# Patient Record
Sex: FEMALE | Race: WHITE | NOT HISPANIC OR LATINO | Employment: FULL TIME | URBAN - METROPOLITAN AREA
[De-identification: names, ages, dates, MRNs, and addresses within clinical notes are randomized per-mention and may not be internally consistent; named-entity substitution may affect disease eponyms.]

---

## 2017-01-16 ENCOUNTER — APPOINTMENT (OUTPATIENT)
Dept: GENERAL RADIOLOGY | Facility: CLINIC | Age: 32
End: 2017-01-16
Attending: EMERGENCY MEDICINE
Payer: COMMERCIAL

## 2017-01-16 ENCOUNTER — HOSPITAL ENCOUNTER (EMERGENCY)
Facility: CLINIC | Age: 32
Discharge: HOME | End: 2017-01-16
Attending: EMERGENCY MEDICINE | Admitting: EMERGENCY MEDICINE
Payer: COMMERCIAL

## 2017-01-16 VITALS — DIASTOLIC BLOOD PRESSURE: 81 MMHG | OXYGEN SATURATION: 100 % | TEMPERATURE: 97.8 F | SYSTOLIC BLOOD PRESSURE: 121 MMHG

## 2017-01-16 DIAGNOSIS — K59.09 OTHER CONSTIPATION: ICD-10-CM

## 2017-01-16 DIAGNOSIS — R10.84 ABDOMINAL PAIN, GENERALIZED: ICD-10-CM

## 2017-01-16 LAB
ALBUMIN SERPL-MCNC: 3.7 G/DL (ref 3.4–5)
ALBUMIN UR-MCNC: NEGATIVE MG/DL
ALP SERPL-CCNC: 45 U/L (ref 40–150)
ALT SERPL W P-5'-P-CCNC: 25 U/L (ref 0–50)
ANION GAP SERPL CALCULATED.3IONS-SCNC: 7 MMOL/L (ref 3–14)
APPEARANCE UR: CLEAR
AST SERPL W P-5'-P-CCNC: 22 U/L (ref 0–45)
BASOPHILS # BLD AUTO: 0 10E9/L (ref 0–0.2)
BASOPHILS NFR BLD AUTO: 0.2 %
BILIRUB SERPL-MCNC: 0.2 MG/DL (ref 0.2–1.3)
BILIRUB UR QL STRIP: NEGATIVE
BUN SERPL-MCNC: 12 MG/DL (ref 7–30)
CALCIUM SERPL-MCNC: 8.3 MG/DL (ref 8.5–10.1)
CHLORIDE SERPL-SCNC: 104 MMOL/L (ref 94–109)
CO2 SERPL-SCNC: 29 MMOL/L (ref 20–32)
COLOR UR AUTO: ABNORMAL
CREAT SERPL-MCNC: 0.64 MG/DL (ref 0.52–1.04)
DIFFERENTIAL METHOD BLD: ABNORMAL
EOSINOPHIL # BLD AUTO: 0.1 10E9/L (ref 0–0.7)
EOSINOPHIL NFR BLD AUTO: 1.5 %
ERYTHROCYTE [DISTWIDTH] IN BLOOD BY AUTOMATED COUNT: 12.4 % (ref 10–15)
GFR SERPL CREATININE-BSD FRML MDRD: ABNORMAL ML/MIN/1.7M2
GLUCOSE SERPL-MCNC: 88 MG/DL (ref 70–99)
GLUCOSE UR STRIP-MCNC: NEGATIVE MG/DL
HCG UR QL: NEGATIVE
HCT VFR BLD AUTO: 40.7 % (ref 35–47)
HGB BLD-MCNC: 13.5 G/DL (ref 11.7–15.7)
HGB UR QL STRIP: NEGATIVE
IMM GRANULOCYTES # BLD: 0 10E9/L (ref 0–0.4)
IMM GRANULOCYTES NFR BLD: 0 %
KETONES UR STRIP-MCNC: 10 MG/DL
LEUKOCYTE ESTERASE UR QL STRIP: ABNORMAL
LYMPHOCYTES # BLD AUTO: 1.7 10E9/L (ref 0.8–5.3)
LYMPHOCYTES NFR BLD AUTO: 32.3 %
MCH RBC QN AUTO: 30.3 PG (ref 26.5–33)
MCHC RBC AUTO-ENTMCNC: 33.2 G/DL (ref 31.5–36.5)
MCV RBC AUTO: 91 FL (ref 78–100)
MONOCYTES # BLD AUTO: 0.4 10E9/L (ref 0–1.3)
MONOCYTES NFR BLD AUTO: 7 %
MUCOUS THREADS #/AREA URNS LPF: PRESENT /LPF
NEUTROPHILS # BLD AUTO: 3.1 10E9/L (ref 1.6–8.3)
NEUTROPHILS NFR BLD AUTO: 59 %
NITRATE UR QL: NEGATIVE
PH UR STRIP: 6 PH (ref 5–7)
PLATELET # BLD AUTO: 124 10E9/L (ref 150–450)
POTASSIUM SERPL-SCNC: 3.6 MMOL/L (ref 3.4–5.3)
PROT SERPL-MCNC: 6.3 G/DL (ref 6.8–8.8)
RBC # BLD AUTO: 4.46 10E12/L (ref 3.8–5.2)
RBC #/AREA URNS AUTO: <1 /HPF (ref 0–2)
SODIUM SERPL-SCNC: 140 MMOL/L (ref 133–144)
SP GR UR STRIP: 1.02 (ref 1–1.03)
SQUAMOUS #/AREA URNS AUTO: 12 /HPF (ref 0–1)
URN SPEC COLLECT METH UR: ABNORMAL
UROBILINOGEN UR STRIP-MCNC: NORMAL MG/DL (ref 0–2)
WBC # BLD AUTO: 5.3 10E9/L (ref 4–11)
WBC #/AREA URNS AUTO: <1 /HPF (ref 0–2)

## 2017-01-16 PROCEDURE — 85025 COMPLETE CBC W/AUTO DIFF WBC: CPT | Performed by: FAMILY MEDICINE

## 2017-01-16 PROCEDURE — 99284 EMERGENCY DEPT VISIT MOD MDM: CPT | Mod: 25

## 2017-01-16 PROCEDURE — 96372 THER/PROPH/DIAG INJ SC/IM: CPT

## 2017-01-16 PROCEDURE — 81025 URINE PREGNANCY TEST: CPT | Performed by: FAMILY MEDICINE

## 2017-01-16 PROCEDURE — 80053 COMPREHEN METABOLIC PANEL: CPT | Performed by: FAMILY MEDICINE

## 2017-01-16 PROCEDURE — 81001 URINALYSIS AUTO W/SCOPE: CPT | Performed by: FAMILY MEDICINE

## 2017-01-16 PROCEDURE — 74020 XR ABDOMEN 2 VW: CPT

## 2017-01-16 PROCEDURE — 99284 EMERGENCY DEPT VISIT MOD MDM: CPT | Performed by: EMERGENCY MEDICINE

## 2017-01-16 PROCEDURE — 25000125 ZZHC RX 250: Performed by: EMERGENCY MEDICINE

## 2017-01-16 RX ORDER — SENNOSIDES 8.6 MG
2 TABLET ORAL 2 TIMES DAILY
COMMUNITY

## 2017-01-16 RX ORDER — KETOROLAC TROMETHAMINE 30 MG/ML
30 INJECTION, SOLUTION INTRAMUSCULAR; INTRAVENOUS ONCE
Status: COMPLETED | OUTPATIENT
Start: 2017-01-16 | End: 2017-01-16

## 2017-01-16 RX ADMIN — KETOROLAC TROMETHAMINE 30 MG: 30 INJECTION, SOLUTION INTRAMUSCULAR at 15:38

## 2017-01-16 NOTE — ED AVS SNAPSHOT
[facility]    [address] [address]    Phone:  [phone]    Fax:  [phone]                                       Merissa Leung   MRN: [id number]    Department:  [facility]   Date of Visit:  1/16/2017           After Visit Summary Signature Page     I have received my discharge instructions, and my questions have been answered. I have discussed any challenges I see with this plan with the nurse or doctor.    ..........................................................................................................................................  Patient/Patient Representative Signature      ..........................................................................................................................................  Patient Representative Print Name and Relationship to Patient    ..................................................               ................................................  Date                                            Time    ..........................................................................................................................................  Reviewed by Signature/Title    ...................................................              ..............................................  Date                                                            Time

## 2017-01-16 NOTE — ED NOTES
pt noticed last night at 2030 she passed a pinkish/white clot like, either vag/rectal unsure, now having pain from the top of neck down her back with more  in lower back into fronot, no bleeding today

## 2017-01-16 NOTE — ED PROVIDER NOTES
"  History     Chief Complaint   Patient presents with     Rectal Bleeding     pt noticed last night at  she passed a pinkish/white clot like, either vag/rectal unsure, now having pain from the top of neck down her back with more  in lower back into fronot, no bleeding today      Threatened Miscarriage     HPI  Merissa Leung is a 31 year old female with a history of chronic anxiety, abdominal pain, and chronic constipation who presents to the ED for concerns of possible rectal bleeding. Patient reports last night at 8:30 pm she developed back pain from the upper back down to the low back that has progressively worsened. She also noted lower abdominal pain. Around that time, she voided and then felt the need to have a bowel movement, but felt like she was straining excessively to pass it. Then when she looked in the toilet she saw \"a mass,\" pinkish/white in color. She explains that it did not look like a clot to her. She says that she has passed many clots in the past due to her history of IBS, food intolerances, bowel obstructions, and chronic constipation. Her bowel program includes daily Miralax and Senna and she has been doing this as instructed. Her bowel movements have been regular, but she has felt slightly constipated for about 4 weeks. She also had c. Diff within the last 6 months but this resolved completely. Patient has not taken any medication for her pain today. Denies nausea or vomiting. Surgical history includes a  11 months ago.      Patient Active Problem List   Diagnosis     Hyperlipidemia LDL goal <160     Headaches, tension     Chronic constipation     OCD (obsessive compulsive disorder)     Anxiety     Fibromyalgia     Chronic abdominal pain     Current Outpatient Prescriptions   Medication Sig Dispense Refill     sennosides (SENOKOT) 8.6 MG tablet Take 2 tablets by mouth 2 times daily       clonazePAM (KLONOPIN) 1 MG tablet Take 1/2 twice a day for anxiety, 1 and 1/2 at bedtime for " sleep 75 tablet 2     lisdexamfetamine (VYVANSE) 30 MG capsule Take 1 capsule (30 mg) by mouth daily 30 capsule 0     polyethylene glycol (MIRALAX/GLYCOLAX) powder Take 17 g by mouth daily as needed for constipation       Multiple Vitamins-Minerals (MULTIVITAL PO) Take 1 chew tab by mouth daily Gummi       [START ON 2/9/2017] lisdexamfetamine (VYVANSE) 30 MG capsule Take 1 capsule (30 mg) by mouth daily 30 capsule 0     ondansetron (ZOFRAN ODT) 4 MG disintegrating tablet Take 1-2 tablets (4-8 mg) by mouth every 8 hours as needed for nausea or vomiting 20 tablet 1     Allergies   Allergen Reactions     Dilaudid [Hydromorphone] Nausea and Vomiting     Nkda [No Known Drug Allergies]      I have reviewed the Medications, Allergies, Past Medical and Surgical History, and Social History in the Epic system.    Review of Systems  All other systems are reviewed and are negative.    Physical Exam   BP: 121/81 mmHg  Heart Rate: 68  Temp: 97.8  F (36.6  C)  SpO2: 100 %  Physical Exam   Nontoxic-appearing no respiratory distress alert and oriented ×3, lying in the fetal position, slow to move to us from sitting to supine  Head atraumatic normocephalic  Oropharynx moist without lesions or  Skin pink warm and dry  Lungs clear  Tenderness parathoracic paralumbar musculature without rash  Heart regular no murmur  Abdomen soft scaphoid bowel sounds positive mild diffuse tenderness without guarding or rebound    ED Course   Procedures             Labs Ordered and Resulted from Time of ED Arrival Up to the Time of Departure from the ED   URINE MACROSCOPIC WITH REFLEX TO MICRO - Abnormal; Notable for the following:     Ketones Urine 10 (*)     Leukocyte Esterase Urine Small (*)     Squamous Epithelial /HPF Urine 12 (*)     Mucous Urine Present (*)     All other components within normal limits   CBC WITH PLATELETS DIFFERENTIAL - Abnormal; Notable for the following:     Platelet Count 124 (*)     All other components within normal limits    COMPREHENSIVE METABOLIC PANEL - Abnormal; Notable for the following:     Calcium 8.3 (*)     Protein Total 6.3 (*)     All other components within normal limits   HCG QUALITATIVE URINE     No results found for this or any previous visit (from the past 24 hour(s)).    Medications   ketorolac (TORADOL) injection 30 mg (30 mg Intramuscular Given 1/16/17 4718)     Reevaluated after Toradol, Tylenol and tap water enema.  Symptoms subsided somewhat, flat and upright of the abdomen show no evidence for obstruction, moderate stool left lower colon and right lower colon.  2:55 PM Patient assessed.    Assessments & Plan (with Medical Decision Making)  31-year-old female acute on chronic abdominal pain history constipation, no evidence for intra-abdominal infection, ball distraction, urinary tract infection, cholecystitis versus other.  Exacerbation of chronic abdominal pain, recommend continuing bowel regimen, fleets enemas at home, follow-up with regular physician, return criteria reviewed.       I have reviewed the nursing notes.    I have reviewed the findings, diagnosis, plan and need for follow up with the patient.    Discharge Medication List as of 1/16/2017  5:35 PM          Final diagnoses:   Abdominal pain, generalized   Other constipation     This document serves as a record of the services and decisions personally performed and made by Saqib Rollins MD. It was created on HIS/HER behalf by Mirtha Ferguson, a trained medical scribe. The creation of this document is based the provider's statements to the medical scribe.  Mirtha Ferguson 2:36 PM 1/16/2017    Provider:   The information in this document, created by the medical scribe for me, accurately reflects the services I personally performed and the decisions made by me. I have reviewed and approved this document for accuracy prior to leaving the patient care area.  Saqib Rollins MD 2:36 PM 1/16/2017 1/16/2017   [facility]  DEPARTMENT      Saqib Rollins MD  01/17/17 1974

## 2017-01-16 NOTE — DISCHARGE INSTRUCTIONS
*Abdominal Pain, Unknown Cause (Female)    The exact cause of your abdominal (stomach) pain is not certain. This does not mean that this is something to worry about, or the right tests were not done. Everyone likes to know the exact cause of the problem, but sometimes with abdominal pain, there is no clear-cut cause, and this could be a good thing. The good news is that your symptoms can be treated, and you will feel better.   Your condition does not seem serious now; however, sometimes the signs of a serious problem may take more time to appear. For this reason, it is important for you to watch for any new symptoms, problems, or worsening of your condition.  Over the next few days, the abdominal pain may come and go, or be continuous. Other common symptoms can include nausea and vomiting. Sometimes it can be difficult to tell if you feel nauseous, you may just feel bad and not associate that feeling with nausea. Constipation, diarrhea, and a fever may go along with the pain.  The pain may continue even if treated correctly over the following days. Depending on how things go, sometimes the cause can become clear and may require further or different treatment. Additional evaluations, medications, or tests may be needed.  Home care  Your health care provider may prescribe medications for pain, symptoms, or an infection.  Follow the health care provider's instructions for taking these medications.  General care    Rest until your next exam. No strenuous activities.    Try to find positions that ease discomfort. A small pillow placed on the abdomen may help relieve pain.    Something warm on your abdomen (such as a heating pad) may help, but be careful not to burn yourself.  Diet    Do not force yourself to eat, especially if having cramps, vomiting, or diarrhea.    Water is important so you do not get dehydrated. Soup may also be good. Sports drinks may also help, especially if they are not too acidic. Make sure you  don't drink sugary drinks as this can make things worse. Take liquids in small amounts. Do not guzzle them.    Caffeine sometimes makes the pain and cramping worse.    Avoid dairy products if you have vomiting or diarrhea.    Don't eat large amounts at a time. Wait a few minutes between bites.    Eat a diet low in fiber (called a low-residue diet). Foods allowed include refined breads, white rice, fruit and vegetable juices without pulp, tender meats. These foods will pass more easily through the intestine.    Avoid fried or fatty foods, dairy, alcohol and spicy foods until your symptoms go away.  Follow-up care  Follow up with your health care provider as instructed, or if your pain does not begin to improve in the next 24 hours.  When to seek medical care  Seek prompt medical care if any of the following occur:    Pain gets worse or moves to the right lower abdomen    New or worsening vomiting or diarrhea    Swelling of the abdomen    Unable to pass stool for more than three days    New fever over 101  F (38.3 C), or rising fever    Blood in vomit or bowel movements (dark red or black color)    Jaundice (yellow color of eyes and skin)    Weakness, dizziness    Chest, arm, back, neck or jaw pain    Unexpected vaginal bleeding or missed period  Call 911  Call emergency services if any of the following occur:    Trouble breathing    Confusion    Fainting or loss of consciousness    Rapid heart rate    Seizure    [id number] [facility], [address] [place] [address], [place] [address]. All rights reserved. This information is not intended as a substitute for professional medical care. Always follow your healthcare professional's instructions.          Constipation (Adult)  Constipation means that you have bowel movements that are less frequent than usual. Stools often become very hard and difficult to pass.  Constipation is very common. At some point in life it affects almost everyone. Since everyone's bowel habits are  different, what is constipation to one person may not be to another. Your healthcare provider may do tests to diagnose constipation. It depends on what he or she finds when evaluating you.    Symptoms of constipation include:    Abdominal pain    Bloating    Vomiting    Painful bowel movements    Itching, swelling, bleeding, or pain around the anus  Causes  Constipation can have many causes. These include:    Diet low in fiber    Too much dairy    Not drinking enough liquids    Lack of exercise or physical activity. This is especially true for older adults.    Changes in lifestyle or daily routine, including pregnancy, aging, work, and travel    Frequent use or misuse of laxatives    Ignoring the urge to have a bowel movement or delaying it until later    Medicines, such as certain prescription pain medicines, iron supplements, antacids, certain antidepressants, and calcium supplements    Diseases like irritable bowel syndrome, bowel obstructions, stroke, diabetes, thyroid disease, Parkinson disease, hemorrhoids, and colon cancer  Complications  Potential complications of constipation can include:    Hemorrhoids    Rectal bleeding from hemorrhoids or anal fissures (skin tears)    Hernias    Dependency on laxatives    Chronic constipation    Fecal impaction    Bowel obstruction or perforation  Home care  All treatment should be done after talking with your healthcare provider. This is especially true if you have another medical problems, are taking prescription medicines, or are an older adult. Treatment most often involves lifestyle changes. You may also need medicines. Your healthcare provider will tell you which will work best for you. Follow the advice below to help avoid this problem in the future.  Lifestyle changes  These lifestyle changes can help prevent constipation:    Diet. Eat a high-fiber diet, with fresh fruit and vegetables, and reduce dairy intake, meats, and processed foods    Fluids. It's important  to get enough fluids each day. Drink plenty of water when you eat more fiber. If you are on diet that limits the amount of fluid you can have, talk about this with your healthcare provider.    Regular exercise. Check with your healthcare provider first.  Medications  Take any medicines as directed. Some laxatives are safe to use only every now and then. Others can be taken on a regular basis. Talk with your doctor or pharmacist if you have questions.  Prescription pain medicines can cause constipation. If you are taking this kind of medicine, ask your healthcare provider if you should also take a stool softener.  Medicines you may take to treat constipation include:    Fiber supplements    Stool softeners    Laxatives    Enemas    Rectal suppositories  Follow-up care  Follow up with your healthcare provider if symptoms don't get better in the next few days. You may need to have more tests or see a specialist.  Call 911  Call 911 if any of these occur:    Trouble breathing    Stiff, rigid abdomen that is severely painful to touch    Confusion    Fainting or loss of consciousness    Rapid heart rate    Chest pain  When to seek medical advice  Call your healthcare provider right away if any of these occur:    Fever over 100.4 F (38 C)    Failure to resume normal bowel movements    Pain in your abdomen or back gets worse    Nausea or vomiting    Swelling in your abdomen    Blood in the stool    Black, tarry stool    Involuntary weight loss    Weakness    [id number] [employer]. [address] [place] [address], [place] [address]. All rights reserved. This information is not intended as a substitute for professional medical care. Always follow your healthcare professional's instructions.      Try Fleets enema

## 2017-01-16 NOTE — ED NOTES
Tap water enema administered.  Pt placed on her left side of comfort and encouraged to hold in contents for at least 15 minutes.  Pt agreed.

## 2017-01-16 NOTE — ED AVS SNAPSHOT
[facility] [place] [facility]    [address]    [facility] [address]    Phone:  [phone]    Fax:  [phone]                                       Merissa Leung   MRN: [id number]    Department:  [facility] [place] [facility]   Date of Visit:  1/16/2017           Patient Information     Date Of Birth          1985        Your diagnoses for this visit were:     Abdominal pain, generalized     Other constipation        You were seen by Saqib Rollins MD.      Follow-up Information     Follow up with Mohini Mccord NP.    Specialty:  Nurse Practitioner - Adult Health    Contact information:    [facility]  [address] [facility] [address]  [phone]          Discharge Instructions         *Abdominal Pain, Unknown Cause (Female)    The exact cause of your abdominal (stomach) pain is not certain. This does not mean that this is something to worry about, or the right tests were not done. Everyone likes to know the exact cause of the problem, but sometimes with abdominal pain, there is no clear-cut cause, and this could be a good thing. The good news is that your symptoms can be treated, and you will feel better.   Your condition does not seem serious now; however, sometimes the signs of a serious problem may take more time to appear. For this reason, it is important for you to watch for any new symptoms, problems, or worsening of your condition.  Over the next few days, the abdominal pain may come and go, or be continuous. Other common symptoms can include nausea and vomiting. Sometimes it can be difficult to tell if you feel nauseous, you may just feel bad and not associate that feeling with nausea. Constipation, diarrhea, and a fever may go along with the pain.  The pain may continue even if treated correctly over the following days. Depending on how things go, sometimes the cause can become clear and may require further or different treatment. Additional  evaluations, medications, or tests may be needed.  Home care  Your health care provider may prescribe medications for pain, symptoms, or an infection.  Follow the health care provider's instructions for taking these medications.  General care    Rest until your next exam. No strenuous activities.    Try to find positions that ease discomfort. A small pillow placed on the abdomen may help relieve pain.    Something warm on your abdomen (such as a heating pad) may help, but be careful not to burn yourself.  Diet    Do not force yourself to eat, especially if having cramps, vomiting, or diarrhea.    Water is important so you do not get dehydrated. Soup may also be good. Sports drinks may also help, especially if they are not too acidic. Make sure you don't drink sugary drinks as this can make things worse. Take liquids in small amounts. Do not guzzle them.    Caffeine sometimes makes the pain and cramping worse.    Avoid dairy products if you have vomiting or diarrhea.    Don't eat large amounts at a time. Wait a few minutes between bites.    Eat a diet low in fiber (called a low-residue diet). Foods allowed include refined breads, white rice, fruit and vegetable juices without pulp, tender meats. These foods will pass more easily through the intestine.    Avoid fried or fatty foods, dairy, alcohol and spicy foods until your symptoms go away.  Follow-up care  Follow up with your health care provider as instructed, or if your pain does not begin to improve in the next 24 hours.  When to seek medical care  Seek prompt medical care if any of the following occur:    Pain gets worse or moves to the right lower abdomen    New or worsening vomiting or diarrhea    Swelling of the abdomen    Unable to pass stool for more than three days    New fever over 101  F (38.3 C), or rising fever    Blood in vomit or bowel movements (dark red or black color)    Jaundice (yellow color of eyes and skin)    Weakness, dizziness    Chest,  arm, back, neck or jaw pain    Unexpected vaginal bleeding or missed period  Call 911  Call emergency services if any of the following occur:    Trouble breathing    Confusion    Fainting or loss of consciousness    Rapid heart rate    Seizure    [id number] [employer], [address] [facility], [place] [address]. All rights reserved. This information is not intended as a substitute for professional medical care. Always follow your healthcare professional's instructions.          Constipation (Adult)  Constipation means that you have bowel movements that are less frequent than usual. Stools often become very hard and difficult to pass.  Constipation is very common. At some point in life it affects almost everyone. Since everyone's bowel habits are different, what is constipation to one person may not be to another. Your healthcare provider may do tests to diagnose constipation. It depends on what he or she finds when evaluating you.    Symptoms of constipation include:    Abdominal pain    Bloating    Vomiting    Painful bowel movements    Itching, swelling, bleeding, or pain around the anus  Causes  Constipation can have many causes. These include:    Diet low in fiber    Too much dairy    Not drinking enough liquids    Lack of exercise or physical activity. This is especially true for older adults.    Changes in lifestyle or daily routine, including pregnancy, aging, work, and travel    Frequent use or misuse of laxatives    Ignoring the urge to have a bowel movement or delaying it until later    Medicines, such as certain prescription pain medicines, iron supplements, antacids, certain antidepressants, and calcium supplements    Diseases like irritable bowel syndrome, bowel obstructions, stroke, diabetes, thyroid disease, Parkinson disease, hemorrhoids, and colon cancer  Complications  Potential complications of constipation can include:    Hemorrhoids    Rectal bleeding from hemorrhoids or anal fissures (skin  tears)    Hernias    Dependency on laxatives    Chronic constipation    Fecal impaction    Bowel obstruction or perforation  Home care  All treatment should be done after talking with your healthcare provider. This is especially true if you have another medical problems, are taking prescription medicines, or are an older adult. Treatment most often involves lifestyle changes. You may also need medicines. Your healthcare provider will tell you which will work best for you. Follow the advice below to help avoid this problem in the future.  Lifestyle changes  These lifestyle changes can help prevent constipation:    Diet. Eat a high-fiber diet, with fresh fruit and vegetables, and reduce dairy intake, meats, and processed foods    Fluids. It's important to get enough fluids each day. Drink plenty of water when you eat more fiber. If you are on diet that limits the amount of fluid you can have, talk about this with your healthcare provider.    Regular exercise. Check with your healthcare provider first.  Medications  Take any medicines as directed. Some laxatives are safe to use only every now and then. Others can be taken on a regular basis. Talk with your doctor or pharmacist if you have questions.  Prescription pain medicines can cause constipation. If you are taking this kind of medicine, ask your healthcare provider if you should also take a stool softener.  Medicines you may take to treat constipation include:    Fiber supplements    Stool softeners    Laxatives    Enemas    Rectal suppositories  Follow-up care  Follow up with your healthcare provider if symptoms don't get better in the next few days. You may need to have more tests or see a specialist.  Call 911  Call 911 if any of these occur:    Trouble breathing    Stiff, rigid abdomen that is severely painful to touch    Confusion    Fainting or loss of consciousness    Rapid heart rate    Chest pain  When to seek medical advice  Call your healthcare provider  right away if any of these occur:    Fever over 100.4 F (38 C)    Failure to resume normal bowel movements    Pain in your abdomen or back gets worse    Nausea or vomiting    Swelling in your abdomen    Blood in the stool    Black, tarry stool    Involuntary weight loss    Weakness    [id number] [employer]. [address] [place] [address]. All rights reserved. This information is not intended as a substitute for professional medical care. Always follow your healthcare professional's instructions.      Try Fleets enema    24 Hour Appointment Hotline       To make an appointment at any [facility], call [phone] ([phone]). If you don't have a family doctor or clinic, we will help you find one. [facility] are conveniently located to serve the needs of you and your family.             Review of your medicines      Our records show that you are taking the medicines listed below. If these are incorrect, please call your family doctor or clinic.        Dose / Directions Last dose taken    clonazePAM 1 MG tablet   Commonly known as:  klonoPIN   Quantity:  75 tablet        Take 1/2 twice a day for anxiety, 1 and 1/2 at bedtime for sleep   Refills:  2        * lisdexamfetamine 30 MG capsule   Commonly known as:  VYVANSE   Dose:  30 mg   Quantity:  30 capsule        Take 1 capsule (30 mg) by mouth daily   Refills:  0        * lisdexamfetamine 30 MG capsule   Commonly known as:  VYVANSE   Dose:  30 mg   Quantity:  30 capsule   Start taking on:  2/9/2017        Take 1 capsule (30 mg) by mouth daily   Refills:  0        MULTIVITAL PO   Dose:  1 chew tab        Take 1 chew tab by mouth daily Gummi   Refills:  0        ondansetron 4 MG ODT tab   Commonly known as:  ZOFRAN ODT   Dose:  4-8 mg   Quantity:  20 tablet        Take 1-2 tablets (4-8 mg) by mouth every 8 hours as needed for nausea or vomiting   Refills:  1        polyethylene glycol powder   Commonly known as:   MIRALAX/GLYCOLAX   Dose:  17 g        Take 17 g by mouth daily as needed for constipation   Refills:  0        sennosides 8.6 MG tablet   Commonly known as:  SENOKOT   Dose:  2 tablet        Take 2 tablets by mouth 2 times daily   Refills:  0        * Notice:  This list has 2 medication(s) that are the same as other medications prescribed for you. Read the directions carefully, and ask your doctor or other care provider to review them with you.            Procedures and tests performed during your visit     Abdomen, flat/upright (2 views)    CBC with platelets differential    Comprehensive metabolic panel    HCG qualitative urine    UA reflex to Microscopic      Orders Needing Specimen Collection     None      Pending Results     No orders found from 1/15/2017 to 1/17/2017.            Pending Culture Results     No orders found from 1/15/2017 to 1/17/2017.       Test Results from your hospital stay           1/16/2017  2:15 PM - Interface, Flexilab Results      Component Results     Component Value Ref Range & Units Status    Color Urine Dark Yellow  Final    Appearance Urine Clear  Final    Glucose Urine Negative NEG mg/dL Final    Bilirubin Urine Negative NEG Final    Ketones Urine 10 (A) NEG mg/dL Final    Specific Gravity Urine 1.017 1.003 - 1.035 Final    Blood Urine Negative NEG Final    pH Urine 6.0 5.0 - 7.0 pH Final    Protein Albumin Urine Negative NEG mg/dL Final    Urobilinogen mg/dL Normal 0.0 - 2.0 mg/dL Final    Nitrite Urine Negative NEG Final    Leukocyte Esterase Urine Small (A) NEG Final    Source Midstream Urine  Final    RBC Urine <1 0 - 2 /HPF Final    WBC Urine <1 0 - 2 /HPF Final    Squamous Epithelial /HPF Urine 12 (H) 0 - 1 /HPF Final    Mucous Urine Present (A) NEG /LPF Final         1/16/2017  2:09 PM - Interface, Flexilab Results      Component Results     Component Value Ref Range & Units Status    HCG Qual Urine Negative NEG Final         1/16/2017  2:08 PM - Interface, Flexilab  Results      Component Results     Component Value Ref Range & Units Status    WBC 5.3 4.0 - 11.0 10e9/L Final    RBC Count 4.46 3.8 - 5.2 10e12/L Final    Hemoglobin 13.5 11.7 - 15.7 g/dL Final    Hematocrit 40.7 35.0 - 47.0 % Final    MCV 91 78 - 100 fl Final    MCH 30.3 26.5 - 33.0 pg Final    MCHC 33.2 31.5 - 36.5 g/dL Final    RDW 12.4 10.0 - 15.0 % Final    Platelet Count 124 (L) 150 - 450 10e9/L Final    Diff Method Automated Method  Final    % Neutrophils 59.0 % Final    % Lymphocytes 32.3 % Final    % Monocytes 7.0 % Final    % Eosinophils 1.5 % Final    % Basophils 0.2 % Final    % Immature Granulocytes 0.0 % Final    Absolute Neutrophil 3.1 1.6 - 8.3 10e9/L Final    Absolute Lymphocytes 1.7 0.8 - 5.3 10e9/L Final    Absolute Monocytes 0.4 0.0 - 1.3 10e9/L Final    Absolute Eosinophils 0.1 0.0 - 0.7 10e9/L Final    Absolute Basophils 0.0 0.0 - 0.2 10e9/L Final    Abs Immature Granulocytes 0.0 0 - 0.4 10e9/L Final         1/16/2017  2:32 PM - Interface, Flexilab Results      Component Results     Component Value Ref Range & Units Status    Sodium 140 133 - 144 mmol/L Final    Potassium 3.6 3.4 - 5.3 mmol/L Final    Chloride 104 94 - 109 mmol/L Final    Carbon Dioxide 29 20 - 32 mmol/L Final    Anion Gap 7 3 - 14 mmol/L Final    Glucose 88 70 - 99 mg/dL Final    Urea Nitrogen 12 7 - 30 mg/dL Final    Creatinine 0.64 0.52 - 1.04 mg/dL Final    GFR Estimate >90  Non  GFR Calc   >60 mL/min/1.7m2 Final    GFR Estimate If Black >90   GFR Calc   >60 mL/min/1.7m2 Final    Calcium 8.3 (L) 8.5 - 10.1 mg/dL Final    Bilirubin Total 0.2 0.2 - 1.3 mg/dL Final    Albumin 3.7 3.4 - 5.0 g/dL Final    Protein Total 6.3 (L) 6.8 - 8.8 g/dL Final    Alkaline Phosphatase 45 40 - 150 U/L Final    ALT 25 0 - 50 U/L Final    AST 22 0 - 45 U/L Final         1/16/2017  3:43 PM - Interface, Radiant Ib      Narrative     XR ABDOMEN 2 VW 1/16/2017 3:30 PM    HISTORY: Abdominal pain.    COMPARISON:  "2013    FINDINGS: No evidence of free intraperitoneal air. The bowel gas  pattern is nonobstructive.        Impression     IMPRESSION: Nonobstructive gas pattern.    NAV ROBERTS MD                Thank you for choosing [facility]       Thank you for choosing [facility] for your care. Our goal is always to provide you with excellent care. Hearing back from our patients is one way we can continue to improve our services. Please take a few minutes to complete the written survey that you may receive in the mail after you visit with us. Thank you!        [employer] Information     [employer] lets you send messages to your doctor, view your test results, renew your prescriptions, schedule appointments and more. To sign up, go to [email or web address] . Click on \"Log in\" on the left side of the screen, which will take you to the Welcome page. Then click on \"Sign up Now\" on the right side of the page.     You will be asked to enter the access code listed below, as well as some personal information. Please follow the directions to create your username and password.     Your access code is: [email or web address]  Expires: 3/9/2017  8:14 AM     Your access code will  in 90 days. If you need help or a new code, please call your [facility] clinic or [phone].        Care EveryWhere ID     This is your Care EveryWhere ID. This could be used by other organizations to access your [facility] medical records  [id number]        After Visit Summary       This is your record. Keep this with you and show to your community pharmacist(s) and doctor(s) at your next visit.                  "

## 2017-03-07 ENCOUNTER — OFFICE VISIT (OUTPATIENT)
Dept: FAMILY MEDICINE | Facility: CLINIC | Age: 32
End: 2017-03-07
Payer: COMMERCIAL

## 2017-03-07 VITALS
BODY MASS INDEX: 17.21 KG/M2 | SYSTOLIC BLOOD PRESSURE: 129 MMHG | TEMPERATURE: 97.5 F | DIASTOLIC BLOOD PRESSURE: 81 MMHG | HEART RATE: 106 BPM | RESPIRATION RATE: 16 BRPM | WEIGHT: 105 LBS

## 2017-03-07 DIAGNOSIS — J06.9 VIRAL URI: ICD-10-CM

## 2017-03-07 DIAGNOSIS — R07.0 THROAT PAIN: Primary | ICD-10-CM

## 2017-03-07 LAB
DEPRECATED S PYO AG THROAT QL EIA: NORMAL
MICRO REPORT STATUS: NORMAL
SPECIMEN SOURCE: NORMAL

## 2017-03-07 PROCEDURE — 87081 CULTURE SCREEN ONLY: CPT | Performed by: NURSE PRACTITIONER

## 2017-03-07 PROCEDURE — 99212 OFFICE O/P EST SF 10 MIN: CPT | Performed by: NURSE PRACTITIONER

## 2017-03-07 PROCEDURE — 87880 STREP A ASSAY W/OPTIC: CPT | Performed by: NURSE PRACTITIONER

## 2017-03-07 NOTE — PROGRESS NOTES
SUBJECTIVE:                                                    Merissa Leung is a 31 year old female who presents to clinic today for the following health issues:       RESPIRATORY SYMPTOMS      Duration: 1 week    Description  sore throat, cough, wheezing, fever, fatigue/malaise and hoarse voice    Severity: moderate    Accompanying signs and symptoms: None    History (predisposing factors):  none    Precipitating or alleviating factors: None    Therapies tried and outcome:  OTC theraflu, ibuprofen, tylenol       Problem list and histories reviewed & adjusted, as indicated.  Additional history: as documented    Labs reviewed in EPIC    Reviewed and updated as needed this visit by clinical staff  Tobacco  Allergies  Meds  Problems  Med Hx  Surg Hx  Fam Hx  Soc Hx        Reviewed and updated as needed this visit by Provider  Allergies  Meds  Problems         ROS:  Constitutional, neuro, ENT, endocrine, pulmonary, cardiac, gastrointestinal, genitourinary, musculoskeletal, integument and psychiatric systems are negative, except as otherwise noted.    OBJECTIVE:                                                    /81 (BP Location: Right arm, Cuff Size: Adult Regular)  Pulse 106  Temp 97.5  F (36.4  C) (Tympanic)  Resp 16  Wt 105 lb (47.6 kg)  LMP 01/16/2017  BMI 17.21 kg/m2  Body mass index is 17.21 kg/(m^2).  GENERAL APPEARANCE: healthy, alert and no distress  HENT: ear canals and TM's normal and nose and mouth without ulcers or lesions  RESP: lungs clear to auscultation - no rales, rhonchi or wheezes  CV: regular rates and rhythm, normal S1 S2, no S3 or S4 and no murmur, click or rub  ABDOMEN: soft, nontender, without hepatosplenomegaly or masses and bowel sounds normal  SKIN: no suspicious lesions or rashes    Diagnostic test results:  Diagnostic Test Results:  Strep screen - Negative     ASSESSMENT/PLAN:                                                    1. Throat pain  - Strep, Rapid Screen  -  Beta strep group A culture    2. Viral URI      Patient Instructions   Suspect viral upper respiratory infection   Call if worsening the end of the week   Strep was negative       Viral Upper Respiratory Illness (Adult)  You have a viral upper respiratory illness (URI), which is another term for the common cold. This illness is contagious during the first few days. It is spread through the air by coughing and sneezing. It may also be spread by direct contact (touching the sick person and then touching your own eyes, nose, or mouth). Frequent handwashing will decrease risk of spread. Most viral illnesses go away within 7 to 10 days with rest and simple home remedies. Sometimes the illness may last for several weeks. Antibiotics will not kill a virus, and they are generally not prescribed for this condition.    Home care    If symptoms are severe, rest at home for the first 2 to 3 days. When you resume activity, don't let yourself get too tired.    Avoid being exposed to cigarette smoke (yours or others ).    You may use acetaminophen or ibuprofen to control pain and fever, unless another medicine was prescribed. (Note: If you have chronic liver or kidney disease, have ever had a stomach ulcer or gastrointestinal bleeding, or are taking blood-thinning medicines, talk with your healthcare provider before using these medicines.) Aspirin should never be given to anyone under 18 years of age who is ill with a viral infection or fever. It may cause severe liver or brain damage.    Your appetite may be poor, so a light diet is fine. Avoid dehydration by drinking 6 to 8 glasses of fluids per day (water, soft drinks, juices, tea, or soup). Extra fluids will help loosen secretions in the nose and lungs.    Over-the-counter cold medicines will not shorten the length of time you re sick, but they may be helpful for the following symptoms: cough, sore throat, and nasal and sinus congestion. (Note: Do not use decongestants if you  have high blood pressure.)  Follow-up care  Follow up with your healthcare provider, or as advised.  When to seek medical advice  Call your healthcare provider right away if any of these occur:    Cough with lots of colored sputum (mucus)    Severe headache; face, neck, or ear pain    Difficulty swallowing due to throat pain    Fever of 100.4 F (38 C)  Call 911, or get immediate medical care  Call emergency services right away if any of these occur:    Chest pain, shortness of breath, wheezing, or difficulty breathing    Coughing up blood    Inability to swallow due to throat pain    7927-8044 [employer]. [address] [place] [address]. All rights reserved. This information is not intended as a substitute for professional medical care. Always follow your healthcare professional's instructions.            Mohini Mccord NP  [facility]

## 2017-03-07 NOTE — LETTER
[facility]  [address] [facility] [address]  Phone: [phone]  Fax: [phone]    March 9, 2017    Merissa Leung  [address] [facility] [address]              Dear Ms. Leung,        The results of your recent throat culture were negative.  If you have any further questions or concerns please contact the clinic            Sincerely,      Анна Mccord NP/ ross

## 2017-03-07 NOTE — MR AVS SNAPSHOT
After Visit Summary   3/7/2017    Merissa Leung    MRN: [id number]           Patient Information     Date Of Birth          1985        Visit Information        Provider Department      3/7/2017 8:20 AM Mohini Mccord NP [facility]        Today's Diagnoses     Throat pain    -  1      Care Instructions    Suspect viral upper respiratory infection   Call if worsening the end of the week   Strep was negative       Viral Upper Respiratory Illness (Adult)  You have a viral upper respiratory illness (URI), which is another term for the common cold. This illness is contagious during the first few days. It is spread through the air by coughing and sneezing. It may also be spread by direct contact (touching the sick person and then touching your own eyes, nose, or mouth). Frequent handwashing will decrease risk of spread. Most viral illnesses go away within 7 to 10 days with rest and simple home remedies. Sometimes the illness may last for several weeks. Antibiotics will not kill a virus, and they are generally not prescribed for this condition.    Home care    If symptoms are severe, rest at home for the first 2 to 3 days. When you resume activity, don't let yourself get too tired.    Avoid being exposed to cigarette smoke (yours or others ).    You may use acetaminophen or ibuprofen to control pain and fever, unless another medicine was prescribed. (Note: If you have chronic liver or kidney disease, have ever had a stomach ulcer or gastrointestinal bleeding, or are taking blood-thinning medicines, talk with your healthcare provider before using these medicines.) Aspirin should never be given to anyone under 18 years of age who is ill with a viral infection or fever. It may cause severe liver or brain damage.    Your appetite may be poor, so a light diet is fine. Avoid dehydration by drinking 6 to 8 glasses of fluids per day (water, soft drinks, juices, tea, or soup). Extra fluids  will help loosen secretions in the nose and lungs.    Over-the-counter cold medicines will not shorten the length of time you re sick, but they may be helpful for the following symptoms: cough, sore throat, and nasal and sinus congestion. (Note: Do not use decongestants if you have high blood pressure.)  Follow-up care  Follow up with your healthcare provider, or as advised.  When to seek medical advice  Call your healthcare provider right away if any of these occur:    Cough with lots of colored sputum (mucus)    Severe headache; face, neck, or ear pain    Difficulty swallowing due to throat pain    Fever of 100.4 F (38 C)  Call 911, or get immediate medical care  Call emergency services right away if any of these occur:    Chest pain, shortness of breath, wheezing, or difficulty breathing    Coughing up blood    Inability to swallow due to throat pain    6211-0453 [employer]. [address] [place] [address]. All rights reserved. This information is not intended as a substitute for professional medical care. Always follow your healthcare professional's instructions.              Follow-ups after your visit        Who to contact     If you have questions or need follow up information about today's clinic visit or your schedule please contact [facility] directly at [phone].  Normal or non-critical lab and imaging results will be communicated to you by [employer] SPORT[employer]art, letter or phone within 4 business days after the clinic has received the results. If you do not hear from us within 7 days, please contact the clinic through [employer] SPORT[employer]art or phone. If you have a critical or abnormal lab result, we will notify you by phone as soon as possible.  Submit refill requests through "[email or web address] or call your pharmacy and they will forward the refill request to us. Please allow 3 business days for your refill to be completed.          Additional Information About Your Visit        MyChart Information   "   [employer] lets you send messages to your doctor, view your test results, renew your prescriptions, schedule appointments and more. To sign up, go to [email or web address] . Click on \"Log in\" on the left side of the screen, which will take you to the Welcome page. Then click on \"Sign up Now\" on the right side of the page.     You will be asked to enter the access code listed below, as well as some personal information. Please follow the directions to create your username and password.     Your access code is: [email or web address]  Expires: 3/9/2017  8:14 AM     Your access code will  in 90 days. If you need help or a new code, please call your [facility] clinic or [phone].        Care EveryWhere ID     This is your [facility] EveryWhere ID. This could be used by other organizations to access your [facility] medical records  [id number]        Your Vitals Were     Pulse Temperature Respirations Last Period BMI (Body Mass Index)       106 97.5  F (36.4  C) (Tympanic) 96 2017 17.21 kg/m2        Blood Pressure from Last 3 Encounters:   17 129/81   17 121/81   16 118/76    Weight from Last 3 Encounters:   17 105 lb (47.6 kg)   16 105 lb (47.6 kg)   10/03/16 106 lb (48.1 kg)              We Performed the Following     Beta strep group A culture     Strep, Rapid Screen        Primary Care Provider Office Phone # Fax #    Mohini RAHEL Mccord [phone]       [facility] [address]  [facility] [address]        Thank you!     Thank you for choosing [facility]  for your care. Our goal is always to provide you with excellent care. Hearing back from our patients is one way we can continue to improve our services. Please take a few minutes to complete the written survey that you may receive in the mail after your visit with us. Thank you!             Your Updated Medication List - Protect others around you: Learn how to safely use, store and throw " away your medicines at www.disposemymeds.org.          This list is accurate as of: 3/7/17  8:51 AM.  Always use your most recent med list.                   Brand Name Dispense Instructions for use    clonazePAM 1 MG tablet    klonoPIN    75 tablet    Take 1/2 twice a day for anxiety, 1 and 1/2 at bedtime for sleep       lisdexamfetamine 30 MG capsule    VYVANSE    30 capsule    Take 1 capsule (30 mg) by mouth daily       MULTIVITAL PO      Take 1 chew tab by mouth daily Gummi       ondansetron 4 MG ODT tab    ZOFRAN ODT    20 tablet    Take 1-2 tablets (4-8 mg) by mouth every 8 hours as needed for nausea or vomiting       polyethylene glycol powder    MIRALAX/GLYCOLAX     Take 17 g by mouth daily as needed for constipation       sennosides 8.6 MG tablet    SENOKOT     Take 2 tablets by mouth 2 times daily

## 2017-03-07 NOTE — PATIENT INSTRUCTIONS
Suspect viral upper respiratory infection   Call if worsening the end of the week   Strep was negative       Viral Upper Respiratory Illness (Adult)  You have a viral upper respiratory illness (URI), which is another term for the common cold. This illness is contagious during the first few days. It is spread through the air by coughing and sneezing. It may also be spread by direct contact (touching the sick person and then touching your own eyes, nose, or mouth). Frequent handwashing will decrease risk of spread. Most viral illnesses go away within 7 to 10 days with rest and simple home remedies. Sometimes the illness may last for several weeks. Antibiotics will not kill a virus, and they are generally not prescribed for this condition.    Home care    If symptoms are severe, rest at home for the first 2 to 3 days. When you resume activity, don't let yourself get too tired.    Avoid being exposed to cigarette smoke (yours or others ).    You may use acetaminophen or ibuprofen to control pain and fever, unless another medicine was prescribed. (Note: If you have chronic liver or kidney disease, have ever had a stomach ulcer or gastrointestinal bleeding, or are taking blood-thinning medicines, talk with your healthcare provider before using these medicines.) Aspirin should never be given to anyone under 18 years of age who is ill with a viral infection or fever. It may cause severe liver or brain damage.    Your appetite may be poor, so a light diet is fine. Avoid dehydration by drinking 6 to 8 glasses of fluids per day (water, soft drinks, juices, tea, or soup). Extra fluids will help loosen secretions in the nose and lungs.    Over-the-counter cold medicines will not shorten the length of time you re sick, but they may be helpful for the following symptoms: cough, sore throat, and nasal and sinus congestion. (Note: Do not use decongestants if you have high blood pressure.)  Follow-up care  Follow up with your  healthcare provider, or as advised.  When to seek medical advice  Call your healthcare provider right away if any of these occur:    Cough with lots of colored sputum (mucus)    Severe headache; face, neck, or ear pain    Difficulty swallowing due to throat pain    Fever of 100.4 F (38 C)  Call 911, or get immediate medical care  Call emergency services right away if any of these occur:    Chest pain, shortness of breath, wheezing, or difficulty breathing    Coughing up blood    Inability to swallow due to throat pain    9613-8278 [employer]. [address] [place] [address]. All rights reserved. This information is not intended as a substitute for professional medical care. Always follow your healthcare professional's instructions.

## 2017-03-07 NOTE — NURSING NOTE
"Chief Complaint   Patient presents with     URI     x 1 week       Initial /81 (BP Location: Right arm, Cuff Size: Adult Regular)  Pulse 106  Temp 97.5  F (36.4  C) (Tympanic)  Resp (!) 96  Wt 105 lb (47.6 kg)  LMP 01/16/2017  BMI 17.21 kg/m2 Estimated body mass index is 17.21 kg/(m^2) as calculated from the following:    Height as of 7/7/16: 5' 5.5\" (1.664 m).    Weight as of this encounter: 105 lb (47.6 kg).  Medication Reconciliation: complete    Health Maintenance that is potentially due pending provider review:  NONE    n/a    Juliana Valdez, CMA    "

## 2017-03-09 LAB
BACTERIA SPEC CULT: NORMAL
MICRO REPORT STATUS: NORMAL
SPECIMEN SOURCE: NORMAL

## 2017-03-21 ENCOUNTER — OFFICE VISIT (OUTPATIENT)
Dept: FAMILY MEDICINE | Facility: CLINIC | Age: 32
End: 2017-03-21
Payer: COMMERCIAL

## 2017-03-21 VITALS
RESPIRATION RATE: 16 BRPM | WEIGHT: 107 LBS | TEMPERATURE: 97.1 F | DIASTOLIC BLOOD PRESSURE: 70 MMHG | BODY MASS INDEX: 17.53 KG/M2 | HEART RATE: 72 BPM | SYSTOLIC BLOOD PRESSURE: 110 MMHG

## 2017-03-21 DIAGNOSIS — F50.819 BINGE EATING DISORDER: ICD-10-CM

## 2017-03-21 DIAGNOSIS — F41.1 GAD (GENERALIZED ANXIETY DISORDER): ICD-10-CM

## 2017-03-21 PROCEDURE — 99213 OFFICE O/P EST LOW 20 MIN: CPT | Performed by: NURSE PRACTITIONER

## 2017-03-21 RX ORDER — LISDEXAMFETAMINE DIMESYLATE 30 MG/1
30 CAPSULE ORAL DAILY
Qty: 30 CAPSULE | Refills: 0 | Status: SHIPPED | OUTPATIENT
Start: 2017-05-21 | End: 2017-06-20

## 2017-03-21 RX ORDER — CLONAZEPAM 1 MG/1
TABLET ORAL
Qty: 75 TABLET | Refills: 2 | Status: SHIPPED | OUTPATIENT
Start: 2017-03-21 | End: 2017-06-26

## 2017-03-21 RX ORDER — LISDEXAMFETAMINE DIMESYLATE 30 MG/1
30 CAPSULE ORAL DAILY
Qty: 30 CAPSULE | Refills: 0 | Status: SHIPPED | OUTPATIENT
Start: 2017-03-21 | End: 2017-06-26

## 2017-03-21 RX ORDER — LISDEXAMFETAMINE DIMESYLATE 30 MG/1
30 CAPSULE ORAL DAILY
Qty: 30 CAPSULE | Refills: 0 | Status: SHIPPED | OUTPATIENT
Start: 2017-04-21 | End: 2017-07-20 | Stop reason: DRUGHIGH

## 2017-03-21 NOTE — MR AVS SNAPSHOT
"              After Visit Summary   3/21/2017    Merissa Leung    MRN: [id number]           Patient Information     Date Of Birth          1985        Visit Information        Provider Department      3/21/2017 8:40 AM Mohini Mccord NP [facility]        Today's Diagnoses     Binge eating disorder        EDWARD (generalized anxiety disorder)          Care Instructions    meds refilled for 3 months         Follow-ups after your visit        Who to contact     If you have questions or need follow up information about today's clinic visit or your schedule please contact [facility] directly at [phone].  Normal or non-critical lab and imaging results will be communicated to you by Shawarmanjihart, letter or phone within 4 business days after the clinic has received the results. If you do not hear from us within 7 days, please contact the clinic through Shawarmanjihart or phone. If you have a critical or abnormal lab result, we will notify you by phone as soon as possible.  Submit refill requests through 5minutes or call your pharmacy and they will forward the refill request to us. Please allow 3 business days for your refill to be completed.          Additional Information About Your Visit        MyChart Information     5minutes lets you send messages to your doctor, view your test results, renew your prescriptions, schedule appointments and more. To sign up, go to [email or web address] . Click on \"Log in\" on the left side of the screen, which will take you to the Welcome page. Then click on \"Sign up Now\" on the right side of the page.     You will be asked to enter the access code listed below, as well as some personal information. Please follow the directions to create your username and password.     Your access code is: [email or web address]  Expires: 2017  8:59 AM     Your access code will  in 90 days. If you need help or a new code, please call your [facility] or [phone].      "   Care EveryWhere ID     This is your Care EveryWhere ID. This could be used by other organizations to access your [facility] medical records  [id number]        Your Vitals Were     Pulse Temperature Respirations Last Period Breastfeeding? BMI (Body Mass Index)    72 97.1  F (36.2  C) (Tympanic) 16 01/16/2017 No 17.53 kg/m2       Blood Pressure from Last 3 Encounters:   03/21/17 110/70   03/07/17 129/81   01/16/17 121/81    Weight from Last 3 Encounters:   03/21/17 107 lb (48.5 kg)   03/07/17 105 lb (47.6 kg)   12/09/16 105 lb (47.6 kg)              Today, you had the following     No orders found for display         Today's Medication Changes          These changes are accurate as of: 3/21/17  8:59 AM.  If you have any questions, ask your nurse or doctor.               Start taking these medicines.        Dose/Directions    * lisdexamfetamine 30 MG capsule   Commonly known as:  VYVANSE   Used for:  Binge eating disorder   Started by:  Mohini Mccord NP        Dose:  30 mg   Take 1 capsule (30 mg) by mouth daily   Quantity:  30 capsule   Refills:  0       * lisdexamfetamine 30 MG capsule   Commonly known as:  VYVANSE   Used for:  Binge eating disorder   Started by:  Mohini Mccord NP        Dose:  30 mg   Start taking on:  4/21/2017   Take 1 capsule (30 mg) by mouth daily   Quantity:  30 capsule   Refills:  0       * lisdexamfetamine 30 MG capsule   Commonly known as:  VYVANSE   Used for:  Binge eating disorder   Started by:  Mohini Mccord NP        Dose:  30 mg   Start taking on:  5/21/2017   Take 1 capsule (30 mg) by mouth daily   Quantity:  30 capsule   Refills:  0       * Notice:  This list has 3 medication(s) that are the same as other medications prescribed for you. Read the directions carefully, and ask your doctor or other care provider to review them with you.         Where to get your medicines      Some of these will need a paper prescription and others can be bought over the counter.  Ask your  nurse if you have questions.     Bring a paper prescription for each of these medications     clonazePAM 1 MG tablet    lisdexamfetamine 30 MG capsule    lisdexamfetamine 30 MG capsule    lisdexamfetamine 30 MG capsule                Primary Care Provider Office Phone # Fax #    Mohini Mccord [phone]       [facility] [address]  [facility] [place] [address]        Thank you!     Thank you for choosing [facility]  for your care. Our goal is always to provide you with excellent care. Hearing back from our patients is one way we can continue to improve our services. Please take a few minutes to complete the written survey that you may receive in the mail after your visit with us. Thank you!             Your Updated Medication List - Protect others around you: Learn how to safely use, store and throw away your medicines at www.disposemymeds.org.          This list is accurate as of: 3/21/17  8:59 AM.  Always use your most recent med list.                   Brand Name Dispense Instructions for use    clonazePAM 1 MG tablet    klonoPIN    75 tablet    Take 1/2 twice a day for anxiety, 1 and 1/2 at bedtime for sleep       * lisdexamfetamine 30 MG capsule    VYVANSE    30 capsule    Take 1 capsule (30 mg) by mouth daily       * lisdexamfetamine 30 MG capsule   Start taking on:  4/21/2017    VYVANSE    30 capsule    Take 1 capsule (30 mg) by mouth daily       * lisdexamfetamine 30 MG capsule   Start taking on:  5/21/2017    VYVANSE    30 capsule    Take 1 capsule (30 mg) by mouth daily       MULTIVITAL PO      Take 1 chew tab by mouth daily Gummi       ondansetron 4 MG ODT tab    ZOFRAN ODT    20 tablet    Take 1-2 tablets (4-8 mg) by mouth every 8 hours as needed for nausea or vomiting       polyethylene glycol powder    MIRALAX/GLYCOLAX     Take 17 g by mouth daily as needed for constipation       sennosides 8.6 MG tablet    SENOKOT     Take 2 tablets by mouth 2 times  daily       * Notice:  This list has 3 medication(s) that are the same as other medications prescribed for you. Read the directions carefully, and ask your doctor or other care provider to review them with you.

## 2017-03-21 NOTE — PROGRESS NOTES
SUBJECTIVE:                                                    Merissa Leung is a 32 year old female who presents to clinic today for the following health issues:       Medication Followup     Taking Medication as prescribed: yes    Side Effects:  None    Medication Helping Symptoms:  yes     Doing well on medications   No concerns today     Wt Readings from Last 4 Encounters:   03/21/17 107 lb (48.5 kg)   03/07/17 105 lb (47.6 kg)   12/09/16 105 lb (47.6 kg)   10/03/16 106 lb (48.1 kg)       Problem list and histories reviewed & adjusted, as indicated.  Additional history: as documented    Labs reviewed in EPIC    Reviewed and updated as needed this visit by clinical staff  Tobacco  Allergies  Med Hx  Surg Hx  Fam Hx  Soc Hx      Reviewed and updated as needed this visit by Provider         ROS:  Constitutional, HEENT, cardiovascular, pulmonary, gi and gu systems are negative, except as otherwise noted.    OBJECTIVE:                                                    /70 (BP Location: Right arm, Cuff Size: Adult Regular)  Pulse 72  Temp 97.1  F (36.2  C) (Tympanic)  Resp 16  Wt 107 lb (48.5 kg)  LMP 01/16/2017  Breastfeeding? No  BMI 17.53 kg/m2  Body mass index is 17.53 kg/(m^2).  GENERAL APPEARANCE: healthy, alert and no distress  RESP: lungs clear to auscultation - no rales, rhonchi or wheezes  CV: regular rates and rhythm, normal S1 S2, no S3 or S4 and no murmur, click or rub  PSYCH: mentation appears normal and affect normal/bright    Diagnostic test results:  Diagnostic Test Results:  none      ASSESSMENT/PLAN:                                                    1. Binge eating disorder  Refilled for 3 months   Doing well on medications  - lisdexamfetamine (VYVANSE) 30 MG capsule; Take 1 capsule (30 mg) by mouth daily  Dispense: 30 capsule; Refill: 0  - lisdexamfetamine (VYVANSE) 30 MG capsule; Take 1 capsule (30 mg) by mouth daily  Dispense: 30 capsule; Refill: 0  - lisdexamfetamine (VYVANSE)  30 MG capsule; Take 1 capsule (30 mg) by mouth daily  Dispense: 30 capsule; Refill: 0    2. EDWARD (generalized anxiety disorder)  Doing well   - clonazePAM (KLONOPIN) 1 MG tablet; Take 1/2 twice a day for anxiety, 1 and 1/2 at bedtime for sleep  Dispense: 75 tablet; Refill: 2      Patient Instructions   meds refilled for 3 months       Mohini Mccodr NP  [facility]

## 2017-03-21 NOTE — NURSING NOTE
"Chief Complaint   Patient presents with     Anxiety     med refills       Initial /70 (BP Location: Right arm, Cuff Size: Adult Regular)  Pulse 72  Temp 97.1  F (36.2  C) (Tympanic)  Resp 16  Wt 107 lb (48.5 kg)  LMP 01/16/2017  Breastfeeding? No  BMI 17.53 kg/m2 Estimated body mass index is 17.53 kg/(m^2) as calculated from the following:    Height as of 7/7/16: 5' 5.5\" (1.664 m).    Weight as of this encounter: 107 lb (48.5 kg).  Medication Reconciliation: complete    Health Maintenance that is potentially due pending provider review:  NONE    n/a      Juliana Valdez, CMA    "

## 2017-06-20 DIAGNOSIS — F50.819 BINGE EATING DISORDER: ICD-10-CM

## 2017-06-20 NOTE — TELEPHONE ENCOUNTER
Luan      Last Written Prescription Date:  5/21/2017  Last Fill Quantity: 30,   # refills: 0  Last Office Visit with [facility], [facility] or [facility] prescribing provider: 3/21/2017  Future Office visit:    Next 5 appointments (look out 90 days)     Jun 23, 2017  9:40 AM CDT   [facility]ccord [facility]    [address] [place] [id number]   [phone]                   Routing refill request to provider for review/approval because:  Drug not on the [facility], [facility] or [facility] refill protocol or controlled substance    Pt called.  She is out of medication and she set up an appt on Friday 6/23/2017

## 2017-06-22 RX ORDER — LISDEXAMFETAMINE DIMESYLATE 30 MG/1
30 CAPSULE ORAL DAILY
Qty: 30 CAPSULE | Refills: 0 | Status: SHIPPED | OUTPATIENT
Start: 2017-06-22 | End: 2017-06-26

## 2017-06-26 ENCOUNTER — OFFICE VISIT (OUTPATIENT)
Dept: FAMILY MEDICINE | Facility: CLINIC | Age: 32
End: 2017-06-26
Payer: COMMERCIAL

## 2017-06-26 VITALS
WEIGHT: 107 LBS | SYSTOLIC BLOOD PRESSURE: 122 MMHG | OXYGEN SATURATION: 99 % | RESPIRATION RATE: 18 BRPM | DIASTOLIC BLOOD PRESSURE: 86 MMHG | TEMPERATURE: 98 F | BODY MASS INDEX: 17.53 KG/M2

## 2017-06-26 DIAGNOSIS — F41.1 GAD (GENERALIZED ANXIETY DISORDER): ICD-10-CM

## 2017-06-26 DIAGNOSIS — F50.819 BINGE EATING DISORDER: ICD-10-CM

## 2017-06-26 DIAGNOSIS — K58.1 IRRITABLE BOWEL SYNDROME WITH CONSTIPATION: Primary | ICD-10-CM

## 2017-06-26 PROCEDURE — 99214 OFFICE O/P EST MOD 30 MIN: CPT | Performed by: NURSE PRACTITIONER

## 2017-06-26 RX ORDER — CLONAZEPAM 1 MG/1
TABLET ORAL
Qty: 75 TABLET | Refills: 2 | Status: SHIPPED | OUTPATIENT
Start: 2017-06-26 | End: 2017-10-27

## 2017-06-26 RX ORDER — LISDEXAMFETAMINE DIMESYLATE 10 MG/1
10 CAPSULE ORAL
Qty: 30 CAPSULE | Refills: 0 | Status: SHIPPED | OUTPATIENT
Start: 2017-06-26 | End: 2017-11-20

## 2017-06-26 NOTE — NURSING NOTE
"Chief Complaint   Patient presents with     Recheck Medication       Initial /86 (BP Location: Right arm, Patient Position: Chair, Cuff Size: Adult Small)  Temp 98  F (36.7  C) (Tympanic)  Resp 18  Wt 107 lb (48.5 kg)  LMP 05/19/2017  SpO2 99%  BMI 17.53 kg/m2 Estimated body mass index is 17.53 kg/(m^2) as calculated from the following:    Height as of 7/7/16: 5' 5.5\" (1.664 m).    Weight as of this encounter: 107 lb (48.5 kg).  Medication Reconciliation: complete    Health Maintenance that is potentially due pending provider review:  NONE    n/a    "

## 2017-06-26 NOTE — PATIENT INSTRUCTIONS
Let try adding in 10 mg   Take in afternoon for 1 week   Take along with the 30 mg for higher dose the next   See which one works better     Higher dose would likely be cheaper option     I will research linzess and update you       See me back in 1 month

## 2017-06-26 NOTE — PROGRESS NOTES
SUBJECTIVE:                                                    Merissa Leung is a 32 year old female who presents to clinic today for the following health issues:      Medication Followup of  VYVANSE    Taking Medication as prescribed: yes    Side Effects:  None    Medication Helping Symptoms:  yes     Noticing that vyvanse wears off between 11-3  Wondering if there is a high dose or if it can be taken BID    Ashlyn wants to try   IBS constipation     right now  Taking miralax 1 capful in AM and PM when she remembers   Senna 2-3  AM and PM     as needed enema when severe constipation           Taking a magnesium supplement to help with sleep     Problem list and histories reviewed & adjusted, as indicated.  Additional history: as documented    Patient Active Problem List   Diagnosis     Hyperlipidemia LDL goal <160     Headaches, tension     Chronic constipation     OCD (obsessive compulsive disorder)     Anxiety     Fibromyalgia     Chronic abdominal pain     Past Surgical History:   Procedure Laterality Date     C/SECTION, LOW TRANSVERSE        SECTION, TUBAL LIGATION, COMBINED N/A 2/15/2016    NO tubal ligation done     COLONOSCOPY  2013    Procedure: COMBINED COLONOSCOPY, SINGLE BIOPSY/POLYPECTOMY BY BIOPSY;  Colonoscopy;  Surgeon: Maureen Valentin MD;  Location: [facility]     COLONOSCOPY N/A 2015    Procedure: COMBINED COLONOSCOPY, SINGLE OR MULTIPLE BIOPSY/POLYPECTOMY BY BIOPSY;  Surgeon: Maureen Valentin MD;  Location: [facility]       Social History   Substance Use Topics     Smoking status: Former Smoker     Packs/day: 0.50     Years: 6.00     Types: Cigarettes     Quit date: 2015     Smokeless tobacco: Never Used      Comment: smoking 10cig/day- quit with pregnancy     Alcohol use No     Family History   Problem Relation Age of Onset     GASTROINTESTINAL DISEASE Mother 46     perforated intestines-      Hepatitis Mother      C     Liver Disease Mother      Blood  Disease Mother      anemia     Substance Abuse Mother      A&D     Psychotic Disorder Brother      Schizophrenia, OCD     Schizophrenia Brother      Anxiety Disorder Brother      Arthritis Maternal Grandmother      CANCER Paternal Grandmother      cervical     Breast Cancer Paternal Grandmother      Hypertension Paternal Grandmother      DIABETES Paternal Grandmother      CEREBROVASCULAR DISEASE Paternal Grandfather      HEART DISEASE Paternal Grandfather      Bipolar Disorder Paternal Grandfather      Suicide Paternal Grandfather      CANCER Paternal Aunt      cervical     Coronary Artery Disease Maternal Grandfather      MI     Substance Abuse Father      recovered A&D           Reviewed and updated as needed this visit by clinical staff       Reviewed and updated as needed this visit by Provider         ROS:  Constitutional, HEENT, cardiovascular, pulmonary, gi and gu systems are negative, except as otherwise noted.    OBJECTIVE:                                                    /86 (BP Location: Right arm, Patient Position: Chair, Cuff Size: Adult Small)  Temp 98  F (36.7  C) (Tympanic)  Resp 18  Wt 107 lb (48.5 kg)  LMP 05/19/2017  SpO2 99%  BMI 17.53 kg/m2  Body mass index is 17.53 kg/(m^2).  GENERAL APPEARANCE: healthy, alert and no distress  RESP: lungs clear to auscultation - no rales, rhonchi or wheezes  CV: regular rates and rhythm, normal S1 S2, no S3 or S4 and no murmur, click or rub  SKIN: no suspicious lesions or rashes  PSYCH: mentation appears normal and affect normal/bright    Diagnostic test results:  Diagnostic Test Results:  none      ASSESSMENT/PLAN:                                                    1. Binge eating disorder  Will continue with 30 mg in AM and will add in 10 mg in the afternoon   Discussed that she could also try increase daily dose to 40 mg in AM one week and see if she notices a difference with taking twice a day versus daily at higher dose  I will see her back  in 1 month@  - lisdexamfetamine dimesylate (VYVANSE) 10 MG capsule; Take 1 capsule (10 mg) by mouth daily (with lunch)  Dispense: 30 capsule; Refill: 0    2. EDWARD (generalized anxiety disorder)  Refilled today   - clonazePAM (KLONOPIN) 1 MG tablet; Take 1/2 twice a day for anxiety, 1 and 1/2 at bedtime for sleep  Dispense: 75 tablet; Refill: 2    3. Irritable bowel syndrome with constipation  Will do a trial of linzess  - linaclotide (LINZESS) 145 MCG capsule; Take 1 capsule (145 mcg) by mouth every morning (before breakfast)  Dispense: 30 capsule; Refill: 0      Patient Instructions   Let try adding in 10 mg   Take in afternoon for 1 week   Take along with the 30 mg for higher dose the next   See which one works better     Higher dose would likely be cheaper option     I will research linzess and update you       See me back in 1 month      Mohini Mccord NP  [facility]

## 2017-06-26 NOTE — MR AVS SNAPSHOT
"              After Visit Summary   6/26/2017    Merissa Leung    MRN: [id number]           Patient Information     Date Of Birth          1985        Visit Information        Provider Department      6/26/2017 4:20 PM Mohini Mccord NP [facility]        Today's Diagnoses     Binge eating disorder        EDWARD (generalized anxiety disorder)          Care Instructions    Let try adding in 10 mg   Take in afternoon for 1 week   Take along with the 30 mg for higher dose the next   See which one works better     Higher dose would likely be cheaper option     I will research linzess and update you       See me back in 1 month          Follow-ups after your visit        Who to contact     If you have questions or need follow up information about today's clinic visit or your schedule please contact [facility] directly at [phone].  Normal or non-critical lab and imaging results will be communicated to you by MyChart, letter or phone within 4 business days after the clinic has received the results. If you do not hear from us within 7 days, please contact the clinic through MyChart or phone. If you have a critical or abnormal lab result, we will notify you by phone as soon as possible.  Submit refill requests through [employer] or call your pharmacy and they will forward the refill request to us. Please allow 3 business days for your refill to be completed.          Additional Information About Your Visit        MyChart Information     [employer] lets you send messages to your doctor, view your test results, renew your prescriptions, schedule appointments and more. To sign up, go to [email or web address] . Click on \"Log in\" on the left side of the screen, which will take you to the Welcome page. Then click on \"Sign up Now\" on the right side of the page.     You will be asked to enter the access code listed below, as well as some personal information. Please follow the directions to " create your username and password.     Your access code is: [email or web address]  Expires: 2017  4:55 PM     Your access code will  in 90 days. If you need help or a new code, please call your [facility] or [phone].        Care EveryWhere ID     This is your Care EveryWhere ID. This could be used by other organizations to access your [place] medical records  [id number]        Your Vitals Were     Temperature Respirations Last Period Pulse Oximetry BMI (Body Mass Index)       98  F (36.7  C) (Tympanic) 18 2017 99% 17.53 kg/m2        Blood Pressure from Last 3 Encounters:   17 122/86   17 110/70   17 129/81    Weight from Last 3 Encounters:   17 107 lb (48.5 kg)   17 107 lb (48.5 kg)   17 105 lb (47.6 kg)              Today, you had the following     No orders found for display         Today's Medication Changes          These changes are accurate as of: 17  4:55 PM.  If you have any questions, ask your nurse or doctor.               These medicines have changed or have updated prescriptions.        Dose/Directions    * lisdexamfetamine 30 MG capsule   Commonly known as:  VYVANSE   This may have changed:  Another medication with the same name was added. Make sure you understand how and when to take each.   Used for:  Binge eating disorder   Changed by:  Mohini Mccord NP        Dose:  30 mg   Take 1 capsule (30 mg) by mouth daily   Quantity:  30 capsule   Refills:  0       * lisdexamfetamine dimesylate 10 MG capsule   Commonly known as:  VYVANSE   This may have changed:  You were already taking a medication with the same name, and this prescription was added. Make sure you understand how and when to take each.   Used for:  Binge eating disorder   Changed by:  Mohini Mccord NP        Dose:  10 mg   Take 1 capsule (10 mg) by mouth daily (with lunch)   Quantity:  30 capsule   Refills:  0       * Notice:  This list has 2 medication(s) that are the same  as other medications prescribed for you. Read the directions carefully, and ask your doctor or other care provider to review them with you.         Where to get your medicines      Some of these will need a paper prescription and others can be bought over the counter.  Ask your nurse if you have questions.     Bring a paper prescription for each of these medications     clonazePAM 1 MG tablet    lisdexamfetamine dimesylate 10 MG capsule                Primary Care Provider Office Phone # Fax #    Mohini Mccord, [phone]       [facility] [address] [facility] [address]        Equal Access to Services     ANA GARCIA : Hadii ivanna ku hadasho Soomaali, waaxda luqadaha, qaybta kaalmada adeedyyaney, leela us . So [facility] [phone].    ATENCIÓN: Si habla español, tiene a goldsmith disposición servicios gratuitos de asistencia lingüística. Llame al [phone].    We comply with applicable federal civil rights laws and [place] laws. We do not discriminate on the basis of race, color, national origin, age, disability sex, sexual orientation or gender identity.            Thank you!     Thank you for choosing [facility]  for your care. Our goal is always to provide you with excellent care. Hearing back from our patients is one way we can continue to improve our services. Please take a few minutes to complete the written survey that you may receive in the mail after your visit with us. Thank you!             Your Updated Medication List - Protect others around you: Learn how to safely use, store and throw away your medicines at www.disposemymeds.org.          This list is accurate as of: 6/26/17  4:55 PM.  Always use your most recent med list.                   Brand Name Dispense Instructions for use Diagnosis    clonazePAM 1 MG tablet    klonoPIN    75 tablet    Take 1/2 twice a day for anxiety, 1 and 1/2 at bedtime for sleep    EDWARD  (generalized anxiety disorder)       * lisdexamfetamine 30 MG capsule    VYVANSE    30 capsule    Take 1 capsule (30 mg) by mouth daily    Binge eating disorder       * lisdexamfetamine dimesylate 10 MG capsule    VYVANSE    30 capsule    Take 1 capsule (10 mg) by mouth daily (with lunch)    Binge eating disorder       MULTIVITAL PO      Take 1 chew tab by mouth daily Gummi        polyethylene glycol powder    MIRALAX/GLYCOLAX     Take 17 g by mouth daily as needed for constipation        sennosides 8.6 MG tablet    SENOKOT     Take 2 tablets by mouth 2 times daily        * Notice:  This list has 2 medication(s) that are the same as other medications prescribed for you. Read the directions carefully, and ask your doctor or other care provider to review them with you.

## 2017-07-20 ENCOUNTER — TELEPHONE (OUTPATIENT)
Dept: OBGYN | Facility: CLINIC | Age: 32
End: 2017-07-20

## 2017-07-20 ENCOUNTER — DOCUMENTATION ONLY (OUTPATIENT)
Dept: LAB | Facility: CLINIC | Age: 32
End: 2017-07-20

## 2017-07-20 ENCOUNTER — PRENATAL OFFICE VISIT (OUTPATIENT)
Dept: OBGYN | Facility: CLINIC | Age: 32
End: 2017-07-20
Payer: COMMERCIAL

## 2017-07-20 DIAGNOSIS — Z32.01 PREGNANCY TEST POSITIVE: Primary | ICD-10-CM

## 2017-07-20 DIAGNOSIS — Z34.80 PRENATAL CARE, SUBSEQUENT PREGNANCY: Primary | ICD-10-CM

## 2017-07-20 PROCEDURE — 99207 ZZC NO CHARGE NURSE ONLY: CPT | Performed by: OBSTETRICS & GYNECOLOGY

## 2017-07-20 NOTE — TELEPHONE ENCOUNTER
Reason for Call: Request for an order or referral:    Order or referral being requested: urine and blood pregnancy     Date needed: as soon as possible    Has the patient been seen by the PCP for this problem? NO    Additional comments: pt calling stating she would like to get orders for blood and urine pregnancy test. She has an appt in [place] tomorrow. Please call pt once this has been finished so she knows it ok to keep her appt    Phone number Patient can be reached at:  Home number on file [phone] (home)    Best Time:  Any     Can we leave a detailed message on this number?  YES    Call taken on 7/20/2017 at 3:55 PM by Daylin Bernstein

## 2017-07-20 NOTE — PROGRESS NOTES
Denied need for review of teaching- Patient told me after intake she had 2 negative tests today but positive yesterday. Instructed her to go into clinic for urine or blood test to determine if she is pregnant or not.  Francisca Melissa OB Intake Nurse

## 2017-07-20 NOTE — PROGRESS NOTES
Patient is scheduled for a lab appointment tomorrow for a blood and urine pregnancy test.  Please place future order for labs needed.  Thank you.

## 2017-07-20 NOTE — TELEPHONE ENCOUNTER
Called pt and she reports she had taken several home pregnancy tests and is getting mixed readings.  Took 3 more today and 1 was a faded negative and when checks it 5 min later it is positive.  Pt has lab appt sched for tomorrow in [place].    Tracey Ramírez  [facility] RN

## 2017-07-20 NOTE — MR AVS SNAPSHOT
"              After Visit Summary   7/20/2017    Merissa Leung    MRN: [id number]           Patient Information     Date Of Birth          1985        Visit Information        Provider Department      7/20/2017 3:23 PM Carola Sanchez MD [facility]        Today's Diagnoses     Prenatal care, subsequent pregnancy    -  1       Follow-ups after your visit        Your next 10 appointments already scheduled     Jul 26, 2017 10:30 AM CDT   New Prenatal with Carola Sanchez MD, [facility]   [facility] ([facility])    [address] [facility] [address]   [phone]              Who to contact     If you have questions or need follow up information about today's clinic visit or your schedule please contact [facility] directly at [phone].  Normal or non-critical lab and imaging results will be communicated to you by MyChart, letter or phone within 4 business days after the clinic has received the results. If you do not hear from us within 7 days, please contact the clinic through MyChart or phone. If you have a critical or abnormal lab result, we will notify you by phone as soon as possible.  Submit refill requests through [employer] or call your pharmacy and they will forward the refill request to us. Please allow 3 business days for your refill to be completed.          Additional Information About Your Visit        MyChart Information     [employer] lets you send messages to your doctor, view your test results, renew your prescriptions, schedule appointments and more. To sign up, go to [email or web address] . Click on \"Log in\" on the left side of the screen, which will take you to the Welcome page. Then click on \"Sign up Now\" on the right side of the page.     You will be asked to enter the access code listed below, as well as some personal information. Please follow the directions to create your username and password.   "   Your access code is: [email or web address]  Expires: 2017  4:55 PM     Your access code will  in 90 days. If you need help or a new code, please call your [facility] clinic or [phone].        Care EveryWhere ID     This is your Care EveryWhere ID. This could be used by other organizations to access your [facility] medical records  [id number]        Your Vitals Were     Last Period                   2017            Blood Pressure from Last 3 Encounters:   17 122/86   17 110/70   17 129/81    Weight from Last 3 Encounters:   17 48.5 kg (107 lb)   17 48.5 kg (107 lb)   17 47.6 kg (105 lb)              Today, you had the following     No orders found for display         Today's Medication Changes          These changes are accurate as of: 17  3:32 PM.  If you have any questions, ask your nurse or doctor.               These medicines have changed or have updated prescriptions.        Dose/Directions    lisdexamfetamine dimesylate 10 MG capsule   Commonly known as:  VYVANSE   This may have changed:  Another medication with the same name was removed. Continue taking this medication, and follow the directions you see here.   Used for:  Binge eating disorder   Changed by:  Mohini Mccord NP        Dose:  10 mg   Take 1 capsule (10 mg) by mouth daily (with lunch)   Quantity:  30 capsule   Refills:  0                Primary Care Provider Office Phone # Fax #    Mohini Mccord [phone]       [address] [address]        Equal Access to Services     [facility]: Hadii aad ku hadasho Soomaali, waaxda luqadaha, qaybta kaalmada adeleela menjivar idiin hayaan adeeg kharash la'aan . So [facility] [phone].    ATENCIÓN: Si habla español, tiene a goldsmith disposición servicios gratuitos de asistencia lingüística. Llame al [phone].    We comply with applicable federal civil rights laws and [place] laws. We do not  discriminate on the basis of race, color, national origin, age, disability sex, sexual orientation or gender identity.            Thank you!     Thank you for choosing [facility]  for your care. Our goal is always to provide you with excellent care. Hearing back from our patients is one way we can continue to improve our services. Please take a few minutes to complete the written survey that you may receive in the mail after your visit with us. Thank you!             Your Updated Medication List - Protect others around you: Learn how to safely use, store and throw away your medicines at www.disposemymeds.org.          This list is accurate as of: 7/20/17  3:32 PM.  Always use your most recent med list.                   Brand Name Dispense Instructions for use Diagnosis    clonazePAM 1 MG tablet    klonoPIN    75 tablet    Take 1/2 twice a day for anxiety, 1 and 1/2 at bedtime for sleep    EDWARD (generalized anxiety disorder)       linaclotide 145 MCG capsule    LINZESS    30 capsule    Take 1 capsule (145 mcg) by mouth every morning (before breakfast)    Irritable bowel syndrome with constipation       lisdexamfetamine dimesylate 10 MG capsule    VYVANSE    30 capsule    Take 1 capsule (10 mg) by mouth daily (with lunch)    Binge eating disorder       MULTIVITAL PO      Take 1 chew tab by mouth daily Gummi        polyethylene glycol powder    MIRALAX/GLYCOLAX     Take 17 g by mouth daily as needed for constipation        sennosides 8.6 MG tablet    SENOKOT     Take 2 tablets by mouth 2 times daily

## 2017-07-21 ENCOUNTER — TELEPHONE (OUTPATIENT)
Dept: OBGYN | Facility: CLINIC | Age: 32
End: 2017-07-21

## 2017-07-21 ENCOUNTER — ALLIED HEALTH/NURSE VISIT (OUTPATIENT)
Dept: FAMILY MEDICINE | Facility: CLINIC | Age: 32
End: 2017-07-21
Payer: COMMERCIAL

## 2017-07-21 DIAGNOSIS — Z32.00 POSSIBLE PREGNANCY, NOT YET CONFIRMED: Primary | ICD-10-CM

## 2017-07-21 DIAGNOSIS — Z32.01 PREGNANCY TEST POSITIVE: ICD-10-CM

## 2017-07-21 LAB — BETA HCG QUAL IFA URINE: NEGATIVE

## 2017-07-21 PROCEDURE — 84703 CHORIONIC GONADOTROPIN ASSAY: CPT | Performed by: NURSE PRACTITIONER

## 2017-07-21 PROCEDURE — 99207 ZZC NO CHARGE NURSE ONLY: CPT

## 2017-07-21 PROCEDURE — 84702 CHORIONIC GONADOTROPIN TEST: CPT | Performed by: OBSTETRICS & GYNECOLOGY

## 2017-07-21 PROCEDURE — 36415 COLL VENOUS BLD VENIPUNCTURE: CPT | Performed by: OBSTETRICS & GYNECOLOGY

## 2017-07-21 NOTE — PROGRESS NOTES
S-(situation): RN visit for UPT ordered by RAHEL Jj, to confirm pregnancy.    B-(background): Reports irregular menses since last pregnancy 2/2016. Thinks LMP 05-19-17, heavy flow x2 days then stopped. Has had multiple neg home preg tests with 1 faint positive home preg test yesterday followed by 2 neg tests last jewels.     A-(assessment): UPT neg today. Reports has had some unusual nausea, breast tenderness. Denies spotting, cramping. Denies current birth control.      R-(recommendations): Discussed with RAHEL Jj, who advises to proceed with serum HCT quant ordered by Dr. Sanchez. Advised pt to expect call from OB/gyn with these results, further recommendations after provider reviews. Forwarded to Dr. Sanchez. ADA Rubi RN

## 2017-07-21 NOTE — TELEPHONE ENCOUNTER
Inform pt that should get blood test. Place order for quant hcg   Carola Mericle     Order placed.    Julia Renee   [facility]  RN

## 2017-07-21 NOTE — MR AVS SNAPSHOT
"              After Visit Summary   7/21/2017    Merissa Leung    MRN: [id number]           Patient Information     Date Of Birth          1985        Visit Information        Provider Department      7/21/2017 11:15 AM JOSETTE MCINTOSH RN [facility]        Today's Diagnoses     Possible pregnancy, not yet confirmed    -  1       Follow-ups after your visit        Your next 10 appointments already scheduled     Jul 26, 2017 10:30 AM CDT   New Prenatal with Carola Sanchez MD, [facility]    [address] [address]   [phone]              Who to contact     If you have questions or need follow up information about today's clinic visit or your schedule please contact [facility] directly at [phone].  Normal or non-critical lab and imaging results will be communicated to you by MyChart, letter or phone within 4 business days after the clinic has received the results. If you do not hear from us within 7 days, please contact the clinic through MyChart or phone. If you have a critical or abnormal lab result, we will notify you by phone as soon as possible.  Submit refill requests through [employer] [facility] [employer] or call your pharmacy and they will forward the refill request to us. Please allow 3 business days for your refill to be completed.          Additional Information About Your Visit        MyChart Information     [employer] [facility] [employer] lets you send messages to your doctor, view your test results, renew your prescriptions, schedule appointments and more. To sign up, go to [email or web address]/[employer] . Click on \"Log in\" on the left side of the screen, which will take you to the Welcome page. Then click on \"Sign up Now\" on the right side of the page.     You will be asked to enter the access code listed below, as well as some personal information. Please follow the directions to create your username and password.     Your " access code is: [email or web address]  Expires: 2017  4:55 PM     Your access code will  in 90 days. If you need help or a new code, please call your [place] clinic or [phone].        Care EveryWhere ID     This is your Care EveryWhere ID. This could be used by other organizations to access your [place] medical records  [id number]        Your Vitals Were     Last Period Breastfeeding?                2017 Unknown           Blood Pressure from Last 3 Encounters:   17 122/86   17 110/70   17 129/81    Weight from Last 3 Encounters:   17 107 lb (48.5 kg)   17 107 lb (48.5 kg)   17 105 lb (47.6 kg)              We Performed the Following     Beta HCG qual IFA urine        Primary Care Provider Office Phone # Fax #    Mohini Mccord, [phone]       [facility] [address] [facility] [address]        Equal Access to Services     ANA GARCIA : Hadii aad ku hadasho Soomaali, waaxda luqadaha, qaybta kaalmada adeegyada, waxay idiin hayaan marilee khken us . So [facility] [phone].    ATENCIÓN: Si habla español, tiene a goldsmith disposición servicios gratuitos de asistencia lingüística. Llame al [phone].    We comply with applicable federal civil rights laws and [place] laws. We do not discriminate on the basis of race, color, national origin, age, disability sex, sexual orientation or gender identity.            Thank you!     Thank you for choosing [facility]  for your care. Our goal is always to provide you with excellent care. Hearing back from our patients is one way we can continue to improve our services. Please take a few minutes to complete the written survey that you may receive in the mail after your visit with us. Thank you!             Your Updated Medication List - Protect others around you: Learn how to safely use, store and throw away your medicines at www.disposemymeds.org.          This list is  accurate as of: 7/21/17 12:22 PM.  Always use your most recent med list.                   Brand Name Dispense Instructions for use Diagnosis    clonazePAM 1 MG tablet    klonoPIN    75 tablet    Take 1/2 twice a day for anxiety, 1 and 1/2 at bedtime for sleep    EDWARD (generalized anxiety disorder)       linaclotide 145 MCG capsule    LINZESS    30 capsule    Take 1 capsule (145 mcg) by mouth every morning (before breakfast)    Irritable bowel syndrome with constipation       lisdexamfetamine dimesylate 10 MG capsule    VYVANSE    30 capsule    Take 1 capsule (10 mg) by mouth daily (with lunch)    Binge eating disorder       MULTIVITAL PO      Take 1 chew tab by mouth daily Gummi        polyethylene glycol powder    MIRALAX/GLYCOLAX     Take 17 g by mouth daily as needed for constipation        sennosides 8.6 MG tablet    SENOKOT     Take 2 tablets by mouth 2 times daily

## 2017-07-21 NOTE — TELEPHONE ENCOUNTER
Pt calling to get pregnancy test results from today.    Please call-     Fadumo Cosby  Clinic Station

## 2017-07-22 LAB — B-HCG SERPL-ACNC: <1 IU/L (ref 0–5)

## 2017-07-24 DIAGNOSIS — F50.819 BINGE EATING DISORDER: ICD-10-CM

## 2017-07-24 DIAGNOSIS — K58.1 IRRITABLE BOWEL SYNDROME WITH CONSTIPATION: ICD-10-CM

## 2017-07-24 RX ORDER — LISDEXAMFETAMINE DIMESYLATE 30 MG/1
30 CAPSULE ORAL DAILY
Qty: 30 CAPSULE | Refills: 0 | Status: SHIPPED | OUTPATIENT
Start: 2017-09-24 | End: 2017-10-24

## 2017-07-24 RX ORDER — LISDEXAMFETAMINE DIMESYLATE 30 MG/1
30 CAPSULE ORAL DAILY
Qty: 30 CAPSULE | Refills: 0 | Status: SHIPPED | OUTPATIENT
Start: 2017-08-24 | End: 2017-09-23

## 2017-07-24 RX ORDER — LISDEXAMFETAMINE DIMESYLATE 10 MG/1
10 CAPSULE ORAL
Qty: 30 CAPSULE | Refills: 0 | Status: SHIPPED | OUTPATIENT
Start: 2017-07-24 | End: 2017-08-23

## 2017-07-24 RX ORDER — LISDEXAMFETAMINE DIMESYLATE 10 MG/1
10 CAPSULE ORAL
Qty: 30 CAPSULE | Refills: 0 | Status: SHIPPED | OUTPATIENT
Start: 2017-07-24 | End: 2017-07-24

## 2017-07-24 RX ORDER — LISDEXAMFETAMINE DIMESYLATE 10 MG/1
10 CAPSULE ORAL
Qty: 30 CAPSULE | Refills: 0 | Status: SHIPPED | OUTPATIENT
Start: 2017-08-24 | End: 2017-11-20

## 2017-07-24 RX ORDER — LISDEXAMFETAMINE DIMESYLATE 30 MG/1
30 CAPSULE ORAL DAILY
Qty: 30 CAPSULE | Refills: 0 | Status: SHIPPED | OUTPATIENT
Start: 2017-07-24 | End: 2017-08-23

## 2017-07-24 NOTE — TELEPHONE ENCOUNTER
Notes Recorded by Carola Sanchez MD on 7/22/2017 at 9:41 AM  Notify pt that the HCG blood test is negative  Carola Sanchez    Call to pt to notify of above.  Unable to reach.  Left message for pt to call back     Julia Renee   Ob/Gyn Clinic  RN

## 2017-07-25 ENCOUNTER — TELEPHONE (OUTPATIENT)
Dept: FAMILY MEDICINE | Facility: CLINIC | Age: 32
End: 2017-07-25

## 2017-07-25 NOTE — TELEPHONE ENCOUNTER
PA for Vyvanse approved over the phone with Matthew at [phone] approved until 07.25.2018 ref.# [id number] notified Tex CHAMPAGNE.    Keke Wei CSS

## 2017-10-27 ENCOUNTER — TELEPHONE (OUTPATIENT)
Dept: FAMILY MEDICINE | Facility: CLINIC | Age: 32
End: 2017-10-27

## 2017-10-27 ENCOUNTER — NURSE TRIAGE (OUTPATIENT)
Dept: NURSING | Facility: CLINIC | Age: 32
End: 2017-10-27

## 2017-10-27 DIAGNOSIS — F41.1 GAD (GENERALIZED ANXIETY DISORDER): ICD-10-CM

## 2017-10-27 DIAGNOSIS — K58.1 IRRITABLE BOWEL SYNDROME WITH CONSTIPATION: ICD-10-CM

## 2017-10-27 DIAGNOSIS — F50.819 BINGE EATING DISORDER: ICD-10-CM

## 2017-10-27 RX ORDER — LISDEXAMFETAMINE DIMESYLATE 10 MG/1
10 CAPSULE ORAL
Qty: 30 CAPSULE | Refills: 0 | Status: SHIPPED | OUTPATIENT
Start: 2017-12-27 | End: 2018-01-30

## 2017-10-27 RX ORDER — LISDEXAMFETAMINE DIMESYLATE 10 MG/1
10 CAPSULE ORAL
Qty: 30 CAPSULE | Refills: 0 | Status: SHIPPED | OUTPATIENT
Start: 2017-11-27 | End: 2017-11-20

## 2017-10-27 RX ORDER — LISDEXAMFETAMINE DIMESYLATE 30 MG/1
30 CAPSULE ORAL DAILY
Qty: 30 CAPSULE | Refills: 0 | Status: SHIPPED | OUTPATIENT
Start: 2017-10-27 | End: 2017-11-20

## 2017-10-27 RX ORDER — LISDEXAMFETAMINE DIMESYLATE 30 MG/1
30 CAPSULE ORAL DAILY
Qty: 30 CAPSULE | Refills: 0 | Status: SHIPPED | OUTPATIENT
Start: 2017-11-27 | End: 2017-11-20

## 2017-10-27 RX ORDER — LISDEXAMFETAMINE DIMESYLATE 30 MG/1
30 CAPSULE ORAL DAILY
Qty: 30 CAPSULE | Refills: 0 | Status: SHIPPED | OUTPATIENT
Start: 2017-12-28 | End: 2018-02-01

## 2017-10-27 RX ORDER — CLONAZEPAM 1 MG/1
TABLET ORAL
Qty: 75 TABLET | Refills: 2 | Status: SHIPPED | OUTPATIENT
Start: 2017-10-27 | End: 2018-01-30

## 2017-10-27 RX ORDER — LISDEXAMFETAMINE DIMESYLATE 10 MG/1
10 CAPSULE ORAL
Qty: 30 CAPSULE | Refills: 0 | Status: SHIPPED | OUTPATIENT
Start: 2017-10-27 | End: 2017-11-20

## 2017-10-27 NOTE — TELEPHONE ENCOUNTER
Pt has been informed of Rx refills and need for 3 month F/U prior to further refills.  Scripts placed at  for .  ADA Rubi RN

## 2017-10-27 NOTE — TELEPHONE ENCOUNTER
Clinic Action Needed:Yes, Please call patient [phone], patient stating she will need all of her medications refilled  Reason for Call:    Patient calling requesting refills     Controlled Substance Refill Request for Vyvansse Klonopin  Problem List Complete:     checked in past 6 months?  Yes 6/26/17     Vyvanse 10 mg capsule last filled 8/24/17 30 capsules no refills.  Clonazepam 1 mg tablet filled 6/26/17 75 tablets with 2 refills.    .Tameka Burch RN  [facility]

## 2017-10-27 NOTE — TELEPHONE ENCOUNTER
Pt states doing well on current medications- stable. Denies s/e or concerns.  Agreeable to F/U in 3 mos or as provider advises.  Forwarded to Alfonso Rubi RN

## 2017-10-27 NOTE — TELEPHONE ENCOUNTER
Clinic Action Needed:Yes, Please call patient [phone]  Reason for Call:    Patient calling requesting refills     Controlled Substance Refill Request for Vyvdayose Klonopin  Problem List Complete:     checked in past 6 months?  Yes 6/26/17     Vyvanse 10 mg capsule last filled 8/24/17 30 capsules no refills.  Clonazepam 1 mg tablet filled 6/26/17 75 tablets with 2 refills.    .Tameka Burch RN  [facility]

## 2017-11-14 DIAGNOSIS — R63.6 UNDERWEIGHT: ICD-10-CM

## 2017-11-14 DIAGNOSIS — D69.6 THROMBOCYTOPENIA (H): Primary | ICD-10-CM

## 2017-11-15 DIAGNOSIS — D69.6 THROMBOCYTOPENIA (H): ICD-10-CM

## 2017-11-15 DIAGNOSIS — R63.6 UNDERWEIGHT: ICD-10-CM

## 2017-11-15 LAB
ERYTHROCYTE [DISTWIDTH] IN BLOOD BY AUTOMATED COUNT: 12 % (ref 10–15)
HCT VFR BLD AUTO: 43.5 % (ref 35–47)
HGB BLD-MCNC: 14.6 G/DL (ref 11.7–15.7)
MCH RBC QN AUTO: 30 PG (ref 26.5–33)
MCHC RBC AUTO-ENTMCNC: 33.6 G/DL (ref 31.5–36.5)
MCV RBC AUTO: 89 FL (ref 78–100)
PLATELET # BLD AUTO: 32 10E9/L (ref 150–450)
RBC # BLD AUTO: 4.87 10E12/L (ref 3.8–5.2)
WBC # BLD AUTO: 6.8 10E9/L (ref 4–11)

## 2017-11-15 PROCEDURE — 36415 COLL VENOUS BLD VENIPUNCTURE: CPT | Performed by: NURSE PRACTITIONER

## 2017-11-15 PROCEDURE — 80048 BASIC METABOLIC PNL TOTAL CA: CPT | Performed by: NURSE PRACTITIONER

## 2017-11-15 PROCEDURE — 85027 COMPLETE CBC AUTOMATED: CPT | Performed by: NURSE PRACTITIONER

## 2017-11-15 NOTE — PROGRESS NOTES
Merissa was seen in the EMERGENCY ROOM on 11/7/17 for chest pain- cardiac work up negative. Her platelet count was noted to be 27 at this time. She was admitted for observation. Work up including peripheral smear, HIV, drug screen, XI, hepatitis, B12 and CMP unremarkable and is in care everywhere.     Recheck of platelets on 11/9 was 32; 11/15 22    Spoke with Merissa she has an appointment with hematologist at [facility] on 11/28- she prefers to resume care with [facility]    Spoke with Dr. Le's nurse appointment made for 11/24 at 9 am.

## 2017-11-15 NOTE — NURSING NOTE
Platelets critical low at 22.  Spoke to PCP.  Will try to get her in to see [place]ology [facility].  Appointment made for November 29th at 3PM.  PCP calling patient to see if she wants to keep appointment at [place] or if she should go to [facility].      Lori Henderson RN

## 2017-11-16 LAB
ANION GAP SERPL CALCULATED.3IONS-SCNC: 5 MMOL/L (ref 3–14)
BUN SERPL-MCNC: 7 MG/DL (ref 7–30)
CALCIUM SERPL-MCNC: 8.8 MG/DL (ref 8.5–10.1)
CHLORIDE SERPL-SCNC: 105 MMOL/L (ref 94–109)
CO2 SERPL-SCNC: 29 MMOL/L (ref 20–32)
CREAT SERPL-MCNC: 0.67 MG/DL (ref 0.52–1.04)
GFR SERPL CREATININE-BSD FRML MDRD: >90 ML/MIN/1.7M2
GLUCOSE SERPL-MCNC: 96 MG/DL (ref 70–99)
POTASSIUM SERPL-SCNC: 4 MMOL/L (ref 3.4–5.3)
SODIUM SERPL-SCNC: 139 MMOL/L (ref 133–144)

## 2017-11-20 DIAGNOSIS — D69.6 THROMBOCYTOPENIA (H): Primary | ICD-10-CM

## 2017-11-20 DIAGNOSIS — N92.0 EXCESSIVE OR FREQUENT MENSTRUATION: ICD-10-CM

## 2017-11-20 NOTE — PROGRESS NOTES
Patient called and reported that her period started over the weekend. She reports that her periods is very heavy and she is needing to change her tampon every hour. I have recommended that she come in and have her lans checked to ensure that her hgb is not too low in light of her recent diagnosis of thrombocytopenia. She will call and schedule a lab apppointment.   Анна MCGINNIS

## 2017-11-21 DIAGNOSIS — R70.0 ELEVATED SEDIMENTATION RATE: Primary | ICD-10-CM

## 2017-11-21 DIAGNOSIS — D69.6 THROMBOCYTOPENIA (H): ICD-10-CM

## 2017-11-21 DIAGNOSIS — N92.0 EXCESSIVE OR FREQUENT MENSTRUATION: ICD-10-CM

## 2017-11-21 PROCEDURE — 36415 COLL VENOUS BLD VENIPUNCTURE: CPT | Performed by: NURSE PRACTITIONER

## 2017-11-21 PROCEDURE — 85027 COMPLETE CBC AUTOMATED: CPT | Performed by: NURSE PRACTITIONER

## 2017-11-22 LAB
ERYTHROCYTE [DISTWIDTH] IN BLOOD BY AUTOMATED COUNT: 12.4 % (ref 10–15)
HCT VFR BLD AUTO: 45.5 % (ref 35–47)
HGB BLD-MCNC: 15.2 G/DL (ref 11.7–15.7)
MCH RBC QN AUTO: 30.3 PG (ref 26.5–33)
MCHC RBC AUTO-ENTMCNC: 33.4 G/DL (ref 31.5–36.5)
MCV RBC AUTO: 91 FL (ref 78–100)
PLATELET # BLD AUTO: 44 10E9/L (ref 150–450)
RBC # BLD AUTO: 5.02 10E12/L (ref 3.8–5.2)
WBC # BLD AUTO: 6.4 10E9/L (ref 4–11)

## 2017-11-24 ENCOUNTER — ONCOLOGY VISIT (OUTPATIENT)
Dept: ONCOLOGY | Facility: CLINIC | Age: 32
End: 2017-11-24
Attending: INTERNAL MEDICINE
Payer: COMMERCIAL

## 2017-11-24 ENCOUNTER — HOSPITAL ENCOUNTER (OUTPATIENT)
Dept: LAB | Facility: CLINIC | Age: 32
Discharge: HOME | End: 2017-11-24
Attending: INTERNAL MEDICINE | Admitting: INTERNAL MEDICINE
Payer: COMMERCIAL

## 2017-11-24 VITALS
WEIGHT: 110.9 LBS | SYSTOLIC BLOOD PRESSURE: 115 MMHG | TEMPERATURE: 98.5 F | HEIGHT: 65 IN | OXYGEN SATURATION: 100 % | BODY MASS INDEX: 18.48 KG/M2 | RESPIRATION RATE: 16 BRPM | DIASTOLIC BLOOD PRESSURE: 78 MMHG | HEART RATE: 77 BPM

## 2017-11-24 DIAGNOSIS — D69.3 IMMUNE THROMBOCYTOPENIA (H): Primary | ICD-10-CM

## 2017-11-24 LAB
IGA SERPL-MCNC: 75 MG/DL (ref 70–380)
IGG SERPL-MCNC: 776 MG/DL (ref 695–1620)
IGM SERPL-MCNC: 90 MG/DL (ref 60–265)

## 2017-11-24 PROCEDURE — 82784 ASSAY IGA/IGD/IGG/IGM EACH: CPT | Performed by: INTERNAL MEDICINE

## 2017-11-24 PROCEDURE — 84165 PROTEIN E-PHORESIS SERUM: CPT | Performed by: INTERNAL MEDICINE

## 2017-11-24 PROCEDURE — 36415 COLL VENOUS BLD VENIPUNCTURE: CPT | Performed by: INTERNAL MEDICINE

## 2017-11-24 PROCEDURE — 99204 OFFICE O/P NEW MOD 45 MIN: CPT | Performed by: INTERNAL MEDICINE

## 2017-11-24 PROCEDURE — 99211 OFF/OP EST MAY X REQ PHY/QHP: CPT

## 2017-11-24 PROCEDURE — 00000402 ZZHCL STATISTIC TOTAL PROTEIN: Performed by: INTERNAL MEDICINE

## 2017-11-24 ASSESSMENT — PAIN SCALES - GENERAL: PAINLEVEL: WORST PAIN (10)

## 2017-11-24 NOTE — NURSING NOTE
"Oncology Rooming Note    November 24, 2017 9:10 AM   Merissa Leung is a 32 year old female who presents for:    Chief Complaint   Patient presents with     Hematology     New patient, Thrombocytopenia. Review lab results.      Initial Vitals: /78 (BP Location: Right arm, Patient Position: Sitting, Cuff Size: Adult Regular)  Pulse 77  Temp 98.5  F (36.9  C) (Oral)  Resp 16  Ht 1.657 m (5' 5.25\")  Wt 50.3 kg (110 lb 14.4 oz)  SpO2 100%  Breastfeeding? No  BMI 18.31 kg/m2 Estimated body mass index is 18.31 kg/(m^2) as calculated from the following:    Height as of this encounter: 1.657 m (5' 5.25\").    Weight as of this encounter: 50.3 kg (110 lb 14.4 oz). Body surface area is 1.52 meters squared.  Worst Pain (10) Comment: whole body aches.    No LMP recorded. Patient is not currently having periods (Reason: Irregular Periods).  Allergies reviewed: Yes  Medications reviewed: Yes    Medications: Medication refills not needed today.  Pharmacy name entered into [employer]:    [facility] - [place], [place] - [address] - [place], [place] - [address]    Clinical concerns: New patient, referred by PMD for Thrombocytopenia. C/o extreme fatigue x 6-7 months which she feels is intermittent along with 10/10 body aches. Per patient Memory has been fogged, difficulty concentrating and feels vision has been blurry at times, denies seeing Opthalmologist. C/o of intense migraines over the last several months. Here today to review lab results.      7 minutes for nursing intake (face to face time)     Cathi [facility]            "

## 2017-11-24 NOTE — MR AVS SNAPSHOT
After Visit Summary   11/24/2017    Merissa Leung    MRN: [id number]           Patient Information     Date Of Birth          1985        Visit Information        Provider Department      11/24/2017 9:00 AM Brittany Le MD [facility] ONCOLOGY      Today's Diagnoses     Idiopathic thrombocytopenic purpura (H)    -  1      Care Instructions    We would like you to have lab work today and weekly labs, refer you to rheumatology and to see you back in 3 months or earlier as needed pending lab results. When you are in need of a refill, please call your pharmacy and they will send us a request.  Copy of appointments, and after visit summary (AVS) given to patient.  If you have any questions during business hours (M-F 8 AM- 4PM), please call Laverne Rollins RN, BSN, OCN Oncology Hematology /Breast Cancer Navigator at [facility] [phone].   For questions after business hours, or on holidays/weekends, please call our after hours Nurse Triage line [phone]. Thank you.            Follow-ups after your visit        Additional Services     RHEUMATOLOGY REFERRAL       Your provider has referred you to: [facility]: [facility] [phone]   [email or web address]/ or Per pt preference    Please be aware that coverage of these services is subject to the terms and limitations of your health insurance plan.  Call member services at your health plan with any benefit or coverage questions.      Please bring the following with you to your appointment:    (1) Any X-Rays, CTs or MRIs which have been performed.  Contact the facility where they were done to arrange for  prior to your scheduled appointment.    (2) List of current medications   (3) This referral request   (4) Any documents/labs given to you for this referral                  Follow-up notes from your care team     Return in about 3 months  (around 2/24/2018) for Standing blood work, lab ordered today.      Your next 10 appointments already scheduled     Feb 16, 2018  2:00 PM CST   LAB with [facility]   [facility]    [address] [address]   [phone]           Please do not eat 10-12 hours before your appointment if you are coming in fasting for labs on lipids, cholesterol, or glucose (sugar). This does not apply to pregnant women. Water, hot tea and black coffee (with nothing added) are okay. Do not drink other fluids, diet soda or chew gum.            Feb 23, 2018  2:00 PM CST   Return Visit with Brittany Le MD   [facility] ([facility])    [facility]  [address]  [facility] [address]   [phone]            Mar 19, 2018  1:40 PM CDT   [facility]    [address] [address] [phone]              Who to contact     If you have questions or need follow up information about today's clinic visit or your schedule please contact [facility] directly at [phone].  Normal or non-critical lab and imaging results will be communicated to you by MyChart, letter or phone within 4 business days after the clinic has received the results. If you do not hear from us within 7 days, please contact the clinic through MyChart or phone. If you have a critical or abnormal lab result, we will notify you by phone as soon as possible.  Submit refill requests through [employer] or call your pharmacy and they will forward the refill request to us. Please allow 3 business days for your refill to be completed.          Additional Information About Your Visit        [employer] Information     [employer] lets you send messages to your doctor, view your test results, renew your prescriptions, schedule appointments and more. To sign up, go to  "[email or web address] . Click on \"Log in\" on the left side of the screen, which will take you to the Welcome page. Then click on \"Sign up Now\" on the right side of the page.     You will be asked to enter the access code listed below, as well as some personal information. Please follow the directions to create your username and password.     Your access code is: [email or web address]  Expires: 2018 10:28 AM     Your access code will  in 90 days. If you need help or a new code, please call your [place] clinic or [phone].        Care EveryWhere ID     This is your Care EveryWhere ID. This could be used by other organizations to access your [place] medical records  [id number]        Your Vitals Were     Pulse Temperature Respirations Height Pulse Oximetry Breastfeeding?    77 98.5  F (36.9  C) (Oral) 16 1.657 m (5' 5.25\") 100% No    BMI (Body Mass Index)                   18.31 kg/m2            Blood Pressure from Last 3 Encounters:   17 115/78   17 122/86   17 110/70    Weight from Last 3 Encounters:   17 50.3 kg (110 lb 14.4 oz)   17 48.5 kg (107 lb)   17 48.5 kg (107 lb)              We Performed the Following     ELP reflex to IELP     Immunoglobulins A G and M     RHEUMATOLOGY REFERRAL        Primary Care Provider Office Phone # Fax #    Mohini Mccord [phone]       [address] [place] [address]        Equal Access to Services     [facility]: Hadii ivanna santoyo hadcarlos Soandrea, waaxda luqadaha, qaybta kaalmada leela chavez. So [facility] [phone].    ATENCIÓN: Si habla español, tiene a goldsmith disposición servicios gratuitos de asistencia lingüística. Llame al [phone].    We comply with applicable federal civil rights laws and [place] laws. We do not discriminate on the basis of race, color, national origin, age, disability, sex, sexual orientation, or gender identity.            Thank you!     " Thank you for choosing [facility]  for your care. Our goal is always to provide you with excellent care. Hearing back from our patients is one way we can continue to improve our services. Please take a few minutes to complete the written survey that you may receive in the mail after your visit with us. Thank you!             Your Updated Medication List - Protect others around you: Learn how to safely use, store and throw away your medicines at www.disposemymeds.org.          This list is accurate as of: 11/24/17 11:59 PM.  Always use your most recent med list.                   Brand Name Dispense Instructions for use Diagnosis    clonazePAM 1 MG tablet    klonoPIN    75 tablet    Take 1/2 twice a day for anxiety, 1 and 1/2 at bedtime for sleep    EDWARD (generalized anxiety disorder)       linaclotide 145 MCG capsule    LINZESS    30 capsule    Take 1 capsule (145 mcg) by mouth every morning (before breakfast)    Irritable bowel syndrome with constipation       * lisdexamfetamine 10 MG capsule   Start taking on:  12/27/2017    VYVANSE    30 capsule    Take 1 capsule (10 mg) by mouth daily (with lunch)    Binge eating disorder       * lisdexamfetamine 30 MG capsule   Start taking on:  12/28/2017    VYVANSE    30 capsule    Take 1 capsule (30 mg) by mouth daily    Binge eating disorder       MULTIVITAL PO      Take 1 chew tab by mouth daily Gummi        polyethylene glycol powder    MIRALAX/GLYCOLAX     Take 17 g by mouth daily as needed for constipation        sennosides 8.6 MG tablet    SENOKOT     Take 2 tablets by mouth 2 times daily        * Notice:  This list has 2 medication(s) that are the same as other medications prescribed for you. Read the directions carefully, and ask your doctor or other care provider to review them with you.

## 2017-11-24 NOTE — LETTER
2017         RE: Merissa Leung  [address] [facility] [address]        Dear Colleague,    Thank you for referring your patient, Merissa Leung, to the [facility]. Please see a copy of my visit note below.    DATE OF VISIT: 2017    REASON FOR REFERRAL: Management of thrombocytopenia.    CHIEF COMPLAINT:   Chief Complaint   Patient presents with     Hematology     New patient, Thrombocytopenia. Review lab results.        HISTORY OF PRESENT ILLNESS:   32-year-old female patient who was a routine blood work found to have low platelet count. Patient was referred for further evaluation. Patient was noted to have low platelet count at 232711 in 2017. More recently a CBC which was done on November 15, 2017 showed a platelet count of 52,000. Total white count and hemoglobin has been normal. Patient was referred for further evaluation and management. The patient denies any bruising or bleeding. The patient denies any fever or chills or skin rash. Patient denies any joint aches or pains. There is no family history of rheumatologic disease or autoimmune disease.    REVIEW OF SYSTEMS:   Constitutional: Negative for fever, chills, and night sweats.  Skin: negative.  Eyes: negative.  Ears/Nose/Throat: negative.  Respiratory: No shortness of breath, dyspnea on exertion, cough, or hemoptysis.  Cardiovascular: negative.  Gastrointestinal: negative.  Genitourinary: negative.  Musculoskeletal: negative.  Neurologic: negative.  Psychiatric: negative.  Hematologic/Lymphatic/Immunologic: negative.  Endocrine: negative.    PAST MEDICAL HISTORY:   Past Medical History:   Diagnosis Date     Anxiety      Binge eating disorder      Chickenpox     x3     Food intolerance      IBS (irritable bowel syndrome)      MEDICAL HISTORY OF -     pelvic floor disorder       PAST SURGICAL HISTORY:   Past Surgical History:   Procedure Laterality Date     C/SECTION, LOW TRANSVERSE        SECTION, TUBAL  LIGATION, COMBINED N/A 2/15/2016    NO tubal ligation done     COLONOSCOPY  2013    Procedure: COMBINED COLONOSCOPY, SINGLE BIOPSY/POLYPECTOMY BY BIOPSY;  Colonoscopy;  Surgeon: Maureen Valentin MD;  Location: [facility]     COLONOSCOPY N/A 2015    Procedure: COMBINED COLONOSCOPY, SINGLE OR MULTIPLE BIOPSY/POLYPECTOMY BY BIOPSY;  Surgeon: Maureen Valentin MD;  Location: [facility]       ALLERGIES:   Allergies as of 2017 - Nikunj as Reviewed 2017   Allergen Reaction Noted     Dilaudid [hydromorphone] Nausea and Vomiting 2016       MEDICATIONS:   Current Outpatient Prescriptions   Medication Sig Dispense Refill     clonazePAM (KLONOPIN) 1 MG tablet Take 1/2 twice a day for anxiety, 1 and 1/2 at bedtime for sleep 75 tablet 2     linaclotide (LINZESS) 145 MCG capsule Take 1 capsule (145 mcg) by mouth every morning (before breakfast) 30 capsule 3     [START ON 2017] lisdexamfetamine (VYVANSE) 30 MG capsule Take 1 capsule (30 mg) by mouth daily 30 capsule 0     [START ON 2017] lisdexamfetamine dimesylate (VYVANSE) 10 MG capsule Take 1 capsule (10 mg) by mouth daily (with lunch) 30 capsule 0     sennosides (SENOKOT) 8.6 MG tablet Take 2 tablets by mouth 2 times daily       Multiple Vitamins-Minerals (MULTIVITAL PO) Take 1 chew tab by mouth daily Gummi       polyethylene glycol (MIRALAX/GLYCOLAX) powder Take 17 g by mouth daily as needed for constipation          FAMILY HISTORY:   Family History   Problem Relation Age of Onset     GASTROINTESTINAL DISEASE Mother 46     perforated intestines-      Hepatitis Mother      C     Liver Disease Mother      Blood Disease Mother      anemia     Substance Abuse Mother      A&D     Psychotic Disorder Brother      Schizophrenia, OCD     Schizophrenia Brother      Anxiety Disorder Brother      Arthritis Maternal Grandmother      CANCER Paternal Grandmother      cervical     Breast Cancer Paternal Grandmother      Hypertension  "Paternal Grandmother      DIABETES Paternal Grandmother      CEREBROVASCULAR DISEASE Paternal Grandfather      HEART DISEASE Paternal Grandfather      Bipolar Disorder Paternal Grandfather      Suicide Paternal Grandfather      CANCER Paternal Aunt      cervical     Coronary Artery Disease Maternal Grandfather      MI     Substance Abuse Father      recovered A&D     HEART DISEASE Father      MI-stents     Hypertension Father       No history of hematologic disease and autoimmune disease in her family    SOCIAL HISTORY:   Social History     Social History     Marital status:      Spouse name: N/A     Number of children: N/A     Years of education: N/A     Social History Main Topics     Smoking status: Former Smoker     Packs/day: 0.50     Years: 6.00     Types: Cigarettes     Quit date: 5/25/2015     Smokeless tobacco: Never Used      Comment: 1/2 pack daily      Alcohol use No     Drug use: No     Sexual activity: Yes     Partners: Male     Other Topics Concern     Parent/Sibling W/ Cabg, Mi Or Angioplasty Before 65f 55m? No     Social History Narrative       PHYSICAL EXAMINATION:   /78 (BP Location: Right arm, Patient Position: Sitting, Cuff Size: Adult Regular)  Pulse 77  Temp 98.5  F (36.9  C) (Oral)  Resp 16  Ht 1.657 m (5' 5.25\")  Wt 50.3 kg (110 lb 14.4 oz)  SpO2 100%  Breastfeeding? No  BMI 18.31 kg/m2  Wt Readings from Last 10 Encounters:   11/24/17 50.3 kg (110 lb 14.4 oz)   06/26/17 48.5 kg (107 lb)   03/21/17 48.5 kg (107 lb)   03/07/17 47.6 kg (105 lb)   12/09/16 47.6 kg (105 lb)   10/03/16 48.1 kg (106 lb)   09/07/16 47.6 kg (105 lb)   08/08/16 46.7 kg (103 lb)   07/07/16 45.9 kg (101 lb 3.2 oz)   05/24/16 45.5 kg (100 lb 3.2 oz)      GENERAL APPEARANCE: Healthy, alert and in no acute distress.  HEENT: Sclerae anicteric. PERRLA. Oropharynx without ulcers, lesions, or thrush.  NECK: Supple. No asymmetry or masses.  LYMPHATICS: No palpable cervical, supraclavicular, axillary, or " inguinal lymphadenopathy.  RESP: Lungs clear to auscultation bilaterally without rales, rhonchi or wheezes.  CARDIOVASCULAR: Regular rate and rhythm. Normal S1, S2; no S3 or S4. No murmur, gallop, or rub.  ABDOMEN: Soft, nontender. Bowel sounds normal. No palpable organomegaly or masses.  MUSCULOSKELETAL: Extremities without gross deformities noted. No edema of bilateral lower extremities.  SKIN: No suspicious lesions or rashes.  NEURO: Alert and oriented x 3. Cranial nerves II-XII grossly intact.  PSYCHIATRIC: Mentation and affect appear normal.    LABORATORY RESULTS:  Orders Only on 11/21/2017   Component Date Value Ref Range Status     WBC 11/21/2017 6.4  4.0 - 11.0 10e9/L Final     RBC Count 11/21/2017 5.02  3.8 - 5.2 10e12/L Final     Hemoglobin 11/21/2017 15.2  11.7 - 15.7 g/dL Final     Hematocrit 11/21/2017 45.5  35.0 - 47.0 % Final     MCV 11/21/2017 91  78 - 100 fl Final     MCH 11/21/2017 30.3  26.5 - 33.0 pg Final     MCHC 11/21/2017 33.4  31.5 - 36.5 g/dL Final     RDW 11/21/2017 12.4  10.0 - 15.0 % Final     Platelet Count 11/21/2017 44* 150 - 450 10e9/L Final    Comment: .   Consistent with previous critical result         IMAGING RESULTS:      ASSESSMENT AND PLAN:  This is a 52-year-old female patient with thrombocytopenia probably due to immune thrombocytopenia. I discussed with the patient the natural history of immune thrombocytopenia. Currently the patient is minimally symptomatic. Today I am going to check CBC with platelets differential, Blood    Morphology Pathologist Review, Reticulocyte Count, ELP reflex to IELP, Immunoglobulins A G   and M, RHEUMATOLOGY REFERRAL, Blood Morphology Pathologist Review, Reticulocyte Count. I also requested standing order for CBC. I talked to the patient about therapeutic modalities of immune thrombocytopenia. We talked about use of steroids. I gave an overview about potential side effects of steroids. I educated the patient about potential complications of  thrombocytopenia. I asked the patient to call us if she has any new symptoms or concerns.    The patient is ready to learn, no apparent learning barriers were identified, Diagnosis and treatment plans were explained to the patient. The patient expressed understanding of the content. The patient questions were answered to her satisfaction.    Brittany Le MD    Chart documentation with Dragon Voice recognition Software. Although reviewed after completion, some words and grammatical errors may remain.    Again, thank you for allowing me to participate in the care of your patient.        Sincerely,        Brittany Le MD

## 2017-11-24 NOTE — PATIENT INSTRUCTIONS
We would like you to have lab work today and weekly labs, refer you to rheumatology and to see you back in 3 months or earlier as needed pending lab results. When you are in need of a refill, please call your pharmacy and they will send us a request.  Copy of appointments, and after visit summary (AVS) given to patient.  If you have any questions during business hours (M-F 8 AM- 4PM), please call Laverne Rollins RN, BSN, OCN Oncology Hematology /Breast Cancer Navigator at [facility] [phone].   For questions after business hours, or on holidays/weekends, please call our after hours Nurse Triage line [phone]. Thank you.

## 2017-11-25 ENCOUNTER — TELEPHONE (OUTPATIENT)
Dept: ONCOLOGY | Facility: CLINIC | Age: 32
End: 2017-11-25

## 2017-11-25 NOTE — TELEPHONE ENCOUNTER
Clinic Action Needed:No  Reason for Call: Merissa is calling for her platelet count that was to be drawn yesterday. If she had not heard back from the cancer clinic lab, she was suppose to call them. There was not one done yesterday. I spoke with Rose Anderson in the inpatient lab who confirmed it was not done. There is a standing order for CBC with platelets in Merissa's chart. There are 2 other lab tests in [facility] for Merissa ordered early today. 3:15pm  I paged Dr Le, on call for himself for [facility] via his pager at [phone]. Rose Anderson from lab said to have someone call them with a heads up, if the patient was coming in for a blood draw today. If Merissa is, she should check in at the emergency room for a lab only visit and the ED would page them for the blood draw. 3:30 pm I have not heard back from Dr Le, so I called cell phone at [phone]. I left a message for him to call me back at [phone]. Dr Le called back and said if Merissa is bruising or bleeding, she should come to [place] for a CBC with platelets, and as the order from early today said, he wants the Blood Morphology pathologist to review them. Merissa said she wants to wait until Monday and call the [facility] at that time and go from there. I did repeat if she bleeds or bruises before then, to be seen for blood work this weekend. Patient voices understanding and is in agreement with this plan.   Routed to: None    Tiffani Phelan RN, [place] [facility]

## 2017-11-27 LAB
ALBUMIN SERPL ELPH-MCNC: 4.4 G/DL (ref 3.7–5.1)
ALPHA1 GLOB SERPL ELPH-MCNC: 0.3 G/DL (ref 0.2–0.4)
ALPHA2 GLOB SERPL ELPH-MCNC: 0.8 G/DL (ref 0.5–0.9)
B-GLOBULIN SERPL ELPH-MCNC: 0.6 G/DL (ref 0.6–1)
GAMMA GLOB SERPL ELPH-MCNC: 0.8 G/DL (ref 0.7–1.6)
M PROTEIN SERPL ELPH-MCNC: 0 G/DL
PROT PATTERN SERPL ELPH-IMP: NORMAL

## 2017-11-28 ENCOUNTER — TELEPHONE (OUTPATIENT)
Dept: ONCOLOGY | Facility: CLINIC | Age: 32
End: 2017-11-28

## 2017-11-28 DIAGNOSIS — D69.3 IDIOPATHIC THROMBOCYTOPENIC PURPURA (H): ICD-10-CM

## 2017-11-28 PROCEDURE — 85060 BLOOD SMEAR INTERPRETATION: CPT | Performed by: PATHOLOGY

## 2017-11-28 PROCEDURE — 36415 COLL VENOUS BLD VENIPUNCTURE: CPT | Performed by: INTERNAL MEDICINE

## 2017-11-28 PROCEDURE — 85045 AUTOMATED RETICULOCYTE COUNT: CPT | Performed by: INTERNAL MEDICINE

## 2017-11-28 PROCEDURE — 85025 COMPLETE CBC W/AUTO DIFF WBC: CPT | Performed by: INTERNAL MEDICINE

## 2017-11-28 NOTE — TELEPHONE ENCOUNTER
DATE:  11/28/2017   TIME OF RECEIPT FROM LAB:  7685  LAB TEST:  PLT  LAB VALUE:  Preliminary PLT result of 37  RESULTS GIVEN WITH READ-BACK TO (PROVIDER):  Dr. NANCY Le via inbasket.  Morphology and other results pending   TIME LAB VALUE REPORTED TO PROVIDER:   1500

## 2017-11-29 PROBLEM — D69.3 IMMUNE THROMBOCYTOPENIA (H): Status: ACTIVE | Noted: 2017-11-29

## 2017-11-29 LAB
BASOPHILS # BLD AUTO: 0 10E9/L (ref 0–0.2)
BASOPHILS NFR BLD AUTO: 0.3 %
COPATH REPORT: NORMAL
DIFFERENTIAL METHOD BLD: ABNORMAL
EOSINOPHIL # BLD AUTO: 0 10E9/L (ref 0–0.7)
EOSINOPHIL NFR BLD AUTO: 0.4 %
ERYTHROCYTE [DISTWIDTH] IN BLOOD BY AUTOMATED COUNT: 12.4 % (ref 10–15)
HCT VFR BLD AUTO: 44.2 % (ref 35–47)
HGB BLD-MCNC: 14.6 G/DL (ref 11.7–15.7)
IMM GRANULOCYTES # BLD: 0 10E9/L (ref 0–0.4)
IMM GRANULOCYTES NFR BLD: 0.1 %
LYMPHOCYTES # BLD AUTO: 2 10E9/L (ref 0.8–5.3)
LYMPHOCYTES NFR BLD AUTO: 26 %
MCH RBC QN AUTO: 29.9 PG (ref 26.5–33)
MCHC RBC AUTO-ENTMCNC: 33 G/DL (ref 31.5–36.5)
MCV RBC AUTO: 91 FL (ref 78–100)
MONOCYTES # BLD AUTO: 0.3 10E9/L (ref 0–1.3)
MONOCYTES NFR BLD AUTO: 4.4 %
NEUTROPHILS # BLD AUTO: 5.2 10E9/L (ref 1.6–8.3)
NEUTROPHILS NFR BLD AUTO: 68.8 %
PLATELET # BLD AUTO: 55 10E9/L (ref 150–450)
RBC # BLD AUTO: 4.88 10E12/L (ref 3.8–5.2)
RETICS # AUTO: 47.4 10E9/L (ref 25–95)
RETICS/RBC NFR AUTO: 1 % (ref 0.5–2)
WBC # BLD AUTO: 7.6 10E9/L (ref 4–11)

## 2017-11-29 NOTE — PROGRESS NOTES
Message left for patient to return call to clinic for results and recommendations.  Direct line provided.

## 2017-11-29 NOTE — PROGRESS NOTES
Patient returned call to clinic.  Reviewed results and recommendations.  Denies questions.  Would like to sign up for mychart and also to have results placed in the mail.  Verified address, results placed in outgoing mail today.  Call transferred to scheduling to assist with mychart activation. Advised to call back with questions or concerns.  Direct line provided.

## 2017-11-29 NOTE — PROGRESS NOTES
DATE OF VISIT: 2017    REASON FOR REFERRAL: Management of thrombocytopenia.    CHIEF COMPLAINT:   Chief Complaint   Patient presents with     Hematology     New patient, Thrombocytopenia. Review lab results.        HISTORY OF PRESENT ILLNESS:   32-year-old female patient who was a routine blood work found to have low platelet count. Patient was referred for further evaluation. Patient was noted to have low platelet count at 264026 in 2017. More recently a CBC which was done on November 15, 2017 showed a platelet count of 52,000. Total white count and hemoglobin has been normal. Patient was referred for further evaluation and management. The patient denies any bruising or bleeding. The patient denies any fever or chills or skin rash. Patient denies any joint aches or pains. There is no family history of rheumatologic disease or autoimmune disease.    REVIEW OF SYSTEMS:   Constitutional: Negative for fever, chills, and night sweats.  Skin: negative.  Eyes: negative.  Ears/Nose/Throat: negative.  Respiratory: No shortness of breath, dyspnea on exertion, cough, or hemoptysis.  Cardiovascular: negative.  Gastrointestinal: negative.  Genitourinary: negative.  Musculoskeletal: negative.  Neurologic: negative.  Psychiatric: negative.  Hematologic/Lymphatic/Immunologic: negative.  Endocrine: negative.    PAST MEDICAL HISTORY:   Past Medical History:   Diagnosis Date     Anxiety      Binge eating disorder      Chickenpox     x3     Food intolerance      IBS (irritable bowel syndrome)      MEDICAL HISTORY OF -     pelvic floor disorder       PAST SURGICAL HISTORY:   Past Surgical History:   Procedure Laterality Date     C/SECTION, LOW TRANSVERSE        SECTION, TUBAL LIGATION, COMBINED N/A 2/15/2016    NO tubal ligation done     COLONOSCOPY  2013    Procedure: COMBINED COLONOSCOPY, SINGLE BIOPSY/POLYPECTOMY BY BIOPSY;  Colonoscopy;  Surgeon: Maureen Valentin MD;  Location: [facility]      COLONOSCOPY N/A 2015    Procedure: COMBINED COLONOSCOPY, SINGLE OR MULTIPLE BIOPSY/POLYPECTOMY BY BIOPSY;  Surgeon: Maureen Valentin MD;  Location: [facility]       ALLERGIES:   Allergies as of 2017 - Nikunj as Reviewed 2017   Allergen Reaction Noted     Dilaudid [hydromorphone] Nausea and Vomiting 2016       MEDICATIONS:   Current Outpatient Prescriptions   Medication Sig Dispense Refill     clonazePAM (KLONOPIN) 1 MG tablet Take 1/2 twice a day for anxiety, 1 and 1/2 at bedtime for sleep 75 tablet 2     linaclotide (LINZESS) 145 MCG capsule Take 1 capsule (145 mcg) by mouth every morning (before breakfast) 30 capsule 3     [START ON 2017] lisdexamfetamine (VYVANSE) 30 MG capsule Take 1 capsule (30 mg) by mouth daily 30 capsule 0     [START ON 2017] lisdexamfetamine dimesylate (VYVANSE) 10 MG capsule Take 1 capsule (10 mg) by mouth daily (with lunch) 30 capsule 0     sennosides (SENOKOT) 8.6 MG tablet Take 2 tablets by mouth 2 times daily       Multiple Vitamins-Minerals (MULTIVITAL PO) Take 1 chew tab by mouth daily Gummi       polyethylene glycol (MIRALAX/GLYCOLAX) powder Take 17 g by mouth daily as needed for constipation          FAMILY HISTORY:   Family History   Problem Relation Age of Onset     GASTROINTESTINAL DISEASE Mother 46     perforated intestines-      Hepatitis Mother      C     Liver Disease Mother      Blood Disease Mother      anemia     Substance Abuse Mother      A&D     Psychotic Disorder Brother      Schizophrenia, OCD     Schizophrenia Brother      Anxiety Disorder Brother      Arthritis Maternal Grandmother      CANCER Paternal Grandmother      cervical     Breast Cancer Paternal Grandmother      Hypertension Paternal Grandmother      DIABETES Paternal Grandmother      CEREBROVASCULAR DISEASE Paternal Grandfather      HEART DISEASE Paternal Grandfather      Bipolar Disorder Paternal Grandfather      Suicide Paternal Grandfather      CANCER Paternal  "Aunt      cervical     Coronary Artery Disease Maternal Grandfather      MI     Substance Abuse Father      recovered A&D     HEART DISEASE Father      MI-stents     Hypertension Father       No history of hematologic disease and autoimmune disease in her family    SOCIAL HISTORY:   Social History     Social History     Marital status:      Spouse name: N/A     Number of children: N/A     Years of education: N/A     Social History Main Topics     Smoking status: Former Smoker     Packs/day: 0.50     Years: 6.00     Types: Cigarettes     Quit date: 5/25/2015     Smokeless tobacco: Never Used      Comment: 1/2 pack daily      Alcohol use No     Drug use: No     Sexual activity: Yes     Partners: Male     Other Topics Concern     Parent/Sibling W/ Cabg, Mi Or Angioplasty Before 65f 55m? No     Social History Narrative       PHYSICAL EXAMINATION:   /78 (BP Location: Right arm, Patient Position: Sitting, Cuff Size: Adult Regular)  Pulse 77  Temp 98.5  F (36.9  C) (Oral)  Resp 16  Ht 1.657 m (5' 5.25\")  Wt 50.3 kg (110 lb 14.4 oz)  SpO2 100%  Breastfeeding? No  BMI 18.31 kg/m2  Wt Readings from Last 10 Encounters:   11/24/17 50.3 kg (110 lb 14.4 oz)   06/26/17 48.5 kg (107 lb)   03/21/17 48.5 kg (107 lb)   03/07/17 47.6 kg (105 lb)   12/09/16 47.6 kg (105 lb)   10/03/16 48.1 kg (106 lb)   09/07/16 47.6 kg (105 lb)   08/08/16 46.7 kg (103 lb)   07/07/16 45.9 kg (101 lb 3.2 oz)   05/24/16 45.5 kg (100 lb 3.2 oz)      GENERAL APPEARANCE: Healthy, alert and in no acute distress.  HEENT: Sclerae anicteric. PERRLA. Oropharynx without ulcers, lesions, or thrush.  NECK: Supple. No asymmetry or masses.  LYMPHATICS: No palpable cervical, supraclavicular, axillary, or inguinal lymphadenopathy.  RESP: Lungs clear to auscultation bilaterally without rales, rhonchi or wheezes.  CARDIOVASCULAR: Regular rate and rhythm. Normal S1, S2; no S3 or S4. No murmur, gallop, or rub.  ABDOMEN: Soft, nontender. Bowel sounds " normal. No palpable organomegaly or masses.  MUSCULOSKELETAL: Extremities without gross deformities noted. No edema of bilateral lower extremities.  SKIN: No suspicious lesions or rashes.  NEURO: Alert and oriented x 3. Cranial nerves II-XII grossly intact.  PSYCHIATRIC: Mentation and affect appear normal.    LABORATORY RESULTS:  Orders Only on 11/21/2017   Component Date Value Ref Range Status     WBC 11/21/2017 6.4  4.0 - 11.0 10e9/L Final     RBC Count 11/21/2017 5.02  3.8 - 5.2 10e12/L Final     Hemoglobin 11/21/2017 15.2  11.7 - 15.7 g/dL Final     Hematocrit 11/21/2017 45.5  35.0 - 47.0 % Final     MCV 11/21/2017 91  78 - 100 fl Final     MCH 11/21/2017 30.3  26.5 - 33.0 pg Final     MCHC 11/21/2017 33.4  31.5 - 36.5 g/dL Final     RDW 11/21/2017 12.4  10.0 - 15.0 % Final     Platelet Count 11/21/2017 44* 150 - 450 10e9/L Final    Comment: .   Consistent with previous critical result         IMAGING RESULTS:      ASSESSMENT AND PLAN:  This is a 52-year-old female patient with thrombocytopenia probably due to immune thrombocytopenia. I discussed with the patient the natural history of immune thrombocytopenia. Currently the patient is minimally symptomatic. Today I am going to check CBC with platelets differential, Blood    Morphology Pathologist Review, Reticulocyte Count, ELP reflex to IELP, Immunoglobulins A G   and M, RHEUMATOLOGY REFERRAL, Blood Morphology Pathologist Review, Reticulocyte Count. I also requested standing order for CBC. I talked to the patient about therapeutic modalities of immune thrombocytopenia. We talked about use of steroids. I gave an overview about potential side effects of steroids. I educated the patient about potential complications of thrombocytopenia. I asked the patient to call us if she has any new symptoms or concerns.    The patient is ready to learn, no apparent learning barriers were identified, Diagnosis and treatment plans were explained to the patient. The patient  expressed understanding of the content. The patient questions were answered to her satisfaction.    Brittany Le MD    Chart documentation with Dragon Voice recognition Software. Although reviewed after completion, some words and grammatical errors may remain.

## 2017-12-04 ENCOUNTER — HOSPITAL ENCOUNTER (EMERGENCY)
Facility: CLINIC | Age: 32
Discharge: HOME | End: 2017-12-05
Attending: FAMILY MEDICINE | Admitting: FAMILY MEDICINE
Payer: COMMERCIAL

## 2017-12-04 ENCOUNTER — TELEPHONE (OUTPATIENT)
Dept: ONCOLOGY | Facility: CLINIC | Age: 32
End: 2017-12-04

## 2017-12-04 DIAGNOSIS — D69.6 THROMBOCYTOPENIA (H): ICD-10-CM

## 2017-12-04 DIAGNOSIS — D69.3 IMMUNE THROMBOCYTOPENIA (H): Primary | ICD-10-CM

## 2017-12-04 LAB
ALBUMIN SERPL-MCNC: 3.7 G/DL (ref 3.4–5)
ALP SERPL-CCNC: 56 U/L (ref 40–150)
ALT SERPL W P-5'-P-CCNC: 21 U/L (ref 0–50)
ANION GAP SERPL CALCULATED.3IONS-SCNC: 8 MMOL/L (ref 3–14)
AST SERPL W P-5'-P-CCNC: 15 U/L (ref 0–45)
BILIRUB SERPL-MCNC: 0.4 MG/DL (ref 0.2–1.3)
BUN SERPL-MCNC: 9 MG/DL (ref 7–30)
CALCIUM SERPL-MCNC: 8.3 MG/DL (ref 8.5–10.1)
CHLORIDE SERPL-SCNC: 108 MMOL/L (ref 94–109)
CO2 SERPL-SCNC: 24 MMOL/L (ref 20–32)
CREAT SERPL-MCNC: 0.64 MG/DL (ref 0.52–1.04)
GFR SERPL CREATININE-BSD FRML MDRD: >90 ML/MIN/1.7M2
GLUCOSE SERPL-MCNC: 77 MG/DL (ref 70–99)
INR PPP: 1.07 (ref 0.86–1.14)
POTASSIUM SERPL-SCNC: 3.4 MMOL/L (ref 3.4–5.3)
PROT SERPL-MCNC: 6.6 G/DL (ref 6.8–8.8)
SODIUM SERPL-SCNC: 140 MMOL/L (ref 133–144)

## 2017-12-04 PROCEDURE — 85610 PROTHROMBIN TIME: CPT | Performed by: FAMILY MEDICINE

## 2017-12-04 PROCEDURE — 85025 COMPLETE CBC W/AUTO DIFF WBC: CPT | Performed by: FAMILY MEDICINE

## 2017-12-04 PROCEDURE — 99283 EMERGENCY DEPT VISIT LOW MDM: CPT | Performed by: FAMILY MEDICINE

## 2017-12-04 PROCEDURE — 80053 COMPREHEN METABOLIC PANEL: CPT | Performed by: FAMILY MEDICINE

## 2017-12-04 PROCEDURE — 99284 EMERGENCY DEPT VISIT MOD MDM: CPT | Mod: Z6 | Performed by: FAMILY MEDICINE

## 2017-12-04 NOTE — TELEPHONE ENCOUNTER
"Pt called to report she has had issues with bleeding today. Pt reports she is having dark red blood with stool (mixed in and on toilet paper) and vaginally, pt requiring a panty liner. Pt also reports she was coughing this morning and coughed up a \"quarter sized of bright red blood\". Pt is currently having CBC weekly and last weeks platelet count was 55. Pt also reports ongoing weakness and abdominal pain which is \"normal for her\" but endorses light-headedness which is new. Denies any SOB but is \"not with it\" which has been \"consistent lately\". Advised pt to be evaluated in urgent care or ER this evening to be evaluated. Pt verbalized understanding. Advised pt Dr. Le's  or I will follow up with her tomorrow.   "

## 2017-12-04 NOTE — ED AVS SNAPSHOT
[facility] Emergency Department    [address] [address]    Phone:  [phone]    Fax:  [phone]                                       Merissa Leung   MRN: [id number]    Department:  [facility] Emergency Department   Date of Visit:  12/4/2017           Patient Information     Date Of Birth          1985        Your diagnoses for this visit were:     Thrombocytopenia (H)        You were seen by Ap Wynn MD.        Discharge Instructions       Return to the Emergency Room if the following occurs:     Worsened bleeding, fainting, trouble breathing, or for any concern at anytime.    Or, follow-up with the following provider as we discussed:     Call your oncologist/hematologist later today for further guidance.  Your most recent platelet count is 43,000.    Medications discussed:    None new.  No changes.    If you received pain-relieving or sedating medication during your time in the ER, avoid alcohol, driving automobiles, or working with machinery.  Also, a responsible adult must stay with you.        Call the Nurse Advice Line at [phone] or [phone] for any concern at anytime.      Future Appointments        Provider Department Dept Phone Center    2/16/2018 2:00 PM PI LAB [facility] [phone] [address]    2/23/2018 2:00 PM Brittany Le MD [facility] [phone] [facility]    3/19/2018 1:40 PM Buck Morrell MD [facility] [phone] [place]      24 Hour Appointment Hotline       To make an appointment at any [facility], call [phone]). If you don't have a family doctor or clinic, we will help you find one. [facility] (formerly [facility] are conveniently located to serve the needs of you and your family.             Review of your medicines      Our records show that you are taking the medicines listed below. If these are incorrect, please call your family doctor or clinic.         Dose / Directions Last dose taken    clonazePAM 1 MG tablet   Commonly known as:  klonoPIN   Quantity:  75 tablet        Take 1/2 twice a day for anxiety, 1 and 1/2 at bedtime for sleep   Refills:  2        linaclotide 145 MCG capsule   Commonly known as:  LINZESS   Dose:  145 mcg   Quantity:  30 capsule        Take 1 capsule (145 mcg) by mouth every morning (before breakfast)   Refills:  3        * lisdexamfetamine 10 MG capsule   Commonly known as:  VYVANSE   Dose:  10 mg   Quantity:  30 capsule   Start taking on:  12/27/2017        Take 1 capsule (10 mg) by mouth daily (with lunch)   Refills:  0        * lisdexamfetamine 30 MG capsule   Commonly known as:  VYVANSE   Dose:  30 mg   Quantity:  30 capsule   Start taking on:  12/28/2017        Take 1 capsule (30 mg) by mouth daily   Refills:  0        MULTIVITAL PO   Dose:  1 chew tab        Take 1 chew tab by mouth daily Gummi   Refills:  0        polyethylene glycol powder   Commonly known as:  MIRALAX/GLYCOLAX   Dose:  17 g        Take 17 g by mouth daily as needed for constipation   Refills:  0        sennosides 8.6 MG tablet   Commonly known as:  SENOKOT   Dose:  2 tablet        Take 2 tablets by mouth 2 times daily   Refills:  0        * Notice:  This list has 2 medication(s) that are the same as other medications prescribed for you. Read the directions carefully, and ask your doctor or other care provider to review them with you.            Procedures and tests performed during your visit     CBC with platelets differential    Comprehensive metabolic panel    INR      Orders Needing Specimen Collection     None      Pending Results     Date and Time Order Name Status Description    12/4/2017 2313 CBC with platelets differential In process             Pending Culture Results     No orders found for last 3 day(s).            Pending Results Instructions     If you had any lab results that were not finalized at the time of your Discharge, you can call the ED Lab  Result RN at [phone]. You will be contacted by this team for any positive Lab results or changes in treatment. The nurses are available 7 days a week from 10A to 6:30P.  You can leave a message 24 hours per day and they will return your call.        Test Results From Your Hospital Stay        12/5/2017 12:55 AM      Component Results     Component Value Ref Range & Units Status    WBC 6.6 4.0 - 11.0 10e9/L Final    RBC Count 4.62 3.8 - 5.2 10e12/L Final    Hemoglobin 13.9 11.7 - 15.7 g/dL Final    Hematocrit 41.0 35.0 - 47.0 % Final    MCV 89 78 - 100 fl Final    MCH 30.1 26.5 - 33.0 pg Final    MCHC 33.9 31.5 - 36.5 g/dL Final    RDW 12.2 10.0 - 15.0 % Final    Platelet Count 43 (LL) 150 - 450 10e9/L Final    .   Consistent with previous critical result      Diff Method PENDING  Incomplete    % Neutrophils 46.8 % Final    % Lymphocytes 43.9 % Final    % Monocytes 6.2 % Final    % Eosinophils 2.1 % Final    % Basophils 0.8 % Final    % Immature Granulocytes 0.2 % Final    Absolute Neutrophil 3.1 1.6 - 8.3 10e9/L Final    Absolute Lymphocytes 2.9 0.8 - 5.3 10e9/L Final    Absolute Monocytes 0.4 0.0 - 1.3 10e9/L Final    Absolute Eosinophils 0.1 0.0 - 0.7 10e9/L Final    Absolute Basophils 0.1 0.0 - 0.2 10e9/L Final    Abs Immature Granulocytes 0.0 0 - 0.4 10e9/L Final         12/4/2017 11:45 PM      Component Results     Component Value Ref Range & Units Status    Sodium 140 133 - 144 mmol/L Final    Potassium 3.4 3.4 - 5.3 mmol/L Final    Chloride 108 94 - 109 mmol/L Final    Carbon Dioxide 24 20 - 32 mmol/L Final    Anion Gap 8 3 - 14 mmol/L Final    Glucose 77 70 - 99 mg/dL Final    Urea Nitrogen 9 7 - 30 mg/dL Final    Creatinine 0.64 0.52 - 1.04 mg/dL Final    GFR Estimate >90 >60 mL/min/1.7m2 Final    Non  GFR Calc    GFR Estimate If Black >90 >60 mL/min/1.7m2 Final    African American GFR Calc    Calcium 8.3 (L) 8.5 - 10.1 mg/dL Final    Bilirubin Total 0.4 0.2 - 1.3 mg/dL Final    Albumin  "3.7 3.4 - 5.0 g/dL Final    Protein Total 6.6 (L) 6.8 - 8.8 g/dL Final    Alkaline Phosphatase 56 40 - 150 U/L Final    ALT 21 0 - 50 U/L Final    AST 15 0 - 45 U/L Final         2017 11:37 PM      Component Results     Component Value Ref Range & Units Status    INR 1.07 0.86 - 1.14 Final                Thank you for choosing [facility]       Thank you for choosing [facility] for your care. Our goal is always to provide you with excellent care. Hearing back from our patients is one way we can continue to improve our services. Please take a few minutes to complete the written survey that you may receive in the mail after you visit with us. Thank you!        [employer] Information     [employer] lets you send messages to your doctor, view your test results, renew your prescriptions, schedule appointments and more. To sign up, go to [email or web address] . Click on \"Log in\" on the left side of the screen, which will take you to the Welcome page. Then click on \"Sign up Now\" on the right side of the page.     You will be asked to enter the access code listed below, as well as some personal information. Please follow the directions to create your username and password.     Your access code is: [email or web address]  Expires: 2018 10:28 AM     Your access code will  in 90 days. If you need help or a new code, please call your [facility] clinic or [phone].        Care EveryWhere ID     This is your Care EveryWhere ID. This could be used by other organizations to access your [facility] medical records  [id number]        Equal Access to Services     [facility] JOSE : Hadjaden Cervantes, geovanni gutiérrez, leela berman. So [facility] [phone].    ATENCIÓN: Si habla español, tiene a goldsmith disposición servicios gratuitos de asistencia lingüística. Llame al [phone].    We comply with applicable federal civil rights laws and [place] laws. We do not discriminate on " the basis of race, color, national origin, age, disability, sex, sexual orientation, or gender identity.            After Visit Summary       This is your record. Keep this with you and show to your community pharmacist(s) and doctor(s) at your next visit.

## 2017-12-04 NOTE — ED AVS SNAPSHOT
[facility] Emergency Department    [address] [address]    Phone:  [phone]    Fax:  [phone]                                       Merissa Leung   MRN: [id number]    Department:  [facility] Emergency Department   Date of Visit:  12/4/2017           After Visit Summary Signature Page     I have received my discharge instructions, and my questions have been answered. I have discussed any challenges I see with this plan with the nurse or doctor.    ..........................................................................................................................................  Patient/Patient Representative Signature      ..........................................................................................................................................  Patient Representative Print Name and Relationship to Patient    ..................................................               ................................................  Date                                            Time    ..........................................................................................................................................  Reviewed by Signature/Title    ...................................................              ..............................................  Date                                                            Time

## 2017-12-05 VITALS
DIASTOLIC BLOOD PRESSURE: 56 MMHG | RESPIRATION RATE: 16 BRPM | TEMPERATURE: 97.5 F | OXYGEN SATURATION: 99 % | SYSTOLIC BLOOD PRESSURE: 89 MMHG | HEART RATE: 86 BPM

## 2017-12-05 PROBLEM — D69.6 THROMBOCYTOPENIA (H): Status: ACTIVE | Noted: 2017-11-09

## 2017-12-05 PROBLEM — R63.6 UNDERWEIGHT: Status: ACTIVE | Noted: 2017-11-09

## 2017-12-05 LAB
BASOPHILS # BLD AUTO: 0.1 10E9/L (ref 0–0.2)
BASOPHILS NFR BLD AUTO: 0.8 %
DIFFERENTIAL METHOD BLD: ABNORMAL
EOSINOPHIL # BLD AUTO: 0.1 10E9/L (ref 0–0.7)
EOSINOPHIL NFR BLD AUTO: 2.1 %
ERYTHROCYTE [DISTWIDTH] IN BLOOD BY AUTOMATED COUNT: 12.2 % (ref 10–15)
HCT VFR BLD AUTO: 41 % (ref 35–47)
HGB BLD-MCNC: 13.9 G/DL (ref 11.7–15.7)
IMM GRANULOCYTES # BLD: 0 10E9/L (ref 0–0.4)
IMM GRANULOCYTES NFR BLD: 0.2 %
LYMPHOCYTES # BLD AUTO: 2.9 10E9/L (ref 0.8–5.3)
LYMPHOCYTES NFR BLD AUTO: 43.9 %
MCH RBC QN AUTO: 30.1 PG (ref 26.5–33)
MCHC RBC AUTO-ENTMCNC: 33.9 G/DL (ref 31.5–36.5)
MCV RBC AUTO: 89 FL (ref 78–100)
MONOCYTES # BLD AUTO: 0.4 10E9/L (ref 0–1.3)
MONOCYTES NFR BLD AUTO: 6.2 %
NEUTROPHILS # BLD AUTO: 3.1 10E9/L (ref 1.6–8.3)
NEUTROPHILS NFR BLD AUTO: 46.8 %
PLATELET # BLD AUTO: 43 10E9/L (ref 150–450)
RBC # BLD AUTO: 4.62 10E12/L (ref 3.8–5.2)
WBC # BLD AUTO: 6.6 10E9/L (ref 4–11)

## 2017-12-05 NOTE — DISCHARGE INSTRUCTIONS
Return to the Emergency Room if the following occurs:     Worsened bleeding, fainting, trouble breathing, or for any concern at anytime.    Or, follow-up with the following provider as we discussed:     Call your oncologist/hematologist later today for further guidance.  Your most recent platelet count is 43,000.    Medications discussed:    None new.  No changes.    If you received pain-relieving or sedating medication during your time in the ER, avoid alcohol, driving automobiles, or working with machinery.  Also, a responsible adult must stay with you.        Call the Nurse Advice Line at [phone] or [phone] for any concern at anytime.

## 2017-12-05 NOTE — TELEPHONE ENCOUNTER
"Status Check:    Pt is a 32 year old female, recently in ED 12.04.17 for thrombocytopenia and bleeding.  Call placed for status check.    Primary care provider is: Mohini Mccord    Diagnosis:   Encounter Diagnoses   Name Primary?     Immune thrombocytopenia (H) Yes     Thrombocytopenia (H)      Oncology provider is:      How are you doing/feeling?: \"Ok. So far no more bleeding\".  Any further issues?: denies  Next Follow up/Recommendations: Reviewed bleeding precautions.  Patient will be seen tomorrow by Dr. Le to review symptoms, ED visit, and plan of care.    Patient instructed to call with any questions or concerns.  Patient states understanding and is in agreement with this plan.    Approximately 7 minutes spent on telephone with patient reviewing assessment and symptom(s) and providing symptom management.    Laverne Rollins RN, BSN, OCN  Oncology Hematology   Breast Cancer Navigator  [facility]  [email or web address]  [phone]    "

## 2017-12-05 NOTE — ED PROVIDER NOTES
HPI  Patient is a 32-year-old female presenting with concerns for worsening low platelet count.  She has a known history of idiopathic thrombocytopenia.  She sees hematology.  She has had regular lab values checked over the past few weeks.  Her platelet count most recently was 32K, 44K, and 55K just last week.  She has fibromyalgia and IBS as well.  Former smoker.  No drugs.  No alcohol.    The patient had not had abnormal bleeding in the past despite her low platelet numbers.  However, today she recognized a quarter-sized amount of blood when she coughed this morning.  This recurred at about 5:00 PM this evening.  There is been no chest pain or shortness of breath.  No rhinorrhea or congestion.  No fever.  She also recognized a moderate sized amount of blood mixed with her stool today.  This was unusual for her.  No melena or obvious clots described.  She also has had spotting throughout the day from the vagina.  She has had a couple of larger episodes of vaginal bleeding as well.  The most recent of these was at 8:00 this evening, three and a half hours ago.  She feels lightheaded which is new.  She feels tired.  No shortness of breath.  No trauma or injury.    ROS: All other review of systems are negative other than that noted above.     Past Medical History:   Diagnosis Date     Anxiety      Binge eating disorder      Chickenpox     x3     Food intolerance      IBS (irritable bowel syndrome)      MEDICAL HISTORY OF -     pelvic floor disorder     Past Surgical History:   Procedure Laterality Date     C/SECTION, LOW TRANSVERSE        SECTION, TUBAL LIGATION, COMBINED N/A 2/15/2016    NO tubal ligation done     COLONOSCOPY  2013    Procedure: COMBINED COLONOSCOPY, SINGLE BIOPSY/POLYPECTOMY BY BIOPSY;  Colonoscopy;  Surgeon: Maureen Valentin MD;  Location: [facility]     COLONOSCOPY N/A 2015    Procedure: COMBINED COLONOSCOPY, SINGLE OR MULTIPLE BIOPSY/POLYPECTOMY BY BIOPSY;  Surgeon: Larissa  Maureen Cooper MD;  Location: [facility]     Family History   Problem Relation Age of Onset     GASTROINTESTINAL DISEASE Mother 46     perforated intestines-      Hepatitis Mother      C     Liver Disease Mother      Blood Disease Mother      anemia     Substance Abuse Mother      A&D     Psychotic Disorder Brother      Schizophrenia, OCD     Schizophrenia Brother      Anxiety Disorder Brother      Arthritis Maternal Grandmother      CANCER Paternal Grandmother      cervical     Breast Cancer Paternal Grandmother      Hypertension Paternal Grandmother      DIABETES Paternal Grandmother      CEREBROVASCULAR DISEASE Paternal Grandfather      HEART DISEASE Paternal Grandfather      Bipolar Disorder Paternal Grandfather      Suicide Paternal Grandfather      CANCER Paternal Aunt      cervical     Coronary Artery Disease Maternal Grandfather      MI     Substance Abuse Father      recovered A&D     HEART DISEASE Father      MI-stents     Hypertension Father      Social History   Substance Use Topics     Smoking status: Former Smoker     Packs/day: 0.50     Years: 6.00     Types: Cigarettes     Quit date: 2015     Smokeless tobacco: Never Used      Comment: 1/2 pack daily      Alcohol use No         PHYSICAL  BP (!) 89/56  Pulse 86  Temp 97.5  F (36.4  C) (Oral)  Resp 16  LMP 2017 (Exact Date)  SpO2 99%  General: Patient is alert and in mild distress.  Tired appearing.  Thin.  Neurological: Alert.  Moving upper and lower extremities equally, bilaterally.  Head / Neck: Atraumatic.  Ears: Not done.  Eyes: Pupils are equal, round, and reactive.  Normal conjunctiva.  Nose: Midline.  No epistaxis.  Mouth / Throat: No ulcerations or lesions.  Upper pharynx is not erythematous.  Moist.  Respiratory: No respiratory distress. CTA B.  Cardiovascular: Regular rhythm.  Peripheral extremities are warm.  No edema.  No calf tenderness.  Abdomen / Pelvis: Not tender.  No distention.  Soft throughout.  Genitalia: Not  done.  Musculoskeletal: No tenderness over major muscles and joints.  Skin: No evidence of rash or trauma.        PHYSICIAN  2323.  Patient has abnormal bleeding within the respiratory system, within the GI system, and within the reproductive system.  Lab values pending.  Vital signs unremarkable.    Labs Ordered and Resulted from Time of ED Arrival Up to the Time of Departure from the ED   CBC WITH PLATELETS DIFFERENTIAL - Abnormal; Notable for the following:        Result Value    Platelet Count 43 (*)     All other components within normal limits   COMPREHENSIVE METABOLIC PANEL - Abnormal; Notable for the following:     Calcium 8.3 (*)     Protein Total 6.6 (*)     All other components within normal limits   INR       0230.  Unfortunately, the hematologist/oncologist group was not calling back.  We phoned them multiple times at the [facility] and they have not called.  The patient is frustrated as am I.  She would like to leave.  She has not been having obvious, active bleeding here in the ER.  I have low concern that she needs hospitalization.  No platelets are given here.  The patient will call her hematologist/oncologist later today for further guidance.  She'll return here for any worsening, as discussed.      IMPRESSION    ICD-10-CM    1. Thrombocytopenia (H) D69.6            Critical Care time:  none                    Ap Wynn MD  12/05/17 2912

## 2017-12-05 NOTE — ED NOTES
Pt presents to ED with rectal and vaginal bleeding, especially with cough. Pt being seen by oncology/hematology for thrombcytopenia. Reports dizziness, weakness and abd pain.

## 2017-12-06 ENCOUNTER — ONCOLOGY VISIT (OUTPATIENT)
Dept: ONCOLOGY | Facility: CLINIC | Age: 32
End: 2017-12-06
Attending: INTERNAL MEDICINE
Payer: COMMERCIAL

## 2017-12-06 VITALS
OXYGEN SATURATION: 100 % | SYSTOLIC BLOOD PRESSURE: 126 MMHG | TEMPERATURE: 97.7 F | DIASTOLIC BLOOD PRESSURE: 75 MMHG | BODY MASS INDEX: 18.01 KG/M2 | HEART RATE: 67 BPM | WEIGHT: 108.1 LBS | HEIGHT: 65 IN | RESPIRATION RATE: 16 BRPM

## 2017-12-06 DIAGNOSIS — R11.0 NAUSEA: ICD-10-CM

## 2017-12-06 DIAGNOSIS — D69.6 THROMBOCYTOPENIA (H): Primary | ICD-10-CM

## 2017-12-06 PROCEDURE — 99214 OFFICE O/P EST MOD 30 MIN: CPT | Performed by: INTERNAL MEDICINE

## 2017-12-06 PROCEDURE — 99211 OFF/OP EST MAY X REQ PHY/QHP: CPT

## 2017-12-06 RX ORDER — PREDNISONE 50 MG/1
50 TABLET ORAL DAILY
Qty: 7 TABLET | Refills: 0 | Status: SHIPPED | OUTPATIENT
Start: 2017-12-06 | End: 2017-12-14

## 2017-12-06 RX ORDER — PREDNISONE 50 MG/1
50 TABLET ORAL DAILY
Qty: 10 TABLET | Refills: 0 | Status: SHIPPED | OUTPATIENT
Start: 2017-12-06 | End: 2017-12-14

## 2017-12-06 ASSESSMENT — PAIN SCALES - GENERAL: PAINLEVEL: EXTREME PAIN (8)

## 2017-12-06 NOTE — NURSING NOTE
"Oncology Rooming Note    December 6, 2017 2:31 PM   Merissa Leung is a 32 year old female who presents for:    Chief Complaint   Patient presents with     Hematology     Recheck Thrombocytopenia, post ED, review labs     Initial Vitals: /75 (BP Location: Right arm, Patient Position: Sitting, Cuff Size: Adult Regular)  Pulse 67  Temp 97.7  F (36.5  C) (Tympanic)  Resp 16  Ht 1.657 m (5' 5.24\")  Wt 49 kg (108 lb 1.6 oz)  LMP 11/20/2017 (Exact Date)  SpO2 100%  Breastfeeding? No  BMI 17.86 kg/m2 Estimated body mass index is 17.86 kg/(m^2) as calculated from the following:    Height as of this encounter: 1.657 m (5' 5.24\").    Weight as of this encounter: 49 kg (108 lb 1.6 oz). Body surface area is 1.5 meters squared.  Extreme Pain (8) Comment: low back    Patient's last menstrual period was 11/20/2017 (exact date).  Allergies reviewed: Yes  Medications reviewed: Yes    Medications: Medication refills not needed today.  Pharmacy name entered into [employer]:      [facility] - [place], [place] - [address]    Clinical concerns:Recheck Thrombocytopenia, post ED, review labs.     1. Patient gone to Ed for rectal bleeding & Coughed up blood.   2. Bleeding vaginally heavy off & on for two weeks.   7 minutes for nursing intake (face to face time)     Ashley Sultana CMA              "

## 2017-12-06 NOTE — MR AVS SNAPSHOT
After Visit Summary   12/6/2017    Merissa Leung    MRN: [id number]           Patient Information     Date Of Birth          1985        Visit Information        Provider Department      12/6/2017 2:15 PM Brittany Le MD [facility] ONCOLOGY      Today's Diagnoses     Thrombocytopenia (H)    -  1    Nausea          Care Instructions    We are referring you to gastroenterology for the persistent nausea. We would like to see you back in clinic with Dr. Le in 1 week with labs prior.  Please continue weekly standing labs. Our schedulers will also assist with trying to get you a sooner appt with rheumatology.    Your prescription (prednisone) has been sent to:   [facility] [address] [place] [address] [address]  [address] [facility] [address]  Phone: [phone] Fax: [phone]  When you are in need of a refill, please call your pharmacy and they will send us a request.      Copy of appointments, and after visit summary (AVS) given to patient.  If you have any questions during business hours (M-F 8 AM- 4PM), please call Laverne Rollins RN, BSN, OCN Oncology Hematology /Breast Cancer Navigator at [facility] [phone].   For questions after business hours, or on holidays/weekends, please call our after hours Nurse Triage line [phone]. Thank you.            Follow-ups after your visit        Follow-up notes from your care team     Return in about 1 week (around 12/13/2017) for Blood work before next appointment, Standing blood work.      Your next 10 appointments already scheduled     Dec 14, 2017  2:00 PM CST   LAB with PI LAB   [facility]    [address] [facility] [address]   [phone]           Please do not eat 10-12 hours before your appointment if you are coming in fasting for labs on lipids, cholesterol, or glucose (sugar). This does not apply to  pregnant women. Water, hot tea and black coffee (with nothing added) are okay. Do not drink other fluids, diet soda or chew gum.            Dec 14, 2017  3:30 PM CST   Return Visit with Brittany Le MD   [facility] ([facility])    [facility]  [address]  [facility] [address]   [phone]            Jan 02, 2018   Procedure with Ray Amato MD   [facility] ([facility])    [address] [facility] [address]   [phone]           The medical center is located at [address] (between [address] in [place], [place] north of [place]).            Feb 16, 2018  2:00 PM CST   LAB with PI LAB   [facility]    [address] [facility] [address]   [phone]           Please do not eat 10-12 hours before your appointment if you are coming in fasting for labs on lipids, cholesterol, or glucose (sugar). This does not apply to pregnant women. Water, hot tea and black coffee (with nothing added) are okay. Do not drink other fluids, diet soda or chew gum.            Feb 23, 2018  2:00 PM CST   Return Visit with Brittany Le MD   [facility] ([facility])    [facility]  [address]  [facility] [address]   [phone]            Mar 19, 2018  1:40 PM CDT   New Visit with Buck Morrell MD   [facility] [place] ([facility])    [address]  [place] [address] [phone]              Future tests that were ordered for you today     Open Future Orders        Priority Expected Expires Ordered    UPPER GI ENDOSCOPY Routine 12/6/2017 1/20/2018 12/6/2017            Who to contact     If you have questions or need follow up information about today's clinic visit or your schedule please contact [facility] directly at [phone].  Normal or  "non-critical lab and imaging results will be communicated to you by MyChart, letter or phone within 4 business days after the clinic has received the results. If you do not hear from us within 7 days, please contact the clinic through [employer] or phone. If you have a critical or abnormal lab result, we will notify you by phone as soon as possible.  Submit refill requests through [employer] or call your pharmacy and they will forward the refill request to us. Please allow 3 business days for your refill to be completed.          Additional Information About Your Visit        [employer] Information     [employer] lets you send messages to your doctor, view your test results, renew your prescriptions, schedule appointments and more. To sign up, go to [email or web address] . Click on \"Log in\" on the left side of the screen, which will take you to the Welcome page. Then click on \"Sign up Now\" on the right side of the page.     You will be asked to enter the access code listed below, as well as some personal information. Please follow the directions to create your username and password.     Your access code is: [email or web address]  Expires: 2018 10:28 AM     Your access code will  in 90 days. If you need help or a new code, please call your [place] clinic or [phone].        Care EveryWhere ID     This is your Care EveryWhere ID. This could be used by other organizations to access your [place] medical records  [id number]        Your Vitals Were     Pulse Temperature Respirations Height Last Period Pulse Oximetry    67 97.7  F (36.5  C) (Tympanic) 16 1.657 m (5' 5.24\") 2017 (Exact Date) 100%    Breastfeeding? BMI (Body Mass Index)                No 17.86 kg/m2           Blood Pressure from Last 3 Encounters:   17 126/75   17 (!) 89/56   17 115/78    Weight from Last 3 Encounters:   17 49 kg (108 lb 1.6 oz)   17 50.3 kg (110 lb 14.4 oz)   17 48.5 kg (107 lb)               "   Today's Medication Changes          These changes are accurate as of: 12/6/17  3:22 PM.  If you have any questions, ask your nurse or doctor.               Start taking these medicines.        Dose/Directions    predniSONE 50 MG tablet   Commonly known as:  DELTASONE   Used for:  Thrombocytopenia (H)   Started by:  Brittany Le MD        Dose:  50 mg   Take 1 tablet (50 mg) by mouth daily   Quantity:  7 tablet   Refills:  0            Where to get your medicines      These medications [id number] [address] [facility]  [address] [facility] [facility] [address]     Phone:  [phone]     predniSONE 50 MG tablet                Primary Care Provider Office Phone # Fax #    Mohini Mccord, [phone]       [address] [facility] [address]        Equal Access to Services     [facility] : Hadii aad ku hadasho Soomaali, waaxda luqadaha, qaybta kaalmada adeedyyaney, leela us . So [facility] [phone].    ATENCIÓN: Si habla español, tiene a goldsmith disposición servicios gratuitos de asistencia lingüística. [facility] [phone].    We comply with applicable federal civil rights laws and [place] laws. We do not discriminate on the basis of race, color, national origin, age, disability, sex, sexual orientation, or gender identity.            Thank you!     Thank you for choosing [facility]  for your care. Our goal is always to provide you with excellent care. Hearing back from our patients is one way we can continue to improve our services. Please take a few minutes to complete the written survey that you may receive in the mail after your visit with us. Thank you!             Your Updated Medication List - Protect others around you: Learn how to safely use, store and throw away your medicines at www.disposemymeds.org.          This list is accurate as of: 12/6/17  3:22 PM.  Always use your most recent med list.                    Brand Name Dispense Instructions for use Diagnosis    clonazePAM 1 MG tablet    klonoPIN    75 tablet    Take 1/2 twice a day for anxiety, 1 and 1/2 at bedtime for sleep    EDWARD (generalized anxiety disorder)       linaclotide 145 MCG capsule    LINZESS    30 capsule    Take 1 capsule (145 mcg) by mouth every morning (before breakfast)    Irritable bowel syndrome with constipation       * lisdexamfetamine 10 MG capsule   Start taking on:  12/27/2017    VYVANSE    30 capsule    Take 1 capsule (10 mg) by mouth daily (with lunch)    Binge eating disorder       * lisdexamfetamine 30 MG capsule   Start taking on:  12/28/2017    VYVANSE    30 capsule    Take 1 capsule (30 mg) by mouth daily    Binge eating disorder       MULTIVITAL PO      Take 1 chew tab by mouth daily Gummi        polyethylene glycol powder    MIRALAX/GLYCOLAX     Take 17 g by mouth daily as needed for constipation        predniSONE 50 MG tablet    DELTASONE    7 tablet    Take 1 tablet (50 mg) by mouth daily    Thrombocytopenia (H)       sennosides 8.6 MG tablet    SENOKOT     Take 2 tablets by mouth 2 times daily        UNABLE TO FIND      Take 2 teaspoonful by mouth At Bedtime Magnesium Calm        * Notice:  This list has 2 medication(s) that are the same as other medications prescribed for you. Read the directions carefully, and ask your doctor or other care provider to review them with you.

## 2017-12-06 NOTE — LETTER
12/6/2017         RE: Merissa Leung  [address] [facility] [address]        Dear Colleague,    Thank you for referring your patient, Merissa Leung, to the [facility]. Please see a copy of my visit note below.    Hematology/ Oncology Follow-up Visit:  Dec 6, 2017    Reason for Visit:   Chief Complaint   Patient presents with     Hematology     Recheck Thrombocytopenia, post ED, review labs       Oncologic History:  History of immune thrombocytopenia    Interval History:  Patient is here today for follow-up. She's been having increasing vaginal bleeding. His platelet count has been averaging around 50,000. Patient denies any weight loss or night sweats or fever or chills. She's been having increasing fatigue lately.    Review Of Systems:  Constitutional: Negative for fever, chills, and night sweats.  Skin: negative.  Eyes: negative.  Ears/Nose/Throat: negative.  Respiratory: No shortness of breath, dyspnea on exertion, cough, or hemoptysis.  Cardiovascular: negative.  Gastrointestinal: negative.  Genitourinary: negative.  Musculoskeletal: negative.  Neurologic: negative.  Psychiatric: negative.  Hematologic/Lymphatic/Immunologic: negative.  Endocrine: negative.    All other ROS negative unless mentioned in interval history.    Past medical, social, surgical, and family histories reviewed.    Allergies:  Allergies as of 12/06/2017 - Nikunj as Reviewed 12/06/2017   Allergen Reaction Noted     Dilaudid [hydromorphone] Nausea and Vomiting 02/14/2016       Current Medications:  Current Outpatient Prescriptions   Medication Sig Dispense Refill     UNABLE TO FIND Take 2 teaspoonful by mouth At Bedtime Magnesium Calm       predniSONE (DELTASONE) 50 MG tablet Take 1 tablet (50 mg) by mouth daily 7 tablet 0     predniSONE (DELTASONE) 50 MG tablet Take 1 tablet (50 mg) by mouth daily 10 tablet 0     omeprazole (PRILOSEC) 20 MG CR capsule Take 1 capsule (20 mg) by mouth daily 30 capsule 1     clonazePAM  "(KLONOPIN) 1 MG tablet Take 1/2 twice a day for anxiety, 1 and 1/2 at bedtime for sleep 75 tablet 2     linaclotide (LINZESS) 145 MCG capsule Take 1 capsule (145 mcg) by mouth every morning (before breakfast) 30 capsule 3     [START ON 12/28/2017] lisdexamfetamine (VYVANSE) 30 MG capsule Take 1 capsule (30 mg) by mouth daily 30 capsule 0     [START ON 12/27/2017] lisdexamfetamine dimesylate (VYVANSE) 10 MG capsule Take 1 capsule (10 mg) by mouth daily (with lunch) 30 capsule 0     sennosides (SENOKOT) 8.6 MG tablet Take 2 tablets by mouth 2 times daily       polyethylene glycol (MIRALAX/GLYCOLAX) powder Take 17 g by mouth daily as needed for constipation       Multiple Vitamins-Minerals (MULTIVITAL PO) Take 1 chew tab by mouth daily Gummi          Physical Exam:  /75 (BP Location: Right arm, Patient Position: Sitting, Cuff Size: Adult Regular)  Pulse 67  Temp 97.7  F (36.5  C) (Tympanic)  Resp 16  Ht 1.657 m (5' 5.24\")  Wt 49 kg (108 lb 1.6 oz)  LMP 11/20/2017 (Exact Date)  SpO2 100%  Breastfeeding? No  BMI 17.86 kg/m2  Wt Readings from Last 12 Encounters:   12/06/17 49 kg (108 lb 1.6 oz)   11/24/17 50.3 kg (110 lb 14.4 oz)   06/26/17 48.5 kg (107 lb)   03/21/17 48.5 kg (107 lb)   03/07/17 47.6 kg (105 lb)   12/09/16 47.6 kg (105 lb)   10/03/16 48.1 kg (106 lb)   09/07/16 47.6 kg (105 lb)   08/08/16 46.7 kg (103 lb)   07/07/16 45.9 kg (101 lb 3.2 oz)   05/24/16 45.5 kg (100 lb 3.2 oz)   03/30/16 46.3 kg (102 lb)     GENERAL APPEARANCE: Healthy, alert and in no acute distress.  HEENT: Sclerae anicteric. PERRLA. Oropharynx without ulcers, lesions, or thrush.  NECK: Supple. No asymmetry or masses.  LYMPHATICS: No palpable cervical, supraclavicular, axillary, or inguinal lymphadenopathy.  RESP: Lungs clear to auscultation bilaterally without rales, rhonchi or wheezes.  CARDIOVASCULAR: Regular rate and rhythm. Normal S1, S2; no S3 or S4. No murmur, gallop, or rub.  ABDOMEN: Soft, nontender. Bowel sounds " normal. No palpable organomegaly or masses.  MUSCULOSKELETAL: Extremities without gross deformities noted. No edema of bilateral lower extremities.  SKIN: No suspicious lesions or rashes.  NEURO: Alert and oriented x 3. Cranial nerves II-XII grossly intact.  PSYCHIATRIC: Mentation and affect appear normal.    Laboratory/Imaging Studies:  Orders Only on 11/28/2017   Component Date Value Ref Range Status     WBC 11/28/2017 7.6  4.0 - 11.0 10e9/L Final     RBC Count 11/28/2017 4.88  3.8 - 5.2 10e12/L Final     Hemoglobin 11/28/2017 14.6  11.7 - 15.7 g/dL Final     Hematocrit 11/28/2017 44.2  35.0 - 47.0 % Final     MCV 11/28/2017 91  78 - 100 fl Final     MCH 11/28/2017 29.9  26.5 - 33.0 pg Final     MCHC 11/28/2017 33.0  31.5 - 36.5 g/dL Final     RDW 11/28/2017 12.4  10.0 - 15.0 % Final     Platelet Count 11/28/2017 55* 150 - 450 10e9/L Final     Diff Method 11/28/2017 Automated Method   Final     % Neutrophils 11/28/2017 68.8  % Final     % Lymphocytes 11/28/2017 26.0  % Final     % Monocytes 11/28/2017 4.4  % Final     % Eosinophils 11/28/2017 0.4  % Final     % Basophils 11/28/2017 0.3  % Final     % Immature Granulocytes 11/28/2017 0.1  % Final     Absolute Neutrophil 11/28/2017 5.2  1.6 - 8.3 10e9/L Final     Absolute Lymphocytes 11/28/2017 2.0  0.8 - 5.3 10e9/L Final     Absolute Monocytes 11/28/2017 0.3  0.0 - 1.3 10e9/L Final     Absolute Eosinophils 11/28/2017 0.0  0.0 - 0.7 10e9/L Final     Absolute Basophils 11/28/2017 0.0  0.0 - 0.2 10e9/L Final     Abs Immature Granulocytes 11/28/2017 0.0  0 - 0.4 10e9/L Final     Copath Report 11/28/2017    Final                    Value:Patient Name: ALLEGRA LUZ  MR#: [id number]  Specimen #: [id number]  Collected: 11/28/2017  Received: 11/29/2017  Reported: 11/29/2017 18:29  Ordering Phy(s): DOCTOR UNKNOWN    For improved result formatting, select 'View Enhanced Report Format'  under Linked Documents section.    TEST(S):  Peripheral Smear Morphology    FINAL  DIAGNOSIS:  Peripheral blood morphology:  - Marked thrombocytopenia    COMMENT:  Thrombocytopenia may be due to increased platelet destruction (immune  mediated such as idiopathic thrombocytopenic purpura, SLE, drug induced  or due to ITP, TTP, DIC, infections such as anaplasma), due to  hypersplenism or due to bone marrow suppression (i.e. bone marrow  infiltrative process, myelodysplastic syndrome, or  toxin - such as  alcohol, viral or drug induced), among other causes.    Electronically signed out by:    Netta Bowers M.D.    CLINICAL HISTORY:  32 year old female.  From electronic medical record: Idiopathic  thrombocytopenic purpura.  On 11/28/2017 absolu                          te reticulocyte count was  normal.  On 11/22/2017, IgG,. IgA, IgM and serum protein electrophoresis  were normal.  Platelet count has been decreased since 1/16/17 at 124 and  was 44 on 11/21/2017.    PERIPHERAL BLOOD DATA:  PERIPHERAL BLOOD DATA (Date: 11/29/2017)    Patient Value (Reference Range >18 year old female)    7.55    WBC        (4.0-11.0 x 10*9/L)  4.88    RBC         (3.8-5.2 x 10*12/L)  14.6    HGB         (11.7-15.7 g/dL)  44.2    HCT         (35.0-47.0 %)  90.6    MCV        (78-100fL)  29.9    MCH        (26.5-33.0 pg)  33.0    MCHC      (31.5-36.5 g/dL)  12.4    RDW       (10.0-15.0 %)    55    PLT         (150-450 x 10*9/L)    PERIPHERAL BLOOD DIFFERENTIAL - Manual 200 cells.  (Reference ranges >18 year old)    Percent  69.0%  Neutrophils  26.0%  Lymphocytes   5.0%  Monocytes   0.0%  Eosinophils   0.0%  Basophils    Absolute  5.21   Neutrophils  (Ref normal 1.6 - 8.3 x 10*9/L)  1.96   Lymphocytes  (Ref normal 0.8 - 5.3 x 10*9/L)  0.38   Monocytes  (Ref normal 0 -1.3                           x 10*9/L)  0.00   Eosinophils  (Ref normal 0 - 0.7 x 10*9/L)  0.00   Basophils  (Ref normal 0 - 0.2 x 10*9/L)    PERIPHERAL BLOOD MORPHOLOGY:    ERYTHROCYTES:  The red cells are normocytic, normochromic and normal in  number for  the patient's age and gender.  Anisopoikilocytosis is  minimal.  No features of hemolysis or increased polychromasia are  identified.  No intracellular microorganisms are identified.    LEUKOCYTES:  The leukocytes are normal in number, morphology and  differential distribution. No immature precursors or evidence of  neutrophilic dysplasia is seen. No atypical lymphoid cells are seen. No  intracellular microorganisms are identified.    PLATELETS:  The platelets are markedly decreased in number and are  normal in morphology.    CPT Codes:  A: 18203-VJND    TESTING LAB LOCATION:  [address] [address] [phone]    COLLECTION SITE:  Client:  [facility]  Lo                          cation:  PILAB (K)       % Retic 11/28/2017 1.0  0.5 - 2.0 % Final     Absolute Retic 11/28/2017 47.4  25 - 95 10e9/L Final   Oncology Visit on 11/24/2017   Component Date Value Ref Range Status     Albumin Fraction 11/24/2017 4.4  3.7 - 5.1 g/dL Final     Alpha 1 Fraction 11/24/2017 0.3  0.2 - 0.4 g/dL Final     Alpha 2 Fraction 11/24/2017 0.8  0.5 - 0.9 g/dL Final     Beta Fraction 11/24/2017 0.6  0.6 - 1.0 g/dL Final     Gamma Fraction 11/24/2017 0.8  0.7 - 1.6 g/dL Final     Monoclonal Peak 11/24/2017 0.0  0.0 g/dL Final     ELP Interpretation: 11/24/2017    Final                    Value:Essentially normal electrophoretic pattern. No monoclonal protein seen.  Pathologic   significance requires clinical correlation. Delfin Hobson M.D., Ph.D., Pathologist.        IGG 11/24/2017 776  695 - 1620 mg/dL Final     IGA 11/24/2017 75  70 - 380 mg/dL Final     IGM 11/24/2017 90  60 - 265 mg/dL Final          Assessment and plan:    (D69.6) Thrombocytopenia (H)  (primary encounter diagnosis)  I reviewed with the patient denied history of immune thrombocytopenia. We also talked about sequential I of the disease. Because of the patient  increased history of bleeding lately I recommend patient to start a short course of steroids. Patient will be started on prednisone 50 mg orally daily. I reviewed with the patient the potential side effects of prednisone in details. We will monitor the platelet count and adjust the dose accordingly. I will see the patient again in 1 week time or sooner if there are new developments or concerns.    (R11.0) Nausea  The patient had persistent nausea. We will refer the patient for upper GI endoscopy. Patient will be starting Prilosec 20 mg orally daily.  Plan: UPPER GI ENDOSCOPY    The patient is ready to learn, no apparent learning barriers were identified.  Diagnosis and treatment plans were explained to the patient. The patient expressed understanding of the content. The patient asked appropriate questions. The patient questions were answered to her satisfaction.    Time spent 15 minutes more than 50% of the time in counseling and coordination of care including discussion of immune thrombocytopenia, prednisone and potential side effects. We also discussed follow-up plan    Chart documentation with Dragon Voice recognition Software. Although reviewed after completion, some words and grammatical errors may remain.    Again, thank you for allowing me to participate in the care of your patient.        Sincerely,        Brittany Le MD

## 2017-12-12 NOTE — PROGRESS NOTES
Hematology/ Oncology Follow-up Visit:  Dec 6, 2017    Reason for Visit:   Chief Complaint   Patient presents with     Hematology     Recheck Thrombocytopenia, post ED, review labs       Oncologic History:  History of immune thrombocytopenia    Interval History:  Patient is here today for follow-up. She's been having increasing vaginal bleeding. His platelet count has been averaging around 50,000. Patient denies any weight loss or night sweats or fever or chills. She's been having increasing fatigue lately.    Review Of Systems:  Constitutional: Negative for fever, chills, and night sweats.  Skin: negative.  Eyes: negative.  Ears/Nose/Throat: negative.  Respiratory: No shortness of breath, dyspnea on exertion, cough, or hemoptysis.  Cardiovascular: negative.  Gastrointestinal: negative.  Genitourinary: negative.  Musculoskeletal: negative.  Neurologic: negative.  Psychiatric: negative.  Hematologic/Lymphatic/Immunologic: negative.  Endocrine: negative.    All other ROS negative unless mentioned in interval history.    Past medical, social, surgical, and family histories reviewed.    Allergies:  Allergies as of 12/06/2017 - Nikunj as Reviewed 12/06/2017   Allergen Reaction Noted     Dilaudid [hydromorphone] Nausea and Vomiting 02/14/2016       Current Medications:  Current Outpatient Prescriptions   Medication Sig Dispense Refill     UNABLE TO FIND Take 2 teaspoonful by mouth At Bedtime Magnesium Calm       predniSONE (DELTASONE) 50 MG tablet Take 1 tablet (50 mg) by mouth daily 7 tablet 0     predniSONE (DELTASONE) 50 MG tablet Take 1 tablet (50 mg) by mouth daily 10 tablet 0     omeprazole (PRILOSEC) 20 MG CR capsule Take 1 capsule (20 mg) by mouth daily 30 capsule 1     clonazePAM (KLONOPIN) 1 MG tablet Take 1/2 twice a day for anxiety, 1 and 1/2 at bedtime for sleep 75 tablet 2     linaclotide (LINZESS) 145 MCG capsule Take 1 capsule (145 mcg) by mouth every morning (before breakfast) 30 capsule 3     [START ON  "12/28/2017] lisdexamfetamine (VYVANSE) 30 MG capsule Take 1 capsule (30 mg) by mouth daily 30 capsule 0     [START ON 12/27/2017] lisdexamfetamine dimesylate (VYVANSE) 10 MG capsule Take 1 capsule (10 mg) by mouth daily (with lunch) 30 capsule 0     sennosides (SENOKOT) 8.6 MG tablet Take 2 tablets by mouth 2 times daily       polyethylene glycol (MIRALAX/GLYCOLAX) powder Take 17 g by mouth daily as needed for constipation       Multiple Vitamins-Minerals (MULTIVITAL PO) Take 1 chew tab by mouth daily Gummi          Physical Exam:  /75 (BP Location: Right arm, Patient Position: Sitting, Cuff Size: Adult Regular)  Pulse 67  Temp 97.7  F (36.5  C) (Tympanic)  Resp 16  Ht 1.657 m (5' 5.24\")  Wt 49 kg (108 lb 1.6 oz)  LMP 11/20/2017 (Exact Date)  SpO2 100%  Breastfeeding? No  BMI 17.86 kg/m2  Wt Readings from Last 12 Encounters:   12/06/17 49 kg (108 lb 1.6 oz)   11/24/17 50.3 kg (110 lb 14.4 oz)   06/26/17 48.5 kg (107 lb)   03/21/17 48.5 kg (107 lb)   03/07/17 47.6 kg (105 lb)   12/09/16 47.6 kg (105 lb)   10/03/16 48.1 kg (106 lb)   09/07/16 47.6 kg (105 lb)   08/08/16 46.7 kg (103 lb)   07/07/16 45.9 kg (101 lb 3.2 oz)   05/24/16 45.5 kg (100 lb 3.2 oz)   03/30/16 46.3 kg (102 lb)     GENERAL APPEARANCE: Healthy, alert and in no acute distress.  HEENT: Sclerae anicteric. PERRLA. Oropharynx without ulcers, lesions, or thrush.  NECK: Supple. No asymmetry or masses.  LYMPHATICS: No palpable cervical, supraclavicular, axillary, or inguinal lymphadenopathy.  RESP: Lungs clear to auscultation bilaterally without rales, rhonchi or wheezes.  CARDIOVASCULAR: Regular rate and rhythm. Normal S1, S2; no S3 or S4. No murmur, gallop, or rub.  ABDOMEN: Soft, nontender. Bowel sounds normal. No palpable organomegaly or masses.  MUSCULOSKELETAL: Extremities without gross deformities noted. No edema of bilateral lower extremities.  SKIN: No suspicious lesions or rashes.  NEURO: Alert and oriented x 3. Cranial nerves " II-XII grossly intact.  PSYCHIATRIC: Mentation and affect appear normal.    Laboratory/Imaging Studies:  Orders Only on 11/28/2017   Component Date Value Ref Range Status     WBC 11/28/2017 7.6  4.0 - 11.0 10e9/L Final     RBC Count 11/28/2017 4.88  3.8 - 5.2 10e12/L Final     Hemoglobin 11/28/2017 14.6  11.7 - 15.7 g/dL Final     Hematocrit 11/28/2017 44.2  35.0 - 47.0 % Final     MCV 11/28/2017 91  78 - 100 fl Final     MCH 11/28/2017 29.9  26.5 - 33.0 pg Final     MCHC 11/28/2017 33.0  31.5 - 36.5 g/dL Final     RDW 11/28/2017 12.4  10.0 - 15.0 % Final     Platelet Count 11/28/2017 55* 150 - 450 10e9/L Final     Diff Method 11/28/2017 Automated Method   Final     % Neutrophils 11/28/2017 68.8  % Final     % Lymphocytes 11/28/2017 26.0  % Final     % Monocytes 11/28/2017 4.4  % Final     % Eosinophils 11/28/2017 0.4  % Final     % Basophils 11/28/2017 0.3  % Final     % Immature Granulocytes 11/28/2017 0.1  % Final     Absolute Neutrophil 11/28/2017 5.2  1.6 - 8.3 10e9/L Final     Absolute Lymphocytes 11/28/2017 2.0  0.8 - 5.3 10e9/L Final     Absolute Monocytes 11/28/2017 0.3  0.0 - 1.3 10e9/L Final     Absolute Eosinophils 11/28/2017 0.0  0.0 - 0.7 10e9/L Final     Absolute Basophils 11/28/2017 0.0  0.0 - 0.2 10e9/L Final     Abs Immature Granulocytes 11/28/2017 0.0  0 - 0.4 10e9/L Final     Copath Report 11/28/2017    Final                    Value:Patient Name: ALLEGRA LUZ  MR#: [id number]  Specimen #: [id number]  Collected: 11/28/2017  Received: 11/29/2017  Reported: 11/29/2017 18:29  Ordering Phy(s): DOCTOR UNKNOWN    For improved result formatting, select 'View Enhanced Report Format'  under Linked Documents section.    TEST(S):  Peripheral Smear Morphology    FINAL DIAGNOSIS:  Peripheral blood morphology:  - Marked thrombocytopenia    COMMENT:  Thrombocytopenia may be due to increased platelet destruction (immune  mediated such as idiopathic thrombocytopenic purpura, SLE, drug induced  or due to ITP,  TTP, DIC, infections such as anaplasma), due to  hypersplenism or due to bone marrow suppression (i.e. bone marrow  infiltrative process, myelodysplastic syndrome, or  toxin - such as  alcohol, viral or drug induced), among other causes.    Electronically signed out by:    Netta Bowers M.D.    CLINICAL HISTORY:  32 year old female.  From electronic medical record: Idiopathic  thrombocytopenic purpura.  On 11/28/2017 absolu                          te reticulocyte count was  normal.  On 11/22/2017, IgG,. IgA, IgM and serum protein electrophoresis  were normal.  Platelet count has been decreased since 1/16/17 at 124 and  was 44 on 11/21/2017.    PERIPHERAL BLOOD DATA:  PERIPHERAL BLOOD DATA (Date: 11/29/2017)    Patient Value (Reference Range >18 year old female)    7.55    WBC        (4.0-11.0 x 10*9/L)  4.88    RBC         (3.8-5.2 x 10*12/L)  14.6    HGB         (11.7-15.7 g/dL)  44.2    HCT         (35.0-47.0 %)  90.6    MCV        (78-100fL)  29.9    MCH        (26.5-33.0 pg)  33.0    MCHC      (31.5-36.5 g/dL)  12.4    RDW       (10.0-15.0 %)    55    PLT         (150-450 x 10*9/L)    PERIPHERAL BLOOD DIFFERENTIAL - Manual 200 cells.  (Reference ranges >18 year old)    Percent  69.0%  Neutrophils  26.0%  Lymphocytes   5.0%  Monocytes   0.0%  Eosinophils   0.0%  Basophils    Absolute  5.21   Neutrophils  (Ref normal 1.6 - 8.3 x 10*9/L)  1.96   Lymphocytes  (Ref normal 0.8 - 5.3 x 10*9/L)  0.38   Monocytes  (Ref normal 0 -1.3                           x 10*9/L)  0.00   Eosinophils  (Ref normal 0 - 0.7 x 10*9/L)  0.00   Basophils  (Ref normal 0 - 0.2 x 10*9/L)    PERIPHERAL BLOOD MORPHOLOGY:    ERYTHROCYTES:  The red cells are normocytic, normochromic and normal in  number for the patient's age and gender.  Anisopoikilocytosis is  minimal.  No features of hemolysis or increased polychromasia are  identified.  No intracellular microorganisms are identified.    LEUKOCYTES:  The leukocytes are normal in number,  morphology and  differential distribution. No immature precursors or evidence of  neutrophilic dysplasia is seen. No atypical lymphoid cells are seen. No  intracellular microorganisms are identified.    PLATELETS:  The platelets are markedly decreased in number and are  normal in morphology.    CPT Codes:  A: 45158-IJOO    TESTING LAB LOCATION:  [facility], [address] [place] [id number] [phone]    COLLECTION SITE:  Client:  [facility]  Lo                          cation:  PILAB (K)       % Retic 11/28/2017 1.0  0.5 - 2.0 % Final     Absolute Retic 11/28/2017 47.4  25 - 95 10e9/L Final   Oncology Visit on 11/24/2017   Component Date Value Ref Range Status     Albumin Fraction 11/24/2017 4.4  3.7 - 5.1 g/dL Final     Alpha 1 Fraction 11/24/2017 0.3  0.2 - 0.4 g/dL Final     Alpha 2 Fraction 11/24/2017 0.8  0.5 - 0.9 g/dL Final     Beta Fraction 11/24/2017 0.6  0.6 - 1.0 g/dL Final     Gamma Fraction 11/24/2017 0.8  0.7 - 1.6 g/dL Final     Monoclonal Peak 11/24/2017 0.0  0.0 g/dL Final     ELP Interpretation: 11/24/2017    Final                    Value:Essentially normal electrophoretic pattern. No monoclonal protein seen.  Pathologic   significance requires clinical correlation. Delfin Hobson M.D., Ph.D., Pathologist.        IGG 11/24/2017 776  695 - 1620 mg/dL Final     IGA 11/24/2017 75  70 - 380 mg/dL Final     IGM 11/24/2017 90  60 - 265 mg/dL Final          Assessment and plan:    (D69.6) Thrombocytopenia (H)  (primary encounter diagnosis)  I reviewed with the patient denied history of immune thrombocytopenia. We also talked about sequential I of the disease. Because of the patient increased history of bleeding lately I recommend patient to start a short course of steroids. Patient will be started on prednisone 50 mg orally daily. I reviewed with the patient the potential side effects of prednisone in details. We  will monitor the platelet count and adjust the dose accordingly. I will see the patient again in 1 week time or sooner if there are new developments or concerns.    (R11.0) Nausea  The patient had persistent nausea. We will refer the patient for upper GI endoscopy. Patient will be starting Prilosec 20 mg orally daily.  Plan: UPPER GI ENDOSCOPY    The patient is ready to learn, no apparent learning barriers were identified.  Diagnosis and treatment plans were explained to the patient. The patient expressed understanding of the content. The patient asked appropriate questions. The patient questions were answered to her satisfaction.    Time spent 15 minutes more than 50% of the time in counseling and coordination of care including discussion of immune thrombocytopenia, prednisone and potential side effects. We also discussed follow-up plan    Chart documentation with Dragon Voice recognition Software. Although reviewed after completion, some words and grammatical errors may remain.

## 2017-12-13 PROCEDURE — 36415 COLL VENOUS BLD VENIPUNCTURE: CPT | Performed by: INTERNAL MEDICINE

## 2017-12-13 PROCEDURE — 85025 COMPLETE CBC W/AUTO DIFF WBC: CPT | Performed by: INTERNAL MEDICINE

## 2017-12-14 ENCOUNTER — ONCOLOGY VISIT (OUTPATIENT)
Dept: ONCOLOGY | Facility: CLINIC | Age: 32
End: 2017-12-14
Attending: INTERNAL MEDICINE
Payer: COMMERCIAL

## 2017-12-14 VITALS
TEMPERATURE: 98 F | BODY MASS INDEX: 18.26 KG/M2 | HEIGHT: 65 IN | SYSTOLIC BLOOD PRESSURE: 126 MMHG | DIASTOLIC BLOOD PRESSURE: 78 MMHG | RESPIRATION RATE: 12 BRPM | HEART RATE: 73 BPM | WEIGHT: 109.6 LBS | OXYGEN SATURATION: 100 %

## 2017-12-14 DIAGNOSIS — D69.6 THROMBOCYTOPENIA (H): ICD-10-CM

## 2017-12-14 LAB
BASOPHILS # BLD AUTO: 0 10E9/L (ref 0–0.2)
BASOPHILS NFR BLD AUTO: 0.1 %
DIFFERENTIAL METHOD BLD: ABNORMAL
EOSINOPHIL # BLD AUTO: 0 10E9/L (ref 0–0.7)
EOSINOPHIL NFR BLD AUTO: 0 %
ERYTHROCYTE [DISTWIDTH] IN BLOOD BY AUTOMATED COUNT: 12.5 % (ref 10–15)
HCT VFR BLD AUTO: 44.2 % (ref 35–47)
HGB BLD-MCNC: 14.8 G/DL (ref 11.7–15.7)
IMM GRANULOCYTES # BLD: 0 10E9/L (ref 0–0.4)
IMM GRANULOCYTES NFR BLD: 0 %
LYMPHOCYTES # BLD AUTO: 0.8 10E9/L (ref 0.8–5.3)
LYMPHOCYTES NFR BLD AUTO: 10 %
MCH RBC QN AUTO: 30.2 PG (ref 26.5–33)
MCHC RBC AUTO-ENTMCNC: 33.5 G/DL (ref 31.5–36.5)
MCV RBC AUTO: 90 FL (ref 78–100)
MONOCYTES # BLD AUTO: 0.1 10E9/L (ref 0–1.3)
MONOCYTES NFR BLD AUTO: 0.8 %
NEUTROPHILS # BLD AUTO: 6.8 10E9/L (ref 1.6–8.3)
NEUTROPHILS NFR BLD AUTO: 89.1 %
PLATELET # BLD AUTO: 71 10E9/L (ref 150–450)
PLATELET # BLD EST: ABNORMAL 10*3/UL
RBC # BLD AUTO: 4.9 10E12/L (ref 3.8–5.2)
RBC MORPH BLD: NORMAL
WBC # BLD AUTO: 7.7 10E9/L (ref 4–11)

## 2017-12-14 PROCEDURE — 99211 OFF/OP EST MAY X REQ PHY/QHP: CPT

## 2017-12-14 PROCEDURE — 99213 OFFICE O/P EST LOW 20 MIN: CPT | Performed by: INTERNAL MEDICINE

## 2017-12-14 RX ORDER — PREDNISONE 50 MG/1
50 TABLET ORAL DAILY
Qty: 15 TABLET | Refills: 1 | Status: SHIPPED | OUTPATIENT
Start: 2017-12-14 | End: 2017-12-22

## 2017-12-14 ASSESSMENT — PAIN SCALES - GENERAL: PAINLEVEL: SEVERE PAIN (6)

## 2017-12-14 NOTE — NURSING NOTE
"Oncology Rooming Note    December 14, 2017 3:44 PM   Merissa Leung is a 32 year old female who presents for:    Chief Complaint   Patient presents with     Hematology     One week follow up Thrombocytopenia. Review lab results.      Initial Vitals: /78 (BP Location: Right arm, Patient Position: Sitting, Cuff Size: Adult Regular)  Pulse 73  Temp 98  F (36.7  C) (Tympanic)  Resp 12  Ht 1.657 m (5' 5.24\")  Wt 49.7 kg (109 lb 9.6 oz)  LMP 11/20/2017 (Exact Date)  SpO2 100%  Breastfeeding? No  BMI 18.11 kg/m2 Estimated body mass index is 18.11 kg/(m^2) as calculated from the following:    Height as of this encounter: 1.657 m (5' 5.24\").    Weight as of this encounter: 49.7 kg (109 lb 9.6 oz). Body surface area is 1.51 meters squared.  Severe Pain (6) Comment: Joint & Muscle all over   Patient's last menstrual period was 11/20/2017 (exact date).  Allergies reviewed: Yes  Medications reviewed: Yes    Medications: Medication refills not needed today.  Pharm[facility] into [facility]: [facility] - [address]    Clinical concerns: One week follow up Thrombocytopenia. Review lab results.      7 minutes for nursing intake (face to face time)     Ashley Sultana CMA              "

## 2017-12-14 NOTE — PATIENT INSTRUCTIONS
We would like to see you back in clinic with Dr. Le in 6 weeks with standing labs.      Your prescription (prednisone) has been sent to:   [facility] [address] [place] [address] - [facility] [address]  [address] [facility] [address]  Phone: [phone] Fax: [phone]  When you are in need of a refill, please call your pharmacy and they will send us a request.      Copy of appointments, and after visit summary (AVS) given to patient.  If you have any questions during business hours (M-F 8 AM- 4PM), please call Laverne Rollins RN, BSN, OCN Oncology Hematology /Breast Cancer Navigator at [facility] [phone].       For questions after business hours, or on holidays/weekends, please call our after hours Nurse Triage line [phone]. Thank you.

## 2017-12-14 NOTE — MR AVS SNAPSHOT
After Visit Summary   12/14/2017    Merissa Leung    MRN: [id number]           Patient Information     Date Of Birth          1985        Visit Information        Provider Department      12/14/2017 3:30 PM Brittany Le MD [facility] ONCOLOGY      Today's Diagnoses     Thrombocytopenia (H)           Follow-ups after your visit        Follow-up notes from your care team     Return in about 6 weeks (around 1/25/2018) for Standing blood work.      Your next 10 appointments already scheduled     Jan 02, 2018   Procedure with Ray Amato MD   [facility] ([facility])    [address] [facility] [id number] [phone]           The medical center is located at [address] [place] [address]. (between [address] in [place], [place] north of [place]).            Jan 19, 2018  2:00 PM CST   LAB with [facility] LAB   [facility]    [address] [facility] [id number] [phone]           Please do not eat 10-12 hours before your appointment if you are coming in fasting for labs on lipids, cholesterol, or glucose (sugar). This does not apply to pregnant women. Water, hot tea and black coffee (with nothing added) are okay. Do not drink other fluids, diet soda or chew gum.            Jan 25, 2018  2:30 PM CST   Return Visit with Brittany Le MD   [facility] ([facility])    [facility]  [address] [facility] [address]  [facility] [id number] [phone]              Who to contact     If you have questions or need follow up information about today's clinic visit or your schedule please contact [facility] CANCER [facility] directly at [phone].  Normal or non-critical lab and imaging results will be communicated to you by MyChart, letter or phone within 4 business days after the clinic has received the results. If you do not hear from us within 7 days,  "please contact the clinic through [employer] or phone. If you have a critical or abnormal lab result, we will notify you by phone as soon as possible.  Submit refill requests through [employer] or call your pharmacy and they will forward the refill request to us. Please allow 3 business days for your refill to be completed.          Additional Information About Your Visit        [employer] Information     [employer] gives you secure access to your electronic health record. If you see a primary care provider, you can also send messages to your care team and make appointments. If you have questions, please call your primary care clinic.  If you do not have a primary care provider, please call [phone] and they will assist you.        Care EveryWhere ID     This is your Care EveryWhere ID. This could be used by other organizations to access your [place] medical records  [id number]        Your Vitals Were     Pulse Temperature Respirations Height Last Period Pulse Oximetry    73 98  F (36.7  C) (Tympanic) 12 1.657 m (5' 5.24\") 11/20/2017 (Exact Date) 100%    Breastfeeding? BMI (Body Mass Index)                No 18.11 kg/m2           Blood Pressure from Last 3 Encounters:   12/14/17 126/78   12/06/17 126/75   12/05/17 (!) 89/56    Weight from Last 3 Encounters:   12/14/17 49.7 kg (109 lb 9.6 oz)   12/06/17 49 kg (108 lb 1.6 oz)   11/24/17 50.3 kg (110 lb 14.4 oz)              Today, you had the following     No orders found for display         Where to get your medicines      These medications [facility] to [facility] [address] [facility]  [address] [facility] [address]     Phone:  [phone]     predniSONE 50 MG tablet          Primary Care Provider Office Phone # Fax #    Mohini Mccord [phone]       [address]  [facility] [address]        Equal Access to Services     ANA GARCIA AH: Elva razoo Soandrea, waaxda luqadaha, qaybta kaalmada kathy, leela " honorio calderónken la'aan ah. So [facility] [phone].    ATENCIÓN: Si karanla lois, tiene a goldsmith disposición servicios gratuitos de asistencia lingüística. Konrad al [phone].    We comply with applicable federal civil rights laws and [place] laws. We do not discriminate on the basis of race, color, national origin, age, disability, sex, sexual orientation, or gender identity.            Thank you!     Thank you for choosing [facility]  for your care. Our goal is always to provide you with excellent care. Hearing back from our patients is one way we can continue to improve our services. Please take a few minutes to complete the written survey that you may receive in the mail after your visit with us. Thank you!             Your Updated Medication List - Protect others around you: Learn how to safely use, store and throw away your medicines at www.disposemymeds.org.          This list is accurate as of: 12/14/17  3:59 PM.  Always use your most recent med list.                   Brand Name Dispense Instructions for use Diagnosis    clonazePAM 1 MG tablet    klonoPIN    75 tablet    Take 1/2 twice a day for anxiety, 1 and 1/2 at bedtime for sleep    EDWARD (generalized anxiety disorder)       linaclotide 145 MCG capsule    LINZESS    30 capsule    Take 1 capsule (145 mcg) by mouth every morning (before breakfast)    Irritable bowel syndrome with constipation       * lisdexamfetamine 10 MG capsule   Start taking on:  12/27/2017    VYVANSE    30 capsule    Take 1 capsule (10 mg) by mouth daily (with lunch)    Binge eating disorder       * lisdexamfetamine 30 MG capsule   Start taking on:  12/28/2017    VYVANSE    30 capsule    Take 1 capsule (30 mg) by mouth daily    Binge eating disorder       MULTIVITAL PO      Take 1 chew tab by mouth daily Gummi        omeprazole 20 MG CR capsule    priLOSEC    30 capsule    Take 1 capsule (20 mg) by mouth daily    Thrombocytopenia (H)       polyethylene glycol powder     MIRALAX/GLYCOLAX     Take 17 g by mouth daily as needed for constipation        predniSONE 50 MG tablet    DELTASONE    15 tablet    Take 1 tablet (50 mg) by mouth daily    Thrombocytopenia (H)       sennosides 8.6 MG tablet    SENOKOT     Take 2 tablets by mouth 2 times daily        UNABLE TO FIND      Take 2 teaspoonful by mouth At Bedtime Magnesium Calm        * Notice:  This list has 2 medication(s) that are the same as other medications prescribed for you. Read the directions carefully, and ask your doctor or other care provider to review them with you.

## 2017-12-14 NOTE — LETTER
12/14/2017         RE: Merissa Leung  [address] [facility] [address]        Dear Colleague,    Thank you for referring your patient, Merissa Leung, to the [facility]. Please see a copy of my visit note below.    DATE OF VISIT: Dec 14, 2017    Merissa Leung is a 32 year old female is seen today for   Chief Complaint   Patient presents with     Hematology     One week follow up Thrombocytopenia. Review lab results.    .       (D69.6) Thrombocytopenia (H)  Patient presented with thrombocytopenia. She started on steroids with prednisone 50 mg orally daily. Platelet count has increased to 71,000. Patient denies any significant bruising or bleeding. She denies any nausea or vomiting or diarrhea. She is scheduled to see a rheumatologist in January. I would recommend to continue on prednisone at 50 mg daily. We will monitor CBC once a week and taper off prednisone accordingly. I will see the patient again in 6 weeks or sooner if there are new developments or concerns.    The patient is ready to learn, no apparent learning barriers were identified.  Diagnosis and treatment plans were explained to the patient. The patient expressed understanding of the content. The patient asked appropriate questions. The patient questions were answered to her satisfaction.  Time spent 15 minutes more than 50% of the timing counseling and coordination of care.  Chart documentation with Dragon Voice recognition Software. Although reviewed after completion, some words and grammatical errors may remain.    Again, thank you for allowing me to participate in the care of your patient.        Sincerely,        Brittany Le MD

## 2017-12-15 NOTE — PROGRESS NOTES
DATE OF VISIT: Dec 14, 2017    Merissa Leung is a 32 year old female is seen today for   Chief Complaint   Patient presents with     Hematology     One week follow up Thrombocytopenia. Review lab results.    .       (D69.6) Thrombocytopenia (H)  Patient presented with thrombocytopenia. She started on steroids with prednisone 50 mg orally daily. Platelet count has increased to 71,000. Patient denies any significant bruising or bleeding. She denies any nausea or vomiting or diarrhea. She is scheduled to see a rheumatologist in January. I would recommend to continue on prednisone at 50 mg daily. We will monitor CBC once a week and taper off prednisone accordingly. I will see the patient again in 6 weeks or sooner if there are new developments or concerns.    The patient is ready to learn, no apparent learning barriers were identified.  Diagnosis and treatment plans were explained to the patient. The patient expressed understanding of the content. The patient asked appropriate questions. The patient questions were answered to her satisfaction.  Time spent 15 minutes more than 50% of the timing counseling and coordination of care.  Chart documentation with Dragon Voice recognition Software. Although reviewed after completion, some words and grammatical errors may remain.

## 2017-12-21 ENCOUNTER — TELEPHONE (OUTPATIENT)
Dept: ONCOLOGY | Facility: CLINIC | Age: 32
End: 2017-12-21

## 2017-12-21 LAB
BASOPHILS # BLD AUTO: 0 10E9/L (ref 0–0.2)
BASOPHILS NFR BLD AUTO: 0.2 %
DIFFERENTIAL METHOD BLD: ABNORMAL
EOSINOPHIL # BLD AUTO: 0.1 10E9/L (ref 0–0.7)
EOSINOPHIL NFR BLD AUTO: 1 %
ERYTHROCYTE [DISTWIDTH] IN BLOOD BY AUTOMATED COUNT: 13 % (ref 10–15)
HCT VFR BLD AUTO: 43.7 % (ref 35–47)
HGB BLD-MCNC: 14.3 G/DL (ref 11.7–15.7)
IMM GRANULOCYTES # BLD: 0 10E9/L (ref 0–0.4)
IMM GRANULOCYTES NFR BLD: 0.3 %
LYMPHOCYTES # BLD AUTO: 4.1 10E9/L (ref 0.8–5.3)
LYMPHOCYTES NFR BLD AUTO: 42.3 %
MCH RBC QN AUTO: 30.2 PG (ref 26.5–33)
MCHC RBC AUTO-ENTMCNC: 32.7 G/DL (ref 31.5–36.5)
MCV RBC AUTO: 92 FL (ref 78–100)
MONOCYTES # BLD AUTO: 0.7 10E9/L (ref 0–1.3)
MONOCYTES NFR BLD AUTO: 6.7 %
NEUTROPHILS # BLD AUTO: 4.8 10E9/L (ref 1.6–8.3)
NEUTROPHILS NFR BLD AUTO: 49.5 %
PLATELET # BLD AUTO: 40 10E9/L (ref 150–450)
RBC # BLD AUTO: 4.74 10E12/L (ref 3.8–5.2)
WBC # BLD AUTO: 9.7 10E9/L (ref 4–11)

## 2017-12-21 PROCEDURE — 85025 COMPLETE CBC W/AUTO DIFF WBC: CPT | Performed by: INTERNAL MEDICINE

## 2017-12-21 PROCEDURE — 36415 COLL VENOUS BLD VENIPUNCTURE: CPT | Performed by: INTERNAL MEDICINE

## 2017-12-21 NOTE — TELEPHONE ENCOUNTER
DATE:  12/21/2017   TIME OF RECEIPT FROM LAB:  1017   LAB TEST:  Preliminary PLT result  LAB VALUE:  36  RESULTS GIVEN WITH READ-BACK TO (PROVIDER):  Dr. NANCY Le via inForrst  TIME LAB VALUE REPORTED TO PROVIDER:   1020    This is preliminary result.  Tube is being couriered to [facility] for manual count.

## 2017-12-21 NOTE — PROGRESS NOTES
Please inform the patient.  Continue on prednisone at 50 mg orally daily.  Check CBC again next week

## 2017-12-22 DIAGNOSIS — D69.6 THROMBOCYTOPENIA (H): ICD-10-CM

## 2017-12-22 RX ORDER — PREDNISONE 50 MG/1
50 TABLET ORAL DAILY
Qty: 15 TABLET | Refills: 1 | Status: SHIPPED | OUTPATIENT
Start: 2017-12-22 | End: 2018-01-24

## 2017-12-22 NOTE — PROGRESS NOTES
Patient returned call to clinic.  Reviewed results and recommendations.  Patient reports she is menstruating at this time, and has been for approximately 2- 2 1/2 weeks.  Reports this cycle is not as heavy as it's been.  She will call back to schedule lab draw for next week.  She starts a new job Wednesday morning and is unsure of what date/time she will be able to have labs drawn.  Direct line for scheduling provided. Refill of prednisone sent to pharmacy per patient request.

## 2017-12-26 ENCOUNTER — TELEPHONE (OUTPATIENT)
Dept: ONCOLOGY | Facility: CLINIC | Age: 32
End: 2017-12-26

## 2017-12-26 LAB
BASOPHILS # BLD AUTO: 0 10E9/L (ref 0–0.2)
BASOPHILS NFR BLD AUTO: 0.5 %
DIFFERENTIAL METHOD BLD: ABNORMAL
EOSINOPHIL # BLD AUTO: 0.1 10E9/L (ref 0–0.7)
EOSINOPHIL NFR BLD AUTO: 0.9 %
ERYTHROCYTE [DISTWIDTH] IN BLOOD BY AUTOMATED COUNT: 12.8 % (ref 10–15)
HCT VFR BLD AUTO: 45.6 % (ref 35–47)
HGB BLD-MCNC: 14.9 G/DL (ref 11.7–15.7)
IMM GRANULOCYTES # BLD: 0.1 10E9/L (ref 0–0.4)
IMM GRANULOCYTES NFR BLD: 1 %
LYMPHOCYTES # BLD AUTO: 1.5 10E9/L (ref 0.8–5.3)
LYMPHOCYTES NFR BLD AUTO: 19.2 %
MCH RBC QN AUTO: 29.8 PG (ref 26.5–33)
MCHC RBC AUTO-ENTMCNC: 32.7 G/DL (ref 31.5–36.5)
MCV RBC AUTO: 91 FL (ref 78–100)
MONOCYTES # BLD AUTO: 0.4 10E9/L (ref 0–1.3)
MONOCYTES NFR BLD AUTO: 4.7 %
NEUTROPHILS # BLD AUTO: 5.6 10E9/L (ref 1.6–8.3)
NEUTROPHILS NFR BLD AUTO: 73.7 %
PLATELET # BLD AUTO: 30 10E9/L (ref 150–450)
RBC # BLD AUTO: 5 10E12/L (ref 3.8–5.2)
WBC # BLD AUTO: 7.7 10E9/L (ref 4–11)

## 2017-12-26 PROCEDURE — 36415 COLL VENOUS BLD VENIPUNCTURE: CPT | Performed by: INTERNAL MEDICINE

## 2017-12-26 PROCEDURE — 85025 COMPLETE CBC W/AUTO DIFF WBC: CPT | Performed by: INTERNAL MEDICINE

## 2017-12-26 NOTE — TELEPHONE ENCOUNTER
Called to check on pt and to review the preliminary results. Pt reports no bleeding or additional bruising and is still taking the Prednisone 50 mg daily. Advised pt we will call after the final result is back but it will most likely not be until morning. Pt advised to go to the ER if she has any bleeding, severe headache or any new symptoms. Pt verbalized understanding. Will f/u tomorrow.

## 2017-12-26 NOTE — TELEPHONE ENCOUNTER
DATE:  12/26/2017   TIME OF RECEIPT FROM LAB:  2:18  LAB TEST:  PLT (Prelim)  LAB VALUE:  23  RESULTS GIVEN WITH READ-BACK TO (PROVIDER):  Dr. Le  TIME LAB VALUE REPORTED TO PROVIDER:   2:21     Will await final being couriered to [facility] lab.

## 2017-12-27 ENCOUNTER — ANESTHESIA EVENT (OUTPATIENT)
Dept: GASTROENTEROLOGY | Facility: CLINIC | Age: 32
End: 2017-12-27
Payer: COMMERCIAL

## 2017-12-27 ASSESSMENT — LIFESTYLE VARIABLES: TOBACCO_USE: 1

## 2017-12-27 NOTE — ANESTHESIA PREPROCEDURE EVALUATION
Anesthesia Evaluation     . Pt has had prior anesthetic. Type: MAC and Regional           ROS/MED HX    ENT/Pulmonary:     (+)tobacco use, Past use , . .    Neurologic:  - neg neurologic ROS     Cardiovascular:     (+) Dyslipidemia, ----. : . . . :. .       METS/Exercise Tolerance:  >4 METS   Hematologic:     (+) Other Hematologic Disorder-Thrombocytopenia      Musculoskeletal:   (+) , , other musculoskeletal- Fibromyalgia      GI/Hepatic:     (+) Inflammatory bowel disease,       Renal/Genitourinary:  - ROS Renal section negative       Endo:  - neg endo ROS       Psychiatric:     (+) psychiatric history anxiety and other (comment) (OCD)      Infectious Disease:  - neg infectious disease ROS       Malignancy:      - no malignancy   Other:                     Physical Exam  Normal systems: cardiovascular, pulmonary and dental    Airway   Mallampati: I  TM distance: >3 FB  Neck ROM: full    Dental     Cardiovascular       Pulmonary                     Anesthesia Plan      History & Physical Review  History and physical reviewed and following examination; no interval change.    ASA Status:  2 .    NPO Status:  > 6 hours    Plan for MAC with Propofol and Intravenous induction. Reason for MAC:  Deep or markedly invasive procedure (G8)  PONV prophylaxis:  Ondansetron (or other 5HT-3) and Dexamethasone or Solumedrol       Postoperative Care  Postoperative pain management:  IV analgesics, Oral pain medications and Multi-modal analgesia.      Consents  Anesthetic plan, risks, benefits and alternatives discussed with:  Patient..                          .

## 2017-12-27 NOTE — TELEPHONE ENCOUNTER
Reviewed with Dr. Le today, advised pt to decrease to 40 mg prednisone daily x1 week, continue weekly lab checks and we will call her with results next week. Pt to report to the ED or call us if she has any signs of bleeding.     Left message for a return call.     Pt returned call, reviewed the plan with her and she verbalized understanding. Pt also inquiring about getting a form filled out from work stating she is able to work full time with no restrictions. Gave pt the fax number to send something over for us to complete. Pt will have labs done on 1/2.

## 2017-12-29 ENCOUNTER — DOCUMENTATION ONLY (OUTPATIENT)
Dept: ONCOLOGY | Facility: CLINIC | Age: 32
End: 2017-12-29

## 2017-12-29 NOTE — PROGRESS NOTES
Dr. Le is out of office starting 12.30.17-01.14.18.    Pt is to have lab draw 01.02.18.  Per Dr. Le, if PLT stable or above 30, OK to decrease prednisone to 30 mg daily x 1 week with recheck following week with decrease of prednisone to 20 mg daily x 1 week if platelets stable or above 30 with recheck the following week.    If platelets below 30 or active bleeding, pt is to remain on same dose of prednisone and be evaluated by Dr. Pena.   138

## 2018-01-02 ENCOUNTER — HOSPITAL ENCOUNTER (OUTPATIENT)
Facility: CLINIC | Age: 33
Discharge: HOME | End: 2018-01-02
Attending: SURGERY | Admitting: SURGERY
Payer: COMMERCIAL

## 2018-01-02 ENCOUNTER — ANESTHESIA (OUTPATIENT)
Dept: GASTROENTEROLOGY | Facility: CLINIC | Age: 33
End: 2018-01-02
Payer: COMMERCIAL

## 2018-01-02 ENCOUNTER — NURSE TRIAGE (OUTPATIENT)
Dept: NURSING | Facility: CLINIC | Age: 33
End: 2018-01-02

## 2018-01-02 ENCOUNTER — TELEPHONE (OUTPATIENT)
Dept: ONCOLOGY | Facility: CLINIC | Age: 33
End: 2018-01-02

## 2018-01-02 VITALS
WEIGHT: 110 LBS | DIASTOLIC BLOOD PRESSURE: 55 MMHG | HEIGHT: 66 IN | HEART RATE: 67 BPM | SYSTOLIC BLOOD PRESSURE: 93 MMHG | RESPIRATION RATE: 16 BRPM | TEMPERATURE: 97.7 F | BODY MASS INDEX: 17.68 KG/M2 | OXYGEN SATURATION: 100 %

## 2018-01-02 LAB
BASOPHILS # BLD AUTO: 0 10E9/L (ref 0–0.2)
BASOPHILS NFR BLD AUTO: 0.6 %
DIFFERENTIAL METHOD BLD: ABNORMAL
EOSINOPHIL # BLD AUTO: 0.2 10E9/L (ref 0–0.7)
EOSINOPHIL NFR BLD AUTO: 2.4 %
ERYTHROCYTE [DISTWIDTH] IN BLOOD BY AUTOMATED COUNT: 12.6 % (ref 10–15)
HCG UR QL: NEGATIVE
HCT VFR BLD AUTO: 44 % (ref 35–47)
HGB BLD-MCNC: 14.6 G/DL (ref 11.7–15.7)
IMM GRANULOCYTES # BLD: 0 10E9/L (ref 0–0.4)
IMM GRANULOCYTES NFR BLD: 0.2 %
LYMPHOCYTES # BLD AUTO: 1.7 10E9/L (ref 0.8–5.3)
LYMPHOCYTES NFR BLD AUTO: 26.1 %
MCH RBC QN AUTO: 30.2 PG (ref 26.5–33)
MCHC RBC AUTO-ENTMCNC: 33.2 G/DL (ref 31.5–36.5)
MCV RBC AUTO: 91 FL (ref 78–100)
MONOCYTES # BLD AUTO: 0.4 10E9/L (ref 0–1.3)
MONOCYTES NFR BLD AUTO: 7 %
NEUTROPHILS # BLD AUTO: 4 10E9/L (ref 1.6–8.3)
NEUTROPHILS NFR BLD AUTO: 63.7 %
PLATELET # BLD AUTO: 40 10E9/L (ref 150–450)
RBC # BLD AUTO: 4.84 10E12/L (ref 3.8–5.2)
UPPER GI ENDOSCOPY: NORMAL
WBC # BLD AUTO: 6.3 10E9/L (ref 4–11)

## 2018-01-02 PROCEDURE — 25000128 H RX IP 250 OP 636: Performed by: NURSE ANESTHETIST, CERTIFIED REGISTERED

## 2018-01-02 PROCEDURE — 25000125 ZZHC RX 250: Performed by: SURGERY

## 2018-01-02 PROCEDURE — 25000128 H RX IP 250 OP 636: Performed by: SURGERY

## 2018-01-02 PROCEDURE — 43239 EGD BIOPSY SINGLE/MULTIPLE: CPT | Performed by: SURGERY

## 2018-01-02 PROCEDURE — 40000268 ZZH STATISTIC NO CHARGES

## 2018-01-02 PROCEDURE — 37000008 ZZH ANESTHESIA TECHNICAL FEE, 1ST 30 MIN: Performed by: SURGERY

## 2018-01-02 PROCEDURE — 88305 TISSUE EXAM BY PATHOLOGIST: CPT | Performed by: SURGERY

## 2018-01-02 PROCEDURE — 25000125 ZZHC RX 250: Performed by: NURSE ANESTHETIST, CERTIFIED REGISTERED

## 2018-01-02 PROCEDURE — 85025 COMPLETE CBC W/AUTO DIFF WBC: CPT | Performed by: INTERNAL MEDICINE

## 2018-01-02 PROCEDURE — 88305 TISSUE EXAM BY PATHOLOGIST: CPT | Mod: 26 | Performed by: SURGERY

## 2018-01-02 PROCEDURE — 96374 THER/PROPH/DIAG INJ IV PUSH: CPT

## 2018-01-02 PROCEDURE — 36415 COLL VENOUS BLD VENIPUNCTURE: CPT | Performed by: INTERNAL MEDICINE

## 2018-01-02 PROCEDURE — 81025 URINE PREGNANCY TEST: CPT | Performed by: SURGERY

## 2018-01-02 PROCEDURE — 96361 HYDRATE IV INFUSION ADD-ON: CPT

## 2018-01-02 RX ORDER — SODIUM CHLORIDE, SODIUM LACTATE, POTASSIUM CHLORIDE, CALCIUM CHLORIDE 600; 310; 30; 20 MG/100ML; MG/100ML; MG/100ML; MG/100ML
INJECTION, SOLUTION INTRAVENOUS CONTINUOUS
Status: DISCONTINUED | OUTPATIENT
Start: 2018-01-02 | End: 2018-01-02 | Stop reason: HOSPADM

## 2018-01-02 RX ORDER — LIDOCAINE 40 MG/G
CREAM TOPICAL
Status: DISCONTINUED | OUTPATIENT
Start: 2018-01-02 | End: 2018-01-02 | Stop reason: HOSPADM

## 2018-01-02 RX ORDER — PROPOFOL 10 MG/ML
INJECTION, EMULSION INTRAVENOUS CONTINUOUS PRN
Status: DISCONTINUED | OUTPATIENT
Start: 2018-01-02 | End: 2018-01-02

## 2018-01-02 RX ORDER — ONDANSETRON 2 MG/ML
4 INJECTION INTRAMUSCULAR; INTRAVENOUS
Status: DISCONTINUED | OUTPATIENT
Start: 2018-01-02 | End: 2018-01-02 | Stop reason: HOSPADM

## 2018-01-02 RX ORDER — PROPOFOL 10 MG/ML
INJECTION, EMULSION INTRAVENOUS PRN
Status: DISCONTINUED | OUTPATIENT
Start: 2018-01-02 | End: 2018-01-02

## 2018-01-02 RX ORDER — LIDOCAINE HYDROCHLORIDE 10 MG/ML
INJECTION, SOLUTION INFILTRATION; PERINEURAL PRN
Status: DISCONTINUED | OUTPATIENT
Start: 2018-01-02 | End: 2018-01-02

## 2018-01-02 RX ADMIN — PROPOFOL 100 MG: 10 INJECTION, EMULSION INTRAVENOUS at 08:05

## 2018-01-02 RX ADMIN — PROPOFOL 200 MCG/KG/MIN: 10 INJECTION, EMULSION INTRAVENOUS at 08:05

## 2018-01-02 RX ADMIN — LIDOCAINE HYDROCHLORIDE 5 ML: 10 INJECTION, SOLUTION EPIDURAL; INFILTRATION; INTRACAUDAL; PERINEURAL at 07:33

## 2018-01-02 RX ADMIN — SODIUM CHLORIDE, POTASSIUM CHLORIDE, SODIUM LACTATE AND CALCIUM CHLORIDE: 600; 310; 30; 20 INJECTION, SOLUTION INTRAVENOUS at 07:33

## 2018-01-02 RX ADMIN — LIDOCAINE HYDROCHLORIDE 50 MG: 10 INJECTION, SOLUTION INFILTRATION; PERINEURAL at 08:05

## 2018-01-02 NOTE — BRIEF OP NOTE
[facility]   Brief Operative Note    Pre-operative diagnosis: nausea   Post-operative diagnosis gastritis, poor gastric emptying   Procedure: Procedure(s):  Gastroscopy - Wound Class: II-Clean Contaminated   Surgeon(s): Surgeon(s) and Role:     * Ray Amato MD - Primary   Estimated blood loss: * No values recorded between 1/2/2018 12:00 AM and 1/2/2018  8:17 AM *    Specimens:   ID Type Source Tests Collected by Time Destination   A :                                     Tissue Small Intestine, Duodenum SURGICAL PATHOLOGY EXAM Ray Amato MD 1/2/2018  8:13 AM    B :       Tissue Stomach, Antrum SURGICAL PATHOLOGY EXAM Ray Amato MD 1/2/2018  8:14 AM       Findings: 1. Large amount of food remaining in stomach  2. Mild duodenitis, moderate gastritis  3. Esophagus normal

## 2018-01-02 NOTE — H&P
32 year old year old female here for upper endoscopy for evaluation of persistent nausea.        Patient Active Problem List   Diagnosis     Hyperlipidemia LDL goal <160     Headaches, tension     Chronic constipation     OCD (obsessive compulsive disorder)     Anxiety     Fibromyalgia     Chronic abdominal pain     Prenatal care, subsequent pregnancy     Immune thrombocytopenia (H)     Thrombocytopenia (H)     Underweight       Past Medical History:   Diagnosis Date     Anxiety      Binge eating disorder      Chickenpox     x3     Food intolerance      IBS (irritable bowel syndrome)      MEDICAL HISTORY OF -     pelvic floor disorder       Past Surgical History:   Procedure Laterality Date     C/SECTION, LOW TRANSVERSE        SECTION, TUBAL LIGATION, COMBINED N/A 2/15/2016    NO tubal ligation done     COLONOSCOPY  2013    Procedure: COMBINED COLONOSCOPY, SINGLE BIOPSY/POLYPECTOMY BY BIOPSY;  Colonoscopy;  Surgeon: Maureen Valentin MD;  Location: [facility]     COLONOSCOPY N/A 2015    Procedure: COMBINED COLONOSCOPY, SINGLE OR MULTIPLE BIOPSY/POLYPECTOMY BY BIOPSY;  Surgeon: Maureen Valentin MD;  Location: [facility]       Family History   Problem Relation Age of Onset     GASTROINTESTINAL DISEASE Mother 46     perforated intestines-      Hepatitis Mother      C     Liver Disease Mother      Blood Disease Mother      anemia     Substance Abuse Mother      A&D     Psychotic Disorder Brother      Schizophrenia, OCD     Schizophrenia Brother      Anxiety Disorder Brother      Arthritis Maternal Grandmother      CANCER Paternal Grandmother      cervical     Breast Cancer Paternal Grandmother      Hypertension Paternal Grandmother      DIABETES Paternal Grandmother      CEREBROVASCULAR DISEASE Paternal Grandfather      HEART DISEASE Paternal Grandfather      Bipolar Disorder Paternal Grandfather      Suicide Paternal Grandfather      CANCER Paternal Aunt      cervical     Coronary  Artery Disease Maternal Grandfather      MI     Substance Abuse Father      recovered A&D     HEART DISEASE Father      MI-stents     Hypertension Father        No current outpatient prescriptions on file.       Allergies   Allergen Reactions     Dilaudid [Hydromorphone] Nausea and Vomiting       Pt reports that she quit smoking about 2 years ago. Her smoking use included Cigarettes. She has a 3.00 pack-year smoking history. She has never used smokeless tobacco. She reports that she does not drink alcohol or use illicit drugs.    Exam:    Awake, Alert OX3  Lungs - CTA bilaterally  CV - RRR, no murmurs, distal pulses intact  Abd - soft, non-distended, non-tender, +BS  Extr - No cyanosis or edema    A/P 32 year old year old female in need of upper endoscopy for evaluation of persistent nausea. Risks, benefits, alternatives, and complications were discussed including the possibility of perforation and the patient agreed to proceed.    Ray Amato MD

## 2018-01-02 NOTE — ANESTHESIA CARE TRANSFER NOTE
Patient: Merissa Leung    Procedure(s):  Gastroscopy - Wound Class: II-Clean Contaminated    Diagnosis: nausea  Diagnosis Additional Information: No value filed.    Anesthesia Type:   MAC     Note:  Airway :Nasal Cannula  Patient transferred to:Phase II  Handoff Report: Identifed the Patient, Identified the Reponsible Provider, Reviewed the pertinent medical history, Discussed the surgical course, Reviewed Intra-OP anesthesia mangement and issues during anesthesia, Set expectations for post-procedure period and Allowed opportunity for questions and acknowledgement of understanding      Vitals: (Last set prior to Anesthesia Care Transfer)    CRNA VITALS  1/2/2018 0748 - 1/2/2018 0818      1/2/2018             Pulse: 67    Ht Rate: 69    SpO2: 100 %                Electronically Signed By: TABATHA Starkey CRNA  January 2, 2018  8:18 AM

## 2018-01-02 NOTE — ANESTHESIA POSTPROCEDURE EVALUATION
Patient: Merissa Leung    Procedure(s):  Gastroscopy - Wound Class: II-Clean Contaminated    Diagnosis:nausea  Diagnosis Additional Information: No value filed.    Anesthesia Type:  MAC    Note:  Anesthesia Post Evaluation    Patient location during evaluation: Phase 2 and Bedside  Patient participation: Able to fully participate in evaluation  Level of consciousness: awake and alert  Pain management: adequate  Airway patency: patent  Cardiovascular status: acceptable and hemodynamically stable  Respiratory status: acceptable and room air  Hydration status: acceptable  PONV: none     Anesthetic complications: None          Last vitals:  Vitals:    01/02/18 0719   BP: 100/64   Pulse: 67   Resp: 16   Temp: 36.5  C (97.7  F)   SpO2: 100%         Electronically Signed By: TABATHA Starkey CRNA  January 2, 2018  8:18 AM

## 2018-01-02 NOTE — TELEPHONE ENCOUNTER
[facility] called with critical preliminary platelet count of 29, will be sending to [place] for verification, but they need on call paged or notified of this critical lab value. Called Dr. Le on his cell and left message for him to call Leticia LAKE at [facility] [phone] regarding critical lab results at 5:06 pm.  2nd call to Dr. Le's cell at 5:29 pm and left another message to call Leticia LAKE at [facility] [phone] re: critical lab value. Called Page  Alejandro and he has no pager or information for this provider. Per the protocol for this provider unable to get a hold of after 30 minutes reach out to PCP, paged on call Dr. Aguilera via smart web at 5:56 pm to Call Leticia LAKE [facility] [phone], re: Merissa Leung [birth year], critical lab value called in from [facility] and unable to get a hold of Dr. Le. Thanks! Called page  Terrell at 6:12 pm stating no call back yet from Dr. Aguilera and she agreed to send a 2nd page to provider to call Leticia LAKE at [facility] [phone]. Dr. Aguilera called back and he stated that he does not cover for specialty or heme/onc and he deferred this back to ordering provider of treating provider from [facility] today.  He feels it is better served to have critical lab sent to more acute provider.

## 2018-01-03 ENCOUNTER — NURSE TRIAGE (OUTPATIENT)
Dept: NURSING | Facility: CLINIC | Age: 33
End: 2018-01-03

## 2018-01-03 LAB — COPATH REPORT: NORMAL

## 2018-01-03 NOTE — TELEPHONE ENCOUNTER
Merissa returned call stating she had received a voice mail.  Reviewed # 1 and #2 information below (from telephone encounter 1/2/18 at 821 pm. Patient stating she does have some bruising on her lower leg.  Advised patient to follow recommendation and be seen in ED now. Patient verbalized understanding.  Stating she is scheduled to work this morning and will contact her supervisor. Encouraged patient to been  aeen as soon as possible in ED this morning.    Note routed to Dr Pena.    Tameka Burch RN  [facility]

## 2018-01-03 NOTE — TELEPHONE ENCOUNTER
Merissa returned call stating she had received a voice mail.  Reviewed # 1 and #2 information below. Patient stating she does have some bruising on her lower leg.  Advised patient to follow recommendation and be seen in ED now. Patient verbalized understanding.  Stating she is scheduled to work this morning and will contact her supervisor. Encouraged patient to been  aeen as soon as possible in ED this morning.    Note routed to Dr Pena.    Tameka Burch RN  [facility]

## 2018-01-03 NOTE — TELEPHONE ENCOUNTER
"Spoke with patient in regards to PLT level of 40 from 01.02.18.  Reviewed Dr. Le's plan, per note 12.29.17.  She is not experiencing signs of bleeding and bruising that is present has not increased in size nor darkened in color since bruising was present 2-3 weeks ago.  Dr. Le's plan for management of PLT is:  \"Dr. Le is out of office starting 12.30.17-01.14.18.  Pt is to have lab draw 01.02.18.  Per Dr. Le, if PLT stable or above 30, OK to decrease prednisone to 30 mg daily x 1 week with recheck following week with decrease of prednisone to 20 mg daily x 1 week if platelets stable or above 30 with recheck the following week.  If platelets below 30 or active bleeding, pt is to remain on same dose of prednisone and be evaluated by Dr. Pena.\"    Reviewed the above with patient.  She is in agreement with this plan.  "

## 2018-01-03 NOTE — TELEPHONE ENCOUNTER
Following up in regards to relaying a critical platelet count of 29. See nurse triage encounter previously.   This FNA RN paged the admin for the oncology clinic at 7:09pm. Admin called back and relayed that Dr. Le is [place] the [place].   FNA paged covering provider, Dr. Meneses, at 7:18pm to call FNA back directly on MD line.      Dr. Meneses called back and RN relayed critical lab value. Reviewed previous platelet values and med list.   Provider requested to call patient to advise the followin) If patient is bleeding/ bruising, advised to be seen in the ED.   2) Advised patient not to take aspirin, naproxen, or motrin.      Attempted to call patient at [phone], no answer.   Left generic VM to return call to [facility] and then pick the option to speak to nurse advisors.      Results are preliminary so FNA was not going to relay them, but did want to relay #1 and #2 above.   Will route TE to Dr. MENESES to follow up as appropriate.      Julisa Peralta RN  [employer] [facility]

## 2018-01-03 NOTE — TELEPHONE ENCOUNTER
Following up in regards to relaying the critical lab value below.   This FNA RN paged the admin for the oncology clinic at 7:09pm. Admin called back and relayed that Dr. Le is out of the country.   FNA paged covering provider, Dr. Meneses, at 7:18pm to call FNA back directly on MD line.     Dr. Meneses called back and RN relayed critical lab value. Reviewed previous platelet values and med list.   Provider requested to call patient to advise the followin) If patient is bleeding/ bruising, advised to be seen in the ED.   2) Advised patient not to take aspirin, naproxen, or motrin.     Attempted to call patient at [phone], no answer.   Left generic VM to return call to [facility] and pick the option to speak to nurse advisors.     Results are preliminary so FNA was not going to relay them, but did want to relay #1 and #2 above.   Will route TE to Dr. MENESES to follow up as appropriate.     Julisa Peralta RN  [facility]

## 2018-01-08 ENCOUNTER — TRANSFERRED RECORDS (OUTPATIENT)
Dept: HEALTH INFORMATION MANAGEMENT | Facility: CLINIC | Age: 33
End: 2018-01-08

## 2018-01-10 ENCOUNTER — TELEPHONE (OUTPATIENT)
Dept: ONCOLOGY | Facility: CLINIC | Age: 33
End: 2018-01-10

## 2018-01-10 PROCEDURE — 85025 COMPLETE CBC W/AUTO DIFF WBC: CPT | Performed by: INTERNAL MEDICINE

## 2018-01-10 PROCEDURE — 36415 COLL VENOUS BLD VENIPUNCTURE: CPT | Performed by: INTERNAL MEDICINE

## 2018-01-10 NOTE — TELEPHONE ENCOUNTER
"DATE:  1/10/2018   TIME OF RECEIPT FROM LAB:  4:28  LAB TEST:  PLT  LAB VALUE:  46 (preliminary)   RESULTS GIVEN WITH READ-BACK TO (PROVIDER):  Dr. Pena via in-basket. Plan in place already per Per Dr. Le, \"if PLT stable or above 30, OK to decrease prednisone to 30 mg daily x 1 week with recheck following week with decrease of prednisone to 20 mg daily x 1 week if platelets stable or above 30 with recheck the following week. If platelets below 30 or active bleeding, pt is to remain on same dose of prednisone and be evaluated by Dr. Pena.\"   Called and left a message updating pt with results and plan.  TIME LAB VALUE REPORTED TO PROVIDER:   4:31    "

## 2018-01-11 LAB
BASOPHILS # BLD AUTO: 0 10E9/L (ref 0–0.2)
BASOPHILS NFR BLD AUTO: 0.7 %
DIFFERENTIAL METHOD BLD: ABNORMAL
EOSINOPHIL # BLD AUTO: 0.1 10E9/L (ref 0–0.7)
EOSINOPHIL NFR BLD AUTO: 1.3 %
ERYTHROCYTE [DISTWIDTH] IN BLOOD BY AUTOMATED COUNT: 12.4 % (ref 10–15)
HCT VFR BLD AUTO: 43.4 % (ref 35–47)
HGB BLD-MCNC: 14.4 G/DL (ref 11.7–15.7)
IMM GRANULOCYTES # BLD: 0 10E9/L (ref 0–0.4)
IMM GRANULOCYTES NFR BLD: 0.2 %
LYMPHOCYTES # BLD AUTO: 1.9 10E9/L (ref 0.8–5.3)
LYMPHOCYTES NFR BLD AUTO: 32.1 %
MCH RBC QN AUTO: 29.8 PG (ref 26.5–33)
MCHC RBC AUTO-ENTMCNC: 33.2 G/DL (ref 31.5–36.5)
MCV RBC AUTO: 90 FL (ref 78–100)
MONOCYTES # BLD AUTO: 0.4 10E9/L (ref 0–1.3)
MONOCYTES NFR BLD AUTO: 5.8 %
NEUTROPHILS # BLD AUTO: 3.6 10E9/L (ref 1.6–8.3)
NEUTROPHILS NFR BLD AUTO: 59.9 %
PLATELET # BLD AUTO: 58 10E9/L (ref 150–450)
RBC # BLD AUTO: 4.84 10E12/L (ref 3.8–5.2)
WBC # BLD AUTO: 6 10E9/L (ref 4–11)

## 2018-01-23 PROCEDURE — 85025 COMPLETE CBC W/AUTO DIFF WBC: CPT | Performed by: INTERNAL MEDICINE

## 2018-01-23 PROCEDURE — 36415 COLL VENOUS BLD VENIPUNCTURE: CPT | Performed by: INTERNAL MEDICINE

## 2018-01-24 ENCOUNTER — ONCOLOGY VISIT (OUTPATIENT)
Dept: ONCOLOGY | Facility: CLINIC | Age: 33
End: 2018-01-24
Attending: INTERNAL MEDICINE
Payer: COMMERCIAL

## 2018-01-24 ENCOUNTER — HOSPITAL ENCOUNTER (OUTPATIENT)
Dept: GENERAL RADIOLOGY | Facility: CLINIC | Age: 33
End: 2018-01-24
Attending: INTERNAL MEDICINE
Payer: COMMERCIAL

## 2018-01-24 ENCOUNTER — HOSPITAL ENCOUNTER (OUTPATIENT)
Dept: LAB | Facility: CLINIC | Age: 33
Discharge: HOME | End: 2018-01-24
Attending: INTERNAL MEDICINE | Admitting: INTERNAL MEDICINE
Payer: COMMERCIAL

## 2018-01-24 ENCOUNTER — HOSPITAL ENCOUNTER (OUTPATIENT)
Dept: ULTRASOUND IMAGING | Facility: CLINIC | Age: 33
End: 2018-01-24
Attending: INTERNAL MEDICINE
Payer: COMMERCIAL

## 2018-01-24 VITALS
SYSTOLIC BLOOD PRESSURE: 130 MMHG | TEMPERATURE: 97.7 F | HEART RATE: 71 BPM | BODY MASS INDEX: 18.21 KG/M2 | OXYGEN SATURATION: 99 % | RESPIRATION RATE: 16 BRPM | DIASTOLIC BLOOD PRESSURE: 80 MMHG | WEIGHT: 111.1 LBS

## 2018-01-24 DIAGNOSIS — D69.6 THROMBOCYTOPENIA (H): ICD-10-CM

## 2018-01-24 DIAGNOSIS — D69.3 IMMUNE THROMBOCYTOPENIA (H): ICD-10-CM

## 2018-01-24 DIAGNOSIS — D69.3 IMMUNE THROMBOCYTOPENIA (H): Primary | ICD-10-CM

## 2018-01-24 LAB
ALBUMIN SERPL-MCNC: 3.7 G/DL (ref 3.4–5)
ALBUMIN UR-MCNC: 10 MG/DL
ALP SERPL-CCNC: 60 U/L (ref 40–150)
ALT SERPL W P-5'-P-CCNC: 18 U/L (ref 0–50)
ANION GAP SERPL CALCULATED.3IONS-SCNC: 3 MMOL/L (ref 3–14)
APPEARANCE UR: CLEAR
AST SERPL W P-5'-P-CCNC: 14 U/L (ref 0–45)
BACTERIA #/AREA URNS HPF: ABNORMAL /HPF
BASOPHILS # BLD AUTO: 0.1 10E9/L (ref 0–0.2)
BASOPHILS NFR BLD AUTO: 0.8 %
BILIRUB SERPL-MCNC: 0.2 MG/DL (ref 0.2–1.3)
BILIRUB UR QL STRIP: NEGATIVE
BUN SERPL-MCNC: 12 MG/DL (ref 7–30)
CALCIUM SERPL-MCNC: 8.1 MG/DL (ref 8.5–10.1)
CHLORIDE SERPL-SCNC: 111 MMOL/L (ref 94–109)
CK SERPL-CCNC: 129 U/L (ref 30–225)
CO2 SERPL-SCNC: 28 MMOL/L (ref 20–32)
COLOR UR AUTO: ABNORMAL
CREAT SERPL-MCNC: 0.6 MG/DL (ref 0.52–1.04)
CRP SERPL-MCNC: <2.9 MG/L (ref 0–8)
DIFFERENTIAL METHOD BLD: ABNORMAL
EOSINOPHIL # BLD AUTO: 0.1 10E9/L (ref 0–0.7)
EOSINOPHIL NFR BLD AUTO: 1.1 %
ERYTHROCYTE [DISTWIDTH] IN BLOOD BY AUTOMATED COUNT: 12.2 % (ref 10–15)
ERYTHROCYTE [SEDIMENTATION RATE] IN BLOOD BY WESTERGREN METHOD: 4 MM/H (ref 0–20)
GFR SERPL CREATININE-BSD FRML MDRD: >90 ML/MIN/1.7M2
GLUCOSE SERPL-MCNC: 101 MG/DL (ref 70–99)
GLUCOSE UR STRIP-MCNC: NEGATIVE MG/DL
HCT VFR BLD AUTO: 42.6 % (ref 35–47)
HGB BLD-MCNC: 14.5 G/DL (ref 11.7–15.7)
HGB UR QL STRIP: ABNORMAL
IMM GRANULOCYTES # BLD: 0 10E9/L (ref 0–0.4)
IMM GRANULOCYTES NFR BLD: 0 %
KETONES UR STRIP-MCNC: 5 MG/DL
LEUKOCYTE ESTERASE UR QL STRIP: ABNORMAL
LYMPHOCYTES # BLD AUTO: 2 10E9/L (ref 0.8–5.3)
LYMPHOCYTES NFR BLD AUTO: 27.6 %
MCH RBC QN AUTO: 29.8 PG (ref 26.5–33)
MCHC RBC AUTO-ENTMCNC: 34 G/DL (ref 31.5–36.5)
MCV RBC AUTO: 88 FL (ref 78–100)
MONOCYTES # BLD AUTO: 0.5 10E9/L (ref 0–1.3)
MONOCYTES NFR BLD AUTO: 6.2 %
MUCOUS THREADS #/AREA URNS LPF: PRESENT /LPF
NEUTROPHILS # BLD AUTO: 4.8 10E9/L (ref 1.6–8.3)
NEUTROPHILS NFR BLD AUTO: 64.3 %
NITRATE UR QL: NEGATIVE
PH UR STRIP: 6 PH (ref 5–7)
PLATELET # BLD AUTO: 23 10E9/L (ref 150–450)
PLATELET # BLD EST: ABNORMAL 10*3/UL
POTASSIUM SERPL-SCNC: 3.4 MMOL/L (ref 3.4–5.3)
PROT SERPL-MCNC: 6.9 G/DL (ref 6.8–8.8)
RBC # BLD AUTO: 4.87 10E12/L (ref 3.8–5.2)
RBC #/AREA URNS AUTO: 132 /HPF (ref 0–2)
RBC MORPH BLD: NORMAL
SODIUM SERPL-SCNC: 142 MMOL/L (ref 133–144)
SOURCE: ABNORMAL
SP GR UR STRIP: 1.02 (ref 1–1.03)
SQUAMOUS #/AREA URNS AUTO: 2 /HPF (ref 0–1)
UROBILINOGEN UR STRIP-MCNC: NORMAL MG/DL (ref 0–2)
WBC # BLD AUTO: 7.4 10E9/L (ref 4–11)
WBC #/AREA URNS AUTO: 3 /HPF (ref 0–2)

## 2018-01-24 PROCEDURE — 76700 US EXAM ABDOM COMPLETE: CPT

## 2018-01-24 PROCEDURE — 82164 ANGIOTENSIN I ENZYME TEST: CPT | Performed by: INTERNAL MEDICINE

## 2018-01-24 PROCEDURE — 81001 URINALYSIS AUTO W/SCOPE: CPT | Performed by: INTERNAL MEDICINE

## 2018-01-24 PROCEDURE — 85730 THROMBOPLASTIN TIME PARTIAL: CPT | Performed by: INTERNAL MEDICINE

## 2018-01-24 PROCEDURE — 00000167 ZZHCL STATISTIC INR NC: Performed by: INTERNAL MEDICINE

## 2018-01-24 PROCEDURE — 00000401 ZZHCL STATISTIC THROMBIN TIME NC: Performed by: INTERNAL MEDICINE

## 2018-01-24 PROCEDURE — 80053 COMPREHEN METABOLIC PANEL: CPT | Performed by: INTERNAL MEDICINE

## 2018-01-24 PROCEDURE — 83516 IMMUNOASSAY NONANTIBODY: CPT | Performed by: INTERNAL MEDICINE

## 2018-01-24 PROCEDURE — 36415 COLL VENOUS BLD VENIPUNCTURE: CPT | Performed by: INTERNAL MEDICINE

## 2018-01-24 PROCEDURE — 86160 COMPLEMENT ANTIGEN: CPT | Performed by: INTERNAL MEDICINE

## 2018-01-24 PROCEDURE — 86140 C-REACTIVE PROTEIN: CPT | Performed by: INTERNAL MEDICINE

## 2018-01-24 PROCEDURE — G0463 HOSPITAL OUTPT CLINIC VISIT: HCPCS

## 2018-01-24 PROCEDURE — 87340 HEPATITIS B SURFACE AG IA: CPT | Performed by: INTERNAL MEDICINE

## 2018-01-24 PROCEDURE — 85652 RBC SED RATE AUTOMATED: CPT | Performed by: INTERNAL MEDICINE

## 2018-01-24 PROCEDURE — 83876 ASSAY MYELOPEROXIDASE: CPT | Performed by: INTERNAL MEDICINE

## 2018-01-24 PROCEDURE — 71046 X-RAY EXAM CHEST 2 VIEWS: CPT

## 2018-01-24 PROCEDURE — 82085 ASSAY OF ALDOLASE: CPT | Performed by: INTERNAL MEDICINE

## 2018-01-24 PROCEDURE — 85613 RUSSELL VIPER VENOM DILUTED: CPT | Performed by: INTERNAL MEDICINE

## 2018-01-24 PROCEDURE — 82550 ASSAY OF CK (CPK): CPT | Performed by: INTERNAL MEDICINE

## 2018-01-24 PROCEDURE — 86803 HEPATITIS C AB TEST: CPT | Performed by: INTERNAL MEDICINE

## 2018-01-24 PROCEDURE — 99214 OFFICE O/P EST MOD 30 MIN: CPT | Performed by: INTERNAL MEDICINE

## 2018-01-24 RX ORDER — PREDNISONE 10 MG/1
20 TABLET ORAL DAILY
Qty: 20 TABLET | Refills: 0 | Status: SHIPPED | OUTPATIENT
Start: 2018-01-24 | End: 2018-06-04

## 2018-01-24 ASSESSMENT — PAIN SCALES - GENERAL: PAINLEVEL: MODERATE PAIN (5)

## 2018-01-24 NOTE — PROGRESS NOTES
Reviewed results and recommendations with patient.  She will check with her boss and call us back to let us know what day/time works to come to clinic. Direct line provided.

## 2018-01-24 NOTE — PATIENT INSTRUCTIONS
Dr. Le would like you to have the testing done today that was recommended by your rheumatologist.  This includes, labs (blood draw and urine), ultrasound, and chest xray.  You will also need to have your platelets rechecked this Friday (01.26.18).  We will call you with this result and let you know dose to stay on of the prednisone.   Depending on the results of the testing; you may need to have a bone marrow biopsy or your spleen removed (splenectomy).  We will call you with the plan and follow up appointment with Dr. Le.    Your prescription(prednisone) has been sent to:   [facility] [address] [place] [address] [address] [place] [address]  [address] [facility] [address]  Phone: [phone] Fax: [phone]  When you are in need of a refill, please call your pharmacy and they will send us a request.      Copy of appointments, and after visit summary (AVS) given to patient.  If you have any questions during business hours (M-F 8 AM- 4PM), please call Laverne Rollins RN, BSN, OCN Oncology Hematology /Breast Cancer Navigator at [facility] [phone].   For questions after business hours, or on holidays/weekends, please call our after hours Nurse Triage line [phone]. Thank you.

## 2018-01-24 NOTE — LETTER
1/24/2018         RE: Merissa Leung  [address] [facility] [address]        Dear Colleague,    Thank you for referring your patient, Merissa Leung, to the [facility] [place] [facility]. Please see a copy of my visit note below.    Patient called back to confirm her prednisone dose is 10 mg.     Hematology/ Oncology Follow-up Visit:  Jan 24, 2018    Reason for Visit:   Chief Complaint   Patient presents with     Oncology Clinic Visit     f/u thrombocytopenia       Oncologic History:  Immune thrombocytopenia.    Interval History:  Patient is here today for follow-up.  Most recent platelet count came down to 23 000.  She denies any recent history of active bleeding or bruising.  Denies any fever or chills.  Denies any nausea or vomiting or diarrhea.  Denies any black stools.  Denies any epistaxis.  She was seen by rheumatology because of generalized body aches.  Workup was requested.  The test were not done    Review Of Systems:  Constitutional: Negative for fever, chills, and night sweats.  Skin: negative.  Eyes: negative.  Ears/Nose/Throat: negative.  Respiratory: No shortness of breath, dyspnea on exertion, cough, or hemoptysis.  Cardiovascular: negative.  Gastrointestinal: negative.  Genitourinary: negative.  Musculoskeletal: negative.  Neurologic: negative.  Psychiatric: negative.  Hematologic/Lymphatic/Immunologic: negative.  Endocrine: negative.    All other ROS negative unless mentioned in interval history.    Past medical, social, surgical, and family histories reviewed.    Allergies:  Allergies as of 01/24/2018 - Nikunj as Reviewed 01/24/2018   Allergen Reaction Noted     Dilaudid [hydromorphone] Nausea and Vomiting 02/14/2016       Current Medications:  Current Outpatient Prescriptions   Medication Sig Dispense Refill     predniSONE (DELTASONE) 10 MG tablet Take 2 tablets (20 mg) by mouth daily 20 tablet 0     UNABLE TO FIND Take 2 teaspoonful by mouth At Bedtime Magnesium Calm       omeprazole (PRILOSEC) 20  MG CR capsule Take 1 capsule (20 mg) by mouth daily 30 capsule 1     clonazePAM (KLONOPIN) 1 MG tablet Take 1/2 twice a day for anxiety, 1 and 1/2 at bedtime for sleep 75 tablet 2     linaclotide (LINZESS) 145 MCG capsule Take 1 capsule (145 mcg) by mouth every morning (before breakfast) 30 capsule 3     lisdexamfetamine (VYVANSE) 30 MG capsule Take 1 capsule (30 mg) by mouth daily 30 capsule 0     lisdexamfetamine dimesylate (VYVANSE) 10 MG capsule Take 1 capsule (10 mg) by mouth daily (with lunch) 30 capsule 0     sennosides (SENOKOT) 8.6 MG tablet Take 2 tablets by mouth 2 times daily       polyethylene glycol (MIRALAX/GLYCOLAX) powder Take 17 g by mouth daily as needed for constipation       Multiple Vitamins-Minerals (MULTIVITAL PO) Take 1 chew tab by mouth daily Gummi          Physical Exam:  /80 (BP Location: Right arm, Patient Position: Sitting, Cuff Size: Adult Small)  Pulse 71  Temp 97.7  F (36.5  C) (Oral)  Resp 16  Wt 50.4 kg (111 lb 1.6 oz)  SpO2 99%  BMI 18.21 kg/m2  Wt Readings from Last 12 Encounters:   01/24/18 50.4 kg (111 lb 1.6 oz)   01/02/18 49.9 kg (110 lb)   12/14/17 49.7 kg (109 lb 9.6 oz)   12/06/17 49 kg (108 lb 1.6 oz)   11/24/17 50.3 kg (110 lb 14.4 oz)   06/26/17 48.5 kg (107 lb)   03/21/17 48.5 kg (107 lb)   03/07/17 47.6 kg (105 lb)   12/09/16 47.6 kg (105 lb)   10/03/16 48.1 kg (106 lb)   09/07/16 47.6 kg (105 lb)   08/08/16 46.7 kg (103 lb)     GENERAL APPEARANCE: Healthy, alert and in no acute distress.  HEENT: Sclerae anicteric. PERRLA. Oropharynx without ulcers, lesions, or thrush.  NECK: Supple. No asymmetry or masses.  LYMPHATICS: No palpable cervical, supraclavicular, axillary, or inguinal lymphadenopathy.  RESP: Lungs clear to auscultation bilaterally without rales, rhonchi or wheezes.  CARDIOVASCULAR: Regular rate and rhythm. Normal S1, S2; no S3 or S4. No murmur, gallop, or rub.  ABDOMEN: Soft, nontender. Bowel sounds normal. No palpable organomegaly or  masses.  MUSCULOSKELETAL: Extremities without gross deformities noted. No edema of bilateral lower extremities.  SKIN: No suspicious lesions or rashes.  NEURO: Alert and oriented x 3. Cranial nerves II-XII grossly intact.  PSYCHIATRIC: Mentation and affect appear normal.    Laboratory/Imaging Studies:  Orders Only on 01/23/2018   Component Date Value Ref Range Status     WBC 01/23/2018 7.4  4.0 - 11.0 10e9/L Final     RBC Count 01/23/2018 4.87  3.8 - 5.2 10e12/L Final     Hemoglobin 01/23/2018 14.5  11.7 - 15.7 g/dL Final     Hematocrit 01/23/2018 42.6  35.0 - 47.0 % Final     MCV 01/23/2018 88  78 - 100 fl Final     MCH 01/23/2018 29.8  26.5 - 33.0 pg Final     MCHC 01/23/2018 34.0  31.5 - 36.5 g/dL Final     RDW 01/23/2018 12.2  10.0 - 15.0 % Final     Platelet Count 01/23/2018 23* 150 - 450 10e9/L Final    Comment: This result has been called to DR ARACELI by Bushra Cline on 01 24 2018 at   0130, and has been read back. CALLED by JCarter       Diff Method 01/23/2018 Automated Method   Final     % Neutrophils 01/23/2018 64.3  % Final     % Lymphocytes 01/23/2018 27.6  % Final     % Monocytes 01/23/2018 6.2  % Final     % Eosinophils 01/23/2018 1.1  % Final     % Basophils 01/23/2018 0.8  % Final     % Immature Granulocytes 01/23/2018 0.0  % Final     Absolute Neutrophil 01/23/2018 4.8  1.6 - 8.3 10e9/L Final     Absolute Lymphocytes 01/23/2018 2.0  0.8 - 5.3 10e9/L Final     Absolute Monocytes 01/23/2018 0.5  0.0 - 1.3 10e9/L Final     Absolute Eosinophils 01/23/2018 0.1  0.0 - 0.7 10e9/L Final     Absolute Basophils 01/23/2018 0.1  0.0 - 0.2 10e9/L Final     Abs Immature Granulocytes 01/23/2018 0.0  0 - 0.4 10e9/L Final     RBC Morphology 01/23/2018 Normal   Final     Platelet Estimate 01/23/2018 Confirming automated cell count   Final        Recent Results (from the past 744 hour(s))   US Abdomen Complete    Narrative    ULTRASOUND ABDOMEN COMPLETE January 24, 2018 1:33 PM     HISTORY: Thrombocytopenia. Immune  thrombocytopenia (H).     COMPARISON: CT abdomen and pelvis 9/30/2016.    FINDINGS:    Gallbladder: Normal with no cholelithiasis, wall thickening or focal  tenderness.      Bile ducts: CHD is normal diameter. No intrahepatic biliary  dilatation.    Liver: Normal.     Pancreas: Partially obscured but grossly unremarkable.     Spleen: A few granulomatous calcifications noted. Normal size of the  spleen measuring 9.3 cm.    Right kidney: Normal.     Left kidney: Normal.    Aorta and IVC: Not specifically assessed.        Impression    IMPRESSION: No specific abnormality.    TINO NOLASCO MD   XR Chest 2 Views    Narrative    CHEST TWO VIEWS January 24, 2018 1:38 PM     HISTORY: Thrombocytopenia; Immune thrombocytopenia (H).    COMPARISON: None.      Impression    IMPRESSION: No acute cardiopulmonary disease.    TINO NOLASCO MD       Assessment and plan:  (D69.3) Immune thrombocytopenia (H)  (primary encounter diagnosis)  Again I reviewed with the patient the management and the natural history of immune thrombocytopenia.  The patient currently has no active bleeding.  I would recommend to increase the dose of prednisone to 20 mg orally daily.  We will continue to monitor platelet count.  We talked about IVIG.  We also talked about splenectomy.  We will proceed with further workup.  I will see the patient again after the results of the workup to discuss next step in management.    The patient is ready to learn, no apparent learning barriers were identified.  Diagnosis and treatment plans were explained to the patient. The patient expressed understanding of the content. The patient asked appropriate questions. The patient questions were answered to her satisfaction.    Time spent 25 minute more than 50% of the time in counseling coordination of care including discussion of natural history of immune thrombocytopenia, management,  prognosis and follow-up    Chart documentation with Dragon Voice recognition Software. Although  reviewed after completion, some words and grammatical errors may remain.    Again, thank you for allowing me to participate in the care of your patient.        Sincerely,        Brittany Le MD

## 2018-01-24 NOTE — NURSING NOTE
"Oncology Rooming Note    January 24, 2018 10:53 AM   Merissa Leung is a 32 year old female who presents for:    Chief Complaint   Patient presents with     Oncology Clinic Visit     f/u thrombocytopenia     Initial Vitals: /80 (BP Location: Right arm, Patient Position: Sitting, Cuff Size: Adult Small)  Pulse 71  Temp 97.7  F (36.5  C) (Oral)  Resp 16  Wt 50.4 kg (111 lb 1.6 oz)  SpO2 99%  BMI 18.21 kg/m2 Estimated body mass index is 18.21 kg/(m^2) as calculated from the following:    Height as of 1/2/18: 1.664 m (5' 5.5\").    Weight as of this encounter: 50.4 kg (111 lb 1.6 oz). Body surface area is 1.53 meters squared.  Moderate Pain (5) Comment: chronic pain   No LMP recorded. Patient is not currently having periods (Reason: Irregular Periods).  Allergies reviewed: Yes  Medications reviewed: Yes    Medications: Medication refills not needed today.  Pharmacy name entered into [facility]: [facility] - [place], [place] - [address] [place] [address]    Clinical concerns: f/u thrombocytopenia, lab results     5 minutes for nursing intake (face to face time)     Maddy Miramontes RN              "

## 2018-01-24 NOTE — PROGRESS NOTES
Patient called back.  She is able to come to clinic today at 1045.  Appt added to Dr. Le's schedule.

## 2018-01-24 NOTE — MR AVS SNAPSHOT
After Visit Summary   1/24/2018    Merissa Leung    MRN: [id number]           Patient Information     Date Of Birth          1985        Visit Information        Provider Department      1/24/2018 10:45 AM Brittany Le MD [facility] ONCOLOGY      Today's Diagnoses     Immune thrombocytopenia (H)    -  1       Follow-ups after your visit        Your next 10 appointments already scheduled     Jan 24, 2018 12:00 PM CST   US ABDOMEN COMPLETE with [email or web address]   [facility] Ultrasound ([facility])    [address] [facility] [address]   [phone]           Please bring a list of your medicines (including vitamins, minerals and over-the-counter drugs). Also, tell your doctor about any allergies you may have. Wear comfortable clothes and leave your valuables at home.  Adults: No eating or drinking for 8 hours before the exam. You may take medicine with a small sip of water.  Children: - Children 6+ years: No food or drink for 6 hours before exam. - Children 1-5 years: No food or drink for 4 hours before exam. - Infants, breast-fed: may have breast milk up to 2 hours before exam. - Infants, formula: may have bottle until 4 hours before exam.  Please call the Imaging Department at your exam site with any questions.            Jan 26, 2018  3:30 PM CST   LAB with PI LAB   [facility]    [address] [facility] [facility] [id number] [phone]           Please do not eat 10-12 hours before your appointment if you are coming in fasting for labs on lipids, cholesterol, or glucose (sugar). This does not apply to pregnant women. Water, hot tea and black coffee (with nothing added) are okay. Do not drink other fluids, diet soda or chew gum.              Future tests that were ordered for you today     Open Future Orders        Priority Expected Expires Ordered    Vasculitis panel Routine 1/24/2018 1/31/2018 1/24/2018     ANTIBODY; HIV-1 & HIV-2, SINGLE ASSAY Routine 1/24/2018 1/31/2018 1/24/2018    HCV ANTIBODY Routine 1/24/2018 1/31/2018 1/24/2018    Hepatitis B surface antigen Routine 1/24/2018 1/31/2018 1/24/2018    XI Screen IFA ([facility]) Routine 1/24/2018 1/31/2018 1/24/2018    Complement C3 Routine 1/24/2018 1/31/2018 1/24/2018    Complement C4 Routine 1/24/2018 1/31/2018 1/24/2018    Sjogrens Anti-SS-A ([facility]) Routine 1/24/2018 1/31/2018 1/24/2018    CK total Routine 1/24/2018 1/31/2018 1/24/2018    Aldolase Routine 1/24/2018 1/31/2018 1/24/2018    CRP inflammation Routine 1/24/2018 1/31/2018 1/24/2018    ESR: Erythrocyte sedimentation rate Routine 1/24/2018 1/31/2018 1/24/2018    CARDIOLIPIN AB (IGG, IGM) Routine 1/24/2018 1/31/2018 1/24/2018    Lupus Anticoagulant Panel Routine 1/24/2018 1/31/2018 1/24/2018    Comprehensive metabolic panel Routine 1/24/2018 1/31/2018 1/24/2018    Angiotensin converting enzyme Routine 1/24/2018 1/31/2018 1/24/2018    US Abdomen Complete STAT 1/24/2018 1/24/2019 1/24/2018    XR Chest 2 Views STAT 1/24/2018 1/24/2019 1/24/2018            Who to contact     If you have questions or need follow up information about today's clinic visit or your schedule please contact [facility] directly at [phone].  Normal or non-critical lab and imaging results will be communicated to you by MyChart, letter or phone within 4 business days after the clinic has received the results. If you do not hear from us within 7 days, please contact the clinic through MyChart or phone. If you have a critical or abnormal lab result, we will notify you by phone as soon as possible.  Submit refill requests through [employer] or call your pharmacy and they will forward the refill request to us. Please allow 3 business days for your refill to be completed.          Additional Information About Your Visit        [employer] Information     [employer] gives you secure access to your electronic health record. If you see a  primary care provider, you can also send messages to your care team and make appointments. If you have questions, please call your primary care clinic.  If you do not have a primary care provider, please call [phone] and they will assist you.        Care EveryWhere ID     This is your Care EveryWhere ID. This could be used by other organizations to access your [facility] medical records  [id number]        Your Vitals Were     Pulse Temperature Respirations Pulse Oximetry BMI (Body Mass Index)       71 97.7  F (36.5  C) (Oral) 16 99% 18.21 kg/m2        Blood Pressure from Last 3 Encounters:   01/24/18 130/80   01/02/18 93/55   12/14/17 126/78    Weight from Last 3 Encounters:   01/24/18 50.4 kg (111 lb 1.6 oz)   01/02/18 49.9 kg (110 lb)   12/14/17 49.7 kg (109 lb 9.6 oz)               Primary Care Provider Office Phone # Fax #    Mohini Flex, [phone]       [address] [place] [address]        Equal Access to Services     [facility] : Hadii ivanna ku hadasho Soomaali, waaxda luqadaha, qaybta kaalmada kathy, leela smith. So [facility] [phone].    ATENCIÓN: Si habla español, tiene a goldsmith disposición servicios gratuitos de asistencia lingüística. Konrad al [phone].    We comply with applicable federal civil rights laws and [place] laws. We do not discriminate on the basis of race, color, national origin, age, disability, sex, sexual orientation, or gender identity.            Thank you!     Thank you for choosing [facility]  for your care. Our goal is always to provide you with excellent care. Hearing back from our patients is one way we can continue to improve our services. Please take a few minutes to complete the written survey that you may receive in the mail after your visit with us. Thank you!             Your Updated Medication List - Protect others around you: Learn how to safely use, store and throw away your medicines at  www.disposemymeds.org.          This list is accurate as of 1/24/18 11:32 AM.  Always use your most recent med list.                   Brand Name Dispense Instructions for use Diagnosis    clonazePAM 1 MG tablet    klonoPIN    75 tablet    Take 1/2 twice a day for anxiety, 1 and 1/2 at bedtime for sleep    EDWARD (generalized anxiety disorder)       linaclotide 145 MCG capsule    LINZESS    30 capsule    Take 1 capsule (145 mcg) by mouth every morning (before breakfast)    Irritable bowel syndrome with constipation       * lisdexamfetamine 10 MG capsule    VYVANSE    30 capsule    Take 1 capsule (10 mg) by mouth daily (with lunch)    Binge eating disorder       * lisdexamfetamine 30 MG capsule    VYVANSE    30 capsule    Take 1 capsule (30 mg) by mouth daily    Binge eating disorder       MULTIVITAL PO      Take 1 chew tab by mouth daily Gummi        omeprazole 20 MG CR capsule    priLOSEC    30 capsule    Take 1 capsule (20 mg) by mouth daily    Thrombocytopenia (H)       polyethylene glycol powder    MIRALAX/GLYCOLAX     Take 17 g by mouth daily as needed for constipation        predniSONE 50 MG tablet    DELTASONE    15 tablet    Take 1 tablet (50 mg) by mouth daily    Thrombocytopenia (H)       sennosides 8.6 MG tablet    SENOKOT     Take 2 tablets by mouth 2 times daily        UNABLE TO FIND      Take 2 teaspoonful by mouth At Bedtime Magnesium Calm        * Notice:  This list has 2 medication(s) that are the same as other medications prescribed for you. Read the directions carefully, and ask your doctor or other care provider to review them with you.

## 2018-01-25 LAB
C3 SERPL-MCNC: 84 MG/DL (ref 76–169)
C4 SERPL-MCNC: 24 MG/DL (ref 15–50)
HBV SURFACE AG SERPL QL IA: NONREACTIVE
HCV AB SERPL QL IA: NONREACTIVE
MYELOPEROXIDASE AB SER-ACNC: <0.2 AI (ref 0–0.9)
PROTEINASE3 IGG SER-ACNC: <0.2 AI (ref 0–0.9)

## 2018-01-26 ENCOUNTER — TELEPHONE (OUTPATIENT)
Dept: ONCOLOGY | Facility: CLINIC | Age: 33
End: 2018-01-26

## 2018-01-26 DIAGNOSIS — D69.6 THROMBOCYTOPENIA (H): Primary | ICD-10-CM

## 2018-01-26 DIAGNOSIS — D69.3 IMMUNE THROMBOCYTOPENIA (H): ICD-10-CM

## 2018-01-26 LAB
ACE SERPL-CCNC: 13 U/L (ref 9–67)
ALDOLASE SERPL-CCNC: 6.3 U/L (ref 1.5–8.1)
BASOPHILS # BLD AUTO: 0.1 10E9/L (ref 0–0.2)
BASOPHILS NFR BLD AUTO: 0.7 %
DIFFERENTIAL METHOD BLD: ABNORMAL
EOSINOPHIL # BLD AUTO: 0.1 10E9/L (ref 0–0.7)
EOSINOPHIL NFR BLD AUTO: 0.8 %
ERYTHROCYTE [DISTWIDTH] IN BLOOD BY AUTOMATED COUNT: 12.1 % (ref 10–15)
HCT VFR BLD AUTO: 43.2 % (ref 35–47)
HGB BLD-MCNC: 14.8 G/DL (ref 11.7–15.7)
IMM GRANULOCYTES # BLD: 0 10E9/L (ref 0–0.4)
IMM GRANULOCYTES NFR BLD: 0.1 %
LA PPP-IMP: NEGATIVE
LYMPHOCYTES # BLD AUTO: 2.4 10E9/L (ref 0.8–5.3)
LYMPHOCYTES NFR BLD AUTO: 32 %
MCH RBC QN AUTO: 29.8 PG (ref 26.5–33)
MCHC RBC AUTO-ENTMCNC: 34.3 G/DL (ref 31.5–36.5)
MCV RBC AUTO: 87 FL (ref 78–100)
MONOCYTES # BLD AUTO: 0.5 10E9/L (ref 0–1.3)
MONOCYTES NFR BLD AUTO: 6.9 %
NEUTROPHILS # BLD AUTO: 4.5 10E9/L (ref 1.6–8.3)
NEUTROPHILS NFR BLD AUTO: 59.5 %
PLATELET # BLD AUTO: 21 10E9/L (ref 150–450)
RBC # BLD AUTO: 4.97 10E12/L (ref 3.8–5.2)
WBC # BLD AUTO: 7.5 10E9/L (ref 4–11)

## 2018-01-26 PROCEDURE — 85025 COMPLETE CBC W/AUTO DIFF WBC: CPT | Performed by: INTERNAL MEDICINE

## 2018-01-26 PROCEDURE — 36415 COLL VENOUS BLD VENIPUNCTURE: CPT | Performed by: INTERNAL MEDICINE

## 2018-01-26 NOTE — PROGRESS NOTES
Hematology/ Oncology Follow-up Visit:  Jan 24, 2018    Reason for Visit:   Chief Complaint   Patient presents with     Oncology Clinic Visit     f/u thrombocytopenia       Oncologic History:  Immune thrombocytopenia.    Interval History:  Patient is here today for follow-up.  Most recent platelet count came down to 23 000.  She denies any recent history of active bleeding or bruising.  Denies any fever or chills.  Denies any nausea or vomiting or diarrhea.  Denies any black stools.  Denies any epistaxis.  She was seen by rheumatology because of generalized body aches.  Workup was requested.  The test were not done    Review Of Systems:  Constitutional: Negative for fever, chills, and night sweats.  Skin: negative.  Eyes: negative.  Ears/Nose/Throat: negative.  Respiratory: No shortness of breath, dyspnea on exertion, cough, or hemoptysis.  Cardiovascular: negative.  Gastrointestinal: negative.  Genitourinary: negative.  Musculoskeletal: negative.  Neurologic: negative.  Psychiatric: negative.  Hematologic/Lymphatic/Immunologic: negative.  Endocrine: negative.    All other ROS negative unless mentioned in interval history.    Past medical, social, surgical, and family histories reviewed.    Allergies:  Allergies as of 01/24/2018 - Nikunj as Reviewed 01/24/2018   Allergen Reaction Noted     Dilaudid [hydromorphone] Nausea and Vomiting 02/14/2016       Current Medications:  Current Outpatient Prescriptions   Medication Sig Dispense Refill     predniSONE (DELTASONE) 10 MG tablet Take 2 tablets (20 mg) by mouth daily 20 tablet 0     UNABLE TO FIND Take 2 teaspoonful by mouth At Bedtime Magnesium Calm       omeprazole (PRILOSEC) 20 MG CR capsule Take 1 capsule (20 mg) by mouth daily 30 capsule 1     clonazePAM (KLONOPIN) 1 MG tablet Take 1/2 twice a day for anxiety, 1 and 1/2 at bedtime for sleep 75 tablet 2     linaclotide (LINZESS) 145 MCG capsule Take 1 capsule (145 mcg) by mouth every morning (before breakfast) 30  capsule 3     lisdexamfetamine (VYVANSE) 30 MG capsule Take 1 capsule (30 mg) by mouth daily 30 capsule 0     lisdexamfetamine dimesylate (VYVANSE) 10 MG capsule Take 1 capsule (10 mg) by mouth daily (with lunch) 30 capsule 0     sennosides (SENOKOT) 8.6 MG tablet Take 2 tablets by mouth 2 times daily       polyethylene glycol (MIRALAX/GLYCOLAX) powder Take 17 g by mouth daily as needed for constipation       Multiple Vitamins-Minerals (MULTIVITAL PO) Take 1 chew tab by mouth daily Gummi          Physical Exam:  /80 (BP Location: Right arm, Patient Position: Sitting, Cuff Size: Adult Small)  Pulse 71  Temp 97.7  F (36.5  C) (Oral)  Resp 16  Wt 50.4 kg (111 lb 1.6 oz)  SpO2 99%  BMI 18.21 kg/m2  Wt Readings from Last 12 Encounters:   01/24/18 50.4 kg (111 lb 1.6 oz)   01/02/18 49.9 kg (110 lb)   12/14/17 49.7 kg (109 lb 9.6 oz)   12/06/17 49 kg (108 lb 1.6 oz)   11/24/17 50.3 kg (110 lb 14.4 oz)   06/26/17 48.5 kg (107 lb)   03/21/17 48.5 kg (107 lb)   03/07/17 47.6 kg (105 lb)   12/09/16 47.6 kg (105 lb)   10/03/16 48.1 kg (106 lb)   09/07/16 47.6 kg (105 lb)   08/08/16 46.7 kg (103 lb)     GENERAL APPEARANCE: Healthy, alert and in no acute distress.  HEENT: Sclerae anicteric. PERRLA. Oropharynx without ulcers, lesions, or thrush.  NECK: Supple. No asymmetry or masses.  LYMPHATICS: No palpable cervical, supraclavicular, axillary, or inguinal lymphadenopathy.  RESP: Lungs clear to auscultation bilaterally without rales, rhonchi or wheezes.  CARDIOVASCULAR: Regular rate and rhythm. Normal S1, S2; no S3 or S4. No murmur, gallop, or rub.  ABDOMEN: Soft, nontender. Bowel sounds normal. No palpable organomegaly or masses.  MUSCULOSKELETAL: Extremities without gross deformities noted. No edema of bilateral lower extremities.  SKIN: No suspicious lesions or rashes.  NEURO: Alert and oriented x 3. Cranial nerves II-XII grossly intact.  PSYCHIATRIC: Mentation and affect appear normal.    Laboratory/Imaging  Studies:  Orders Only on 01/23/2018   Component Date Value Ref Range Status     WBC 01/23/2018 7.4  4.0 - 11.0 10e9/L Final     RBC Count 01/23/2018 4.87  3.8 - 5.2 10e12/L Final     Hemoglobin 01/23/2018 14.5  11.7 - 15.7 g/dL Final     Hematocrit 01/23/2018 42.6  35.0 - 47.0 % Final     MCV 01/23/2018 88  78 - 100 fl Final     MCH 01/23/2018 29.8  26.5 - 33.0 pg Final     MCHC 01/23/2018 34.0  31.5 - 36.5 g/dL Final     RDW 01/23/2018 12.2  10.0 - 15.0 % Final     Platelet Count 01/23/2018 23* 150 - 450 10e9/L Final    Comment: This result has been called to DR ARACELI by Bushra Cline on 01 24 2018 at   0130, and has been read back. CALLED by JCarter       Diff Method 01/23/2018 Automated Method   Final     % Neutrophils 01/23/2018 64.3  % Final     % Lymphocytes 01/23/2018 27.6  % Final     % Monocytes 01/23/2018 6.2  % Final     % Eosinophils 01/23/2018 1.1  % Final     % Basophils 01/23/2018 0.8  % Final     % Immature Granulocytes 01/23/2018 0.0  % Final     Absolute Neutrophil 01/23/2018 4.8  1.6 - 8.3 10e9/L Final     Absolute Lymphocytes 01/23/2018 2.0  0.8 - 5.3 10e9/L Final     Absolute Monocytes 01/23/2018 0.5  0.0 - 1.3 10e9/L Final     Absolute Eosinophils 01/23/2018 0.1  0.0 - 0.7 10e9/L Final     Absolute Basophils 01/23/2018 0.1  0.0 - 0.2 10e9/L Final     Abs Immature Granulocytes 01/23/2018 0.0  0 - 0.4 10e9/L Final     RBC Morphology 01/23/2018 Normal   Final     Platelet Estimate 01/23/2018 Confirming automated cell count   Final        Recent Results (from the past 744 hour(s))   US Abdomen Complete    Narrative    ULTRASOUND ABDOMEN COMPLETE January 24, 2018 1:33 PM     HISTORY: Thrombocytopenia. Immune thrombocytopenia (H).     COMPARISON: CT abdomen and pelvis 9/30/2016.    FINDINGS:    Gallbladder: Normal with no cholelithiasis, wall thickening or focal  tenderness.      Bile ducts: CHD is normal diameter. No intrahepatic biliary  dilatation.    Liver: Normal.     Pancreas: Partially  obscured but grossly unremarkable.     Spleen: A few granulomatous calcifications noted. Normal size of the  spleen measuring 9.3 cm.    Right kidney: Normal.     Left kidney: Normal.    Aorta and IVC: Not specifically assessed.        Impression    IMPRESSION: No specific abnormality.    TINO NOLASCO MD   XR Chest 2 Views    Narrative    CHEST TWO VIEWS January 24, 2018 1:38 PM     HISTORY: Thrombocytopenia; Immune thrombocytopenia (H).    COMPARISON: None.      Impression    IMPRESSION: No acute cardiopulmonary disease.    TINO NOLASCO MD       Assessment and plan:  (D69.3) Immune thrombocytopenia (H)  (primary encounter diagnosis)  Again I reviewed with the patient the management and the natural history of immune thrombocytopenia.  The patient currently has no active bleeding.  I would recommend to increase the dose of prednisone to 20 mg orally daily.  We will continue to monitor platelet count.  We talked about IVIG.  We also talked about splenectomy.  We will proceed with further workup.  I will see the patient again after the results of the workup to discuss next step in management.    The patient is ready to learn, no apparent learning barriers were identified.  Diagnosis and treatment plans were explained to the patient. The patient expressed understanding of the content. The patient asked appropriate questions. The patient questions were answered to her satisfaction.    Time spent 25 minute more than 50% of the time in counseling coordination of care including discussion of natural history of immune thrombocytopenia, management,  prognosis and follow-up    Chart documentation with Dragon Voice recognition Software. Although reviewed after completion, some words and grammatical errors may remain.

## 2018-01-26 NOTE — TELEPHONE ENCOUNTER
DATE:  1/26/2018   TIME OF RECEIPT FROM LAB:  1605   LAB TEST:  preliminart PLT result  LAB VALUE:  13  RESULTS GIVEN WITH READ-BACK TO (PROVIDER):  MANNY MARTIN  TIME LAB VALUE REPORTED TO PROVIDER:   Dr. NANCY Le

## 2018-01-26 NOTE — PROGRESS NOTES
Patient called for results.  Per Dr. Le, results are normal and do not indicate a rationale for the continued thrombocytopenia.  He suspects it is immune thrombocytopenia and would like patient to continue with weekly CBC with diff at this time.  Depending on if platelet count continues to remain low, may need bmbx or splenectomy. He would like to see her in clinic in 2 weeks with CBC with diff prior.    Pt states understanding and is in agreement with this plan.    Pt scheduled to see him 02.09.18 at 0800. Pt will call back to set up lab draw.    Also, standing order changed to STAT, and verified with Dr. Le today's order for PLT should be CBC with diff.  Order changed to reflect this.

## 2018-01-29 NOTE — TELEPHONE ENCOUNTER
"Status Check:    Pt is a 32 year female, recently with critical value for thrombocytopenia.  Call placed for status check.    Primary care provider is: Mohini Mccord      Diagnosis:   Encounter Diagnoses   Name Primary?     Thrombocytopenia (H) Yes     Immune thrombocytopenia (H)      Oncology provider is:  Dr. Le    How are you doing/feeling?: \"Good\".  Denies bleeding, nor bruising. Reviewed present dose of prednisone (20 mg daily).  Any further issues?: Reviewed Dr. Le's recommendations.  Next Follow up/Recommendations: Pt understands to continue same dose of prednisone (20 mg) daily and to recheck CBC with diff this Wednesday.     Patient instructed to call with any questions or concerns.  Patient states understanding and is in agreement with this plan.    Approximately 7 minutes spent on telephone with patient reviewing assessment and symptom(s) and providing symptom management.    Laverne Rollins RN, BSN, OCN  Oncology Hematology   Breast Cancer Navigator  [facility]  [email or web address]  [phone]    "

## 2018-01-30 ENCOUNTER — APPOINTMENT (OUTPATIENT)
Dept: CT IMAGING | Facility: CLINIC | Age: 33
End: 2018-01-30
Attending: EMERGENCY MEDICINE
Payer: COMMERCIAL

## 2018-01-30 ENCOUNTER — HOSPITAL ENCOUNTER (EMERGENCY)
Facility: CLINIC | Age: 33
Discharge: HOME | End: 2018-01-30
Attending: EMERGENCY MEDICINE | Admitting: EMERGENCY MEDICINE
Payer: COMMERCIAL

## 2018-01-30 ENCOUNTER — TELEPHONE (OUTPATIENT)
Dept: FAMILY MEDICINE | Facility: CLINIC | Age: 33
End: 2018-01-30

## 2018-01-30 VITALS
DIASTOLIC BLOOD PRESSURE: 50 MMHG | RESPIRATION RATE: 18 BRPM | WEIGHT: 107 LBS | OXYGEN SATURATION: 98 % | TEMPERATURE: 98.8 F | HEART RATE: 70 BPM | BODY MASS INDEX: 17.53 KG/M2 | SYSTOLIC BLOOD PRESSURE: 98 MMHG

## 2018-01-30 DIAGNOSIS — D69.3 IMMUNE THROMBOCYTOPENIA (H): ICD-10-CM

## 2018-01-30 DIAGNOSIS — F50.819 BINGE EATING DISORDER: ICD-10-CM

## 2018-01-30 DIAGNOSIS — F41.1 GAD (GENERALIZED ANXIETY DISORDER): ICD-10-CM

## 2018-01-30 DIAGNOSIS — K52.9 GASTROENTERITIS: ICD-10-CM

## 2018-01-30 DIAGNOSIS — K58.1 IRRITABLE BOWEL SYNDROME WITH CONSTIPATION: ICD-10-CM

## 2018-01-30 LAB
ALBUMIN SERPL-MCNC: 4.2 G/DL (ref 3.4–5)
ALBUMIN UR-MCNC: 30 MG/DL
ALP SERPL-CCNC: 77 U/L (ref 40–150)
ALT SERPL W P-5'-P-CCNC: 17 U/L (ref 0–50)
ANION GAP SERPL CALCULATED.3IONS-SCNC: 6 MMOL/L (ref 3–14)
APPEARANCE UR: CLEAR
AST SERPL W P-5'-P-CCNC: 15 U/L (ref 0–45)
BASOPHILS # BLD AUTO: 0 10E9/L (ref 0–0.2)
BASOPHILS NFR BLD AUTO: 0.2 %
BILIRUB SERPL-MCNC: 0.5 MG/DL (ref 0.2–1.3)
BILIRUB UR QL STRIP: NEGATIVE
BUN SERPL-MCNC: 15 MG/DL (ref 7–30)
CALCIUM SERPL-MCNC: 8.9 MG/DL (ref 8.5–10.1)
CAOX CRY #/AREA URNS HPF: ABNORMAL /HPF
CHLORIDE SERPL-SCNC: 108 MMOL/L (ref 94–109)
CO2 SERPL-SCNC: 24 MMOL/L (ref 20–32)
COLOR UR AUTO: ABNORMAL
CREAT SERPL-MCNC: 0.8 MG/DL (ref 0.52–1.04)
DEPRECATED S PYO AG THROAT QL EIA: NORMAL
DIFFERENTIAL METHOD BLD: ABNORMAL
EOSINOPHIL # BLD AUTO: 0 10E9/L (ref 0–0.7)
EOSINOPHIL NFR BLD AUTO: 0.2 %
ERYTHROCYTE [DISTWIDTH] IN BLOOD BY AUTOMATED COUNT: 12.2 % (ref 10–15)
FLUAV+FLUBV AG SPEC QL: NEGATIVE
FLUAV+FLUBV AG SPEC QL: NEGATIVE
GFR SERPL CREATININE-BSD FRML MDRD: 84 ML/MIN/1.7M2
GLUCOSE SERPL-MCNC: 92 MG/DL (ref 70–99)
GLUCOSE UR STRIP-MCNC: NEGATIVE MG/DL
HCG SERPL QL: NEGATIVE
HCT VFR BLD AUTO: 48.6 % (ref 35–47)
HGB BLD-MCNC: 16.9 G/DL (ref 11.7–15.7)
HGB UR QL STRIP: NEGATIVE
IMM GRANULOCYTES # BLD: 0 10E9/L (ref 0–0.4)
IMM GRANULOCYTES NFR BLD: 0.1 %
KETONES UR STRIP-MCNC: 5 MG/DL
LEUKOCYTE ESTERASE UR QL STRIP: ABNORMAL
LIPASE SERPL-CCNC: 81 U/L (ref 73–393)
LYMPHOCYTES # BLD AUTO: 1 10E9/L (ref 0.8–5.3)
LYMPHOCYTES NFR BLD AUTO: 11.6 %
MCH RBC QN AUTO: 30 PG (ref 26.5–33)
MCHC RBC AUTO-ENTMCNC: 34.8 G/DL (ref 31.5–36.5)
MCV RBC AUTO: 86 FL (ref 78–100)
MONOCYTES # BLD AUTO: 0.4 10E9/L (ref 0–1.3)
MONOCYTES NFR BLD AUTO: 4.6 %
MUCOUS THREADS #/AREA URNS LPF: PRESENT /LPF
NEUTROPHILS # BLD AUTO: 7.4 10E9/L (ref 1.6–8.3)
NEUTROPHILS NFR BLD AUTO: 83.3 %
NITRATE UR QL: NEGATIVE
PH UR STRIP: 6 PH (ref 5–7)
PLATELET # BLD AUTO: 25 10E9/L (ref 150–450)
POTASSIUM SERPL-SCNC: 3.4 MMOL/L (ref 3.4–5.3)
PROT SERPL-MCNC: 7.8 G/DL (ref 6.8–8.8)
RBC # BLD AUTO: 5.63 10E12/L (ref 3.8–5.2)
RBC #/AREA URNS AUTO: 39 /HPF (ref 0–2)
SODIUM SERPL-SCNC: 138 MMOL/L (ref 133–144)
SOURCE: ABNORMAL
SP GR UR STRIP: 1.02 (ref 1–1.03)
SPECIMEN SOURCE: NORMAL
SPECIMEN SOURCE: NORMAL
SQUAMOUS #/AREA URNS AUTO: 2 /HPF (ref 0–1)
UROBILINOGEN UR STRIP-MCNC: NORMAL MG/DL (ref 0–2)
WBC # BLD AUTO: 8.9 10E9/L (ref 4–11)
WBC #/AREA URNS AUTO: 5 /HPF (ref 0–2)

## 2018-01-30 PROCEDURE — 84703 CHORIONIC GONADOTROPIN ASSAY: CPT | Performed by: EMERGENCY MEDICINE

## 2018-01-30 PROCEDURE — 81001 URINALYSIS AUTO W/SCOPE: CPT | Performed by: EMERGENCY MEDICINE

## 2018-01-30 PROCEDURE — 87880 STREP A ASSAY W/OPTIC: CPT | Performed by: EMERGENCY MEDICINE

## 2018-01-30 PROCEDURE — 87081 CULTURE SCREEN ONLY: CPT | Performed by: EMERGENCY MEDICINE

## 2018-01-30 PROCEDURE — 83690 ASSAY OF LIPASE: CPT | Performed by: EMERGENCY MEDICINE

## 2018-01-30 PROCEDURE — 96374 THER/PROPH/DIAG INJ IV PUSH: CPT | Performed by: EMERGENCY MEDICINE

## 2018-01-30 PROCEDURE — 85025 COMPLETE CBC W/AUTO DIFF WBC: CPT | Performed by: EMERGENCY MEDICINE

## 2018-01-30 PROCEDURE — 74176 CT ABD & PELVIS W/O CONTRAST: CPT

## 2018-01-30 PROCEDURE — 87804 INFLUENZA ASSAY W/OPTIC: CPT | Performed by: EMERGENCY MEDICINE

## 2018-01-30 PROCEDURE — 87506 IADNA-DNA/RNA PROBE TQ 6-11: CPT | Performed by: EMERGENCY MEDICINE

## 2018-01-30 PROCEDURE — 99285 EMERGENCY DEPT VISIT HI MDM: CPT | Mod: 25 | Performed by: EMERGENCY MEDICINE

## 2018-01-30 PROCEDURE — 87086 URINE CULTURE/COLONY COUNT: CPT | Performed by: EMERGENCY MEDICINE

## 2018-01-30 PROCEDURE — 80053 COMPREHEN METABOLIC PANEL: CPT | Performed by: EMERGENCY MEDICINE

## 2018-01-30 PROCEDURE — 96361 HYDRATE IV INFUSION ADD-ON: CPT | Performed by: EMERGENCY MEDICINE

## 2018-01-30 PROCEDURE — 96375 TX/PRO/DX INJ NEW DRUG ADDON: CPT | Performed by: EMERGENCY MEDICINE

## 2018-01-30 PROCEDURE — 99285 EMERGENCY DEPT VISIT HI MDM: CPT | Mod: Z6 | Performed by: EMERGENCY MEDICINE

## 2018-01-30 PROCEDURE — 25000128 H RX IP 250 OP 636: Performed by: EMERGENCY MEDICINE

## 2018-01-30 RX ORDER — ONDANSETRON 2 MG/ML
4 INJECTION INTRAMUSCULAR; INTRAVENOUS ONCE
Status: COMPLETED | OUTPATIENT
Start: 2018-01-30 | End: 2018-01-30

## 2018-01-30 RX ORDER — ONDANSETRON 4 MG/1
8 TABLET, ORALLY DISINTEGRATING ORAL EVERY 8 HOURS PRN
Qty: 10 TABLET | Refills: 0 | Status: SHIPPED | OUTPATIENT
Start: 2018-01-30 | End: 2018-06-04

## 2018-01-30 RX ORDER — ONDANSETRON 4 MG/1
8 TABLET, ORALLY DISINTEGRATING ORAL EVERY 8 HOURS PRN
Qty: 10 TABLET | Refills: 0 | Status: SHIPPED | OUTPATIENT
Start: 2018-01-30 | End: 2018-01-30

## 2018-01-30 RX ORDER — FENTANYL CITRATE 50 UG/ML
75 INJECTION, SOLUTION INTRAMUSCULAR; INTRAVENOUS ONCE
Status: COMPLETED | OUTPATIENT
Start: 2018-01-30 | End: 2018-01-30

## 2018-01-30 RX ADMIN — SODIUM CHLORIDE 1000 ML: 9 INJECTION, SOLUTION INTRAVENOUS at 18:05

## 2018-01-30 RX ADMIN — ONDANSETRON 4 MG: 2 INJECTION INTRAMUSCULAR; INTRAVENOUS at 18:05

## 2018-01-30 RX ADMIN — FENTANYL CITRATE 75 MCG: 50 INJECTION, SOLUTION INTRAMUSCULAR; INTRAVENOUS at 20:13

## 2018-01-30 ASSESSMENT — ENCOUNTER SYMPTOMS
VOMITING: 1
BLOOD IN STOOL: 0
CHILLS: 1
DIARRHEA: 1
ABDOMINAL PAIN: 1
COUGH: 1
HEMATURIA: 0
NAUSEA: 1
SORE THROAT: 0

## 2018-01-30 ASSESSMENT — PAIN DESCRIPTION - DESCRIPTORS: DESCRIPTORS: ACHING

## 2018-01-30 NOTE — TELEPHONE ENCOUNTER
Patient called to update our office that she was advised to present to [place] ED to be admitted for the flu, dehydration, and low platelets. Patient questioning if this was necessary.    Did reinforce PCP's recommendation to present to ED for evaluation. Platelet count is being monitored by Dr. Le (Hematologist), is on Prednisone 20 mg daily for this, and pt is due for CBC with diff recheck tomorrow.

## 2018-01-30 NOTE — TELEPHONE ENCOUNTER
Influenza-Like Illness (DELON) Protocol    Mersisa Leung      Age: 32 year old     YOB: 1985    Are you currently sick or have you had close contact with someone who is currently sick?   Yes, this patient is currently sick.   Reports onset upset stomach, GI sx 3-4 days day. Felt better Mon day, worsening sx since last jewels  Including nausea, vomiting, diarrhea, body aches, weakness, slight cough, runny nose, low grade fever.  States mouth dry despite trying to drink fluids. States is urinating.   Child and spouse with vomiting, diarrhea also. Child sx improved, spouse still symptomatic.  Discussed with RAHEL Jj, who advises ED for eval due to Dx thrombocytopenia, potential dehydration.  Pt informed, voices understanding/ agreement.  Will cancel appt with Анна this jewels. Pt will reschedule as when feeling better.   Requesting med refills- see 01-30-18 refill encounter.  ADA Rubi RN      Adult Clinical Evaluation    Is this patient experiencing ANY of the following?  Unconsciousness or unresponsiveness No   Difficulty breathing or swallowing No   Blue or dusky lips, skin, or nail beds No   Chest pain No   Severe confusion or delirium No   Seizure activity: ongoing or stopped No   Severe dehydration or signs of shock No   Patient sounds very sick on the phone No     Is this patient experiencing ANY of the following?  Fever > 104 or shaking chills No   Wheezing with minimal response to usual wheezing medications or new wheezing No   Repeated vomiting or diarrhea with signs of dehydration (no urination within last 12 hours) No   Flu-like symptoms that initially improved but returned with fever and a worse cough Yes.  Patient advised to be taken to the ED now.   Stiff or painful neck No   Severe headache No     Does the patient have any of the following?  Measured fever > 100 degrees No   Chills or feels very warm to the touch Yes   Cough Yes   Sore throat No   Muscle/ body aches Yes   Headaches No    Fatigue (tiredness) Yes     Nursing Plan  Huddled with provider- advised ED today.     Provided home care instructions    General home care instruction:      Avoid contact with people in your household who are at increased risk for more severe complications of influenza (such as pregnant women or people who have a chronic health condition, for example diabetes, heart disease, asthma, or emphysema).    Stay home from work, school, childcare or other public places until your fever (37.8 degrees Celsius [100 degrees Fahrenheit]) has been gone for at least 24 hours, except to seek medical care. (Fever should be gone without the use of fever-reducing medications.) Use a surgical mask if available, or cover your mouth and nose with a tissue if possible if you need to seek medical care. Contact your work place, school, or  as they may have longer exclusion times.    You may continue to shed virus after your fever is gone. Limit your contact with high-risk individuals for 10 days after your symptoms started and be especially careful to cover your coughs/sneezes and wash your hands.    Cover your cough and wash your hands often, and especially after coughing, sneezing, blowing your nose.    Drink plenty of fluids (such as water, broth, sports drinks, electrolyte beverages for children) to prevent dehydration.    Avoid tobacco and second hand smoke.    Get plenty of rest.    Use over-the-counter pain relievers as needed per  instructions.    Do not give aspirin (acetylsalicylic acid) or products that contain aspirin (e.g. bismuth subsalicylate - Pepto Bismol) to children or teenagers 18 years or younger.    A small number of people with influenza do not have fever. If you have respiratory symptoms and are at increased risk for complications of influenza, contact your health care provider to discuss these symptoms.    For parents of infants:    If possible, only family members who are not sick should care  for infants.    Wash your hands with soap and water, or an alcohol-based hand rub (if your hands are not visibly soiled) before caring for your infant.    Cover your mouth and nose with a tissue when coughing or sneezing, and clean your hands.    Contact a health care provider to discuss your illness within 1-2 days if you are    Pregnant    Immunocompromised    Call 911 if you experience:    Difficulty breathing or shortness of breath    Pain or pressure in the chest    Confusion or less responsive than normal    Seizure activity: ongoing or stopped    Severe dehydration or signs of shock     Blue or dusky lips, skin, or nail beds    If further questions/concerns or if new symptoms develop, call your PCP or [facility] as soon as possible.    When to seek medical attention    Contact your health care provider right away if you experience:    A painful sore throat accompanied by fever persists for more than 48 hours    Ear pain, sinus pain, persistent vomiting and/or diarrhea    Oral temperature greater than 104  Fahrenheit (40  Celsius)    Dehydration (e.g., mouth feeling dry, dizzy when sitting/standing, decreased urine output)    Severe or persistent vomiting; unable to keep fluids down    Improvement in flu-like symptoms (fever and cough or sore throat) but then return of fever and worse cough or sore throat    Not drinking enough fluid    Any other concerns not stated above      Additional educational resources include:    http://www.[employer].com    http://www.cdc.gov/flu/  Anu Rubi

## 2018-01-30 NOTE — ED AVS SNAPSHOT
[facility]    [address] [facility] [address]    Phone:  [phone]    Fax:  [phone]                                       Merissa Leung   MRN: [id number]    Department:  [facility]   Date of Visit:  1/30/2018           Patient Information     Date Of Birth          1985        Your diagnoses for this visit were:     Gastroenteritis     Immune thrombocytopenia (H)        You were seen by Ray Rivera MD.      Follow-up Information     Follow up with Mohini Mccord NP.    Specialty:  Nurse Practitioner - Adult Health    Why:  As needed    Contact information:    [address] [facility] [address]  [phone]          Follow up with [facility].    Specialty:  EMERGENCY MEDICINE    Why:  If symptoms worsen    Contact information:    [facility] [facility] [address] [phone]    Additional information:    The medical center is located at   [address] [facility]. ([address]   [facility] [address] in [place], [place] miles north   of [place]).        Discharge Instructions       Return if symptoms worsen or new symptoms develop.  Follow-up with primary care physician next available.  Drink plenty of fluids.  Take Zofran as needed.  Follow-up with your hematologist tomorrow.  If increasing pain fevers chills or other symptoms present please return for recheck.  Coping with Thrombocytopenia   What is thrombocytopenia?  This is a term for a low platelet count. Platelets are blood cells that help your blood to clot. When your platelets are low, you may bruise or bleed more easily.   Signs of low platelets include:     A lot of bruises    Bleeding gums    Nosebleeds    Tiny purple spots on the feet or legs.  How is it treated?  If your platelets are too low or you have active bleeding, you may need a platelet transfusion.   In this case, we would give you donated platelets through an IV line (a  small tube in your vein).   This usually takes one to two hours.   You will need blood tests to see how well your treatment is working.  What can I do to prevent bleeding?  Until your platelets are back to normal, you need to reduce your risk for bleeding or bruising.    Do not have surgery or dental work.    Do not take medicine unless your care team says it's okay. This includes:    Aspirin or products that contain aspirin    Aspirin-free pain relievers (such as Advil).  Be sure to read the labels on any store-bought medicines.    Do not drink alcohol unless your care team says it's okay.    Avoid foods that can make your mouth bleed, including popcorn, chips and raw vegetables.    To avoid injuries:    Make your home as safe as possible.    Take care when using knives and other tools.    Be careful not to burn yourself when ironing   or cooking.    Wear heavy gloves when working in the garden or near [employer] plants.    Wear shoes when you walk.    Wear loose-fitting clothes to prevent bruising.    Do low-impact exercise such as walking or swimming. Avoid contact sports.    Keep your lips moist with lip balm. Keep your skin soft with cream or lotion.    Blow your nose gently. Never use your fingers to clean your nose.    Use an electric razor.    Use a soft toothbrush. Do not floss.    Use a water-based gel during sex (intercourse), such as K-Y Jelly or Astroglide. Do not have sex if your platelets are below 50,000.    Do not use enemas, suppositories, douches   or tampons.    Try not to strain when using the toilet. Ask   your care team if you need a stool softener   (such as Colace).  When should I call my care team?  Call your care team if:    You bleed from a cut or wound and it doesn't stop within 30 minutes of applying pressure.    You notice blood in your urine or stool after using the toilet.    You see blood or dark brown spots in your vomit.    You have nosebleeds, bleeding gums or headaches that you  cannot explain.    You see tiny, pinpoint-sized red or purple spots on your skin.    You have heavy bleeding from the vagina (you are soaking one pad an hour) or any change in your periods. This includes heavier bleeding or bleeding between cycles.  Comments:  _______________________________________  _______________________________________  _______________________________________  _______________________________________  _______________________________________  _______________________________________  _______________________________________  _______________________________________  _______________________________________  _______________________________________  For informational purposes only. Not to replace the advice of your health care provider.   Copyright   2006 [facility]. All rights reserved. [employer] 385567 - REV 10/15.      Noninfectious Gastroenteritis (Ages 6 Years to Adult)    Gastroenteritis can cause nausea, vomiting, diarrhea, and abdominal cramping. This may occur as a result of food sensitivity, inflammation of your gastrointestinal tract, medicines, stress, or other causes not related to infection. Your symptoms will usually last from 1 to 3 days, but can last longer. Antibiotics are not effective, but simple home treatment will be helpful.  Home care  Medicine    You may use acetaminophen or NSAID medicines like ibuprofen or naproxen to control fever, unless another medicine is prescribed. (Note: If you have chronic liver or kidney disease, or ever had a stomach ulcer or gastrointestinalI bleeding, talk with your healthcare provider before using these medicines.) Aspirin should never be used in anyone under 18 years of age who is ill with a fever. It may cause severe liver damage. Don't increase your NSAID medicines if you are already taking these medicines for another condition (like arthritis). Don't use NSAIDS if you are on aspirin (such as for heart disease, or after a  stroke).    If medicines for diarrhea or vomiting are prescribed, take only as directed.  General care and preventing spread of the illness    If symptoms are severe, rest at home for the next 24 hours or until you feel better.    Hand washing with soap and water is the best way to prevent the spread of infection. Wash your hands after touching anyone who is sick.    Wash your hands after using the toilet and before meals. Clean the toilet after each use.    Caffeine, tobacco, and alcohol can make your diarrhea, cramping, and pain worse.  Diet    Water and clear liquids are important so you do not get dehydrated. Drink a small amount at a time.    Do not force yourself to eat, especially if you have cramps, vomiting, or diarrhea. When you finally decide to start eating, do not eat large amounts at a time, even if you are hungry.    If you eat, avoid fatty, greasy, spicy, or fried foods.    Do not eat dairy products if you have diarrhea; they can make the diarrhea worse.  During the first 24 hours (the first full day), follow the diet below:    Beverages: Water, clear liquids, soft drinks without caffeine, like ginger ale; mineral water (plain or flavored); decaffeinated tea and coffee.    Soups: Clear broth, consommé, and bouillon Sports drinks aren't a good choice because they have too much sugar and not enough electrolytes. In this case, commercially available products called oral rehydration solutions are best.    Desserts: Plain gelatin, popsicles, and fruit juice bars.  During the next 24 hours (the second day), you may add the following to the above if you have improved. If not, continue what you did the first day:    Hot cereal, plain toast, bread, rolls, crackers    Plain noodles, rice, mashed potatoes, chicken noodle or rice soup    Unsweetened canned fruit (avoid pineapple), bananas    Limit caffeine and chocolate. No spices or seasonings except salt.  During the next 24 hours    Gradually resume a normal  diet, as you feel better and your symptoms improve.    If at any time your symptoms start getting worse, go back to clear liquids until you feel better.  Food preparation    If you have diarrhea, you should not prepare food for others. When you  prepare food for yourself, wash your hands before and after.    Wash your hands after using cutting boards, countertops, and knives that have been in contact with raw food.    Keep uncooked meats away from cooked and ready-to-eat foods.  Follow-up care  Follow up with your healthcare provider if you are not improving over the next 2 to 3 days, or as advised. If a stool (diarrhea) sample was taken, call for the results as directed.  When to seek medical care  Call your healthcare provider right away if any of these occur:     Increasing abdominal pain or constant lower right abdominal pain    Continued vomiting (unable to keep liquids down)    Frequent diarrhea (more than 5 times a day)    Blood in vomit or stool (black or red color)    Inability to tolerate solid food after a few days.    Dark urine, reduced urine output    Weakness, dizziness    Drowsiness    Fever of 100.4 F (38.0 C) or higher, or as directed by your healthcare provider    New rash  Call 911  Call 911 if any of these occur:    Trouble breathing    Chest pain    Confusion    Severe drowsiness or trouble awakening    Seizure    Stiff neck  Date Last Reviewed: 11/16/2015 2000-2017 [employer]. [address] [place] [address]. All rights reserved. This information is not intended as a substitute for professional medical care. Always follow your healthcare professional's instructions.          Future Appointments        Provider Department Dept Phone Center    2/9/2018 8:00 AM Brittany Le MD [facility] [phone] [facility]      24 Hour Appointment Hotline       To make an appointment at any [facility], call [phone] ([phone]). If you don't have a  family doctor or clinic, we will help you find one. [place] clinics are conveniently located to serve the needs of you and your family.             Review of your medicines      START taking        Dose / Directions Last dose taken    ondansetron 4 MG ODT tab   Commonly known as:  ZOFRAN ODT   Dose:  8 mg   Quantity:  10 tablet        Take 2 tablets (8 mg) by mouth every 8 hours as needed for nausea   Refills:  0          Our records show that you are taking the medicines listed below. If these are incorrect, please call your family doctor or clinic.        Dose / Directions Last dose taken    clonazePAM 1 MG tablet   Commonly known as:  klonoPIN   Quantity:  75 tablet        Take 1/2 twice a day for anxiety, 1 and 1/2 at bedtime for sleep   Refills:  2        linaclotide 145 MCG capsule   Commonly known as:  LINZESS   Dose:  145 mcg   Quantity:  30 capsule        Take 1 capsule (145 mcg) by mouth every morning (before breakfast)   Refills:  3        lisdexamfetamine 10 MG capsule   Commonly known as:  VYVANSE   Dose:  10 mg   Quantity:  30 capsule        Take 1 capsule (10 mg) by mouth daily (with lunch)   Refills:  0        MULTIVITAL PO   Dose:  1 chew tab        Take 1 chew tab by mouth daily Gummi   Refills:  0        omeprazole 20 MG CR capsule   Commonly known as:  priLOSEC   Dose:  20 mg   Quantity:  30 capsule        Take 1 capsule (20 mg) by mouth daily   Refills:  1        polyethylene glycol powder   Commonly known as:  MIRALAX/GLYCOLAX   Dose:  17 g        Take 17 g by mouth daily as needed for constipation   Refills:  0        predniSONE 10 MG tablet   Commonly known as:  DELTASONE   Dose:  20 mg   Quantity:  20 tablet        Take 2 tablets (20 mg) by mouth daily   Refills:  0        sennosides 8.6 MG tablet   Commonly known as:  SENOKOT   Dose:  2 tablet        Take 2 tablets by mouth 2 times daily   Refills:  0        UNABLE TO FIND   Dose:  2 teaspoonful        Take 2 teaspoonful by mouth At  Bedtime Magnesium Calm   Refills:  0                Prescriptions were sent or printed at these locations (1 Prescription)                   [id number] [address] [facility]   [address] [facility] [address]    Telephone:  [phone]   Fax:  [phone]   Hours:                  Printed at Department/Unit printer (1 of 1)         ondansetron (ZOFRAN ODT) 4 MG ODT tab                Procedures and tests performed during your visit     Abd/pelvis CT - no contrast - Stone Protocol    Beta strep group A culture    CBC with platelets, differential    Comprehensive metabolic panel    Enteric Bacteria and Virus Panel by ISRA Stool    HCG qualitative pregnancy (blood)    Influenza A/B antigen    Lipase    Rapid Strep Screen    UA reflex to Microscopic    Urine Culture Aerobic Bacterial      Orders Needing Specimen Collection     None      Pending Results     Date and Time Order Name Status Description    1/30/2018 2017 Abd/pelvis CT - no contrast - Stone Protocol Preliminary     1/30/2018 1846 Urine Culture Aerobic Bacterial In process     1/30/2018 1745 Beta strep group A culture In process     1/30/2018 1740 Enteric Bacteria and Virus Panel by ISRA Stool In process             Pending Culture Results     Date and Time Order Name Status Description    1/30/2018 1846 Urine Culture Aerobic Bacterial In process     1/30/2018 1745 Beta strep group A culture In process     1/30/2018 1740 Enteric Bacteria and Virus Panel by ISRA Stool In process             Pending Results Instructions     If you had any lab results that were not finalized at the time of your Discharge, you can call the ED Lab Result RN at [phone]. You will be contacted by this team for any positive Lab results or changes in treatment. The nurses are available 7 days a week from 10A to 6:30P.  You can leave a message 24 hours per day and they will return your call.        Test Results From Your Hospital Stay        1/30/2018   7:06 PM      Component Results     Component Value Ref Range & Units Status    WBC 8.9 4.0 - 11.0 10e9/L Final    RBC Count 5.63 (H) 3.8 - 5.2 10e12/L Final    Hemoglobin 16.9 (H) 11.7 - 15.7 g/dL Final    Hematocrit 48.6 (H) 35.0 - 47.0 % Final    MCV 86 78 - 100 fl Final    MCH 30.0 26.5 - 33.0 pg Final    MCHC 34.8 31.5 - 36.5 g/dL Final    RDW 12.2 10.0 - 15.0 % Final    Platelet Count 25 (LL) 150 - 450 10e9/L Final    This result has been called to FIGUEROA DENISE RN by Luis Armando Pollard on 01 30 2018 at 1902, and has been read back.       Diff Method Automated Method  Final    % Neutrophils 83.3 % Final    % Lymphocytes 11.6 % Final    % Monocytes 4.6 % Final    % Eosinophils 0.2 % Final    % Basophils 0.2 % Final    % Immature Granulocytes 0.1 % Final    Absolute Neutrophil 7.4 1.6 - 8.3 10e9/L Final    Absolute Lymphocytes 1.0 0.8 - 5.3 10e9/L Final    Absolute Monocytes 0.4 0.0 - 1.3 10e9/L Final    Absolute Eosinophils 0.0 0.0 - 0.7 10e9/L Final    Absolute Basophils 0.0 0.0 - 0.2 10e9/L Final    Abs Immature Granulocytes 0.0 0 - 0.4 10e9/L Final         1/30/2018  6:45 PM      Component Results     Component Value Ref Range & Units Status    Sodium 138 133 - 144 mmol/L Final    Potassium 3.4 3.4 - 5.3 mmol/L Final    Chloride 108 94 - 109 mmol/L Final    Carbon Dioxide 24 20 - 32 mmol/L Final    Anion Gap 6 3 - 14 mmol/L Final    Glucose 92 70 - 99 mg/dL Final    Urea Nitrogen 15 7 - 30 mg/dL Final    Creatinine 0.80 0.52 - 1.04 mg/dL Final    GFR Estimate 84 >60 mL/min/1.7m2 Final    Non  GFR Calc    GFR Estimate If Black >90 >60 mL/min/1.7m2 Final    African American GFR Calc    Calcium 8.9 8.5 - 10.1 mg/dL Final    Bilirubin Total 0.5 0.2 - 1.3 mg/dL Final    Albumin 4.2 3.4 - 5.0 g/dL Final    Protein Total 7.8 6.8 - 8.8 g/dL Final    Alkaline Phosphatase 77 40 - 150 U/L Final    ALT 17 0 - 50 U/L Final    AST 15 0 - 45 U/L Final         1/30/2018  6:42 PM      Component Results      Component Value Ref Range & Units Status    Lipase 81 73 - 393 U/L Final         1/30/2018  6:44 PM      Component Results     Component Value Ref Range & Units Status    Color Urine Dark Yellow  Final    Appearance Urine Clear  Final    Glucose Urine Negative NEG^Negative mg/dL Final    Bilirubin Urine Negative NEG^Negative Final    Ketones Urine 5 (A) NEG^Negative mg/dL Final    Specific Gravity Urine 1.019 1.003 - 1.035 Final    Blood Urine Negative NEG^Negative Final    pH Urine 6.0 5.0 - 7.0 pH Final    Protein Albumin Urine 30 (A) NEG^Negative mg/dL Final    Urobilinogen mg/dL Normal 0.0 - 2.0 mg/dL Final    Nitrite Urine Negative NEG^Negative Final    Leukocyte Esterase Urine Trace (A) NEG^Negative Final    Source Unspecified Urine  Final    RBC Urine 39 (H) 0 - 2 /HPF Final    WBC Urine 5 (H) 0 - 2 /HPF Final    Squamous Epithelial /HPF Urine 2 (H) 0 - 1 /HPF Final    Mucous Urine Present (A) NEG^Negative /LPF Final    Calcium Oxalate Few (A) NEG^Negative /HPF Final         1/30/2018  7:58 PM      Component Results     Component Value Ref Range & Units Status    HCG Qualitative Serum Negative NEG^Negative Final    This test is for screening purposes.  Results should be interpreted along with   the clinical picture.  Confirmation testing is available if warranted by   ordering [id number], HCG Quantitative Pregnancy.           1/30/2018  6:17 PM         1/30/2018  6:35 PM      Component Results     Component Value Ref Range & Units Status    Influenza A/B Agn Specimen Nasal  Final    Influenza A Negative NEG^Negative Final    Influenza B Negative NEG^Negative Final    Test results must be correlated with clinical data. If necessary, results   should be confirmed by a molecular assay or viral culture.           1/30/2018  6:27 PM      Component Results     Component    Specimen Description    Throat    Rapid Strep A Screen    NEGATIVE: No Group A streptococcal antigen detected by immunoassay, await culture report.          1/30/2018  6:28 PM         1/30/2018  7:00 PM         1/30/2018  8:40 PM      Narrative     CT ABDOMEN AND PELVIS WITHOUT CONTRAST   1/30/2018 8:31 PM     HISTORY: Left-sided flank pain.    COMPARISON: 9/30/2016.    TECHNIQUE: Without intravenous or oral contrast, helical sections were  acquired from the top of the diaphragm through the pubic symphysis.  Coronal reconstructions were generated. Radiation dose for this scan  was reduced using automated exposure control, adjustment of the mA  and/or kV according to the patient's size, or iterative reconstruction  technique. (Renal stone protocol)    FINDINGS:     Right urinary tract: Two 0.2 cm nonobstructing renal calculi. No  ureteral calculi or evidence of urinary obstruction.    Left urinary tract: Two tiny 0.2 cm nonobstructing calculi in the  interpolar region of the kidney. No ureteral calculi. No dilatation of  the intrarenal collecting system or ureter.    Urinary bladder: No visualized calculi.    Remainder of the abdomen and pelvis: The liver is unremarkable to the  limits of a noncontrast CT scan. A few small calcified granulomas  scattered within the spleen. The pancreas and adrenal glands are  unremarkable. The gallbladder is present. Fluid is present throughout  the small and large bowel lumens. No significant dilatation of the  small or large bowel. The appendix is not visualized. No bowel wall  thickening, pneumatosis or free intraperitoneal gas. The uterus is  present. No enlarged lymph nodes or free fluid in the abdomen or  pelvis.    Scan through the lower chest is unremarkable.        Impression     IMPRESSION:   1. Fluid is present throughout the lumens of the small and large  bowel. This is nonspecific, but could relate to gastroenteritis.  2. No other cause of acute pain identified in the abdomen or pelvis.  3. A few tiny nonobstructing bilateral renal calculi. No visualized  ureteral calculi or evidence of urinary obstruction.                 Thank you for choosing [facility]       Thank you for choosing [facility] for your care. Our goal is always to provide you with excellent care. Hearing back from our patients is one way we can continue to improve our services. Please take a few minutes to complete the written survey that you may receive in the mail after you visit with us. Thank you!        [employer] Information     [employer] gives you secure access to your electronic health record. If you see a primary care provider, you can also send messages to your care team and make appointments. If you have questions, please call your primary care clinic.  If you do not have a primary care provider, please call [phone] and they will assist you.        Care EveryWhere ID     This is your Care EveryWhere ID. This could be used by other organizations to access your [facility] medical records  [id number]        Equal Access to Services     ANA GARCIA : Elva Cervantes, geovanni gutiérrez, leela berman. So [facility] [phone].    ATENCIÓN: Si habla español, tiene a goldsmith disposición servicios gratuitos de asistencia lingüística. Llame al [phone].    We comply with applicable federal civil rights laws and [place] laws. We do not discriminate on the basis of race, color, national origin, age, disability, sex, sexual orientation, or gender identity.            After Visit Summary       This is your record. Keep this with you and show to your community pharmacist(s) and doctor(s) at your next visit.

## 2018-01-30 NOTE — ED AVS SNAPSHOT
[facility]    [address] [facility] [address]    Phone:  [phone]    Fax:  [phone]                                       Merissa Leung   MRN: [id number]    Department:  [facility]   Date of Visit:  1/30/2018           After Visit Summary Signature Page     I have received my discharge instructions, and my questions have been answered. I have discussed any challenges I see with this plan with the nurse or doctor.    ..........................................................................................................................................  Patient/Patient Representative Signature      ..........................................................................................................................................  Patient Representative Print Name and Relationship to Patient    ..................................................               ................................................  Date                                            Time    ..........................................................................................................................................  Reviewed by Signature/Title    ...................................................              ..............................................  Date                                                            Time

## 2018-01-30 NOTE — ED NOTES
Entered PT room and found PT lying on gurney in position of comfort. Placed PT on the monitor. PT states for the past 3 days. States she is having N/V, fevers and was sent her by PMD to be admitted. PT is A&OX4 at this time. Appears to be in no pain and mild discomfort. All needs are being assessed and will be met and all comfort measures are being addressed. MD at bedside and now awaiting orders.

## 2018-01-30 NOTE — TELEPHONE ENCOUNTER
Reason for call:  Patient reporting a symptom    Symptom or request: Diarrhea, vomiting, Fever, chills, coughing, runny nose, weak  Pt is wanting to know if she should keep her appt this evening.    Duration (how long have symptoms been present): upset stomach started 3 days ago.  Other symptoms started last night    Phone Number patient can be reached at:  Home number on file [phone] (home)    Best Time:  Any Time      Can we leave a detailed message on this number:  YES    Call taken on 1/30/2018 at 1:03 PM by Anum Brannon

## 2018-01-30 NOTE — TELEPHONE ENCOUNTER
"Appt with Анна scheduled for this jewels canceled due to pt ill. Will reschedule as soon as feeling better.  Pt requesting refills\" linactotide, vyvanse, clonazepam.  Forwarded to Анна.  ADA Rubi RN      "

## 2018-01-30 NOTE — ED PROVIDER NOTES
History     Chief Complaint   Patient presents with     Diarrhea     flu like illness, body aches     HPI  Merissa Leung is a 32 year old female who has a history of idiopathic thrombocytopenia, fibromyalgia, and IBS who presents to the ED for evaluation of diarrhea. Patient reports that she is being followed by Dr. Le for low platelet counts for which she has been taking 50 mg prednisone daily. Patient has been followed with weekly CBCs since December, and her most recent CBC was 1/26/2018 which showed a platelet count of 21. Patient states that she called the RN line for Dr. Le who instructed her to come into the ED to be admitted for influenza, dehydration, and low platelets.     Today, patient states that she has been ill for 4 days when her son became ill, and her symptoms worsened yesterday with increased symptoms of nausea, vomiting, and diarrhea. She has generalized abdominal pain. Patient states that she has increased weakness and fatigue. She has had 4-6 bouts of diarrhea today. Patient also reports chills. She denies sore throat, cough, or bleeding. She is having cramping abdominal pain which she she rates a 4/10. She has not noticed any blood in her stool.    Problem List:    Patient Active Problem List    Diagnosis Date Noted     Immune thrombocytopenia (H) 11/29/2017     Priority: Medium     Thrombocytopenia (H) 11/09/2017     Priority: Medium     Underweight 11/09/2017     Priority: Medium     Prenatal care, subsequent pregnancy 07/20/2017     Priority: Medium     Chronic abdominal pain 10/06/2016     Priority: Medium     Has had workup through [facility] and [place]      Now followed by [facility] (started on bentyl)       Fibromyalgia 07/17/2015     Priority: Medium     Anxiety 07/06/2015     Priority: Medium     Hyperlipidemia LDL goal <160 07/15/2013     Priority: Medium     Headaches, tension 07/15/2013     Priority: Medium     Chronic constipation 07/15/2013     Priority: Medium     OCD  (obsessive compulsive disorder) 07/15/2013     Priority: Medium     Binge eating  Started therapeutic trial of Luvox 100-300mg extended release at bedtime; category C in pregnancy (as all SSRI); not effective  Will refer to [facility]          Past Medical History:    Past Medical History:   Diagnosis Date     Anxiety      Binge eating disorder      Chickenpox      Food intolerance      IBS (irritable bowel syndrome)      MEDICAL HISTORY OF -        Past Surgical History:    Past Surgical History:   Procedure Laterality Date     C/SECTION, LOW TRANSVERSE        SECTION, TUBAL LIGATION, COMBINED N/A 2/15/2016    NO tubal ligation done     COLONOSCOPY  2013    Procedure: COMBINED COLONOSCOPY, SINGLE BIOPSY/POLYPECTOMY BY BIOPSY;  Colonoscopy;  Surgeon: Maureen Valentin MD;  Location: [facility]     COLONOSCOPY N/A 2015    Procedure: COMBINED COLONOSCOPY, SINGLE OR MULTIPLE BIOPSY/POLYPECTOMY BY BIOPSY;  Surgeon: Maureen Valentin MD;  Location: [facility]     ESOPHAGOSCOPY, GASTROSCOPY, DUODENOSCOPY (EGD), COMBINED N/A 2018    Procedure: COMBINED ESOPHAGOSCOPY, GASTROSCOPY, DUODENOSCOPY (EGD), BIOPSY SINGLE OR MULTIPLE;  Gastroscopy;  Surgeon: Ray Amato MD;  Location: [facility]       Family History:    Family History   Problem Relation Age of Onset     GASTROINTESTINAL DISEASE Mother 46     perforated intestines-      Hepatitis Mother      C     Liver Disease Mother      Blood Disease Mother      anemia     Substance Abuse Mother      A&D     Psychotic Disorder Brother      Schizophrenia, OCD     Schizophrenia Brother      Anxiety Disorder Brother      Arthritis Maternal Grandmother      CANCER Paternal Grandmother      cervical     Breast Cancer Paternal Grandmother      Hypertension Paternal Grandmother      DIABETES Paternal Grandmother      CEREBROVASCULAR DISEASE Paternal Grandfather      HEART DISEASE Paternal Grandfather      Bipolar Disorder Paternal Grandfather       Suicide Paternal Grandfather      CANCER Paternal Aunt      cervical     Coronary Artery Disease Maternal Grandfather      MI     Substance Abuse Father      recovered A&D     HEART DISEASE Father      MI-stents     Hypertension Father        Social History:  Marital Status:   [2]  Social History   Substance Use Topics     Smoking status: Former Smoker     Packs/day: 0.50     Years: 6.00     Types: Cigarettes     Quit date: 5/25/2015     Smokeless tobacco: Never Used      Comment: 1/2 pack daily      Alcohol use No        Medications:      predniSONE (DELTASONE) 10 MG tablet   UNABLE TO FIND   omeprazole (PRILOSEC) 20 MG CR capsule   clonazePAM (KLONOPIN) 1 MG tablet   linaclotide (LINZESS) 145 MCG capsule   lisdexamfetamine dimesylate (VYVANSE) 10 MG capsule   sennosides (SENOKOT) 8.6 MG tablet   polyethylene glycol (MIRALAX/GLYCOLAX) powder   Multiple Vitamins-Minerals (MULTIVITAL PO)         Review of Systems   Constitutional: Positive for activity change, appetite change, chills and fatigue. Negative for fever.   HENT: Negative for congestion and sore throat.    Eyes: Negative for visual disturbance.   Respiratory: Positive for cough and chest tightness. Negative for wheezing.    Cardiovascular: Negative for chest pain.   Gastrointestinal: Positive for abdominal pain, diarrhea, nausea and vomiting. Negative for abdominal distention and blood in stool.   Genitourinary: Negative for decreased urine volume, dysuria and hematuria.   Neurological: Positive for light-headedness. Negative for dizziness, numbness and headaches.        Generalized weakness.   Hematological: Does not bruise/bleed easily.   Psychiatric/Behavioral: Negative for confusion.   All other systems reviewed and are negative.      Physical Exam   BP: 106/74  Pulse: 85  Temp: 98.8  F (37.1  C)  Resp: 18  Weight: 48.5 kg (107 lb)  SpO2: 98 %      Physical Exam   Constitutional: She appears well-developed and well-nourished. She appears  distressed.   Eyes: Conjunctivae are normal.   Psychiatric: She has a normal mood and affect.   Nursing note and vitals reviewed.    HENT: Oral mucosa moist. No lesions. TMs clear bilaterally. Posterior pharynx mild erythema and edema.  Neck: Supple  Pulmonary/Chest: Lungs are clear to auscultation bilaterally.  Cardiovascular: Heart is regular rate and rhythm. No murmur.  Abdomen: Soft, non-distended. Mild tenderness to palpation generally, no guarding, no rebound. Bowels sounds positive.   Musculoskeletal: Moving all extremities well. No peripheral edema.   Neurological: Alert. No focal neurologic deficit.   Skin: No rash.    ED Course     ED Course     Procedures               Critical Care time:  none               Labs Ordered and Resulted from Time of ED Arrival Up to the Time of Departure from the ED   CBC WITH PLATELETS DIFFERENTIAL - Abnormal; Notable for the following:        Result Value    RBC Count 5.63 (*)     Hemoglobin 16.9 (*)     Hematocrit 48.6 (*)     Platelet Count 25 (*)     All other components within normal limits   URINE MACROSCOPIC WITH REFLEX TO MICRO - Abnormal; Notable for the following:     Ketones Urine 5 (*)     Protein Albumin Urine 30 (*)     Leukocyte Esterase Urine Trace (*)     RBC Urine 39 (*)     WBC Urine 5 (*)     Squamous Epithelial /HPF Urine 2 (*)     Mucous Urine Present (*)     Calcium Oxalate Few (*)     All other components within normal limits   COMPREHENSIVE METABOLIC PANEL   LIPASE   HCG QUALITATIVE   INFLUENZA A/B ANTIGEN   RAPID STREP SCREEN     Results for orders placed or performed during the hospital encounter of 01/30/18   Abd/pelvis CT - no contrast - Stone Protocol    Narrative    CT ABDOMEN AND PELVIS WITHOUT CONTRAST   1/30/2018 8:31 PM     HISTORY: Left-sided flank pain.    COMPARISON: 9/30/2016.    TECHNIQUE: Without intravenous or oral contrast, helical sections were  acquired from the top of the diaphragm through the pubic symphysis.  Coronal  reconstructions were generated. Radiation dose for this scan  was reduced using automated exposure control, adjustment of the mA  and/or kV according to the patient's size, or iterative reconstruction  technique. (Renal stone protocol)    FINDINGS:     Right urinary tract: Two 0.2 cm nonobstructing renal calculi. No  ureteral calculi or evidence of urinary obstruction.    Left urinary tract: Two 0.2 cm nonobstructing calculi in the  interpolar region of the kidney. No ureteral calculi. No dilatation of  the intrarenal collecting system or ureter.    Urinary bladder: No visualized calculi.    Remainder of the abdomen and pelvis: The liver is unremarkable to the  limits of a noncontrast CT scan. A few small calcified granulomas  scattered within the spleen. The pancreas and adrenal glands are  unremarkable. The gallbladder is present. Fluid is present throughout  the small and large bowel lumens. No significant dilatation of the  small or large bowel. The appendix is not visualized. No bowel wall  thickening, pneumatosis or free intraperitoneal gas. The uterus is  present. No enlarged lymph nodes or free fluid in the abdomen or  pelvis.    Scan through the lower chest is unremarkable.      Impression    IMPRESSION:   1. Fluid is present throughout the lumens of the small and large  bowel. This is nonspecific, but could relate to gastroenteritis.  2. No other cause of acute pain identified in the abdomen or pelvis.  3. Two tiny nonobstructing calculi in each kidney. No visualized  ureteral calculi or evidence of urinary obstruction.    MANUEL ADHIKARI MD         Medications   0.9% sodium chloride BOLUS (0 mLs Intravenous Stopped 1/30/18 1953)   ondansetron (ZOFRAN) injection 4 mg (4 mg Intravenous Given 1/30/18 1805)   fentaNYL (PF) (SUBLIMAZE) injection 75 mcg (75 mcg Intravenous Given 1/30/18 2013)       5:29 PM Patient assessed.    Assessments & Plan (with Medical Decision Making)records were reviewed. Labs were  obtained. Patient was given a fluid bolus and zofran. A stool sample was requested. White count was not elevated at there was not a L shift . Platelet count was low but stable at 25. CMP was without abnormality. UA with trace leuk 39 wbc and 5 wbc. A urine culture was sent. Due to RBC's and pain a CT stone protocol was sent. This revealed findings consistent with gastroenteritis. She was given fentanyl for pain with improvement of pain. She was informed of the findings. She was able to take po fluids and is feeling better. No evidence of acidosis at this time. No tachycardia present. She feels comfortable going home at this point. I am going to await culture results before starting antibiotics. I have advised the Patient she needs to push fluids and return if any further vomiting, increased diarrhea , blood in stool weakness or other symptoms. She should talk with her hematologist tomorrow about her platelet count . She is in agreement with the plan.      I have reviewed the nursing notes.    I have reviewed the findings, diagnosis, plan and need for follow up with the patient.       Discharge Medication List as of 1/30/2018  9:22 PM      START taking these medications    Details   ondansetron (ZOFRAN ODT) 4 MG ODT tab Take 2 tablets (8 mg) by mouth every 8 hours as needed for nausea, Disp-10 tablet, R-0, Local Print             Final diagnoses:   Gastroenteritis   Immune thrombocytopenia (H)     This document serves as a record of the services and decisions personally performed and made by Ray Rivera MD. It was created on HIS/HER behalf by   Anna Loyd, a trained medical scribe. The creation of this document is based the provider's statements to the medical scribe.  Anna Loyd 5:29 PM 1/30/2018    Provider:   The information in this document, created by the medical scribe for me, accurately reflects the services I personally performed and the decisions made by me. I have reviewed and  approved this document for accuracy prior to leaving the patient care area.  Ray Rivera MD 5:29 PM 1/30/2018 1/30/2018   [facility] EMERGENCY DEPARTMENT     Ray Rivera MD  02/01/18 0939

## 2018-01-31 ENCOUNTER — TELEPHONE (OUTPATIENT)
Dept: FAMILY MEDICINE | Facility: CLINIC | Age: 33
End: 2018-01-31

## 2018-01-31 ENCOUNTER — TELEPHONE (OUTPATIENT)
Dept: EMERGENCY MEDICINE | Facility: CLINIC | Age: 33
End: 2018-01-31

## 2018-01-31 DIAGNOSIS — F50.819 BINGE EATING DISORDER: ICD-10-CM

## 2018-01-31 DIAGNOSIS — K58.1 IRRITABLE BOWEL SYNDROME WITH CONSTIPATION: ICD-10-CM

## 2018-01-31 LAB
BACTERIA SPEC CULT: NO GROWTH
C COLI+JEJUNI+LARI FUSA STL QL NAA+PROBE: NOT DETECTED
EC STX1 GENE STL QL NAA+PROBE: NOT DETECTED
EC STX2 GENE STL QL NAA+PROBE: NOT DETECTED
ENTERIC PATHOGEN COMMENT: ABNORMAL
Lab: NORMAL
NOROV GI+II ORF1-ORF2 JNC STL QL NAA+PR: ABNORMAL
RVA NSP5 STL QL NAA+PROBE: NOT DETECTED
SALMONELLA SP RPOD STL QL NAA+PROBE: NOT DETECTED
SHIGELLA SP+EIEC IPAH STL QL NAA+PROBE: NOT DETECTED
SPECIMEN SOURCE: NORMAL
V CHOL+PARA RFBL+TRKH+TNAA STL QL NAA+PR: NOT DETECTED
Y ENTERO RECN STL QL NAA+PROBE: NOT DETECTED

## 2018-01-31 RX ORDER — LISDEXAMFETAMINE DIMESYLATE 10 MG/1
10 CAPSULE ORAL
Qty: 30 CAPSULE | Refills: 0 | Status: SHIPPED | OUTPATIENT
Start: 2018-01-31 | End: 2018-02-06

## 2018-01-31 RX ORDER — CLONAZEPAM 1 MG/1
TABLET ORAL
Qty: 75 TABLET | Refills: 0 | Status: SHIPPED | OUTPATIENT
Start: 2018-01-31 | End: 2018-02-06

## 2018-01-31 RX ORDER — LISDEXAMFETAMINE DIMESYLATE 10 MG/1
10 CAPSULE ORAL
Qty: 30 CAPSULE | Refills: 0 | Status: CANCELLED | OUTPATIENT
Start: 2018-01-31

## 2018-01-31 NOTE — TELEPHONE ENCOUNTER
Анна, please see notes below and advise note for employer and refills as they are not on RN protocol.    JAYA Centeno

## 2018-01-31 NOTE — TELEPHONE ENCOUNTER
[facility] Emergency Department Lab result notification:    [place] ED lab result protocol used  Enteric Bacteria and Virus Panel    Reason for call  Notify of lab results, assess symptoms,  review ED providers recommendations/discharge instructions (if necessary) and advise per ED lab result f/u protocol    Lab Result  Final Enteric Bacteria and Virus Panel by IS[facility] Stool is POSITIVE for Norovirus l and ll  Patient to be notified, symptom's assessed and advised per [place] ED lab result f/u protocol.    Information table from ED Provider visit on 1/30/18  Symptoms reported at ED visit (Chief complaint, HPI) Merissa Leung is a 32 year old female who has a history of idiopathic thrombocytopenia, fibromyalgia, and IBS who presents to the ED for evaluation of diarrhea. Patient reports that she is being followed by Dr. Le for low platelet counts for which she has been taking 50 mg prednisone daily. Patient has been followed with weekly CBCs since December, and her most recent CBC was 1/26/2018 which showed a platelet count of 21. Patient states that she called the RN line for Dr. Le who instructed her to come into the ED to be admitted for influenza, dehydration, and low platelets.      Today, patient states that she has been ill for 4 days when her son became ill, and her symptoms worsened yesterday with increased symptoms of nausea, vomiting, and diarrhea. She has generalized abdominal pain. Patient states that she has increased weakness and fatigue. She has had 4-6 bouts of diarrhea today. Patient also reports chills. She denies sore throat, cough, or bleeding.    ED providers Impression and Plan (applicable information) Return if symptoms worsen or new symptoms develop.  Follow-up with primary care physician next available.  Drink plenty of fluids.  Take Zofran as needed.  Follow-up with your hematologist tomorrow.  If increasing pain fevers chills or other symptoms present please return  "for recheck.   Miscellaneous information N/A     RN Assessment (Patient s current Symptoms), include time called.  [Insert Left message here if message left]  \"Not feeling so well\".  Not feeling worse,  Family has nausea/vomiting.        RN Recommendations/Instructions per [place] ED lab result protocol  Did review general information about and including infection control related to Norovirus.  Verbalizes understanding, no questions.    Please Contact your PCP clinic or return to the Emergency department if your:    Symptoms return.    Symptoms worsen or other concerning symptom's.    PCP follow-up Questions asked: YES       Flor aWlsh RN    [facility] RN  Lung Nodule and ED Lab Results F/U RN  Epic pool (ED late result f/u RN) : [phone] [id number]  Ph # [phone]    Copy of Lab result   Order   Enteric Bacteria and Virus Panel by [facility] Stool [[id number]] (Order [id number])   Exam Information   Exam Date Exam Time Accession # Results    1/30/18  5:50 PM [id number]    Component Results   Component Value Flag Ref Range Units Status Collected Lab   Campylobacter group by ISRA Not Detected  NDET^Not Detected  Final 01/30/2018  5:50 PM 75   Salmonella species by ISRA Not Detected  NDET^Not Detected  Final 01/30/2018  5:50 PM 75   Shigella species by ISRA Not Detected  NDET^Not Detected  Final 01/30/2018  5:50 PM 75   Vibrio group by ISRA Not Detected  NDET^Not Detected  Final 01/30/2018  5:50 PM 75   Rotavirus A by ISRA Not Detected  NDET^Not Detected  Final 01/30/2018  5:50 PM 75   Shiga toxin 1 gene by ISRA Not Detected  NDET^Not Detected  Final 01/30/2018  5:50 PM 75   Shiga toxin 2 gene by ISRA Not Detected  NDET^Not Detected  Final 01/30/2018  5:50 PM 75   Norovirus I and II by ISRA  (A) NDET^Not Detected  Final 01/30/2018  5:50 PM 75   Detected, Abnormal Result   Yersinia enterocolitica by ISRA Not Detected  NDET^Not Detected  Final 01/30/2018  5:50 PM 75         "

## 2018-01-31 NOTE — TELEPHONE ENCOUNTER
Reason for Call:  Other     Detailed comments: Patient was seen in our ED and has kidney stones and stomach flu - See notes - Patient was out of work yesterday and will be out the rest of this week - she needs a note for her employer.  Leonard Rodriguez - [phone] - Patient was to see Zaire yesterday but zaire sent her to the ED - She needs the above refills.      Phone Number Patient can be reached at: Home number on file [phone] (home)    Best Time:     Can we leave a detailed message on this number? YES    Call taken on 1/31/2018 at 9:51 AM by Jahaira Payne

## 2018-01-31 NOTE — DISCHARGE INSTRUCTIONS
Return if symptoms worsen or new symptoms develop.  Follow-up with primary care physician next available.  Drink plenty of fluids.  Take Zofran as needed.  Follow-up with your hematologist tomorrow.  If increasing pain fevers chills or other symptoms present please return for recheck.  Coping with Thrombocytopenia   What is thrombocytopenia?  This is a term for a low platelet count. Platelets are blood cells that help your blood to clot. When your platelets are low, you may bruise or bleed more easily.   Signs of low platelets include:     A lot of bruises    Bleeding gums    Nosebleeds    Tiny purple spots on the feet or legs.  How is it treated?  If your platelets are too low or you have active bleeding, you may need a platelet transfusion.   In this case, we would give you donated platelets through an IV line (a small tube in your vein).   This usually takes one to two hours.   You will need blood tests to see how well your treatment is working.  What can I do to prevent bleeding?  Until your platelets are back to normal, you need to reduce your risk for bleeding or bruising.    Do not have surgery or dental work.    Do not take medicine unless your care team says it's okay. This includes:    Aspirin or products that contain aspirin    Aspirin-free pain relievers (such as Advil).  Be sure to read the labels on any store-bought medicines.    Do not drink alcohol unless your care team says it's okay.    Avoid foods that can make your mouth bleed, including popcorn, chips and raw vegetables.    To avoid injuries:    Make your home as safe as possible.    Take care when using knives and other tools.    Be careful not to burn yourself when ironing   or cooking.    Wear heavy gloves when working in the garden or near [place] plants.    Wear shoes when you walk.    Wear loose-fitting clothes to prevent bruising.    Do low-impact exercise such as walking or swimming. Avoid contact sports.    Keep your lips moist with lip  balm. Keep your skin soft with cream or lotion.    Blow your nose gently. Never use your fingers to clean your nose.    Use an electric razor.    Use a soft toothbrush. Do not floss.    Use a water-based gel during sex (intercourse), such as K-Y Jelly or Astroglide. Do not have sex if your platelets are below 50,000.    Do not use enemas, suppositories, douches   or tampons.    Try not to strain when using the toilet. Ask   your care team if you need a stool softener   (such as Colace).  When should I call my care team?  Call your care team if:    You bleed from a cut or wound and it doesn't stop within 30 minutes of applying pressure.    You notice blood in your urine or stool after using the toilet.    You see blood or dark brown spots in your vomit.    You have nosebleeds, bleeding gums or headaches that you cannot explain.    You see tiny, pinpoint-sized red or purple spots on your skin.    You have heavy bleeding from the vagina (you are soaking one pad an hour) or any change in your periods. This includes heavier bleeding or bleeding between cycles.  Comments:  _______________________________________  _______________________________________  _______________________________________  _______________________________________  _______________________________________  _______________________________________  _______________________________________  _______________________________________  _______________________________________  _______________________________________  For informational purposes only. Not to replace the advice of your health care provider.   Copyright   2006 [facility]. All rights reserved. [employer] 142651 - REV 10/15.      Noninfectious Gastroenteritis (Ages 6 Years to Adult)    Gastroenteritis can cause nausea, vomiting, diarrhea, and abdominal cramping. This may occur as a result of food sensitivity, inflammation of your gastrointestinal tract, medicines, stress, or other causes  not related to infection. Your symptoms will usually last from 1 to 3 days, but can last longer. Antibiotics are not effective, but simple home treatment will be helpful.  Home care  Medicine    You may use acetaminophen or NSAID medicines like ibuprofen or naproxen to control fever, unless another medicine is prescribed. (Note: If you have chronic liver or kidney disease, or ever had a stomach ulcer or gastrointestinalI bleeding, talk with your healthcare provider before using these medicines.) Aspirin should never be used in anyone under 18 years of age who is ill with a fever. It may cause severe liver damage. Don't increase your NSAID medicines if you are already taking these medicines for another condition (like arthritis). Don't use NSAIDS if you are on aspirin (such as for heart disease, or after a stroke).    If medicines for diarrhea or vomiting are prescribed, take only as directed.  General care and preventing spread of the illness    If symptoms are severe, rest at home for the next 24 hours or until you feel better.    Hand washing with soap and water is the best way to prevent the spread of infection. Wash your hands after touching anyone who is sick.    Wash your hands after using the toilet and before meals. Clean the toilet after each use.    Caffeine, tobacco, and alcohol can make your diarrhea, cramping, and pain worse.  Diet    Water and clear liquids are important so you do not get dehydrated. Drink a small amount at a time.    Do not force yourself to eat, especially if you have cramps, vomiting, or diarrhea. When you finally decide to start eating, do not eat large amounts at a time, even if you are hungry.    If you eat, avoid fatty, greasy, spicy, or fried foods.    Do not eat dairy products if you have diarrhea; they can make the diarrhea worse.  During the first 24 hours (the first full day), follow the diet below:    Beverages: Water, clear liquids, soft drinks without caffeine,  like ginger ale; mineral water (plain or flavored); decaffeinated tea and coffee.    Soups: Clear broth, consommé, and bouillon Sports drinks aren't a good choice because they have too much sugar and not enough electrolytes. In this case, commercially available products called oral rehydration solutions are best.    Desserts: Plain gelatin, popsicles, and fruit juice bars.  During the next 24 hours (the second day), you may add the following to the above if you have improved. If not, continue what you did the first day:    Hot cereal, plain toast, bread, rolls, crackers    Plain noodles, rice, mashed potatoes, chicken noodle or rice soup    Unsweetened canned fruit (avoid pineapple), bananas    Limit caffeine and chocolate. No spices or seasonings except salt.  During the next 24 hours    Gradually resume a normal diet, as you feel better and your symptoms improve.    If at any time your symptoms start getting worse, go back to clear liquids until you feel better.  Food preparation    If you have diarrhea, you should not prepare food for others. When you  prepare food for yourself, wash your hands before and after.    Wash your hands after using cutting boards, countertops, and knives that have been in contact with raw food.    Keep uncooked meats away from cooked and ready-to-eat foods.  Follow-up care  Follow up with your healthcare provider if you are not improving over the next 2 to 3 days, or as advised. If a stool (diarrhea) sample was taken, call for the results as directed.  When to seek medical care  Call your healthcare provider right away if any of these occur:     Increasing abdominal pain or constant lower right abdominal pain    Continued vomiting (unable to keep liquids down)    Frequent diarrhea (more than 5 times a day)    Blood in vomit or stool (black or red color)    Inability to tolerate solid food after a few days.    Dark urine, reduced urine output    Weakness,  dizziness    Drowsiness    Fever of 100.4 F (38.0 C) or higher, or as directed by your healthcare provider    New rash  Call 911  Call 911 if any of these occur:    Trouble breathing    Chest pain    Confusion    Severe drowsiness or trouble awakening    Seizure    Stiff neck  Date Last Reviewed: 11/16/2015 2000-2017 [employer]. [address] [place], [place] [address]. All rights reserved. This information is not intended as a substitute for professional medical care. Always follow your healthcare professional's instructions.

## 2018-01-31 NOTE — TELEPHONE ENCOUNTER
Patient had PLT level drawn while in ED yesterday.  PLT 25, per Dr. Le no recheck needed today.  Recheck as planned next week.

## 2018-01-31 NOTE — ED NOTES
DATE:  1/30/2018   TIME OF RECEIPT FROM LAB:  1903  LAB TEST:  Platelet  LAB VALUE:  25  RESULTS GIVEN WITH READ-BACK TO (PROVIDER):    TIME LAB VALUE REPORTED TO PROVIDER:   1907

## 2018-01-31 NOTE — LETTER
January 31, 2018      Merissa Leung  [address] [facility] [address]        To Whom It May Concern:    Merissa Leung  was seen in the emergency room yesterday January 30, 2018.  Please excuse her until Monday February 5, 2018 due to illness.        Sincerely,        Mohini Mccord NP

## 2018-02-01 ENCOUNTER — TELEPHONE (OUTPATIENT)
Dept: FAMILY MEDICINE | Facility: CLINIC | Age: 33
End: 2018-02-01

## 2018-02-01 DIAGNOSIS — F50.819 BINGE EATING DISORDER: ICD-10-CM

## 2018-02-01 LAB
BACTERIA SPEC CULT: NORMAL
SPECIMEN SOURCE: NORMAL

## 2018-02-01 RX ORDER — LISDEXAMFETAMINE DIMESYLATE 30 MG/1
30 CAPSULE ORAL DAILY
Qty: 30 CAPSULE | Refills: 0 | Status: SHIPPED | OUTPATIENT
Start: 2018-02-01 | End: 2018-02-06

## 2018-02-01 ASSESSMENT — ENCOUNTER SYMPTOMS
HEADACHES: 0
NUMBNESS: 0
FATIGUE: 1
APPETITE CHANGE: 1
BRUISES/BLEEDS EASILY: 0
DIZZINESS: 0
ACTIVITY CHANGE: 1
WHEEZING: 0
DYSURIA: 0
CONFUSION: 0
ABDOMINAL DISTENTION: 0
LIGHT-HEADEDNESS: 1
CHEST TIGHTNESS: 1
FEVER: 0

## 2018-02-01 NOTE — TELEPHONE ENCOUNTER
Patient uses two different doses of the Vyvanse. She uses a 10 mg and a 30 mg tablet. She needs a script for the 30 mg.     Marcy [facility]

## 2018-02-06 ENCOUNTER — OFFICE VISIT (OUTPATIENT)
Dept: FAMILY MEDICINE | Facility: CLINIC | Age: 33
End: 2018-02-06
Payer: COMMERCIAL

## 2018-02-06 ENCOUNTER — NURSE TRIAGE (OUTPATIENT)
Dept: NURSING | Facility: CLINIC | Age: 33
End: 2018-02-06

## 2018-02-06 VITALS
SYSTOLIC BLOOD PRESSURE: 100 MMHG | BODY MASS INDEX: 17.17 KG/M2 | TEMPERATURE: 98.2 F | HEART RATE: 74 BPM | DIASTOLIC BLOOD PRESSURE: 62 MMHG | RESPIRATION RATE: 18 BRPM | WEIGHT: 104.8 LBS

## 2018-02-06 DIAGNOSIS — D69.6 THROMBOCYTOPENIA (H): ICD-10-CM

## 2018-02-06 DIAGNOSIS — R07.0 THROAT PAIN: Primary | ICD-10-CM

## 2018-02-06 DIAGNOSIS — K58.1 IRRITABLE BOWEL SYNDROME WITH CONSTIPATION: ICD-10-CM

## 2018-02-06 DIAGNOSIS — D69.3 IMMUNE THROMBOCYTOPENIA (H): ICD-10-CM

## 2018-02-06 DIAGNOSIS — F41.1 GAD (GENERALIZED ANXIETY DISORDER): ICD-10-CM

## 2018-02-06 DIAGNOSIS — F50.819 BINGE EATING DISORDER: ICD-10-CM

## 2018-02-06 LAB
DEPRECATED S PYO AG THROAT QL EIA: NORMAL
SPECIMEN SOURCE: NORMAL

## 2018-02-06 PROCEDURE — 36415 COLL VENOUS BLD VENIPUNCTURE: CPT | Performed by: INTERNAL MEDICINE

## 2018-02-06 PROCEDURE — 99214 OFFICE O/P EST MOD 30 MIN: CPT | Performed by: NURSE PRACTITIONER

## 2018-02-06 PROCEDURE — 87880 STREP A ASSAY W/OPTIC: CPT | Performed by: NURSE PRACTITIONER

## 2018-02-06 PROCEDURE — 87081 CULTURE SCREEN ONLY: CPT | Performed by: NURSE PRACTITIONER

## 2018-02-06 PROCEDURE — 85025 COMPLETE CBC W/AUTO DIFF WBC: CPT | Performed by: INTERNAL MEDICINE

## 2018-02-06 RX ORDER — LISDEXAMFETAMINE DIMESYLATE 10 MG/1
10 CAPSULE ORAL
Qty: 30 CAPSULE | Refills: 0 | Status: CANCELLED | OUTPATIENT
Start: 2018-02-06

## 2018-02-06 RX ORDER — LISDEXAMFETAMINE DIMESYLATE 40 MG/1
40 CAPSULE ORAL DAILY
Qty: 30 CAPSULE | Refills: 0 | Status: SHIPPED | OUTPATIENT
Start: 2018-03-30 | End: 2019-02-21

## 2018-02-06 RX ORDER — LISDEXAMFETAMINE DIMESYLATE 30 MG/1
30 CAPSULE ORAL DAILY
Qty: 30 CAPSULE | Refills: 0 | Status: CANCELLED | OUTPATIENT
Start: 2018-02-06

## 2018-02-06 RX ORDER — LISDEXAMFETAMINE DIMESYLATE 40 MG/1
40 CAPSULE ORAL DAILY
Qty: 30 CAPSULE | Refills: 0 | Status: SHIPPED | OUTPATIENT
Start: 2018-04-30 | End: 2019-02-21

## 2018-02-06 RX ORDER — ONDANSETRON 4 MG/1
8 TABLET, ORALLY DISINTEGRATING ORAL EVERY 8 HOURS PRN
Qty: 10 TABLET | Refills: 0 | Status: CANCELLED | OUTPATIENT
Start: 2018-02-06

## 2018-02-06 RX ORDER — LISDEXAMFETAMINE DIMESYLATE 40 MG/1
40 CAPSULE ORAL DAILY
Qty: 30 CAPSULE | Refills: 0 | Status: SHIPPED | OUTPATIENT
Start: 2018-03-01 | End: 2019-02-21

## 2018-02-06 RX ORDER — CLONAZEPAM 1 MG/1
TABLET ORAL
Qty: 75 TABLET | Refills: 2 | Status: SHIPPED | OUTPATIENT
Start: 2018-02-06 | End: 2018-06-04

## 2018-02-06 ASSESSMENT — ANXIETY QUESTIONNAIRES
5. BEING SO RESTLESS THAT IT IS HARD TO SIT STILL: NOT AT ALL
6. BECOMING EASILY ANNOYED OR IRRITABLE: NOT AT ALL
1. FEELING NERVOUS, ANXIOUS, OR ON EDGE: SEVERAL DAYS
3. WORRYING TOO MUCH ABOUT DIFFERENT THINGS: NOT AT ALL
2. NOT BEING ABLE TO STOP OR CONTROL WORRYING: NOT AT ALL
7. FEELING AFRAID AS IF SOMETHING AWFUL MIGHT HAPPEN: NOT AT ALL
GAD7 TOTAL SCORE: 2

## 2018-02-06 ASSESSMENT — PATIENT HEALTH QUESTIONNAIRE - PHQ9: 5. POOR APPETITE OR OVEREATING: SEVERAL DAYS

## 2018-02-06 ASSESSMENT — PAIN SCALES - GENERAL: PAINLEVEL: MODERATE PAIN (4)

## 2018-02-06 NOTE — TELEPHONE ENCOUNTER
Cynthia from [facility] called with a preliminary platelet count result of 22, last was 25. Sending lab work to [place] for verification. Cynthia can be reached at [phone]. Paged Dr. Le at 5:42 pm. Dr. Le called back at 5:50pm and message was relayed.    Apurva Pappas RN/[facility]

## 2018-02-06 NOTE — PROGRESS NOTES
"  SUBJECTIVE:   Merissa Leung is a 32 year old female who presents to clinic today for the following health issues:      Irritable Bowel with stomach pain:    Amount of exercise or physical activity: 2-3 days/week for an average of less than 15 minutes    Problems taking medications regularly: No - need refills- \"Linzess is really helping\"    Medication side effects: none    Diet: regular (no restrictions)      THROAT SYMPTOMS      Duration: 2 days- had a Negative RST at [facility] week ago    Description  rhinorrhea, sore throat, facial pain/pressure, cough, chills, ear pain both, headache, fatigue/malaise and hoarse voice    Severity: moderate    Accompanying signs and symptoms: No stomach pain or diarrhea at this time    History (predisposing factors):  tobacco abuse and exposure to smoke    Precipitating or alleviating factors: Irritable bowels    At times    Therapies tried and outcome:  rest and fluids    Needs medications refills  PHQ-9 SCORE 2016   Total Score - - -   Total Score 4 4 4     EDWRAD-7 SCORE 2016   Total Score - - -   Total Score 0 1 2       Seeing hematology for thrombocytopenia         Problem list and histories reviewed & adjusted, as indicated.  Additional history: as documented    Patient Active Problem List   Diagnosis     Hyperlipidemia LDL goal <160     Headaches, tension     Chronic constipation     OCD (obsessive compulsive disorder)     Anxiety     Fibromyalgia     Chronic abdominal pain     Prenatal care, subsequent pregnancy     Immune thrombocytopenia (H)     Thrombocytopenia (H)     Underweight     Past Surgical History:   Procedure Laterality Date     C/SECTION, LOW TRANSVERSE        SECTION, TUBAL LIGATION, COMBINED N/A 2/15/2016    NO tubal ligation done     COLONOSCOPY  2013    Procedure: COMBINED COLONOSCOPY, SINGLE BIOPSY/POLYPECTOMY BY BIOPSY;  Colonoscopy;  Surgeon: Maureen Valentin MD;  Location: [facility]" COLONOSCOPY N/A 2015    Procedure: COMBINED COLONOSCOPY, SINGLE OR MULTIPLE BIOPSY/POLYPECTOMY BY BIOPSY;  Surgeon: Maureen Valentin MD;  Location: [facility]     ESOPHAGOSCOPY, GASTROSCOPY, DUODENOSCOPY (EGD), COMBINED N/A 2018    Procedure: COMBINED ESOPHAGOSCOPY, GASTROSCOPY, DUODENOSCOPY (EGD), BIOPSY SINGLE OR MULTIPLE;  Gastroscopy;  Surgeon: Ray Amato MD;  Location: [facility]       Social History   Substance Use Topics     Smoking status: Current Every Day Smoker     Packs/day: 0.50     Years: 6.00     Types: Cigarettes     Last attempt to quit: 2015     Smokeless tobacco: Never Used      Comment: 1/2 pack daily      Alcohol use No     Family History   Problem Relation Age of Onset     GASTROINTESTINAL DISEASE Mother 46     perforated intestines-      Hepatitis Mother      C     Liver Disease Mother      Blood Disease Mother      anemia     Substance Abuse Mother      A&D     Psychotic Disorder Brother      Schizophrenia, OCD     Schizophrenia Brother      Anxiety Disorder Brother      Arthritis Maternal Grandmother      CANCER Paternal Grandmother      cervical     Breast Cancer Paternal Grandmother      Hypertension Paternal Grandmother      DIABETES Paternal Grandmother      CEREBROVASCULAR DISEASE Paternal Grandfather      HEART DISEASE Paternal Grandfather      Bipolar Disorder Paternal Grandfather      Suicide Paternal Grandfather      CANCER Paternal Aunt      cervical     Coronary Artery Disease Maternal Grandfather      MI     Substance Abuse Father      recovered A&D     HEART DISEASE Father      MI-stents     Hypertension Father            Reviewed and updated as needed this visit by clinical staff  Tobacco  Allergies  Meds       Reviewed and updated as needed this visit by Provider         ROS:  Constitutional, HEENT, cardiovascular, pulmonary, gi and gu systems are negative, except as otherwise noted.    OBJECTIVE:                                                    BP  100/62 (BP Location: Left arm, Patient Position: Sitting, Cuff Size: Adult Small)  Pulse 74  Temp 98.2  F (36.8  C) (Tympanic)  Resp 18  Wt 104 lb 12.8 oz (47.5 kg)  BMI 17.17 kg/m2  Body mass index is 17.17 kg/(m^2).  GENERAL APPEARANCE: healthy, alert and no distress  HENT: ear canals and TM's normal and nose and mouth without ulcers or lesions  RESP: lungs clear to auscultation - no rales, rhonchi or wheezes  CV: regular rates and rhythm, normal S1 S2, no S3 or S4 and no murmur, click or rub  ABDOMEN: soft, nontender, without hepatosplenomegaly or masses and bowel sounds normal  PSYCH: mood calm     Diagnostic test results:  Diagnostic Test Results:  Strep negative      ASSESSMENT/PLAN:                                                    1. EDWARD (generalized anxiety disorder)  Stable   Refilled   - clonazePAM (KLONOPIN) 1 MG tablet; Take 1/2 twice a day for anxiety, 1 and 1/2 at bedtime for sleep  Dispense: 75 tablet; Refill: 2    2. Binge eating disorder  Stable   Refilled   Will do 40 mg capsule rather than 30 mg and 10 mg   - lisdexamfetamine (VYVANSE) 40 MG capsule; Take 1 capsule (40 mg) by mouth daily  Dispense: 30 capsule; Refill: 0  - lisdexamfetamine (VYVANSE) 40 MG capsule; Take 1 capsule (40 mg) by mouth daily  Dispense: 30 capsule; Refill: 0  - lisdexamfetamine (VYVANSE) 40 MG capsule; Take 1 capsule (40 mg) by mouth daily  Dispense: 30 capsule; Refill: 0    3. Irritable bowel syndrome with constipation  stable  - linaclotide (LINZESS) 145 MCG capsule; Take 1 capsule (145 mcg) by mouth every morning (before breakfast)  Dispense: 30 capsule; Refill: 5    4. Throat pain  Negative strep   Suspect viral URI  - Strep, Rapid Screen  - Beta strep group A culture    5. Thrombocytopenia (H)  In process of working up with hematology      Patient Instructions   Refilled medication for 3 months     Rapid strep was negative   Suspect viral sore throat   Try saline nasal, netti pot, flonase nasal spray        Follow up with hematology as planned this week       Mohini Mccord NP  [facility]

## 2018-02-06 NOTE — LETTER
February 7, 2018      Merissa Leung  [address] [facility] [address]        Dear Merissa,       The results of your recent throat culture were negative.  If you have any questions or concerns please call your care team at the number listed at the top of this letter.            Sincerely,        Mohini Mccord NP

## 2018-02-06 NOTE — MR AVS SNAPSHOT
After Visit Summary   2/6/2018    Merissa Leung    MRN: [id number]           Patient Information     Date Of Birth          1985        Visit Information        Provider Department      2/6/2018 5:20 PM Mohini Mccord NP [facility]        Today's Diagnoses     Throat pain    -  1    EDWARD (generalized anxiety disorder)        Binge eating disorder        Irritable bowel syndrome with constipation          Care Instructions    Refilled medication for 3 months     Rapid strep was negative   Suspect viral sore throat   Try saline nasal, netti pot, flonase nasal spray       Follow up with hematology as planned this week           Follow-ups after your visit        Your next 10 appointments already scheduled     Feb 09, 2018  8:00 AM CST   Return Visit with Brittany Le MD   [facility]    [facility]  [address]  [facility] [address] [phone]              Who to contact     If you have questions or need follow up information about today's clinic visit or your schedule please contact [facility] directly at [phone].  Normal or non-critical lab and imaging results will be communicated to you by MyChart, letter or phone within 4 business days after the clinic has received the results. If you do not hear from us within 7 days, please contact the clinic through MyChart or phone. If you have a critical or abnormal lab result, we will notify you by phone as soon as possible.  Submit refill requests through [employer] or call your pharmacy and they will forward the refill request to us. Please allow 3 business days for your refill to be completed.          Additional Information About Your Visit        MyChart Information     [employer] gives you secure access to your electronic health record. If you see a primary care provider, you can also send messages to your care team and make  appointments. If you have questions, please call your primary care clinic.  If you do not have a primary care provider, please call [phone] and they will assist you.        Care EveryWhere ID     This is your Care EveryWhere ID. This could be used by other organizations to access your [place] medical records  [id number]        Your Vitals Were     Pulse Temperature Respirations BMI (Body Mass Index)          74 98.2  F (36.8  C) (Tympanic) 18 17.17 kg/m2         Blood Pressure from Last 3 Encounters:   02/06/18 100/62   01/30/18 98/50   01/24/18 130/80    Weight from Last 3 Encounters:   02/06/18 104 lb 12.8 oz (47.5 kg)   01/30/18 107 lb (48.5 kg)   01/24/18 111 lb 1.6 oz (50.4 kg)              We Performed the Following     Beta strep group A culture     Strep, Rapid Screen          Today's Medication Changes          These changes are accurate as of 2/6/18  6:10 PM.  If you have any questions, ask your nurse or doctor.               These medicines have changed or have updated prescriptions.        Dose/Directions    * lisdexamfetamine 40 MG capsule   Commonly known as:  VYVANSE   This may have changed:    - medication strength  - how much to take  - when to take this   Used for:  Binge eating disorder   Changed by:  Mohini Mccord NP        Dose:  40 mg   Start taking on:  3/1/2018   Take 1 capsule (40 mg) by mouth daily   Quantity:  30 capsule   Refills:  0       * lisdexamfetamine 40 MG capsule   Commonly known as:  VYVANSE   This may have changed:    - medication strength  - how much to take   Used for:  Binge eating disorder   Changed by:  Mohini Mccord NP        Dose:  40 mg   Start taking on:  3/30/2018   Take 1 capsule (40 mg) by mouth daily   Quantity:  30 capsule   Refills:  0       * lisdexamfetamine 40 MG capsule   Commonly known as:  VYVANSE   This may have changed:  You were already taking a medication with the same name, and this prescription was added. Make sure you understand how  and when to take each.   Used for:  Binge eating disorder   Changed by:  Mohini Mccord NP        Dose:  40 mg   Start taking on:  4/30/2018   Take 1 capsule (40 mg) by mouth daily   Quantity:  30 capsule   Refills:  0       * Notice:  This list has 3 medication(s) that are the same as other medications prescribed for you. Read the directions carefully, and ask your doctor or other care provider to review them with you.         Where to get your medicines      These medications were sent to [facility] [address] [facility]  [address] [facility] [address]     Phone:  [phone]     linaclotide 145 MCG capsule         Some of these will need a paper prescription and others can be bought over the counter.  Ask your nurse if you have questions.     Bring a paper prescription for each of these medications     clonazePAM 1 MG tablet    lisdexamfetamine 40 MG capsule    lisdexamfetamine 40 MG capsule    lisdexamfetamine 40 MG capsule                Primary Care Provider Office Phone # Fax #    Mohini Mccord [phone]       [address] [facility] [address]        Equal Access to Services     ANA GARCIA AH: Hadii ivanna ku hadasho Soomaali, waaxda luqadaha, qaybta kaalmada adeedyyaney, leela smith. So [facility] [phone].    ATENCIÓN: Si habla español, tiene a goldsmith disposición servicios gratuitos de asistencia lingüística. [facility] [phone].    We comply with applicable federal civil rights laws and [place] laws. We do not discriminate on the basis of race, color, national origin, age, disability, sex, sexual orientation, or gender identity.            Thank you!     Thank you for choosing [facility]  for your care. Our goal is always to provide you with excellent care. Hearing back from our patients is one way we can continue to improve our services. Please take a few minutes to complete the written survey that you may  receive in the mail after your visit with us. Thank you!             Your Updated Medication List - Protect others around you: Learn how to safely use, store and throw away your medicines at www.disposemymeds.org.          This list is accurate as of 2/6/18  6:10 PM.  Always use your most recent med list.                   Brand Name Dispense Instructions for use Diagnosis    clonazePAM 1 MG tablet    klonoPIN    75 tablet    Take 1/2 twice a day for anxiety, 1 and 1/2 at bedtime for sleep    EDWARD (generalized anxiety disorder)       linaclotide 145 MCG capsule    LINZESS    30 capsule    Take 1 capsule (145 mcg) by mouth every morning (before breakfast)    Irritable bowel syndrome with constipation       * lisdexamfetamine 40 MG capsule   Start taking on:  3/1/2018    VYVANSE    30 capsule    Take 1 capsule (40 mg) by mouth daily    Binge eating disorder       * lisdexamfetamine 40 MG capsule   Start taking on:  3/30/2018    VYVANSE    30 capsule    Take 1 capsule (40 mg) by mouth daily    Binge eating disorder       * lisdexamfetamine 40 MG capsule   Start taking on:  4/30/2018    VYVANSE    30 capsule    Take 1 capsule (40 mg) by mouth daily    Binge eating disorder       MULTIVITAL PO      Take 1 chew tab by mouth daily Gummi        omeprazole 20 MG CR capsule    priLOSEC    30 capsule    Take 1 capsule (20 mg) by mouth daily    Thrombocytopenia (H)       ondansetron 4 MG ODT tab    ZOFRAN ODT    10 tablet    Take 2 tablets (8 mg) by mouth every 8 hours as needed for nausea        polyethylene glycol powder    MIRALAX/GLYCOLAX     Take 17 g by mouth daily as needed for constipation        predniSONE 10 MG tablet    DELTASONE    20 tablet    Take 2 tablets (20 mg) by mouth daily    Thrombocytopenia (H), Immune thrombocytopenia (H)       sennosides 8.6 MG tablet    SENOKOT     Take 2 tablets by mouth 2 times daily        UNABLE TO FIND      Take 2 teaspoonful by mouth At Bedtime Magnesium Calm        * Notice:   This list has 3 medication(s) that are the same as other medications prescribed for you. Read the directions carefully, and ask your doctor or other care provider to review them with you.

## 2018-02-07 LAB
BACTERIA SPEC CULT: NORMAL
BASOPHILS # BLD AUTO: 0 10E9/L (ref 0–0.2)
BASOPHILS NFR BLD AUTO: 0.3 %
DIFFERENTIAL METHOD BLD: ABNORMAL
EOSINOPHIL # BLD AUTO: 0.1 10E9/L (ref 0–0.7)
EOSINOPHIL NFR BLD AUTO: 0.5 %
ERYTHROCYTE [DISTWIDTH] IN BLOOD BY AUTOMATED COUNT: 12.2 % (ref 10–15)
HCT VFR BLD AUTO: 42 % (ref 35–47)
HGB BLD-MCNC: 14.9 G/DL (ref 11.7–15.7)
IMM GRANULOCYTES # BLD: 0 10E9/L (ref 0–0.4)
IMM GRANULOCYTES NFR BLD: 0.2 %
LYMPHOCYTES # BLD AUTO: 1.8 10E9/L (ref 0.8–5.3)
LYMPHOCYTES NFR BLD AUTO: 13.9 %
MCH RBC QN AUTO: 29.7 PG (ref 26.5–33)
MCHC RBC AUTO-ENTMCNC: 35.5 G/DL (ref 31.5–36.5)
MCV RBC AUTO: 84 FL (ref 78–100)
MONOCYTES # BLD AUTO: 0.8 10E9/L (ref 0–1.3)
MONOCYTES NFR BLD AUTO: 6 %
NEUTROPHILS # BLD AUTO: 10.4 10E9/L (ref 1.6–8.3)
NEUTROPHILS NFR BLD AUTO: 79.1 %
PLATELET # BLD AUTO: 31 10E9/L (ref 150–450)
RBC # BLD AUTO: 5.01 10E12/L (ref 3.8–5.2)
SPECIMEN SOURCE: NORMAL
WBC # BLD AUTO: 13.1 10E9/L (ref 4–11)

## 2018-02-07 ASSESSMENT — PATIENT HEALTH QUESTIONNAIRE - PHQ9: SUM OF ALL RESPONSES TO PHQ QUESTIONS 1-9: 4

## 2018-02-07 ASSESSMENT — ANXIETY QUESTIONNAIRES: GAD7 TOTAL SCORE: 2

## 2018-02-07 NOTE — PATIENT INSTRUCTIONS
Refilled medication for 3 months     Rapid strep was negative   Suspect viral sore throat   Try saline nasal, netti pot, flonase nasal spray       Follow up with hematology as planned this week

## 2018-02-09 ENCOUNTER — TELEPHONE (OUTPATIENT)
Dept: ONCOLOGY | Facility: CLINIC | Age: 33
End: 2018-02-09

## 2018-02-09 NOTE — TELEPHONE ENCOUNTER
Patient cancelled appt for this morning with Dr. Le d/t her work schedule.  She requested a call back to set up plan; labs, monitoring, f/u.    Call returned to patient.  No answer.  Requested call back, direct line provided.

## 2018-02-16 NOTE — TELEPHONE ENCOUNTER
Merissa called today to figure out the plan. Reviewed with Dr. Le and plan for her to continue the weekly labs. If PLT continues to rise, will monitor. If they drop may need to talk about a spleenectomy.     Reviewed this with the pt and she plans to have them done on Tuesday evening. We will call her with results. Pt verbalized understanding.

## 2018-02-20 DIAGNOSIS — D69.3 IMMUNE THROMBOCYTOPENIA (H): ICD-10-CM

## 2018-02-20 PROCEDURE — 36415 COLL VENOUS BLD VENIPUNCTURE: CPT | Performed by: INTERNAL MEDICINE

## 2018-02-20 PROCEDURE — 85025 COMPLETE CBC W/AUTO DIFF WBC: CPT | Performed by: INTERNAL MEDICINE

## 2018-02-21 LAB
BASOPHILS # BLD AUTO: 0 10E9/L (ref 0–0.2)
BASOPHILS NFR BLD AUTO: 0.4 %
DIFFERENTIAL METHOD BLD: ABNORMAL
EOSINOPHIL # BLD AUTO: 0.4 10E9/L (ref 0–0.7)
EOSINOPHIL NFR BLD AUTO: 4.4 %
ERYTHROCYTE [DISTWIDTH] IN BLOOD BY AUTOMATED COUNT: 12 % (ref 10–15)
HCT VFR BLD AUTO: 42.6 % (ref 35–47)
HGB BLD-MCNC: 14.7 G/DL (ref 11.7–15.7)
IMM GRANULOCYTES # BLD: 0 10E9/L (ref 0–0.4)
IMM GRANULOCYTES NFR BLD: 0.1 %
LYMPHOCYTES # BLD AUTO: 2.6 10E9/L (ref 0.8–5.3)
LYMPHOCYTES NFR BLD AUTO: 28.3 %
MCH RBC QN AUTO: 29.7 PG (ref 26.5–33)
MCHC RBC AUTO-ENTMCNC: 34.5 G/DL (ref 31.5–36.5)
MCV RBC AUTO: 86 FL (ref 78–100)
MONOCYTES # BLD AUTO: 0.5 10E9/L (ref 0–1.3)
MONOCYTES NFR BLD AUTO: 5.2 %
NEUTROPHILS # BLD AUTO: 5.7 10E9/L (ref 1.6–8.3)
NEUTROPHILS NFR BLD AUTO: 61.6 %
PLATELET # BLD AUTO: 62 10E9/L (ref 150–450)
RBC # BLD AUTO: 4.95 10E12/L (ref 3.8–5.2)
WBC # BLD AUTO: 9.3 10E9/L (ref 4–11)

## 2018-02-21 NOTE — TELEPHONE ENCOUNTER
Reviewed results and Dr. Le's recommendations. Denies questions or concerns at this time.  Will call back if any arise.  Will continue weekly CBC draw at this time.

## 2018-02-27 DIAGNOSIS — D69.3 IMMUNE THROMBOCYTOPENIA (H): ICD-10-CM

## 2018-02-27 PROCEDURE — 36415 COLL VENOUS BLD VENIPUNCTURE: CPT | Performed by: INTERNAL MEDICINE

## 2018-02-27 PROCEDURE — 85025 COMPLETE CBC W/AUTO DIFF WBC: CPT | Performed by: INTERNAL MEDICINE

## 2018-02-28 LAB
BASOPHILS # BLD AUTO: 0.1 10E9/L (ref 0–0.2)
BASOPHILS NFR BLD AUTO: 1 %
DIFFERENTIAL METHOD BLD: ABNORMAL
EOSINOPHIL # BLD AUTO: 0.3 10E9/L (ref 0–0.7)
EOSINOPHIL NFR BLD AUTO: 3.7 %
ERYTHROCYTE [DISTWIDTH] IN BLOOD BY AUTOMATED COUNT: 12.4 % (ref 10–15)
HCT VFR BLD AUTO: 43.3 % (ref 35–47)
HGB BLD-MCNC: 14.8 G/DL (ref 11.7–15.7)
IMM GRANULOCYTES # BLD: 0 10E9/L (ref 0–0.4)
IMM GRANULOCYTES NFR BLD: 0 %
LYMPHOCYTES # BLD AUTO: 3.1 10E9/L (ref 0.8–5.3)
LYMPHOCYTES NFR BLD AUTO: 33.4 %
MCH RBC QN AUTO: 29.5 PG (ref 26.5–33)
MCHC RBC AUTO-ENTMCNC: 34.2 G/DL (ref 31.5–36.5)
MCV RBC AUTO: 86 FL (ref 78–100)
MONOCYTES # BLD AUTO: 0.5 10E9/L (ref 0–1.3)
MONOCYTES NFR BLD AUTO: 4.9 %
NEUTROPHILS # BLD AUTO: 5.2 10E9/L (ref 1.6–8.3)
NEUTROPHILS NFR BLD AUTO: 57 %
PLATELET # BLD AUTO: 44 10E9/L (ref 150–450)
RBC # BLD AUTO: 5.01 10E12/L (ref 3.8–5.2)
WBC # BLD AUTO: 9.2 10E9/L (ref 4–11)

## 2018-03-12 ENCOUNTER — HOSPITAL ENCOUNTER (EMERGENCY)
Facility: CLINIC | Age: 33
Discharge: HOME | End: 2018-03-12
Attending: EMERGENCY MEDICINE | Admitting: EMERGENCY MEDICINE
Payer: COMMERCIAL

## 2018-03-12 ENCOUNTER — TELEPHONE (OUTPATIENT)
Dept: ONCOLOGY | Facility: CLINIC | Age: 33
End: 2018-03-12

## 2018-03-12 ENCOUNTER — APPOINTMENT (OUTPATIENT)
Dept: CT IMAGING | Facility: CLINIC | Age: 33
End: 2018-03-12
Attending: EMERGENCY MEDICINE
Payer: COMMERCIAL

## 2018-03-12 VITALS
HEART RATE: 61 BPM | TEMPERATURE: 98.1 F | RESPIRATION RATE: 16 BRPM | HEIGHT: 65 IN | SYSTOLIC BLOOD PRESSURE: 124 MMHG | OXYGEN SATURATION: 100 % | DIASTOLIC BLOOD PRESSURE: 78 MMHG

## 2018-03-12 DIAGNOSIS — D69.6 THROMBOCYTOPENIA (H): ICD-10-CM

## 2018-03-12 DIAGNOSIS — G44.209 TENSION HEADACHE: ICD-10-CM

## 2018-03-12 DIAGNOSIS — D69.3 IMMUNE THROMBOCYTOPENIA (H): ICD-10-CM

## 2018-03-12 DIAGNOSIS — D69.3 IMMUNE THROMBOCYTOPENIA (H): Primary | ICD-10-CM

## 2018-03-12 LAB
BASOPHILS # BLD AUTO: 0 10E9/L (ref 0–0.2)
BASOPHILS NFR BLD AUTO: 0.7 %
DIFFERENTIAL METHOD BLD: ABNORMAL
EOSINOPHIL # BLD AUTO: 0.2 10E9/L (ref 0–0.7)
EOSINOPHIL NFR BLD AUTO: 3.2 %
ERYTHROCYTE [DISTWIDTH] IN BLOOD BY AUTOMATED COUNT: 12.6 % (ref 10–15)
HCT VFR BLD AUTO: 39.6 % (ref 35–47)
HGB BLD-MCNC: 13.9 G/DL (ref 11.7–15.7)
IMM GRANULOCYTES # BLD: 0 10E9/L (ref 0–0.4)
IMM GRANULOCYTES NFR BLD: 0.2 %
LYMPHOCYTES # BLD AUTO: 2.4 10E9/L (ref 0.8–5.3)
LYMPHOCYTES NFR BLD AUTO: 43.1 %
MCH RBC QN AUTO: 29.9 PG (ref 26.5–33)
MCHC RBC AUTO-ENTMCNC: 35.1 G/DL (ref 31.5–36.5)
MCV RBC AUTO: 85 FL (ref 78–100)
MONOCYTES # BLD AUTO: 0.3 10E9/L (ref 0–1.3)
MONOCYTES NFR BLD AUTO: 5.8 %
NEUTROPHILS # BLD AUTO: 2.6 10E9/L (ref 1.6–8.3)
NEUTROPHILS NFR BLD AUTO: 47 %
PLATELET # BLD AUTO: 52 10E9/L (ref 150–450)
RBC # BLD AUTO: 4.65 10E12/L (ref 3.8–5.2)
WBC # BLD AUTO: 5.6 10E9/L (ref 4–11)

## 2018-03-12 PROCEDURE — 85025 COMPLETE CBC W/AUTO DIFF WBC: CPT | Performed by: EMERGENCY MEDICINE

## 2018-03-12 PROCEDURE — 99284 EMERGENCY DEPT VISIT MOD MDM: CPT | Mod: 25 | Performed by: EMERGENCY MEDICINE

## 2018-03-12 PROCEDURE — 70450 CT HEAD/BRAIN W/O DYE: CPT

## 2018-03-12 PROCEDURE — 99284 EMERGENCY DEPT VISIT MOD MDM: CPT | Mod: Z6 | Performed by: EMERGENCY MEDICINE

## 2018-03-12 ASSESSMENT — ENCOUNTER SYMPTOMS
ABDOMINAL PAIN: 0
FEVER: 0
SHORTNESS OF BREATH: 0
HEADACHES: 1

## 2018-03-12 NOTE — TELEPHONE ENCOUNTER
"Patient is a 32 year old female, Dx ITP, was on prednisone therapy with weekly CBC diff with platelets for monitoring.  Last CBC drawn  02.27.18, PLT was 44 at that time.      Pt called to report epistaxis lasting greater than 20 min. Denies bruising or petechiae. Does report \"light\" bleeding of her gums when brushing her teeth.  Denies hematuria, hemoptysis. Reports worsening headaches and dizziness.  Feels she is eating and drinking ok.  Denies fever nor chills, pain nor discomfort excluding headaches.    Advised patient come to clinic for lab draw and evaluation.  Pt declines stating she can't miss anymore work.  Reiterated the importance of lab draw and evaluation to check bleeding/PLT status and reviewed potential concerns with the missed lab draws.  Pt states she will go to urgent care today after work.   "

## 2018-03-12 NOTE — ED AVS SNAPSHOT
[facility]    [address] [address]    Phone:  [phone]    Fax:  [phone]                                       Merissa Leung   MRN: [id number]    Department:  [facility]   Date of Visit:  3/12/2018           After Visit Summary Signature Page     I have received my discharge instructions, and my questions have been answered. I have discussed any challenges I see with this plan with the nurse or doctor.    ..........................................................................................................................................  Patient/Patient Representative Signature      ..........................................................................................................................................  Patient Representative Print Name and Relationship to Patient    ..................................................               ................................................  Date                                            Time    ..........................................................................................................................................  Reviewed by Signature/Title    ...................................................              ..............................................  Date                                                            Time

## 2018-03-12 NOTE — ED AVS SNAPSHOT
[facility]    [address] [address]    Phone:  [phone]    Fax:  [phone]                                       Merissa Leung   MRN: [id number]    Department:  [facility]   Date of Visit:  3/12/2018           Patient Information     Date Of Birth          1985        Your diagnoses for this visit were:     Tension headache     Immune thrombocytopenia (H)     Thrombocytopenia (H)        You were seen by Ray Velasquez MD.        Discharge Instructions       Apply pressure if ongoing nose bleeds.        Nosebleed (Adult)    Bleeding from the nose most commonly occurs because of injury or drying and cracking of the inner lining of the nose. Most nosebleeds are because of dry air or nose-picking. They can occur during a common cold or an allergy attack. They can also occur on a very hot day, or from dry air in the winter.  If the bleeding site is found, it may be cauterized. This means it is treated to cause a blood clot to form. This may be done with a chemical, heat, or electricity. If the bleeding continues after the site is cauterized, or if the site cannot be found, packing may be put in your nose. This is to apply pressure and stop the bleeding. The packing may be made of gauze or sponge. A small balloon catheter is sometimes used. These must be removed by your doctor. Some types of packing dissolve on their own.  Home care    If packing was put in your nose, unless told otherwise, do not pull on it or try to remove it yourself. You will be given an appointment to have it removed. You may also have been given antibiotics to prevent a sinus infection. If so, finish all of the medicine.    Do not blow your nose for 12 hours after the bleeding stops. This will allow a strong blood clot to form. Do not pick your nose. This may restart bleeding.    Avoid drinking alcohol and hot liquids for the next 2 days. Alcohol or hot  liquids in your mouth can dilate blood vessels in your nose. This can cause bleeding to start again.    Do not take ibuprofen, naproxen, or medicines that contain aspirin. These thin the blood and may cause your nose to bleed. You may take acetaminophen for pain, unless another pain medicine was prescribed.    If the bleeding starts again, sit up and lean forward to prevent swallowing blood. Pinch your nose tightly on both sides, as shown above, for 10 to 15 minutes. Time yourself. Don t release the pressure on your nose until 10 minutes is up. If bleeding does not stop, continue to pinch your nose and call your healthcare provider or return to this facility.    If you have a cold, allergies, or dry nasal membranes, lubricate the nasal passages. Apply a small amount of petroleum jelly inside the nose with a cotton swab twice a day (morning and night).    Avoid overheating your home. This can dry the air and make your condition worse.    Put a humidifier in the room where you sleep. This will add moisture to the air.    Use a saline nasal spray to keep nasal passages moist.    Do not pick your nose. Keep fingernails trimmed to decrease risk of bleeds.    Do not smoke.  Follow-up care  Follow up with your healthcare provider, or as advised. Nasal packing should be rechecked or removed within 2 to 3 days.  When to seek medical advice  Call your healthcare provider right away if any of these occur.    You have another nosebleed that you cannot control    Dizziness, weakness, or fainting    You become tired or confused    Fever of 100.4 F (38 C) or higher, or as directed by your healthcare provider    Headache    Sinus or facial pain    Shortness of breath or trouble breathing  Date Last Reviewed: 3/22/2015    2921-6965 [employer] [address] [place] [address], [place] [address]. All rights reserved. This information is not intended as a substitute for professional medical care. Always follow your healthcare  professional's instructions.          24 Hour Appointment Hotline       To make an appointment at any [facility], call [phone] ([phone]). If you don't have a family doctor or clinic, we will help you find one. [facility] clinics are conveniently located to serve the needs of you and your family.             Review of your medicines      Our records show that you are taking the medicines listed below. If these are incorrect, please call your family doctor or clinic.        Dose / Directions Last dose taken    clonazePAM 1 MG tablet   Commonly known as:  klonoPIN   Quantity:  75 tablet        Take 1/2 twice a day for anxiety, 1 and 1/2 at bedtime for sleep   Refills:  2        linaclotide 145 MCG capsule   Commonly known as:  LINZESS   Dose:  145 mcg   Quantity:  30 capsule        Take 1 capsule (145 mcg) by mouth every morning (before breakfast)   Refills:  5        * lisdexamfetamine 40 MG capsule   Commonly known as:  VYVANSE   Dose:  40 mg   Quantity:  30 capsule        Take 1 capsule (40 mg) by mouth daily   Refills:  0        * lisdexamfetamine 40 MG capsule   Commonly known as:  VYVANSE   Dose:  40 mg   Quantity:  30 capsule   Start taking on:  3/30/2018        Take 1 capsule (40 mg) by mouth daily   Refills:  0        * lisdexamfetamine 40 MG capsule   Commonly known as:  VYVANSE   Dose:  40 mg   Quantity:  30 capsule   Start taking on:  4/30/2018        Take 1 capsule (40 mg) by mouth daily   Refills:  0        MULTIVITAL PO   Dose:  1 chew tab        Take 1 chew tab by mouth daily Gummi   Refills:  0        omeprazole 20 MG CR capsule   Commonly known as:  priLOSEC   Dose:  20 mg   Quantity:  30 capsule        Take 1 capsule (20 mg) by mouth daily   Refills:  1        ondansetron 4 MG ODT tab   Commonly known as:  ZOFRAN ODT   Dose:  8 mg   Quantity:  10 tablet        Take 2 tablets (8 mg) by mouth every 8 hours as needed for nausea   Refills:  0        polyethylene glycol powder   Commonly  known as:  MIRALAX/GLYCOLAX   Dose:  17 g        Take 17 g by mouth daily as needed for constipation   Refills:  0        predniSONE 10 MG tablet   Commonly known as:  DELTASONE   Dose:  20 mg   Quantity:  20 tablet        Take 2 tablets (20 mg) by mouth daily   Refills:  0        sennosides 8.6 MG tablet   Commonly known as:  SENOKOT   Dose:  2 tablet        Take 2 tablets by mouth 2 times daily   Refills:  0        UNABLE TO FIND   Dose:  2 teaspoonful        Take 2 teaspoonful by mouth At Bedtime Magnesium Calm   Refills:  0        * Notice:  This list has 3 medication(s) that are the same as other medications prescribed for you. Read the directions carefully, and ask your doctor or other care provider to review them with you.            Procedures and tests performed during your visit     CBC with platelets differential    CT Head w/o Contrast      Orders Needing Specimen Collection     None      Pending Results     No orders found from 3/10/2018 to 3/13/2018.            Pending Culture Results     No orders found from 3/10/2018 to 3/13/2018.            Pending Results Instructions     If you had any lab results that were not finalized at the time of your Discharge, you can call the ED Lab Result RN at [phone]. You will be contacted by this team for any positive Lab results or changes in treatment. The nurses are available 7 days a week from 10A to 6:30P.  You can leave a message 24 hours per day and they will return your call.        Test Results From Your Hospital Stay        3/12/2018  9:50 PM      Component Results     Component Value Ref Range & Units Status    WBC 5.6 4.0 - 11.0 10e9/L Final    RBC Count 4.65 3.8 - 5.2 10e12/L Final    Hemoglobin 13.9 11.7 - 15.7 g/dL Final    Hematocrit 39.6 35.0 - 47.0 % Final    MCV 85 78 - 100 fl Final    MCH 29.9 26.5 - 33.0 pg Final    MCHC 35.1 31.5 - 36.5 g/dL Final    RDW 12.6 10.0 - 15.0 % Final    Platelet Count 52 (L) 150 - 450 10e9/L Final    Diff Method  Automated Method  Final    % Neutrophils 47.0 % Final    % Lymphocytes 43.1 % Final    % Monocytes 5.8 % Final    % Eosinophils 3.2 % Final    % Basophils 0.7 % Final    % Immature Granulocytes 0.2 % Final    Absolute Neutrophil 2.6 1.6 - 8.3 10e9/L Final    Absolute Lymphocytes 2.4 0.8 - 5.3 10e9/L Final    Absolute Monocytes 0.3 0.0 - 1.3 10e9/L Final    Absolute Eosinophils 0.2 0.0 - 0.7 10e9/L Final    Absolute Basophils 0.0 0.0 - 0.2 10e9/L Final    Abs Immature Granulocytes 0.0 0 - 0.4 10e9/L Final         3/12/2018  9:25 PM      Narrative     CT OF THE HEAD WITHOUT CONTRAST 3/12/2018 8:46 PM     COMPARISON: Brain MRI 2/24/2012    HISTORY:  severe headache.  h/o ITP;     TECHNIQUE: Axial CT images of the head from the skull base to the  vertex were acquired without IV contrast.    FINDINGS: The ventricles and basal cisterns are within normal limits  in configuration. There is no midline shift. There are no extra-axial  fluid collections.  Gray-white differentiation is well maintained.    No intracranial hemorrhage, mass or recent infarct.    The visualized paranasal sinuses are well-aerated. There is no  mastoiditis. There are no fractures of the visualized bones.        Impression     IMPRESSION:  Normal head CT.      Radiation dose for this scan was reduced using automated exposure  control, adjustment of the mA and/or kV according to patient size, or  iterative reconstruction technique    KENNEDY GARCIA MD                Thank you for choosing [facility]       Thank you for choosing [facility] for your care. Our goal is always to provide you with excellent care. Hearing back from our patients is one way we can continue to improve our services. Please take a few minutes to complete the written survey that you may receive in the mail after you visit with us. Thank you!        [employer] Information     [employer] gives you secure access to your electronic health record. If you see a primary care provider, you can also  send messages to your care team and make appointments. If you have questions, please call your primary care clinic.  If you do not have a primary care provider, please call [phone] and they will assist you.        Care EveryWhere ID     This is your Care EveryWhere ID. This could be used by other organizations to access your [facility] medical records  [id number]        Equal Access to Services     ANA GARCIA : Elva Cervantes, geovanni gutiérrez, cheyanne chavez, leela us . So [facility] [phone].    ATENCIÓN: Si habla español, tiene a goldsmith disposición servicios gratuitos de asistencia lingüística. Konrad al [phone].    We comply with applicable federal civil rights laws and [place] laws. We do not discriminate on the basis of race, color, national origin, age, disability, sex, sexual orientation, or gender identity.            After Visit Summary       This is your record. Keep this with you and show to your community pharmacist(s) and doctor(s) at your next visit.

## 2018-03-13 NOTE — ED PROVIDER NOTES
History     Chief Complaint   Patient presents with     Epistaxis     pt had a nose bleed for about 20 min this afternoon. Pt is being treated at the cancer center for low plateletes     Headache     for the last week     HPI  Merissa Leung is a 32 year old female who has past medical history significant for anxiety, hyperlipidemia, ITP, presenting to the emergency department with complaints of headache over the past 1 week, in addition to nosebleed which occurred for 20 minutes this afternoon.  I reviewed patient's previous medical records.  She has been on prior prednisone therapy.  She has weaned off of this currently, and her most recent platelet check was at 44.  Patient states that she has had worsening global headache, moderate to severe in nature which has occurred over the past 1 week.  There is been no nausea, or vomiting.  She reports nosebleed which occurred for approximately 20 minutes during the day today, and resolved with mild amounts of pressure, and ice pack.  No significant prior nosebleeds.    Problem List:    Patient Active Problem List    Diagnosis Date Noted     Immune thrombocytopenia (H) 11/29/2017     Priority: Medium     Thrombocytopenia (H) 11/09/2017     Priority: Medium     Underweight 11/09/2017     Priority: Medium     Prenatal care, subsequent pregnancy 07/20/2017     Priority: Medium     Chronic abdominal pain 10/06/2016     Priority: Medium     Has had workup through [place] and [place]      Now followed by [facility] (started on bentyl)       Fibromyalgia 07/17/2015     Priority: Medium     Anxiety 07/06/2015     Priority: Medium     Hyperlipidemia LDL goal <160 07/15/2013     Priority: Medium     Headaches, tension 07/15/2013     Priority: Medium     Chronic constipation 07/15/2013     Priority: Medium     OCD (obsessive compulsive disorder) 07/15/2013     Priority: Medium     Binge eating  Started therapeutic trial of Luvox 100-300mg extended release at bedtime; category C in  pregnancy (as all SSRI); not effective  Will refer to [facility]          Past Medical History:    Past Medical History:   Diagnosis Date     Anxiety      Binge eating disorder      Chickenpox      Food intolerance      IBS (irritable bowel syndrome)      MEDICAL HISTORY OF -        Past Surgical History:    Past Surgical History:   Procedure Laterality Date     C/SECTION, LOW TRANSVERSE        SECTION, TUBAL LIGATION, COMBINED N/A 2/15/2016    NO tubal ligation done     COLONOSCOPY  2013    Procedure: COMBINED COLONOSCOPY, SINGLE BIOPSY/POLYPECTOMY BY BIOPSY;  Colonoscopy;  Surgeon: Maureen Valentin MD;  Location: [facility]     COLONOSCOPY N/A 2015    Procedure: COMBINED COLONOSCOPY, SINGLE OR MULTIPLE BIOPSY/POLYPECTOMY BY BIOPSY;  Surgeon: Maureen Valentin MD;  Location: [facility]     ESOPHAGOSCOPY, GASTROSCOPY, DUODENOSCOPY (EGD), COMBINED N/A 2018    Procedure: COMBINED ESOPHAGOSCOPY, GASTROSCOPY, DUODENOSCOPY (EGD), BIOPSY SINGLE OR MULTIPLE;  Gastroscopy;  Surgeon: Ray Amato MD;  Location: [facility]       Family History:    Family History   Problem Relation Age of Onset     GASTROINTESTINAL DISEASE Mother 46     perforated intestines-      Hepatitis Mother      C     Liver Disease Mother      Blood Disease Mother      anemia     Substance Abuse Mother      A&D     Psychotic Disorder Brother      Schizophrenia, OCD     Schizophrenia Brother      Anxiety Disorder Brother      Arthritis Maternal Grandmother      CANCER Paternal Grandmother      cervical     Breast Cancer Paternal Grandmother      Hypertension Paternal Grandmother      DIABETES Paternal Grandmother      CEREBROVASCULAR DISEASE Paternal Grandfather      HEART DISEASE Paternal Grandfather      Bipolar Disorder Paternal Grandfather      Suicide Paternal Grandfather      CANCER Paternal Aunt      cervical     Coronary Artery Disease Maternal Grandfather      MI     Substance Abuse Father      recovered A&D  "    HEART DISEASE Father      MI-stents     Hypertension Father        Social History:  Marital Status:   [2]  Social History   Substance Use Topics     Smoking status: Current Every Day Smoker     Packs/day: 0.50     Years: 6.00     Types: Cigarettes     Last attempt to quit: 5/25/2015     Smokeless tobacco: Never Used      Comment: 1/2 pack daily      Alcohol use No        Medications:      clonazePAM (KLONOPIN) 1 MG tablet   linaclotide (LINZESS) 145 MCG capsule   lisdexamfetamine (VYVANSE) 40 MG capsule   [START ON 3/30/2018] lisdexamfetamine (VYVANSE) 40 MG capsule   [START ON 4/30/2018] lisdexamfetamine (VYVANSE) 40 MG capsule   ondansetron (ZOFRAN ODT) 4 MG ODT tab   predniSONE (DELTASONE) 10 MG tablet   UNABLE TO FIND   omeprazole (PRILOSEC) 20 MG CR capsule   sennosides (SENOKOT) 8.6 MG tablet   polyethylene glycol (MIRALAX/GLYCOLAX) powder   Multiple Vitamins-Minerals (MULTIVITAL PO)         Review of Systems   Constitutional: Negative for fever.   Respiratory: Negative for shortness of breath.    Cardiovascular: Negative for chest pain.   Gastrointestinal: Negative for abdominal pain.   Neurological: Positive for headaches.   Hematological:        See HPI   All other systems reviewed and are negative.      Physical Exam   BP: 124/78  Pulse: 61  Temp: 98.1  F (36.7  C)  Resp: 16  Height: 165.1 cm (5' 5\")  SpO2: 100 %      Physical Exam  /78  Pulse 61  Temp 98.1  F (36.7  C) (Oral)  Resp 16  Ht 1.651 m (5' 5\")  SpO2 100%  General: alert, interactive, in no apparent distress  Head: atraumatic  Nose: no rhinorrhea or epistaxis  Ears: no external auditory canal discharge or bleeding.    Eyes: Sclera nonicteric. Conjunctiva noninjected. PERRL, EOMI  Mouth: no tonsillar erythema, edema, or exudate  Neck: supple, no palp LAD  Lungs: CTAB  CV: RRR, S1/S2; peripheral pulses palpable and symmetric  Abdomen: soft, nt, nd, no guarding or rebound. Positive bowel sounds  Extremities: no cyanosis or " edema  Skin: no rash or diaphoresis  Neuro: CN II-XII grossly intact, strength 5/5 in UE and LEs bilaterally, sensation intact to light touch in UE and LEs bilaterally;       ED Course     ED Course     Procedures               Critical Care time:  none               Results for orders placed or performed during the hospital encounter of 03/12/18 (from the past 24 hour(s))   CBC with platelets differential   Result Value Ref Range    WBC 5.6 4.0 - 11.0 10e9/L    RBC Count 4.65 3.8 - 5.2 10e12/L    Hemoglobin 13.9 11.7 - 15.7 g/dL    Hematocrit 39.6 35.0 - 47.0 %    MCV 85 78 - 100 fl    MCH 29.9 26.5 - 33.0 pg    MCHC 35.1 31.5 - 36.5 g/dL    RDW 12.6 10.0 - 15.0 %    Platelet Count 52 (L) 150 - 450 10e9/L    Diff Method Automated Method     % Neutrophils 47.0 %    % Lymphocytes 43.1 %    % Monocytes 5.8 %    % Eosinophils 3.2 %    % Basophils 0.7 %    % Immature Granulocytes 0.2 %    Absolute Neutrophil 2.6 1.6 - 8.3 10e9/L    Absolute Lymphocytes 2.4 0.8 - 5.3 10e9/L    Absolute Monocytes 0.3 0.0 - 1.3 10e9/L    Absolute Eosinophils 0.2 0.0 - 0.7 10e9/L    Absolute Basophils 0.0 0.0 - 0.2 10e9/L    Abs Immature Granulocytes 0.0 0 - 0.4 10e9/L   CT Head w/o Contrast    Narrative    CT OF THE HEAD WITHOUT CONTRAST 3/12/2018 8:46 PM     COMPARISON: Brain MRI 2/24/2012    HISTORY:  severe headache.  h/o ITP;     TECHNIQUE: Axial CT images of the head from the skull base to the  vertex were acquired without IV contrast.    FINDINGS: The ventricles and basal cisterns are within normal limits  in configuration. There is no midline shift. There are no extra-axial  fluid collections.  Gray-white differentiation is well maintained.    No intracranial hemorrhage, mass or recent infarct.    The visualized paranasal sinuses are well-aerated. There is no  mastoiditis. There are no fractures of the visualized bones.      Impression    IMPRESSION:  Normal head CT.      Radiation dose for this scan was reduced using automated  exposure  control, adjustment of the mA and/or kV according to patient size, or  iterative reconstruction technique    KENNEDY GARCIA MD       Medications - No data to display    Assessments & Plan (with Medical Decision Making)  32 year old female, with past medical history significant for ITP, presenting to the emergency department with approximately 20 minute episode of nosebleed, primarily left-sided, which occurred this afternoon.  No trauma, fall, or injury.  However, patient complains of current global headache, nonradiating, moderate to severe in nature.  This is been primarily intermittent over the past 1 week.  Given the concerns of worsening headache, worse than prior episodes of headache in the context of history of ITP with thrombocytopenia, CT scan of the head will be performed.   these images were reviewed by myself, and radiology reviewed, and show no evidence of acute intracranial hemorrhage, or other abnormality.    CBC performed, and platelets are 52, improved from 44,000 most recently.  Patient is currently not on any prednisone therapy.  Do not feel that this needs to be started at this point.  Encouraged follow-up with primary care providers as needed.     I have reviewed the nursing notes.    I have reviewed the findings, diagnosis, plan and need for follow up with the patient.       Discharge Medication List as of 3/12/2018 10:09 PM          Final diagnoses:   Tension headache   Immune thrombocytopenia (H)   Thrombocytopenia (H)       3/12/2018   [facility] EMERGENCY DEPARTMENT     Ray Velasquez MD  03/12/18 [id number]

## 2018-03-13 NOTE — TELEPHONE ENCOUNTER
"Status Check:    Pt is a 32 year old female, recently in ED for Epistaxis and headache.  Call placed for status check.    Primary care provider is: Mohini Mccord    Diagnosis:    Encounter Diagnosis   Name Primary?     Immune thrombocytopenia (H) Yes     Oncology provider is:  Dr. NANCY Le    How are you doing/feeling?: \"better, but tired.  No more bleeding, but the headache is still there.  They've been getting worse\".  Any further issues?: has completed her prednisone therapy.  Next Follow up/Recommendations: Advised patient to update her PCP about the headaches and to continue weekly lab draw per Dr. Le's recommendations.      Advised patient to utilize PRN medications as needed, eat nutritious meals, drink plenty of fluids, and keep scheduled appointments. If symptoms or other concerns develop after hours, encouraged patient to call our after hours number: [phone].  Patient instructed to call with any questions or concerns.  Patient states understanding and is in agreement with this plan.      Patient instructed to call with any questions or concerns.  Patient states understanding and is in agreement with this plan.    Approximately 7 minutes spent on telephone with patient reviewing assessment and symptom(s) and providing symptom management.    Laverne Rollins RN, BSN, OCN  Oncology Hematology   Breast Cancer Navigator  [facility]  [email or web address]  [phone]    "

## 2018-03-13 NOTE — DISCHARGE INSTRUCTIONS
Apply pressure if ongoing nose bleeds.        Nosebleed (Adult)    Bleeding from the nose most commonly occurs because of injury or drying and cracking of the inner lining of the nose. Most nosebleeds are because of dry air or nose-picking. They can occur during a common cold or an allergy attack. They can also occur on a very hot day, or from dry air in the winter.  If the bleeding site is found, it may be cauterized. This means it is treated to cause a blood clot to form. This may be done with a chemical, heat, or electricity. If the bleeding continues after the site is cauterized, or if the site cannot be found, packing may be put in your nose. This is to apply pressure and stop the bleeding. The packing may be made of gauze or sponge. A small balloon catheter is sometimes used. These must be removed by your doctor. Some types of packing dissolve on their own.  Home care    If packing was put in your nose, unless told otherwise, do not pull on it or try to remove it yourself. You will be given an appointment to have it removed. You may also have been given antibiotics to prevent a sinus infection. If so, finish all of the medicine.    Do not blow your nose for 12 hours after the bleeding stops. This will allow a strong blood clot to form. Do not pick your nose. This may restart bleeding.    Avoid drinking alcohol and hot liquids for the next 2 days. Alcohol or hot liquids in your mouth can dilate blood vessels in your nose. This can cause bleeding to start again.    Do not take ibuprofen, naproxen, or medicines that contain aspirin. These thin the blood and may cause your nose to bleed. You may take acetaminophen for pain, unless another pain medicine was prescribed.    If the bleeding starts again, sit up and lean forward to prevent swallowing blood. Pinch your nose tightly on both sides, as shown above, for 10 to 15 minutes. Time yourself. Don t release the pressure on your nose until 10 minutes is up. If  bleeding does not stop, continue to pinch your nose and call your healthcare provider or return to this facility.    If you have a cold, allergies, or dry nasal membranes, lubricate the nasal passages. Apply a small amount of petroleum jelly inside the nose with a cotton swab twice a day (morning and night).    Avoid overheating your home. This can dry the air and make your condition worse.    Put a humidifier in the room where you sleep. This will add moisture to the air.    Use a saline nasal spray to keep nasal passages moist.    Do not pick your nose. Keep fingernails trimmed to decrease risk of bleeds.    Do not smoke.  Follow-up care  Follow up with your healthcare provider, or as advised. Nasal packing should be rechecked or removed within 2 to 3 days.  When to seek medical advice  Call your healthcare provider right away if any of these occur.    You have another nosebleed that you cannot control    Dizziness, weakness, or fainting    You become tired or confused    Fever of 100.4 F (38 C) or higher, or as directed by your healthcare provider    Headache    Sinus or facial pain    Shortness of breath or trouble breathing  Date Last Reviewed: 3/22/2015    9617-9306 [employer]. [address] [place] [address], [place] [address]. All rights reserved. This information is not intended as a substitute for professional medical care. Always follow your healthcare professional's instructions.

## 2018-03-13 NOTE — ED NOTES
Pt resting in room, with lights out.  RUSSELL remains 4-5/10.  Pt unable to take anything for pain, offered hot or cold packs, refused at this time.  Head CT Neg.    PLAN:  Awaiting plts result still.  Put updated.

## 2018-06-04 ENCOUNTER — OFFICE VISIT (OUTPATIENT)
Dept: FAMILY MEDICINE | Facility: CLINIC | Age: 33
End: 2018-06-04
Payer: COMMERCIAL

## 2018-06-04 VITALS
HEART RATE: 72 BPM | DIASTOLIC BLOOD PRESSURE: 78 MMHG | BODY MASS INDEX: 17.47 KG/M2 | OXYGEN SATURATION: 100 % | TEMPERATURE: 98.6 F | SYSTOLIC BLOOD PRESSURE: 110 MMHG | RESPIRATION RATE: 16 BRPM | WEIGHT: 105 LBS

## 2018-06-04 DIAGNOSIS — F50.819 BINGE EATING DISORDER: ICD-10-CM

## 2018-06-04 DIAGNOSIS — F41.1 GAD (GENERALIZED ANXIETY DISORDER): ICD-10-CM

## 2018-06-04 DIAGNOSIS — K58.1 IRRITABLE BOWEL SYNDROME WITH CONSTIPATION: ICD-10-CM

## 2018-06-04 DIAGNOSIS — D69.3 IMMUNE THROMBOCYTOPENIA (H): Primary | ICD-10-CM

## 2018-06-04 LAB
ANION GAP SERPL CALCULATED.3IONS-SCNC: 8 MMOL/L (ref 3–14)
BASOPHILS # BLD AUTO: 0.1 10E9/L (ref 0–0.2)
BASOPHILS NFR BLD AUTO: 0.8 %
BUN SERPL-MCNC: 11 MG/DL (ref 7–30)
CALCIUM SERPL-MCNC: 8.9 MG/DL (ref 8.5–10.1)
CHLORIDE SERPL-SCNC: 110 MMOL/L (ref 94–109)
CO2 SERPL-SCNC: 24 MMOL/L (ref 20–32)
CREAT SERPL-MCNC: 0.74 MG/DL (ref 0.52–1.04)
DIFFERENTIAL METHOD BLD: ABNORMAL
EOSINOPHIL # BLD AUTO: 0.1 10E9/L (ref 0–0.7)
EOSINOPHIL NFR BLD AUTO: 0.9 %
ERYTHROCYTE [DISTWIDTH] IN BLOOD BY AUTOMATED COUNT: 12.6 % (ref 10–15)
GFR SERPL CREATININE-BSD FRML MDRD: >90 ML/MIN/1.7M2
GLUCOSE SERPL-MCNC: 82 MG/DL (ref 70–99)
HCT VFR BLD AUTO: 46 % (ref 35–47)
HGB BLD-MCNC: 15.3 G/DL (ref 11.7–15.7)
IMM GRANULOCYTES # BLD: 0 10E9/L (ref 0–0.4)
IMM GRANULOCYTES NFR BLD: 0.1 %
LYMPHOCYTES # BLD AUTO: 2.3 10E9/L (ref 0.8–5.3)
LYMPHOCYTES NFR BLD AUTO: 29.7 %
MCH RBC QN AUTO: 29.8 PG (ref 26.5–33)
MCHC RBC AUTO-ENTMCNC: 33.3 G/DL (ref 31.5–36.5)
MCV RBC AUTO: 90 FL (ref 78–100)
MONOCYTES # BLD AUTO: 0.4 10E9/L (ref 0–1.3)
MONOCYTES NFR BLD AUTO: 4.6 %
NEUTROPHILS # BLD AUTO: 4.9 10E9/L (ref 1.6–8.3)
NEUTROPHILS NFR BLD AUTO: 63.9 %
NRBC # BLD AUTO: 0 10*3/UL
NRBC BLD AUTO-RTO: 0 /100
PLATELET # BLD AUTO: 59 10E9/L (ref 150–450)
POTASSIUM SERPL-SCNC: 3.6 MMOL/L (ref 3.4–5.3)
RBC # BLD AUTO: 5.13 10E12/L (ref 3.8–5.2)
SODIUM SERPL-SCNC: 142 MMOL/L (ref 133–144)
WBC # BLD AUTO: 7.7 10E9/L (ref 4–11)

## 2018-06-04 PROCEDURE — 99214 OFFICE O/P EST MOD 30 MIN: CPT | Performed by: NURSE PRACTITIONER

## 2018-06-04 PROCEDURE — 80048 BASIC METABOLIC PNL TOTAL CA: CPT | Performed by: NURSE PRACTITIONER

## 2018-06-04 PROCEDURE — 36415 COLL VENOUS BLD VENIPUNCTURE: CPT | Performed by: NURSE PRACTITIONER

## 2018-06-04 PROCEDURE — 85025 COMPLETE CBC W/AUTO DIFF WBC: CPT | Performed by: NURSE PRACTITIONER

## 2018-06-04 RX ORDER — LISDEXAMFETAMINE DIMESYLATE 10 MG/1
10 CAPSULE ORAL
Qty: 30 CAPSULE | Refills: 0 | Status: SHIPPED | OUTPATIENT
Start: 2018-07-03 | End: 2018-09-07

## 2018-06-04 RX ORDER — LISDEXAMFETAMINE DIMESYLATE 40 MG/1
40 CAPSULE ORAL DAILY
Qty: 30 CAPSULE | Refills: 0 | Status: SHIPPED | OUTPATIENT
Start: 2018-06-04 | End: 2019-02-21

## 2018-06-04 RX ORDER — ALPRAZOLAM 0.5 MG
0.5 TABLET ORAL 2 TIMES DAILY PRN
Qty: 60 TABLET | Refills: 3 | Status: SHIPPED | OUTPATIENT
Start: 2018-06-04 | End: 2018-08-01

## 2018-06-04 RX ORDER — CLONAZEPAM 1 MG/1
TABLET ORAL
Qty: 75 TABLET | Refills: 2 | Status: CANCELLED | OUTPATIENT
Start: 2018-06-04

## 2018-06-04 RX ORDER — LISDEXAMFETAMINE DIMESYLATE 40 MG/1
40 CAPSULE ORAL DAILY
Qty: 30 CAPSULE | Refills: 0 | Status: SHIPPED | OUTPATIENT
Start: 2018-07-05 | End: 2019-02-21

## 2018-06-04 RX ORDER — LISDEXAMFETAMINE DIMESYLATE 10 MG/1
10 CAPSULE ORAL
Qty: 30 CAPSULE | Refills: 0 | Status: SHIPPED | OUTPATIENT
Start: 2018-08-03 | End: 2018-09-07

## 2018-06-04 RX ORDER — MAGNESIUM CITRATE
615 POWDER (GRAM) MISCELLANEOUS AT BEDTIME
Qty: 1 BOTTLE | Refills: 3 | Status: SHIPPED | OUTPATIENT
Start: 2018-06-04 | End: 2018-10-31

## 2018-06-04 RX ORDER — LISDEXAMFETAMINE DIMESYLATE 10 MG/1
10 CAPSULE ORAL
Qty: 30 CAPSULE | Refills: 0 | Status: SHIPPED | OUTPATIENT
Start: 2018-06-04 | End: 2018-06-04

## 2018-06-04 RX ORDER — LISDEXAMFETAMINE DIMESYLATE 10 MG/1
10 CAPSULE ORAL
Qty: 30 CAPSULE | Refills: 0 | Status: SHIPPED | OUTPATIENT
Start: 2018-06-04 | End: 2018-09-07

## 2018-06-04 RX ORDER — LISDEXAMFETAMINE DIMESYLATE 40 MG/1
40 CAPSULE ORAL DAILY
Qty: 30 CAPSULE | Refills: 0 | Status: SHIPPED | OUTPATIENT
Start: 2018-08-05 | End: 2018-07-06

## 2018-06-04 ASSESSMENT — ANXIETY QUESTIONNAIRES
1. FEELING NERVOUS, ANXIOUS, OR ON EDGE: SEVERAL DAYS
2. NOT BEING ABLE TO STOP OR CONTROL WORRYING: SEVERAL DAYS
6. BECOMING EASILY ANNOYED OR IRRITABLE: SEVERAL DAYS
GAD7 TOTAL SCORE: 5
7. FEELING AFRAID AS IF SOMETHING AWFUL MIGHT HAPPEN: NOT AT ALL
5. BEING SO RESTLESS THAT IT IS HARD TO SIT STILL: NOT AT ALL
3. WORRYING TOO MUCH ABOUT DIFFERENT THINGS: SEVERAL DAYS

## 2018-06-04 ASSESSMENT — PATIENT HEALTH QUESTIONNAIRE - PHQ9: 5. POOR APPETITE OR OVEREATING: SEVERAL DAYS

## 2018-06-04 ASSESSMENT — PAIN SCALES - GENERAL: PAINLEVEL: NO PAIN (0)

## 2018-06-04 NOTE — MR AVS SNAPSHOT
After Visit Summary   6/4/2018    Merissa Leung    MRN: [id number]           Patient Information     Date Of Birth          1985        Visit Information        Provider Department      6/4/2018 3:40 PM Mohini Mccord NP [facility]        Today's Diagnoses     Immune thrombocytopenia (H)    -  1    EDWARD (generalized anxiety disorder)        Irritable bowel syndrome with constipation        Binge eating disorder          Care Instructions    Refilled medications for 3 months   Try the xanax in place of the klonopin   Update me in 1 month if this isn't going well   Ok to refill over the phone in 3 month if all is well    Labs in 1 month             Follow-ups after your visit        Future tests that were ordered for you today     Open Future Orders        Priority Expected Expires Ordered    CBC with platelets Routine  6/4/2019 6/4/2018            Who to contact     If you have questions or need follow up information about today's clinic visit or your schedule please contact [facility] directly at [phone].  Normal or non-critical lab and imaging results will be communicated to you by [employer], letter or phone within 4 business days after the clinic has received the results. If you do not hear from us within 7 days, please contact the clinic through [employer] or phone. If you have a critical or abnormal lab result, we will notify you by phone as soon as possible.  Submit refill requests through [employer] or call your pharmacy and they will forward the refill request to us. Please allow 3 business days for your refill to be completed.          Additional Information About Your Visit        CallYour[employer]hart Information     [employer] gives you secure access to your electronic health record. If you see a primary care provider, you can also send messages to your care team and make appointments. If you have questions, please call your primary care clinic.  If you do not have a  primary care provider, please call [phone] and they will assist you.        Care EveryWhere ID     This is your Care EveryWhere ID. This could be used by other organizations to access your [facility] medical records  [id number]        Your Vitals Were     Pulse Temperature Respirations Pulse Oximetry BMI (Body Mass Index)       72 98.6  F (37  C) (Tympanic) 16 100% 17.47 kg/m2        Blood Pressure from Last 3 Encounters:   06/04/18 110/78   03/12/18 124/78   02/06/18 100/62    Weight from Last 3 Encounters:   06/04/18 105 lb (47.6 kg)   02/06/18 104 lb 12.8 oz (47.5 kg)   01/30/18 107 lb (48.5 kg)              We Performed the Following     Basic metabolic panel  (Ca, Cl, CO2, Creat, Gluc, K, Na, BUN)     CBC with platelets and differential          Today's Medication Changes          These changes are accurate as of 6/4/18  4:26 PM.  If you have any questions, ask your nurse or doctor.               Start taking these medicines.        Dose/Directions    ALPRAZolam 0.5 MG tablet   Commonly known as:  XANAX   Used for:  EDWARD (generalized anxiety disorder)   Started by:  Mohini Mccord NP        Dose:  0.5 mg   Take 1 tablet (0.5 mg) by mouth 2 times daily as needed for anxiety   Quantity:  60 tablet   Refills:  3       * lisdexamfetamine 40 MG capsule   Commonly known as:  VYVANSE   Used for:  Binge eating disorder   Started by:  Mohini Mccord NP        Dose:  40 mg   Take 1 capsule (40 mg) by mouth daily   Quantity:  30 capsule   Refills:  0       * lisdexamfetamine 10 MG capsule   Commonly known as:  VYVANSE   Used for:  Binge eating disorder   Started by:  Mohini Mccord NP        Dose:  10 mg   Take 1 capsule (10 mg) by mouth daily (before lunch)   Quantity:  30 capsule   Refills:  0       * lisdexamfetamine 10 MG capsule   Commonly known as:  VYVANSE   Used for:  Binge eating disorder   Started by:  Mohini Mccord NP        Dose:  10 mg   Start taking on:  7/3/2018   Take 1 capsule (10 mg) by  mouth daily (with lunch)   Quantity:  30 capsule   Refills:  0       * lisdexamfetamine 40 MG capsule   Commonly known as:  VYVANSE   Used for:  Binge eating disorder   Started by:  Mohini Mccord NP        Dose:  40 mg   Start taking on:  7/5/2018   Take 1 capsule (40 mg) by mouth daily   Quantity:  30 capsule   Refills:  0       * lisdexamfetamine 10 MG capsule   Commonly known as:  VYVANSE   Used for:  Binge eating disorder   Started by:  Mohini Mccord NP        Dose:  10 mg   Start taking on:  8/3/2018   Take 1 capsule (10 mg) by mouth daily (with lunch)   Quantity:  30 capsule   Refills:  0       * lisdexamfetamine 40 MG capsule   Commonly known as:  VYVANSE   Used for:  Binge eating disorder   Started by:  Mohini Mccord NP        Dose:  40 mg   Start taking on:  8/5/2018   Take 1 capsule (40 mg) by mouth daily   Quantity:  30 capsule   Refills:  0       Magnesium Citrate Powd   Used for:  Irritable bowel syndrome with constipation   Started by:  Mohini Mccord NP        Dose:  615 mg   615 mg At Bedtime   Quantity:  1 Bottle   Refills:  3       * Notice:  This list has 6 medication(s) that are the same as other medications prescribed for you. Read the directions carefully, and ask your doctor or other care provider to review them with you.      Stop taking these medicines if you haven't already. Please contact your care team if you have questions.     clonazePAM 1 MG tablet   Commonly known as:  klonoPIN   Stopped by:  Mohini Mccord NP                Where to get your medicines      These medications were sent to [facility] [address] [place] [id number]     Phone:  [phone]     linaclotide 145 MCG capsule    Magnesium Citrate Powd         Some of these will need a paper prescription and others can be bought over the counter.  Ask your nurse if you have questions.     Bring a paper prescription for each of these medications      ALPRAZolam 0.5 MG tablet    lisdexamfetamine 10 MG capsule    lisdexamfetamine 10 MG capsule    lisdexamfetamine 10 MG capsule    lisdexamfetamine 40 MG capsule    lisdexamfetamine 40 MG capsule    lisdexamfetamine 40 MG capsule                Primary Care Provider Office Phone # Fax #    Mohini Mccord [phone]       [address] [facility] [address]        Equal Access to Services     ANA GARCIA : Hadii aad ku hadasho Soomaali, waaxda luqadaha, qaybta kaalmada adeegyada, waxay idiin hayaan adeeg kharash la'cristin . So [facility] [phone].    ATENCIÓN: Si habla español, tiene a goldsmith disposición servicios gratuitos de asistencia lingüística. [facility] [phone].    We comply with applicable federal civil rights laws and [place] laws. We do not discriminate on the basis of race, color, national origin, age, disability, sex, sexual orientation, or gender identity.            Thank you!     Thank you for choosing [facility]  for your care. Our goal is always to provide you with excellent care. Hearing back from our patients is one way we can continue to improve our services. Please take a few minutes to complete the written survey that you may receive in the mail after your visit with us. Thank you!             Your Updated Medication List - Protect others around you: Learn how to safely use, store and throw away your medicines at www.disposemymeds.org.          This list is accurate as of 6/4/18  4:26 PM.  Always use your most recent med list.                   Brand Name Dispense Instructions for use Diagnosis    ALPRAZolam 0.5 MG tablet    XANAX    60 tablet    Take 1 tablet (0.5 mg) by mouth 2 times daily as needed for anxiety    EDWARD (generalized anxiety disorder)       linaclotide 145 MCG capsule    LINZESS    30 capsule    Take 1 capsule (145 mcg) by mouth every morning (before breakfast)    Irritable bowel syndrome with constipation       * lisdexamfetamine 40 MG  capsule    VYVANSE    30 capsule    Take 1 capsule (40 mg) by mouth daily    Binge eating disorder       * lisdexamfetamine 10 MG capsule    VYVANSE    30 capsule    Take 1 capsule (10 mg) by mouth daily (before lunch)    Binge eating disorder       * lisdexamfetamine 10 MG capsule   Start taking on:  7/3/2018    VYVANSE    30 capsule    Take 1 capsule (10 mg) by mouth daily (with lunch)    Binge eating disorder       * lisdexamfetamine 40 MG capsule   Start taking on:  7/5/2018    VYVANSE    30 capsule    Take 1 capsule (40 mg) by mouth daily    Binge eating disorder       * lisdexamfetamine 10 MG capsule   Start taking on:  8/3/2018    VYVANSE    30 capsule    Take 1 capsule (10 mg) by mouth daily (with lunch)    Binge eating disorder       * lisdexamfetamine 40 MG capsule   Start taking on:  8/5/2018    VYVANSE    30 capsule    Take 1 capsule (40 mg) by mouth daily    Binge eating disorder       Magnesium Citrate Powd     1 Bottle    615 mg At Bedtime    Irritable bowel syndrome with constipation       MULTIVITAL PO      Take 1 chew tab by mouth daily Gummi        polyethylene glycol powder    MIRALAX/GLYCOLAX     Take 17 g by mouth daily as needed for constipation        sennosides 8.6 MG tablet    SENOKOT     Take 2 tablets by mouth 2 times daily        UNABLE TO FIND      Take 2 teaspoonful by mouth At Bedtime Magnesium Calm        * Notice:  This list has 6 medication(s) that are the same as other medications prescribed for you. Read the directions carefully, and ask your doctor or other care provider to review them with you.

## 2018-06-04 NOTE — NURSING NOTE
"Chief Complaint   Patient presents with     Anxiety       Initial /78  Pulse 72  Temp 98.6  F (37  C) (Tympanic)  Resp 16  Wt 105 lb (47.6 kg)  SpO2 100%  BMI 17.47 kg/m2 Estimated body mass index is 17.47 kg/(m^2) as calculated from the following:    Height as of 3/12/18: 5' 5\" (1.651 m).    Weight as of this encounter: 105 lb (47.6 kg).      Health Maintenance that is potentially due pending provider review:  NONE    n/a    Is there anyone who you would like to be able to receive your results? No  If yes have patient fill out SAUD      "

## 2018-06-04 NOTE — PATIENT INSTRUCTIONS
Refilled medications for 3 months   Try the xanax in place of the klonopin   Update me in 1 month if this isn't going well   Ok to refill over the phone in 3 month if all is well    Labs in 1 month

## 2018-06-04 NOTE — PROGRESS NOTES
SUBJECTIVE:   Merissa Leung is a 33 year old female who presents to clinic today for the following health issues:      Anxiety Follow-Up    Status since last visit: No change    Other associated symptoms:None    Complicating factors:   Significant life event: No   Current substance abuse: None  Depression symptoms: No  EDWARD-7 SCORE 2016   Total Score - - -   Total Score 1 2 5     PHQ-9 SCORE 2016   Total Score - - -   Total Score 4 4 9       EDWARD-7    Reports anxiety is a bit worse   No longer feels the clonazepam is helpful   She is wondering if she could try something different as needed           Medication Followup of Linzess    Taking Medication as prescribed: yes    Side Effects:  None    Medication Helping Symptoms:  yes   This is working well denies any concerns or side effects      She is taking vyvanse for binge eating disorder  She feels that it works well but can tell that it is wearing off in the afternoon    Problem list and histories reviewed & adjusted, as indicated.  Additional history: as documented    Patient Active Problem List   Diagnosis     Hyperlipidemia LDL goal <160     Headaches, tension     Chronic constipation     OCD (obsessive compulsive disorder)     Anxiety     Fibromyalgia     Chronic abdominal pain     Prenatal care, subsequent pregnancy     Immune thrombocytopenia (H)     Thrombocytopenia (H)     Underweight     Past Surgical History:   Procedure Laterality Date     C/SECTION, LOW TRANSVERSE        SECTION, TUBAL LIGATION, COMBINED N/A 2/15/2016    NO tubal ligation done     COLONOSCOPY  2013    Procedure: COMBINED COLONOSCOPY, SINGLE BIOPSY/POLYPECTOMY BY BIOPSY;  Colonoscopy;  Surgeon: Maureen Valentin MD;  Location: [facility]     COLONOSCOPY N/A 2015    Procedure: COMBINED COLONOSCOPY, SINGLE OR MULTIPLE BIOPSY/POLYPECTOMY BY BIOPSY;  Surgeon: Maureen Valentin MD;  Location: [facility]      ESOPHAGOSCOPY, GASTROSCOPY, DUODENOSCOPY (EGD), COMBINED N/A 2018    Procedure: COMBINED ESOPHAGOSCOPY, GASTROSCOPY, DUODENOSCOPY (EGD), BIOPSY SINGLE OR MULTIPLE;  Gastroscopy;  Surgeon: Ray Amato MD;  Location: [facility]       Social History   Substance Use Topics     Smoking status: Current Every Day Smoker     Packs/day: 0.50     Years: 6.00     Types: Cigarettes     Last attempt to quit: 2015     Smokeless tobacco: Never Used      Comment: 1/2 pack daily      Alcohol use No     Family History   Problem Relation Age of Onset     GASTROINTESTINAL DISEASE Mother 46     perforated intestines-      Hepatitis Mother      C     Liver Disease Mother      Blood Disease Mother      anemia     Substance Abuse Mother      A&D     Psychotic Disorder Brother      Schizophrenia, OCD     Schizophrenia Brother      Anxiety Disorder Brother      Arthritis Maternal Grandmother      CANCER Paternal Grandmother      cervical     Breast Cancer Paternal Grandmother      Hypertension Paternal Grandmother      DIABETES Paternal Grandmother      CEREBROVASCULAR DISEASE Paternal Grandfather      HEART DISEASE Paternal Grandfather      Bipolar Disorder Paternal Grandfather      Suicide Paternal Grandfather      CANCER Paternal Aunt      cervical     Coronary Artery Disease Maternal Grandfather      MI     Substance Abuse Father      recovered A&D     HEART DISEASE Father      MI-stents     Hypertension Father            Reviewed and updated as needed this visit by clinical staff  Tobacco  Allergies  Meds  Med Hx  Surg Hx  Fam Hx  Soc Hx      Reviewed and updated as needed this visit by Provider         ROS:  Constitutional, HEENT, cardiovascular, pulmonary, gi and gu systems are negative, except as otherwise noted.    OBJECTIVE:                                                    /78  Pulse 72  Temp 98.6  F (37  C) (Tympanic)  Resp 16  Wt 105 lb (47.6 kg)  SpO2 100%  BMI 17.47 kg/m2  Body mass index is  17.47 kg/(m^2).  GENERAL APPEARANCE: healthy, alert and no distress  RESP: lungs clear to auscultation - no rales, rhonchi or wheezes  ABDOMEN: soft, nontender, without hepatosplenomegaly or masses and bowel sounds normal  PSYCH: mentation appears normal and affect normal/bright    Diagnostic test results:  Diagnostic Test Results:  Results for orders placed or performed in visit on 06/04/18   Basic metabolic panel  (Ca, Cl, CO2, Creat, Gluc, K, Na, BUN)   Result Value Ref Range    Sodium 142 133 - 144 mmol/L    Potassium 3.6 3.4 - 5.3 mmol/L    Chloride 110 (H) 94 - 109 mmol/L    Carbon Dioxide 24 20 - 32 mmol/L    Anion Gap 8 3 - 14 mmol/L    Glucose 82 70 - 99 mg/dL    Urea Nitrogen 11 7 - 30 mg/dL    Creatinine 0.74 0.52 - 1.04 mg/dL    GFR Estimate >90 >60 mL/min/1.7m2    GFR Estimate If Black >90 >60 mL/min/1.7m2    Calcium 8.9 8.5 - 10.1 mg/dL   CBC with platelets and differential   Result Value Ref Range    WBC 7.7 4.0 - 11.0 10e9/L    RBC Count 5.13 3.8 - 5.2 10e12/L    Hemoglobin 15.3 11.7 - 15.7 g/dL    Hematocrit 46.0 35.0 - 47.0 %    MCV 90 78 - 100 fl    MCH 29.8 26.5 - 33.0 pg    MCHC 33.3 31.5 - 36.5 g/dL    RDW 12.6 10.0 - 15.0 %    Platelet Count 59 (L) 150 - 450 10e9/L    Diff Method Automated Method     % Neutrophils 63.9 %    % Lymphocytes 29.7 %    % Monocytes 4.6 %    % Eosinophils 0.9 %    % Basophils 0.8 %    % Immature Granulocytes 0.1 %    Nucleated RBCs 0 0 /100    Absolute Neutrophil 4.9 1.6 - 8.3 10e9/L    Absolute Lymphocytes 2.3 0.8 - 5.3 10e9/L    Absolute Monocytes 0.4 0.0 - 1.3 10e9/L    Absolute Eosinophils 0.1 0.0 - 0.7 10e9/L    Absolute Basophils 0.1 0.0 - 0.2 10e9/L    Abs Immature Granulocytes 0.0 0 - 0.4 10e9/L    Absolute Nucleated RBC 0.0         ASSESSMENT/PLAN:                                                    1. EDWARD (generalized anxiety disorder)  Will stop the clonzapam and try alprazolam instead  - ALPRAZolam (XANAX) 0.5 MG tablet; Take 1 tablet (0.5 mg) by mouth 2  times daily as needed for anxiety  Dispense: 60 tablet; Refill: 3    2. Irritable bowel syndrome with constipation  Stable   - linaclotide (LINZESS) 145 MCG capsule; Take 1 capsule (145 mcg) by mouth every morning (before breakfast)  Dispense: 30 capsule; Refill: 5  - Basic metabolic panel  (Ca, Cl, CO2, Creat, Gluc, K, Na, BUN)  - CBC with platelets and differential  - Magnesium Citrate POWD; 615 mg At Bedtime  Dispense: 1 Bottle; Refill: 3    3. Immune thrombocytopenia (H)  Was seeing hematology routinely   She tells me she became overwhelmed with the need for frequent monitoring and now change in the plan of care   She prefers to have labs routinely checked in the clinic   If a drop in platelet she will return to hematology for further management   Current stable without any treatment     - Basic metabolic panel  (Ca, Cl, CO2, Creat, Gluc, K, Na, BUN)  - CBC with platelets and differential  - CBC with platelets; Future    4. Binge eating disorder  Dose increased to 40 in the AM and 10 mg in the afternoon   Refilled for 3 months   She will see me back if does not find this change helpful   Plan will be to refer to mental health pre scriber if still requesting dose adjustments   - lisdexamfetamine (VYVANSE) 40 MG capsule; Take 1 capsule (40 mg) by mouth daily  Dispense: 30 capsule; Refill: 0  - lisdexamfetamine (VYVANSE) 40 MG capsule; Take 1 capsule (40 mg) by mouth daily  Dispense: 30 capsule; Refill: 0  - lisdexamfetamine (VYVANSE) 40 MG capsule; Take 1 capsule (40 mg) by mouth daily  Dispense: 30 capsule; Refill: 0  - lisdexamfetamine (VYVANSE) 10 MG capsule; Take 1 capsule (10 mg) by mouth daily (with lunch)  Dispense: 30 capsule; Refill: 0  - lisdexamfetamine (VYVANSE) 10 MG capsule; Take 1 capsule (10 mg) by mouth daily (before lunch)  Dispense: 30 capsule; Refill: 0  - lisdexamfetamine (VYVANSE) 10 MG capsule; Take 1 capsule (10 mg) by mouth daily (with lunch)  Dispense: 30 capsule; Refill: 0      Patient  Instructions   Refilled medications for 3 months   Try the xanax in place of the klonopin   Update me in 1 month if this isn't going well   Ok to refill over the phone in 3 month if all is well    Labs in 1 month         Mohini Mccord NP  [facility]

## 2018-06-05 ASSESSMENT — ANXIETY QUESTIONNAIRES: GAD7 TOTAL SCORE: 5

## 2018-06-05 ASSESSMENT — PATIENT HEALTH QUESTIONNAIRE - PHQ9: SUM OF ALL RESPONSES TO PHQ QUESTIONS 1-9: 9

## 2018-07-06 ENCOUNTER — TELEPHONE (OUTPATIENT)
Dept: FAMILY MEDICINE | Facility: CLINIC | Age: 33
End: 2018-07-06

## 2018-07-06 DIAGNOSIS — F50.819 BINGE EATING DISORDER: ICD-10-CM

## 2018-07-06 NOTE — TELEPHONE ENCOUNTER
Per 07-03-18 Vyvanse script, 18 of 30 caps dispensed.  Need new script for remaining 12 caps- pended.  Forwarded to Анна.  ADA Rubi RN

## 2018-07-06 NOTE — TELEPHONE ENCOUNTER
Request from pharmacy stating they dispenced 18 caps and need new Rx for remainder of Rx    Keke ROMERO

## 2018-07-09 RX ORDER — LISDEXAMFETAMINE DIMESYLATE 40 MG/1
40 CAPSULE ORAL DAILY
Qty: 12 CAPSULE | Refills: 0 | Status: SHIPPED | OUTPATIENT
Start: 2018-08-05 | End: 2018-09-07

## 2018-07-30 ENCOUNTER — TELEPHONE (OUTPATIENT)
Dept: FAMILY MEDICINE | Facility: CLINIC | Age: 33
End: 2018-07-30

## 2018-07-30 ENCOUNTER — NURSE TRIAGE (OUTPATIENT)
Dept: NURSING | Facility: CLINIC | Age: 33
End: 2018-07-30

## 2018-07-30 DIAGNOSIS — D69.3 IMMUNE THROMBOCYTOPENIA (H): ICD-10-CM

## 2018-07-30 DIAGNOSIS — Z32.01 PREGNANCY TEST POSITIVE: ICD-10-CM

## 2018-07-30 DIAGNOSIS — Z32.01 PREGNANCY TEST POSITIVE: Primary | ICD-10-CM

## 2018-07-30 LAB
B-HCG SERPL-ACNC: <1 IU/L (ref 0–5)
BASOPHILS # BLD AUTO: 0.1 10E9/L (ref 0–0.2)
BASOPHILS NFR BLD AUTO: 1.1 %
DIFFERENTIAL METHOD BLD: ABNORMAL
EOSINOPHIL # BLD AUTO: 0.1 10E9/L (ref 0–0.7)
EOSINOPHIL NFR BLD AUTO: 1 %
ERYTHROCYTE [DISTWIDTH] IN BLOOD BY AUTOMATED COUNT: 12.7 % (ref 10–15)
HCT VFR BLD AUTO: 45.2 % (ref 35–47)
HGB BLD-MCNC: 15 G/DL (ref 11.7–15.7)
IMM GRANULOCYTES # BLD: 0 10E9/L (ref 0–0.4)
IMM GRANULOCYTES NFR BLD: 0.2 %
LYMPHOCYTES # BLD AUTO: 2.5 10E9/L (ref 0.8–5.3)
LYMPHOCYTES NFR BLD AUTO: 41 %
MCH RBC QN AUTO: 29.9 PG (ref 26.5–33)
MCHC RBC AUTO-ENTMCNC: 33.2 G/DL (ref 31.5–36.5)
MCV RBC AUTO: 90 FL (ref 78–100)
MONOCYTES # BLD AUTO: 0.3 10E9/L (ref 0–1.3)
MONOCYTES NFR BLD AUTO: 5.2 %
NEUTROPHILS # BLD AUTO: 3.2 10E9/L (ref 1.6–8.3)
NEUTROPHILS NFR BLD AUTO: 51.5 %
NRBC # BLD AUTO: 0 10*3/UL
NRBC BLD AUTO-RTO: 0 /100
PLATELET # BLD AUTO: 45 10E9/L (ref 150–450)
RBC # BLD AUTO: 5.02 10E12/L (ref 3.8–5.2)
WBC # BLD AUTO: 6.2 10E9/L (ref 4–11)

## 2018-07-30 PROCEDURE — 36415 COLL VENOUS BLD VENIPUNCTURE: CPT | Performed by: NURSE PRACTITIONER

## 2018-07-30 PROCEDURE — 84702 CHORIONIC GONADOTROPIN TEST: CPT | Performed by: NURSE PRACTITIONER

## 2018-07-30 PROCEDURE — 85025 COMPLETE CBC W/AUTO DIFF WBC: CPT | Performed by: INTERNAL MEDICINE

## 2018-07-30 NOTE — TELEPHONE ENCOUNTER
A critical lab came in to FNA from [facility] at the [facility] for this patient. At 5:57 pm I left a voicemail for Dr NANCY Le on his cell [phone], as he is the provider who ordered the lab, to call me back at [facility], [phone].  6:00 pm Dr Le called me and took the information to call [facility] at the [place] [facility], [phone], for the critical platelet lab from today.    Tiffani Phelan RN, [facility]

## 2018-07-30 NOTE — PROGRESS NOTES
Patient called reporting a positive home pregnancy test and is requesting blood test to confirm with labs that are to be done tonight at 530 PM lab appointment. Анна Mccord NP

## 2018-07-30 NOTE — TELEPHONE ENCOUNTER
Reason for Call:  Other     Detailed comments: Patient is suppose to come in for a blood tests today but she wanted Danyelle to know that she had a positive pregnancy test yesterday. Please call pt    Phone Number Patient can be reached at: Home number on file [phone] (home)    Best Time:     Can we leave a detailed message on this number? YES    Call taken on 7/30/2018 at 10:44 AM by Jahaira Payne

## 2018-07-31 ENCOUNTER — NURSE TRIAGE (OUTPATIENT)
Dept: NURSING | Facility: CLINIC | Age: 33
End: 2018-07-31

## 2018-07-31 NOTE — TELEPHONE ENCOUNTER
As requested, writer gave pt her HCG results.  She stated her understanding and had no further questions.     Naye Caruso RN/FNA

## 2018-07-31 NOTE — TELEPHONE ENCOUNTER
Reason for Disposition    [1] Caller requesting NON-URGENT health information AND [2] PCP's office is the best resource    Additional Information    Negative: [1] Caller is not with the adult (patient) AND [2] reporting urgent symptoms    Negative: Lab result questions    Negative: Medication questions    Negative: Caller cannot be reached by phone    Negative: Caller has already spoken to PCP or another triager    Negative: RN needs further essential information from caller in order to complete triage    Negative: Requesting regular office appointment    Protocols used: INFORMATION ONLY CALL-ADULT-  Pt calling to see if her pregnancy test is done. Informed the HCG test for pregnancy is still in process, so no results yet. Advised to call clinic in am tomorrow for results, should be done by then. Also, has not heard from Dr Le about CBC and platelet test. She says her platelets have been low for months. Denies bleeding or bruises at this time. She will call clinic tomorrow for all test information and know she can call us back 24/7 if clinic is closed.     Tiffani Phelan RN, [facility]

## 2018-08-01 DIAGNOSIS — N91.2 AMENORRHEA: Primary | ICD-10-CM

## 2018-08-01 DIAGNOSIS — F41.1 GAD (GENERALIZED ANXIETY DISORDER): ICD-10-CM

## 2018-08-01 RX ORDER — CLONAZEPAM 0.5 MG/1
0.25-0.5 TABLET ORAL 2 TIMES DAILY PRN
Qty: 20 TABLET | Refills: 0 | COMMUNITY
Start: 2018-08-01 | End: 2019-11-06

## 2018-09-07 DIAGNOSIS — F50.819 BINGE EATING DISORDER: ICD-10-CM

## 2018-09-07 RX ORDER — LISDEXAMFETAMINE DIMESYLATE 10 MG/1
10 CAPSULE ORAL
Qty: 30 CAPSULE | Refills: 0 | Status: SHIPPED | OUTPATIENT
Start: 2018-09-07 | End: 2018-10-31

## 2018-09-07 RX ORDER — LISDEXAMFETAMINE DIMESYLATE 10 MG/1
10 CAPSULE ORAL
Qty: 30 CAPSULE | Refills: 0 | Status: SHIPPED | OUTPATIENT
Start: 2018-10-07 | End: 2018-09-07

## 2018-09-07 RX ORDER — LISDEXAMFETAMINE DIMESYLATE 40 MG/1
40 CAPSULE ORAL DAILY
Qty: 30 CAPSULE | Refills: 0 | Status: SHIPPED | OUTPATIENT
Start: 2018-09-07 | End: 2018-10-31

## 2018-09-07 RX ORDER — LISDEXAMFETAMINE DIMESYLATE 10 MG/1
10 CAPSULE ORAL EVERY MORNING
Qty: 30 CAPSULE | Refills: 0 | Status: ON HOLD | OUTPATIENT
Start: 2018-11-07 | End: 2018-11-06

## 2018-09-07 RX ORDER — LISDEXAMFETAMINE DIMESYLATE 40 MG/1
40 CAPSULE ORAL EVERY MORNING
Qty: 30 CAPSULE | Refills: 0 | Status: SHIPPED | OUTPATIENT
Start: 2018-10-07 | End: 2018-12-03

## 2018-09-07 RX ORDER — LISDEXAMFETAMINE DIMESYLATE 40 MG/1
40 CAPSULE ORAL EVERY MORNING
Qty: 30 CAPSULE | Refills: 0 | Status: SHIPPED | OUTPATIENT
Start: 2018-11-07 | End: 2018-09-07

## 2018-09-07 RX ORDER — LISDEXAMFETAMINE DIMESYLATE 10 MG/1
10 CAPSULE ORAL
Qty: 30 CAPSULE | Refills: 0 | Status: SHIPPED | OUTPATIENT
Start: 2018-10-07 | End: 2018-12-03

## 2018-09-07 NOTE — PROGRESS NOTES
Merissa called she needs refills on vyvanse. Current rx is working well for her. 3 month of scripts provided and left at  to . Reminded she is due for a pap.   Анна MCGINNIS

## 2018-09-23 ENCOUNTER — HEALTH MAINTENANCE LETTER (OUTPATIENT)
Age: 33
End: 2018-09-23

## 2018-09-24 DIAGNOSIS — Z32.01 PREGNANCY TEST POSITIVE: Primary | ICD-10-CM

## 2018-09-24 DIAGNOSIS — D69.3 IMMUNE THROMBOCYTOPENIA (H): ICD-10-CM

## 2018-09-24 DIAGNOSIS — Z32.01 PREGNANCY TEST POSITIVE: ICD-10-CM

## 2018-09-24 LAB
B-HCG SERPL-ACNC: <1 IU/L (ref 0–5)
BETA HCG QUAL IFA URINE: NEGATIVE
ERYTHROCYTE [DISTWIDTH] IN BLOOD BY AUTOMATED COUNT: 12 % (ref 10–15)
HCT VFR BLD AUTO: 44.1 % (ref 35–47)
HGB BLD-MCNC: 14.5 G/DL (ref 11.7–15.7)
MCH RBC QN AUTO: 30 PG (ref 26.5–33)
MCHC RBC AUTO-ENTMCNC: 32.9 G/DL (ref 31.5–36.5)
MCV RBC AUTO: 91 FL (ref 78–100)
PLATELET # BLD AUTO: 30 10E9/L (ref 150–450)
RBC # BLD AUTO: 4.84 10E12/L (ref 3.8–5.2)
WBC # BLD AUTO: 6 10E9/L (ref 4–11)

## 2018-09-24 PROCEDURE — 36415 COLL VENOUS BLD VENIPUNCTURE: CPT | Performed by: NURSE PRACTITIONER

## 2018-09-24 PROCEDURE — 84702 CHORIONIC GONADOTROPIN TEST: CPT | Performed by: NURSE PRACTITIONER

## 2018-09-24 PROCEDURE — 85027 COMPLETE CBC AUTOMATED: CPT | Performed by: NURSE PRACTITIONER

## 2018-09-24 PROCEDURE — 84703 CHORIONIC GONADOTROPIN ASSAY: CPT | Performed by: NURSE PRACTITIONER

## 2018-09-25 ENCOUNTER — TELEPHONE (OUTPATIENT)
Dept: FAMILY MEDICINE | Facility: CLINIC | Age: 33
End: 2018-09-25

## 2018-09-25 NOTE — TELEPHONE ENCOUNTER
Component      Latest Ref Rng & Units 9/24/2018   Beta HCG Qual IFA Urine      NEG:Negative    Negative   HCG Quantitative Serum      0 - 5 IU/L <1    Анна has informed pt of lab result.  ADA Rubi RN

## 2018-09-25 NOTE — TELEPHONE ENCOUNTER
Reason for Call:  Request for results:    Name of test or procedure: pregnancy test    Date of test of procedure: 09/24/18      Location of the test or procedure: [facility] to leave the result message on voice mail or with a family member? NO    Phone number Patient can be reached at:  Home number on file [phone] (home)    Additional comments: pt anxious for her results. Final but not read by MD,    Call taken on 9/25/2018 at 8:46 AM by Yara Crowley

## 2018-09-30 NOTE — PROGRESS NOTES
Patient contacted me on 9/24/18 requesting ordered for pregnancy urine and serum testing orders placed.   Анна Dalal NP

## 2018-10-02 ENCOUNTER — HOSPITAL ENCOUNTER (OUTPATIENT)
Dept: LAB | Facility: CLINIC | Age: 33
Discharge: HOME | End: 2018-10-02
Attending: INTERNAL MEDICINE | Admitting: INTERNAL MEDICINE
Payer: COMMERCIAL

## 2018-10-02 ENCOUNTER — ONCOLOGY VISIT (OUTPATIENT)
Dept: ONCOLOGY | Facility: CLINIC | Age: 33
End: 2018-10-02
Attending: INTERNAL MEDICINE
Payer: COMMERCIAL

## 2018-10-02 VITALS
DIASTOLIC BLOOD PRESSURE: 71 MMHG | HEART RATE: 94 BPM | OXYGEN SATURATION: 100 % | SYSTOLIC BLOOD PRESSURE: 120 MMHG | WEIGHT: 109.3 LBS | BODY MASS INDEX: 17.57 KG/M2 | TEMPERATURE: 98.6 F | RESPIRATION RATE: 16 BRPM | HEIGHT: 66 IN

## 2018-10-02 DIAGNOSIS — D69.3 IDIOPATHIC THROMBOCYTOPENIC PURPURA (H): Primary | ICD-10-CM

## 2018-10-02 LAB
BASOPHILS # BLD AUTO: 0.1 10E9/L (ref 0–0.2)
BASOPHILS NFR BLD AUTO: 0.6 %
DIFFERENTIAL METHOD BLD: ABNORMAL
EOSINOPHIL # BLD AUTO: 0 10E9/L (ref 0–0.7)
EOSINOPHIL NFR BLD AUTO: 0.4 %
ERYTHROCYTE [DISTWIDTH] IN BLOOD BY AUTOMATED COUNT: 11.9 % (ref 10–15)
HCT VFR BLD AUTO: 45.5 % (ref 35–47)
HGB BLD-MCNC: 14.9 G/DL (ref 11.7–15.7)
IMM GRANULOCYTES # BLD: 0 10E9/L (ref 0–0.4)
IMM GRANULOCYTES NFR BLD: 0.3 %
LYMPHOCYTES # BLD AUTO: 1.8 10E9/L (ref 0.8–5.3)
LYMPHOCYTES NFR BLD AUTO: 23.4 %
MCH RBC QN AUTO: 30 PG (ref 26.5–33)
MCHC RBC AUTO-ENTMCNC: 32.7 G/DL (ref 31.5–36.5)
MCV RBC AUTO: 92 FL (ref 78–100)
MONOCYTES # BLD AUTO: 0.3 10E9/L (ref 0–1.3)
MONOCYTES NFR BLD AUTO: 4.2 %
NEUTROPHILS # BLD AUTO: 5.5 10E9/L (ref 1.6–8.3)
NEUTROPHILS NFR BLD AUTO: 71.1 %
NRBC # BLD AUTO: 0 10*3/UL
NRBC BLD AUTO-RTO: 0 /100
PLATELET # BLD AUTO: 29 10E9/L (ref 150–450)
RBC # BLD AUTO: 4.96 10E12/L (ref 3.8–5.2)
WBC # BLD AUTO: 7.8 10E9/L (ref 4–11)

## 2018-10-02 PROCEDURE — 85025 COMPLETE CBC W/AUTO DIFF WBC: CPT | Performed by: INTERNAL MEDICINE

## 2018-10-02 PROCEDURE — G0463 HOSPITAL OUTPT CLINIC VISIT: HCPCS

## 2018-10-02 PROCEDURE — 36415 COLL VENOUS BLD VENIPUNCTURE: CPT | Performed by: INTERNAL MEDICINE

## 2018-10-02 PROCEDURE — 99215 OFFICE O/P EST HI 40 MIN: CPT | Performed by: INTERNAL MEDICINE

## 2018-10-02 RX ORDER — DIPHENHYDRAMINE HCL 25 MG
25 CAPSULE ORAL EVERY 6 HOURS PRN
Status: CANCELLED
Start: 2018-10-02

## 2018-10-02 RX ORDER — ACETAMINOPHEN 325 MG/1
325 TABLET ORAL EVERY 4 HOURS PRN
Status: CANCELLED
Start: 2018-10-02

## 2018-10-02 ASSESSMENT — PAIN SCALES - GENERAL: PAINLEVEL: MODERATE PAIN (5)

## 2018-10-02 NOTE — LETTER
10/2/2018         RE: Merissa Leung  [address] [facility] [address]        Dear Colleague,    Thank you for referring your patient, Merissa Leung, to the [facility]. Please see a copy of my visit note below.    Visit Date:   10/02/2018      HEMATOLOGY FOLLOWUP      CHIEF COMPLAINT AND REASON FOR VISIT:  ITP.  Transfer care from Dr. Le to me 10/02/2018.      HISTORY OF PRESENT ILLNESS:  She was seen first by Dr. Le on 11/20/2017 with severe thrombocytopenia isolated of PL around 30, working diagnosis ITP. Wbc/PL are fine.     She was offered prednisone 50 mg with slow taper over 2 or 3 months' period of time.  Her angela platelet count was around 21, was never back up to normal with the prednisone taper.  She has continued severe thrombocytopenia.  Splenectomy was discussed.  Plan was not carried over.      Her platelet count was normal back in 2016.  In 01/2017, platelet count was at 124 and then 11/2017 was at 32.   September 2018 recheck platelet count at 30 with normal white count and hemoglobin.     She reports steroid intolerance, not willing to get back on it.       PAST MEDICAL HISTORY:  Binge eating on medication, chronic abdominal pain, constipation on constipation medication, fibromyalgia, anxiety, hyperlipidemia, underweight, obsessive-compulsive disorder.      SOCIAL HISTORY:  She is .  She has 2 kids.  Her last pregnancy was in 2016.  She denies smoking, denies alcohol abuse.  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      FAMILY HISTORY:  Denied family history of a low platelet count.      REVIEW OF SYSTEMS:  She has intermittent gingival bleed and epistaxis and hemorrhoid bleed.  She is always underweight.  She has chronic body ache, abdominal discomfort and pain.      PHYSICAL EXAMINATION:   VITAL SIGNS:  Stable. Blood pressure 120/71, pulse 94, temperature 98.6  F (37  C), temperature source Tympanic,  "resp. rate 16, height 1.664 m (5' 5.5\"), weight 49.6 kg (109 lb 4.8 oz), SpO2 100 %, not currently breastfeeding.    ECOG 0    GENERAL APPEARANCE: NAD  HEENT: The patient is normocephalic, atraumatic. Pupils are equally reactive to light.  Sclerae are anicteric.  Moist oral mucosa.  Negative pharynx.  No oral thrush.   NECK:  Supple.  No jugular venous distention.  Thyroid is not palpable.   LYMPH NODES:  Superficial lymphadenopathy is not appreciable in the bilateral cervical, supraclavicular, axillary or inguinal areas.   CARDIOVASCULAR:  S1, S2 regular with no murmurs or gallops.  No carotid or abdominal bruits.   PULMONARY:  Lungs are clear to auscultation and percussion bilaterally.  There is no wheezing or rhonchi.   GASTROINTESTINAL:  Abdomen is soft, nontender.  No hepatosplenomegaly.  No signs of ascites.  No mass appreciable.   MUSCULOSKELETAL/EXTREMITIES:  No edema.  No cyanotic changes.  No signs of joint deformity.  No lymphedema.  No spinal or paraspinal tenderness.  No CVA tenderness.   NEUROLOGIC:  Cranial nerves II-XII are grossly intact.  Sensation intact.  Muscle strength and muscle tone symmetrical, 5/5 throughout.   SKIN:  No petechiae.  No rash.  No signs of cellulitis.       CURRENT LAB DATA REVIEWED:    September 2018:  Platelet count 30.  White count normal.  Hemoglobin normal.      CURRENT IMAGE DATA REVIEWED:  J  anuary 2018 ultrasound abdomen identified normal spleen size and liver size.     OLD DATA REVIEWED WITH SUMMARY:   Her platelet count was normal back in 2016.  In 01/2017, platelet count was at 124 and then 11/2017 was at 32.         ASSESSMENT AND PLAN:  Idiopathic thrombocytopenia purpura presenting platelet count around 30s, late 2017, status post 3-4 months of prednisone, has poor tolerance and minimal response.  She refused to get back on steroid again.      We discussed the bleeding risk associated with severe thrombocytopenia.  The indication treatment for ITP is platelet " count less than 30.      We talked about treatment options in terms of steroid rituximab, IVIG, immunosuppressive agent thrombopoietin agonist, different side effects associated with different options.  Due to her young age, active life, I think splenectomy is a valid option to pursue in this setting while we support her with IVIG.      We then discussed what is IVIG, why it works in ITP, how it is infused, dose, frequency, side effects, and how she should be monitored during this IV infusion.      Questions were answered to her satisfaction in terms of diagnosis, ITP, treatment, side effect, different treatment options, etc.      Total time was about 40 minutes, more than 35 minutes spent in counseling regarding this diagnosis and the above.         LAURITA MENESES MD             D: 10/02/2018   T: 10/02/2018   MT: PAZ      Name:     ALLEGRA LUZ   MRN:      [id number]        Account:      [id number]   :      1985           Visit Date:   10/02/2018      Document: [id number]        Pt is scheduled for IVIG.     Maddy Miramontes RN on 10/2/2018 at 4:15 PM     Again, thank you for allowing me to participate in the care of your patient.        Sincerely,        Bernadette Meneses MD, MD

## 2018-10-02 NOTE — PATIENT INSTRUCTIONS
Dr. Pena would like you to start monthly labs today and IVIG x2 as needed.    We would like you to have a surgical consult to discuss removal of spleen.       We would like to see you back in as needed pending Platelet count level for a follow up appointment.     When you are in need of a refill, please call your pharmacy and they will send us a request.      Copy of appointments, and after visit summary (AVS) given to patient.      If you have any questions please call Maddy Miramontes RN, BSN Oncology Hematology  [facility] [phone]. For questions after business hours, or on holidays/weekends, please call our after hours Nurse Triage line [phone]. Thank you.           Monthly cbc start today, with IVIG I in therapy plan prn. IVIG education material to pt.   Surgical consult for splenectomy for ITP.   F/u base on her PL count.

## 2018-10-02 NOTE — PROGRESS NOTES
"Visit Date:   10/02/2018      HEMATOLOGY FOLLOWUP      CHIEF COMPLAINT AND REASON FOR VISIT:  ITP.  Transfer care from Dr. Le to me 10/02/2018.      HISTORY OF PRESENT ILLNESS:  She was seen first by Dr. Le on 11/20/2017 with severe thrombocytopenia isolated of PL around 30, working diagnosis ITP. Wbc/PL are fine.     She was offered prednisone 50 mg with slow taper over 2 or 3 months' period of time.  Her angela platelet count was around 21, was never back up to normal with the prednisone taper.  She has continued severe thrombocytopenia.  Splenectomy was discussed.  Plan was not carried over.      Her platelet count was normal back in 2016.  In 01/2017, platelet count was at 124 and then 11/2017 was at 32.   September 2018 recheck platelet count at 30 with normal white count and hemoglobin.     She reports steroid intolerance, not willing to get back on it.       PAST MEDICAL HISTORY:  Binge eating on medication, chronic abdominal pain, constipation on constipation medication, fibromyalgia, anxiety, hyperlipidemia, underweight, obsessive-compulsive disorder.      SOCIAL HISTORY:  She is .  She has 2 kids.  Her last pregnancy was in 2016.  She denies smoking, denies alcohol abuse.  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      FAMILY HISTORY:  Denied family history of a low platelet count.      REVIEW OF SYSTEMS:  She has intermittent gingival bleed and epistaxis and hemorrhoid bleed.  She is always underweight.  She has chronic body ache, abdominal discomfort and pain.      PHYSICAL EXAMINATION:   VITAL SIGNS:  Stable. Blood pressure 120/71, pulse 94, temperature 98.6  F (37  C), temperature source Tympanic, resp. rate 16, height 1.664 m (5' 5.5\"), weight 49.6 kg (109 lb 4.8 oz), SpO2 100 %, not currently breastfeeding.    ECOG 0    GENERAL APPEARANCE: NAD  HEENT: The patient is normocephalic, atraumatic. Pupils are equally reactive to " light.  Sclerae are anicteric.  Moist oral mucosa.  Negative pharynx.  No oral thrush.   NECK:  Supple.  No jugular venous distention.  Thyroid is not palpable.   LYMPH NODES:  Superficial lymphadenopathy is not appreciable in the bilateral cervical, supraclavicular, axillary or inguinal areas.   CARDIOVASCULAR:  S1, S2 regular with no murmurs or gallops.  No carotid or abdominal bruits.   PULMONARY:  Lungs are clear to auscultation and percussion bilaterally.  There is no wheezing or rhonchi.   GASTROINTESTINAL:  Abdomen is soft, nontender.  No hepatosplenomegaly.  No signs of ascites.  No mass appreciable.   MUSCULOSKELETAL/EXTREMITIES:  No edema.  No cyanotic changes.  No signs of joint deformity.  No lymphedema.  No spinal or paraspinal tenderness.  No CVA tenderness.   NEUROLOGIC:  Cranial nerves II-XII are grossly intact.  Sensation intact.  Muscle strength and muscle tone symmetrical, 5/5 throughout.   SKIN:  No petechiae.  No rash.  No signs of cellulitis.       CURRENT LAB DATA REVIEWED:    September 2018:  Platelet count 30.  White count normal.  Hemoglobin normal.      CURRENT IMAGE DATA REVIEWED:  J  anuary 2018 ultrasound abdomen identified normal spleen size and liver size.     OLD DATA REVIEWED WITH SUMMARY:   Her platelet count was normal back in 2016.  In 01/2017, platelet count was at 124 and then 11/2017 was at 32.         ASSESSMENT AND PLAN:  Idiopathic thrombocytopenia purpura presenting platelet count around 30s, late 2017, status post 3-4 months of prednisone, has poor tolerance and minimal response.  She refused to get back on steroid again.      We discussed the bleeding risk associated with severe thrombocytopenia.  The indication treatment for ITP is platelet count less than 30.      We talked about treatment options in terms of steroid rituximab, IVIG, immunosuppressive agent thrombopoietin agonist, different side effects associated with different options.  Due to her young age, active  life, I think splenectomy is a valid option to pursue in this setting while we support her with IVIG.      We then discussed what is IVIG, why it works in ITP, how it is infused, dose, frequency, side effects, and how she should be monitored during this IV infusion.      Questions were answered to her satisfaction in terms of diagnosis, ITP, treatment, side effect, different treatment options, etc.      Total time was about 40 minutes, more than 35 minutes spent in counseling regarding this diagnosis and the above.         LAURITA MENESES MD             D: 10/02/2018   T: 10/02/2018   MT: PAZ      Name:     ALLEGRA LUZ   MRN:      [id number]        Account:      [id number]   :      1985           Visit Date:   10/02/2018      Document: [id number]

## 2018-10-02 NOTE — MR AVS SNAPSHOT
After Visit Summary   10/2/2018    Merissa Leung    MRN: [id number]           Patient Information     Date Of Birth          1985        Visit Information        Provider Department      10/2/2018 1:00 PM Bernadette Pena MD [facility]        Today's Diagnoses     Idiopathic thrombocytopenic purpura (H)    -  1      Care Instructions    Dr. Pena would like you to start monthly labs today and IVIG x2 as needed.    We would like you to have a surgical consult to discuss removal of spleen.       We would like to see you back in as needed pending Platelet count level for a follow up appointment.     When you are in need of a refill, please call your pharmacy and they will send us a request.      Copy of appointments, and after visit summary (AVS) given to patient.      If you have any questions please call Maddy Miramontes RN, BSN Oncology Hematology  [facility] [phone]. For questions after business hours, or on holidays/weekends, please call our after hours Nurse Triage line [phone]. Thank you.           Monthly cbc start today, with IVIG I in therapy plan prn. IVIG education material to pt.   Surgical consult for splenectomy for ITP.   F/u base on her PL count.           Follow-ups after your visit        Additional Services     GENERAL SURG ADULT REFERRAL       Your provider has referred you to: [facility]: [facility] [phone]   [email or web address]    Please be aware that coverage of these services is subject to the terms and limitations of your health insurance plan.  Call member services at your health plan with any benefit or coverage questions.      Please bring the following with you to your appointment:    (1) Any X-Rays, CTs or MRIs which have been performed.  Contact the facility where they were done to arrange for  prior to your scheduled appointment.   (2) List of current  medications   (3) This referral request   (4) Any documents/labs given to you for this referral    Discuss splenectomy d/t ITP                  Your next 10 appointments already scheduled     Oct 08, 2018 11:00 AM CDT   Level 3 with ROOM 9 [facility] ([facility])    [facility]  [address]  [facility] [address]   [phone]            Oct 09, 2018  9:45 AM CDT   New Visit with Anthony Jackson,    [facility]    [address] [facility] [address] [address]   [phone]            Oct 09, 2018 11:00 AM CDT   Level 3 with ROOM 8 [facility] ([facility])    [facility]  [address]  [facility] [address]   [phone]            Nov 30, 2018  2:45 PM CST   PHYSICAL with Carola Sanchez MD   [facility] ([facility])    [address] [facility] [address]   [phone]              Future tests that were ordered for you today     Open Standing Orders        Priority Remaining Interval Expires Ordered    CBC with platelets differential Routine 5/6 monthly 10/2/2019 10/2/2018            Who to contact     If you have questions or need follow up information about today's clinic visit or your schedule please contact [facility] directly at [phone].  Normal or non-critical lab and imaging results will be communicated to you by MyChart, letter or phone within 4 business days after the clinic has received the results. If you do not hear from us within 7 days, please contact the clinic through MyChart or phone. If you have a critical or abnormal lab result, we will notify you by phone as soon as possible.  Submit refill requests through [facility] or call your pharmacy and they will forward the refill request to us. Please allow 3 business days for your refill to be completed.           "Additional Information About Your Visit        MyChart Information     [employer] gives you secure access to your electronic health record. If you see a primary care provider, you can also send messages to your care team and make appointments. If you have questions, please call your primary care clinic.  If you do not have a primary care provider, please call [phone] and they will assist you.        Care EveryWhere ID     This is your Care EveryWhere ID. This could be used by other organizations to access your [place] medical records  [id number]        Your Vitals Were     Pulse Temperature Respirations Height Pulse Oximetry Breastfeeding?    94 98.6  F (37  C) (Tympanic) 16 1.664 m (5' 5.5\") 100% No    BMI (Body Mass Index)                   17.91 kg/m2            Blood Pressure from Last 3 Encounters:   10/02/18 120/71   06/04/18 110/78   03/12/18 124/78    Weight from Last 3 Encounters:   10/02/18 49.6 kg (109 lb 4.8 oz)   06/04/18 47.6 kg (105 lb)   02/06/18 47.5 kg (104 lb 12.8 oz)              We Performed the Following     CBC with platelets differential     GENERAL SURG ADULT REFERRAL        Primary Care Provider Office Phone # Fax #    Mohini Flex, [phone]       [address] [facility] [address]        Equal Access to Services     [facility] JOSE AH: Hadii ivanna ku hadasho Soomaali, waaxda luqadaha, qaybta kaalmada adeegyada, leela us . So [facility] [phone].    ATENCIÓN: Si habla español, tiene a goldsmith disposición servicios gratuitos de asistencia lingüística. Llame al [phone].    We comply with applicable federal civil rights laws and [place] laws. We do not discriminate on the basis of race, color, national origin, age, disability, sex, sexual orientation, or gender identity.            Thank you!     Thank you for choosing [facility]  for your care. Our goal is always to provide you with excellent care. Hearing back from our " patients is one way we can continue to improve our services. Please take a few minutes to complete the written survey that you may receive in the mail after your visit with us. Thank you!             Your Updated Medication List - Protect others around you: Learn how to safely use, store and throw away your medicines at www.disposemymeds.org.          This list is accurate as of 10/2/18  2:10 PM.  Always use your most recent med list.                   Brand Name Dispense Instructions for use Diagnosis    clonazePAM 0.5 MG tablet    klonoPIN    20 tablet    Take 0.5-1 tablets (0.25-0.5 mg) by mouth 2 times daily as needed for anxiety    EDWARD (generalized anxiety disorder)       linaclotide 145 MCG capsule    LINZESS    30 capsule    Take 1 capsule (145 mcg) by mouth every morning (before breakfast)    Irritable bowel syndrome with constipation       * lisdexamfetamine 10 MG capsule    VYVANSE    30 capsule    Take 1 capsule (10 mg) by mouth daily (with lunch)    Binge eating disorder       * lisdexamfetamine 40 MG capsule    VYVANSE    30 capsule    Take 1 capsule (40 mg) by mouth daily    Binge eating disorder       * lisdexamfetamine 10 MG capsule   Start taking on:  10/7/2018    VYVANSE    30 capsule    Take 1 capsule (10 mg) by mouth daily (before lunch)    Binge eating disorder       * lisdexamfetamine 40 MG capsule   Start taking on:  10/7/2018    VYVANSE    30 capsule    Take 1 capsule (40 mg) by mouth every morning    Binge eating disorder       * lisdexamfetamine 10 MG capsule   Start taking on:  11/7/2018    VYVANSE    30 capsule    Take 1 capsule (10 mg) by mouth every morning    Binge eating disorder       Magnesium Citrate Powd     1 Bottle    615 mg At Bedtime    Irritable bowel syndrome with constipation       MULTIVITAL PO      Take 1 chew tab by mouth daily Gummi        polyethylene glycol powder    MIRALAX/GLYCOLAX     Take 17 g by mouth daily as needed for constipation        sennosides 8.6 MG  tablet    SENOKOT     Take 2 tablets by mouth 2 times daily        UNABLE TO FIND      Take 2 teaspoonful by mouth At Bedtime Magnesium Calm        * Notice:  This list has 5 medication(s) that are the same as other medications prescribed for you. Read the directions carefully, and ask your doctor or other care provider to review them with you.

## 2018-10-02 NOTE — NURSING NOTE
"Oncology Rooming Note    October 2, 2018 1:10 PM   Merissa Leung is a 33 year old female who presents for:    Chief Complaint   Patient presents with     Hematology     New Patient- Immune thrombocytopenia, transfer form Dr Le     Initial Vitals: /71 (BP Location: Right arm, Patient Position: Sitting, Cuff Size: Adult Regular)  Pulse 94  Temp 98.6  F (37  C) (Tympanic)  Resp 16  Ht 1.664 m (5' 5.5\")  Wt 49.6 kg (109 lb 4.8 oz)  SpO2 100%  Breastfeeding? No  BMI 17.91 kg/m2 Estimated body mass index is 17.91 kg/(m^2) as calculated from the following:    Height as of this encounter: 1.664 m (5' 5.5\").    Weight as of this encounter: 49.6 kg (109 lb 4.8 oz). Body surface area is 1.51 meters squared.  Moderate Pain (5) Comment: entire right side of body    No LMP recorded. Patient is not currently having periods (Reason: Irregular Periods).  Allergies reviewed: Yes  Medications reviewed: Yes    Medications: Medication refills not needed today.  Pharmacy name entered into [employer]: [facility] - [address]    Clinical concerns:  New Patient- Immune thrombocytopenia, transfer form Dr eL.    7 minutes for nursing intake (face to face time)     Ashley Sultana CMA              "

## 2018-10-08 ENCOUNTER — INFUSION THERAPY VISIT (OUTPATIENT)
Dept: INFUSION THERAPY | Facility: CLINIC | Age: 33
End: 2018-10-08
Attending: INTERNAL MEDICINE
Payer: COMMERCIAL

## 2018-10-08 VITALS
SYSTOLIC BLOOD PRESSURE: 103 MMHG | TEMPERATURE: 96.9 F | HEART RATE: 63 BPM | OXYGEN SATURATION: 97 % | RESPIRATION RATE: 22 BRPM | DIASTOLIC BLOOD PRESSURE: 60 MMHG

## 2018-10-08 DIAGNOSIS — D69.3 IMMUNE THROMBOCYTOPENIA (H): Primary | ICD-10-CM

## 2018-10-08 PROCEDURE — 25000132 ZZH RX MED GY IP 250 OP 250 PS 637: Performed by: INTERNAL MEDICINE

## 2018-10-08 PROCEDURE — 96365 THER/PROPH/DIAG IV INF INIT: CPT

## 2018-10-08 PROCEDURE — 25000128 H RX IP 250 OP 636: Performed by: INTERNAL MEDICINE

## 2018-10-08 PROCEDURE — 96366 THER/PROPH/DIAG IV INF ADDON: CPT

## 2018-10-08 PROCEDURE — 25000128 H RX IP 250 OP 636

## 2018-10-08 RX ORDER — DIPHENHYDRAMINE HCL 25 MG
25 CAPSULE ORAL EVERY 6 HOURS PRN
Status: DISCONTINUED | OUTPATIENT
Start: 2018-10-08 | End: 2018-10-08 | Stop reason: HOSPADM

## 2018-10-08 RX ORDER — HEPARIN SODIUM,PORCINE 10 UNIT/ML
VIAL (ML) INTRAVENOUS
Status: COMPLETED
Start: 2018-10-08 | End: 2018-10-08

## 2018-10-08 RX ORDER — DIPHENHYDRAMINE HCL 25 MG
25 CAPSULE ORAL EVERY 6 HOURS PRN
Status: CANCELLED
Start: 2018-10-08

## 2018-10-08 RX ORDER — ACETAMINOPHEN 325 MG/1
325 TABLET ORAL EVERY 4 HOURS PRN
Status: CANCELLED
Start: 2018-10-08

## 2018-10-08 RX ORDER — ACETAMINOPHEN 325 MG/1
325 TABLET ORAL EVERY 4 HOURS PRN
Status: DISCONTINUED | OUTPATIENT
Start: 2018-10-08 | End: 2018-10-08 | Stop reason: HOSPADM

## 2018-10-08 RX ADMIN — RANITIDINE 150 MG: 150 TABLET, FILM COATED ORAL at 11:31

## 2018-10-08 RX ADMIN — HUMAN IMMUNOGLOBULIN G 25 G: 20 LIQUID INTRAVENOUS at 12:49

## 2018-10-08 RX ADMIN — HEPARIN, PORCINE (PF) 10 UNIT/ML INTRAVENOUS SYRINGE 50 UNITS: at 14:51

## 2018-10-08 RX ADMIN — DIPHENHYDRAMINE HYDROCHLORIDE 25 MG: 25 CAPSULE ORAL at 11:29

## 2018-10-08 RX ADMIN — ACETAMINOPHEN 325 MG: 325 TABLET, FILM COATED ORAL at 11:29

## 2018-10-08 RX ADMIN — DEXTROSE MONOHYDRATE 250 ML: 50 INJECTION, SOLUTION INTRAVENOUS at 12:01

## 2018-10-08 NOTE — MR AVS SNAPSHOT
After Visit Summary   10/8/2018    Merissa Leung    MRN: [id number]           Patient Information     Date Of Birth          1985        Visit Information        Provider Department      10/8/2018 11:00 AM ROOM 9 [facility]        Today's Diagnoses     Immune thrombocytopenia (H)    -  1       Follow-ups after your visit        Your next 10 appointments already scheduled     Oct 09, 2018  9:45 AM CDT   New Visit with Anthony Jackson,    [facility]    [address] [address]   [phone]            Oct 09, 2018 11:00 AM CDT   Level 3 with ROOM 8 [facility] ([facility])    [facility]  [address]  [facility] [address]   [phone]            Nov 05, 2018  5:30 PM CST   LAB with PI LAB   [facility]    [address] [facility] [address]   [phone]           Please do not eat 10-12 hours before your appointment if you are coming in fasting for labs on lipids, cholesterol, or glucose (sugar). This does not apply to pregnant women. Water, hot tea and black coffee (with nothing added) are okay. Do not drink other fluids, diet soda or chew gum.            Nov 08, 2018 11:30 AM CST   Level 3 with ROOM 6 [facility] ([facility])    [facility]  [address]  [facility] [address]   [phone]            Nov 09, 2018 11:30 AM CST   Level 3 with ROOM 6 [facility] ([facility])    [facility]  [address]  [facility] [address]   [phone]            Nov 30, 2018  2:45 PM CST   PHYSICAL with Carola Sanchez MD   [facility] ([facility])    [address] [address]   [phone]            Dec 03, 2018   5:30 PM CST   LAB with PI LAB   [facility] ([facility])    [address] [facility] [address]   [phone]           Please do not eat 10-12 hours before your appointment if you are coming in fasting for labs on lipids, cholesterol, or glucose (sugar). This does not apply to pregnant women. Water, hot tea and black coffee (with nothing added) are okay. Do not drink other fluids, diet soda or chew gum.            Dec 06, 2018 11:30 AM CST   Level 3 with ROOM 6 [facility] ([facility])    [facility]  [address]  [facility] [address]   [phone]            Dec 07, 2018 11:30 AM CST   Level 3 with ROOM 6 [facility] ([facility])    [facility]  [address]  [facility] [address]   [phone]              Who to contact     If you have questions or need follow up information about today's clinic visit or your schedule please contact [facility] directly at [phone].  Normal or non-critical lab and imaging results will be communicated to you by [employer], letter or phone within 4 business days after the clinic has received the results. If you do not hear from us within 7 days, please contact the clinic through [employer] or phone. If you have a critical or abnormal lab result, we will notify you by phone as soon as possible.  Submit refill requests through [employer] or call your pharmacy and they will forward the refill request to us. Please allow 3 business days for your refill to be completed.          Additional Information About Your Visit        [employer] Trace Information     [employer] gives you secure access to your electronic health record. If you see a primary care provider, you can also send messages to your care team and make appointments. If you have questions, please call your primary care clinic.  If you do not have a primary care  provider, please call [phone] and they will assist you.        Care EveryWhere ID     This is your Care EveryWhere ID. This could be used by other organizations to access your [place] medical records  [id number]        Your Vitals Were     Pulse Temperature Respirations Pulse Oximetry          63 96.9  F (36.1  C) (Oral) 22 97%         Blood Pressure from Last 3 Encounters:   10/08/18 103/60   10/02/18 120/71   06/04/18 110/78    Weight from Last 3 Encounters:   10/02/18 49.6 kg (109 lb 4.8 oz)   06/04/18 47.6 kg (105 lb)   02/06/18 47.5 kg (104 lb 12.8 oz)              Today, you had the following     No orders found for display       Primary Care Provider Office Phone # Fax #    Mohini Mccord, [phone]       [address] [facility] [address]        Equal Access to Services     KAVYA GARCIA : Hadii ivanna razoo Soandrea, waaxda luqadaha, qaybta kaalmada adeegyada, leela us . So [facility] [phone].    ATENCIÓN: Si habla español, tiene a goldsmith disposición servicios gratuitos de asistencia lingüística. Llame al [phone].    We comply with applicable federal civil rights laws and [place] laws. We do not discriminate on the basis of race, color, national origin, age, disability, sex, sexual orientation, or gender identity.            Thank you!     Thank you for choosing [facility]  for your care. Our goal is always to provide you with excellent care. Hearing back from our patients is one way we can continue to improve our services. Please take a few minutes to complete the written survey that you may receive in the mail after your visit with us. Thank you!             Your Updated Medication List - Protect others around you: Learn how to safely use, store and throw away your medicines at www.disposemymeds.org.          This list is accurate as of 10/8/18  2:53 PM.  Always use your most recent med list.                   Brand Name  Dispense Instructions for use Diagnosis    clonazePAM 0.5 MG tablet    klonoPIN    20 tablet    Take 0.5-1 tablets (0.25-0.5 mg) by mouth 2 times daily as needed for anxiety    EDWARD (generalized anxiety disorder)       linaclotide 145 MCG capsule    LINZESS    30 capsule    Take 1 capsule (145 mcg) by mouth every morning (before breakfast)    Irritable bowel syndrome with constipation       * lisdexamfetamine 10 MG capsule    VYVANSE    30 capsule    Take 1 capsule (10 mg) by mouth daily (with lunch)    Binge eating disorder       * lisdexamfetamine 40 MG capsule    VYVANSE    30 capsule    Take 1 capsule (40 mg) by mouth daily    Binge eating disorder       * lisdexamfetamine 10 MG capsule    VYVANSE    30 capsule    Take 1 capsule (10 mg) by mouth daily (before lunch)    Binge eating disorder       * lisdexamfetamine 40 MG capsule    VYVANSE    30 capsule    Take 1 capsule (40 mg) by mouth every morning    Binge eating disorder       * lisdexamfetamine 10 MG capsule   Start taking on:  11/7/2018    VYVANSE    30 capsule    Take 1 capsule (10 mg) by mouth every morning    Binge eating disorder       Magnesium Citrate Powd     1 Bottle    615 mg At Bedtime    Irritable bowel syndrome with constipation       MULTIVITAL PO      Take 1 chew tab by mouth daily Gummi        polyethylene glycol powder    MIRALAX/GLYCOLAX     Take 17 g by mouth daily as needed for constipation        sennosides 8.6 MG tablet    SENOKOT     Take 2 tablets by mouth 2 times daily        UNABLE TO FIND      Take 2 teaspoonful by mouth At Bedtime Magnesium Calm        * Notice:  This list has 5 medication(s) that are the same as other medications prescribed for you. Read the directions carefully, and ask your doctor or other care provider to review them with you.

## 2018-10-08 NOTE — PROGRESS NOTES
Infusion Nursing Note:  Merissa VINICIO Lelandjudy presents today for IVIG.    Patient seen by provider today: No   present during visit today: Not Applicable.    Note: After PIV placement Patient became hypotensive and bradycardic and felt like she was going to pass out. Patient never did become unresponsive. 250cc IVF given and Patient recovered back to her baseline.    Intravenous Access:  Peripheral IV placed.    Treatment Conditions:  Rheumatology Infusion Checklist: PRIOR TO INFUSION OF BIOLOGICAL MEDICATIONS OR ANY OF THESE AS LISTED: IVIG    Prior to Infusion of biological medications or any of these as listed:    1. Elevated temperature, fever, chills, productive cough or abnormal vital signs, night sweats, coughing up blood or sputum, no appetite or abnormal vital signs : NO    2. Open wounds or new incisions: NO    3. Recent hospitalization: NO    4.  Recent surgeries:  NO    5. Any upcoming surgeries or dental procedures?:NO    6. Any current or recent bouts of illness or infection? On any antibiotics? : NO    7. Any new, sudden or worsening abdominal pain :NO    8. Vaccination within 4 weeks? Patient or someone in the household is scheduled to receive vaccination? No live virus vaccines prior to or during treatment :NO    9. Any nervous system diseases [i.e. multiple sclerosis, Guillain-[place], seizures, neurological  changes]: NO    10. Pregnant or breast feeding; or plans on pregnancy in the future: NO    11. Signs of worsening depression or suicidal ideations while taking benlysta:NO    12. New-onset medical symptoms: NO    13.  New cancer diagnosis or on chemotherapy or radiation NO    14.  Evaluate for any sign of active TB [Unexplained weight loss, Loss of appetite, Night sweats, Fever, Fatigue, Chills, Coughing for 3 weeks or longer, Hemoptysis (coughing up blood), Chest pain]: NO    **Note: If answered yes to any of the above, hold the infusion and contact ordering rheumatologist or on-call  rheumatologist.   .      Post Infusion Assessment:  Patient tolerated infusion without incident.    Discharge Plan:   Patient discharged in stable condition accompanied by: .  Departure Mode: Ambulatory.    Katy Brooks RN

## 2018-10-09 ENCOUNTER — OFFICE VISIT (OUTPATIENT)
Dept: SURGERY | Facility: CLINIC | Age: 33
End: 2018-10-09
Payer: COMMERCIAL

## 2018-10-09 ENCOUNTER — INFUSION THERAPY VISIT (OUTPATIENT)
Dept: INFUSION THERAPY | Facility: CLINIC | Age: 33
End: 2018-10-09
Attending: INTERNAL MEDICINE
Payer: COMMERCIAL

## 2018-10-09 VITALS
SYSTOLIC BLOOD PRESSURE: 99 MMHG | RESPIRATION RATE: 16 BRPM | TEMPERATURE: 96.8 F | HEART RATE: 72 BPM | DIASTOLIC BLOOD PRESSURE: 58 MMHG

## 2018-10-09 VITALS
SYSTOLIC BLOOD PRESSURE: 114 MMHG | TEMPERATURE: 98 F | WEIGHT: 109 LBS | DIASTOLIC BLOOD PRESSURE: 67 MMHG | HEART RATE: 91 BPM | HEIGHT: 66 IN | BODY MASS INDEX: 17.52 KG/M2

## 2018-10-09 DIAGNOSIS — D69.3 IMMUNE THROMBOCYTOPENIA (H): Primary | ICD-10-CM

## 2018-10-09 PROCEDURE — 96366 THER/PROPH/DIAG IV INF ADDON: CPT

## 2018-10-09 PROCEDURE — 25000128 H RX IP 250 OP 636: Performed by: INTERNAL MEDICINE

## 2018-10-09 PROCEDURE — 99215 OFFICE O/P EST HI 40 MIN: CPT | Performed by: SURGERY

## 2018-10-09 PROCEDURE — 25000132 ZZH RX MED GY IP 250 OP 250 PS 637: Performed by: INTERNAL MEDICINE

## 2018-10-09 PROCEDURE — 96365 THER/PROPH/DIAG IV INF INIT: CPT

## 2018-10-09 RX ORDER — DIPHENHYDRAMINE HCL 25 MG
25 CAPSULE ORAL EVERY 6 HOURS PRN
Status: CANCELLED
Start: 2018-10-09

## 2018-10-09 RX ORDER — ACETAMINOPHEN 325 MG/1
325 TABLET ORAL EVERY 4 HOURS PRN
Status: CANCELLED
Start: 2018-10-09

## 2018-10-09 RX ORDER — ACETAMINOPHEN 325 MG/1
325 TABLET ORAL EVERY 4 HOURS PRN
Status: DISCONTINUED | OUTPATIENT
Start: 2018-10-09 | End: 2018-10-09 | Stop reason: HOSPADM

## 2018-10-09 RX ORDER — DIPHENHYDRAMINE HCL 25 MG
25 CAPSULE ORAL EVERY 6 HOURS PRN
Status: DISCONTINUED | OUTPATIENT
Start: 2018-10-09 | End: 2018-10-09 | Stop reason: HOSPADM

## 2018-10-09 RX ADMIN — RANITIDINE 150 MG: 150 TABLET, FILM COATED ORAL at 11:26

## 2018-10-09 RX ADMIN — HUMAN IMMUNOGLOBULIN G 25 G: 20 LIQUID INTRAVENOUS at 11:45

## 2018-10-09 RX ADMIN — ACETAMINOPHEN 325 MG: 325 TABLET, FILM COATED ORAL at 11:26

## 2018-10-09 RX ADMIN — DEXTROSE MONOHYDRATE 250 ML: 50 INJECTION, SOLUTION INTRAVENOUS at 11:27

## 2018-10-09 RX ADMIN — DIPHENHYDRAMINE HYDROCHLORIDE 25 MG: 25 CAPSULE ORAL at 11:26

## 2018-10-09 NOTE — PROGRESS NOTES
Surgical Consultation/History and Physical  [facility] General Surgery    Merissa is seen in consultation for splenectomy for ITP, at the request of Dr. Pena.    Chief Complaint:  Frequent bleeding    History of Present Illness: Merissa Leung is a 33 year old female presents with immmune thyrombocytopenia. She admits to spontaneous bleeding of gums, epistaxis and occasional rectal bleeding.  She has previously been on steroids, but has been unable to tolerate chronic therapy and relapsed promptly.  Currently she is receiving IVIG therapy which she started yesterday.  She also admits to chronic constipation and lower abdominal pain which has been ongoing.  She denies any history of upper abdominal surgery.  Patient is interested in pursuing a more durable option other than medications at this point.    Patient Active Problem List   Diagnosis     Hyperlipidemia LDL goal <160     Headaches, tension     Chronic constipation     OCD (obsessive compulsive disorder)     Anxiety     Fibromyalgia     Chronic abdominal pain     Prenatal care, subsequent pregnancy     Immune thrombocytopenia (H)     Thrombocytopenia (H)     Underweight     Past Medical History:   Diagnosis Date     Anxiety      Binge eating disorder      Chickenpox     x3     Food intolerance      IBS (irritable bowel syndrome)      MEDICAL HISTORY OF -     pelvic floor disorder     Past Surgical History:   Procedure Laterality Date     C/SECTION, LOW TRANSVERSE        SECTION, TUBAL LIGATION, COMBINED N/A 2/15/2016    NO tubal ligation done     COLONOSCOPY  2013    Procedure: COMBINED COLONOSCOPY, SINGLE BIOPSY/POLYPECTOMY BY BIOPSY;  Colonoscopy;  Surgeon: Maureen Valentin MD;  Location: [facility]     COLONOSCOPY N/A 2015    Procedure: COMBINED COLONOSCOPY, SINGLE OR MULTIPLE BIOPSY/POLYPECTOMY BY BIOPSY;  Surgeon: Maureen Valentin MD;  Location: [facility]     ESOPHAGOSCOPY, GASTROSCOPY, DUODENOSCOPY (EGD), COMBINED N/A  2018    Procedure: COMBINED ESOPHAGOSCOPY, GASTROSCOPY, DUODENOSCOPY (EGD), BIOPSY SINGLE OR MULTIPLE;  Gastroscopy;  Surgeon: Ray Amato MD;  Location: [facility]     Family History   Problem Relation Age of Onset     GASTROINTESTINAL DISEASE Mother 46     perforated intestines-      Hepatitis Mother      C     Liver Disease Mother      Blood Disease Mother      anemia     Substance Abuse Mother      A&D     Psychotic Disorder Brother      Schizophrenia, OCD     Schizophrenia Brother      Anxiety Disorder Brother      Arthritis Maternal Grandmother      Cancer Paternal Grandmother      cervical     Breast Cancer Paternal Grandmother      Hypertension Paternal Grandmother      Diabetes Paternal Grandmother      Cerebrovascular Disease Paternal Grandfather      HEART DISEASE Paternal Grandfather      Bipolar Disorder Paternal Grandfather      Suicide Paternal Grandfather      Cancer Paternal Aunt      cervical     Coronary Artery Disease Maternal Grandfather      MI     Substance Abuse Father      recovered A&D     HEART DISEASE Father      MI-stents     Hypertension Father      Social History   Substance Use Topics     Smoking status: Current Every Day Smoker     Packs/day: 0.50     Years: 6.00     Types: Cigarettes     Last attempt to quit: 2015     Smokeless tobacco: Never Used      Comment: 1/2 pack daily      Alcohol use No      History   Drug Use No     Current Outpatient Prescriptions   Medication Sig Dispense Refill     clonazePAM (KLONOPIN) 0.5 MG tablet Take 0.5-1 tablets (0.25-0.5 mg) by mouth 2 times daily as needed for anxiety 20 tablet 0     linaclotide (LINZESS) 145 MCG capsule Take 1 capsule (145 mcg) by mouth every morning (before breakfast) 30 capsule 5     lisdexamfetamine (VYVANSE) 10 MG capsule Take 1 capsule (10 mg) by mouth daily (before lunch) 30 capsule 0     lisdexamfetamine (VYVANSE) 10 MG capsule Take 1 capsule (10 mg) by mouth daily (with lunch) 30 capsule 0     [START ON  "11/7/2018] lisdexamfetamine (VYVANSE) 10 MG capsule Take 1 capsule (10 mg) by mouth every morning 30 capsule 0     lisdexamfetamine (VYVANSE) 40 MG capsule Take 1 capsule (40 mg) by mouth daily 30 capsule 0     lisdexamfetamine (VYVANSE) 40 MG capsule Take 1 capsule (40 mg) by mouth every morning 30 capsule 0     Magnesium Citrate POWD 615 mg At Bedtime 1 Bottle 3     Multiple Vitamins-Minerals (MULTIVITAL PO) Take 1 chew tab by mouth daily Gummi       polyethylene glycol (MIRALAX/GLYCOLAX) powder Take 17 g by mouth daily as needed for constipation       sennosides (SENOKOT) 8.6 MG tablet Take 2 tablets by mouth 2 times daily       UNABLE TO FIND Take 2 teaspoonful by mouth At Bedtime Magnesium Calm       Allergies   Allergen Reactions     Dilaudid [Hydromorphone] Nausea and Vomiting     Food Other (See Comments) and Swelling     Processed food, sugar, high sodium, red grains, rice, potatoes  Pain in extremities and face.   All over body at times.     Review of Systems:   Constitutional - Denies fevers, weight loss, malaise, lethargy  Neuro - Denies tremors or seizures  Pulmon - Denies SOB, dyspnea, hemoptysis, chronic cough or use of an inhaler  CV - Denies CP, SOB, lower extremity edema, difficulty w/ stairs, has never used NTG  GI - Denies hematemesis,, + Chronic constipation, BRBPR, lower abdominal pain   - Denies hematuria, difficulty voiding, h/o STDs  Hematology - + Thrombocytopenia, + easy bleeding  Dermatology - No melanomas or skin cancers  Rheumatology - No h/o RA  Pysch - Denies depression, bipolar d/o or schizophrenia; + OCD    Physical Exam:  /67 (BP Location: Right arm, Patient Position: Sitting, Cuff Size: Adult Regular)  Pulse 91  Temp 98  F (36.7  C) (Oral)  Ht 1.664 m (5' 5.5\")  Wt 49.4 kg (109 lb)  BMI 17.86 kg/m2    Constitutional- No acute distress, non-toxic, thin  Eyes: Anicteric, no injection.  PERRL  ENT:  Normocephalic, atraumatic, Nose midline, moist mucus membranes  Neck - " supple, no LAD  Respiratory- Clear to auscultation bilaterally, good inspiratory effort  Cardiovascular - Heart RRR, no lift's, thrills, murmurs, rubs, or gallop.  No peripheral edema.  No clubbing.  Abdomen - Soft, non-tender, +BS, no hepatosplenomegaly, no palpable masses  Neuro - No focal neuro deficits, Alert and oriented x 3  Psych: Appropriate mood and affect  Musculoskeletal: Normal gait, symmetric strength.  FROM upper and lower extremities.  Skin: Warm, Dry    Assessment:  1. Immune thrombocytopenia (H)      Plan:   Mrs. Leung presents for evaluation for splenectomy due to ongoing ITP with intolerance to medical therapy.  Currently, she is on IVIG.  I reviewed her imaging with Dr. Weldon in radiology.  There is no evidence of splenules on imaging.  I reviewed her immunizations, and is up to date, however would need standard immunizations to streptoccocus pneumoniae, meningococcus, and H influenza.  I will discuss with infectious disease, timing of immunization in the setting of ongoing IVIG therapy.  This will need to be completed a minimum of 2 weeks prior to scheduling surgery.  Additionally, I would like her platelets above 50,000 prior to proceeding to the OR.      I discussed at length with Mrs. Leung and her  regarding the nature of ITP, treatment durability of splenectomy, nature of surgery and complications associated with operation.  Our discussion included laparoscopic versus open approach, possible failure of cure with operation, conversion to an open surgery, possibility of overwhelming post splenectomy infection (OPSI), in addition to the risks of surgery itself.      The risks of surgery discussed included but were not limited to: bleeding, infection, hernia, need for additional treatment, nontherapeutic intervention, wound complication (such as dehiscence), splenosis, conversion to open surgery, damage to adjacent structures including but not limited to: blood vessels, nerves,  colon, stomach, pancreas, possibility of pancreatic leak, need for transfusion and rare complications related to surgery and/or anesthesia such as venous thromboembolism and cardiorespiratory complications.  Additionally we discussed possibility of occult splenules which may cause treatment failure.  She expressed understanding of the risks and wishes to proceed with surgical treatment.      I spent over 50 minutes with the patient with over 35 minutes spent with the patient in counseling.    Anthony Jackson, DO on 10/9/2018 at 9:03 AM

## 2018-10-09 NOTE — MR AVS SNAPSHOT
After Visit Summary   10/9/2018    Merissa Leung    MRN: [id number]           Patient Information     Date Of Birth          1985        Visit Information        Provider Department      10/9/2018 9:45 AM Anthony Jackson, DO [facility]        Today's Diagnoses     Immune thrombocytopenia (H)    -  1      Care Instructions    Per Physician's instructions          Follow-ups after your visit        Your next 10 appointments already scheduled     Nov 05, 2018  5:30 PM CST   [facility]    [address] [facility] [address]   [phone]           Please do not eat 10-12 hours before your appointment if you are coming in fasting for labs on lipids, cholesterol, or glucose (sugar). This does not apply to pregnant women. Water, hot tea and black coffee (with nothing added) are okay. Do not drink other fluids, diet soda or chew gum.            Nov 08, 2018 11:30 AM CST   Level 3 with ROOM 6 [facility] ([facility])    [facility]  [address]  [facility] [address]   [phone]            Nov 09, 2018 11:30 AM CST   Level 3 with ROOM 6 [facility] ([facility])    [facility]  [address]  [facility] [address]   [phone]            Nov 30, 2018  2:45 PM CST   PHYSICAL with Carola Sanchez MD   [facility]    [address] [facility] [address]   [phone]            Dec 03, 2018  5:30 PM CST   LAB with [facility]   [facility]    [address] [facility] [address]   [phone]           Please do not eat 10-12 hours before your appointment if you are coming in fasting for labs on lipids, cholesterol, or glucose (sugar). This does not apply to pregnant women.  Water, hot tea and black coffee (with nothing added) are okay. Do not drink other fluids, diet soda or chew gum.            Dec 06, 2018 11:30 AM CST   Level 3 with ROOM 6 [facility] ([facility])    [facility]  [address]  [facility] [address]   [phone]            Dec 07, 2018 11:30 AM CST   Level 3 with ROOM 6 [facility] ([facility])    [facility]  [address]  [facility] [address]   [phone]              Who to contact     If you have questions or need follow up information about today's clinic visit or your schedule please contact [facility] directly at [phone].  Normal or non-critical lab and imaging results will be communicated to you by [employer], letter or phone within 4 business days after the clinic has received the results. If you do not hear from us within 7 days, please contact the clinic through [employer] or phone. If you have a critical or abnormal lab result, we will notify you by phone as soon as possible.  Submit refill requests through [employer] or call your pharmacy and they will forward the refill request to us. Please allow 3 business days for your refill to be completed.          Additional Information About Your Visit        [employer] Information     [employer] gives you secure access to your electronic health record. If you see a primary care provider, you can also send messages to your care team and make appointments. If you have questions, please call your primary care clinic.  If you do not have a primary care provider, please call [phone] and they will assist you.        Care EveryWhere ID     This is your Care EveryWhere ID. This could be used by other organizations to access your [facility] medical records  [id number]        Your Vitals Were     Pulse Temperature Height BMI (Body Mass Index)          91 98  F (36.7  C) (Oral)  "1.664 m (5' 5.5\") 17.86 kg/m2         Blood Pressure from Last 3 Encounters:   10/09/18 99/58   10/09/18 114/67   10/08/18 103/60    Weight from Last 3 Encounters:   10/09/18 49.4 kg (109 lb)   10/02/18 49.6 kg (109 lb 4.8 oz)   06/04/18 47.6 kg (105 lb)              We Performed the Following     HIB, PRP-T, ACTHIB, IM     MCV4, MENINGOCOCCAL CONJ, IM (9 MO - 55 YRS) - Menactra     Marina-Operative Worksheet     Marina-Operative Worksheet     PNEUMOCOCCAL CONJ VACCINE 13 VALENT IM        Primary Care Provider Office Phone # Fax #    Mohini Mccord, [phone]       [address] [facility] [address]        Equal Access to Services     [facility] JOSE : Hadii ivanna santoyo hadasho Soomaali, waaxda luqadaha, qaybta kaalmada adeotto, leela us . So [facility] [phone].    ATENCIÓN: Si habla español, tiene a goldsmith disposición servicios gratuitos de asistencia lingüística. Konrad al [phone].    We comply with applicable federal civil rights laws and [place] laws. We do not discriminate on the basis of race, color, national origin, age, disability, sex, sexual orientation, or gender identity.            Thank you!     Thank you for choosing [facility]  for your care. Our goal is always to provide you with excellent care. Hearing back from our patients is one way we can continue to improve our services. Please take a few minutes to complete the written survey that you may receive in the mail after your visit with us. Thank you!             Your Updated Medication List - Protect others around you: Learn how to safely use, store and throw away your medicines at www.disposemymeds.org.          This list is accurate as of 10/9/18 12:43 PM.  Always use your most recent med list.                   Brand Name Dispense Instructions for use Diagnosis    clonazePAM 0.5 MG tablet    klonoPIN    20 tablet    Take 0.5-1 tablets (0.25-0.5 mg) by mouth 2 times daily as needed for " anxiety    EDWARD (generalized anxiety disorder)       linaclotide 145 MCG capsule    LINZESS    30 capsule    Take 1 capsule (145 mcg) by mouth every morning (before breakfast)    Irritable bowel syndrome with constipation       * lisdexamfetamine 10 MG capsule    VYVANSE    30 capsule    Take 1 capsule (10 mg) by mouth daily (with lunch)    Binge eating disorder       * lisdexamfetamine 40 MG capsule    VYVANSE    30 capsule    Take 1 capsule (40 mg) by mouth daily    Binge eating disorder       * lisdexamfetamine 10 MG capsule    VYVANSE    30 capsule    Take 1 capsule (10 mg) by mouth daily (before lunch)    Binge eating disorder       * lisdexamfetamine 40 MG capsule    VYVANSE    30 capsule    Take 1 capsule (40 mg) by mouth every morning    Binge eating disorder       * lisdexamfetamine 10 MG capsule   Start taking on:  11/7/2018    VYVANSE    30 capsule    Take 1 capsule (10 mg) by mouth every morning    Binge eating disorder       Magnesium Citrate Powd     1 Bottle    615 mg At Bedtime    Irritable bowel syndrome with constipation       MULTIVITAL PO      Take 1 chew tab by mouth daily Gummi        polyethylene glycol powder    MIRALAX/GLYCOLAX     Take 17 g by mouth daily as needed for constipation        sennosides 8.6 MG tablet    SENOKOT     Take 2 tablets by mouth 2 times daily        UNABLE TO FIND      Take 2 teaspoonful by mouth At Bedtime Magnesium Calm        * Notice:  This list has 5 medication(s) that are the same as other medications prescribed for you. Read the directions carefully, and ask your doctor or other care provider to review them with you.

## 2018-10-09 NOTE — NURSING NOTE
"Initial /67 (BP Location: Right arm, Patient Position: Sitting, Cuff Size: Adult Regular)  Pulse 91  Temp 98  F (36.7  C) (Oral)  Ht 1.664 m (5' 5.5\")  Wt 49.4 kg (109 lb)  BMI 17.86 kg/m2 Estimated body mass index is 17.86 kg/(m^2) as calculated from the following:    Height as of this encounter: 1.664 m (5' 5.5\").    Weight as of this encounter: 49.4 kg (109 lb). .    Fadumo López MA    "

## 2018-10-09 NOTE — LETTER
10/9/2018         RE: Merissa Leung  [address] [facility] [address]        Dear Colleague,    Thank you for referring your patient, Merissa Leung, to the [facility]. Please see a copy of my visit note below.    Surgical Consultation/History and Physical  [facility]    Merissa is seen in consultation for splenectomy for ITP, at the request of Dr. Pena.    Chief Complaint:  Frequent bleeding    History of Present Illness: Merissa Lenug is a 33 year old female presents with immmune thyrombocytopenia. She admits to spontaneous bleeding of gums, epistaxis and occasional rectal bleeding.  She has previously been on steroids, but has been unable to tolerate chronic therapy and relapsed promptly.  Currently she is receiving IVIG therapy which she started yesterday.  She also admits to chronic constipation and lower abdominal pain which has been ongoing.  She denies any history of upper abdominal surgery.  Patient is interested in pursuing a more durable option other than medications at this point.    Patient Active Problem List   Diagnosis     Hyperlipidemia LDL goal <160     Headaches, tension     Chronic constipation     OCD (obsessive compulsive disorder)     Anxiety     Fibromyalgia     Chronic abdominal pain     Prenatal care, subsequent pregnancy     Immune thrombocytopenia (H)     Thrombocytopenia (H)     Underweight     Past Medical History:   Diagnosis Date     Anxiety      Binge eating disorder      Chickenpox     x3     Food intolerance      IBS (irritable bowel syndrome)      MEDICAL HISTORY OF -     pelvic floor disorder     Past Surgical History:   Procedure Laterality Date     C/SECTION, LOW TRANSVERSE        SECTION, TUBAL LIGATION, COMBINED N/A 2/15/2016    NO tubal ligation done     COLONOSCOPY  2013    Procedure: COMBINED COLONOSCOPY, SINGLE BIOPSY/POLYPECTOMY BY BIOPSY;  Colonoscopy;  Surgeon: Maureen Valentin MD;  Location: [facility]      COLONOSCOPY N/A 2015    Procedure: COMBINED COLONOSCOPY, SINGLE OR MULTIPLE BIOPSY/POLYPECTOMY BY BIOPSY;  Surgeon: Maureen Valentin MD;  Location: [facility]     ESOPHAGOSCOPY, GASTROSCOPY, DUODENOSCOPY (EGD), COMBINED N/A 2018    Procedure: COMBINED ESOPHAGOSCOPY, GASTROSCOPY, DUODENOSCOPY (EGD), BIOPSY SINGLE OR MULTIPLE;  Gastroscopy;  Surgeon: Ray Amato MD;  Location: [facility]     Family History   Problem Relation Age of Onset     GASTROINTESTINAL DISEASE Mother 46     perforated intestines-      Hepatitis Mother      C     Liver Disease Mother      Blood Disease Mother      anemia     Substance Abuse Mother      A&D     Psychotic Disorder Brother      Schizophrenia, OCD     Schizophrenia Brother      Anxiety Disorder Brother      Arthritis Maternal Grandmother      Cancer Paternal Grandmother      cervical     Breast Cancer Paternal Grandmother      Hypertension Paternal Grandmother      Diabetes Paternal Grandmother      Cerebrovascular Disease Paternal Grandfather      HEART DISEASE Paternal Grandfather      Bipolar Disorder Paternal Grandfather      Suicide Paternal Grandfather      Cancer Paternal Aunt      cervical     Coronary Artery Disease Maternal Grandfather      MI     Substance Abuse Father      recovered A&D     HEART DISEASE Father      MI-stents     Hypertension Father      Social History   Substance Use Topics     Smoking status: Current Every Day Smoker     Packs/day: 0.50     Years: 6.00     Types: Cigarettes     Last attempt to quit: 2015     Smokeless tobacco: Never Used      Comment: 1/2 pack daily      Alcohol use No      History   Drug Use No     Current Outpatient Prescriptions   Medication Sig Dispense Refill     clonazePAM (KLONOPIN) 0.5 MG tablet Take 0.5-1 tablets (0.25-0.5 mg) by mouth 2 times daily as needed for anxiety 20 tablet 0     linaclotide (LINZESS) 145 MCG capsule Take 1 capsule (145 mcg) by mouth every morning (before breakfast) 30 capsule 5      lisdexamfetamine (VYVANSE) 10 MG capsule Take 1 capsule (10 mg) by mouth daily (before lunch) 30 capsule 0     lisdexamfetamine (VYVANSE) 10 MG capsule Take 1 capsule (10 mg) by mouth daily (with lunch) 30 capsule 0     [START ON 11/7/2018] lisdexamfetamine (VYVANSE) 10 MG capsule Take 1 capsule (10 mg) by mouth every morning 30 capsule 0     lisdexamfetamine (VYVANSE) 40 MG capsule Take 1 capsule (40 mg) by mouth daily 30 capsule 0     lisdexamfetamine (VYVANSE) 40 MG capsule Take 1 capsule (40 mg) by mouth every morning 30 capsule 0     Magnesium Citrate POWD 615 mg At Bedtime 1 Bottle 3     Multiple Vitamins-Minerals (MULTIVITAL PO) Take 1 chew tab by mouth daily Gummi       polyethylene glycol (MIRALAX/GLYCOLAX) powder Take 17 g by mouth daily as needed for constipation       sennosides (SENOKOT) 8.6 MG tablet Take 2 tablets by mouth 2 times daily       UNABLE TO FIND Take 2 teaspoonful by mouth At Bedtime Magnesium Calm       Allergies   Allergen Reactions     Dilaudid [Hydromorphone] Nausea and Vomiting     Food Other (See Comments) and Swelling     Processed food, sugar, high sodium, red grains, rice, potatoes  Pain in extremities and face.   All over body at times.     Review of Systems:   Constitutional - Denies fevers, weight loss, malaise, lethargy  Neuro - Denies tremors or seizures  Pulmon - Denies SOB, dyspnea, hemoptysis, chronic cough or use of an inhaler  CV - Denies CP, SOB, lower extremity edema, difficulty w/ stairs, has never used NTG  GI - Denies hematemesis,, + Chronic constipation, BRBPR, lower abdominal pain   - Denies hematuria, difficulty voiding, h/o STDs  Hematology - + Thrombocytopenia, + easy bleeding  Dermatology - No melanomas or skin cancers  Rheumatology - No h/o RA  Pysch - Denies depression, bipolar d/o or schizophrenia; + OCD    Physical Exam:  /67 (BP Location: Right arm, Patient Position: Sitting, Cuff Size: Adult Regular)  Pulse 91  Temp 98  F (36.7  C) (Oral)   "Ht 1.664 m (5' 5.5\")  Wt 49.4 kg (109 lb)  BMI 17.86 kg/m2    Constitutional- No acute distress, non-toxic, thin  Eyes: Anicteric, no injection.  PERRL  ENT:  Normocephalic, atraumatic, Nose midline, moist mucus membranes  Neck - supple, no LAD  Respiratory- Clear to auscultation bilaterally, good inspiratory effort  Cardiovascular - Heart RRR, no lift's, thrills, murmurs, rubs, or gallop.  No peripheral edema.  No clubbing.  Abdomen - Soft, non-tender, +BS, no hepatosplenomegaly, no palpable masses  Neuro - No focal neuro deficits, Alert and oriented x 3  Psych: Appropriate mood and affect  Musculoskeletal: Normal gait, symmetric strength.  FROM upper and lower extremities.  Skin: Warm, Dry    Assessment:  1. Immune thrombocytopenia (H)      Plan:   Mrs. Leung presents for evaluation for splenectomy due to ongoing ITP with intolerance to medical therapy.  Currently, she is on IVIG.  I reviewed her imaging with Dr. Weldon in radiology.  There is no evidence of splenules on imaging.  I reviewed her immunizations, and is up to date, however would need standard immunizations to streptoccocus pneumoniae, meningococcus, and H influenza.  I will discuss with infectious disease, timing of immunization in the setting of ongoing IVIG therapy.  This will need to be completed a minimum of 2 weeks prior to scheduling surgery.  Additionally, I would like her platelets above 50,000 prior to proceeding to the OR.      I discussed at length with Mrs. Leung and her  regarding the nature of ITP, treatment durability of splenectomy, nature of surgery and complications associated with operation.  Our discussion included laparoscopic versus open approach, possible failure of cure with operation, conversion to an open surgery, possibility of overwhelming post splenectomy infection (OPSI), in addition to the risks of surgery itself.      The risks of surgery discussed included but were not limited to: bleeding, infection, " hernia, need for additional treatment, nontherapeutic intervention, wound complication (such as dehiscence), splenosis, conversion to open surgery, damage to adjacent structures including but not limited to: blood vessels, nerves, colon, stomach, pancreas, possibility of pancreatic leak, need for transfusion and rare complications related to surgery and/or anesthesia such as venous thromboembolism and cardiorespiratory complications.  Additionally we discussed possibility of occult splenules which may cause treatment failure.  She expressed understanding of the risks and wishes to proceed with surgical treatment.      I spent over 50 minutes with the patient with over 35 minutes spent with the patient in counseling.    Anthony Jackson DO on 10/9/2018 at 9:03 AM      Again, thank you for allowing me to participate in the care of your patient.        Sincerely,        Anthony Jackson DO

## 2018-10-09 NOTE — MR AVS SNAPSHOT
After Visit Summary   10/9/2018    Merissa Leung    MRN: [id number]           Patient Information     Date Of Birth          1985        Visit Information        Provider Department      10/9/2018 11:00 AM ROOM 8 [facility]        Today's Diagnoses     Immune thrombocytopenia (H)    -  1       Follow-ups after your visit        Your next 10 appointments already scheduled     Nov 05, 2018  5:30 PM CST   LAB with PI LAB   [facility]    [address] [place] [address]   [phone]           Please do not eat 10-12 hours before your appointment if you are coming in fasting for labs on lipids, cholesterol, or glucose (sugar). This does not apply to pregnant women. Water, hot tea and black coffee (with nothing added) are okay. Do not drink other fluids, diet soda or chew gum.            Nov 08, 2018 11:30 AM CST   Level 3 with ROOM 6 [facility] ([facility])    [facility]  [address]  [facility] [address]   [phone]            Nov 09, 2018 11:30 AM CST   Level 3 with ROOM 6 [facility] ([facility])    [facility]  [address]  [facility] [address]   [phone]            Nov 30, 2018  2:45 PM CST   [facility]    [address]  [facility] [address]   [phone]            Dec 03, 2018  5:30 PM CST   LAB with PI LAB   [facility] ([facility])    [address] [address]   [phone]           Please do not eat 10-12 hours before your appointment if you are coming in fasting for labs on lipids, cholesterol, or glucose (sugar). This does not apply to pregnant women. Water, hot tea and black coffee (with nothing added) are okay. Do not drink  other fluids, diet soda or chew gum.            Dec 06, 2018 11:30 AM CST   Level 3 with ROOM 6 [facility] ([facility])    [facility]  [address]  [facility] [address] [phone]            Dec 07, 2018 11:30 AM CST   Level 3 with ROOM 6 [facility] ([facility])    [facility]  [address]  [facility] [address]   [phone]              Who to contact     If you have questions or need follow up information about today's clinic visit or your schedule please contact [facility] directly at [phone].  Normal or non-critical lab and imaging results will be communicated to you by [employer], letter or phone within 4 business days after the clinic has received the results. If you do not hear from us within 7 days, please contact the clinic through [employer] or phone. If you have a critical or abnormal lab result, we will notify you by phone as soon as possible.  Submit refill requests through [employer] or call your pharmacy and they will forward the refill request to us. Please allow 3 business days for your refill to be completed.          Additional Information About Your Visit        [employer] Information     [employer] gives you secure access to your electronic health record. If you see a primary care provider, you can also send messages to your care team and make appointments. If you have questions, please call your primary care clinic.  If you do not have a primary care provider, please call [phone] and they will assist you.        Care EveryWhere ID     This is your Care EveryWhere ID. This could be used by other organizations to access your [facility] medical records  [id number]        Your Vitals Were     Pulse Temperature Respirations             72 96.8  F (36  C) 16          Blood Pressure from Last 3 Encounters:   10/09/18 99/58   10/09/18 114/67   10/08/18 103/60     Weight from Last 3 Encounters:   10/09/18 49.4 kg (109 lb)   10/02/18 49.6 kg (109 lb 4.8 oz)   06/04/18 47.6 kg (105 lb)              Today, you had the following     No orders found for display       Primary Care Provider Office Phone # Fax Chet Mccord [phone]       [address] [facility] [address]        Equal Access to Services     ANA GARCIA : Hadii aad ku hadasho Soomaali, waaxda luqadaha, qaybta kaalmada adeegyada, waxay idiin hayaan adeeg khken us . So [facility] [phone].    ATENCIÓN: Si karanla lois, tiene a goldsmith disposición servicios gratuitos de asistencia lingüística. [facility] [phone].    We comply with applicable federal civil rights laws and [place] laws. We do not discriminate on the basis of race, color, national origin, age, disability, sex, sexual orientation, or gender identity.            Thank you!     Thank you for choosing [facility]  for your care. Our goal is always to provide you with excellent care. Hearing back from our patients is one way we can continue to improve our services. Please take a few minutes to complete the written survey that you may receive in the mail after your visit with us. Thank you!             Your Updated Medication List - Protect others around you: Learn how to safely use, store and throw away your medicines at www.disposemymeds.org.          This list is accurate as of 10/9/18  2:23 PM.  Always use your most recent med list.                   Brand Name Dispense Instructions for use Diagnosis    clonazePAM 0.5 MG tablet    klonoPIN    20 tablet    Take 0.5-1 tablets (0.25-0.5 mg) by mouth 2 times daily as needed for anxiety    EDWARD (generalized anxiety disorder)       linaclotide 145 MCG capsule    LINZESS    30 capsule    Take 1 capsule (145 mcg) by mouth every morning (before breakfast)    Irritable bowel syndrome with constipation       * lisdexamfetamine 10 MG capsule    VYVANSE    30 capsule     Take 1 capsule (10 mg) by mouth daily (with lunch)    Binge eating disorder       * lisdexamfetamine 40 MG capsule    VYVANSE    30 capsule    Take 1 capsule (40 mg) by mouth daily    Binge eating disorder       * lisdexamfetamine 10 MG capsule    VYVANSE    30 capsule    Take 1 capsule (10 mg) by mouth daily (before lunch)    Binge eating disorder       * lisdexamfetamine 40 MG capsule    VYVANSE    30 capsule    Take 1 capsule (40 mg) by mouth every morning    Binge eating disorder       * lisdexamfetamine 10 MG capsule   Start taking on:  11/7/2018    VYVANSE    30 capsule    Take 1 capsule (10 mg) by mouth every morning    Binge eating disorder       Magnesium Citrate Powd     1 Bottle    615 mg At Bedtime    Irritable bowel syndrome with constipation       MULTIVITAL PO      Take 1 chew tab by mouth daily Gummi        polyethylene glycol powder    MIRALAX/GLYCOLAX     Take 17 g by mouth daily as needed for constipation        sennosides 8.6 MG tablet    SENOKOT     Take 2 tablets by mouth 2 times daily        UNABLE TO FIND      Take 2 teaspoonful by mouth At Bedtime Magnesium Calm        * Notice:  This list has 5 medication(s) that are the same as other medications prescribed for you. Read the directions carefully, and ask your doctor or other care provider to review them with you.

## 2018-10-09 NOTE — PROGRESS NOTES
Infusion Nursing Note:  Merissa Leung presents today for IVIG.    Patient seen by provider today: No   present during visit today: Not Applicable.    Note: N/A.    Intravenous Access:  Peripheral IV intact from yesterday.    Treatment Conditions:  Not Applicable.      Post Infusion Assessment:  Patient tolerated infusion without incident.    Discharge Plan:   Patient discharged in stable condition accompanied by: significant other. Returns in 1mo.    Mason Aaron RN

## 2018-10-12 ENCOUNTER — TELEPHONE (OUTPATIENT)
Dept: SURGERY | Facility: CLINIC | Age: 33
End: 2018-10-12

## 2018-10-12 NOTE — TELEPHONE ENCOUNTER
Reason for Call:  Form, our goal is to have forms completed with 72 hours, however, some forms may require a visit or additional information.    Type of letter, form or note:  FMLA    Who is the form from?: Patient    Where did the form come from: form was faxed in    What clinic location was the form placed at?: [facility] (ENT, Neurology, Rheumatology, Surgery, Urology, Vascular Surgery)    Where the form was placed: Given to MA/RN    What number is listed as a contact on the form?: [phone]        Additional comments: Fax completed form to: [phone]    Call taken on 10/12/2018 at 11:36 AM by Denise Behrendt

## 2018-10-15 NOTE — TELEPHONE ENCOUNTER
Form completed for: Merissa Leung  What was done with form: Fax form to:   [facility] [phone]     Copy of forms mailed to patient's home address on file.     Maddy ADEN MA

## 2018-10-19 ENCOUNTER — OFFICE VISIT (OUTPATIENT)
Dept: FAMILY MEDICINE | Facility: CLINIC | Age: 33
End: 2018-10-19
Payer: COMMERCIAL

## 2018-10-19 VITALS
TEMPERATURE: 98.7 F | OXYGEN SATURATION: 100 % | SYSTOLIC BLOOD PRESSURE: 128 MMHG | DIASTOLIC BLOOD PRESSURE: 70 MMHG | HEART RATE: 82 BPM | BODY MASS INDEX: 18.12 KG/M2 | RESPIRATION RATE: 12 BRPM | WEIGHT: 110.6 LBS

## 2018-10-19 DIAGNOSIS — F50.819 BINGE EATING DISORDER: ICD-10-CM

## 2018-10-19 DIAGNOSIS — F41.1 GAD (GENERALIZED ANXIETY DISORDER): ICD-10-CM

## 2018-10-19 DIAGNOSIS — Z23 NEED FOR VACCINATION: ICD-10-CM

## 2018-10-19 DIAGNOSIS — Z01.818 PREOP GENERAL PHYSICAL EXAM: Primary | ICD-10-CM

## 2018-10-19 DIAGNOSIS — K58.1 IRRITABLE BOWEL SYNDROME WITH CONSTIPATION: ICD-10-CM

## 2018-10-19 DIAGNOSIS — D69.3 IDIOPATHIC THROMBOCYTOPENIC PURPURA (H): ICD-10-CM

## 2018-10-19 LAB — BETA HCG QUAL IFA URINE: NEGATIVE

## 2018-10-19 PROCEDURE — 90648 HIB PRP-T VACCINE 4 DOSE IM: CPT | Performed by: NURSE PRACTITIONER

## 2018-10-19 PROCEDURE — 90734 MENACWYD/MENACWYCRM VACC IM: CPT | Performed by: NURSE PRACTITIONER

## 2018-10-19 PROCEDURE — 90471 IMMUNIZATION ADMIN: CPT | Performed by: NURSE PRACTITIONER

## 2018-10-19 PROCEDURE — 84703 CHORIONIC GONADOTROPIN ASSAY: CPT | Performed by: NURSE PRACTITIONER

## 2018-10-19 PROCEDURE — 36415 COLL VENOUS BLD VENIPUNCTURE: CPT | Performed by: INTERNAL MEDICINE

## 2018-10-19 PROCEDURE — 90472 IMMUNIZATION ADMIN EACH ADD: CPT | Performed by: NURSE PRACTITIONER

## 2018-10-19 PROCEDURE — 85025 COMPLETE CBC W/AUTO DIFF WBC: CPT | Performed by: INTERNAL MEDICINE

## 2018-10-19 PROCEDURE — 99214 OFFICE O/P EST MOD 30 MIN: CPT | Mod: 25 | Performed by: NURSE PRACTITIONER

## 2018-10-19 PROCEDURE — 90670 PCV13 VACCINE IM: CPT | Performed by: NURSE PRACTITIONER

## 2018-10-19 ASSESSMENT — PAIN SCALES - GENERAL: PAINLEVEL: NO PAIN (0)

## 2018-10-19 NOTE — PROGRESS NOTES
[facility]  [address] [facility] [address]  [phone]  Dept: [phone]    PRE-OP EVALUATION:  Today's date: 10/19/2018    Merissa Leung (: 1985) presents for pre-operative evaluation assessment as requested by Dr. Jackson.  She requires evaluation and anesthesia risk assessment prior to undergoing surgery/procedure for treatment of Idiopathic Thrombocytopenia .    Fax number for surgical facility:   Primary Physician: Mohini Mccord  Type of Anesthesia Anticipated: General    Patient has a Health Care Directive or Living Will:  NO    Preop Questions 10/19/2018   Who is doing your surgery? Dr Jackson   What are you having done? spleenectomy   Date of Surgery/Procedure:    Facility or Hospital where procedure/surgery will be performed: [facility]   1.  Do you have a history of Heart attack, stroke, stent, coronary bypass surgery, or other heart surgery? No   2.  Do you ever have any pain or discomfort in your chest? No   3.  Do you have a history of  Heart Failure? No   4.   Are you troubled by shortness of breath when:  walking on a level surface, or up a slight hill, or at night? No   5.  Do you currently have a cold, bronchitis or other respiratory infection? No   6.  Do you have a cough, shortness of breath, or wheezing? No   7.  Do you sometimes get pains in the calves of your legs when you walk? No   8. Do you or anyone in your family have previous history of blood clots? YES - mother and father  Dad with history DVT- still on blood thinners  Mom unsure complete details    9.  Do you or does anyone in your family have a serious bleeding problem such as prolonged bleeding following surgeries or cuts? YES - mother- prolonged healing, unsure all details, knows that she required blood transfusion, Hep C    10. Have you ever had problems with anemia or been told to take iron pills? No   11. Have you had any abnormal blood loss such as black,  tarry or bloody stools, or abnormal vaginal bleeding? YES - blood in stools, hx of IBS    12. Have you ever had a blood transfusion? No   13. Have you or any of your relatives ever had problems with anesthesia? No   14. Do you have sleep apnea, excessive snoring or daytime drowsiness? YES - daytime drowsiness   15. Do you have any prosthetic heart valves? No   16. Do you have prosthetic joints? No   17. Is there any chance that you may be pregnant? YES - irregular periods- plans on seeing Ob/GYN for thsi          HPI:     HPI related to upcoming procedure: patient was diagnosed with idopathic thrombocytopenia in 2017 she attemoted 3-4 months of prednisone with little response. She was seen by Dr. Pena who recommended that she consider splenectomy along with IVIG. She was seen by Dr. Jackson general surgeon in [place] to further discuss splenectomy. She will be pursuing this on 11/6. She is here today for pre op clearance and to get recommended immunizations.       See problem list for active medical problems.  Problems all longstanding and stable, except as noted/documented.  See ROS for pertinent symptoms related to these conditions.                                                                                                                                                          .    MEDICAL HISTORY:     Patient Active Problem List    Diagnosis Date Noted     Immune thrombocytopenia (H) 11/29/2017     Priority: Medium     Thrombocytopenia (H) 11/09/2017     Priority: Medium     Underweight 11/09/2017     Priority: Medium     Prenatal care, subsequent pregnancy 07/20/2017     Priority: Medium     Chronic abdominal pain 10/06/2016     Priority: Medium     Has had workup through [place] and [place]      Now followed by [facility] (started on bentyl)       Fibromyalgia 07/17/2015     Priority: Medium     Anxiety 07/06/2015     Priority: Medium     Hyperlipidemia LDL goal <160 07/15/2013     Priority: Medium      Headaches, tension 07/15/2013     Priority: Medium     Chronic constipation 07/15/2013     Priority: Medium     OCD (obsessive compulsive disorder) 07/15/2013     Priority: Medium     Binge eating  Started therapeutic trial of Luvox 100-300mg extended release at bedtime; category C in pregnancy (as all SSRI); not effective  Will refer to [facility]        Past Medical History:   Diagnosis Date     Anxiety      Binge eating disorder      Chickenpox     x3     Food intolerance      IBS (irritable bowel syndrome)      MEDICAL HISTORY OF -     pelvic floor disorder     Past Surgical History:   Procedure Laterality Date     C/SECTION, LOW TRANSVERSE        SECTION, TUBAL LIGATION, COMBINED N/A 2/15/2016    NO tubal ligation done     COLONOSCOPY  2013    Procedure: COMBINED COLONOSCOPY, SINGLE BIOPSY/POLYPECTOMY BY BIOPSY;  Colonoscopy;  Surgeon: Maureen Valentin MD;  Location: [facility]     COLONOSCOPY N/A 2015    Procedure: COMBINED COLONOSCOPY, SINGLE OR MULTIPLE BIOPSY/POLYPECTOMY BY BIOPSY;  Surgeon: Maureen Valentin MD;  Location: [facility]     ESOPHAGOSCOPY, GASTROSCOPY, DUODENOSCOPY (EGD), COMBINED N/A 2018    Procedure: COMBINED ESOPHAGOSCOPY, GASTROSCOPY, DUODENOSCOPY (EGD), BIOPSY SINGLE OR MULTIPLE;  Gastroscopy;  Surgeon: Ray Amato MD;  Location: [facility]     Current Outpatient Prescriptions   Medication Sig Dispense Refill     clonazePAM (KLONOPIN) 0.5 MG tablet Take 0.5-1 tablets (0.25-0.5 mg) by mouth 2 times daily as needed for anxiety 20 tablet 0     linaclotide (LINZESS) 145 MCG capsule Take 1 capsule (145 mcg) by mouth every morning (before breakfast) 30 capsule 5     lisdexamfetamine (VYVANSE) 10 MG capsule Take 1 capsule (10 mg) by mouth daily (before lunch) 30 capsule 0     lisdexamfetamine (VYVANSE) 10 MG capsule Take 1 capsule (10 mg) by mouth daily (with lunch) 30 capsule 0     [START ON 2018] lisdexamfetamine (VYVANSE) 10 MG capsule Take 1 capsule  (10 mg) by mouth every morning 30 capsule 0     lisdexamfetamine (VYVANSE) 40 MG capsule Take 1 capsule (40 mg) by mouth daily 30 capsule 0     lisdexamfetamine (VYVANSE) 40 MG capsule Take 1 capsule (40 mg) by mouth every morning 30 capsule 0     Magnesium Citrate POWD 615 mg At Bedtime 1 Bottle 3     Multiple Vitamins-Minerals (MULTIVITAL PO) Take 1 chew tab by mouth daily Gummi       polyethylene glycol (MIRALAX/GLYCOLAX) powder Take 17 g by mouth daily as needed for constipation       sennosides (SENOKOT) 8.6 MG tablet Take 2 tablets by mouth 2 times daily       UNABLE TO FIND Take 2 teaspoonful by mouth At Bedtime Magnesium Calm       OTC products: None, except as noted above    Allergies   Allergen Reactions     Dilaudid [Hydromorphone] Nausea and Vomiting     Food Other (See Comments) and Swelling     Processed food, sugar, high sodium, red grains, rice, potatoes  Pain in extremities and face.   All over body at times.      Latex Allergy: NO    Social History   Substance Use Topics     Smoking status: Current Every Day Smoker     Packs/day: 0.50     Years: 6.00     Types: Cigarettes     Last attempt to quit: 5/25/2015     Smokeless tobacco: Never Used      Comment: 1/2 pack daily      Alcohol use No     History   Drug Use No       REVIEW OF SYSTEMS:   Constitutional, neuro, ENT, endocrine, pulmonary, cardiac, gastrointestinal, genitourinary, musculoskeletal, integument and psychiatric systems are negative, except as otherwise noted.    EXAM:   /70  Pulse 82  Temp 98.7  F (37.1  C) (Tympanic)  Resp 12  Wt 110 lb 9.6 oz (50.2 kg)  SpO2 100%  BMI 18.12 kg/m2    GENERAL APPEARANCE: healthy, alert and no distress     EYES: EOMI,  PERRL     HENT: ear canals and TM's normal and nose and mouth without ulcers or lesions     NECK: no adenopathy, no asymmetry, masses, or scars and thyroid normal to palpation     RESP: lungs clear to auscultation - no rales, rhonchi or wheezes     CV: regular rates and  rhythm, normal S1 S2, no S3 or S4 and no murmur, click or rub     ABDOMEN:  soft, nontender, no HSM or masses and bowel sounds normal     MS: extremities normal- no gross deformities noted, no evidence of inflammation in joints, FROM in all extremities.     SKIN: no suspicious lesions or rashes     NEURO: Normal strength and tone, sensory exam grossly normal, mentation intact and speech normal     PSYCH: mentation appears normal. and affect normal/bright     LYMPHATICS: No cervical adenopathy    DIAGNOSTICS:     CBC RESULTS:   Recent Labs   Lab Test  10/19/18   1531   WBC  8.8   RBC  4.79   HGB  14.4   HCT  42.5   MCV  89   MCH  30.1   MCHC  33.9   RDW  12.7   PLT  56*         Recent Labs   Lab Test  10/02/18   1403  09/24/18   1715   06/04/18   1555   01/30/18   1805   12/04/17   2300   HGB  14.9  14.5   < >  15.3   < >  16.9*   < >  13.9   PLT  29*  30*   < >  59*   < >  25*   < >  43*   INR   --    --    --    --    --    --    --   1.07   NA   --    --    --   142   --   138   < >  140   POTASSIUM   --    --    --   3.6   --   3.4   < >  3.4   CR   --    --    --   0.74   --   0.80   < >  0.64    < > = values in this interval not displayed.      Urine pregnancy test today- negative   IMPRESSION:   Reason for surgery/procedure: ITP  Diagnosis/reason for consult: pre op clearance    The proposed surgical procedure is considered INTERMEDIATE risk.    REVISED CARDIAC RISK INDEX  The patient has the following serious cardiovascular risks for perioperative complications such as (MI, PE, VFib and 3  AV Block):  No serious cardiac risks  INTERPRETATION: 0 risks: Class I (very low risk - 0.4% complication rate)    The patient has the following additional risks for perioperative complications:  No identified additional risks      ICD-10-CM    1. Preop general physical exam Z01.818 Beta HCG Qual, Urine - [facility] and [place] ([id number])     HIB, PRP-T, ACTHIB, IM [86585]     MENINGOCOCCAL VACCINE,IM (MENACTRA) [29683] AGE  11-55     Pneumococcal vaccine 13 valent PCV13 IM (Prevnar) [35423]     Each additional admin.  (Right click and add QUANTITY)  [04704]   2. Idiopathic thrombocytopenic purpura (H) D69.3 CBC with platelets differential     HIB, PRP-T, ACTHIB, IM [07226]     MENINGOCOCCAL VACCINE,IM (MENACTRA) [10951] AGE 11-55     Pneumococcal vaccine 13 valent PCV13 IM (Prevnar) [38498]     Each additional admin.  (Right click and add QUANTITY)  [72693]   3. Irritable bowel syndrome with constipation K58.1    4. Binge eating disorder F50.81    5. Need for vaccination Z23 HIB, PRP-T, ACTHIB, IM [73498]     MENINGOCOCCAL VACCINE,IM (MENACTRA) [50031] AGE 11-55     Pneumococcal vaccine 13 valent PCV13 IM (Prevnar) [69169]     Each additional admin.  (Right click and add QUANTITY)  [95378]   6. EDWARD (generalized anxiety disorder) F41.1        RECOMMENDATIONS:       --Patient is to holdall scheduled medications on the day of surgery EXCEPT for modifications listed below.    APPROVAL GIVEN to proceed with proposed procedure, without further diagnostic evaluation       Signed Electronically by: Mohini Mccord NP    Copy of this evaluation report is provided to requesting physician.    [facility] Preop Guidelines    Revised Cardiac Risk Index

## 2018-10-19 NOTE — NURSING NOTE
Immunizations ordered by Surgeon  Checked with Pharmacist regarding ActHib due to pt is out of the recommended age range per protocol.  AFSHIN Fischer pharmacist replied  [10/19/2018 2:22 PM] Ashkan Antony:   Administer Haemophilus influenzae type b vaccine (Hib) to adults with the following indications:   Anatomical or functional asplenia (including sickle cell disease) or undergoing elective splenectomy: Administer 1 dose if not previously vaccinated (preferably at least 14 days before elective splenectomy)  [10/19/2018 2:22 PM] Ashkan Antony:   I don't think any of the alternatives to ActHib are indicated for adults either  [10/19/2018 2:24 PM] Ashkan Antony:   so yes I think that it would be okay to give

## 2018-10-19 NOTE — PATIENT INSTRUCTIONS
Hold all medication prior to surgery   Immunization today     Before Your Surgery      Call your surgeon if there is any change in your health. This includes signs of a cold or flu (such as a sore throat, runny nose, cough, rash or fever).    Do not smoke, drink alcohol or take over the counter medicine (unless your surgeon or primary care doctor tells you to) for the 24 hours before and after surgery.    If you take prescribed drugs: Follow your doctor s orders about which medicines to take and which to stop until after surgery.    Eating and drinking prior to surgery: follow the instructions from your surgeon    Take a shower or bath the night before surgery. Use the soap your surgeon gave you to gently clean your skin. If you do not have soap from your surgeon, use your regular soap. Do not shave or scrub the surgery site.  Wear clean pajamas and have clean sheets on your bed.

## 2018-10-19 NOTE — MR AVS SNAPSHOT
After Visit Summary   10/19/2018    Merissa Leung    MRN: [id number]           Patient Information     Date Of Birth          1985        Visit Information        Provider Department      10/19/2018 3:20 PM Mohini Mccord NP [facility]        Today's Diagnoses     Preop general physical exam    -  1    Idiopathic thrombocytopenic purpura (H)        Need for vaccination          Care Instructions      Hold all medication prior to surgery   Immunization today     Before Your Surgery      Call your surgeon if there is any change in your health. This includes signs of a cold or flu (such as a sore throat, runny nose, cough, rash or fever).    Do not smoke, drink alcohol or take over the counter medicine (unless your surgeon or primary care doctor tells you to) for the 24 hours before and after surgery.    If you take prescribed drugs: Follow your doctor s orders about which medicines to take and which to stop until after surgery.    Eating and drinking prior to surgery: follow the instructions from your surgeon    Take a shower or bath the night before surgery. Use the soap your surgeon gave you to gently clean your skin. If you do not have soap from your surgeon, use your regular soap. Do not shave or scrub the surgery site.  Wear clean pajamas and have clean sheets on your bed.           Follow-ups after your visit        Your next 10 appointments already scheduled     Nov 05, 2018  5:30 PM CST   [facility] with [facility]   [facility]    [address] [place] [address]   [phone]           Please do not eat 10-12 hours before your appointment if you are coming in fasting for labs on lipids, cholesterol, or glucose (sugar). This does not apply to pregnant women. Water, hot tea and black coffee (with nothing added) are okay. Do not drink other fluids, diet soda or chew gum.            Nov 06, 2018   Procedure with Anthony Bell  DO Ren[place]   [facility] (--)    [address] [facility] [address]   [phone]           The medical center is located at [address] [facility] [address] (between [address] in [place], [place] north of [place]).            Nov 08, 2018 11:30 AM CST   Level 3 with ROOM 6 [facility]    [facility]  [address] [facility] [address]  [facility] [address]   [phone]            Nov 09, 2018 11:30 AM CST   Level 3 with ROOM 6 [facility])    [facility]  [address]  [facility] [address]   [phone]            Nov 30, 2018  2:45 PM CST   PHYSICAL with Carola Sanchez MD   [facility]    [address] [facility] [address]   [phone]            Dec 03, 2018  5:30 PM CST   LAB with PI LAB   [facility]    [address] [facility] [address]   [phone]           Please do not eat 10-12 hours before your appointment if you are coming in fasting for labs on lipids, cholesterol, or glucose (sugar). This does not apply to pregnant women. Water, hot tea and black coffee (with nothing added) are okay. Do not drink other fluids, diet soda or chew gum.            Dec 06, 2018 11:30 AM CST   Level 3 with ROOM 6 [facility])    [facility]  [address] [facility] [address]  [facility] [address]   [phone]            Dec 07, 2018 11:30 AM CST   Level 3 with ROOM 6 [facility]    [facility]  [address]  [facility] [address]   [phone]              Who to contact     If you have questions or need follow up information about today's clinic visit or your schedule please contact [facility] directly at [phone].  Normal or non-critical lab and imaging results will be communicated to you by [employer], letter or phone within 4 business days after the clinic has received the results. If you do not hear from us within 7 days, please contact the clinic through [employer] or phone. If you have a critical or abnormal lab result, we will notify you by phone as soon as possible.  Submit refill requests through [employer] or call your pharmacy and they will forward the refill request to us. Please allow 3 business days for your refill to be completed.          Additional Information About Your Visit        [employer] Information     [employer] gives you secure access to your electronic health record. If you see a primary care provider, you can also send messages to your care team and make appointments. If you have questions, please call your primary care clinic.  If you do not have a primary care provider, please call [phone] and they will assist you.        Care EveryWhere ID     This is your Care EveryWhere ID. This could be used by other organizations to access your [facility] medical records  [id number]        Your Vitals Were     Pulse Temperature Respirations Pulse Oximetry BMI (Body Mass Index)       82 98.7  F (37.1  C) (Tympanic) 12 100% 18.12 kg/m2        Blood Pressure from Last 3 Encounters:   10/19/18 128/70   10/09/18 99/58   10/09/18 114/67    Weight from Last 3 Encounters:   10/19/18 110 lb 9.6 oz (50.2 kg)   10/09/18 109 lb (49.4 kg)   10/02/18 109 lb 4.8 oz (49.6 kg)              We Performed the Following     Beta HCG Qual, Urine - [facility] [place] ([id number])     CBC with platelets differential     Each additional admin.  (Right click and add QUANTITY)  [05976]     HIB, PRP-T, ACTHIB, IM [68087]     MENINGOCOCCAL VACCINE,IM (MENACTRA) [17227] AGE 11-55     Pneumococcal vaccine 13 valent PCV13 IM (Prevnar) [70347]        Primary Care Provider Office Phone # Fax #    Mohini Mccord NP  [phone]       [address] [facility] [address]        Equal Access to Services     ANA GARCIA : Hadii aad ku hadcarlos Cervantes, wadionicioda luqadaha, qaybta kaalmada kathy, leela deshaunin hayaan loniedy alexander laSergioconnie smith. So [facility] [phone].    ATENCIÓN: Si habla español, tiene a goldsmith disposición servicios gratuitos de asistencia lingüística. Marcelaame al [phone].    We comply with applicable federal civil rights laws and [place] laws. We do not discriminate on the basis of race, color, national origin, age, disability, sex, sexual orientation, or gender identity.            Thank you!     Thank you for choosing [facility]  for your care. Our goal is always to provide you with excellent care. Hearing back from our patients is one way we can continue to improve our services. Please take a few minutes to complete the written survey that you may receive in the mail after your visit with us. Thank you!             Your Updated Medication List - Protect others around you: Learn how to safely use, store and throw away your medicines at www.disposemymeds.org.          This list is accurate as of 10/19/18  3:49 PM.  Always use your most recent med list.                   Brand Name Dispense Instructions for use Diagnosis    clonazePAM 0.5 MG tablet    klonoPIN    20 tablet    Take 0.5-1 tablets (0.25-0.5 mg) by mouth 2 times daily as needed for anxiety    EDWARD (generalized anxiety disorder)       linaclotide 145 MCG capsule    LINZESS    30 capsule    Take 1 capsule (145 mcg) by mouth every morning (before breakfast)    Irritable bowel syndrome with constipation       * lisdexamfetamine 10 MG capsule    VYVANSE    30 capsule    Take 1 capsule (10 mg) by mouth daily (with lunch)    Binge eating disorder       * lisdexamfetamine 40 MG capsule    VYVANSE    30 capsule    Take 1 capsule (40 mg) by mouth daily    Binge eating disorder       * lisdexamfetamine 10 MG capsule    VYVANSE    30  capsule    Take 1 capsule (10 mg) by mouth daily (before lunch)    Binge eating disorder       * lisdexamfetamine 40 MG capsule    VYVANSE    30 capsule    Take 1 capsule (40 mg) by mouth every morning    Binge eating disorder       * lisdexamfetamine 10 MG capsule   Start taking on:  11/7/2018    VYVANSE    30 capsule    Take 1 capsule (10 mg) by mouth every morning    Binge eating disorder       Magnesium Citrate Powd     1 Bottle    615 mg At Bedtime    Irritable bowel syndrome with constipation       MULTIVITAL PO      Take 1 chew tab by mouth daily Gummi        polyethylene glycol powder    MIRALAX/GLYCOLAX     Take 17 g by mouth daily as needed for constipation        sennosides 8.6 MG tablet    SENOKOT     Take 2 tablets by mouth 2 times daily        UNABLE TO FIND      Take 2 teaspoonful by mouth At Bedtime Magnesium Calm        * Notice:  This list has 5 medication(s) that are the same as other medications prescribed for you. Read the directions carefully, and ask your doctor or other care provider to review them with you.

## 2018-10-19 NOTE — NURSING NOTE
"Chief Complaint   Patient presents with     Pre-Op Exam     Splenectomy        Initial /70  Pulse 82  Temp 98.7  F (37.1  C) (Tympanic)  Resp 12  Wt 110 lb 9.6 oz (50.2 kg)  SpO2 100%  BMI 18.12 kg/m2 Estimated body mass index is 18.12 kg/(m^2) as calculated from the following:    Height as of 10/9/18: 5' 5.5\" (1.664 m).    Weight as of this encounter: 110 lb 9.6 oz (50.2 kg).    Patient presents to the clinic using No DME    Health Maintenance that is potentially due pending provider review:  Pap Smear    Pt is already scheduled for November with Ob/Gyn.    Is there anyone who you would like to be able to receive your results? No  If yes have patient fill out SAUD      "

## 2018-10-20 LAB
DIFFERENTIAL METHOD BLD: ABNORMAL
ERYTHROCYTE [DISTWIDTH] IN BLOOD BY AUTOMATED COUNT: 12.7 % (ref 10–15)
HCT VFR BLD AUTO: 42.5 % (ref 35–47)
HGB BLD-MCNC: 14.4 G/DL (ref 11.7–15.7)
LYMPHOCYTES # BLD AUTO: 2.1 10E9/L (ref 0.8–5.3)
LYMPHOCYTES NFR BLD AUTO: 24 %
MCH RBC QN AUTO: 30.1 PG (ref 26.5–33)
MCHC RBC AUTO-ENTMCNC: 33.9 G/DL (ref 31.5–36.5)
MCV RBC AUTO: 89 FL (ref 78–100)
MONOCYTES # BLD AUTO: 0.4 10E9/L (ref 0–1.3)
MONOCYTES NFR BLD AUTO: 5 %
NEUTROPHILS # BLD AUTO: 6.3 10E9/L (ref 1.6–8.3)
NEUTROPHILS NFR BLD AUTO: 71 %
PLATELET # BLD AUTO: 56 10E9/L (ref 150–450)
RBC # BLD AUTO: 4.79 10E12/L (ref 3.8–5.2)
WBC # BLD AUTO: 8.8 10E9/L (ref 4–11)

## 2018-10-23 ENCOUNTER — TELEPHONE (OUTPATIENT)
Dept: FAMILY MEDICINE | Facility: CLINIC | Age: 33
End: 2018-10-23

## 2018-10-23 DIAGNOSIS — N92.0 HEAVY MENSES: Primary | ICD-10-CM

## 2018-10-23 NOTE — TELEPHONE ENCOUNTER
Merissa is calling stating she got her period on Saturday and she is having very heavy bleeding. She is wondering if she should be seen or if she should just let it run its course. Please advise.    Marcy [facility]Station

## 2018-10-23 NOTE — TELEPHONE ENCOUNTER
LMP prior to Sat vag bleeding unknown as periods have been irregular.  Onset vag bleeding Sat, flow has been heavy since saturating pad and tampon every 1-2 hrs. No clots.  C/O pelvic cramping.   Denies abnormal weakness, lightheadedness, dizziness, SOA.   Component      Latest Ref Rng & Units 10/19/2018   WBC      4.0 - 11.0 10e9/L 8.8   RBC Count      3.8 - 5.2 10e12/L 4.79   Hemoglobin      11.7 - 15.7 g/dL 14.4   Hematocrit      35.0 - 47.0 % 42.5   MCV      78 - 100 fl 89   MCH      26.5 - 33.0 pg 30.1   MCHC      31.5 - 36.5 g/dL 33.9   RDW      10.0 - 15.0 % 12.7   Platelet Count      150 - 450 10e9/L 56 (L)   Diff Method       Automated Method   % Neutrophils      % 71.0   % Lymphocytes      % 24.0   % Monocytes      % 5.0   Absolute Neutrophil      1.6 - 8.3 10e9/L 6.3   Absolute Lymphocytes      0.8 - 5.3 10e9/L 2.1   Absolute Monocytes      0.0 - 1.3 10e9/L 0.4     RN discussed with Анна who advises to monitor bleeding, sx for now. ED if bleeding, cramping worsen, passing clots, develops weakness, lightheadedness, dizziness, SOA, or any other abnormal sx.  F/U by appt, recheck CBC this week.  Pt informed, lab only appt scheduled tomorrow PM. Appt with Анна scheduled Fri 10-26-18 PM, will cancel if not needed.  Advised to ensure adequate fluid intake, rest.   ADA Rubi, RN

## 2018-10-24 DIAGNOSIS — N92.0 HEAVY MENSES: ICD-10-CM

## 2018-10-24 LAB
ERYTHROCYTE [DISTWIDTH] IN BLOOD BY AUTOMATED COUNT: 11.9 % (ref 10–15)
HCT VFR BLD AUTO: 42.6 % (ref 35–47)
HGB BLD-MCNC: 14 G/DL (ref 11.7–15.7)
MCH RBC QN AUTO: 30.2 PG (ref 26.5–33)
MCHC RBC AUTO-ENTMCNC: 32.9 G/DL (ref 31.5–36.5)
MCV RBC AUTO: 92 FL (ref 78–100)
PLATELET # BLD AUTO: 39 10E9/L (ref 150–450)
RBC # BLD AUTO: 4.64 10E12/L (ref 3.8–5.2)
WBC # BLD AUTO: 6.9 10E9/L (ref 4–11)

## 2018-10-24 PROCEDURE — 85027 COMPLETE CBC AUTOMATED: CPT | Performed by: NURSE PRACTITIONER

## 2018-10-24 PROCEDURE — 36415 COLL VENOUS BLD VENIPUNCTURE: CPT | Performed by: NURSE PRACTITIONER

## 2018-10-25 ENCOUNTER — TELEPHONE (OUTPATIENT)
Dept: FAMILY MEDICINE | Facility: CLINIC | Age: 33
End: 2018-10-25

## 2018-10-25 NOTE — TELEPHONE ENCOUNTER
Merissa is calling to see if her platelet count is back.  Please call her.  [facility]

## 2018-10-25 NOTE — TELEPHONE ENCOUNTER
Results sent via [employer] by Анна.  Pt informed of lab results and as noted per Анна.  States still having vag bleeding, slightly less but feels still significant. Also having pelvic cramping.  Pt will see Анна tomorrow, discuss lab results and further POC.  ADA Rubi RN

## 2018-10-26 ENCOUNTER — OFFICE VISIT (OUTPATIENT)
Dept: FAMILY MEDICINE | Facility: CLINIC | Age: 33
End: 2018-10-26
Payer: COMMERCIAL

## 2018-10-26 VITALS
HEIGHT: 66 IN | BODY MASS INDEX: 17.68 KG/M2 | RESPIRATION RATE: 12 BRPM | DIASTOLIC BLOOD PRESSURE: 70 MMHG | OXYGEN SATURATION: 100 % | TEMPERATURE: 97.2 F | WEIGHT: 110 LBS | HEART RATE: 58 BPM | SYSTOLIC BLOOD PRESSURE: 112 MMHG

## 2018-10-26 DIAGNOSIS — D69.3 IMMUNE THROMBOCYTOPENIA (H): ICD-10-CM

## 2018-10-26 DIAGNOSIS — N93.9 ABNORMAL VAGINAL BLEEDING: Primary | ICD-10-CM

## 2018-10-26 LAB
ERYTHROCYTE [DISTWIDTH] IN BLOOD BY AUTOMATED COUNT: 11.9 % (ref 10–15)
HCT VFR BLD AUTO: 46.5 % (ref 35–47)
HGB BLD-MCNC: 15 G/DL (ref 11.7–15.7)
MCH RBC QN AUTO: 30 PG (ref 26.5–33)
MCHC RBC AUTO-ENTMCNC: 32.3 G/DL (ref 31.5–36.5)
MCV RBC AUTO: 93 FL (ref 78–100)
PLATELET # BLD AUTO: 40 10E9/L (ref 150–450)
RBC # BLD AUTO: 5 10E12/L (ref 3.8–5.2)
WBC # BLD AUTO: 6.9 10E9/L (ref 4–11)

## 2018-10-26 PROCEDURE — 36415 COLL VENOUS BLD VENIPUNCTURE: CPT | Performed by: NURSE PRACTITIONER

## 2018-10-26 PROCEDURE — 85027 COMPLETE CBC AUTOMATED: CPT | Performed by: NURSE PRACTITIONER

## 2018-10-26 PROCEDURE — 99213 OFFICE O/P EST LOW 20 MIN: CPT | Performed by: NURSE PRACTITIONER

## 2018-10-26 NOTE — NURSING NOTE
"Chief Complaint   Patient presents with     Vaginal Bleeding     6 days        Initial /70 (BP Location: Right arm, Patient Position: Chair)  Pulse 58  Temp 97.2  F (36.2  C) (Tympanic)  Resp 12  Ht 5' 5.5\" (1.664 m)  Wt 110 lb (49.9 kg)  SpO2 100%  BMI 18.03 kg/m2 Estimated body mass index is 18.03 kg/(m^2) as calculated from the following:    Height as of this encounter: 5' 5.5\" (1.664 m).    Weight as of this encounter: 110 lb (49.9 kg).    Patient presents to the clinic using No DME    Health Maintenance that is potentially due pending provider review:  Pap Smear    Pt is already scheduled .    Is there anyone who you would like to be able to receive your results? No  If yes have patient fill out SAUD    "

## 2018-10-26 NOTE — MR AVS SNAPSHOT
After Visit Summary   10/26/2018    Merissa Leung    MRN: [id number]           Patient Information     Date Of Birth          1985        Visit Information        Provider Department      10/26/2018 2:40 PM Mohini Mccord NP [facility]        Today's Diagnoses     Abnormal vaginal bleeding    -  1    Vaginal bleeding          Care Instructions    Will check labs today to make sure no anemia               Follow-ups after your visit        Follow-up notes from your care team     Return in about 1 week (around 11/2/2018), or if symptoms worsen or fail to improve.      Your next 10 appointments already scheduled     Nov 05, 2018  5:30 PM CST   LAB with [facility]   [facility]    [address] [facility] [address]   [phone]           Please do not eat 10-12 hours before your appointment if you are coming in fasting for labs on lipids, cholesterol, or glucose (sugar). This does not apply to pregnant women. Water, hot tea and black coffee (with nothing added) are okay. Do not drink other fluids, diet soda or chew gum.            Nov 06, 2018   Procedure with Anthony Jackson,    [facility] (--)    [address] [facility] [address]   [phone]           The medical center is located at [address] [place] [address]. (between [address] in [place], [place] north of [place]).            Nov 08, 2018 11:30 AM CST   Level 3 with ROOM 6 [facility] ([facility])    [facility]  [address] [place] [address] [address]  [facility] [address]   [phone]            Nov 09, 2018 11:30 AM CST   Level 3 with ROOM 6 [facility] ([facility])    [facility]  [address] [place] [address] [address]  [facility] [address]   [phone]            Nov 30, 2018  2:45 PM CST   PHYSICAL with Carola Sanchez MD    [facility]    [address] [place]  [facility] [address]   [phone]            Dec 03, 2018  5:30 PM CST   LAB with PI LAB   [facility]    [address]  [facility] [address]   [phone]           Please do not eat 10-12 hours before your appointment if you are coming in fasting for labs on lipids, cholesterol, or glucose (sugar). This does not apply to pregnant women. Water, hot tea and black coffee (with nothing added) are okay. Do not drink other fluids, diet soda or chew gum.            Dec 06, 2018 11:30 AM CST   Level 3 with ROOM 6 [facility] ([facility])    [facility]  [address]  [facility] [address]   [phone]            Dec 07, 2018 11:30 AM CST   Level 3 with ROOM 6 [facility] ([facility])    [facility]  [address]  [facility] [address]   [phone]              Who to contact     If you have questions or need follow up information about today's clinic visit or your schedule please contact [facility] directly at [phone].  Normal or non-critical lab and imaging results will be communicated to you by MyChart, letter or phone within 4 business days after the clinic has received the results. If you do not hear from us within 7 days, please contact the clinic through MyChart or phone. If you have a critical or abnormal lab result, we will notify you by phone as soon as possible.  Submit refill requests through [employer] or call your pharmacy and they will forward the refill request to us. Please allow 3 business days for your refill to be completed.          Additional Information About Your Visit        [employer] Information     [employer] gives you secure access to your electronic health record. If you see a primary care provider, you can also  "send messages to your care team and make appointments. If you have questions, please call your primary care clinic.  If you do not have a primary care provider, please call [phone] and they will assist you.        Care EveryWhere ID     This is your Care EveryWhere ID. This could be used by other organizations to access your [facility] medical records  [id number]        Your Vitals Were     Pulse Temperature Respirations Height Pulse Oximetry BMI (Body Mass Index)    58 97.2  F (36.2  C) (Tympanic) 12 5' 5.5\" (1.664 m) 100% 18.03 kg/m2       Blood Pressure from Last 3 Encounters:   10/26/18 112/70   10/19/18 128/70   10/09/18 99/58    Weight from Last 3 Encounters:   10/26/18 110 lb (49.9 kg)   10/19/18 110 lb 9.6 oz (50.2 kg)   10/09/18 109 lb (49.4 kg)              We Performed the Following     CBC with platelets        Primary Care Provider Office Phone # Fax #    Mohini Mccord, [phone]       [address] [facility] [address]        Equal Access to Services     [facility] : Hadii aad ku hadasho Soomaali, waaxda luqadaha, qaybta kaalmada adeegyada, leela olmedo haycristin marilee us . So [facility] [phone].    ATENCIÓN: Si habla español, tiene a goldsmith disposición servicios gratuitos de asistencia lingüística. Llame al [phone].    We comply with applicable federal civil rights laws and [place] laws. We do not discriminate on the basis of race, color, national origin, age, disability, sex, sexual orientation, or gender identity.            Thank you!     Thank you for choosing [facility]  for your care. Our goal is always to provide you with excellent care. Hearing back from our patients is one way we can continue to improve our services. Please take a few minutes to complete the written survey that you may receive in the mail after your visit with us. Thank you!             Your Updated Medication List - Protect others around you: Learn how to safely use, " store and throw away your medicines at www.disposemymeds.org.          This list is accurate as of 10/26/18  2:49 PM.  Always use your most recent med list.                   Brand Name Dispense Instructions for use Diagnosis    clonazePAM 0.5 MG tablet    klonoPIN    20 tablet    Take 0.5-1 tablets (0.25-0.5 mg) by mouth 2 times daily as needed for anxiety    EDWARD (generalized anxiety disorder)       linaclotide 145 MCG capsule    LINZESS    30 capsule    Take 1 capsule (145 mcg) by mouth every morning (before breakfast)    Irritable bowel syndrome with constipation       * lisdexamfetamine 10 MG capsule    VYVANSE    30 capsule    Take 1 capsule (10 mg) by mouth daily (with lunch)    Binge eating disorder       * lisdexamfetamine 40 MG capsule    VYVANSE    30 capsule    Take 1 capsule (40 mg) by mouth daily    Binge eating disorder       * lisdexamfetamine 10 MG capsule    VYVANSE    30 capsule    Take 1 capsule (10 mg) by mouth daily (before lunch)    Binge eating disorder       * lisdexamfetamine 40 MG capsule    VYVANSE    30 capsule    Take 1 capsule (40 mg) by mouth every morning    Binge eating disorder       * lisdexamfetamine 10 MG capsule   Start taking on:  11/7/2018    VYVANSE    30 capsule    Take 1 capsule (10 mg) by mouth every morning    Binge eating disorder       Magnesium Citrate Powd     1 Bottle    615 mg At Bedtime    Irritable bowel syndrome with constipation       MULTIVITAL PO      Take 1 chew tab by mouth daily Gummi        polyethylene glycol powder    MIRALAX/GLYCOLAX     Take 17 g by mouth daily as needed for constipation        sennosides 8.6 MG tablet    SENOKOT     Take 2 tablets by mouth 2 times daily        UNABLE TO FIND      Take 2 teaspoonful by mouth At Bedtime Magnesium Calm        * Notice:  This list has 5 medication(s) that are the same as other medications prescribed for you. Read the directions carefully, and ask your doctor or other care provider to review them with you.

## 2018-10-26 NOTE — PROGRESS NOTES
SUBJECTIVE:   Merissa Leung is a 33 year old female who presents to clinic today for the following health issues:      Vaginal Bleeding (Dysmenorrhea)      Onset: 6 days    Description:  Duration of bleeding episodes: hourly having to change tampon and pad within an hour, today her bleeding is a lot lighter  Frequency between periods:  1 hour  Describe bleeding/flow:   Clots: no   Number of pads/hour: 1 pad and 1 tampon per hour  Cramping: severe    Intensity:  severe    Accompanying signs and symptoms: severe migrains, confusion, nausea, tired    History (similar episodes/previous evaluation): low platlet, is unknown if that is the cause of bleeding     Precipitating or alleviating factors: None    Therapies tried and outcome: ibuprofen for cramping and does not seem to improve     5 days of heavy vaginal bleeding-   Today it is improved only having to change 3 times today       Problem list and histories reviewed & adjusted, as indicated.  Additional history: as documented    Patient Active Problem List   Diagnosis     Hyperlipidemia LDL goal <160     Headaches, tension     Chronic constipation     OCD (obsessive compulsive disorder)     Anxiety     Fibromyalgia     Chronic abdominal pain     Prenatal care, subsequent pregnancy     Immune thrombocytopenia (H)     Thrombocytopenia (H)     Underweight     Past Surgical History:   Procedure Laterality Date     C/SECTION, LOW TRANSVERSE        SECTION, TUBAL LIGATION, COMBINED N/A 2/15/2016    NO tubal ligation done     COLONOSCOPY  2013    Procedure: COMBINED COLONOSCOPY, SINGLE BIOPSY/POLYPECTOMY BY BIOPSY;  Colonoscopy;  Surgeon: Maureen Valentin MD;  Location: [facility]     COLONOSCOPY N/A 2015    Procedure: COMBINED COLONOSCOPY, SINGLE OR MULTIPLE BIOPSY/POLYPECTOMY BY BIOPSY;  Surgeon: Maureen Valentin MD;  Location: [facility]     ESOPHAGOSCOPY, GASTROSCOPY, DUODENOSCOPY (EGD), COMBINED N/A 2018    Procedure: COMBINED  "ESOPHAGOSCOPY, GASTROSCOPY, DUODENOSCOPY (EGD), BIOPSY SINGLE OR MULTIPLE;  Gastroscopy;  Surgeon: Ray Amato MD;  Location: [facility]       Social History   Substance Use Topics     Smoking status: Current Every Day Smoker     Packs/day: 0.50     Years: 6.00     Types: Cigarettes     Last attempt to quit: 2015     Smokeless tobacco: Never Used      Comment: 1/2 pack daily      Alcohol use No     Family History   Problem Relation Age of Onset     GASTROINTESTINAL DISEASE Mother 46     perforated intestines-      Hepatitis Mother      C     Liver Disease Mother      Blood Disease Mother      anemia     Substance Abuse Mother      A&D     Psychotic Disorder Brother      Schizophrenia, OCD     Schizophrenia Brother      Anxiety Disorder Brother      Arthritis Maternal Grandmother      Cancer Paternal Grandmother      cervical     Breast Cancer Paternal Grandmother      Hypertension Paternal Grandmother      Diabetes Paternal Grandmother      Cerebrovascular Disease Paternal Grandfather      HEART DISEASE Paternal Grandfather      Bipolar Disorder Paternal Grandfather      Suicide Paternal Grandfather      Cancer Paternal Aunt      cervical     Coronary Artery Disease Maternal Grandfather      MI     Substance Abuse Father      recovered A&D     HEART DISEASE Father      MI-stents     Hypertension Father            Reviewed and updated as needed this visit by clinical staff       Reviewed and updated as needed this visit by Provider         ROS:  Constitutional, HEENT, cardiovascular, pulmonary, gi and gu systems are negative, except as otherwise noted.    OBJECTIVE:                                                    /70 (BP Location: Right arm, Patient Position: Chair)  Pulse 58  Temp 97.2  F (36.2  C) (Tympanic)  Resp 12  Ht 5' 5.5\" (1.664 m)  Wt 110 lb (49.9 kg)  SpO2 100%  BMI 18.03 kg/m2  Body mass index is 18.03 kg/(m^2).  GENERAL APPEARANCE: healthy, alert and no distress  RESP: lungs clear " to auscultation - no rales, rhonchi or wheezes  CV: regular rates and rhythm, normal S1 S2, no S3 or S4 and no murmur, click or rub  ABDOMEN: soft, nontender, without hepatosplenomegaly or masses and bowel sounds normal    Diagnostic test results:  Diagnostic Test Results:  CBC - pending      ASSESSMENT/PLAN:                                                    1. Abnormal vaginal bleeding  Improved today   Will check CBC     2. Immune thrombocytopenia (H)  CBC today     Follow up as needed for persistent or worsening symptoms       Patient Instructions   Will check labs today to make sure no anemia           Mohini Mccord NP  [facility]

## 2018-10-31 ENCOUNTER — TELEPHONE (OUTPATIENT)
Dept: FAMILY MEDICINE | Facility: CLINIC | Age: 33
End: 2018-10-31

## 2018-10-31 ENCOUNTER — ANESTHESIA EVENT (OUTPATIENT)
Dept: SURGERY | Facility: CLINIC | Age: 33
DRG: 800 | End: 2018-10-31
Payer: COMMERCIAL

## 2018-10-31 ENCOUNTER — HOSPITAL ENCOUNTER (OUTPATIENT)
Facility: CLINIC | Age: 33
Setting detail: OBSERVATION
Discharge: HOME | End: 2018-11-01
Attending: EMERGENCY MEDICINE | Admitting: INTERNAL MEDICINE
Payer: COMMERCIAL

## 2018-10-31 DIAGNOSIS — D69.6 THROMBOCYTOPENIA (H): ICD-10-CM

## 2018-10-31 DIAGNOSIS — E87.6 HYPOKALEMIA: ICD-10-CM

## 2018-10-31 DIAGNOSIS — K92.2 GASTROINTESTINAL HEMORRHAGE, UNSPECIFIED GASTROINTESTINAL HEMORRHAGE TYPE: ICD-10-CM

## 2018-10-31 LAB
ALBUMIN UR-MCNC: 30 MG/DL
ANION GAP SERPL CALCULATED.3IONS-SCNC: 10 MMOL/L (ref 3–14)
APPEARANCE UR: ABNORMAL
APTT PPP: 32 SEC (ref 22–37)
BACTERIA #/AREA URNS HPF: ABNORMAL /HPF
BASOPHILS # BLD AUTO: 0.1 10E9/L (ref 0–0.2)
BASOPHILS NFR BLD AUTO: 1 %
BILIRUB UR QL STRIP: NEGATIVE
BLD PROD TYP BPU: NORMAL
BLD UNIT ID BPU: 0
BLOOD PRODUCT CODE: NORMAL
BPU ID: NORMAL
BUN SERPL-MCNC: 11 MG/DL (ref 7–30)
CALCIUM SERPL-MCNC: 7.9 MG/DL (ref 8.5–10.1)
CHLORIDE SERPL-SCNC: 109 MMOL/L (ref 94–109)
CO2 SERPL-SCNC: 23 MMOL/L (ref 20–32)
COLOR UR AUTO: ABNORMAL
CREAT SERPL-MCNC: 0.77 MG/DL (ref 0.52–1.04)
DIFFERENTIAL METHOD BLD: ABNORMAL
EOSINOPHIL # BLD AUTO: 0.1 10E9/L (ref 0–0.7)
EOSINOPHIL NFR BLD AUTO: 1.1 %
ERYTHROCYTE [DISTWIDTH] IN BLOOD BY AUTOMATED COUNT: 11.8 % (ref 10–15)
GFR SERPL CREATININE-BSD FRML MDRD: 87 ML/MIN/1.7M2
GLUCOSE SERPL-MCNC: 75 MG/DL (ref 70–99)
GLUCOSE UR STRIP-MCNC: NEGATIVE MG/DL
HCT VFR BLD AUTO: 40.3 % (ref 35–47)
HGB BLD-MCNC: 13.9 G/DL (ref 11.7–15.7)
HGB UR QL STRIP: NEGATIVE
IMM GRANULOCYTES # BLD: 0 10E9/L (ref 0–0.4)
IMM GRANULOCYTES NFR BLD: 0.3 %
INR PPP: 1.03 (ref 0.86–1.14)
KETONES UR STRIP-MCNC: 5 MG/DL
LEUKOCYTE ESTERASE UR QL STRIP: ABNORMAL
LYMPHOCYTES # BLD AUTO: 2.3 10E9/L (ref 0.8–5.3)
LYMPHOCYTES NFR BLD AUTO: 36.3 %
MCH RBC QN AUTO: 30.5 PG (ref 26.5–33)
MCHC RBC AUTO-ENTMCNC: 34.5 G/DL (ref 31.5–36.5)
MCV RBC AUTO: 88 FL (ref 78–100)
MONOCYTES # BLD AUTO: 0.3 10E9/L (ref 0–1.3)
MONOCYTES NFR BLD AUTO: 5 %
MUCOUS THREADS #/AREA URNS LPF: PRESENT /LPF
NEUTROPHILS # BLD AUTO: 3.5 10E9/L (ref 1.6–8.3)
NEUTROPHILS NFR BLD AUTO: 56.3 %
NITRATE UR QL: NEGATIVE
NRBC # BLD AUTO: 0 10*3/UL
NRBC BLD AUTO-RTO: 0 /100
PH UR STRIP: 5 PH (ref 5–7)
PLATELET # BLD AUTO: 44 10E9/L (ref 150–450)
POTASSIUM SERPL-SCNC: 2.7 MMOL/L (ref 3.4–5.3)
RBC # BLD AUTO: 4.56 10E12/L (ref 3.8–5.2)
RBC #/AREA URNS AUTO: 44 /HPF (ref 0–2)
SODIUM SERPL-SCNC: 142 MMOL/L (ref 133–144)
SOURCE: ABNORMAL
SP GR UR STRIP: 1.02 (ref 1–1.03)
SQUAMOUS #/AREA URNS AUTO: 2 /HPF (ref 0–1)
TRANSFUSION STATUS PATIENT QL: NORMAL
TRANSFUSION STATUS PATIENT QL: NORMAL
UROBILINOGEN UR STRIP-MCNC: 0 MG/DL (ref 0–2)
WBC # BLD AUTO: 6.3 10E9/L (ref 4–11)
WBC #/AREA URNS AUTO: 48 /HPF (ref 0–5)

## 2018-10-31 PROCEDURE — 80048 BASIC METABOLIC PNL TOTAL CA: CPT | Performed by: EMERGENCY MEDICINE

## 2018-10-31 PROCEDURE — 85610 PROTHROMBIN TIME: CPT | Performed by: EMERGENCY MEDICINE

## 2018-10-31 PROCEDURE — 85730 THROMBOPLASTIN TIME PARTIAL: CPT | Performed by: EMERGENCY MEDICINE

## 2018-10-31 PROCEDURE — 36430 TRANSFUSION BLD/BLD COMPNT: CPT

## 2018-10-31 PROCEDURE — P9037 PLATE PHERES LEUKOREDU IRRAD: HCPCS | Performed by: EMERGENCY MEDICINE

## 2018-10-31 PROCEDURE — 85025 COMPLETE CBC W/AUTO DIFF WBC: CPT | Performed by: EMERGENCY MEDICINE

## 2018-10-31 PROCEDURE — 99285 EMERGENCY DEPT VISIT HI MDM: CPT | Mod: 25 | Performed by: EMERGENCY MEDICINE

## 2018-10-31 PROCEDURE — 81001 URINALYSIS AUTO W/SCOPE: CPT | Performed by: EMERGENCY MEDICINE

## 2018-10-31 PROCEDURE — 99219 ZZC INITIAL OBSERVATION CARE,LEVL II: CPT | Performed by: INTERNAL MEDICINE

## 2018-10-31 PROCEDURE — G0378 HOSPITAL OBSERVATION PER HR: HCPCS

## 2018-10-31 PROCEDURE — 25000132 ZZH RX MED GY IP 250 OP 250 PS 637: Performed by: EMERGENCY MEDICINE

## 2018-10-31 PROCEDURE — 99285 EMERGENCY DEPT VISIT HI MDM: CPT | Mod: 25

## 2018-10-31 PROCEDURE — 87086 URINE CULTURE/COLONY COUNT: CPT | Performed by: EMERGENCY MEDICINE

## 2018-10-31 PROCEDURE — 87088 URINE BACTERIA CULTURE: CPT | Performed by: EMERGENCY MEDICINE

## 2018-10-31 PROCEDURE — 87186 SC STD MICRODIL/AGAR DIL: CPT | Performed by: EMERGENCY MEDICINE

## 2018-10-31 RX ORDER — POTASSIUM CHLORIDE 1500 MG/1
40 TABLET, EXTENDED RELEASE ORAL ONCE
Status: COMPLETED | OUTPATIENT
Start: 2018-10-31 | End: 2018-10-31

## 2018-10-31 RX ORDER — NALOXONE HYDROCHLORIDE 0.4 MG/ML
.1-.4 INJECTION, SOLUTION INTRAMUSCULAR; INTRAVENOUS; SUBCUTANEOUS
Status: DISCONTINUED | OUTPATIENT
Start: 2018-10-31 | End: 2018-11-01 | Stop reason: HOSPADM

## 2018-10-31 RX ADMIN — POTASSIUM CHLORIDE 40 MEQ: 1500 TABLET, EXTENDED RELEASE ORAL at 18:17

## 2018-10-31 ASSESSMENT — ACTIVITIES OF DAILY LIVING (ADL)
FALL_HISTORY_WITHIN_LAST_SIX_MONTHS: NO
COMMUNICATION: 0 - UNDERSTANDS/COMMUNICATES WITHOUT DIFFICULTY
DRESS: 0-->INDEPENDENT
TOILETING: 0 - INDEPENDENT
BATHING: 0 - INDEPENDENT
TOILETING: 0-->INDEPENDENT
AMBULATION: 0 - INDEPENDENT
TRANSFERRING: 0-->INDEPENDENT
TRANSFERRING: 0 - INDEPENDENT
SWALLOWING: 0-->SWALLOWS FOODS/LIQUIDS WITHOUT DIFFICULTY
DRESS: 0 - INDEPENDENT
RETIRED_EATING: 0-->INDEPENDENT
COGNITION: 0 - NO COGNITION ISSUES REPORTED
RETIRED_COMMUNICATION: 0-->UNDERSTANDS/COMMUNICATES WITHOUT DIFFICULTY
BATHING: 0-->INDEPENDENT
EATING: 0 - INDEPENDENT
AMBULATION: 0-->INDEPENDENT
SWALLOWING: 0 - SWALLOWS FOODS/LIQUIDS WITHOUT DIFFICULTY
CHANGE_IN_FUNCTIONAL_STATUS_SINCE_ONSET_OF_CURRENT_ILLNESS/INJURY: NO

## 2018-10-31 NOTE — ED NOTES
.DATE:  10/31/2018   TIME OF RECEIPT FROM LAB:  1750  LAB TEST:  Platelets  LAB VALUE:  44  RESULTS GIVEN WITH READ-BACK TO (PROVIDER):  Saqib Rollins MD  TIME LAB VALUE REPORTED TO PROVIDER:   1278

## 2018-10-31 NOTE — TELEPHONE ENCOUNTER
Patient is calling to speak with Анна's care team. States she started bleeding really bad again. Isn't sure if it is vaginally or rectally but she states there are very large blood clots that she cannot control. Please advise.    [facility] [place]

## 2018-10-31 NOTE — ED PROVIDER NOTES
History     Chief Complaint   Patient presents with     Melena     Abdominal Pain     HPI  Merissa Leung is a 33 year old female with a history of immune thrombocytopenia followed by Dr. Pena with recent immunoglobulin infusions on Oct. 8 and 9, chronic abdominal pain, fibromyalgia, OCD, anxiety and hyperlipidemia who presents to the Emergency Department with concern for melena and abdominal pain. Patient passed blood clots of varying size with bowel movements today. She contacted primary care and was referred for further evaluation due to history of thrombocytopenia. She complains of abdominal pain, but notes this is currently at baseline. Also notes chronic constipation, takes miralax daily but denies hard or difficult to pass stools. She is not currently on steroids. Of note, patient is scheduled for splenectomy in 6 days.     Previous Records Reviewed:   Colonoscopy 5/29/15   Impression:           - Friable (with contact bleeding) and melanotic mucosa in the entire examined colon. Biopsied.   - The distal rectum and anal verge are normal on retroflexion view.     Recommendation:   - Discharge patient to home (ambulatory).   - Repeat colonoscopy at age 52 for screening purposes.   - Await pathology results.       Problem List:    Patient Active Problem List    Diagnosis Date Noted     Immune thrombocytopenia (H) 11/29/2017     Priority: Medium     Thrombocytopenia (H) 11/09/2017     Priority: Medium     Underweight 11/09/2017     Priority: Medium     Prenatal care, subsequent pregnancy 07/20/2017     Priority: Medium     Chronic abdominal pain 10/06/2016     Priority: Medium     Has had workup through [place] and [place]      Now followed by [facility] (started on bentyl)       Fibromyalgia 07/17/2015     Priority: Medium     Anxiety 07/06/2015     Priority: Medium     Hyperlipidemia LDL goal <160 07/15/2013     Priority: Medium     Headaches, tension 07/15/2013     Priority: Medium     Chronic constipation  07/15/2013     Priority: Medium     OCD (obsessive compulsive disorder) 07/15/2013     Priority: Medium     Binge eating  Started therapeutic trial of Luvox 100-300mg extended release at bedtime; category C in pregnancy (as all SSRI); not effective  Will refer to [facility]          Past Medical History:    Past Medical History:   Diagnosis Date     Anxiety      Binge eating disorder      Chickenpox      Food intolerance      IBS (irritable bowel syndrome)      MEDICAL HISTORY OF -        Past Surgical History:    Past Surgical History:   Procedure Laterality Date     C/SECTION, LOW TRANSVERSE        SECTION, TUBAL LIGATION, COMBINED N/A 2/15/2016    NO tubal ligation done     COLONOSCOPY  2013    Procedure: COMBINED COLONOSCOPY, SINGLE BIOPSY/POLYPECTOMY BY BIOPSY;  Colonoscopy;  Surgeon: Maureen Valentin MD;  Location: [facility]     COLONOSCOPY N/A 2015    Procedure: COMBINED COLONOSCOPY, SINGLE OR MULTIPLE BIOPSY/POLYPECTOMY BY BIOPSY;  Surgeon: Maureen Valentin MD;  Location: [facility]     ESOPHAGOSCOPY, GASTROSCOPY, DUODENOSCOPY (EGD), COMBINED N/A 2018    Procedure: COMBINED ESOPHAGOSCOPY, GASTROSCOPY, DUODENOSCOPY (EGD), BIOPSY SINGLE OR MULTIPLE;  Gastroscopy;  Surgeon: Ray Amato MD;  Location: [facility]       Family History:    Family History   Problem Relation Age of Onset     GASTROINTESTINAL DISEASE Mother 46     perforated intestines-      Hepatitis Mother      C     Liver Disease Mother      Blood Disease Mother      anemia     Substance Abuse Mother      A&D     Psychotic Disorder Brother      Schizophrenia, OCD     Schizophrenia Brother      Anxiety Disorder Brother      Arthritis Maternal Grandmother      Cancer Paternal Grandmother      cervical     Breast Cancer Paternal Grandmother      Hypertension Paternal Grandmother      Diabetes Paternal Grandmother      Cerebrovascular Disease Paternal Grandfather      HEART DISEASE Paternal Grandfather       Bipolar Disorder Paternal Grandfather      Suicide Paternal Grandfather      Cancer Paternal Aunt      cervical     Coronary Artery Disease Maternal Grandfather      MI     Substance Abuse Father      recovered A&D     HEART DISEASE Father      MI-stents     Hypertension Father        Social History:  Marital Status:   [2]  Social History   Substance Use Topics     Smoking status: Current Every Day Smoker     Packs/day: 0.50     Years: 6.00     Types: Cigarettes     Last attempt to quit: 5/25/2015     Smokeless tobacco: Never Used      Comment: 1/2 pack daily      Alcohol use No        Medications:      clonazePAM (KLONOPIN) 0.5 MG tablet   linaclotide (LINZESS) 145 MCG capsule   lisdexamfetamine (VYVANSE) 10 MG capsule   lisdexamfetamine (VYVANSE) 40 MG capsule   Multiple Vitamins-Minerals (MULTIVITAL PO)   polyethylene glycol (MIRALAX/GLYCOLAX) powder   sennosides (SENOKOT) 8.6 MG tablet   UNABLE TO FIND   [START ON 11/7/2018] lisdexamfetamine (VYVANSE) 10 MG capsule       Review of Systems  All other systems are reviewed and are negative.    Physical Exam   BP: 120/76  Pulse: 84  Temp: 99.1  F (37.3  C)  Resp: 18  SpO2: 100 %      Physical Exam  Nontoxic appearing no respiratory distress alert and oriented ×3  Head atraumatic normocephalic   Neck supple full active painless range of motion  Lungs clear to auscultation  Heart regular no murmur  Abdomen soft nontender bowel sounds positive no masses or HSM  Strength and sensation grossly intact throughout the extremities, gait and station normal  Speech is fluent, good eye contact, thought processes are rational      ED Course     ED Course     Procedures               Critical Care time:  none               Results for orders placed or performed during the hospital encounter of 10/31/18 (from the past 24 hour(s))   CBC with platelets differential   Result Value Ref Range    WBC 6.3 4.0 - 11.0 10e9/L    RBC Count 4.56 3.8 - 5.2 10e12/L    Hemoglobin 13.9  11.7 - 15.7 g/dL    Hematocrit 40.3 35.0 - 47.0 %    MCV 88 78 - 100 fl    MCH 30.5 26.5 - 33.0 pg    MCHC 34.5 31.5 - 36.5 g/dL    RDW 11.8 10.0 - 15.0 %    Platelet Count 44 (LL) 150 - 450 10e9/L    Diff Method Automated Method     % Neutrophils 56.3 %    % Lymphocytes 36.3 %    % Monocytes 5.0 %    % Eosinophils 1.1 %    % Basophils 1.0 %    % Immature Granulocytes 0.3 %    Nucleated RBCs 0 0 /100    Absolute Neutrophil 3.5 1.6 - 8.3 10e9/L    Absolute Lymphocytes 2.3 0.8 - 5.3 10e9/L    Absolute Monocytes 0.3 0.0 - 1.3 10e9/L    Absolute Eosinophils 0.1 0.0 - 0.7 10e9/L    Absolute Basophils 0.1 0.0 - 0.2 10e9/L    Abs Immature Granulocytes 0.0 0 - 0.4 10e9/L    Absolute Nucleated RBC 0.0    Basic metabolic panel   Result Value Ref Range    Sodium 142 133 - 144 mmol/L    Potassium 2.7 (L) 3.4 - 5.3 mmol/L    Chloride 109 94 - 109 mmol/L    Carbon Dioxide 23 20 - 32 mmol/L    Anion Gap 10 3 - 14 mmol/L    Glucose 75 70 - 99 mg/dL    Urea Nitrogen 11 7 - 30 mg/dL    Creatinine 0.77 0.52 - 1.04 mg/dL    GFR Estimate 87 >60 mL/min/1.7m2    GFR Estimate If Black >90 >60 mL/min/1.7m2    Calcium 7.9 (L) 8.5 - 10.1 mg/dL   UA reflex to Microscopic   Result Value Ref Range    Color Urine Leticia     Appearance Urine Slightly Cloudy     Glucose Urine Negative NEG^Negative mg/dL    Bilirubin Urine Negative NEG^Negative    Ketones Urine 5 (A) NEG^Negative mg/dL    Specific Gravity Urine 1.020 1.003 - 1.035    Blood Urine Negative NEG^Negative    pH Urine 5.0 5.0 - 7.0 pH    Protein Albumin Urine 30 (A) NEG^Negative mg/dL    Urobilinogen mg/dL 0.0 0.0 - 2.0 mg/dL    Nitrite Urine Negative NEG^Negative    Leukocyte Esterase Urine Small (A) NEG^Negative    Source Midstream Urine     RBC Urine 44 (H) 0 - 2 /HPF    WBC Urine 48 (H) 0 - 5 /HPF    Bacteria Urine Moderate (A) NEG^Negative /HPF    Squamous Epithelial /HPF Urine 2 (H) 0 - 1 /HPF    Mucous Urine Present (A) NEG^Negative /LPF       Medications   potassium chloride SA  (K-ARIA/KLOR-CON M) CR tablet 40 mEq (40 mEq Oral Given 10/31/18 1817)       4:47 PM Patient assessed.     Assessments & Plan (with Medical Decision Making)  33-year-old female with immune ITP presents with lower GI bleeding as outlined per HPI.  Vitals normal, hemoglobin within normal, platelets baseline 44,000.  No active bleeding in ED.  Urinalysis appears infected, patient without symptoms, culture no indication for treatment at this time.  Patient reviewed with Dr. Ibarra hematology on-call recommend platelet transfusion and 20 mill grams dexamethasone daily times 4 days.  Patient refuses dexamethasone.  Consents to platelet transfusion and admission.  Reviewed with  who accepts patient for admission.     I have reviewed the nursing notes.    I have reviewed the findings, diagnosis, plan and need for follow up with the patient.       New Prescriptions    No medications on file       Final diagnoses:   Thrombocytopenia (H)   Gastrointestinal hemorrhage, unspecified gastrointestinal hemorrhage type   Hypokalemia     This document serves as a record of the services and decisions personally performed and made by Saqib Rollins MD. It was created on his behalf by Kimberly Lopez, a trained medical scribe. The creation of this document is based the provider's statements to the medical scribe.  Kimberly Lopez 4:47 PM 10/31/2018    Provider:   The information in this document, created by the medical scribe for me, accurately reflects the services I personally performed and the decisions made by me. I have reviewed and approved this document for accuracy prior to leaving the patient care area.  Saqib Rollins MD 4:47 PM 10/31/2018    10/31/2018   [facility] EMERGENCY DEPARTMENT     Saqib Rollins MD  10/31/18 2006       Saqib Rollins MD  11/14/18 4016

## 2018-10-31 NOTE — IP AVS SNAPSHOT
[facility]    [address] [facility] [address]    Phone:  [phone]    Fax:  [phone]                                       After Visit Summary   10/31/2018    Merissa Leung    MRN: [id number]           After Visit Summary Signature Page     I have received my discharge instructions, and my questions have been answered. I have discussed any challenges I see with this plan with the nurse or doctor.    ..........................................................................................................................................  Patient/Patient Representative Signature      ..........................................................................................................................................  Patient Representative Print Name and Relationship to Patient    ..................................................               ................................................  Date                                   Time    ..........................................................................................................................................  Reviewed by Signature/Title    ...................................................              ..............................................  Date                                               Time          22EPIC Rev 08/18

## 2018-10-31 NOTE — IP AVS SNAPSHOT
MRN:[id number]                      After Visit Summary   10/31/2018    Merissa Leung    MRN: [id number]           Thank you!     Thank you for choosing [facility] for your care. Our goal is always to provide you with excellent care. Hearing back from our patients is one way we can continue to improve our services. Please take a few minutes to complete the written survey that you may receive in the mail after you visit with us. Thank you!        Patient Information     Date Of Birth          1985        Designated Caregiver       Most Recent Value    Caregiver    Will someone help with your care after discharge? yes    Name of designated caregiver Emeterio Leung    Phone number of caregiver [phone]    Caregiver address same as patient      About your hospital stay     You were admitted on:  October 31, 2018 You last received care in the:  [facility]    You were discharged on:  November 1, 2018       Who to Call     For medical emergencies, please call 911.  For non-urgent questions about your medical care, please call your primary care provider or clinic, [phone]          Attending Provider     Provider Specialty    Saqib Rollins MD Emergency Medicine    [facility]    Valeriano Parry MD Family Practice       Primary Care Provider Office Phone # Fax #    Mohini MccordRAHEL [phone]      Your next 10 appointments already scheduled     Nov 05, 2018  5:30 PM CST   LAB with PI LAB   [facility] ([facility])    [address] [place] [id number] [phone]           Please do not eat 10-12 hours before your appointment if you are coming in fasting for labs on lipids, cholesterol, or glucose (sugar). This does not apply to pregnant women. Water, hot tea and black coffee (with nothing added) are okay. Do not drink other fluids, diet soda or chew gum.            Nov 06, 2018    Procedure with Anthony Jackson DO   [facility] (--)    [address] [facility] [address]   [phone]           The medical center is located at [address] [facility] [address] (between [address] in [place], [place] north of [place]).            Nov 08, 2018 11:30 AM CST   Level 3 with ROOM 6 [facility] ([facility])    [facility]  [address] [place] [address] [address] [place] [address]   [phone]            Nov 09, 2018 11:30 AM CST   Level 3 with ROOM 6 [facility] ([facility])    [facility]  [address] [place] [address]   [phone]            Nov 30, 2018  2:45 PM CST   PHYSICAL with Carola Sanchez MD   [facility] ([facility])    [address] [facility] [address]   [phone]            Dec 03, 2018  5:30 PM CST   LAB with PI LAB   [facility]    [address] [facility] [id number] [phone]           Please do not eat 10-12 hours before your appointment if you are coming in fasting for labs on lipids, cholesterol, or glucose (sugar). This does not apply to pregnant women. Water, hot tea and black coffee (with nothing added) are okay. Do not drink other fluids, diet soda or chew gum.            Dec 06, 2018 11:30 AM CST   Level 3 with ROOM 6 [facility] ([facility])    [facility]  [address] [place] [address] [address]  [facility] [address]   [phone]            Dec 07, 2018 11:30 AM CST   Level 3 with ROOM 6 [facility] ([facility])    [facility]  [address] [place] [address] [address]  [facility] [address]   [phone]              Further instructions from your care team       Would tolerate some recurrent episodes of this degree of bleeding, but if  ongoing, would come in for hemoglobin check  Would recheck in clinic Monday for hemoglobin and platelets.    Would go forward with the splenectomy as planned to treat the ITP.          Pending Results     Date and Time Order Name Status Description    10/31/2018 1905 Platelets prepare order unit In process     10/31/2018 1814 Urine Culture Aerobic Bacterial Preliminary             Statement of Approval     Ordered          11/01/18 1027  I have reviewed and agree with all the recommendations and orders detailed in this document.  EFFECTIVE NOW     Approved and electronically signed by:  Valeriano Parry MD             Admission Information     Date & Time Provider Department Dept. Phone    10/31/2018 Valeriano Parry MD [facility] [phone]      Your Vitals Were     Blood Pressure Pulse Temperature Respirations Last Period Pulse Oximetry    105/46 84 98.5  F (36.9  C) (Oral) 16 10/20/2018 (Exact Date) 98%      MyChart Information     [facility] gives you secure access to your electronic health record. If you see a primary care provider, you can also send messages to your care team and make appointments. If you have questions, please call your primary care clinic.  If you do not have a primary care provider, please call [phone] and they will assist you.        Care EveryWhere ID     This is your Care EveryWhere ID. This could be used by other organizations to access your [facility] medical records  [id number]        Equal Access to Services     ANA GARCIA : Hadii ivanna Cervantes, waaxda luqadaha, qaybta kaalmaleela guadarrama. So [facility] [phone].    ATENCIÓN: Si habla español, tiene a goldsmith disposición servicios gratuitos de asistencia lingüística. Llame al [phone].    We comply with applicable federal civil rights laws and [place] laws. We do not discriminate on the basis of race, color, national origin, age, disability, sex, sexual  orientation, or gender identity.               Review of your medicines      CONTINUE these medicines which have NOT CHANGED        Dose / Directions    clonazePAM 0.5 MG tablet   Commonly known as:  klonoPIN   Used for:  EDWARD (generalized anxiety disorder)        Dose:  0.25-0.5 mg   Take 0.5-1 tablets (0.25-0.5 mg) by mouth 2 times daily as needed for anxiety   Quantity:  20 tablet   Refills:  0       linaclotide 145 MCG capsule   Commonly known as:  LINZESS   Used for:  Irritable bowel syndrome with constipation        Dose:  145 mcg   Take 1 capsule (145 mcg) by mouth every morning (before breakfast)   Quantity:  30 capsule   Refills:  5       * lisdexamfetamine 10 MG capsule   Commonly known as:  VYVANSE   Used for:  Binge eating disorder        Dose:  10 mg   Take 1 capsule (10 mg) by mouth daily (before lunch)   Quantity:  30 capsule   Refills:  0       * lisdexamfetamine 40 MG capsule   Commonly known as:  VYVANSE   Used for:  Binge eating disorder        Dose:  40 mg   Take 1 capsule (40 mg) by mouth every morning   Quantity:  30 capsule   Refills:  0       * lisdexamfetamine 10 MG capsule   Commonly known as:  VYVANSE   Used for:  Binge eating disorder        Dose:  10 mg   Start taking on:  11/7/2018   Take 1 capsule (10 mg) by mouth every morning   Quantity:  30 capsule   Refills:  0       MULTIVITAL PO        Dose:  1 chew tab   Take 1 chew tab by mouth daily Gummi   Refills:  0       polyethylene glycol powder   Commonly known as:  MIRALAX/GLYCOLAX        Dose:  17 g   Take 17 g by mouth daily as needed for constipation   Refills:  0       sennosides 8.6 MG tablet   Commonly known as:  SENOKOT        Dose:  2 tablet   Take 2 tablets by mouth 2 times daily   Refills:  0       UNABLE TO FIND        Dose:  2 teaspoonful   Take 2 teaspoonful by mouth At Bedtime Magnesium Calm   Refills:  0       * Notice:  This list has 3 medication(s) that are the same as other medications prescribed for you. Read the  directions carefully, and ask your doctor or other care provider to review them with you.             Protect others around you: Learn how to safely use, store and throw away your medicines at www.disposemymeds.org.             Medication List: This is a list of all your medications and when to take them. Check marks below indicate your daily home schedule. Keep this list as a reference.      Medications           Morning Afternoon Evening Bedtime As Needed    clonazePAM 0.5 MG tablet   Commonly known as:  klonoPIN   Take 0.5-1 tablets (0.25-0.5 mg) by mouth 2 times daily as needed for anxiety                                linaclotide 145 MCG capsule   Commonly known as:  LINZESS   Take 1 capsule (145 mcg) by mouth every morning (before breakfast)                                * lisdexamfetamine 10 MG capsule   Commonly known as:  VYVANSE   Take 1 capsule (10 mg) by mouth daily (before lunch)                                * lisdexamfetamine 40 MG capsule   Commonly known as:  VYVANSE   Take 1 capsule (40 mg) by mouth every morning                                * lisdexamfetamine 10 MG capsule   Commonly known as:  VYVANSE   Take 1 capsule (10 mg) by mouth every morning   Start taking on:  11/7/2018                                MULTIVITAL PO   Take 1 chew tab by mouth daily Gummi                                polyethylene glycol powder   Commonly known as:  MIRALAX/GLYCOLAX   Take 17 g by mouth daily as needed for constipation                                sennosides 8.6 MG tablet   Commonly known as:  SENOKOT   Take 2 tablets by mouth 2 times daily                                UNABLE TO FIND   Take 2 teaspoonful by mouth At Bedtime Magnesium Calm                                * Notice:  This list has 3 medication(s) that are the same as other medications prescribed for you. Read the directions carefully, and ask your doctor or other care provider to review them with you.              More Information       Coping with Thrombocytopenia   What is thrombocytopenia?  This is a term for a low platelet count. Platelets are blood cells that help your blood to clot. When your platelets are low, you may bruise or bleed more easily.   Signs of low platelets include:     A lot of bruises    Bleeding gums    Nosebleeds    Tiny purple spots on the feet or legs.  How is it treated?  If your platelets are too low or you have active bleeding, you may need a platelet transfusion.   In this case, we would give you donated platelets through an IV line (a small tube in your vein).   This usually takes one to two hours.   You will need blood tests to see how well your treatment is working.  What can I do to prevent bleeding?  Until your platelets are back to normal, you need to reduce your risk for bleeding or bruising.    Do not have surgery or dental work.    Do not take medicine unless your care team says it's okay. This includes:  ? Aspirin or products that contain aspirin  ? Aspirin-free pain relievers (such as Advil).  Be sure to read the labels on any store-bought medicines.    Do not drink alcohol unless your care team says it's okay.    Avoid foods that can make your mouth bleed, including popcorn, chips and raw vegetables.    To avoid injuries:  ? Make your home as safe as possible.  ? Take care when using knives and other tools.  ? Be careful not to burn yourself when ironing   or cooking.  ? Wear heavy gloves when working in the garden or near thorny plants.  ? Wear shoes when you walk.  ? Wear loose-fitting clothes to prevent bruising.    Do low-impact exercise such as walking or swimming. Avoid contact sports.    Keep your lips moist with lip balm. Keep your skin soft with cream or lotion.    Blow your nose gently. Never use your fingers to clean your nose.    Use an electric razor.    Use a soft toothbrush. Do not floss.    Use a water-based gel during sex (intercourse), such as K-Y Jelly or Astroglide. Do not have sex  if your platelets are below 50,000.    Do not use enemas, suppositories, douches   or tampons.    Try not to strain when using the toilet. Ask   your care team if you need a stool softener   (such as Colace).  When should I call my care team?  Call your care team if:    You bleed from a cut or wound and it doesn't stop within 30 minutes of applying pressure.    You notice blood in your urine or stool after using the toilet.    You see blood or dark brown spots in your vomit.    You have nosebleeds, bleeding gums or headaches that you cannot explain.    You see tiny, pinpoint-sized red or purple spots on your skin.    You have heavy bleeding from the vagina (you are soaking one pad an hour) or any change in your periods. This includes heavier bleeding or bleeding between cycles.  Comments:  _______________________________________  _______________________________________  _______________________________________  _______________________________________  _______________________________________  _______________________________________  _______________________________________  _______________________________________  _______________________________________  _______________________________________  For informational purposes only. Not to replace the advice of your health care provider.   Copyright   2006 [facility]. All rights reserved. [employer] 442013 - REV 10/15.

## 2018-10-31 NOTE — ED NOTES
Pt here with c/o blood clots in stool starting today. Hx if ITP. Pt receives infusions at cancer center, pt unaware of what she gets. Pt is getting her spleen removed next week here at [facility].

## 2018-10-31 NOTE — ED NOTES
Pt did not tolerate IV placement, dry heaving and feeling like she was going to pass out. Oxygen on per pt request.

## 2018-11-01 VITALS
RESPIRATION RATE: 16 BRPM | DIASTOLIC BLOOD PRESSURE: 46 MMHG | SYSTOLIC BLOOD PRESSURE: 105 MMHG | TEMPERATURE: 98.5 F | OXYGEN SATURATION: 98 % | HEART RATE: 84 BPM

## 2018-11-01 LAB
ANION GAP SERPL CALCULATED.3IONS-SCNC: 9 MMOL/L (ref 3–14)
BUN SERPL-MCNC: 13 MG/DL (ref 7–30)
CALCIUM SERPL-MCNC: 7.8 MG/DL (ref 8.5–10.1)
CHLORIDE SERPL-SCNC: 109 MMOL/L (ref 94–109)
CO2 SERPL-SCNC: 24 MMOL/L (ref 20–32)
CREAT SERPL-MCNC: 0.68 MG/DL (ref 0.52–1.04)
ERYTHROCYTE [DISTWIDTH] IN BLOOD BY AUTOMATED COUNT: 11.9 % (ref 10–15)
GFR SERPL CREATININE-BSD FRML MDRD: >90 ML/MIN/1.7M2
GLUCOSE SERPL-MCNC: 93 MG/DL (ref 70–99)
HCT VFR BLD AUTO: 38.6 % (ref 35–47)
HGB BLD-MCNC: 13.3 G/DL (ref 11.7–15.7)
MCH RBC QN AUTO: 30.6 PG (ref 26.5–33)
MCHC RBC AUTO-ENTMCNC: 34.5 G/DL (ref 31.5–36.5)
MCV RBC AUTO: 89 FL (ref 78–100)
PLATELET # BLD AUTO: 47 10E9/L (ref 150–450)
POTASSIUM SERPL-SCNC: 3.2 MMOL/L (ref 3.4–5.3)
RBC # BLD AUTO: 4.35 10E12/L (ref 3.8–5.2)
SODIUM SERPL-SCNC: 142 MMOL/L (ref 133–144)
WBC # BLD AUTO: 7.9 10E9/L (ref 4–11)

## 2018-11-01 PROCEDURE — 80048 BASIC METABOLIC PNL TOTAL CA: CPT | Performed by: INTERNAL MEDICINE

## 2018-11-01 PROCEDURE — 99207 ZZC CDG-CODE CATEGORY CHANGED: CPT | Performed by: FAMILY MEDICINE

## 2018-11-01 PROCEDURE — G0378 HOSPITAL OBSERVATION PER HR: HCPCS

## 2018-11-01 PROCEDURE — 25000132 ZZH RX MED GY IP 250 OP 250 PS 637: Performed by: INTERNAL MEDICINE

## 2018-11-01 PROCEDURE — 99217 ZZC OBSERVATION CARE DISCHARGE: CPT | Performed by: FAMILY MEDICINE

## 2018-11-01 PROCEDURE — 85027 COMPLETE CBC AUTOMATED: CPT | Performed by: INTERNAL MEDICINE

## 2018-11-01 PROCEDURE — 36415 COLL VENOUS BLD VENIPUNCTURE: CPT | Performed by: INTERNAL MEDICINE

## 2018-11-01 RX ORDER — LISDEXAMFETAMINE DIMESYLATE 10 MG/1
10 CAPSULE ORAL
Status: DISCONTINUED | OUTPATIENT
Start: 2018-11-01 | End: 2018-11-01 | Stop reason: HOSPADM

## 2018-11-01 RX ORDER — CLONAZEPAM 0.25 MG/1
0.25-0.5 TABLET, ORALLY DISINTEGRATING ORAL 2 TIMES DAILY PRN
Status: DISCONTINUED | OUTPATIENT
Start: 2018-11-01 | End: 2018-11-01 | Stop reason: HOSPADM

## 2018-11-01 RX ORDER — DEXTROSE MONOHYDRATE, SODIUM CHLORIDE, AND POTASSIUM CHLORIDE 50; 1.49; 4.5 G/1000ML; G/1000ML; G/1000ML
INJECTION, SOLUTION INTRAVENOUS CONTINUOUS
Status: DISCONTINUED | OUTPATIENT
Start: 2018-11-01 | End: 2018-11-01 | Stop reason: HOSPADM

## 2018-11-01 RX ORDER — LISDEXAMFETAMINE DIMESYLATE 40 MG/1
40 CAPSULE ORAL EVERY MORNING
Status: DISCONTINUED | OUTPATIENT
Start: 2018-11-01 | End: 2018-11-01 | Stop reason: HOSPADM

## 2018-11-01 RX ADMIN — CLONAZEPAM 0.5 MG: 0.25 TABLET, ORALLY DISINTEGRATING ORAL at 00:45

## 2018-11-01 RX ADMIN — ACETAMINOPHEN, ASPIRIN AND CAFFEINE 2 TABLET: 250; 250; 65 TABLET, FILM COATED ORAL at 04:24

## 2018-11-01 NOTE — DISCHARGE SUMMARY
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    Would go forward with the splenectomy as planned to treat the ITP.        Follow up with primary care provider in 7 days        Procedures:       Results for orders placed or performed during the hospital encounter of 03/12/18   CT Head w/o Contrast    Narrative    CT OF THE HEAD WITHOUT CONTRAST 3/12/2018 8:46 PM     COMPARISON: Brain MRI 2/24/2012    HISTORY:  severe headache.  h/o ITP;     TECHNIQUE: Axial CT images of the head from the skull base to the  vertex were acquired without IV contrast.    FINDINGS: The ventricles and basal cisterns are within normal limits  in configuration. There is no midline shift. There are no extra-axial  fluid collections.  Gray-white differentiation is well maintained.    No intracranial hemorrhage, mass or recent infarct.    The visualized paranasal sinuses are well-aerated. There is no  mastoiditis. There are no fractures of the visualized bones.      Impression    IMPRESSION:  Normal head CT.      Radiation dose for this scan was reduced using automated exposure  control, adjustment of the mA and/or kV according to patient size, or  iterative reconstruction technique    KENNEDY GARCIA MD                    Allergies:      Allergies    Allergen Reactions     Dilaudid [Hydromorphone] Nausea and Vomiting     Food Other (See Comments) and Swelling     Processed food, sugar, high sodium, red grains, rice, potatoes  Pain in extremities and face.   All over body at times.                  Discharge Medications:     Current Discharge Medication List      CONTINUE these medications which have NOT CHANGED    Details   clonazePAM (KLONOPIN) 0.5 MG tablet Take 0.5-1 tablets (0.25-0.5 mg) by mouth 2 times daily as needed for anxiety  Qty: 20 tablet, Refills: 0    Associated Diagnoses: EDWARD (generalized anxiety disorder)      linaclotide (LINZESS) 145 MCG capsule Take 1 capsule (145 mcg) by mouth every morning (before breakfast)  Qty: 30 capsule, Refills: 5    Associated Diagnoses: Irritable bowel syndrome with constipation      !! lisdexamfetamine (VYVANSE) 10 MG capsule Take 1 capsule (10 mg) by mouth daily (before lunch)  Qty: 30 capsule, Refills: 0    Associated Diagnoses: Binge eating disorder      !! lisdexamfetamine (VYVANSE) 40 MG capsule Take 1 capsule (40 mg) by mouth every morning  Qty: 30 capsule, Refills: 0    Associated Diagnoses: Binge eating disorder      Multiple Vitamins-Minerals (MULTIVITAL PO) Take 1 chew tab by mouth daily Gummi      polyethylene glycol (MIRALAX/GLYCOLAX) powder Take 17 g by mouth daily as needed for constipation      sennosides (SENOKOT) 8.6 MG tablet Take 2 tablets by mouth 2 times daily      UNABLE TO FIND Take 2 teaspoonful by mouth At Bedtime Magnesium Calm      !! lisdexamfetamine (VYVANSE) 10 MG capsule Take 1 capsule (10 mg) by mouth every morning  Qty: 30 capsule, Refills: 0    Associated Diagnoses: Binge eating disorder       !! - Potential duplicate medications found. Please discuss with provider.                Consultations:   None           Brief History of Presenting Illness:   Merissa Leung is a 33 year old female who presents with 2 bloody stools with clots starting this afternoon.  She has had ITP for  about 2-1/2 years.  She failed 4 months of prednisone.  She has not had significant bleeding issues but platelets have continued to run low usually in the 30s-40s.  She is never had a platelet transfusion before.  She not had bleeding issues until she had very heavy menses recently.  He has known melanosis coli seen on colonoscopy about a year ago.  That was obtained due to some small amount of bleeding which she does see periodically.  She has had some lightheadedness with this although the lightheadedness is not new it was more exacerbated.  Said some left-sided abdominal pain with this but that also was not new.  She has daily nausea and abdominal pains.  Feeling sick is her normal.  No recent weight loss.  She recently tried IVIG therapy for the platelets but did not tolerate due to flulike symptoms.  Has seen surgery, Dr. Jackson, who is scheduled splenectomy for next week.             Hospital Course:     Lower GI bleed   Due to thrombocytopenia and likely melanosis coli seen on previous colonoscopy. Hem/onc recommended dexamethasone but patient refused.  Alternatively, platelet transfusion until bleeding stopped recommended.  Patient has had 2 bloody stools and bleeding may already have stopped. Hgb 13.9. Platelets 44k.  I do not think colonoscopy would be of much help at this point.   - if remains stable, likely home tomorrow   - recheck hemoglobin and platelets in the morning   - IVF overnight given some lighthededness       ITP   Diagnosed 2.5 years ago.  Was tried on prednisone for 4 months without resolution.  Recently tried on IVIG x2 but did not tolerate due to flulike symptoms.  Has seen Dr. Jackson and has scheduled splenectomy for definitive treatment next week.   - Transfuse platelets tonight   - splenectomy next week       Chronic constipation   On bowel program.    - continue Linzess, prn meds       Tobacco use d/o   - Nicotine patch while hospitalized            Discharge Exam:   OBJECTIVE:    /46  Pulse 84  Temp 98.5  F (36.9  C) (Oral)  Resp 16  LMP 10/20/2018 (Exact Date)  SpO2 98%  Breastfeeding? No    GENERAL APPEARANCE:  Alert, NAD, Ox3     RESP:clear      CV: regular rates and rhythm,no murmur, no click or rub - no edema     Abdomen: soft, nontender, no liver or spleen enlargement, no masses, BSs normal   Skin: no cyanosis, pallor, or jaundice            Pending Tests at Discharge:     Unresulted Labs Ordered in the Past 30 Days of this Admission     Date and Time Order Name Status Description    10/31/2018 1905 Platelets prepare order unit In process     10/31/2018 1814 Urine Culture Aerobic Bacterial Preliminary                    Discharge Disposition:   Discharged to home      Attestation:  Amount of time performed on this discharge : 30 minutes.    Valeriano Parry MD

## 2018-11-01 NOTE — PROGRESS NOTES
2135 pt admitted as observation pt to room 2041 via stretcher.  Weight, vs and history done.  POC is to infuse platelets per protocol as ordered.

## 2018-11-01 NOTE — H&P
[facility]    History and Physical  Hospital Medicine       Date of Admission:  10/31/2018  Date of Service: 10/31/2018     Assessment & Plan   Merissa Leung is a 33 year old female who presents on 10/31/2018 with bloody stools x 2.      Lower GI bleed   Due to thrombocytopenia and likely melanosis coli seen on previous colonoscopy. Hem/onc recommended dexamethasone but patient refused.  Alternatively, platelet transfusion until bleeding stopped recommended.  Patient has had 2 bloody stools and bleeding may already have stopped. Hgb 13.9. Platelets 44k.  I do not think colonoscopy would be of much help at this point.   - if remains stable, likely home tomorrow   - recheck hemoglobin and platelets in the morning   - IVF overnight given some lighthededness      ITP   Diagnosed 2.5 years ago.  Was tried on prednisone for 4 months without resolution.  Recently tried on IVIG x2 but did not tolerate due to flulike symptoms.  Has seen Dr. Jackson and has scheduled splenectomy for definitive treatment next week.   - Transfuse platelets tonight   - splenectomy next week      Chronic constipation   On bowel program.    - continue Linzess, prn meds      Tobacco use d/o   - Nicotine patch while hospitalized       DVT Prophylaxis: Low Risk/Ambulatory with no VTE prophylaxis indicated  Code Status: Full Code    Lines: PIV  Mishra catheter: none  Restraints: none    Disposition: Anticipate dischargetomorrow if hgb stable and no further bleeding. Appropriate for observation    Guillermo Cho MD  Hospital Medicine        Primary Care Physician   Mohini Mccord [phone]    History is obtained from the patient who is a good historian, discussed with ED physician and review of old records via the EMR.    Past Medical History    Past Medical History:   Diagnosis Date     Anxiety      Binge eating disorder      Chickenpox     x3     Food intolerance      IBS (irritable bowel syndrome)      MEDICAL  HISTORY OF -     pelvic floor disorder       Immune thrombocytopenia (H) 2017     Priority: Medium     Thrombocytopenia (H) 2017     Priority: Medium     Underweight 2017     Priority: Medium     Prenatal care, subsequent pregnancy 2017     Priority: Medium     Chronic abdominal pain 10/06/2016     Priority: Medium     Has had workup through [place] and [place]      Now followed by [facility] (started on bentyl)       Fibromyalgia 2015     Priority: Medium     Anxiety 2015     Priority: Medium     Hyperlipidemia LDL goal <160 07/15/2013     Priority: Medium     Headaches, tension 07/15/2013     Priority: Medium     Chronic constipation 07/15/2013     Priority: Medium     OCD (obsessive compulsive disorder) 07/15/2013     Priority: Medium     Binge eating  Started therapeutic trial of Luvox 100-300mg extended release at bedtime; category C in pregnancy (as all SSRI); not effective  Will refer to [facility]          Past Surgical History   Past Surgical History:   Procedure Laterality Date     C/SECTION, LOW TRANSVERSE        SECTION, TUBAL LIGATION, COMBINED N/A 2/15/2016    NO tubal ligation done     COLONOSCOPY  2013    Procedure: COMBINED COLONOSCOPY, SINGLE BIOPSY/POLYPECTOMY BY BIOPSY;  Colonoscopy;  Surgeon: Maureen Valentin MD;  Location: [facility]     COLONOSCOPY N/A 2015    Procedure: COMBINED COLONOSCOPY, SINGLE OR MULTIPLE BIOPSY/POLYPECTOMY BY BIOPSY;  Surgeon: Maureen Valentin MD;  Location: [facility]     ESOPHAGOSCOPY, GASTROSCOPY, DUODENOSCOPY (EGD), COMBINED N/A 2018    Procedure: COMBINED ESOPHAGOSCOPY, GASTROSCOPY, DUODENOSCOPY (EGD), BIOPSY SINGLE OR MULTIPLE;  Gastroscopy;  Surgeon: Ray Amato MD;  Location: [facility]        History of Present Illness   Merissa Leung is a 33 year old female who presents with 2 bloody stools with clots starting this afternoon.  She has had ITP for about 2-1/2 years.  She failed 4 months of  prednisone.  She has not had significant bleeding issues but platelets have continued to run low usually in the 30s-40s.  She is never had a platelet transfusion before.  She not had bleeding issues until she had very heavy menses recently.  He has known melanosis coli seen on colonoscopy about a year ago.  That was obtained due to some small amount of bleeding which she does see periodically.  She has had some lightheadedness with this although the lightheadedness is not new it was more exacerbated.  Said some left-sided abdominal pain with this but that also was not new.  She has daily nausea and abdominal pains.  Feeling sick is her normal.  No recent weight loss.  She recently tried IVIG therapy for the platelets but did not tolerate due to flulike symptoms.  Has seen surgery, Dr. Jackson, who is scheduled splenectomy for next week.    Prior to Admission Medications   Prior to Admission Medications   Prescriptions Last Dose Informant Patient Reported? Taking?   Multiple Vitamins-Minerals (MULTIVITAL PO) 10/31/2018 at am Self Yes Yes   Sig: Take 1 chew tab by mouth daily Gummi   UNABLE TO FIND 10/30/2018 at hs Self Yes Yes   Sig: Take 2 teaspoonful by mouth At Bedtime Magnesium Calm   clonazePAM (KLONOPIN) 0.5 MG tablet 10/30/2018 at hs Self Yes Yes   Sig: Take 0.5-1 tablets (0.25-0.5 mg) by mouth 2 times daily as needed for anxiety   linaclotide (LINZESS) 145 MCG capsule 10/31/2018 at am Self No Yes   Sig: Take 1 capsule (145 mcg) by mouth every morning (before breakfast)   lisdexamfetamine (VYVANSE) 10 MG capsule 10/31/2018 at 1200 Self No Yes   Sig: Take 1 capsule (10 mg) by mouth daily (before lunch)   lisdexamfetamine (VYVANSE) 10 MG capsule future Self No No   Sig: Take 1 capsule (10 mg) by mouth every morning   lisdexamfetamine (VYVANSE) 40 MG capsule 10/31/2018 at am Self No Yes   Sig: Take 1 capsule (40 mg) by mouth every morning   polyethylene glycol (MIRALAX/GLYCOLAX) powder Past Month at Unknown time  Self Yes Yes   Sig: Take 17 g by mouth daily as needed for constipation   sennosides (SENOKOT) 8.6 MG tablet 10/31/2018 at am Self Yes Yes   Sig: Take 2 tablets by mouth 2 times daily      Facility-Administered Medications: None     Allergies   Allergies   Allergen Reactions     Dilaudid [Hydromorphone] Nausea and Vomiting     Food Other (See Comments) and Swelling     Processed food, sugar, high sodium, red grains, rice, potatoes  Pain in extremities and face.   All over body at times.       Family History    Family History   Problem Relation Age of Onset     GASTROINTESTINAL DISEASE Mother 46     perforated intestines-      Hepatitis Mother      C     Liver Disease Mother      Blood Disease Mother      anemia     Substance Abuse Mother      A&D     Psychotic Disorder Brother      Schizophrenia, OCD     Schizophrenia Brother      Anxiety Disorder Brother      Arthritis Maternal Grandmother      Cancer Paternal Grandmother      cervical     Breast Cancer Paternal Grandmother      Hypertension Paternal Grandmother      Diabetes Paternal Grandmother      Cerebrovascular Disease Paternal Grandfather      HEART DISEASE Paternal Grandfather      Bipolar Disorder Paternal Grandfather      Suicide Paternal Grandfather      Cancer Paternal Aunt      cervical     Coronary Artery Disease Maternal Grandfather      MI     Substance Abuse Father      recovered A&D     HEART DISEASE Father      MI-stents     Hypertension Father      Mother  age 45 of complications of sepsis due to bowel perforation and liver cirrhosis. Father alive but heart diseas age 53.     Social History   Social History     Social History     Marital status:      Spouse name: N/A     Number of children: N/A     Years of education: N/A     Occupational History     Not on file.     Social History Main Topics     Smoking status: Current Every Day Smoker     Packs/day: 0.50     Years: 6.00     Types: Cigarettes     Last attempt to quit: 2015      Smokeless tobacco: Never Used      Comment: 1/2 pack daily      Alcohol use No     Drug use: No     Sexual activity: Yes     Partners: Male     Other Topics Concern     Parent/Sibling W/ Cabg, Mi Or Angioplasty Before 65f 55m? 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    Review of Systems   The 10 point Review of Systems is negative other than noted in the HPI or here.  Has fibromyalgia pain.  Frequent lightheadedness.  Headache at times.    Physical Exam   /57  Pulse 56  Temp 97.1  F (36.2  C) (Oral)  Resp 16  LMP 10/20/2018 (Exact Date)  SpO2 99%  Breastfeeding? No       Constitutional: Alert, oriented, cooperative, no apparent distress, appears nontoxic, very thin otherwise healthy appearing.   Eyes: Eyes are clear, pupils are reactive.  HEENT: Oropharynx is clear and moist. No evidence of cranial trauma.  Lymph/Hematologic: No epitrochlear, axillary, anterior or posterior cervical, or supraclavicular lymphadenopathy is appreciated.  Cardiovascular: Regular rate and rhythm, normal S1 and S2, and no murmur noted. JVP is normal. Good peripheral pulses in wrists bilaterally. No lower extremity edema.  Respiratory: Clear to auscultation bilaterally.   GI: Soft, mild -moderate left sided abdomen tenderness without guarding or rebound, normal bowel sounds, no hepatosplenomegaly.  Genitourinary: Deferred  Musculoskeletal: decreased muscle bulk and normal tone.  Skin: Warm and dry, no rashes.   Neurologic: Neck supple. Cranial nerves are grossly intact.  is symmetric.     Data   Data reviewed today:     Recent Labs  Lab 10/31/18  1715 10/26/18  1455   WBC 6.3 6.9   HGB 13.9 15.0   MCV 88 93   PLT 44* 40*   INR 1.03  --      --    POTASSIUM 2.7*  --    CHLORIDE 109  --    CO2 23  --    BUN 11  --    CR 0.77  --    ANIONGAP 10  --     DREW 7.9*  --    GLC 75  --        No results found for this or any previous visit (from the past 24 hour(s)).    I personally reviewed no images or EKG's today.    Guillermo Cho MD  [facility]

## 2018-11-01 NOTE — PROGRESS NOTES
Will F/U as recommended for spleenectomy as scheduled  Understands warning signs  Discharged today at 1100 ambulatory.

## 2018-11-01 NOTE — PROGRESS NOTES
Pt c/o migraine h/a. States gets migraines several times/month. Requests Excedrin. Dr Coh informed per phone of pt's s/s, vs, request for rx, etc. Orders received. Warm blankets, aromatherapy, et (po) rx given. Pt sleeping @ this time.

## 2018-11-01 NOTE — DISCHARGE INSTRUCTIONS
Would tolerate some recurrent episodes of this degree of bleeding, but if ongoing, would come in for hemoglobin check  Would recheck in clinic Monday for hemoglobin and platelets.    Would go forward with the splenectomy as planned to treat the ITP.

## 2018-11-02 LAB
BACTERIA SPEC CULT: ABNORMAL
Lab: ABNORMAL
SPECIMEN SOURCE: ABNORMAL

## 2018-11-05 DIAGNOSIS — D69.3 IDIOPATHIC THROMBOCYTOPENIC PURPURA (H): ICD-10-CM

## 2018-11-05 LAB
DIFFERENTIAL METHOD BLD: ABNORMAL
EOSINOPHIL # BLD AUTO: 0.1 10E9/L (ref 0–0.7)
EOSINOPHIL NFR BLD AUTO: 1 %
ERYTHROCYTE [DISTWIDTH] IN BLOOD BY AUTOMATED COUNT: 11.9 % (ref 10–15)
HCT VFR BLD AUTO: 40.5 % (ref 35–47)
HGB BLD-MCNC: 13.9 G/DL (ref 11.7–15.7)
LYMPHOCYTES # BLD AUTO: 2.2 10E9/L (ref 0.8–5.3)
LYMPHOCYTES NFR BLD AUTO: 28 %
MCH RBC QN AUTO: 30.4 PG (ref 26.5–33)
MCHC RBC AUTO-ENTMCNC: 34.3 G/DL (ref 31.5–36.5)
MCV RBC AUTO: 88 FL (ref 78–100)
MONOCYTES # BLD AUTO: 0.4 10E9/L (ref 0–1.3)
MONOCYTES NFR BLD AUTO: 5 %
NEUTROPHILS # BLD AUTO: 5 10E9/L (ref 1.6–8.3)
NEUTROPHILS NFR BLD AUTO: 66 %
PLATELET # BLD AUTO: 55 10E9/L (ref 150–450)
RBC # BLD AUTO: 4.57 10E12/L (ref 3.8–5.2)
WBC # BLD AUTO: 7.7 10E9/L (ref 4–11)

## 2018-11-05 PROCEDURE — 36415 COLL VENOUS BLD VENIPUNCTURE: CPT | Performed by: INTERNAL MEDICINE

## 2018-11-05 PROCEDURE — 85025 COMPLETE CBC W/AUTO DIFF WBC: CPT | Performed by: INTERNAL MEDICINE

## 2018-11-06 ENCOUNTER — SURGERY (OUTPATIENT)
Age: 33
End: 2018-11-06

## 2018-11-06 ENCOUNTER — HOSPITAL ENCOUNTER (INPATIENT)
Facility: CLINIC | Age: 33
LOS: 2 days | Discharge: HOME | DRG: 800 | End: 2018-11-08
Attending: SURGERY | Admitting: INTERNAL MEDICINE
Payer: COMMERCIAL

## 2018-11-06 ENCOUNTER — ANESTHESIA (OUTPATIENT)
Dept: SURGERY | Facility: CLINIC | Age: 33
DRG: 800 | End: 2018-11-06
Payer: COMMERCIAL

## 2018-11-06 DIAGNOSIS — D69.6 THROMBOCYTOPENIA (H): ICD-10-CM

## 2018-11-06 DIAGNOSIS — D69.3 IMMUNE THROMBOCYTOPENIC PURPURA (H): Primary | ICD-10-CM

## 2018-11-06 DIAGNOSIS — E87.6 HYPOKALEMIA: ICD-10-CM

## 2018-11-06 LAB
ABO + RH BLD: NORMAL
ABO + RH BLD: NORMAL
BASOPHILS # BLD AUTO: 0.1 10E9/L (ref 0–0.2)
BASOPHILS NFR BLD AUTO: 0.9 %
BLD GP AB SCN SERPL QL: NORMAL
BLOOD BANK CMNT PATIENT-IMP: NORMAL
DIFFERENTIAL METHOD BLD: ABNORMAL
EOSINOPHIL # BLD AUTO: 0.2 10E9/L (ref 0–0.7)
EOSINOPHIL NFR BLD AUTO: 2.8 %
ERYTHROCYTE [DISTWIDTH] IN BLOOD BY AUTOMATED COUNT: 12 % (ref 10–15)
ERYTHROCYTE [DISTWIDTH] IN BLOOD BY AUTOMATED COUNT: 12.1 % (ref 10–15)
HCG UR QL: NEGATIVE
HCT VFR BLD AUTO: 40.1 % (ref 35–47)
HCT VFR BLD AUTO: 42.9 % (ref 35–47)
HGB BLD-MCNC: 13.6 G/DL (ref 11.7–15.7)
HGB BLD-MCNC: 14.6 G/DL (ref 11.7–15.7)
IMM GRANULOCYTES # BLD: 0 10E9/L (ref 0–0.4)
IMM GRANULOCYTES NFR BLD: 0.2 %
LYMPHOCYTES # BLD AUTO: 1.9 10E9/L (ref 0.8–5.3)
LYMPHOCYTES NFR BLD AUTO: 30.4 %
MCH RBC QN AUTO: 30.3 PG (ref 26.5–33)
MCH RBC QN AUTO: 30.6 PG (ref 26.5–33)
MCHC RBC AUTO-ENTMCNC: 33.9 G/DL (ref 31.5–36.5)
MCHC RBC AUTO-ENTMCNC: 34 G/DL (ref 31.5–36.5)
MCV RBC AUTO: 89 FL (ref 78–100)
MCV RBC AUTO: 90 FL (ref 78–100)
MONOCYTES # BLD AUTO: 0.4 10E9/L (ref 0–1.3)
MONOCYTES NFR BLD AUTO: 6.4 %
NEUTROPHILS # BLD AUTO: 3.8 10E9/L (ref 1.6–8.3)
NEUTROPHILS NFR BLD AUTO: 59.3 %
NRBC # BLD AUTO: 0 10*3/UL
NRBC BLD AUTO-RTO: 0 /100
PLATELET # BLD AUTO: 53 10E9/L (ref 150–450)
PLATELET # BLD AUTO: 57 10E9/L (ref 150–450)
RBC # BLD AUTO: 4.49 10E12/L (ref 3.8–5.2)
RBC # BLD AUTO: 4.77 10E12/L (ref 3.8–5.2)
SPECIMEN EXP DATE BLD: NORMAL
WBC # BLD AUTO: 11.6 10E9/L (ref 4–11)
WBC # BLD AUTO: 6.4 10E9/L (ref 4–11)

## 2018-11-06 PROCEDURE — 71000013 ZZH RECOVERY PHASE 1 LEVEL 1 EA ADDTL HR: Performed by: SURGERY

## 2018-11-06 PROCEDURE — 25000128 H RX IP 250 OP 636: Performed by: NURSE ANESTHETIST, CERTIFIED REGISTERED

## 2018-11-06 PROCEDURE — 85027 COMPLETE CBC AUTOMATED: CPT | Performed by: SURGERY

## 2018-11-06 PROCEDURE — 25000128 H RX IP 250 OP 636: Performed by: SURGERY

## 2018-11-06 PROCEDURE — 36000063 ZZH SURGERY LEVEL 4 EA 15 ADDTL MIN: Performed by: SURGERY

## 2018-11-06 PROCEDURE — 86900 BLOOD TYPING SEROLOGIC ABO: CPT | Performed by: SURGERY

## 2018-11-06 PROCEDURE — 71000012 ZZH RECOVERY PHASE 1 LEVEL 1 FIRST HR: Performed by: SURGERY

## 2018-11-06 PROCEDURE — 81025 URINE PREGNANCY TEST: CPT | Performed by: SURGERY

## 2018-11-06 PROCEDURE — 37000009 ZZH ANESTHESIA TECHNICAL FEE, EACH ADDTL 15 MIN: Performed by: SURGERY

## 2018-11-06 PROCEDURE — 88305 TISSUE EXAM BY PATHOLOGIST: CPT | Performed by: SURGERY

## 2018-11-06 PROCEDURE — 36415 COLL VENOUS BLD VENIPUNCTURE: CPT | Performed by: SURGERY

## 2018-11-06 PROCEDURE — 85025 COMPLETE CBC W/AUTO DIFF WBC: CPT | Performed by: SURGERY

## 2018-11-06 PROCEDURE — 25000132 ZZH RX MED GY IP 250 OP 250 PS 637: Performed by: SURGERY

## 2018-11-06 PROCEDURE — 40000306 ZZH STATISTIC PRE PROC ASSESS II: Performed by: SURGERY

## 2018-11-06 PROCEDURE — 25000125 ZZHC RX 250: Performed by: NURSE ANESTHETIST, CERTIFIED REGISTERED

## 2018-11-06 PROCEDURE — 07TP4ZZ RESECTION OF SPLEEN, PERCUTANEOUS ENDOSCOPIC APPROACH: ICD-10-PCS | Performed by: SURGERY

## 2018-11-06 PROCEDURE — 25000566 ZZH SEVOFLURANE, EA 15 MIN: Performed by: SURGERY

## 2018-11-06 PROCEDURE — 37000008 ZZH ANESTHESIA TECHNICAL FEE, 1ST 30 MIN: Performed by: SURGERY

## 2018-11-06 PROCEDURE — 38120 LAPAROSCOPY SPLENECTOMY: CPT | Mod: 80 | Performed by: SURGERY

## 2018-11-06 PROCEDURE — 86850 RBC ANTIBODY SCREEN: CPT | Performed by: SURGERY

## 2018-11-06 PROCEDURE — 12000007 ZZH R&B INTERMEDIATE

## 2018-11-06 PROCEDURE — 36000093 ZZH SURGERY LEVEL 4 1ST 30 MIN: Performed by: SURGERY

## 2018-11-06 PROCEDURE — 38120 LAPAROSCOPY SPLENECTOMY: CPT | Performed by: SURGERY

## 2018-11-06 PROCEDURE — 86901 BLOOD TYPING SEROLOGIC RH(D): CPT | Performed by: SURGERY

## 2018-11-06 PROCEDURE — 27210794 ZZH OR GENERAL SUPPLY STERILE: Performed by: SURGERY

## 2018-11-06 PROCEDURE — 88305 TISSUE EXAM BY PATHOLOGIST: CPT | Mod: 26 | Performed by: SURGERY

## 2018-11-06 RX ORDER — ONDANSETRON 2 MG/ML
INJECTION INTRAMUSCULAR; INTRAVENOUS PRN
Status: DISCONTINUED | OUTPATIENT
Start: 2018-11-06 | End: 2018-11-06

## 2018-11-06 RX ORDER — DEXAMETHASONE SODIUM PHOSPHATE 4 MG/ML
INJECTION, SOLUTION INTRA-ARTICULAR; INTRALESIONAL; INTRAMUSCULAR; INTRAVENOUS; SOFT TISSUE PRN
Status: DISCONTINUED | OUTPATIENT
Start: 2018-11-06 | End: 2018-11-06

## 2018-11-06 RX ORDER — ACETAMINOPHEN 325 MG/1
975 TABLET ORAL EVERY 8 HOURS
Status: DISCONTINUED | OUTPATIENT
Start: 2018-11-06 | End: 2018-11-08 | Stop reason: HOSPADM

## 2018-11-06 RX ORDER — ONDANSETRON 4 MG/1
4 TABLET, ORALLY DISINTEGRATING ORAL EVERY 30 MIN PRN
Status: DISCONTINUED | OUTPATIENT
Start: 2018-11-06 | End: 2018-11-06 | Stop reason: HOSPADM

## 2018-11-06 RX ORDER — PROPOFOL 10 MG/ML
INJECTION, EMULSION INTRAVENOUS PRN
Status: DISCONTINUED | OUTPATIENT
Start: 2018-11-06 | End: 2018-11-06

## 2018-11-06 RX ORDER — ACETAMINOPHEN 325 MG/1
650 TABLET ORAL EVERY 4 HOURS PRN
Status: DISCONTINUED | OUTPATIENT
Start: 2018-11-09 | End: 2018-11-07

## 2018-11-06 RX ORDER — LIDOCAINE HYDROCHLORIDE 10 MG/ML
INJECTION, SOLUTION EPIDURAL; INFILTRATION; INTRACAUDAL; PERINEURAL PRN
Status: DISCONTINUED | OUTPATIENT
Start: 2018-11-06 | End: 2018-11-06

## 2018-11-06 RX ORDER — OXYCODONE HYDROCHLORIDE 5 MG/1
5-10 TABLET ORAL
Status: DISCONTINUED | OUTPATIENT
Start: 2018-11-06 | End: 2018-11-08 | Stop reason: HOSPADM

## 2018-11-06 RX ORDER — SODIUM CHLORIDE, SODIUM LACTATE, POTASSIUM CHLORIDE, CALCIUM CHLORIDE 600; 310; 30; 20 MG/100ML; MG/100ML; MG/100ML; MG/100ML
INJECTION, SOLUTION INTRAVENOUS CONTINUOUS
Status: DISCONTINUED | OUTPATIENT
Start: 2018-11-06 | End: 2018-11-06 | Stop reason: HOSPADM

## 2018-11-06 RX ORDER — LIDOCAINE 40 MG/G
CREAM TOPICAL
Status: DISCONTINUED | OUTPATIENT
Start: 2018-11-06 | End: 2018-11-06 | Stop reason: HOSPADM

## 2018-11-06 RX ORDER — LIDOCAINE 40 MG/G
CREAM TOPICAL
Status: DISCONTINUED | OUTPATIENT
Start: 2018-11-06 | End: 2018-11-08 | Stop reason: HOSPADM

## 2018-11-06 RX ORDER — CEFAZOLIN SODIUM 1 G/50ML
1 INJECTION, SOLUTION INTRAVENOUS SEE ADMIN INSTRUCTIONS
Status: DISCONTINUED | OUTPATIENT
Start: 2018-11-06 | End: 2018-11-06 | Stop reason: HOSPADM

## 2018-11-06 RX ORDER — HYDROMORPHONE HYDROCHLORIDE 1 MG/ML
.3-.5 INJECTION, SOLUTION INTRAMUSCULAR; INTRAVENOUS; SUBCUTANEOUS
Status: CANCELLED | OUTPATIENT
Start: 2018-11-06

## 2018-11-06 RX ORDER — LISDEXAMFETAMINE DIMESYLATE 10 MG/1
10 CAPSULE ORAL
Status: DISCONTINUED | OUTPATIENT
Start: 2018-11-07 | End: 2018-11-08 | Stop reason: HOSPADM

## 2018-11-06 RX ORDER — ONDANSETRON 2 MG/ML
4 INJECTION INTRAMUSCULAR; INTRAVENOUS EVERY 30 MIN PRN
Status: DISCONTINUED | OUTPATIENT
Start: 2018-11-06 | End: 2018-11-06 | Stop reason: HOSPADM

## 2018-11-06 RX ORDER — ONDANSETRON 2 MG/ML
4 INJECTION INTRAMUSCULAR; INTRAVENOUS EVERY 6 HOURS PRN
Status: DISCONTINUED | OUTPATIENT
Start: 2018-11-06 | End: 2018-11-08 | Stop reason: HOSPADM

## 2018-11-06 RX ORDER — CEFAZOLIN SODIUM 2 G/100ML
2 INJECTION, SOLUTION INTRAVENOUS
Status: COMPLETED | OUTPATIENT
Start: 2018-11-06 | End: 2018-11-06

## 2018-11-06 RX ORDER — AMOXICILLIN 250 MG
2 CAPSULE ORAL 2 TIMES DAILY
Status: DISCONTINUED | OUTPATIENT
Start: 2018-11-06 | End: 2018-11-08 | Stop reason: HOSPADM

## 2018-11-06 RX ORDER — AMOXICILLIN 250 MG
1 CAPSULE ORAL 2 TIMES DAILY
Status: DISCONTINUED | OUTPATIENT
Start: 2018-11-06 | End: 2018-11-08 | Stop reason: HOSPADM

## 2018-11-06 RX ORDER — HEPARIN SODIUM 5000 [USP'U]/.5ML
5000 INJECTION, SOLUTION INTRAVENOUS; SUBCUTANEOUS EVERY 8 HOURS
Status: DISCONTINUED | OUTPATIENT
Start: 2018-11-07 | End: 2018-11-08 | Stop reason: HOSPADM

## 2018-11-06 RX ORDER — ONDANSETRON 4 MG/1
4 TABLET, ORALLY DISINTEGRATING ORAL EVERY 6 HOURS PRN
Status: DISCONTINUED | OUTPATIENT
Start: 2018-11-06 | End: 2018-11-08 | Stop reason: HOSPADM

## 2018-11-06 RX ORDER — FENTANYL CITRATE 50 UG/ML
INJECTION, SOLUTION INTRAMUSCULAR; INTRAVENOUS PRN
Status: DISCONTINUED | OUTPATIENT
Start: 2018-11-06 | End: 2018-11-06

## 2018-11-06 RX ORDER — CLONAZEPAM 0.25 MG/1
.25-.5 TABLET, ORALLY DISINTEGRATING ORAL 2 TIMES DAILY PRN
Status: DISCONTINUED | OUTPATIENT
Start: 2018-11-06 | End: 2018-11-08 | Stop reason: HOSPADM

## 2018-11-06 RX ORDER — SODIUM CHLORIDE, SODIUM LACTATE, POTASSIUM CHLORIDE, CALCIUM CHLORIDE 600; 310; 30; 20 MG/100ML; MG/100ML; MG/100ML; MG/100ML
INJECTION, SOLUTION INTRAVENOUS CONTINUOUS PRN
Status: DISCONTINUED | OUTPATIENT
Start: 2018-11-06 | End: 2018-11-06

## 2018-11-06 RX ORDER — GLYCOPYRROLATE 0.2 MG/ML
INJECTION, SOLUTION INTRAMUSCULAR; INTRAVENOUS PRN
Status: DISCONTINUED | OUTPATIENT
Start: 2018-11-06 | End: 2018-11-06

## 2018-11-06 RX ORDER — NALOXONE HYDROCHLORIDE 0.4 MG/ML
.1-.4 INJECTION, SOLUTION INTRAMUSCULAR; INTRAVENOUS; SUBCUTANEOUS
Status: DISCONTINUED | OUTPATIENT
Start: 2018-11-06 | End: 2018-11-06

## 2018-11-06 RX ORDER — HYDROMORPHONE HYDROCHLORIDE 1 MG/ML
.3-.5 INJECTION, SOLUTION INTRAMUSCULAR; INTRAVENOUS; SUBCUTANEOUS EVERY 5 MIN PRN
Status: DISCONTINUED | OUTPATIENT
Start: 2018-11-06 | End: 2018-11-06 | Stop reason: HOSPADM

## 2018-11-06 RX ORDER — LISDEXAMFETAMINE DIMESYLATE 40 MG/1
40 CAPSULE ORAL EVERY MORNING
Status: DISCONTINUED | OUTPATIENT
Start: 2018-11-07 | End: 2018-11-08 | Stop reason: HOSPADM

## 2018-11-06 RX ORDER — MORPHINE SULFATE 2 MG/ML
2 INJECTION, SOLUTION INTRAMUSCULAR; INTRAVENOUS EVERY 4 HOURS PRN
Status: DISCONTINUED | OUTPATIENT
Start: 2018-11-06 | End: 2018-11-08 | Stop reason: HOSPADM

## 2018-11-06 RX ORDER — SCOLOPAMINE TRANSDERMAL SYSTEM 1 MG/1
PATCH, EXTENDED RELEASE TRANSDERMAL PRN
Status: DISCONTINUED | OUTPATIENT
Start: 2018-11-06 | End: 2018-11-06

## 2018-11-06 RX ORDER — PROCHLORPERAZINE MALEATE 10 MG
10 TABLET ORAL EVERY 6 HOURS PRN
Status: DISCONTINUED | OUTPATIENT
Start: 2018-11-06 | End: 2018-11-08 | Stop reason: HOSPADM

## 2018-11-06 RX ORDER — NALOXONE HYDROCHLORIDE 0.4 MG/ML
.1-.4 INJECTION, SOLUTION INTRAMUSCULAR; INTRAVENOUS; SUBCUTANEOUS
Status: DISCONTINUED | OUTPATIENT
Start: 2018-11-06 | End: 2018-11-08 | Stop reason: HOSPADM

## 2018-11-06 RX ORDER — FENTANYL CITRATE 50 UG/ML
25-50 INJECTION, SOLUTION INTRAMUSCULAR; INTRAVENOUS
Status: DISCONTINUED | OUTPATIENT
Start: 2018-11-06 | End: 2018-11-06 | Stop reason: HOSPADM

## 2018-11-06 RX ORDER — ALBUTEROL SULFATE 0.83 MG/ML
2.5 SOLUTION RESPIRATORY (INHALATION) EVERY 4 HOURS PRN
Status: DISCONTINUED | OUTPATIENT
Start: 2018-11-06 | End: 2018-11-06 | Stop reason: HOSPADM

## 2018-11-06 RX ORDER — CLONAZEPAM 0.5 MG/1
0.25-0.5 TABLET ORAL 2 TIMES DAILY PRN
Status: DISCONTINUED | OUTPATIENT
Start: 2018-11-06 | End: 2018-11-06 | Stop reason: CLARIF

## 2018-11-06 RX ORDER — SODIUM CHLORIDE, SODIUM LACTATE, POTASSIUM CHLORIDE, CALCIUM CHLORIDE 600; 310; 30; 20 MG/100ML; MG/100ML; MG/100ML; MG/100ML
INJECTION, SOLUTION INTRAVENOUS CONTINUOUS
Status: DISCONTINUED | OUTPATIENT
Start: 2018-11-06 | End: 2018-11-07

## 2018-11-06 RX ADMIN — ROCURONIUM BROMIDE 35 MG: 10 INJECTION INTRAVENOUS at 07:41

## 2018-11-06 RX ADMIN — OXYCODONE HYDROCHLORIDE 5 MG: 5 TABLET ORAL at 10:38

## 2018-11-06 RX ADMIN — FENTANYL CITRATE 100 MCG: 50 INJECTION, SOLUTION INTRAMUSCULAR; INTRAVENOUS at 07:41

## 2018-11-06 RX ADMIN — MIDAZOLAM 2 MG: 1 INJECTION INTRAMUSCULAR; INTRAVENOUS at 07:35

## 2018-11-06 RX ADMIN — ACETAMINOPHEN 975 MG: 325 TABLET, FILM COATED ORAL at 12:29

## 2018-11-06 RX ADMIN — FENTANYL CITRATE 25 MCG: 50 INJECTION, SOLUTION INTRAMUSCULAR; INTRAVENOUS at 10:39

## 2018-11-06 RX ADMIN — OXYCODONE HYDROCHLORIDE 10 MG: 5 TABLET ORAL at 19:45

## 2018-11-06 RX ADMIN — SENNOSIDES AND DOCUSATE SODIUM 1 TABLET: 8.6; 5 TABLET ORAL at 19:45

## 2018-11-06 RX ADMIN — FENTANYL CITRATE 25 MCG: 50 INJECTION, SOLUTION INTRAMUSCULAR; INTRAVENOUS at 10:55

## 2018-11-06 RX ADMIN — SCOPALAMINE 1 PATCH: 1 PATCH, EXTENDED RELEASE TRANSDERMAL at 07:46

## 2018-11-06 RX ADMIN — SODIUM CHLORIDE, POTASSIUM CHLORIDE, SODIUM LACTATE AND CALCIUM CHLORIDE: 600; 310; 30; 20 INJECTION, SOLUTION INTRAVENOUS at 15:29

## 2018-11-06 RX ADMIN — CEFAZOLIN SODIUM 2 G: 2 INJECTION, SOLUTION INTRAVENOUS at 07:35

## 2018-11-06 RX ADMIN — GLYCOPYRROLATE 0.2 MG: 0.2 INJECTION, SOLUTION INTRAMUSCULAR; INTRAVENOUS at 07:41

## 2018-11-06 RX ADMIN — ACETAMINOPHEN 975 MG: 325 TABLET, FILM COATED ORAL at 19:45

## 2018-11-06 RX ADMIN — FENTANYL CITRATE 25 MCG: 50 INJECTION, SOLUTION INTRAMUSCULAR; INTRAVENOUS at 10:44

## 2018-11-06 RX ADMIN — PROPOFOL 120 MG: 10 INJECTION, EMULSION INTRAVENOUS at 07:41

## 2018-11-06 RX ADMIN — FENTANYL CITRATE 50 MCG: 50 INJECTION, SOLUTION INTRAMUSCULAR; INTRAVENOUS at 07:38

## 2018-11-06 RX ADMIN — DEXAMETHASONE SODIUM PHOSPHATE 8 MG: 4 INJECTION, SOLUTION INTRA-ARTICULAR; INTRALESIONAL; INTRAMUSCULAR; INTRAVENOUS; SOFT TISSUE at 07:41

## 2018-11-06 RX ADMIN — SODIUM CHLORIDE, POTASSIUM CHLORIDE, SODIUM LACTATE AND CALCIUM CHLORIDE: 600; 310; 30; 20 INJECTION, SOLUTION INTRAVENOUS at 07:49

## 2018-11-06 RX ADMIN — SENNOSIDES AND DOCUSATE SODIUM 1 TABLET: 8.6; 5 TABLET ORAL at 12:30

## 2018-11-06 RX ADMIN — LIDOCAINE HYDROCHLORIDE 50 MG: 10 INJECTION, SOLUTION EPIDURAL; INFILTRATION; INTRACAUDAL; PERINEURAL at 07:41

## 2018-11-06 RX ADMIN — FENTANYL CITRATE 25 MCG: 50 INJECTION, SOLUTION INTRAMUSCULAR; INTRAVENOUS at 10:29

## 2018-11-06 RX ADMIN — SODIUM CHLORIDE, POTASSIUM CHLORIDE, SODIUM LACTATE AND CALCIUM CHLORIDE: 600; 310; 30; 20 INJECTION, SOLUTION INTRAVENOUS at 07:06

## 2018-11-06 RX ADMIN — OXYCODONE HYDROCHLORIDE 5 MG: 5 TABLET ORAL at 12:30

## 2018-11-06 RX ADMIN — OXYCODONE HYDROCHLORIDE 10 MG: 5 TABLET ORAL at 15:28

## 2018-11-06 RX ADMIN — ONDANSETRON 4 MG: 2 INJECTION INTRAMUSCULAR; INTRAVENOUS at 07:41

## 2018-11-06 RX ADMIN — LIDOCAINE HYDROCHLORIDE 1 ML: 10 INJECTION, SOLUTION EPIDURAL; INFILTRATION; INTRACAUDAL; PERINEURAL at 07:06

## 2018-11-06 RX ADMIN — SODIUM CHLORIDE, POTASSIUM CHLORIDE, SODIUM LACTATE AND CALCIUM CHLORIDE: 600; 310; 30; 20 INJECTION, SOLUTION INTRAVENOUS at 09:06

## 2018-11-06 RX ADMIN — FENTANYL CITRATE 100 MCG: 50 INJECTION, SOLUTION INTRAMUSCULAR; INTRAVENOUS at 08:10

## 2018-11-06 RX ADMIN — CLONAZEPAM 0.25 MG: 0.25 TABLET, ORALLY DISINTEGRATING ORAL at 13:03

## 2018-11-06 ASSESSMENT — ACTIVITIES OF DAILY LIVING (ADL)
ADLS_ACUITY_SCORE: 10

## 2018-11-06 ASSESSMENT — LIFESTYLE VARIABLES: TOBACCO_USE: 1

## 2018-11-06 NOTE — ANESTHESIA CARE TRANSFER NOTE
Patient: Merissa Leung    Procedure(s):  Laparoscopic splenectomy,     Diagnosis: Immune Thrombocytopenic - purpura  Diagnosis Additional Information: No value filed.    Anesthesia Type:   General, ETT, Periph. Nerve Block for postop pain     Note:  Airway :Nasal Cannula  Patient transferred to:PACU  Handoff Report: Identifed the Patient, Identified the Reponsible Provider, Reviewed the pertinent medical history, Discussed the surgical course, Reviewed Intra-OP anesthesia mangement and issues during anesthesia, Set expectations for post-procedure period and Allowed opportunity for questions and acknowledgement of understanding      Vitals: (Last set prior to Anesthesia Care Transfer)    CRNA VITALS  11/6/2018 0929 - 11/6/2018 1004      11/6/2018             Pulse: 83    SpO2: 100 %    Resp Rate (observed): (!)  1                Electronically Signed By: TABATHA Weston CRNA  November 6, 2018  10:04 AM

## 2018-11-06 NOTE — OR NURSING
Cbc and hcg completed as per dr order. Also t/s ordered preop. Iv started. Pt with much anxiety with iv start but mitch well with much support and reassurance.  at bedside. Ready for surgery.

## 2018-11-06 NOTE — PROGRESS NOTES
[facility] [place]MISS[place] NOTE    Patient admitted to room 2213 at approximately 1125 via cart from surgery. Patient was accompanied by transport tech.     Verbal SBAR report received from Mirella prior to patient arrival.     Patient trasferred to bed via air gianluca. Patient alert and oriented X 3. Pain is not well controlled.  Medication(s) being used: narcotic analgesics including fentanyl and oxycodone.  Warm pack applied to left shoulder discomfort. Admission vital signs: Blood pressure 105/63, pulse 59, temperature 98.1  F (36.7  C), temperature source Oral, resp. rate 12, last menstrual period 10/20/2018, SpO2 100 %, not currently breastfeeding. Patient and spouse was oriented to plan of care, call light, bed controls, tv, telephone, bathroom and visiting hours.     Risk Assessment    The following safety risks were identified during admission: none. Yellow risk band applied: NO.     Skin Initial Assessment    This writer admitted this patient and completed a full skin assessment and Philip score in the Adult PCS flowsheet. Appropriate interventions initiated as needed.     Secondary skin check completed by Nina CALABRESE    Skin  Inspection of bony prominences: Full  Skin WDL: WDL  Skin Integrity: incision(s), tattoo(s), psoriasis posterior neck    Philip Risk Assessment  Sensory Perception: 4-->no impairment  Moisture: 4-->rarely moist  Activity: 3-->walks occasionally  Mobility: 4-->no limitation  Nutrition: 3-->adequate  Friction and Shear: 3-->no apparent problem  Philip Score: 21    Nanette Carreno

## 2018-11-06 NOTE — ANESTHESIA POSTPROCEDURE EVALUATION
Patient: Merissa Leung    Procedure(s):  Laparoscopic splenectomy,     Diagnosis:Immune Thrombocytopenic - purpura  Diagnosis Additional Information: No value filed.    Anesthesia Type:  General, ETT, Periph. Nerve Block for postop pain    Note:  Anesthesia Post Evaluation    Patient location during evaluation: Bedside  Patient participation: Able to fully participate in evaluation  Level of consciousness: awake and alert  Pain management: adequate  Airway patency: patent  Cardiovascular status: acceptable and hemodynamically stable  Respiratory status: acceptable, room air and spontaneous ventilation  Hydration status: acceptable  PONV: none     Anesthetic complications: None          Last vitals:  Vitals:    11/06/18 1310 11/06/18 1410 11/06/18 1500   BP: 105/63  112/60   Pulse: 59 68 58   Resp: 12 12 12   Temp: 36.7  C (98.1  F)  36.9  C (98.4  F)   SpO2: 100% 100% 100%         Electronically Signed By: TABATHA Herring CRNA  November 6, 2018  4:53 PM

## 2018-11-06 NOTE — OP NOTE
Procedure Date: November 6, 2018    Pre-operative Diagnosis: Immune Thrombocytopenic Purpura  Post-operative Diagnosis:  Same    Procedure Performed: Laparoscopic splenectomy  Surgeon: Anthony Jackson DO  Assistants: Adam Mcmillan MD     Anesthesia Type: General with local    Findings: Spleen    Estimated Blood Loss: 10 mL    Fluids: 1500 mL    Urine: 100 mL     Condition: Stable    Indications:   Ms. Merissa Leung presents with immune thrombocytopenic purpura.  She had failed treatment with steroids and was intolerant to alternative therapies.  Given her age, active lifestyle splenectomy was offered.  She received her immunization over 2 weeks prior to operation.  Indications, risks, benefits discussed with the patient regarding laparoscopic possible open splenectomy. Risks discussed include but are not limited to: bleeding, infection, conversion to open surgery, injury to adjacent structures including colon, stomach, pancreas, MI, DVT/PE, or death, non-therapeutic surgery. She understood the information and consented to the aforementioned operative procedure.    Procedure: The patient was brought to the Operating Room and placed on the operating table in a supine position. After induction of general endotracheal anesthesia a narayanan catheter was placed.  The patient was positioned in lazy right lateral decubitus position on a beanbag, the bed was flexed and she was padded appropriately including an axillary roll.  The abdomen was prepped and draped in the usual sterile fashion. The abdomen was entered through an infraumbilical incision using an open Naomi approach, a 12mm trochar was delivered under direct visualization, and the abdomen was insufflated with CO2 gas to a pressure of 15mmHg.  Additional ports were placed under direct visualization: epigastric (5mm), left mid clavicular line (12mm) and left anterior axillary line (5mm).  The abdomen was inspected.  There were no apparent splenules within the mesentery  or omentum.  Dissection was initiated on the splenic flexure, mobilizing the colon with a combination of blunt and ligasure dissection.  Once freed, the gastrosplenic ligament was taken down with the ligasure controlling the short gastrics.  Again no splenules were noted within either the splenocolic ligament or the gastrosplenic ligament.  Attention was turned to the hilum.  The pancreas was well away from the hilum. The splenic artery was first isolated with a combination of ligasure and [place] dissection.  Once skeletonized, the artery was taken with a 45mm vascular SCOTT load.  The splenic vein was similarly dissected and transected with an additional 45mm vascular SCOTT load.  The remaining splenophrenic ligament and splenorenal ligaments were transected with the ligasure.  The spleen was placed within a endocatch bag and exteriorized through the mid clavicular port site.  The port site was enlarged and the spleen was morcellated with a ringed forceps taking care to spill no splenic tissue.  The surgical team initiated a change of gloves after the spleen was removed.  The 12 mm port was replaced and abdomen insufflated.  The left upper quadrant was irrigated and suctioned free.  The staple lines and short gastric arteries were hemostatic.  The ports were removed under direct visualization and the abdomen desufflated.  The infraumbilical and mid clavicular line ports were closed with 0-0 Vicryl.  The skin was closed with 4-0 Vicryl in a running subcuticular fashion.  Sterile glue was applied.  The sponge, instrument and needle count was correct at the end of the case. The patient tolerated the procedure well, was extubated in the Operating Room without incident, and transferred to the recovery room in stable condition.     Dr. Mcmillan was required as an assistant due to complexity of case and expertise in retraction, suction, camera operation and operative expertise.    Anthony Jackson, DO on 11/6/2018 at 9:52  AM

## 2018-11-06 NOTE — PROGRESS NOTES
Skin affirmation note    Admitting nurse completed full skin assessment, Philip score and Philip interventions. This writer agrees with the initial skin assessment findings.

## 2018-11-06 NOTE — H&P (VIEW-ONLY)
[facility]    History and Physical  Hospital Medicine       Date of Admission:  10/31/2018  Date of Service: 10/31/2018     Assessment & Plan   Merissa Leung is a 33 year old female who presents on 10/31/2018 with bloody stools x 2.      Lower GI bleed   Due to thrombocytopenia and likely melanosis coli seen on previous colonoscopy. Hem/onc recommended dexamethasone but patient refused.  Alternatively, platelet transfusion until bleeding stopped recommended.  Patient has had 2 bloody stools and bleeding may already have stopped. Hgb 13.9. Platelets 44k.  I do not think colonoscopy would be of much help at this point.   - if remains stable, likely home tomorrow   - recheck hemoglobin and platelets in the morning   - IVF overnight given some lighthededness      ITP   Diagnosed 2.5 years ago.  Was tried on prednisone for 4 months without resolution.  Recently tried on IVIG x2 but did not tolerate due to flulike symptoms.  Has seen Dr. Jackson and has scheduled splenectomy for definitive treatment next week.   - Transfuse platelets tonight   - splenectomy next week      Chronic constipation   On bowel program.    - continue Linzess, prn meds      Tobacco use d/o   - Nicotine patch while hospitalized       DVT Prophylaxis: Low Risk/Ambulatory with no VTE prophylaxis indicated  Code Status: Full Code    Lines: PIV  Mishra catheter: none  Restraints: none    Disposition: Anticipate dischargetomorrow if hgb stable and no further bleeding. Appropriate for observation    Guillermo Cho MD  Hospital Medicine        Primary Care Physician   Mohini Mccord [phone]    History is obtained from the patient who is a good historian, discussed with ED physician and review of old records via the EMR.    Past Medical History    Past Medical History:   Diagnosis Date     Anxiety      Binge eating disorder      Chickenpox     x3     Food intolerance      IBS (irritable bowel syndrome)      MEDICAL  HISTORY OF -     pelvic floor disorder       Immune thrombocytopenia (H) 2017     Priority: Medium     Thrombocytopenia (H) 2017     Priority: Medium     Underweight 2017     Priority: Medium     Prenatal care, subsequent pregnancy 2017     Priority: Medium     Chronic abdominal pain 10/06/2016     Priority: Medium     Has had workup through [facility]      Now followed by [facility] (started on bentyl)       Fibromyalgia 2015     Priority: Medium     Anxiety 2015     Priority: Medium     Hyperlipidemia LDL goal <160 07/15/2013     Priority: Medium     Headaches, tension 07/15/2013     Priority: Medium     Chronic constipation 07/15/2013     Priority: Medium     OCD (obsessive compulsive disorder) 07/15/2013     Priority: Medium     Binge eating  Started therapeutic trial of Luvox 100-300mg extended release at bedtime; category C in pregnancy (as all SSRI); not effective  Will refer to [facility]          Past Surgical History   Past Surgical History:   Procedure Laterality Date     C/SECTION, LOW TRANSVERSE        SECTION, TUBAL LIGATION, COMBINED N/A 2/15/2016    NO tubal ligation done     COLONOSCOPY  2013    Procedure: COMBINED COLONOSCOPY, SINGLE BIOPSY/POLYPECTOMY BY BIOPSY;  Colonoscopy;  Surgeon: Maureen Valentin MD;  Location: [facility]     COLONOSCOPY N/A 2015    Procedure: COMBINED COLONOSCOPY, SINGLE OR MULTIPLE BIOPSY/POLYPECTOMY BY BIOPSY;  Surgeon: Maureen Valentin MD;  Location: [facility]     ESOPHAGOSCOPY, GASTROSCOPY, DUODENOSCOPY (EGD), COMBINED N/A 2018    Procedure: COMBINED ESOPHAGOSCOPY, GASTROSCOPY, DUODENOSCOPY (EGD), BIOPSY SINGLE OR MULTIPLE;  Gastroscopy;  Surgeon: Ray Amato MD;  Location: [facility]        History of Present Illness   Merissa Leung is a 33 year old female who presents with 2 bloody stools with clots starting this afternoon.  She has had ITP for about 2-1/2 years.  She failed 4 months of  prednisone.  She has not had significant bleeding issues but platelets have continued to run low usually in the 30s-40s.  She is never had a platelet transfusion before.  She not had bleeding issues until she had very heavy menses recently.  He has known melanosis coli seen on colonoscopy about a year ago.  That was obtained due to some small amount of bleeding which she does see periodically.  She has had some lightheadedness with this although the lightheadedness is not new it was more exacerbated.  Said some left-sided abdominal pain with this but that also was not new.  She has daily nausea and abdominal pains.  Feeling sick is her normal.  No recent weight loss.  She recently tried IVIG therapy for the platelets but did not tolerate due to flulike symptoms.  Has seen surgery, Dr. Jackson, who is scheduled splenectomy for next week.    Prior to Admission Medications   Prior to Admission Medications   Prescriptions Last Dose Informant Patient Reported? Taking?   Multiple Vitamins-Minerals (MULTIVITAL PO) 10/31/2018 at am Self Yes Yes   Sig: Take 1 chew tab by mouth daily Gummi   UNABLE TO FIND 10/30/2018 at hs Self Yes Yes   Sig: Take 2 teaspoonful by mouth At Bedtime Magnesium Calm   clonazePAM (KLONOPIN) 0.5 MG tablet 10/30/2018 at hs Self Yes Yes   Sig: Take 0.5-1 tablets (0.25-0.5 mg) by mouth 2 times daily as needed for anxiety   linaclotide (LINZESS) 145 MCG capsule 10/31/2018 at am Self No Yes   Sig: Take 1 capsule (145 mcg) by mouth every morning (before breakfast)   lisdexamfetamine (VYVANSE) 10 MG capsule 10/31/2018 at 1200 Self No Yes   Sig: Take 1 capsule (10 mg) by mouth daily (before lunch)   lisdexamfetamine (VYVANSE) 10 MG capsule future Self No No   Sig: Take 1 capsule (10 mg) by mouth every morning   lisdexamfetamine (VYVANSE) 40 MG capsule 10/31/2018 at am Self No Yes   Sig: Take 1 capsule (40 mg) by mouth every morning   polyethylene glycol (MIRALAX/GLYCOLAX) powder Past Month at Unknown time  Self Yes Yes   Sig: Take 17 g by mouth daily as needed for constipation   sennosides (SENOKOT) 8.6 MG tablet 10/31/2018 at am Self Yes Yes   Sig: Take 2 tablets by mouth 2 times daily      Facility-Administered Medications: None     Allergies   Allergies   Allergen Reactions     Dilaudid [Hydromorphone] Nausea and Vomiting     Food Other (See Comments) and Swelling     Processed food, sugar, high sodium, red grains, rice, potatoes  Pain in extremities and face.   All over body at times.       Family History    Family History   Problem Relation Age of Onset     GASTROINTESTINAL DISEASE Mother 46     perforated intestines-      Hepatitis Mother      C     Liver Disease Mother      Blood Disease Mother      anemia     Substance Abuse Mother      A&D     Psychotic Disorder Brother      Schizophrenia, OCD     Schizophrenia Brother      Anxiety Disorder Brother      Arthritis Maternal Grandmother      Cancer Paternal Grandmother      cervical     Breast Cancer Paternal Grandmother      Hypertension Paternal Grandmother      Diabetes Paternal Grandmother      Cerebrovascular Disease Paternal Grandfather      HEART DISEASE Paternal Grandfather      Bipolar Disorder Paternal Grandfather      Suicide Paternal Grandfather      Cancer Paternal Aunt      cervical     Coronary Artery Disease Maternal Grandfather      MI     Substance Abuse Father      recovered A&D     HEART DISEASE Father      MI-stents     Hypertension Father      Mother  age 45 of complications of sepsis due to bowel perforation and liver cirrhosis. Father alive but heart diseas age 53.     Social History   Social History     Social History     Marital status:      Spouse name: N/A     Number of children: N/A     Years of education: N/A     Occupational History     Not on file.     Social History Main Topics     Smoking status: Current Every Day Smoker     Packs/day: 0.50     Years: 6.00     Types: Cigarettes     Last attempt to quit: 2015      Smokeless tobacco: Never Used      Comment: 1/2 pack daily      Alcohol use No     Drug use: No     Sexual activity: Yes     Partners: Male     Other Topics Concern     Parent/Sibling W/ Cabg, Mi Or Angioplasty Before 65f 55m? 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    Review of Systems   The 10 point Review of Systems is negative other than noted in the HPI or here.  Has fibromyalgia pain.  Frequent lightheadedness.  Headache at times.    Physical Exam   /57  Pulse 56  Temp 97.1  F (36.2  C) (Oral)  Resp 16  LMP 10/20/2018 (Exact Date)  SpO2 99%  Breastfeeding? No       Constitutional: Alert, oriented, cooperative, no apparent distress, appears nontoxic, very thin otherwise healthy appearing.   Eyes: Eyes are clear, pupils are reactive.  HEENT: Oropharynx is clear and moist. No evidence of cranial trauma.  Lymph/Hematologic: No epitrochlear, axillary, anterior or posterior cervical, or supraclavicular lymphadenopathy is appreciated.  Cardiovascular: Regular rate and rhythm, normal S1 and S2, and no murmur noted. JVP is normal. Good peripheral pulses in wrists bilaterally. No lower extremity edema.  Respiratory: Clear to auscultation bilaterally.   GI: Soft, mild -moderate left sided abdomen tenderness without guarding or rebound, normal bowel sounds, no hepatosplenomegaly.  Genitourinary: Deferred  Musculoskeletal: decreased muscle bulk and normal tone.  Skin: Warm and dry, no rashes.   Neurologic: Neck supple. Cranial nerves are grossly intact.  is symmetric.     Data   Data reviewed today:     Recent Labs  Lab 10/31/18  1715 10/26/18  1455   WBC 6.3 6.9   HGB 13.9 15.0   MCV 88 93   PLT 44* 40*   INR 1.03  --      --    POTASSIUM 2.7*  --    CHLORIDE 109  --    CO2 23  --    BUN 11  --    CR 0.77  --    ANIONGAP 10  --     DREW 7.9*  --    GLC 75  --        No results found for this or any previous visit (from the past 24 hour(s)).    I personally reviewed no images or EKG's today.    Guillermo Cho MD  [facility]

## 2018-11-06 NOTE — PLAN OF CARE
Problem: Patient Care Overview  Goal: Plan of Care/Patient Progress Review  Outcome: No Change  Patient slept this afternoon after medicated with oxycodone and tylenol for pain and clonazepam for anxiety. Ice to abdomen. Lap sites approximated with slight ecchymosis. Ambulated to bathroom, voided large amount. Tolerating sips of clear liquids. Instructed on incentive spirometry, pulled to 2500 with encouragement.  present and supportive.

## 2018-11-06 NOTE — ANESTHESIA PREPROCEDURE EVALUATION
Anesthesia Evaluation     . Pt has had prior anesthetic. Type: General    No history of anesthetic complications          ROS/MED HX    ENT/Pulmonary:     (+)tobacco use, Current use , . .    Neurologic:       Cardiovascular:         METS/Exercise Tolerance:     Hematologic:     (+) Other Hematologic Disorder-ITP thrombocytopenia      Musculoskeletal:   (+) , , other musculoskeletal- Fibromyalgia      GI/Hepatic:     (+) Other GI/Hepatic IBS      Renal/Genitourinary:  - ROS Renal section negative       Endo:  - neg endo ROS       Psychiatric:     (+) psychiatric history anxiety and other (comment) (OCD)      Infectious Disease:  - neg infectious disease ROS       Malignancy:      - no malignancy   Other:    (+) No chance of pregnancy                    Physical Exam  Normal systems: cardiovascular, pulmonary and dental    Airway   Mallampati: I  TM distance: >3 FB  Neck ROM: full    Dental     Cardiovascular       Pulmonary                     Anesthesia Plan      History & Physical Review      ASA Status:  2 .    NPO Status:  > 8 hours and > 2 hours (2 clear fluids)    Plan for General, ETT and Periph. Nerve Block for postop pain with Propofol induction. Maintenance will be Balanced.    PONV prophylaxis:  Ondansetron (or other 5HT-3) and Dexamethasone or Solumedrol  Additional equipment: Videolaryngoscope      Postoperative Care  Postoperative pain management:  IV analgesics, Oral pain medications and Peripheral nerve block (Single Shot).      Consents  Anesthetic plan, risks, benefits and alternatives discussed with:  Patient.  Use of blood products discussed: Yes.   Use of blood products discussed with Patient.  Consented to blood products.  .                          .

## 2018-11-06 NOTE — PROGRESS NOTES
SPIRITUAL HEALTH SERVICES  SPIRITUAL ASSESSMENT Progress Note  [facility] [place] - Surgery    Merissa had requested  visit prior to surgery during pre-op phone admission.  When I arrived the surgical team was doing final discussions prior to surgery.  I took just a short time with Merissa, and  Emeterio, to offer a prayer for a sense of peace, an effective surgery and healing going forward.  I will follow up with pt if she comes to Med/Surg floor post surgery.    Nikunj Solorio M.A., [facility]  Staff   [facility]  Office: [phone]  Cell: [phone]  Pager [phone]

## 2018-11-07 LAB
ANION GAP SERPL CALCULATED.3IONS-SCNC: 7 MMOL/L (ref 3–14)
BUN SERPL-MCNC: 7 MG/DL (ref 7–30)
CALCIUM SERPL-MCNC: 7.2 MG/DL (ref 8.5–10.1)
CHLORIDE SERPL-SCNC: 112 MMOL/L (ref 94–109)
CO2 SERPL-SCNC: 25 MMOL/L (ref 20–32)
CREAT SERPL-MCNC: 0.72 MG/DL (ref 0.52–1.04)
ERYTHROCYTE [DISTWIDTH] IN BLOOD BY AUTOMATED COUNT: 12.3 % (ref 10–15)
GFR SERPL CREATININE-BSD FRML MDRD: >90 ML/MIN/1.7M2
GLUCOSE SERPL-MCNC: 86 MG/DL (ref 70–99)
HCT VFR BLD AUTO: 34.3 % (ref 35–47)
HGB BLD-MCNC: 11.8 G/DL (ref 11.7–15.7)
MCH RBC QN AUTO: 31 PG (ref 26.5–33)
MCHC RBC AUTO-ENTMCNC: 34.4 G/DL (ref 31.5–36.5)
MCV RBC AUTO: 90 FL (ref 78–100)
PLATELET # BLD AUTO: 101 10E9/L (ref 150–450)
POTASSIUM SERPL-SCNC: 3.1 MMOL/L (ref 3.4–5.3)
RBC # BLD AUTO: 3.81 10E12/L (ref 3.8–5.2)
SODIUM SERPL-SCNC: 144 MMOL/L (ref 133–144)
WBC # BLD AUTO: 10 10E9/L (ref 4–11)

## 2018-11-07 PROCEDURE — 25000132 ZZH RX MED GY IP 250 OP 250 PS 637: Performed by: SURGERY

## 2018-11-07 PROCEDURE — 25000128 H RX IP 250 OP 636: Performed by: SURGERY

## 2018-11-07 PROCEDURE — 36415 COLL VENOUS BLD VENIPUNCTURE: CPT | Performed by: SURGERY

## 2018-11-07 PROCEDURE — 12000000 ZZH R&B MED SURG/OB

## 2018-11-07 PROCEDURE — 85027 COMPLETE CBC AUTOMATED: CPT | Performed by: SURGERY

## 2018-11-07 PROCEDURE — C9113 INJ PANTOPRAZOLE SODIUM, VIA: HCPCS | Performed by: SURGERY

## 2018-11-07 PROCEDURE — 80048 BASIC METABOLIC PNL TOTAL CA: CPT | Performed by: SURGERY

## 2018-11-07 RX ORDER — SODIUM CHLORIDE, SODIUM LACTATE, POTASSIUM CHLORIDE, CALCIUM CHLORIDE 600; 310; 30; 20 MG/100ML; MG/100ML; MG/100ML; MG/100ML
INJECTION, SOLUTION INTRAVENOUS CONTINUOUS
Status: DISCONTINUED | OUTPATIENT
Start: 2018-11-07 | End: 2018-11-07

## 2018-11-07 RX ORDER — POTASSIUM CHLORIDE 1500 MG/1
40 TABLET, EXTENDED RELEASE ORAL ONCE
Status: COMPLETED | OUTPATIENT
Start: 2018-11-07 | End: 2018-11-07

## 2018-11-07 RX ORDER — KETOROLAC TROMETHAMINE 15 MG/ML
15 INJECTION, SOLUTION INTRAMUSCULAR; INTRAVENOUS ONCE
Status: COMPLETED | OUTPATIENT
Start: 2018-11-07 | End: 2018-11-07

## 2018-11-07 RX ADMIN — SODIUM CHLORIDE, POTASSIUM CHLORIDE, SODIUM LACTATE AND CALCIUM CHLORIDE: 600; 310; 30; 20 INJECTION, SOLUTION INTRAVENOUS at 01:20

## 2018-11-07 RX ADMIN — HEPARIN SODIUM 5000 UNITS: 5000 INJECTION, SOLUTION INTRAVENOUS; SUBCUTANEOUS at 15:53

## 2018-11-07 RX ADMIN — OXYCODONE HYDROCHLORIDE 5 MG: 5 TABLET ORAL at 14:48

## 2018-11-07 RX ADMIN — ACETAMINOPHEN 975 MG: 325 TABLET, FILM COATED ORAL at 03:50

## 2018-11-07 RX ADMIN — PANTOPRAZOLE SODIUM 40 MG: 40 INJECTION, POWDER, FOR SOLUTION INTRAVENOUS at 08:24

## 2018-11-07 RX ADMIN — CLONAZEPAM 0.25 MG: 0.25 TABLET, ORALLY DISINTEGRATING ORAL at 22:29

## 2018-11-07 RX ADMIN — SENNOSIDES AND DOCUSATE SODIUM 1 TABLET: 8.6; 5 TABLET ORAL at 19:10

## 2018-11-07 RX ADMIN — OXYCODONE HYDROCHLORIDE 10 MG: 5 TABLET ORAL at 06:28

## 2018-11-07 RX ADMIN — OXYCODONE HYDROCHLORIDE 5 MG: 5 TABLET ORAL at 19:09

## 2018-11-07 RX ADMIN — LISDEXAMFETAMINE DIMESYLATE 40 MG: 40 CAPSULE ORAL at 08:52

## 2018-11-07 RX ADMIN — OXYCODONE HYDROCHLORIDE 10 MG: 5 TABLET ORAL at 10:08

## 2018-11-07 RX ADMIN — ACETAMINOPHEN 975 MG: 325 TABLET, FILM COATED ORAL at 19:10

## 2018-11-07 RX ADMIN — KETOROLAC TROMETHAMINE 15 MG: 15 INJECTION, SOLUTION INTRAMUSCULAR; INTRAVENOUS at 08:24

## 2018-11-07 RX ADMIN — SENNOSIDES AND DOCUSATE SODIUM 2 TABLET: 8.6; 5 TABLET ORAL at 08:24

## 2018-11-07 RX ADMIN — ACETAMINOPHEN 975 MG: 325 TABLET, FILM COATED ORAL at 12:33

## 2018-11-07 RX ADMIN — POTASSIUM CHLORIDE 40 MEQ: 1500 TABLET, EXTENDED RELEASE ORAL at 08:24

## 2018-11-07 RX ADMIN — SODIUM CHLORIDE 1000 ML: 9 INJECTION, SOLUTION INTRAVENOUS at 01:19

## 2018-11-07 RX ADMIN — OXYCODONE HYDROCHLORIDE 5 MG: 5 TABLET ORAL at 17:07

## 2018-11-07 RX ADMIN — HEPARIN SODIUM 5000 UNITS: 5000 INJECTION, SOLUTION INTRAVENOUS; SUBCUTANEOUS at 08:24

## 2018-11-07 RX ADMIN — OXYCODONE HYDROCHLORIDE 10 MG: 5 TABLET ORAL at 22:28

## 2018-11-07 RX ADMIN — LISDEXAMFETAMINE DIMESYLATE 10 MG: 10 CAPSULE ORAL at 12:29

## 2018-11-07 ASSESSMENT — ACTIVITIES OF DAILY LIVING (ADL)
ADLS_ACUITY_SCORE: 10

## 2018-11-07 NOTE — PLAN OF CARE
Problem: Patient Care Overview  Goal: Plan of Care/Patient Progress Review  Outcome: Improving  Patient went down with  to smoke. She and her  are aware and verbalize understanding that [facility] can't be held responsible while off patient is out smoking. Vital signs stable. Patient reports pain is mostly in left shoulder area. She has aqua k pad to area. Receiving 10 mg oxycodone as needed. Potasssium 40 meq given this am for potassium 3.1. Lap sites clean, dry and intact.

## 2018-11-07 NOTE — PROGRESS NOTES
Oxycodone and Tylenol given @1945, pt wanted to ambulate in souza- d/t significant discomfort writer suggested waiting for pain medication to take effect, however, she did not want to wait. Became very uncomfortable with walking and was brought back to room in . Resting in bed at this time.

## 2018-11-07 NOTE — PLAN OF CARE
Problem: Surgery Nonspecified (Adult)  Goal: Signs and Symptoms of Listed Potential Problems Will be Absent, Minimized or Managed (Surgery Nonspecified)  Signs and symptoms of listed potential problems will be absent, minimized or managed by discharge/transition of care (reference Surgery Nonspecified (Adult) CPG).   Outcome: Improving  Ambulating, voiding, (+) bowel sounds, lap sites approximated- no redness or drainage, scheduled Tylenol and PRN Oxycodone for pain control.

## 2018-11-07 NOTE — PROGRESS NOTES
Subjective:   Mrs. Leung is s/p laparoscopic splenectomy.  Patient c/o LUQ and shoulder pain overnight.  + Ambulation  + IS use.  Tolerating clear liquids.  Denies nausea vomiting, no bowel movement yet.      I/O last 3 completed shifts:  In: 1510 [P.O.:210; I.V.:1300]  Out: 1250 [Urine:1250]     No current outpatient prescriptions on file.         ROUTINE IP LABS (Last four results)  BMP  Recent Labs  Lab 11/07/18  0619 11/01/18  0557 10/31/18  1715    142 142   POTASSIUM 3.1* 3.2* 2.7*   CHLORIDE 112* 109 109   DREW 7.2* 7.8* 7.9*   CO2 25 24 23   BUN 7 13 11   CR 0.72 0.68 0.77   GLC 86 93 75     CBC  Recent Labs  Lab 11/07/18  0619 11/06/18  1215 11/06/18  0653 11/05/18  1645   WBC 10.0 11.6* 6.4 7.7   RBC 3.81 4.49 4.77 4.57   HGB 11.8 13.6 14.6 13.9   HCT 34.3* 40.1 42.9 40.5   MCV 90 89 90 88   MCH 31.0 30.3 30.6 30.4   MCHC 34.4 33.9 34.0 34.3   RDW 12.3 12.0 12.1 11.9   * 57* 53* 55*     INR  Recent Labs  Lab 10/31/18  1715   INR 1.03            Tmax: 98.9  --->  Tcurrent: 97.7   B/P: 90/36  P: 58  R: 16  SpO2:100% RA        EXAM  AOX4 NAD  CTAB  RRR  Non distended.  Mild TTP LUQ without R/R/G +BS  No CCE  Incisions C/D/I.      Lab Results   Component Value Date    WBC 10.0 11/07/2018     Lab Results   Component Value Date    RBC 3.81 11/07/2018     Lab Results   Component Value Date    HGB 11.8 11/07/2018     Lab Results   Component Value Date    HCT 34.3 11/07/2018     Lab Results   Component Value Date    MCV 90 11/07/2018     Lab Results   Component Value Date    MCH 31.0 11/07/2018     Lab Results   Component Value Date    MCHC 34.4 11/07/2018     Lab Results   Component Value Date    RDW 12.3 11/07/2018     Lab Results   Component Value Date     11/07/2018     Last Comprehensive Metabolic Panel:  Sodium   Date Value Ref Range Status   11/07/2018 144 133 - 144 mmol/L Final     Potassium   Date Value Ref Range Status   11/07/2018 3.1 (L) 3.4 - 5.3 mmol/L Final     Chloride   Date  Value Ref Range Status   11/07/2018 112 (H) 94 - 109 mmol/L Final     Carbon Dioxide   Date Value Ref Range Status   11/07/2018 25 20 - 32 mmol/L Final     Anion Gap   Date Value Ref Range Status   11/07/2018 7 3 - 14 mmol/L Final     Glucose   Date Value Ref Range Status   11/07/2018 86 70 - 99 mg/dL Final     Urea Nitrogen   Date Value Ref Range Status   11/07/2018 7 7 - 30 mg/dL Final     Creatinine   Date Value Ref Range Status   11/07/2018 0.72 0.52 - 1.04 mg/dL Final     GFR Estimate   Date Value Ref Range Status   11/07/2018 >90 >60 mL/min/1.7m2 Final     Comment:     Non  GFR Calc     Calcium   Date Value Ref Range Status   11/07/2018 7.2 (L) 8.5 - 10.1 mg/dL Final       A/P:   1. POD 1 Laparoscopic splenectomy for ITP  - Platelets uptrending at 101  - Replace K  - FLD, advance as tolerated  - Toradol x 1  - OOB  - Ambulate  - DVT PPX: Heparin  - Likely discharge this afternoon versus tomorrow

## 2018-11-07 NOTE — PROGRESS NOTES
"SPIRITUAL HEALTH SERVICES  SPIRITUAL ASSESSMENT Progress Note  [facility] [place], [place] - Med/Surg    Merissa had requested follow up  visit after surgery while on Med/Surg floor.  , Emeterio, was bedside.  Merissa stated yesterday had been difficult but today was \"much better.\"  She shared some of her health narrative and the things that have been tried over the past 2 1/2 years.  She is feeling hopeful that this surgery will start her recovery moving in a positive direction.  She stated already some of the lab results have been positive.  When asked about a alyssia community connection she stated Emeterio was [employer] but she was [employer].  She didn't think that denominations mattered to God.  She feels comfortable talking with Him any time, anywhere.  I offered blessing for them, as they possibly will discharge home today, and for their two children, ages 11 and 2 1/2.  Merissa again expressed appreciation for the prayer before surgery and the follow up visit.    Nikunj Solorio M.A., [facility]  Staff   [facility]  Office: [phone]  Cell: [phone]  Pager [phone]    "

## 2018-11-08 VITALS
DIASTOLIC BLOOD PRESSURE: 57 MMHG | TEMPERATURE: 98.1 F | RESPIRATION RATE: 16 BRPM | SYSTOLIC BLOOD PRESSURE: 101 MMHG | OXYGEN SATURATION: 100 % | HEART RATE: 58 BPM

## 2018-11-08 LAB
ANION GAP SERPL CALCULATED.3IONS-SCNC: 7 MMOL/L (ref 3–14)
BUN SERPL-MCNC: 9 MG/DL (ref 7–30)
CALCIUM SERPL-MCNC: 7.9 MG/DL (ref 8.5–10.1)
CHLORIDE SERPL-SCNC: 107 MMOL/L (ref 94–109)
CO2 SERPL-SCNC: 28 MMOL/L (ref 20–32)
CREAT SERPL-MCNC: 0.71 MG/DL (ref 0.52–1.04)
ERYTHROCYTE [DISTWIDTH] IN BLOOD BY AUTOMATED COUNT: 12.7 % (ref 10–15)
GFR SERPL CREATININE-BSD FRML MDRD: >90 ML/MIN/1.7M2
GLUCOSE SERPL-MCNC: 113 MG/DL (ref 70–99)
HCT VFR BLD AUTO: 38.9 % (ref 35–47)
HGB BLD-MCNC: 12.7 G/DL (ref 11.7–15.7)
MCH RBC QN AUTO: 30.5 PG (ref 26.5–33)
MCHC RBC AUTO-ENTMCNC: 32.6 G/DL (ref 31.5–36.5)
MCV RBC AUTO: 94 FL (ref 78–100)
PLATELET # BLD AUTO: 187 10E9/L (ref 150–450)
POTASSIUM SERPL-SCNC: 3.5 MMOL/L (ref 3.4–5.3)
RBC # BLD AUTO: 4.16 10E12/L (ref 3.8–5.2)
SODIUM SERPL-SCNC: 142 MMOL/L (ref 133–144)
WBC # BLD AUTO: 6.2 10E9/L (ref 4–11)

## 2018-11-08 PROCEDURE — C9113 INJ PANTOPRAZOLE SODIUM, VIA: HCPCS | Performed by: SURGERY

## 2018-11-08 PROCEDURE — 36415 COLL VENOUS BLD VENIPUNCTURE: CPT | Performed by: SURGERY

## 2018-11-08 PROCEDURE — 80048 BASIC METABOLIC PNL TOTAL CA: CPT | Performed by: SURGERY

## 2018-11-08 PROCEDURE — 25000128 H RX IP 250 OP 636: Performed by: SURGERY

## 2018-11-08 PROCEDURE — 85027 COMPLETE CBC AUTOMATED: CPT | Performed by: SURGERY

## 2018-11-08 PROCEDURE — 25000132 ZZH RX MED GY IP 250 OP 250 PS 637: Performed by: SURGERY

## 2018-11-08 RX ORDER — HYDROCODONE BITARTRATE AND ACETAMINOPHEN 5; 325 MG/1; MG/1
1-2 TABLET ORAL EVERY 4 HOURS PRN
Qty: 30 TABLET | Refills: 0 | Status: SHIPPED | OUTPATIENT
Start: 2018-11-08 | End: 2018-11-20

## 2018-11-08 RX ORDER — IBUPROFEN 600 MG/1
600 TABLET, FILM COATED ORAL EVERY 8 HOURS PRN
Qty: 30 TABLET | Refills: 0 | Status: SHIPPED | OUTPATIENT
Start: 2018-11-08 | End: 2021-04-20

## 2018-11-08 RX ADMIN — OXYCODONE HYDROCHLORIDE 5 MG: 5 TABLET ORAL at 08:40

## 2018-11-08 RX ADMIN — LISDEXAMFETAMINE DIMESYLATE 40 MG: 40 CAPSULE ORAL at 08:29

## 2018-11-08 RX ADMIN — PANTOPRAZOLE SODIUM 40 MG: 40 INJECTION, POWDER, FOR SOLUTION INTRAVENOUS at 08:29

## 2018-11-08 RX ADMIN — ACETAMINOPHEN 975 MG: 325 TABLET, FILM COATED ORAL at 04:29

## 2018-11-08 RX ADMIN — OXYCODONE HYDROCHLORIDE 10 MG: 5 TABLET ORAL at 05:20

## 2018-11-08 RX ADMIN — SENNOSIDES AND DOCUSATE SODIUM 2 TABLET: 8.6; 5 TABLET ORAL at 08:29

## 2018-11-08 RX ADMIN — OXYCODONE HYDROCHLORIDE 5 MG: 5 TABLET ORAL at 10:58

## 2018-11-08 ASSESSMENT — ACTIVITIES OF DAILY LIVING (ADL)
ADLS_ACUITY_SCORE: 10

## 2018-11-08 NOTE — DISCHARGE SUMMARY
[facility]    Discharge Summary  General Surgery    Date of Admission:  11/6/2018  Date of Discharge:  11/8/2018  Discharging Provider: Anthony Jackson    Discharge Diagnoses   Patient Active Problem List   Diagnosis     Hyperlipidemia LDL goal <160     Headaches, tension     Chronic constipation     OCD (obsessive compulsive disorder)     Anxiety     Fibromyalgia     Chronic abdominal pain     Prenatal care, subsequent pregnancy     Immune thrombocytopenia (H)     Thrombocytopenia (H)     Underweight     Lower GI bleed     Immune thrombocytopenic purpura (H)       Procedure/Surgery Information   Procedure: Procedure(s):  Laparoscopic splenectomy,    Surgeon(s): Surgeon(s) and Role:     * Anthony Jackson,  - Primary     * Adam Mcmillan MD     * Nikunj Veras PA-C - Resident - Observing   Specimens:   ID Type Source Tests Collected by Time Destination   A :  Organ Spleen SURGICAL PATHOLOGY EXAM Anthony Jackson DO 11/6/2018  9:05 AM       Non-operative procedures None performed     History of Present Illness   Merissa Leung is a 33 year old female who presented with ITP.  She presented for elective splenectomy.    Hospital Course   Mreissa Leung was admitted on 11/6/2018.  The following problems were addressed during her hospitalization:  ITP: S/P Laparoscopic splenectomy  -Patient was admitted under my care, had improvement in platelet count.  Required no transfusions.  On day of discharge was tolerating diet, ambulating halls, and had pain controlled.  She was comfortable with discharge plan.    Post-operative pain control: included Morphine IV and will be Vicodin/hydrocodone on discharge.   # Discharge Pain Plan:   - During her hospitalization, Merissa experienced pain due to surgery.  The pain plan for discharge was discussed with Merissa and her spouse and the plan was created in a collaborative fashion.    - Opioids prescribed on discharge: Norco  -  Duration of opioids after discharge: Per [facility] opioid prescribing guidelines, a 3 day prescription of opioids was provided.  - Bowel regimen: senna and miralax      Medications discontinued or adjusted during this hospitalization: No change     Antibiotics prescribed at discharge: None prescribed     Imaging study follow up needs:   -None performed    Significant Findings: None    Anthony Jackson    Discharge Disposition   Discharged to home   Condition at discharge: Stable    Pending Results   Final pathology results: Pending at time of discharge  These results will be followed up by Anthony Jackson    Unresulted Labs Ordered in the Past 30 Days of this Admission     Date and Time Order Name Status Description    11/6/2018 0907 Surgical pathology exam In process     10/31/2018 1905 Platelets prepare order unit In process           Primary Care Physician   Mohini Mccord    Physical Exam   Temp: 98.1  F (36.7  C) Temp src: Oral BP: 101/57   Heart Rate: 86 Resp: 16 SpO2: 100 % O2 Device: None (Room air)    There were no vitals filed for this visit.  Vital Signs with Ranges  Temp:  [97.7  F (36.5  C)-98.4  F (36.9  C)] 98.1  F (36.7  C)  Heart Rate:  [62-86] 86  Resp:  [16-20] 16  BP: ()/(42-57) 101/57  SpO2:  [99 %-100 %] 100 %       Constitutional: awake, alert, cooperative, no apparent distress, and appears stated age  Respiratory: No increased work of breathing, good air exchange, clear to auscultation bilaterally, no crackles or wheezing  Cardiovascular: Normal apical impulse, regular rate and rhythm, normal S1 and S2, no S3 or S4, and no murmur noted  GI: normal bowel sounds, appropriate incisional ttp and no masses palpated  Skin: Incisions C/D/I  Musculoskeletal: There is no redness, warmth, or swelling of the joints.  Full range of motion noted.  Motor strength is 5 out of 5 all extremities bilaterally.  Tone is normal.  Neurologic: Awake, alert, oriented to name, place and time.  Cranial nerves  II-XII are grossly intact.    Neuropsychiatric: General: normal, calm and normal eye contact    Consultations This Hospital Stay   SPIRITUAL HEALTH SERVICES IP CONSULT  SPIRITUAL HEALTH SERVICES IP CONSULT    Time Spent on this Encounter   I have spent greater than 30 minutes on this discharge.    Discharge Orders     Reason for your hospital stay   ITP     Follow-up and recommended labs and tests    Follow up with me,  Anthony Jackson, within 2. to evaluate after surgery.  No follow up labs or test are needed.     Activity   Your activity upon discharge: no lifting, or strenuous exercise for 6 weeks.  No driving until off pain medications     Wound care and dressings   Instructions to care for your wound at home: as directed.     Diet   Follow this diet upon discharge: Regular       Discharge Medications   Current Discharge Medication List      START taking these medications    Details   HYDROcodone-acetaminophen (NORCO) 5-325 MG per tablet Take 1-2 tablets by mouth every 4 hours as needed for severe pain  Qty: 30 tablet, Refills: 0    Associated Diagnoses: Immune thrombocytopenic purpura (H)      ibuprofen (ADVIL/MOTRIN) 600 MG tablet Take 1 tablet (600 mg) by mouth every 8 hours as needed for moderate pain  Qty: 30 tablet, Refills: 0    Associated Diagnoses: Immune thrombocytopenic purpura (H)         CONTINUE these medications which have NOT CHANGED    Details   clonazePAM (KLONOPIN) 0.5 MG tablet Take 0.5-1 tablets (0.25-0.5 mg) by mouth 2 times daily as needed for anxiety  Qty: 20 tablet, Refills: 0    Associated Diagnoses: EDWARD (generalized anxiety disorder)      linaclotide (LINZESS) 145 MCG capsule Take 1 capsule (145 mcg) by mouth every morning (before breakfast)  Qty: 30 capsule, Refills: 5    Associated Diagnoses: Irritable bowel syndrome with constipation      !! lisdexamfetamine (VYVANSE) 10 MG capsule Take 1 capsule (10 mg) by mouth daily (before lunch)  Qty: 30 capsule, Refills: 0    Associated  Diagnoses: Binge eating disorder      !! lisdexamfetamine (VYVANSE) 40 MG capsule Take 1 capsule (40 mg) by mouth every morning  Qty: 30 capsule, Refills: 0    Associated Diagnoses: Binge eating disorder      Multiple Vitamins-Minerals (MULTIVITAL PO) Take 1 chew tab by mouth daily Gummi      polyethylene glycol (MIRALAX/GLYCOLAX) powder Take 17 g by mouth daily as needed for constipation      sennosides (SENOKOT) 8.6 MG tablet Take 2 tablets by mouth 2 times daily      UNABLE TO FIND Take 2 teaspoonful by mouth At Bedtime Magnesium Calm       !! - Potential duplicate medications found. Please discuss with provider.        Allergies   Allergies   Allergen Reactions     Dilaudid [Hydromorphone] Nausea and Vomiting     Food Other (See Comments) and Swelling     Processed food, sugar, high sodium, red grains, rice, potatoes(can have mashed, but no french fries or puffs)  Pain in extremities and face.   All over body at times.     Milk Protein Extract Swelling     Blow up like a balloon     Data   Most Recent 3 CBC's:  Recent Labs   Lab Test  11/08/18   0729  11/07/18   0619  11/06/18   1215   WBC  6.2  10.0  11.6*   HGB  12.7  11.8  13.6   MCV  94  90  89   PLT  187  101*  57*      Most Recent 3 BMP's:  Recent Labs   Lab Test  11/08/18   0729  11/07/18   0619  11/01/18   0557   NA  142  144  142   POTASSIUM  3.5  3.1*  3.2*   CHLORIDE  107  112*  109   CO2  28  25  24   BUN  9  7  13   CR  0.71  0.72  0.68   ANIONGAP  7  7  9   DREW  7.9*  7.2*  7.8*   GLC  113*  86  93     Most Recent 2 LFT's:  Recent Labs   Lab Test  01/30/18   1805  01/24/18   1230   AST  15  14   ALT  17  18   ALKPHOS  77  60   BILITOTAL  0.5  0.2     Most Recent INR's and Anticoagulation Dosing History:  Anticoagulation Dose History     Recent Dosing and Labs Latest Ref Rng & Units 12/4/2017 10/31/2018    INR 0.86 - 1.14 1.07 1.03        Most Recent 3 Troponin's:No lab results found.  Most Recent Cholesterol Panel:No lab results found.  Most Recent  6 Bacteria Isolates From Any Culture (See EPIC Reports for Culture Details):  Recent Labs   Lab Test  10/31/18   1715  02/06/18   1744  01/30/18   1750  01/30/18   1745  03/07/17   0833  07/29/15   1032   CULT  >100,000 colonies/mL  Escherichia coli  *  No beta hemolytic Streptococcus Group A isolated  No growth  No Beta Streptococcus isolated  No Beta Streptococcus isolated  No growth     Most Recent TSH, T4 and A1c Labs:  Recent Labs   Lab Test  10/18/16   0940   TSH  1.90

## 2018-11-08 NOTE — DISCHARGE INSTRUCTIONS
HOME CARE FOLLOWING ABDOMINAL SURGERY    INCISIONAL CARE:  Replace the bandage over your incision (or incisions) until all drainage stops, or if more comfortable to have in place.  If present, leave the steri-strips (white paper tapes) in place for 14 days after surgery.  If you have staples in your incision at the time of discharge, they will be removed at your follow-up appointment.  If Dermabond (a type of skin glue) is present, leave in place until it wears/flakes off.     BATHING:  Avoid baths for 1 week after surgery.  Showers are okay.  You may wash your hair at any time.  Gently pat your incision dry after bathing.    ACTIVITY:  Light Activity -- you may immediately be up and about as tolerated.  Driving -- you may drive when comfortable and off narcotic pain medications (example: Norco, Percocet, Hydrocodone).  Light Work -- resume when comfortable off pain medications.  (If you can drive, you probably can work.)  Strenuous Work/Activity -- limit lifting to 20 pounds for 4 weeks.  Then, progressively increase with time.  Active Sports (running, biking, etc.) -- cautiously resume after 6 weeks.  Most importantly listen to your body.  If an activity causes pain or discomfort please stop and try in a few days or decrease your intensity.    DISCOMFORT:  Use pain medications as prescribed by your surgeon.  Take the pain medication with some food, when possible, to minimize side effects.  Expect gradual improvement.      Narcotic Pain Medications:  Do not drive, make important decisions or operate heavy machinery while on narcotic pain medications.  Narcotic pain medications can be associated with nausea, sleep disturbance, and constipation.  Unless directed otherwise, please take an over the counter stool softener (Colace 100mg twice a day) unless directed otherwise.  As soon as you are able to stop narcotic pain medications the better. Long term use of narcotics is associated with tolerance (the need for more  medication for effect) and potential addiction.    DIET:  Return to diet you were on before surgery, unless you are given specific diet instructions.  Drink plenty of fluids.  While taking pain medications, increase dietary fiber or add a fiber supplementation like Metamucil or Citrucel to help prevent constipation - a possible side effect of pain medications.    NAUSEA:  If nauseated from the anesthetic/pain meds; rest in bed, get up cautiously with assistance, and drink clear liquids (juice, tea, broth).    RETURN APPOINTMENT:  Schedule a follow-up visit 2-3 weeks after discharge from the hospital.  Office Phone: [phone]     CONTACT US IF THE FOLLOWING DEVELOPS:   1. A fever that is above 101     2. If there is a large amount of drainage, bleeding, or swelling.   3. Severe pain that is not relieved by your prescription.   4. Drainage that is thick, cloudy, yellow, green or white.   5. Any other questions not answered by  Frequently Asked Questions  sheet.      FREQUENTLY ASKED QUESTIONS:    Q:  How should my incision look?    A:  Normally your incision will appear slightly swollen with light redness directly along the incision itself as it heals.  It may feel like a bump or ridge as the healing/scarring happens, and over time (3-4 months) this bump or ridge feeling should slowly go away.  In general, clear or pink watery drainage can be normal at first as your incision heals, but should decrease over time.    Q:  How do I know if my incision is infected?  A:  Look at your incision for signs of infection, like redness around the incision spreading to surrounding skin, or drainage of cloudy or foul-smelling drainage.  If you feel warm, check your temperature to see if you are running a fever.    **If any of these things occur, please notify the nurse at our office.  We may need you to come into the office for an incision check.      Q:  How do I take care of my incision?  A:  If you have a dressing in place -  Starting the day after surgery, replace the dressing 1-2 times a day until there is no further drainage from the incision.  At that time, a dressing is no longer needed.  Try to minimize tape on the skin if irritation is occurring at the tape sites.  If you have significant irritation from tape on the skin, please call the office to discuss other method of dressing your incision.    Small pieces of tape called  steri-strips  may be present directly overlying your incision; these may be removed 10 days after surgery unless otherwise specified by your surgeon.  If these tapes start to loosen at the ends, you may trim them back until they fall off or are removed.    A:  If you had  Dermabond  tissue glue used as a dressing (this causes your incision to look shiny with a clear covering over it) - This type of dressing wears off with time and does not require more dressings over the top unless it is draining around the glue as it wears off.  Do not apply ointments or lotions over the incisions until the glue has completely worn off.    Q:  There is a piece of tape or a sticky  lead  still on my skin.  Can I remove this?  A:  Sometimes the sticky  leads  used for monitoring during surgery or for evaluation in the emergency department are not all removed while you are in the hospital.  These sometimes have a tab or metal dot on them.  You can easily remove these on your own, like taking off a band-aid.  If there is a gel substance under the  lead , simply wipe/clean it off with a washcloth or paper towel.      Q:  What can I do to minimize constipation (very hard stools, or lack of stools)?  A:  Stay well hydrated.  Increase your dietary fiber intake or take a fiber supplement -with plenty of water.  Walk around frequently.  You may consider an over-the-counter stool-softener.  Your Pharmacist can assist you with choosing one that is stocked at your pharmacy.  Constipation is also one of the most common side effects of  pain medication.  If you are using pain medication, be pro-active and try to PREVENT problems with constipation by taking the steps above BEFORE constipation becomes a problem.    Q:  What do I do if I need more pain medications?  A:  Call the office to receive refills.  Be aware that certain pain meds cannot be called into a pharmacy and actually require a paper prescription.  A change may be made in your pain med as you progress thru your recovery period or if you have side effects to certain meds.    --Pain meds are NOT refilled after 5pm on weekdays, and NOT AT ALL on the weekends, so please look ahead to prevent problems.      Q:  Why am I having a hard time sleeping now that I am at home?  A:  Many medications you receive while you are in the hospital can impact your sleep for a number of days after your surgery/hospitalization.  Decreased level of activity and naps during the day may also make sleeping at night difficult.  Try to minimize day-time naps, and get up frequently during the day to walk around your home during your recovery time.  Sleep aides may be of some help, but are not recommended for long-term use.      Q:  I am having some back discomfort.  What should I do?  A:  This may be related to certain positioning that was required for your surgery, extended periods of time in bed, or other changes in your overall activity level.  You may try ice, heat, acetaminophen, or ibuprofen to treat this temporarily.  Note that many pain medications have acetaminophen in them and would state this on the prescription bottle.  Be sure not to exceed the maximum of 4000mg per day of acetaminophen.     **If the pain you are having does not resolve, is severe, or is a flare of back pain you have had on other occasions prior to surgery, please contact your primary physician for further recommendations or for an appointment to be examined at their office.    Q:  Why am I having headaches?  A:  Headaches can be caused  by many things:  caffeine withdrawal, use of pain meds, dehydration, high blood pressure, lack of sleep, over-activity/exhaustion, flare-up of usual migraine headaches.  If you feel this is related to muscle tension (a band-like feeling around the head, or a pressure at the low-back of the head) you may try ice or heat to this area.  You may need to drink more fluids (try electrolyte drink like Gatorade), rest, or take your usual migraine medications.   **If your headaches do not resolve, worsen, are accompanied by other symptoms, or if your blood pressure is high, please call your primary physician for recommendation and/or examination.    Q:  I am unable to urinate.  What do I do?  A:  A small percentage of people can have difficulty urinating initially after surgery.  This includes being able to urinate only a very small amount at a time and feeling discomfort or pressure in the very low abdomen.  This is called  urinary retention , and is actually an urgent situation.  Proceed to your nearest Emergency department for evaluation (not an Urgent Care Center).  Sometimes the bladder does not work correctly after certain medications you receive during surgery, or related to certain procedures.  You may need to have a catheter placed until your bladder recovers.  When planning to go to an Emergency department, it may help to call the ER to let them know you are coming in for this problem after a surgery.  This may help you get in quicker to be evaluated.  **If you have symptoms of a urinary tract infection, please contact your primary physician for the proper evaluation and treatment.          If you have other questions, please call the office Monday thru Friday between 8am and 5pm to discuss with the nurse.  # [phone]    There is a surgeon ON CALL on weekday evenings and over the weekend in case of urgent need only, and may be contacted at the same number.    If you are having an emergency, call 911 or proceed  to your nearest emergency department.

## 2018-11-08 NOTE — PLAN OF CARE
Problem: Patient Care Overview  Goal: Plan of Care/Patient Progress Review  Outcome: Improving  Patient up in souza/outside to smoke. Patient requesting oxy for pain given 5mg every 2-3 hours . Patient states doing better today. Patient has 4 stab sites that are clean dry and intact . Patient is eating and taking in oraly well. Patient has family at bedside. Patient has call light within reach. Patient can let needs be known.

## 2018-11-08 NOTE — PLAN OF CARE
Problem: Patient Care Overview  Goal: Plan of Care/Patient Progress Review  Outcome: Adequate for Discharge Date Met: 11/08/18  [facility] [place], [place] DISCHARGE NOTE    Patient discharged to home at 11:00 AM via wheel chair. Accompanied by spouse and staff. Discharge instructions reviewed with patient and spouse, opportunity offered to ask questions. Prescriptions sent with patient to fill . All belongings sent with patient.    Rachel Stephenson

## 2018-11-09 ENCOUNTER — TELEPHONE (OUTPATIENT)
Dept: FAMILY MEDICINE | Facility: CLINIC | Age: 33
End: 2018-11-09

## 2018-11-09 NOTE — TELEPHONE ENCOUNTER
"Hospital/TCU/ED for chronic condition Discharge Protocol    \"Hi, my name is Anu Rubi, a registered nurse, and I am calling from [facility].  I am calling to follow up and see how things are going for you after your recent emergency visit/hospital/TCU stay.\"    Tell me how you are doing now that you are home?\" States feeling better today than yesterday, pain better controlled. Denies fevers, chills. Passing gas but has not had BM. Taking bowel meds as ordered. Will call to scheduled 2 wk post op F/U.       Discharge Instructions    \"Let's review your discharge instructions.  What is/are the follow-up recommendations?  Pt. Response: 2 wk post op Dr. Jackson.    \"Has an appointment with your primary care provider been scheduled?\"   No (schedule appointment)    \"When you see the provider, I would recommend that you bring your medications with you.\"    Medications    \"Tell me what changed about your medicines when you discharged?\"    Changes to chronic meds?    0-1    \"What questions do you have about your medications?\"    None     New diagnoses of heart failure, COPD, diabetes, or MI?    No              Medication reconciliation completed? No  Was MTM referral placed (*Make sure to put transitions as reason for referral)?   No    Call Summary    \"What questions or concerns do you have about your recent visit and your follow-up care?\"     none    \"If you have questions or things don't continue to improve, we encourage you contact us through the main clinic number (give number).  Even if the clinic is not open, triage nurses are available 24/7 to help you.     We would like you to know that our clinic has extended hours (provide information).  We also have urgent care (provide details on closest location and hours/contact info)\"      \"Thank you for your time and take care!\"           "

## 2018-11-09 NOTE — TELEPHONE ENCOUNTER
ED / Discharge Outreach Protocol    Patient Contact    Attempt # 1    Was call answered?  No.  Left message on voicemail with information to call me back.  ADA Rubi RN

## 2018-11-09 NOTE — TELEPHONE ENCOUNTER
ED/UC/IP follow up phone call:  11.08.2018  IP    RN please call to follow up. Thromocytopenic    Number of ED visits in past 12 months = 0    Keke ROMERO

## 2018-11-12 LAB — COPATH REPORT: NORMAL

## 2018-11-13 DIAGNOSIS — D69.3 IMMUNE THROMBOCYTOPENIC PURPURA (H): ICD-10-CM

## 2018-11-13 RX ORDER — HYDROCODONE BITARTRATE AND ACETAMINOPHEN 5; 325 MG/1; MG/1
1-2 TABLET ORAL EVERY 4 HOURS PRN
Qty: 30 TABLET | Refills: 0 | Status: CANCELLED | OUTPATIENT
Start: 2018-11-13

## 2018-11-13 NOTE — TELEPHONE ENCOUNTER
Called pt to discuss current pain level.  She is 1 week post op and says she has a few left and has begun using Ibuprofen.  Offered appt today for patient to evaluate if still having a mod - severe pain at this time.  Pt declined and stated she would begin reserving the Narcotic pain med for times of mod- severe pain and use regular scheduled NSAID to keep ahead of the pain. Refill request cancelled.  Anum Gonzalez RN  [facility] [place]

## 2018-11-13 NOTE — TELEPHONE ENCOUNTER
Reason for Call:  Medication or medication refill:    Do you use a [facility]?  Name of the pharmacy and phone number for the current request:  [facility] [phone]    Name of the medication requested: HYDROcodone-acetaminophen    Other request: LOV:  11/8/18  LAST REFILL:  ?    Can we leave a detailed message on this number? YES    Phone number patient can be reached at: Home number on file [phone] (home)    Best Time: any     Call taken on 11/13/2018 at 10:53 AM by Daylin Bernstein

## 2018-11-14 ENCOUNTER — TELEPHONE (OUTPATIENT)
Dept: ONCOLOGY | Facility: CLINIC | Age: 33
End: 2018-11-14

## 2018-11-14 DIAGNOSIS — D69.3 IMMUNE THROMBOCYTOPENIC PURPURA (H): Primary | ICD-10-CM

## 2018-11-14 DIAGNOSIS — Z90.81 S/P SPLENECTOMY: ICD-10-CM

## 2018-11-14 NOTE — TELEPHONE ENCOUNTER
Status Check:    Pt is a 33 year old female, recently had a splenectomy.  Call placed for status check. Message left.    Primary care provider is: Mohini Mccord    Diagnosis:   Encounter Diagnoses   Name Primary?     Immune thrombocytopenic purpura (H) Yes     S/P splenectomy      Oncology provider is:  Dr. Pena    How are you doing/feeling?: unknown.  Any further issues?: unknown   Next Follow up/Recommendations: Message left requesting call back for status check post splenectomy. Direct line provided.    Approximately 0 minutes spent on telephone with patient reviewing assessment and symptom(s) and providing symptom management.    Laverne Rollins RN, BSN, OCN  Oncology Hematology   Breast Cancer Navigator  [facility]  [email or web address]  [phone]

## 2018-11-14 NOTE — TELEPHONE ENCOUNTER
Patient returned call to clinic. Reports she is healing well, surgery went well and last count her PLT was 185 or 187. Reports she is feeling better each day and doesn't need to f/u with Dr. Pena at this time. She will call if she needs to. Thanks us all for our help and time.

## 2018-11-20 ENCOUNTER — TELEPHONE (OUTPATIENT)
Dept: SURGERY | Facility: CLINIC | Age: 33
End: 2018-11-20

## 2018-11-20 ENCOUNTER — HOSPITAL ENCOUNTER (EMERGENCY)
Facility: CLINIC | Age: 33
Discharge: HOME | End: 2018-11-20
Attending: STUDENT IN AN ORGANIZED HEALTH CARE EDUCATION/TRAINING PROGRAM | Admitting: STUDENT IN AN ORGANIZED HEALTH CARE EDUCATION/TRAINING PROGRAM
Payer: COMMERCIAL

## 2018-11-20 VITALS
TEMPERATURE: 98.2 F | HEIGHT: 66 IN | SYSTOLIC BLOOD PRESSURE: 111 MMHG | WEIGHT: 109 LBS | BODY MASS INDEX: 17.52 KG/M2 | OXYGEN SATURATION: 99 % | DIASTOLIC BLOOD PRESSURE: 76 MMHG | RESPIRATION RATE: 18 BRPM

## 2018-11-20 DIAGNOSIS — N39.0 URINARY TRACT INFECTION WITHOUT HEMATURIA, SITE UNSPECIFIED: ICD-10-CM

## 2018-11-20 DIAGNOSIS — R10.31 BILATERAL LOWER ABDOMINAL PAIN: ICD-10-CM

## 2018-11-20 DIAGNOSIS — R10.32 BILATERAL LOWER ABDOMINAL PAIN: ICD-10-CM

## 2018-11-20 LAB
ALBUMIN SERPL-MCNC: 4 G/DL (ref 3.4–5)
ALBUMIN UR-MCNC: 30 MG/DL
ALP SERPL-CCNC: 56 U/L (ref 40–150)
ALT SERPL W P-5'-P-CCNC: 20 U/L (ref 0–50)
ANION GAP SERPL CALCULATED.3IONS-SCNC: 10 MMOL/L (ref 3–14)
APPEARANCE UR: ABNORMAL
AST SERPL W P-5'-P-CCNC: 19 U/L (ref 0–45)
BACTERIA #/AREA URNS HPF: ABNORMAL /HPF
BASOPHILS # BLD AUTO: 0.1 10E9/L (ref 0–0.2)
BASOPHILS NFR BLD AUTO: 1.7 %
BILIRUB SERPL-MCNC: 0.2 MG/DL (ref 0.2–1.3)
BILIRUB UR QL STRIP: NEGATIVE
BUN SERPL-MCNC: 9 MG/DL (ref 7–30)
CALCIUM SERPL-MCNC: 9 MG/DL (ref 8.5–10.1)
CHLORIDE SERPL-SCNC: 106 MMOL/L (ref 94–109)
CO2 SERPL-SCNC: 24 MMOL/L (ref 20–32)
COLOR UR AUTO: ABNORMAL
CREAT SERPL-MCNC: 0.71 MG/DL (ref 0.52–1.04)
DIFFERENTIAL METHOD BLD: NORMAL
EOSINOPHIL # BLD AUTO: 0.6 10E9/L (ref 0–0.7)
EOSINOPHIL NFR BLD AUTO: 6.8 %
ERYTHROCYTE [DISTWIDTH] IN BLOOD BY AUTOMATED COUNT: 11.8 % (ref 10–15)
GFR SERPL CREATININE-BSD FRML MDRD: >90 ML/MIN/1.7M2
GLUCOSE SERPL-MCNC: 101 MG/DL (ref 70–99)
GLUCOSE UR STRIP-MCNC: NEGATIVE MG/DL
HCG SERPL QL: NEGATIVE
HCT VFR BLD AUTO: 44.7 % (ref 35–47)
HGB BLD-MCNC: 15.1 G/DL (ref 11.7–15.7)
HGB UR QL STRIP: NEGATIVE
IMM GRANULOCYTES # BLD: 0 10E9/L (ref 0–0.4)
IMM GRANULOCYTES NFR BLD: 0.2 %
KETONES UR STRIP-MCNC: 5 MG/DL
LEUKOCYTE ESTERASE UR QL STRIP: ABNORMAL
LIPASE SERPL-CCNC: 90 U/L (ref 73–393)
LYMPHOCYTES # BLD AUTO: 2.5 10E9/L (ref 0.8–5.3)
LYMPHOCYTES NFR BLD AUTO: 29.8 %
MCH RBC QN AUTO: 30.5 PG (ref 26.5–33)
MCHC RBC AUTO-ENTMCNC: 33.8 G/DL (ref 31.5–36.5)
MCV RBC AUTO: 90 FL (ref 78–100)
MONOCYTES # BLD AUTO: 0.5 10E9/L (ref 0–1.3)
MONOCYTES NFR BLD AUTO: 5.4 %
MUCOUS THREADS #/AREA URNS LPF: PRESENT /LPF
NEUTROPHILS # BLD AUTO: 4.7 10E9/L (ref 1.6–8.3)
NEUTROPHILS NFR BLD AUTO: 56.1 %
NITRATE UR QL: POSITIVE
NRBC # BLD AUTO: 0 10*3/UL
NRBC BLD AUTO-RTO: 0 /100
PH UR STRIP: 5 PH (ref 5–7)
PLATELET # BLD AUTO: 344 10E9/L (ref 150–450)
POTASSIUM SERPL-SCNC: 3.5 MMOL/L (ref 3.4–5.3)
PROT SERPL-MCNC: 7.5 G/DL (ref 6.8–8.8)
RBC # BLD AUTO: 4.95 10E12/L (ref 3.8–5.2)
RBC #/AREA URNS AUTO: 22 /HPF (ref 0–2)
SODIUM SERPL-SCNC: 140 MMOL/L (ref 133–144)
SOURCE: ABNORMAL
SP GR UR STRIP: 1.02 (ref 1–1.03)
SQUAMOUS #/AREA URNS AUTO: 1 /HPF (ref 0–1)
UROBILINOGEN UR STRIP-MCNC: 0 MG/DL (ref 0–2)
WBC # BLD AUTO: 8.4 10E9/L (ref 4–11)
WBC #/AREA URNS AUTO: 170 /HPF (ref 0–5)
WBC CLUMPS #/AREA URNS HPF: PRESENT /HPF

## 2018-11-20 PROCEDURE — 87088 URINE BACTERIA CULTURE: CPT | Performed by: STUDENT IN AN ORGANIZED HEALTH CARE EDUCATION/TRAINING PROGRAM

## 2018-11-20 PROCEDURE — 85025 COMPLETE CBC W/AUTO DIFF WBC: CPT | Performed by: STUDENT IN AN ORGANIZED HEALTH CARE EDUCATION/TRAINING PROGRAM

## 2018-11-20 PROCEDURE — 80053 COMPREHEN METABOLIC PANEL: CPT | Performed by: STUDENT IN AN ORGANIZED HEALTH CARE EDUCATION/TRAINING PROGRAM

## 2018-11-20 PROCEDURE — 99284 EMERGENCY DEPT VISIT MOD MDM: CPT | Mod: 25 | Performed by: STUDENT IN AN ORGANIZED HEALTH CARE EDUCATION/TRAINING PROGRAM

## 2018-11-20 PROCEDURE — 99284 EMERGENCY DEPT VISIT MOD MDM: CPT | Mod: Z6 | Performed by: STUDENT IN AN ORGANIZED HEALTH CARE EDUCATION/TRAINING PROGRAM

## 2018-11-20 PROCEDURE — 81001 URINALYSIS AUTO W/SCOPE: CPT | Performed by: STUDENT IN AN ORGANIZED HEALTH CARE EDUCATION/TRAINING PROGRAM

## 2018-11-20 PROCEDURE — 87086 URINE CULTURE/COLONY COUNT: CPT | Performed by: STUDENT IN AN ORGANIZED HEALTH CARE EDUCATION/TRAINING PROGRAM

## 2018-11-20 PROCEDURE — 84703 CHORIONIC GONADOTROPIN ASSAY: CPT | Performed by: STUDENT IN AN ORGANIZED HEALTH CARE EDUCATION/TRAINING PROGRAM

## 2018-11-20 PROCEDURE — 25000128 H RX IP 250 OP 636: Performed by: STUDENT IN AN ORGANIZED HEALTH CARE EDUCATION/TRAINING PROGRAM

## 2018-11-20 PROCEDURE — 96374 THER/PROPH/DIAG INJ IV PUSH: CPT | Performed by: STUDENT IN AN ORGANIZED HEALTH CARE EDUCATION/TRAINING PROGRAM

## 2018-11-20 PROCEDURE — 87186 SC STD MICRODIL/AGAR DIL: CPT | Performed by: STUDENT IN AN ORGANIZED HEALTH CARE EDUCATION/TRAINING PROGRAM

## 2018-11-20 PROCEDURE — 83690 ASSAY OF LIPASE: CPT | Performed by: STUDENT IN AN ORGANIZED HEALTH CARE EDUCATION/TRAINING PROGRAM

## 2018-11-20 PROCEDURE — 25000132 ZZH RX MED GY IP 250 OP 250 PS 637: Performed by: STUDENT IN AN ORGANIZED HEALTH CARE EDUCATION/TRAINING PROGRAM

## 2018-11-20 RX ORDER — OXYCODONE HYDROCHLORIDE 5 MG/1
5 TABLET ORAL EVERY 6 HOURS PRN
Qty: 8 TABLET | Refills: 0 | Status: SHIPPED | OUTPATIENT
Start: 2018-11-20 | End: 2019-02-21

## 2018-11-20 RX ORDER — OXYCODONE HYDROCHLORIDE 5 MG/1
5 TABLET ORAL ONCE
Status: COMPLETED | OUTPATIENT
Start: 2018-11-20 | End: 2018-11-20

## 2018-11-20 RX ORDER — KETOROLAC TROMETHAMINE 15 MG/ML
15 INJECTION, SOLUTION INTRAMUSCULAR; INTRAVENOUS ONCE
Status: COMPLETED | OUTPATIENT
Start: 2018-11-20 | End: 2018-11-20

## 2018-11-20 RX ORDER — CEPHALEXIN 500 MG/1
500 CAPSULE ORAL 4 TIMES DAILY
Qty: 28 CAPSULE | Refills: 0 | Status: SHIPPED | OUTPATIENT
Start: 2018-11-20 | End: 2019-02-21

## 2018-11-20 RX ADMIN — OXYCODONE HYDROCHLORIDE 5 MG: 5 TABLET ORAL at 15:15

## 2018-11-20 RX ADMIN — KETOROLAC TROMETHAMINE 15 MG: 15 INJECTION, SOLUTION INTRAMUSCULAR; INTRAVENOUS at 15:16

## 2018-11-20 RX ADMIN — SODIUM CHLORIDE, POTASSIUM CHLORIDE, SODIUM LACTATE AND CALCIUM CHLORIDE 1000 ML: 600; 310; 30; 20 INJECTION, SOLUTION INTRAVENOUS at 15:14

## 2018-11-20 NOTE — ED AVS SNAPSHOT
[facility] Emergency Department    [address] [address]    Phone:  [phone]    Fax:  [phone]                                       Merissa Leung   MRN: [id number]    Department:  [facility] Emergency Department   Date of Visit:  11/20/2018           After Visit Summary Signature Page     I have received my discharge instructions, and my questions have been answered. I have discussed any challenges I see with this plan with the nurse or doctor.    ..........................................................................................................................................  Patient/Patient Representative Signature      ..........................................................................................................................................  Patient Representative Print Name and Relationship to Patient    ..................................................               ................................................  Date                                   Time    ..........................................................................................................................................  Reviewed by Signature/Title    ...................................................              ..............................................  Date                                               Time          22EPIC Rev 08/18

## 2018-11-20 NOTE — ED PROVIDER NOTES
History     Chief Complaint   Patient presents with     Abdominal Pain     splenectomy 11/6, having increae pain and fever     HPI  Merissa Leung is a 33 year old female with past medical history which includes anxiety, OCD, fibromyalgia, chronic abdominal pain, lower GI bleed, and immune thrombocytopenic purpura status post therapeutic splenectomy performed by Dr. Jackson on 11/6/18 to the department for bilateral lower abdominal pain with nausea.  Patient explains that she had been doing well postoperatively and has not taken her prescription hydrocodone/acetaminophen in several days but simply taking ibuprofen.  However this morning she began developing achy right lower abdominal pain associated with nausea and tactile fever at home, last dose of ibuprofen was 9 AM.  The achy discomfort has persisted and radiates across her lower abdomen, moderate in severity, and sensation similar to history of menstrual periods which she is expecting any time.  She denies headache, sore throat, chest pain, cough, upper abdominal pain, diarrhea, blood with bowel movements, or genitourinary symptoms.  Despite the nausea she denies vomiting and has been able to tolerate by mouth.  Typical nonbloody bowel movement earlier today.    Problem List:    Patient Active Problem List    Diagnosis Date Noted     Immune thrombocytopenic purpura (H) 11/06/2018     Priority: Medium     Lower GI bleed 10/31/2018     Priority: Medium     Immune thrombocytopenia (H) 11/29/2017     Priority: Medium     Thrombocytopenia (H) 11/09/2017     Priority: Medium     Underweight 11/09/2017     Priority: Medium     Prenatal care, subsequent pregnancy 07/20/2017     Priority: Medium     Chronic abdominal pain 10/06/2016     Priority: Medium     Has had workup through [place] and [place]      Now followed by [facility] (started on bentyl)       Fibromyalgia 07/17/2015     Priority: Medium     Anxiety 07/06/2015     Priority: Medium     Hyperlipidemia LDL  goal <160 07/15/2013     Priority: Medium     Headaches, tension 07/15/2013     Priority: Medium     Chronic constipation 07/15/2013     Priority: Medium     OCD (obsessive compulsive disorder) 07/15/2013     Priority: Medium     Binge eating  Started therapeutic trial of Luvox 100-300mg extended release at bedtime; category C in pregnancy (as all SSRI); not effective  Will refer to [facility]          Past Medical History:    Past Medical History:   Diagnosis Date     Anxiety      Binge eating disorder      Chickenpox      Food intolerance      IBS (irritable bowel syndrome)      MEDICAL HISTORY OF -        Past Surgical History:    Past Surgical History:   Procedure Laterality Date     C/SECTION, LOW TRANSVERSE        SECTION, TUBAL LIGATION, COMBINED N/A 2/15/2016    NO tubal ligation done     COLONOSCOPY  2013    Procedure: COMBINED COLONOSCOPY, SINGLE BIOPSY/POLYPECTOMY BY BIOPSY;  Colonoscopy;  Surgeon: Maureen Valentin MD;  Location: [facility]     COLONOSCOPY N/A 2015    Procedure: COMBINED COLONOSCOPY, SINGLE OR MULTIPLE BIOPSY/POLYPECTOMY BY BIOPSY;  Surgeon: Maureen Valentin MD;  Location: [facility]     ESOPHAGOSCOPY, GASTROSCOPY, DUODENOSCOPY (EGD), COMBINED N/A 2018    Procedure: COMBINED ESOPHAGOSCOPY, GASTROSCOPY, DUODENOSCOPY (EGD), BIOPSY SINGLE OR MULTIPLE;  Gastroscopy;  Surgeon: Ray Amato MD;  Location: [facility]     LAPAROSCOPIC SPLENECTOMY N/A 2018    Procedure: Laparoscopic splenectomy, ;  Surgeon: Anthony Jackson DO;  Location: [facility]       Family History:    Family History   Problem Relation Age of Onset     GASTROINTESTINAL DISEASE Mother 46     perforated intestines-      Hepatitis Mother      C     Liver Disease Mother      Blood Disease Mother      anemia     Substance Abuse Mother      A&D     Psychotic Disorder Brother      Schizophrenia, OCD     Schizophrenia Brother      Anxiety Disorder Brother      Arthritis Maternal  "Grandmother      Cancer Paternal Grandmother      cervical     Breast Cancer Paternal Grandmother      Hypertension Paternal Grandmother      Diabetes Paternal Grandmother      Cerebrovascular Disease Paternal Grandfather      HEART DISEASE Paternal Grandfather      Bipolar Disorder Paternal Grandfather      Suicide Paternal Grandfather      Cancer Paternal Aunt      cervical     Coronary Artery Disease Maternal Grandfather      MI     Substance Abuse Father      recovered A&D     HEART DISEASE Father      MI-stents     Hypertension Father        Social History:  Marital Status:   [2]  Social History   Substance Use Topics     Smoking status: Current Every Day Smoker     Packs/day: 0.50     Years: 6.00     Types: Cigarettes     Last attempt to quit: 5/25/2015     Smokeless tobacco: Never Used      Comment: 1/2 pack daily      Alcohol use No        Medications:      cephALEXin (KEFLEX) 500 MG capsule   clonazePAM (KLONOPIN) 0.5 MG tablet   ibuprofen (ADVIL/MOTRIN) 600 MG tablet   linaclotide (LINZESS) 145 MCG capsule   lisdexamfetamine (VYVANSE) 10 MG capsule   lisdexamfetamine (VYVANSE) 40 MG capsule   Multiple Vitamins-Minerals (MULTIVITAL PO)   oxyCODONE IR (ROXICODONE) 5 MG tablet   polyethylene glycol (MIRALAX/GLYCOLAX) powder   sennosides (SENOKOT) 8.6 MG tablet   UNABLE TO FIND         Review of Systems  Constitutional: Positive for tactile fever at home.  Negative for chills.  Cardiovascular:  Negative for chest pain.  Respiratory:  Negative for cough or shortness of breath.  Gastrointestinal: Positive for achy lower abdominal pain with nausea.  Negative for vomiting, diarrhea, constipation, or blood with bowel movements.  Genitourinary:  Negative for dysuria, hematuria, vaginal bleeding or discharge.  Musculoskeletal: Negative for back pain.    All others reviewed and are negative.      Physical Exam   BP: 101/67  Heart Rate: 96  Temp: 98.2  F (36.8  C)  Resp: 16  Height: 166.4 cm (5' 5.5\")  Weight: " 49.4 kg (109 lb)  SpO2: 100 %      Physical Exam  Constitutional:  Well developed, well nourished.  Appears nontoxic and in no acute distress.   HENT:  Normocephalic and atraumatic.  Symmetric in appearance.  Eyes:  Conjunctivae are normal.  Neck:  Neck supple.  Cardiovascular:  No cyanosis.  RRR.  No audible murmurs noted.  No lower extremity edema or asymmetry.   Respiratory:  Effort normal without sign of respiratory distress.  CTAB without diminished regions.    Gastrointestinal: Well-healing postoperative incision sites without surrounding cellulitis.  Soft nondistended abdomen.  Bilateral lower quadrant tenderness with guarding.  No rigidity or rebound tenderness.  Negative Cat's sign.  Negative McBurney's point.  No palpable pulsatile mass.   Genitourinary:  Noncontributory.   Musculoskeletal:  Moves extremities spontaneously.  Neurological:  Patient is alert.  Skin:  Skin is warm and dry.  Psychiatric:  Normal mood and affect.      ED Course     ED Course     Procedures               Critical Care time:  none               Results for orders placed or performed during the hospital encounter of 11/20/18 (from the past 24 hour(s))   CBC with platelets differential   Result Value Ref Range    WBC 8.4 4.0 - 11.0 10e9/L    RBC Count 4.95 3.8 - 5.2 10e12/L    Hemoglobin 15.1 11.7 - 15.7 g/dL    Hematocrit 44.7 35.0 - 47.0 %    MCV 90 78 - 100 fl    MCH 30.5 26.5 - 33.0 pg    MCHC 33.8 31.5 - 36.5 g/dL    RDW 11.8 10.0 - 15.0 %    Platelet Count 344 150 - 450 10e9/L    Diff Method Automated Method     % Neutrophils 56.1 %    % Lymphocytes 29.8 %    % Monocytes 5.4 %    % Eosinophils 6.8 %    % Basophils 1.7 %    % Immature Granulocytes 0.2 %    Nucleated RBCs 0 0 /100    Absolute Neutrophil 4.7 1.6 - 8.3 10e9/L    Absolute Lymphocytes 2.5 0.8 - 5.3 10e9/L    Absolute Monocytes 0.5 0.0 - 1.3 10e9/L    Absolute Eosinophils 0.6 0.0 - 0.7 10e9/L    Absolute Basophils 0.1 0.0 - 0.2 10e9/L    Abs Immature Granulocytes  0.0 0 - 0.4 10e9/L    Absolute Nucleated RBC 0.0    Comprehensive metabolic panel   Result Value Ref Range    Sodium 140 133 - 144 mmol/L    Potassium 3.5 3.4 - 5.3 mmol/L    Chloride 106 94 - 109 mmol/L    Carbon Dioxide 24 20 - 32 mmol/L    Anion Gap 10 3 - 14 mmol/L    Glucose 101 (H) 70 - 99 mg/dL    Urea Nitrogen 9 7 - 30 mg/dL    Creatinine 0.71 0.52 - 1.04 mg/dL    GFR Estimate >90 >60 mL/min/1.7m2    GFR Estimate If Black >90 >60 mL/min/1.7m2    Calcium 9.0 8.5 - 10.1 mg/dL    Bilirubin Total 0.2 0.2 - 1.3 mg/dL    Albumin 4.0 3.4 - 5.0 g/dL    Protein Total 7.5 6.8 - 8.8 g/dL    Alkaline Phosphatase 56 40 - 150 U/L    ALT 20 0 - 50 U/L    AST 19 0 - 45 U/L   Lipase   Result Value Ref Range    Lipase 90 73 - 393 U/L   HCG qualitative pregnancy (blood)   Result Value Ref Range    HCG Qualitative Serum Negative NEG^Negative   UA reflex to Microscopic and Culture   Result Value Ref Range    Color Urine Leticia     Appearance Urine Slightly Cloudy     Glucose Urine Negative NEG^Negative mg/dL    Bilirubin Urine Negative NEG^Negative    Ketones Urine 5 (A) NEG^Negative mg/dL    Specific Gravity Urine 1.021 1.003 - 1.035    Blood Urine Negative NEG^Negative    pH Urine 5.0 5.0 - 7.0 pH    Protein Albumin Urine 30 (A) NEG^Negative mg/dL    Urobilinogen mg/dL 0.0 0.0 - 2.0 mg/dL    Nitrite Urine Positive (A) NEG^Negative    Leukocyte Esterase Urine Large (A) NEG^Negative    Source Midstream Urine     RBC Urine 22 (H) 0 - 2 /HPF    WBC Urine 170 (H) 0 - 5 /HPF    WBC Clumps Present (A) NEG^Negative /HPF    Bacteria Urine Moderate (A) NEG^Negative /HPF    Squamous Epithelial /HPF Urine 1 0 - 1 /HPF    Mucous Urine Present (A) NEG^Negative /LPF       Medications   oxyCODONE IR (ROXICODONE) tablet 5 mg (5 mg Oral Given 11/20/18 1515)   ketorolac (TORADOL) injection 15 mg (15 mg Intravenous Given 11/20/18 1516)   lactated ringers BOLUS 1,000 mL (1,000 mLs Intravenous New Bag 11/20/18 [id number])       Assessments & Plan (with  Medical Decision Making)   Merissa Leung is a 33 year old female who presents to the department for evaluation of lower abdominal pain with tenderness on examination.  Patient is 14 days postop splenectomy for treatment of ITP and seem to be doing well afterwards no longer requiring hydrocodone/acetaminophen for analgesia.  Differential diagnosis included postoperative infection, appendicitis, diverticulitis, endometritis, pyelonephritis, ureteral calculus, ovarian cyst and torsion.  Although she admits to tactile fever prior to arrival, patient has remained afebrile in the department, CBC is without leukocytosis and platelet values have dramatically improved.  Symptoms also improved with analgesic medication in the department.  Urinalysis has returned positive for nitrite as well as large amount of leukocyte esterase concerning for urinary tract infection.  She has no flank pain or tenderness on examination but this could be contributing to her symptoms.  Cannot rule out the menstrual symptoms given her history of similar sensation prior to periods.  She will be started on oral antibiotic Keflex pending urine culture, will also be provided a very short course of oxycodone to take only as needed for breakthrough pain.  She is agreeable with the discharge plan we discussed including follow up and return instructions for developing symptoms, intractable pain, increasing right lower abdominal tenderness, or any other concerns.      Disclaimer:  This note consists of symbols derived from keyboarding, dictation, and/or voice recognition software.  As a result, there may be errors in the script that have gone undetected.  Please consider this when interpreting information found in the chart.        I have reviewed the nursing notes.    I have reviewed the findings, diagnosis, plan and need for follow up with the patient.       New Prescriptions    CEPHALEXIN (KEFLEX) 500 MG CAPSULE    Take 1 capsule (500 mg) by mouth 4  times daily for 7 days    OXYCODONE IR (ROXICODONE) 5 MG TABLET    Take 1 tablet (5 mg) by mouth every 6 hours as needed for severe pain       Final diagnoses:   Urinary tract infection without hematuria, site unspecified   Bilateral lower abdominal pain       11/20/2018   [facility] EMERGENCY DEPARTMENT     Kevin Shane DO  11/20/18 [id number]

## 2018-11-20 NOTE — ED NOTES
Intense abdominal pain started last night. LUQ radiates across to RUQ. Denies nausea and vomiting. Splenectomy 11/6/18. Last BM this am. Highest temp at home 100.1 tympanic at home.

## 2018-11-20 NOTE — ED AVS SNAPSHOT
[facility] Emergency Department    [address] [place] [address]    Phone:  [phone]    Fax:  [phone]                                       Merissa Leung   MRN: [id number]    Department:  [facility] Emergency Department   Date of Visit:  11/20/2018           Patient Information     Date Of Birth          1985        Your diagnoses for this visit were:     Urinary tract infection without hematuria, site unspecified     Bilateral lower abdominal pain        You were seen by Kevin Shane DO.      Follow-up Information     Go to [facility] Emergency Department.    Specialty:  EMERGENCY MEDICINE    Why:  Return if symptoms progress or any other concerns.    Contact information:    [address] [place] [address] [phone]    Additional information:    The medical center is located at   [address] [place] [address] (between [address] and   [facility] in [place], [place] miles north   of [place]).        Follow up with Anthony Jackson DO. Call in 3 days.    Specialty:  Surgery    Why:  Discuss symptoms and follow-up for reevaluation.    Contact information:    [address] [place] [address]  [phone]        Discharge References/Attachments     ABDOMINAL PAIN, UNKNOWN CAUSE, (FEMALE) (ENGLISH)    PYELONEPHRITIS, DISCHARGE INSTRUCTIONS FOR (ENGLISH)      Your next 10 appointments already scheduled     Nov 27, 2018  9:30 AM CST   Return Visit with Anthony Jackson DO   [facility]    [address] [place] [address]   [phone]            Nov 30, 2018  2:45 PM CST   PHYSICAL with Carola Sanchez MD   [facility] ([facility]    [address] [place] [id number]   [phone]            Dec 03, 2018  5:30 PM CST   LAB with PI LAB   [facility] ([facility]    [address] [place] [id number]    [phone]           Please do not eat 10-12 hours before your appointment if you are coming in fasting for labs on lipids, cholesterol, or glucose (sugar). This does not apply to pregnant women. Water, hot tea and black coffee (with nothing added) are okay. Do not drink other fluids, diet soda or chew gum.            Dec 06, 2018 11:30 AM CST   Level 3 with ROOM 6 [facility] ([facility])    [facility]  [address]  [facility] [address]   [phone]            Dec 07, 2018 11:30 AM CST   Level 3 with ROOM 6 [facility] ([facility])    [facility]  [address]  [facility] [place] [address]   [phone]              24 Hour Appointment Hotline       To make an appointment at any [employer] [facility] clinic, call [phone] ([phone]). If you don't have a family doctor or clinic, we will help you find one. [facility] clinics are conveniently located to serve the needs of you and your family.             Review of your medicines      START taking        Dose / Directions Last dose taken    cephALEXin 500 MG capsule   Commonly known as:  KEFLEX   Dose:  500 mg   Quantity:  28 capsule        Take 1 capsule (500 mg) by mouth 4 times daily for 7 days   Refills:  0        oxyCODONE IR 5 MG tablet   Commonly known as:  ROXICODONE   Dose:  5 mg   Quantity:  8 tablet        Take 1 tablet (5 mg) by mouth every 6 hours as needed for severe pain   Refills:  0          Our records show that you are taking the medicines listed below. If these are incorrect, please call your family doctor or clinic.        Dose / Directions Last dose taken    clonazePAM 0.5 MG tablet   Commonly known as:  klonoPIN   Dose:  0.25-0.5 mg   Quantity:  20 tablet        Take 0.5-1 tablets (0.25-0.5 mg) by mouth 2 times daily as needed for anxiety   Refills:  0        ibuprofen 600 MG tablet   Commonly known as:  ADVIL/MOTRIN    Dose:  600 mg   Quantity:  30 tablet        Take 1 tablet (600 mg) by mouth every 8 hours as needed for moderate pain   Refills:  0        linaclotide 145 MCG capsule   Commonly known as:  LINZESS   Dose:  145 mcg   Quantity:  30 capsule        Take 1 capsule (145 mcg) by mouth every morning (before breakfast)   Refills:  5        * lisdexamfetamine 10 MG capsule   Commonly known as:  VYVANSE   Dose:  10 mg   Quantity:  30 capsule        Take 1 capsule (10 mg) by mouth daily (before lunch)   Refills:  0        * lisdexamfetamine 40 MG capsule   Commonly known as:  VYVANSE   Dose:  40 mg   Quantity:  30 capsule        Take 1 capsule (40 mg) by mouth every morning   Refills:  0        MULTIVITAL PO   Dose:  1 chew tab        Take 1 chew tab by mouth daily Gummi   Refills:  0        polyethylene glycol powder   Commonly known as:  MIRALAX/GLYCOLAX   Dose:  17 g        Take 17 g by mouth daily as needed for constipation   Refills:  0        sennosides 8.6 MG tablet   Commonly known as:  SENOKOT   Dose:  2 tablet        Take 2 tablets by mouth 2 times daily   Refills:  0        UNABLE TO FIND   Dose:  2 teaspoonful        Take 2 teaspoonful by mouth At Bedtime Magnesium Calm   Refills:  0        * Notice:  This list has 2 medication(s) that are the same as other medications prescribed for you. Read the directions carefully, and ask your doctor or other care provider to review them with you.            Information about OPIOIDS     PRESCRIPTION OPIOIDS: WHAT YOU NEED TO KNOW   We gave you an opioid (narcotic) pain medicine. It is important to manage your pain, but opioids are not always the best choice. You should first try all the other options your care team gave you. Take this medicine for as short a time (and as few doses) as possible.    Some activities can increase your pain, such as bandage changes or therapy sessions. It may help to take your pain medicine 30 to 60 minutes before these activities. Reduce your  stress by getting enough sleep, working on hobbies you enjoy and practicing relaxation or meditation. Talk to your care team about ways to manage your pain beyond prescription opioids.    These medicines have risks:    DO NOT drive when on new or higher doses of pain medicine. These medicines can affect your alertness and reaction times, and you could be arrested for driving under the influence (DUI). If you need to use opioids long-term, talk to your care team about driving.    DO NOT operate heavy machinery    DO NOT do any other dangerous activities while taking these medicines.    DO NOT drink any alcohol while taking these medicines.     If the opioid prescribed includes acetaminophen, DO NOT take with any other medicines that contain acetaminophen. Read all labels carefully. Look for the word  acetaminophen  or  Tylenol.  Ask your pharmacist if you have questions or are unsure.    You can get addicted to pain medicines, especially if you have a history of addiction (chemical, alcohol or substance dependence). Talk to your care team about ways to reduce this risk.    All opioids tend to cause constipation. Drink plenty of water and eat foods that have a lot of fiber, such as fruits, vegetables, prune juice, apple juice and high-fiber cereal. Take a laxative (Miralax, milk of magnesia, Colace, Senna) if you don t move your bowels at least every other day. Other side effects include upset stomach, sleepiness, dizziness, throwing up, tolerance (needing more of the medicine to have the same effect), physical dependence and slowed breathing.    Store your pills in a secure place, locked if possible. We will not replace any lost or stolen medicine. If you don t finish your medicine, please throw away (dispose) as directed by your pharmacist. The [place] [employer]ution [employer] has more information about safe disposal: [email or web address]://[email or web address].[email or web address]-medications        Prescriptions were  sent or printed at these locations (2 Prescriptions)                   [facility], [place] - [address] [facility]   [address], [place] [address]    Telephone:  [phone]   Fax:  [phone]   Hours:                  E-Prescribed (1 of 2)         cephALEXin (KEFLEX) 500 MG capsule                 Printed at Department/Unit printer (1 of 2)         oxyCODONE IR (ROXICODONE) 5 MG tablet                Procedures and tests performed during your visit     Basic metabolic panel    CBC with platelets differential    Comprehensive metabolic panel    HCG qualitative pregnancy (blood)    Lipase    UA reflex to Microscopic and Culture    Urine Culture Aerobic Bacterial      Orders Needing Specimen Collection     None      Pending Results     Date and Time Order Name Status Description    11/20/2018 1518 Urine Culture Aerobic Bacterial In process     11/20/2018 1504 Basic metabolic panel In process             Pending Culture Results     Date and Time Order Name Status Description    11/20/2018 1518 Urine Culture Aerobic Bacterial In process             Pending Results Instructions     If you had any lab results that were not finalized at the time of your Discharge, you can call the ED Lab Result RN at [phone]. You will be contacted by this team for any positive Lab results or changes in treatment. The nurses are available 7 days a week from 10A to 6:30P.  You can leave a message 24 hours per day and they will return your call.        Test Results From Your Hospital Stay        11/20/2018  3:18 PM      Component Results     Component Value Ref Range & Units Status    WBC 8.4 4.0 - 11.0 10e9/L Final    RBC Count 4.95 3.8 - 5.2 10e12/L Final    Hemoglobin 15.1 11.7 - 15.7 g/dL Final    Hematocrit 44.7 35.0 - 47.0 % Final    MCV 90 78 - 100 fl Final    MCH 30.5 26.5 - 33.0 pg Final    MCHC 33.8 31.5 - 36.5 g/dL Final    RDW 11.8 10.0 - 15.0 % Final    Platelet Count 344 150 - 450 10e9/L Final     Diff Method Automated Method  Final    % Neutrophils 56.1 % Final    % Lymphocytes 29.8 % Final    % Monocytes 5.4 % Final    % Eosinophils 6.8 % Final    % Basophils 1.7 % Final    % Immature Granulocytes 0.2 % Final    Nucleated RBCs 0 0 /100 Final    Absolute Neutrophil 4.7 1.6 - 8.3 10e9/L Final    Absolute Lymphocytes 2.5 0.8 - 5.3 10e9/L Final    Absolute Monocytes 0.5 0.0 - 1.3 10e9/L Final    Absolute Eosinophils 0.6 0.0 - 0.7 10e9/L Final    Absolute Basophils 0.1 0.0 - 0.2 10e9/L Final    Abs Immature Granulocytes 0.0 0 - 0.4 10e9/L Final    Absolute Nucleated RBC 0.0  Final         11/20/2018  3:33 PM      Component Results     Component Value Ref Range & Units Status    Sodium 140 133 - 144 mmol/L Final    Potassium 3.5 3.4 - 5.3 mmol/L Final    Chloride 106 94 - 109 mmol/L Final    Carbon Dioxide 24 20 - 32 mmol/L Final    Anion Gap 10 3 - 14 mmol/L Final    Glucose 101 (H) 70 - 99 mg/dL Final    Urea Nitrogen 9 7 - 30 mg/dL Final    Creatinine 0.71 0.52 - 1.04 mg/dL Final    GFR Estimate >90 >60 mL/min/1.7m2 Final    Non  GFR Calc    GFR Estimate If Black >90 >60 mL/min/1.7m2 Final    African American GFR Calc    Calcium 9.0 8.5 - 10.1 mg/dL Final    Bilirubin Total 0.2 0.2 - 1.3 mg/dL Final    Albumin 4.0 3.4 - 5.0 g/dL Final    Protein Total 7.5 6.8 - 8.8 g/dL Final    Alkaline Phosphatase 56 40 - 150 U/L Final    ALT 20 0 - 50 U/L Final    AST 19 0 - 45 U/L Final         11/20/2018  3:05 PM         11/20/2018  3:31 PM      Component Results     Component Value Ref Range & Units Status    Lipase 90 73 - 393 U/L Final         11/20/2018  4:04 PM      Component Results     Component Value Ref Range & Units Status    Color Urine Leticia  Final    Appearance Urine Slightly Cloudy  Final    Glucose Urine Negative NEG^Negative mg/dL Final    Bilirubin Urine Negative NEG^Negative Final    Ketones Urine 5 (A) NEG^Negative mg/dL Final    Specific Gravity Urine 1.021 1.003 - 1.035 Final    Blood  Urine Negative NEG^Negative Final    pH Urine 5.0 5.0 - 7.0 pH Final    Protein Albumin Urine 30 (A) NEG^Negative mg/dL Final    Urobilinogen mg/dL 0.0 0.0 - 2.0 mg/dL Final    Nitrite Urine Positive (A) NEG^Negative Final    Leukocyte Esterase Urine Large (A) NEG^Negative Final    Source Midstream Urine  Final    RBC Urine 22 (H) 0 - 2 /HPF Final    WBC Urine 170 (H) 0 - 5 /HPF Final    WBC Clumps Present (A) NEG^Negative /HPF Final    Bacteria Urine Moderate (A) NEG^Negative /HPF Final    Squamous Epithelial /HPF Urine 1 0 - 1 /HPF Final    Mucous Urine Present (A) NEG^Negative /LPF Final         11/20/2018  3:26 PM      Component Results     Component Value Ref Range & Units Status    HCG Qualitative Serum Negative NEG^Negative Final    This test is for screening purposes.  Results should be interpreted along with   the clinical picture.  Confirmation testing is available if warranted by   ordering ZFL034, HCG Quantitative Pregnancy.           11/20/2018  4:09 PM                Thank you for choosing [facility]       Thank you for choosing [facility] for your care. Our goal is always to provide you with excellent care. Hearing back from our patients is one way we can continue to improve our services. Please take a few minutes to complete the written survey that you may receive in the mail after you visit with us. Thank you!        [facility] Information     [facility] gives you secure access to your electronic health record. If you see a primary care provider, you can also send messages to your care team and make appointments. If you have questions, please call your primary care clinic.  If you do not have a primary care provider, please call [phone] and they will assist you.        Care EveryWhere ID     This is your Care EveryWhere ID. This could be used by other organizations to access your [facility] medical records  [id number]        Equal Access to Services     ANA BLACK: Elva Cervantes  geovanni gutiérrez, leela berman. So [facility] [phone].    ATENCIÓN: Si habla español, tiene a goldsmith disposición servicios gratuitos de asistencia lingüística. Llame al [phone].    We comply with applicable federal civil rights laws and [place] laws. We do not discriminate on the basis of race, color, national origin, age, disability, sex, sexual orientation, or gender identity.            After Visit Summary       This is your record. Keep this with you and show to your community pharmacist(s) and doctor(s) at your next visit.

## 2018-11-20 NOTE — TELEPHONE ENCOUNTER
Called patient. She states her parents are on their way from [place] to assist her or provide a ride.    Pt reports/confirmes she recently has been reporting doing well.  Until last night she took a turn with low grade fever and significant pain that can spike to 10 out of 10. Pt is audible struggling to speak thru the pain at time of call.  Pt reports getting slightly SOA and doubled over and becoming non functioning.  Advised pt needs ER Eval and recommended considering calling an ambulance and not waiting for parents to arrive.  Pt states understanding and seems she may call ambulance.  Advised I would let Dr Jackson that she has become ill and will be in ER to assess.    Anum Gonzalez RN  [facility]

## 2018-11-20 NOTE — TELEPHONE ENCOUNTER
Reason for Call:  Other call back    Detailed comments: pt calling stating she had surgery on 11/6, she is in a lot of pain and not feeling well. Running low grade fever 99.3. Would like to know if she should come in?     Phone Number Patient can be reached at: Home number on file [phone] (home)    Best Time: any     Can we leave a detailed message on this number? YES    Call taken on 11/20/2018 at 10:53 AM by Daylin Bernstein

## 2018-11-21 LAB
BACTERIA SPEC CULT: ABNORMAL
Lab: ABNORMAL
SPECIMEN SOURCE: ABNORMAL

## 2018-11-23 ENCOUNTER — TELEPHONE (OUTPATIENT)
Dept: EMERGENCY MEDICINE | Facility: CLINIC | Age: 33
End: 2018-11-23

## 2018-11-23 NOTE — TELEPHONE ENCOUNTER
[facility] Emergency Department Lab result notification:    Reason for call  Notify of lab results, assess symptoms,  review ED providers recommendations (if necessary) and advise per ED lab result f/u protocol.    Lab result  Final Urine Culture Report on 11/21/18  Emergency Dept discharge antibiotic prescribed: Cephalexin (Keflex) 500 mg capsule, 1 capsule (500 mg) by mouth 4 times daily for 7 days.  #1. Bacteria, >100,000 colonies/mL Escherichia coli, is SUSCEPTIBLE to Antibiotic.    As per [facility] ED Lab Result protocol, no change in antibiotic therapy.    Left voicemail message requesting a call back to [phone] between 10 a.m. and 6:30 p.m. for patient's ED/UC lab results.    PCP follow-up Questions asked: YES       Flor Walsh RN    [facility] RN  Lung Nodule and ED Lab Results F/U RN  Epic pool (ED late result f/u RN) : [phone]   # [phone]

## 2018-11-27 ENCOUNTER — OFFICE VISIT (OUTPATIENT)
Dept: SURGERY | Facility: CLINIC | Age: 33
End: 2018-11-27
Payer: COMMERCIAL

## 2018-11-27 VITALS
TEMPERATURE: 97.7 F | BODY MASS INDEX: 17.52 KG/M2 | DIASTOLIC BLOOD PRESSURE: 63 MMHG | SYSTOLIC BLOOD PRESSURE: 112 MMHG | HEIGHT: 66 IN | HEART RATE: 96 BPM | WEIGHT: 109 LBS

## 2018-11-27 DIAGNOSIS — Z90.81 H/O SPLENECTOMY: Primary | ICD-10-CM

## 2018-11-27 PROCEDURE — 99024 POSTOP FOLLOW-UP VISIT: CPT | Performed by: SURGERY

## 2018-11-27 NOTE — PATIENT INSTRUCTIONS
1. Obtain vaccination for pneumonia at your PCP office  2. Monitor for recurrent fevers >100.4 degrees, you need emergent evaluation  3. Gradually increase your activity.  4. You need repeat vaccination for pneumonia every 5 years.

## 2018-11-27 NOTE — PROGRESS NOTES
"General Surgery Post Op    Pt returns for follow up visit s/p laparoscopic splenectomy on 11/6/2018 .    Patient has been doing well, tolerating diet. Bowels moving well. No issues with wound healing/redness/drainage. No fevers.      She did visit ED last week with lower abdominal pain.  She was evaluated and found to have a urinary tract infection growing E. Coli >100,000 CFUs sensitive to Keflex which she has been placed on.  Her pain has improved, though states this is similar to her usual menstrual cramps which she is expecting any day now.   She also admits fatigue.    Physical exam: Vitals: /63 (BP Location: Right arm, Patient Position: Sitting, Cuff Size: Adult Regular)  Pulse 96  Temp 97.7  F (36.5  C) (Oral)  Ht 1.664 m (5' 5.5\")  Wt 49.4 kg (109 lb)  BMI 17.86 kg/m2  BMI= Body mass index is 17.86 kg/(m^2).    Exam:  Constitutional: healthy, alert and no distress  Cardiovascular: PMI normal. No lifts, heaves, or thrills. RRR. No murmurs, clicks gallops or rub  Respiratory: Percussion normal. Good diaphragmatic excursion. Lungs clear  Gastrointestinal: Abdomen soft, non-tender. BS normal. No masses, organomegaly  Incisions C/D/I.  No evidence of hernia    Lab Results   Component Value Date    WBC 8.4 11/20/2018     Lab Results   Component Value Date    RBC 4.95 11/20/2018     Lab Results   Component Value Date    HGB 15.1 11/20/2018     Lab Results   Component Value Date    HCT 44.7 11/20/2018     Lab Results   Component Value Date    MCV 90 11/20/2018     Lab Results   Component Value Date    MCH 30.5 11/20/2018     Lab Results   Component Value Date    MCHC 33.8 11/20/2018     Lab Results   Component Value Date    RDW 11.8 11/20/2018     Lab Results   Component Value Date     11/20/2018     Path:  FINAL DIAGNOSIS:   Spleen, laparoscopic splenectomy   - Morphologic findings consistent with clinical history of idiopathic   thrombocytopenic purpura.:   - Focal well circumscribed fibrous " nodule with central calcifications.   - Negative for malignancy.     Assessment:     ICD-10-CM    1. H/O splenectomy Z90.81 Pneumococcal vaccine 23 valent PPSV23  (Pneumovax) [47292]     Plan: Mrs. Leung presents 3 weeks post-op from laparoscopic splenectomy for ITP.  She has had appropriate platelet response.  Overall she is doing quite well.  She will require the PCV-23 pneumococcal vaccination which she will get at her PCP office.  She will need boosters every 5 years from here on out.  I discussed overwhelming post splenectomy infection and need for prompt evaluation at her PCP or ED if she experiences fevers in the future.  She again expressed understanding.    At this point, she can gradually expect improvement in her fatigue and encouraged her to sequentially increase activity over the next few weeks.  She can follow-up with me on an as needed basis or if any complications arise.    Anthony Jackson, DO on 11/27/2018 at 9:41 AM

## 2018-11-27 NOTE — NURSING NOTE
"Initial /63 (BP Location: Right arm, Patient Position: Sitting, Cuff Size: Adult Regular)  Pulse 96  Temp 97.7  F (36.5  C) (Oral)  Ht 1.664 m (5' 5.5\")  Wt 49.4 kg (109 lb)  BMI 17.86 kg/m2 Estimated body mass index is 17.86 kg/(m^2) as calculated from the following:    Height as of this encounter: 1.664 m (5' 5.5\").    Weight as of this encounter: 49.4 kg (109 lb). .    Fadumo López MA    "

## 2018-11-27 NOTE — LETTER
"    11/27/2018         RE: Merissa Leung  [address] [facility] [address]        Dear Colleague,    Thank you for referring your patient, Merissa Leung, to the [facility]. Please see a copy of my visit note below.    General Surgery Post Op    Pt returns for follow up visit s/p laparoscopic splenectomy on 11/6/2018 .    Patient has been doing well, tolerating diet. Bowels moving well. No issues with wound healing/redness/drainage. No fevers.      She did visit ED last week with lower abdominal pain.  She was evaluated and found to have a urinary tract infection growing E. Coli >100,000 CFUs sensitive to Keflex which she has been placed on.  Her pain has improved, though states this is similar to her usual menstrual cramps which she is expecting any day now.   She also admits fatigue.    Physical exam: Vitals: /63 (BP Location: Right arm, Patient Position: Sitting, Cuff Size: Adult Regular)  Pulse 96  Temp 97.7  F (36.5  C) (Oral)  Ht 1.664 m (5' 5.5\")  Wt 49.4 kg (109 lb)  BMI 17.86 kg/m2  BMI= Body mass index is 17.86 kg/(m^2).    Exam:  Constitutional: healthy, alert and no distress  Cardiovascular: PMI normal. No lifts, heaves, or thrills. RRR. No murmurs, clicks gallops or rub  Respiratory: Percussion normal. Good diaphragmatic excursion. Lungs clear  Gastrointestinal: Abdomen soft, non-tender. BS normal. No masses, organomegaly  Incisions C/D/I.  No evidence of hernia    Lab Results   Component Value Date    WBC 8.4 11/20/2018     Lab Results   Component Value Date    RBC 4.95 11/20/2018     Lab Results   Component Value Date    HGB 15.1 11/20/2018     Lab Results   Component Value Date    HCT 44.7 11/20/2018     Lab Results   Component Value Date    MCV 90 11/20/2018     Lab Results   Component Value Date    MCH 30.5 11/20/2018     Lab Results   Component Value Date    MCHC 33.8 11/20/2018     Lab Results   Component Value Date    RDW 11.8 11/20/2018     Lab Results   Component " Value Date     11/20/2018     Path:  FINAL DIAGNOSIS:   Spleen, laparoscopic splenectomy   - Morphologic findings consistent with clinical history of idiopathic   thrombocytopenic purpura.:   - Focal well circumscribed fibrous nodule with central calcifications.   - Negative for malignancy.     Assessment:     ICD-10-CM    1. H/O splenectomy Z90.81 Pneumococcal vaccine 23 valent PPSV23  (Pneumovax) [34802]     Plan: Mrs. Leung presents 3 weeks post-op from laparoscopic splenectomy for ITP.  She has had appropriate platelet response.  Overall she is doing quite well.  She will require the PCV-23 pneumococcal vaccination which she will get at her PCP office.  She will need boosters every 5 years from here on out.  I discussed overwhelming post splenectomy infection and need for prompt evaluation at her PCP or ED if she experiences fevers in the future.  She again expressed understanding.    At this point, she can gradually expect improvement in her fatigue and encouraged her to sequentially increase activity over the next few weeks.  She can follow-up with me on an as needed basis or if any complications arise.    Anthony Jackson,  on 11/27/2018 at 9:41 AM        Again, thank you for allowing me to participate in the care of your patient.        Sincerely,        Anthony Jackson DO

## 2018-11-27 NOTE — MR AVS SNAPSHOT
After Visit Summary   11/27/2018    Merissa Leung    MRN: [id number]           Patient Information     Date Of Birth          1985        Visit Information        Provider Department      11/27/2018 9:30 AM Anthony Jackson, DO [facility]        Today's Diagnoses     H/O splenectomy    -  1      Care Instructions    1. Obtain vaccination for pneumonia at your PCP office  2. Monitor for recurrent fevers >100.4 degrees, you need emergent evaluation  3. Gradually increase your activity.  4. You need repeat vaccination for pneumonia every 5 years.          Follow-ups after your visit        Follow-up notes from your care team     Return if symptoms worsen or fail to improve.      Your next 10 appointments already scheduled     Nov 30, 2018  2:45 PM CST   PHYSICAL with Carola Sanchez MD   [facility] ([facility])    [address] [address]   [phone]            Dec 03, 2018  5:30 PM CST   LAB with PI LAB   [facility] ([facility])    [address] [id number] [phone]           Please do not eat 10-12 hours before your appointment if you are coming in fasting for labs on lipids, cholesterol, or glucose (sugar). This does not apply to pregnant women. Water, hot tea and black coffee (with nothing added) are okay. Do not drink other fluids, diet soda or chew gum.              Who to contact     If you have questions or need follow up information about today's clinic visit or your schedule please contact [facility] directly at [phone].  Normal or non-critical lab and imaging results will be communicated to you by MyChart, letter or phone within 4 business days after the clinic has received the results. If you do not hear from us within 7 days, please contact the clinic through MyChart or phone. If you have a critical or abnormal lab result, we  "will notify you by phone as soon as possible.  Submit refill requests through [employer]mi[employer] or call your pharmacy and they will forward the refill request to us. Please allow 3 business days for your refill to be completed.          Additional Information About Your Visit        Pinion.gghart Information     Omiro gives you secure access to your electronic health record. If you see a primary care provider, you can also send messages to your care team and make appointments. If you have questions, please call your primary care clinic.  If you do not have a primary care provider, please call [phone] and they will assist you.        Care EveryWhere ID     This is your Care EveryWhere ID. This could be used by other organizations to access your [facility] medical records  [id number]        Your Vitals Were     Pulse Temperature Height BMI (Body Mass Index)          96 97.7  F (36.5  C) (Oral) 1.664 m (5' 5.5\") 17.86 kg/m2         Blood Pressure from Last 3 Encounters:   11/27/18 112/63   11/20/18 111/76   11/08/18 101/57    Weight from Last 3 Encounters:   11/27/18 49.4 kg (109 lb)   11/20/18 49.4 kg (109 lb)   10/26/18 49.9 kg (110 lb)              We Performed the Following     Pneumococcal vaccine 23 valent PPSV23  (Pneumovax) [32937]        Primary Care Provider Office Phone # Fax #    Mohini MccordRAHEL [phone]       [address] [id number]        Equal Access to Services     [facility]: Hadii aad ku hadasho Soomaali, waaxda luqadaha, qaybta kaalmada adeegyada, leela us . So [facility] [phone].    ATENCIÓN: Si habla español, tiene a goldsmith disposición servicios gratuitos de asistencia lingüística. Llame al [phone].    We comply with applicable federal civil rights laws and [place] laws. We do not discriminate on the basis of race, color, national origin, age, disability, sex, sexual orientation, or gender identity.            Thank you!     Thank you " for choosing [facility]  for your care. Our goal is always to provide you with excellent care. Hearing back from our patients is one way we can continue to improve our services. Please take a few minutes to complete the written survey that you may receive in the mail after your visit with us. Thank you!             Your Updated Medication List - Protect others around you: Learn how to safely use, store and throw away your medicines at www.disposemymeds.org.          This list is accurate as of 11/27/18  2:43 PM.  Always use your most recent med list.                   Brand Name Dispense Instructions for use Diagnosis    cephALEXin 500 MG capsule    KEFLEX    28 capsule    Take 1 capsule (500 mg) by mouth 4 times daily for 7 days        clonazePAM 0.5 MG tablet    klonoPIN    20 tablet    Take 0.5-1 tablets (0.25-0.5 mg) by mouth 2 times daily as needed for anxiety    EDWARD (generalized anxiety disorder)       ibuprofen 600 MG tablet    ADVIL/MOTRIN    30 tablet    Take 1 tablet (600 mg) by mouth every 8 hours as needed for moderate pain    Immune thrombocytopenic purpura (H)       linaclotide 145 MCG capsule    LINZESS    30 capsule    Take 1 capsule (145 mcg) by mouth every morning (before breakfast)    Irritable bowel syndrome with constipation       * lisdexamfetamine 10 MG capsule    VYVANSE    30 capsule    Take 1 capsule (10 mg) by mouth daily (before lunch)    Binge eating disorder       * lisdexamfetamine 40 MG capsule    VYVANSE    30 capsule    Take 1 capsule (40 mg) by mouth every morning    Binge eating disorder       MULTIVITAL PO      Take 1 chew tab by mouth daily Gummi        oxyCODONE 5 MG tablet    ROXICODONE    8 tablet    Take 1 tablet (5 mg) by mouth every 6 hours as needed for severe pain        polyethylene glycol powder    MIRALAX/GLYCOLAX     Take 17 g by mouth daily as needed for constipation        sennosides 8.6 MG tablet    SENOKOT     Take 2 tablets by mouth 2 times daily         UNABLE TO FIND      Take 2 teaspoonful by mouth At Bedtime Magnesium Calm        * Notice:  This list has 2 medication(s) that are the same as other medications prescribed for you. Read the directions carefully, and ask your doctor or other care provider to review them with you.

## 2018-11-30 ENCOUNTER — OFFICE VISIT (OUTPATIENT)
Dept: OBGYN | Facility: CLINIC | Age: 33
End: 2018-11-30
Payer: COMMERCIAL

## 2018-11-30 VITALS
WEIGHT: 106 LBS | HEIGHT: 66 IN | RESPIRATION RATE: 16 BRPM | HEART RATE: 92 BPM | DIASTOLIC BLOOD PRESSURE: 73 MMHG | SYSTOLIC BLOOD PRESSURE: 108 MMHG | TEMPERATURE: 98.2 F | BODY MASS INDEX: 17.04 KG/M2

## 2018-11-30 DIAGNOSIS — Z11.51 SCREENING FOR HUMAN PAPILLOMAVIRUS: ICD-10-CM

## 2018-11-30 DIAGNOSIS — N94.6 DYSMENORRHEA: ICD-10-CM

## 2018-11-30 DIAGNOSIS — Z01.419 ENCOUNTER FOR GYNECOLOGICAL EXAMINATION WITHOUT ABNORMAL FINDING: Primary | ICD-10-CM

## 2018-11-30 PROBLEM — D69.6 THROMBOCYTOPENIA (H): Status: RESOLVED | Noted: 2017-11-09 | Resolved: 2018-11-30

## 2018-11-30 PROBLEM — Z34.80 PRENATAL CARE, SUBSEQUENT PREGNANCY: Status: RESOLVED | Noted: 2017-07-20 | Resolved: 2018-11-30

## 2018-11-30 PROBLEM — D69.3 IMMUNE THROMBOCYTOPENIA (H): Status: RESOLVED | Noted: 2017-11-29 | Resolved: 2018-11-30

## 2018-11-30 PROCEDURE — G0145 SCR C/V CYTO,THINLAYER,RESCR: HCPCS | Performed by: OBSTETRICS & GYNECOLOGY

## 2018-11-30 PROCEDURE — 87624 HPV HI-RISK TYP POOLED RSLT: CPT | Performed by: OBSTETRICS & GYNECOLOGY

## 2018-11-30 PROCEDURE — 99395 PREV VISIT EST AGE 18-39: CPT | Mod: 24 | Performed by: OBSTETRICS & GYNECOLOGY

## 2018-11-30 RX ORDER — DIAZEPAM 5 MG
5 TABLET ORAL EVERY 12 HOURS PRN
Qty: 20 TABLET | Refills: 1 | Status: SHIPPED | OUTPATIENT
Start: 2018-11-30 | End: 2019-10-24

## 2018-11-30 NOTE — MR AVS SNAPSHOT
After Visit Summary   11/30/2018    Merissa Leung    MRN: [id number]           Patient Information     Date Of Birth          1985        Visit Information        Provider Department      11/30/2018 2:45 PM Carola Sanchez MD [facility]        Today's Diagnoses     Encounter for gynecological examination without abnormal finding    -  1    Dysmenorrhea        Screening for human papillomavirus          Care Instructions      Preventive Health Recommendations  Female Ages 26 - 39  Yearly exam:   See your health care provider every year in order to    Review health changes.     Discuss preventive care.      Review your medicines if you your doctor has prescribed any.    Until age 30: Get a Pap test every three years (more often if you have had an abnormal result).    After age 30: Talk to your doctor about whether you should have a Pap test every 3 years or have a Pap test with HPV screening every 5 years.   You do not need a Pap test if your uterus was removed (hysterectomy) and you have not had cancer.  You should be tested each year for STDs (sexually transmitted diseases), if you're at risk.   Talk to your provider about how often to have your cholesterol checked.  If you are at risk for diabetes, you should have a diabetes test (fasting glucose).  Shots: Get a flu shot each year. Get a tetanus shot every 10 years.   Nutrition:     Eat at least 5 servings of fruits and vegetables each day.    Eat whole-grain bread, whole-wheat pasta and brown rice instead of white grains and rice.    Get adequate Calcium and Vitamin D.     Lifestyle    Exercise at least 150 minutes a week (30 minutes a day, 5 days of the week). This will help you control your weight and prevent disease.    Limit alcohol to one drink per day.    No smoking.     Wear sunscreen to prevent skin cancer.    See your dentist every six months for an exam and cleaning.            Follow-ups after your visit         Your next 10 appointments already scheduled     Dec 03, 2018  5:30 PM CST   LAB with PI LAB   [facility]    [address] [address]   [phone]           Please do not eat 10-12 hours before your appointment if you are coming in fasting for labs on lipids, cholesterol, or glucose (sugar). This does not apply to pregnant women. Water, hot tea and black coffee (with nothing added) are okay. Do not drink other fluids, diet soda or chew gum.              Who to contact     If you have questions or need follow up information about today's clinic visit or your schedule please contact [facility] directly at [phone].  Normal or non-critical lab and imaging results will be communicated to you by POS on CLOUDhart, letter or phone within 4 business days after the clinic has received the results. If you do not hear from us within 7 days, please contact the clinic through [employer] or phone. If you have a critical or abnormal lab result, we will notify you by phone as soon as possible.  Submit refill requests through [employer] or call your pharmacy and they will forward the refill request to us. Please allow 3 business days for your refill to be completed.          Additional Information About Your Visit        [employer] Information     [employer] gives you secure access to your electronic health record. If you see a primary care provider, you can also send messages to your care team and make appointments. If you have questions, please call your primary care clinic.  If you do not have a primary care provider, please call [phone] and they will assist you.        Care EveryWhere ID     This is your Care EveryWhere ID. This could be used by other organizations to access your [place] medical records  [id number]        Your Vitals Were     Pulse Temperature Respirations Height Last Period BMI (Body Mass Index)    92 98.2  F (36.8  C) (Tympanic) 16 5'  "5.5\" (1.664 m) 09/20/2018 17.37 kg/m2       Blood Pressure from Last 3 Encounters:   11/30/18 108/73   11/27/18 112/63   11/20/18 111/76    Weight from Last 3 Encounters:   11/30/18 106 lb (48.1 kg)   11/27/18 109 lb (49.4 kg)   11/20/18 109 lb (49.4 kg)              We Performed the Following     HPV High Risk Types DNA Cervical     Pap imaged thin layer screen with HPV - recommended age 30 - 65 years (select HPV order below)          Today's Medication Changes          These changes are accurate as of 11/30/18  3:38 PM.  If you have any questions, ask your nurse or doctor.               Start taking these medicines.        Dose/Directions    diazepam 5 MG tablet   Commonly known as:  VALIUM   Used for:  Dysmenorrhea   Started by:  Carola Sanchez MD        Dose:  5 mg   Take 1 tablet (5 mg) by mouth every 12 hours as needed for muscle spasms   Quantity:  20 tablet   Refills:  1            Where to get your medicines      Some of these will need a paper prescription and others can be bought over the counter.  Ask your nurse if you have questions.     Bring a paper prescription for each of these medications     diazepam 5 MG tablet                Primary Care Provider Office Phone # Fax #    Mohini Mccord [phone]       [address] [place] [address]        Equal Access to Services     ANA GARCIA AH: Elva razoo Soandrea, waaxda luqadaha, qaybta kaalmada kathy, leela smith. So [facility] [phone].    ATENCIÓN: Si habla español, tiene a goldsmith disposición servicios gratuitos de asistencia lingüística. Konrad al [phone].    We comply with applicable federal civil rights laws and [place] laws. We do not discriminate on the basis of race, color, national origin, age, disability, sex, sexual orientation, or gender identity.            Thank you!     Thank you for choosing [facility]  for your care. Our goal is always to " provide you with excellent care. Hearing back from our patients is one way we can continue to improve our services. Please take a few minutes to complete the written survey that you may receive in the mail after your visit with us. Thank you!             Your Updated Medication List - Protect others around you: Learn how to safely use, store and throw away your medicines at www.disposemymeds.org.          This list is accurate as of 11/30/18  3:38 PM.  Always use your most recent med list.                   Brand Name Dispense Instructions for use Diagnosis    clonazePAM 0.5 MG tablet    klonoPIN    20 tablet    Take 0.5-1 tablets (0.25-0.5 mg) by mouth 2 times daily as needed for anxiety    EDWARD (generalized anxiety disorder)       diazepam 5 MG tablet    VALIUM    20 tablet    Take 1 tablet (5 mg) by mouth every 12 hours as needed for muscle spasms    Dysmenorrhea       ibuprofen 600 MG tablet    ADVIL/MOTRIN    30 tablet    Take 1 tablet (600 mg) by mouth every 8 hours as needed for moderate pain    Immune thrombocytopenic purpura (H)       linaclotide 145 MCG capsule    LINZESS    30 capsule    Take 1 capsule (145 mcg) by mouth every morning (before breakfast)    Irritable bowel syndrome with constipation       * lisdexamfetamine 10 MG capsule    VYVANSE    30 capsule    Take 1 capsule (10 mg) by mouth daily (before lunch)    Binge eating disorder       * lisdexamfetamine 40 MG capsule    VYVANSE    30 capsule    Take 1 capsule (40 mg) by mouth every morning    Binge eating disorder       MULTIVITAL PO      Take 1 chew tab by mouth daily Gummi        oxyCODONE 5 MG tablet    ROXICODONE    8 tablet    Take 1 tablet (5 mg) by mouth every 6 hours as needed for severe pain        polyethylene glycol powder    MIRALAX/GLYCOLAX     Take 17 g by mouth daily as needed for constipation        sennosides 8.6 MG tablet    SENOKOT     Take 2 tablets by mouth 2 times daily        UNABLE TO FIND      Take 2 teaspoonful by  mouth At Bedtime Magnesium Calm        * Notice:  This list has 2 medication(s) that are the same as other medications prescribed for you. Read the directions carefully, and ask your doctor or other care provider to review them with you.
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
## 2018-12-03 ENCOUNTER — ALLIED HEALTH/NURSE VISIT (OUTPATIENT)
Dept: FAMILY MEDICINE | Facility: CLINIC | Age: 33
End: 2018-12-03
Payer: COMMERCIAL

## 2018-12-03 DIAGNOSIS — D69.3 IDIOPATHIC THROMBOCYTOPENIC PURPURA (H): ICD-10-CM

## 2018-12-03 DIAGNOSIS — Z23 NEED FOR PROPHYLACTIC VACCINATION AND INOCULATION AGAINST INFLUENZA: Primary | ICD-10-CM

## 2018-12-03 DIAGNOSIS — F50.819 BINGE EATING DISORDER: ICD-10-CM

## 2018-12-03 LAB
BASOPHILS # BLD AUTO: 0.1 10E9/L (ref 0–0.2)
BASOPHILS NFR BLD AUTO: 0.9 %
DIFFERENTIAL METHOD BLD: NORMAL
EOSINOPHIL # BLD AUTO: 0.2 10E9/L (ref 0–0.7)
EOSINOPHIL NFR BLD AUTO: 2.6 %
ERYTHROCYTE [DISTWIDTH] IN BLOOD BY AUTOMATED COUNT: 12.1 % (ref 10–15)
HCT VFR BLD AUTO: 39.4 % (ref 35–47)
HGB BLD-MCNC: 13.9 G/DL (ref 11.7–15.7)
LYMPHOCYTES # BLD AUTO: 3.4 10E9/L (ref 0.8–5.3)
LYMPHOCYTES NFR BLD AUTO: 48.2 %
MCH RBC QN AUTO: 30.5 PG (ref 26.5–33)
MCHC RBC AUTO-ENTMCNC: 35.3 G/DL (ref 31.5–36.5)
MCV RBC AUTO: 86 FL (ref 78–100)
MONOCYTES # BLD AUTO: 0.7 10E9/L (ref 0–1.3)
MONOCYTES NFR BLD AUTO: 9.4 %
NEUTROPHILS # BLD AUTO: 2.7 10E9/L (ref 1.6–8.3)
NEUTROPHILS NFR BLD AUTO: 38.9 %
PLATELET # BLD AUTO: 224 10E9/L (ref 150–450)
RBC # BLD AUTO: 4.56 10E12/L (ref 3.8–5.2)
WBC # BLD AUTO: 7 10E9/L (ref 4–11)

## 2018-12-03 PROCEDURE — 85025 COMPLETE CBC W/AUTO DIFF WBC: CPT | Performed by: INTERNAL MEDICINE

## 2018-12-03 PROCEDURE — 36415 COLL VENOUS BLD VENIPUNCTURE: CPT | Performed by: INTERNAL MEDICINE

## 2018-12-03 PROCEDURE — 99207 ZZC NO CHARGE NURSE ONLY: CPT

## 2018-12-03 PROCEDURE — 90686 IIV4 VACC NO PRSV 0.5 ML IM: CPT

## 2018-12-03 PROCEDURE — 90471 IMMUNIZATION ADMIN: CPT

## 2018-12-03 NOTE — MR AVS SNAPSHOT
After Visit Summary   12/3/2018    Merissa Leung    MRN: [id number]           Patient Information     Date Of Birth          1985        Visit Information        Provider Department      12/3/2018 3:00 PM FL PI TATI/LPN [facility]        Today's Diagnoses     Need for prophylactic vaccination and inoculation against influenza    -  1       Follow-ups after your visit        Your next 10 appointments already scheduled     Dec 03, 2018  5:30 PM CST   LAB with [facility] LAB   [facility]    [address] [facility] [id number] [phone]           Please do not eat 10-12 hours before your appointment if you are coming in fasting for labs on lipids, cholesterol, or glucose (sugar). This does not apply to pregnant women. Water, hot tea and black coffee (with nothing added) are okay. Do not drink other fluids, diet soda or chew gum.              Who to contact     If you have questions or need follow up information about today's clinic visit or your schedule please contact [facility] directly at [phone].  Normal or non-critical lab and imaging results will be communicated to you by OnSwipehart, letter or phone within 4 business days after the clinic has received the results. If you do not hear from us within 7 days, please contact the clinic through [employer] or phone. If you have a critical or abnormal lab result, we will notify you by phone as soon as possible.  Submit refill requests through Mind Candy or call your pharmacy and they will forward the refill request to us. Please allow 3 business days for your refill to be completed.          Additional Information About Your Visit        OnSwipehart Information     Mind Candy gives you secure access to your electronic health record. If you see a primary care provider, you can also send messages to your care team and make appointments. If you have questions, please call your  primary care clinic.  If you do not have a primary care provider, please call [phone] and they will assist you.        Care EveryWhere ID     This is your Care EveryWhere ID. This could be used by other organizations to access your [place] medical records  [id number]         Blood Pressure from Last 3 Encounters:   11/30/18 108/73   11/27/18 112/63   11/20/18 111/76    Weight from Last 3 Encounters:   11/30/18 106 lb (48.1 kg)   11/27/18 109 lb (49.4 kg)   11/20/18 109 lb (49.4 kg)              We Performed the Following     FLU VACCINE, SPLIT VIRUS, IM (QUADRIVALENT) [06773]- >3 YRS     Vaccine Administration, Initial [06685]        Primary Care Provider Office Phone # Fax #    Mohini Mccord, [phone]       [address] [facility] [address]        Equal Access to Services     ANA GARCIA : Hadii aad ku hadasho Soomaali, waaxda luqadaha, qaybta kaalmada adeegyada, waxay deshaunin haycristin marilee us . So [facility] [phone].    ATENCIÓN: Si habla español, tiene a goldsmith disposición servicios gratuitos de asistencia lingüística. Llame al [phone].    We comply with applicable federal civil rights laws and [place] laws. We do not discriminate on the basis of race, color, national origin, age, disability, sex, sexual orientation, or gender identity.            Thank you!     Thank you for choosing [facility]  for your care. Our goal is always to provide you with excellent care. Hearing back from our patients is one way we can continue to improve our services. Please take a few minutes to complete the written survey that you may receive in the mail after your visit with us. Thank you!             Your Updated Medication List - Protect others around you: Learn how to safely use, store and throw away your medicines at www.disposemymeds.org.          This list is accurate as of 12/3/18  3:18 PM.  Always use your most recent med list.                   Brand Name  Dispense Instructions for use Diagnosis    clonazePAM 0.5 MG tablet    klonoPIN    20 tablet    Take 0.5-1 tablets (0.25-0.5 mg) by mouth 2 times daily as needed for anxiety    EDWARD (generalized anxiety disorder)       diazepam 5 MG tablet    VALIUM    20 tablet    Take 1 tablet (5 mg) by mouth every 12 hours as needed for muscle spasms    Dysmenorrhea       ibuprofen 600 MG tablet    ADVIL/MOTRIN    30 tablet    Take 1 tablet (600 mg) by mouth every 8 hours as needed for moderate pain    Immune thrombocytopenic purpura (H)       linaclotide 145 MCG capsule    LINZESS    30 capsule    Take 1 capsule (145 mcg) by mouth every morning (before breakfast)    Irritable bowel syndrome with constipation       * lisdexamfetamine 10 MG capsule    VYVANSE    30 capsule    Take 1 capsule (10 mg) by mouth daily (before lunch)    Binge eating disorder       * lisdexamfetamine 40 MG capsule    VYVANSE    30 capsule    Take 1 capsule (40 mg) by mouth every morning    Binge eating disorder       MULTIVITAL PO      Take 1 chew tab by mouth daily Gummi        oxyCODONE 5 MG tablet    ROXICODONE    8 tablet    Take 1 tablet (5 mg) by mouth every 6 hours as needed for severe pain        polyethylene glycol powder    MIRALAX/GLYCOLAX     Take 17 g by mouth daily as needed for constipation        sennosides 8.6 MG tablet    SENOKOT     Take 2 tablets by mouth 2 times daily        UNABLE TO FIND      Take 2 teaspoonful by mouth At Bedtime Magnesium Calm        * Notice:  This list has 2 medication(s) that are the same as other medications prescribed for you. Read the directions carefully, and ask your doctor or other care provider to review them with you.

## 2018-12-03 NOTE — PROGRESS NOTES

## 2018-12-04 NOTE — TELEPHONE ENCOUNTER
Disp Refills Start End JAD               lisdexamfetamine (VYVANSE) 10 MG capsule    Last Written Prescription Date:  10/6/18  Last Fill Quantity: 30,   # refills: 0  Last Office Visit: 10/26/18  Future Office visit:       Routing refill request to provider for review/approval because:  Drug not on the [facility], [facility] or [facility] refill protocol or controlled substance      lisdexamfetamine (VYVANSE) 40 MG capsule      Last Written Prescription Date:  10/06/18  Last Fill Quantity: 30,   # refills: 0  Last Office Visit: 10/26/18  Future Office visit:       Routing refill request to provider for review/approval because:  Drug not on the [facility], [facility] or [facility] refill protocol or controlled substance

## 2018-12-04 NOTE — TELEPHONE ENCOUNTER
Controlled Substance Refill Request for Vyvanse  Problem List Complete:  Yes   checked in past 3 months? No, unable to access.  Pt does not need Rx until end of week when PCP returns to clinic.  Forwarded to Alfonso Rubi RN

## 2018-12-05 LAB
COPATH REPORT: NORMAL
PAP: NORMAL

## 2018-12-06 RX ORDER — LISDEXAMFETAMINE DIMESYLATE 10 MG/1
10 CAPSULE ORAL
Qty: 30 CAPSULE | Refills: 0 | Status: SHIPPED | OUTPATIENT
Start: 2018-12-06 | End: 2019-02-21

## 2018-12-06 RX ORDER — LISDEXAMFETAMINE DIMESYLATE 40 MG/1
40 CAPSULE ORAL EVERY MORNING
Qty: 30 CAPSULE | Refills: 0 | Status: SHIPPED | OUTPATIENT
Start: 2018-12-06 | End: 2019-02-21

## 2018-12-06 NOTE — TELEPHONE ENCOUNTER
LM scripts at the  and will be picked up by Patient.  If being picked up by someone other than the patient, is there a signed consent on file? N/A  ADA Rubi RN

## 2018-12-07 LAB
FINAL DIAGNOSIS: NORMAL
HPV HR 12 DNA CVX QL NAA+PROBE: NEGATIVE
HPV16 DNA SPEC QL NAA+PROBE: NEGATIVE
HPV18 DNA SPEC QL NAA+PROBE: NEGATIVE
SPECIMEN DESCRIPTION: NORMAL
SPECIMEN SOURCE CVX/VAG CYTO: NORMAL

## 2018-12-13 DIAGNOSIS — F41.1 GAD (GENERALIZED ANXIETY DISORDER): Primary | ICD-10-CM

## 2018-12-14 RX ORDER — CLONAZEPAM 1 MG/1
TABLET ORAL
Qty: 75 TABLET | Refills: 2 | Status: SHIPPED | OUTPATIENT
Start: 2018-12-14 | End: 2019-04-29

## 2018-12-14 NOTE — TELEPHONE ENCOUNTER
Requested Prescriptions   Pending Prescriptions Disp Refills     clonazePAM (KLONOPIN) 1 MG tablet [Pharmacy Med Name: CLONAZEPAM 1MG      TAB] 75 tablet 2     Sig: TAKE ONE-HALF TABLET BY MOUTH TWICE DAILY FOR  ANXIETY  AND  ONE  AND  ONE-HALF  TABLETS  AT  BEDTIME  FOR  SLEEP    There is no refill protocol information for this order        clonazePAM (KLONOPIN) 1 MG tablet (Discontinued)  Last Written Prescription Date:  02/06/2018  Last Fill Quantity: 75 tablet,  # refills: 2   Last office visit: 12/3/2018 with prescribing provider:  BIBIANA Mccord   Future Office Visit:      Aide STEINBERG (R) (M)

## 2018-12-14 NOTE — TELEPHONE ENCOUNTER
Routing refill request to provider for review/approval because:  Drug not on the [facility] refill protocol     Guerline WILLIS RN

## 2019-01-08 DIAGNOSIS — K58.1 IRRITABLE BOWEL SYNDROME WITH CONSTIPATION: ICD-10-CM

## 2019-01-08 RX ORDER — LINACLOTIDE 145 UG/1
CAPSULE, GELATIN COATED ORAL
Qty: 30 CAPSULE | Refills: 5 | Status: SHIPPED | OUTPATIENT
Start: 2019-01-08 | End: 2019-01-11

## 2019-01-08 NOTE — TELEPHONE ENCOUNTER
Requested Prescriptions   Pending Prescriptions Disp Refills     LINZESS 145 MCG capsule [Pharmacy Med Name: LINZESS 145MCG      CAP] 30 capsule 5     Sig: TAKE 1 CAPSULE BY MOUTH IN THE MORNING BEFORE BREAKFAST    There is no refill protocol information for this order              Last Written Prescription Date:  6/4/18  Last Fill Quantity: 30,   # refills: 4  Last Office Visit: 10/26/18  Future Office visit:       Routing refill request to provider for review/approval because:  Drug not on the [facility], [facility] or [facility] refill protocol or controlled substance

## 2019-01-11 ENCOUNTER — OFFICE VISIT (OUTPATIENT)
Dept: FAMILY MEDICINE | Facility: CLINIC | Age: 34
End: 2019-01-11
Payer: COMMERCIAL

## 2019-01-11 VITALS
SYSTOLIC BLOOD PRESSURE: 120 MMHG | HEART RATE: 91 BPM | OXYGEN SATURATION: 100 % | BODY MASS INDEX: 18.35 KG/M2 | TEMPERATURE: 99 F | RESPIRATION RATE: 12 BRPM | WEIGHT: 112 LBS | DIASTOLIC BLOOD PRESSURE: 72 MMHG

## 2019-01-11 DIAGNOSIS — L40.9 PSORIASIS: Primary | ICD-10-CM

## 2019-01-11 DIAGNOSIS — M79.7 FIBROMYALGIA: ICD-10-CM

## 2019-01-11 DIAGNOSIS — D69.3 IMMUNE THROMBOCYTOPENIA (H): ICD-10-CM

## 2019-01-11 DIAGNOSIS — F50.819 BINGE EATING DISORDER: ICD-10-CM

## 2019-01-11 DIAGNOSIS — J06.9 VIRAL URI: ICD-10-CM

## 2019-01-11 DIAGNOSIS — K58.1 IRRITABLE BOWEL SYNDROME WITH CONSTIPATION: ICD-10-CM

## 2019-01-11 PROCEDURE — 99214 OFFICE O/P EST MOD 30 MIN: CPT | Performed by: NURSE PRACTITIONER

## 2019-01-11 RX ORDER — LISDEXAMFETAMINE DIMESYLATE 40 MG/1
40 CAPSULE ORAL DAILY
Qty: 30 CAPSULE | Refills: 0 | Status: SHIPPED | OUTPATIENT
Start: 2019-02-11 | End: 2019-03-13

## 2019-01-11 RX ORDER — TRIAMCINOLONE ACETONIDE 1 MG/G
OINTMENT TOPICAL 2 TIMES DAILY
Qty: 80 G | Refills: 0 | Status: SHIPPED | OUTPATIENT
Start: 2019-01-11 | End: 2020-01-11

## 2019-01-11 RX ORDER — LISDEXAMFETAMINE DIMESYLATE 40 MG/1
40 CAPSULE ORAL DAILY
Qty: 30 CAPSULE | Refills: 0 | Status: SHIPPED | OUTPATIENT
Start: 2019-01-11 | End: 2019-02-21

## 2019-01-11 RX ORDER — LISDEXAMFETAMINE DIMESYLATE 10 MG/1
10 CAPSULE ORAL
Qty: 30 CAPSULE | Refills: 0 | Status: SHIPPED | OUTPATIENT
Start: 2019-01-11 | End: 2019-01-11

## 2019-01-11 RX ORDER — LISDEXAMFETAMINE DIMESYLATE 40 MG/1
40 CAPSULE ORAL DAILY
Qty: 30 CAPSULE | Refills: 0 | Status: SHIPPED | OUTPATIENT
Start: 2019-03-11 | End: 2019-02-21

## 2019-01-11 RX ORDER — LISDEXAMFETAMINE DIMESYLATE 40 MG/1
40 CAPSULE ORAL EVERY MORNING
Qty: 30 CAPSULE | Refills: 0 | Status: CANCELLED | OUTPATIENT
Start: 2019-01-11

## 2019-01-11 RX ORDER — LISDEXAMFETAMINE DIMESYLATE 10 MG/1
10 CAPSULE ORAL
Qty: 30 CAPSULE | Refills: 0 | Status: SHIPPED | OUTPATIENT
Start: 2019-03-11 | End: 2019-04-10

## 2019-01-11 RX ORDER — LISDEXAMFETAMINE DIMESYLATE 10 MG/1
10 CAPSULE ORAL
Qty: 30 CAPSULE | Refills: 0 | Status: CANCELLED | OUTPATIENT
Start: 2019-01-11

## 2019-01-11 RX ORDER — LISDEXAMFETAMINE DIMESYLATE 10 MG/1
10 CAPSULE ORAL
Qty: 30 CAPSULE | Refills: 0 | Status: SHIPPED | OUTPATIENT
Start: 2019-01-11 | End: 2019-02-21

## 2019-01-11 RX ORDER — LISDEXAMFETAMINE DIMESYLATE 10 MG/1
10 CAPSULE ORAL
Qty: 30 CAPSULE | Refills: 0 | Status: SHIPPED | OUTPATIENT
Start: 2019-02-11 | End: 2019-02-21

## 2019-01-11 RX ORDER — LISDEXAMFETAMINE DIMESYLATE 40 MG/1
40 CAPSULE ORAL DAILY
Qty: 30 CAPSULE | Refills: 0 | Status: SHIPPED | OUTPATIENT
Start: 2019-03-14 | End: 2019-01-11

## 2019-01-11 NOTE — PROGRESS NOTES
SUBJECTIVE:   Merissa Leung is a 33 year old female who presents to clinic today for the following health issues:      Medication Followup of  vyvance and linzess    Taking Medication as prescribed: yes    Side Effects:  None    Medication Helping Symptoms:  yes   'hx of splenectomy   nneds booster PCV-23   She has developed URI symptoms in the past 48 hours (sore throat, fatigue, chills)   Both sons have been ill with similar symptoms     Problem list and histories reviewed & adjusted, as indicated.  Additional history: as documented    Patient Active Problem List   Diagnosis     Hyperlipidemia LDL goal <160     Headaches, tension     Chronic constipation     OCD (obsessive compulsive disorder)     Anxiety     Fibromyalgia     Chronic abdominal pain     Underweight     Lower GI bleed     Immune thrombocytopenic purpura (H)     Past Surgical History:   Procedure Laterality Date     C/SECTION, LOW TRANSVERSE        SECTION, TUBAL LIGATION, COMBINED N/A 2/15/2016    NO tubal ligation done     COLONOSCOPY  2013    Procedure: COMBINED COLONOSCOPY, SINGLE BIOPSY/POLYPECTOMY BY BIOPSY;  Colonoscopy;  Surgeon: Maureen Valentin MD;  Location: [facility]     COLONOSCOPY N/A 2015    Procedure: COMBINED COLONOSCOPY, SINGLE OR MULTIPLE BIOPSY/POLYPECTOMY BY BIOPSY;  Surgeon: Maureen Valentin MD;  Location: [facility]     ESOPHAGOSCOPY, GASTROSCOPY, DUODENOSCOPY (EGD), COMBINED N/A 2018    Procedure: COMBINED ESOPHAGOSCOPY, GASTROSCOPY, DUODENOSCOPY (EGD), BIOPSY SINGLE OR MULTIPLE;  Gastroscopy;  Surgeon: Ray Amato MD;  Location: [facility]     LAPAROSCOPIC SPLENECTOMY N/A 2018    Procedure: Laparoscopic splenectomy, ;  Surgeon: Anthony Jackson DO;  Location: [facility]       Social History     Tobacco Use     Smoking status: Current Every Day Smoker     Packs/day: 0.50     Years: 6.00     Pack years: 3.00     Types: Cigarettes     Last attempt to quit: 2015     Years since  quitting: 3.6     Smokeless tobacco: Never Used     Tobacco comment: 1/2 pack daily    Substance Use Topics     Alcohol use: No     Family History   Problem Relation Age of Onset     Gastrointestinal Disease Mother 46        perforated intestines-      Hepatitis Mother         C     Liver Disease Mother      Blood Disease Mother         anemia     Substance Abuse Mother         A&D     Psychotic Disorder Brother         Schizophrenia, OCD     Schizophrenia Brother      Anxiety Disorder Brother      Arthritis Maternal Grandmother      Cancer Paternal Grandmother         cervical     Breast Cancer Paternal Grandmother      Hypertension Paternal Grandmother      Diabetes Paternal Grandmother      Cerebrovascular Disease Paternal Grandfather      Heart Disease Paternal Grandfather      Bipolar Disorder Paternal Grandfather      Suicide Paternal Grandfather      Cancer Paternal Aunt         cervical     Coronary Artery Disease Maternal Grandfather         MI     Substance Abuse Father         recovered A&D     Heart Disease Father         MI-stents     Hypertension Father            Reviewed and updated as needed this visit by clinical staff  Tobacco  Meds  Med Hx  Surg Hx  Fam Hx  Soc Hx      Reviewed and updated as needed this visit by Provider         ROS:  Constitutional, HEENT, cardiovascular, pulmonary, gi and gu systems are negative, except as otherwise noted.    OBJECTIVE:                                                    /72   Pulse 91   Resp 12   Wt 50.8 kg (112 lb)   SpO2 100%   BMI 18.35 kg/m    Body mass index is 18.35 kg/m .  GENERAL APPEARANCE: alert and no distress  HENT: ear canals and TM's normal and nose and mouth without ulcers or lesions  RESP: lungs clear to auscultation - no rales, rhonchi or wheezes  CV: regular rates and rhythm, normal S1 S2, no S3 or S4 and no murmur, click or rub  ABDOMEN: soft, nontender, without hepatosplenomegaly or masses and bowel sounds normal  MS:  extremities normal- no gross deformities noted  SKIN:psoriasis on scalp and bilateral hands   PSYCH: mentation appears normal and affect normal/bright    Diagnostic test results:  Diagnostic Test Results:  none      ASSESSMENT/PLAN:                                                    1. Irritable bowel syndrome with constipation  Stable refilled  - linaclotide (LINZESS) 145 MCG capsule; Take 1 capsule (145 mcg) by mouth every morning (before breakfast)  Dispense: 30 capsule; Refill: 5    2. Binge eating disorder  Stable on vyvanse   Refilled for 3 months  She will contact clinic when due for refills in 3 months if doing well will refill and see her back in clinic in 6 months   - lisdexamfetamine (VYVANSE) 40 MG capsule; Take 1 capsule (40 mg) by mouth daily    - lisdexamfetamine (VYVANSE) 10 MG capsule; Take 1 capsule (10 mg) by mouth daily (with lunch)      3. Immune thrombocytopenia (H)  Doing very well   S/p spenectomy   She will need to return for pneumonia vaccine when URI symptoms are resolved     4. Psoriasis/  5. Fibromyalgia  - triamcinolone (KENALOG) 0.1 % external ointment; Apply topically 2 times daily  Dispense: 80 g; Refill: 0  - RHEUMATOLOGY REFERRAL    6. Viral URI  Discussed conservative care   She will follow up if not better in 1 week     Patient Instructions   Medications refilled for 3 months   If still with cold symptoms end of next week let me know     Return for pneumonia vaccine when feeling better     Kenalog cream for rash   Avoid using for more than 2 weeks at time      Mohini Mccord NP  [facility]

## 2019-01-11 NOTE — PATIENT INSTRUCTIONS
Medications refilled for 3 months   If still with cold symptoms end of next week let me know     Return for pneumonia vaccine when feeling better     Kenalog cream for rash   Avoid using for more than 2 weeks at time

## 2019-01-11 NOTE — NURSING NOTE
"Chief Complaint   Patient presents with     Refill Request     alba and linzess       Initial /72   Pulse 91   Resp 12   Wt 50.8 kg (112 lb)   SpO2 100%   BMI 18.35 kg/m   Estimated body mass index is 18.35 kg/m  as calculated from the following:    Height as of 11/30/18: 1.664 m (5' 5.5\").    Weight as of this encounter: 50.8 kg (112 lb).    Patient presents to the clinic using No DME    Health Maintenance that is potentially due pending provider review:  NONE    n/a    Is there anyone who you would like to be able to receive your results? No  If yes have patient fill out SAUD    "

## 2019-02-21 ENCOUNTER — TELEPHONE (OUTPATIENT)
Dept: FAMILY MEDICINE | Facility: CLINIC | Age: 34
End: 2019-02-21

## 2019-02-21 ENCOUNTER — OFFICE VISIT (OUTPATIENT)
Dept: FAMILY MEDICINE | Facility: CLINIC | Age: 34
End: 2019-02-21
Payer: COMMERCIAL

## 2019-02-21 VITALS
OXYGEN SATURATION: 100 % | RESPIRATION RATE: 20 BRPM | BODY MASS INDEX: 17.36 KG/M2 | TEMPERATURE: 98.8 F | SYSTOLIC BLOOD PRESSURE: 126 MMHG | WEIGHT: 108 LBS | DIASTOLIC BLOOD PRESSURE: 80 MMHG | HEIGHT: 66 IN | HEART RATE: 86 BPM

## 2019-02-21 DIAGNOSIS — R09.89 RUNNY NOSE: Primary | ICD-10-CM

## 2019-02-21 DIAGNOSIS — J01.90 ACUTE SINUSITIS WITH SYMPTOMS > 10 DAYS: ICD-10-CM

## 2019-02-21 LAB
FLUAV+FLUBV AG SPEC QL: NEGATIVE
FLUAV+FLUBV AG SPEC QL: NEGATIVE
SPECIMEN SOURCE: NORMAL

## 2019-02-21 PROCEDURE — 99213 OFFICE O/P EST LOW 20 MIN: CPT | Performed by: FAMILY MEDICINE

## 2019-02-21 PROCEDURE — 87804 INFLUENZA ASSAY W/OPTIC: CPT | Performed by: FAMILY MEDICINE

## 2019-02-21 ASSESSMENT — MIFFLIN-ST. JEOR: SCORE: 1213.69

## 2019-02-21 NOTE — PATIENT INSTRUCTIONS
Patient Education     Acute Sinusitis    Acute sinusitis is irritation and swelling of the sinuses. It is usually caused by a viral infection after a common cold. Your doctor can help you find relief.  What is acute sinusitis?  Sinuses are air-filled spaces in the skull behind the face. They are kept moist and clean by a lining of mucosa. Things such as pollen, smoke, and chemical fumes can irritate the mucosa. It can then swell up. As a response to irritation, the mucosa makes more mucus and other fluids. Tiny hairlike cilia cover the mucosa. Cilia help carry mucus toward the opening of the sinus. Too much mucus may cause the cilia to stop working. This blocks the sinus opening. A buildup of fluid in the sinuses then causes pain and pressure. It can also encourage bacteria to grow in the sinuses.  Common symptoms of acute sinusitis  You may have:    Facial soreness pain    Headache    Fever    Fluid draining in the back of the throat (postnasal drip)    Congestion    Drainage that is thick and colored, instead of clear    Cough  Diagnosing acute sinusitis  Your doctor will ask about your symptoms and health history. He or she will look at your ear, nose, and throat. You usually won't need to have X-rays taken.    The doctor may take a sample of mucus to check for bacteria. If you have sinusitis that keeps coming back, you may need imaging tests such as X-rays or CAT scans. This will help your doctor check for a structural problem that may be causing the infection.  Treating acute sinusitis  Treatment is aimed at unblocking the sinus opening and helping the cilia work again. You may need to take antihistamine and decongestant medicine. These can reduce inflammation and decrease the amount of fluid your sinuses make. If you have a bacterial infection, you will need to take antibiotic medicine for 10 to 14 days. Take this medicine until it is gone, even if you feel better.  Date Last Reviewed: 10/1/2016    [id number]  [employer]. [address] [place] [address], [place] [address]. All rights reserved. This information is not intended as a substitute for professional medical care. Always follow your healthcare professional's instructions.

## 2019-02-21 NOTE — NURSING NOTE
"Chief Complaint   Patient presents with     Cough       Initial /80 (BP Location: Right arm, Patient Position: Sitting, Cuff Size: Adult Regular)   Pulse 86   Temp 98.8  F (37.1  C) (Tympanic)   Resp 20   Ht 1.664 m (5' 5.5\")   Wt 49 kg (108 lb)   LMP 01/21/2019   SpO2 100%   BMI 17.70 kg/m   Estimated body mass index is 17.7 kg/m  as calculated from the following:    Height as of this encounter: 1.664 m (5' 5.5\").    Weight as of this encounter: 49 kg (108 lb).    Patient presents to the clinic using No DME    Health Maintenance that is potentially due pending provider review:  NONE    n/a    Is there anyone who you would like to be able to receive your results? No  If yes have patient fill out SAUD    "

## 2019-02-21 NOTE — PROGRESS NOTES
SUBJECTIVE:   Merissa Leung is a 33 year old female who presents to clinic today for the following health issues:      RESPIRATORY SYMPTOMS      Duration: 2-3 weeks of on again off again symptoms. Worsening for 3 days     Description  nasal congestion, rhinorrhea, sore throat, facial pain/pressure, cough, wheezing, fever, chills, ears plugged both, headache, fatigue/malaise, hoarse voice, myalgias, gas pain, conjunctival irritation and nausea    Severity: severe    Accompanying signs and symptoms: as above     History (predisposing factors):  Family has been ill     Precipitating or alleviating factors: None    Therapies tried and outcome:  Increased water intake,  Emergen C, Theraflu day and night, IBU, and Tylenol with no relief         Problem list and histories reviewed & adjusted, as indicated.  Additional history: as documented    Patient Active Problem List   Diagnosis     Hyperlipidemia LDL goal <160     Headaches, tension     Chronic constipation     OCD (obsessive compulsive disorder)     Anxiety     Fibromyalgia     Chronic abdominal pain     Underweight     Lower GI bleed     Immune thrombocytopenic purpura (H)     Past Surgical History:   Procedure Laterality Date     C/SECTION, LOW TRANSVERSE        SECTION, TUBAL LIGATION, COMBINED N/A 2/15/2016    NO tubal ligation done     COLONOSCOPY  2013    Procedure: COMBINED COLONOSCOPY, SINGLE BIOPSY/POLYPECTOMY BY BIOPSY;  Colonoscopy;  Surgeon: Maureen Valentin MD;  Location: [facility]     COLONOSCOPY N/A 2015    Procedure: COMBINED COLONOSCOPY, SINGLE OR MULTIPLE BIOPSY/POLYPECTOMY BY BIOPSY;  Surgeon: Maureen Valentin MD;  Location: [facility]     ESOPHAGOSCOPY, GASTROSCOPY, DUODENOSCOPY (EGD), COMBINED N/A 2018    Procedure: COMBINED ESOPHAGOSCOPY, GASTROSCOPY, DUODENOSCOPY (EGD), BIOPSY SINGLE OR MULTIPLE;  Gastroscopy;  Surgeon: Ray Amato MD;  Location: [facility]     LAPAROSCOPIC SPLENECTOMY N/A 2018     Procedure: Laparoscopic splenectomy, ;  Surgeon: Anthony Jackson DO;  Location: [facility]       Social History     Tobacco Use     Smoking status: Current Every Day Smoker     Packs/day: 0.50     Years: 6.00     Pack years: 3.00     Types: Cigarettes     Last attempt to quit: 2015     Years since quitting: 3.7     Smokeless tobacco: Never Used     Tobacco comment: 1/2 pack daily    Substance Use Topics     Alcohol use: No     Family History   Problem Relation Age of Onset     Gastrointestinal Disease Mother 46        perforated intestines-      Hepatitis Mother         C     Liver Disease Mother      Blood Disease Mother         anemia     Substance Abuse Mother         A&D     Psychotic Disorder Brother         Schizophrenia, OCD     Schizophrenia Brother      Anxiety Disorder Brother      Arthritis Maternal Grandmother      Cancer Paternal Grandmother         cervical     Breast Cancer Paternal Grandmother      Hypertension Paternal Grandmother      Diabetes Paternal Grandmother      Cerebrovascular Disease Paternal Grandfather      Heart Disease Paternal Grandfather      Bipolar Disorder Paternal Grandfather      Suicide Paternal Grandfather      Cancer Paternal Aunt         cervical     Coronary Artery Disease Maternal Grandfather         MI     Substance Abuse Father         recovered A&D     Heart Disease Father         MI-stents     Hypertension Father          Current Outpatient Medications   Medication Sig Dispense Refill     clonazePAM (KLONOPIN) 0.5 MG tablet Take 0.5-1 tablets (0.25-0.5 mg) by mouth 2 times daily as needed for anxiety 20 tablet 0     clonazePAM (KLONOPIN) 1 MG tablet TAKE ONE-HALF TABLET BY MOUTH TWICE DAILY FOR  ANXIETY  AND  ONE  AND  ONE-HALF  TABLETS  AT  BEDTIME  FOR  SLEEP 75 tablet 2     diazepam (VALIUM) 5 MG tablet Take 1 tablet (5 mg) by mouth every 12 hours as needed for muscle spasms 20 tablet 1     ibuprofen (ADVIL/MOTRIN) 600 MG tablet Take 1 tablet (600 mg)  by mouth every 8 hours as needed for moderate pain 30 tablet 0     linaclotide (LINZESS) 145 MCG capsule Take 1 capsule (145 mcg) by mouth every morning (before breakfast) 30 capsule 5     [START ON 3/11/2019] lisdexamfetamine (VYVANSE) 10 MG capsule Take 1 capsule (10 mg) by mouth daily (with lunch) 30 capsule 0     lisdexamfetamine (VYVANSE) 40 MG capsule Take 1 capsule (40 mg) by mouth daily 30 capsule 0     Multiple Vitamins-Minerals (MULTIVITAL PO) Take 1 chew tab by mouth daily Gummi       polyethylene glycol (MIRALAX/GLYCOLAX) powder Take 17 g by mouth daily as needed for constipation       sennosides (SENOKOT) 8.6 MG tablet Take 2 tablets by mouth 2 times daily       UNABLE TO FIND Take 2 teaspoonful by mouth At Bedtime Magnesium Calm       triamcinolone (KENALOG) 0.1 % external ointment Apply topically 2 times daily 80 g 0     Allergies   Allergen Reactions     Dilaudid [Hydromorphone] Nausea and Vomiting     Food Other (See Comments) and Swelling     Processed food, sugar, high sodium, red grains, rice, potatoes(can have mashed, but no french fries or puffs)  Pain in extremities and face.   All over body at times.     Milk Protein Extract Swelling     Blow up like a balloon     Recent Labs   Lab Test 11/20/18  1506 11/08/18  0729  01/30/18  1805 01/24/18  1230  10/18/16  0940  11/21/14   ALT 20  --   --  17 18   < >  --    < >  --    CR 0.71 0.71   < > 0.80 0.60   < > 0.78   < >  --    GFRESTIMATED >90 >90   < > 84 >90   < > 88   < >  --    GFRESTBLACK >90 >90   < > >90 >90   < > >90  African American GFR Calc     < >  --    POTASSIUM 3.5 3.5   < > 3.4 3.4   < > 3.4   < >  --    TSH  --   --   --   --   --   --  1.90  --  1.1    < > = values in this interval not displayed.      BP Readings from Last 3 Encounters:   02/21/19 126/80   01/11/19 120/72   11/30/18 108/73    Wt Readings from Last 3 Encounters:   02/21/19 49 kg (108 lb)   01/11/19 50.8 kg (112 lb)   11/30/18 48.1 kg (106 lb)                  Labs  "reviewed in EPIC    Reviewed and updated as needed this visit by clinical staff  Tobacco  Allergies  Meds       Reviewed and updated as needed this visit by Provider         ROS:  Constitutional, HEENT, cardiovascular, pulmonary, gi and gu systems are negative, except as otherwise noted.    OBJECTIVE:     /80 (BP Location: Right arm, Patient Position: Sitting, Cuff Size: Adult Regular)   Pulse 86   Temp 98.8  F (37.1  C) (Tympanic)   Resp 20   Ht 1.664 m (5' 5.5\")   Wt 49 kg (108 lb)   LMP 01/21/2019   SpO2 100%   BMI 17.70 kg/m    Body mass index is 17.7 kg/m .  GENERAL: healthy, alert and no distress  EYES: Eyes grossly normal to inspection, PERRL and conjunctivae and sclerae normal  HENT: normal cephalic/atraumatic, ear canals and TM's normal, nasal mucosa edematous , oral mucous membranes moist, oropharxnx crowded and sinuses: maxillary tenderness on bilateral  NECK: no adenopathy, no asymmetry, masses, or scars and thyroid normal to palpation  RESP: lungs clear to auscultation - no rales, rhonchi or wheezes  CV: regular rates and rhythm, normal S1 S2, no S3 or S4 and no murmur, click or rub  MS: no gross musculoskeletal defects noted, no edema    Diagnostic Test Results:  Results for orders placed or performed in visit on 02/21/19 (from the past 24 hour(s))   Influenza A/B antigen   Result Value Ref Range    Influenza A/B Agn Specimen Nasal     Influenza A Negative NEG^Negative    Influenza B Negative NEG^Negative       ASSESSMENT/PLAN:       ICD-10-CM    1. Runny nose R09.89 Influenza A/B antigen   2. Acute sinusitis with symptoms > 10 days J01.90 amoxicillin-clavulanate (AUGMENTIN) 875-125 MG tablet       Discussed in detail differentials and further management. Symptoms are likely secondary to acute sinusitis.  Augmentin prescribed, common side effects discussed. Recommended well hydration, over-the-counter analgesia, warm fluids and humidifier use. Follow up if symptoms persist or worsen. " Written instructions/information provided. Patient understood and in agreement with the above plan. All questions are answered.             Patient Instructions     Patient Education     Acute Sinusitis    Acute sinusitis is irritation and swelling of the sinuses. It is usually caused by a viral infection after a common cold. Your doctor can help you find relief.  What is acute sinusitis?  Sinuses are air-filled spaces in the skull behind the face. They are kept moist and clean by a lining of mucosa. Things such as pollen, smoke, and chemical fumes can irritate the mucosa. It can then swell up. As a response to irritation, the mucosa makes more mucus and other fluids. Tiny hairlike cilia cover the mucosa. Cilia help carry mucus toward the opening of the sinus. Too much mucus may cause the cilia to stop working. This blocks the sinus opening. A buildup of fluid in the sinuses then causes pain and pressure. It can also encourage bacteria to grow in the sinuses.  Common symptoms of acute sinusitis  You may have:    Facial soreness pain    Headache    Fever    Fluid draining in the back of the throat (postnasal drip)    Congestion    Drainage that is thick and colored, instead of clear    Cough  Diagnosing acute sinusitis  Your doctor will ask about your symptoms and health history. He or she will look at your ear, nose, and throat. You usually won't need to have X-rays taken.    The doctor may take a sample of mucus to check for bacteria. If you have sinusitis that keeps coming back, you may need imaging tests such as X-rays or CAT scans. This will help your doctor check for a structural problem that may be causing the infection.  Treating acute sinusitis  Treatment is aimed at unblocking the sinus opening and helping the cilia work again. You may need to take antihistamine and decongestant medicine. These can reduce inflammation and decrease the amount of fluid your sinuses make. If you have a bacterial infection, you  will need to take antibiotic medicine for 10 to 14 days. Take this medicine until it is gone, even if you feel better.  Date Last Reviewed: 10/1/2016    0184-7347 [employer]. [address] [place] [address], [place] [address]. All rights reserved. This information is not intended as a substitute for professional medical care. Always follow your healthcare professional's instructions.               Gregor Quijano MD  [facility]

## 2019-02-21 NOTE — LETTER
February 21, 2019      Merissa Leung  [address] [facility] [address]        To Whom It May Concern:      Merissa Leung was seen in our clinic.  Kindly excuse her work absence tomorrow.        Sincerely,        Gregor Quijano MD

## 2019-02-21 NOTE — TELEPHONE ENCOUNTER
Reports onset sx approx 2 wks ago-  cough, congestion, feverish. States sx have worsened past 3 days temp 99.6-100 up to 102.4 highest.  Pt asplenic, advised appt today, scheduled with Dr. Quijano 4 PM.  ADA Rubi RN

## 2019-02-21 NOTE — TELEPHONE ENCOUNTER
Reason for Call:  Other     Detailed comments: Cold, cough, fever, congestion wondering her next step should she come in?  It has been like this for 2 weeks.    Please advise      Phone Number Patient can be reached at: Home number on file [phone] (home)    Best Time: any    Can we leave a detailed message on this number? YES    Call taken on 2/21/2019 at 1:47 PM by Maria Antonia German

## 2019-04-08 ENCOUNTER — TELEPHONE (OUTPATIENT)
Dept: FAMILY MEDICINE | Facility: CLINIC | Age: 34
End: 2019-04-08

## 2019-04-08 DIAGNOSIS — F50.819 BINGE EATING DISORDER: ICD-10-CM

## 2019-04-08 DIAGNOSIS — N94.6 DYSMENORRHEA: ICD-10-CM

## 2019-04-08 DIAGNOSIS — F41.1 GAD (GENERALIZED ANXIETY DISORDER): ICD-10-CM

## 2019-04-08 RX ORDER — DIAZEPAM 5 MG
5 TABLET ORAL EVERY 12 HOURS PRN
Qty: 20 TABLET | Refills: 1 | Status: CANCELLED | OUTPATIENT
Start: 2019-04-08

## 2019-04-08 RX ORDER — LISDEXAMFETAMINE DIMESYLATE 40 MG/1
40 CAPSULE ORAL DAILY
Qty: 30 CAPSULE | Refills: 0 | Status: CANCELLED | OUTPATIENT
Start: 2019-04-08

## 2019-04-08 RX ORDER — LISDEXAMFETAMINE DIMESYLATE 10 MG/1
10 CAPSULE ORAL
Qty: 30 CAPSULE | Refills: 0 | Status: CANCELLED | OUTPATIENT
Start: 2019-04-08

## 2019-04-08 RX ORDER — CLONAZEPAM 1 MG/1
TABLET ORAL
Qty: 75 TABLET | Refills: 2 | Status: CANCELLED | OUTPATIENT
Start: 2019-04-08

## 2019-04-08 NOTE — TELEPHONE ENCOUNTER
Pt called. States she has had her period since 3/19/19. States it has been heavy at times with dime-quarter size clots and bright red blood to brown discharge and almost nothing. Pt states she has had painful cramps as well. Appointment made for Friday to see provider regarding this and to review medications. Mohini would you like anything else done at this time?

## 2019-04-08 NOTE — TELEPHONE ENCOUNTER
Reason for Call:  Other prescription    Detailed comments: Pt is doing well on medications and is calling for refills. She will pick all of them up when ready.    Phone Number Patient can be reached at: Home number on file [phone] (home)    Best Time: any    Can we leave a detailed message on this number? YES    Call taken on 4/8/2019 at 2:36 PM by Mora Moreno

## 2019-04-08 NOTE — TELEPHONE ENCOUNTER
Instructions         Return in about 3 months (around 4/11/2019) for for refills .     Medications refilled for 3 months   If still with cold symptoms end of next week let me know

## 2019-04-08 NOTE — TELEPHONE ENCOUNTER
Left 2nd message on identifiable VM advising needs appt, Анна can see pt tomorrow (Tues) 1:20 PM.   Advise UC/ED if heavy bleeding, lightheaded, dizzy, increased cramping or any other concerning sx.   ADA Rubi RN

## 2019-04-08 NOTE — TELEPHONE ENCOUNTER
Reason for call:  Patient reporting a symptom    Symptom or request: Pt has had her period since 3/19/19. It is very different this time. She use to be irregular. She is having nausea, clotting, flow changes and color changes. Red to brown then red again. Very painful cramping.   She has a blood disorder so wonders if she needs to be seen.    Duration (how long have symptoms been present): Since 3/19/19    Have you been treated for this before?     Additional comments:     Phone Number patient can be reached at:  Home number on file [phone] (home)    Best Time:  any    Can we leave a detailed message on this number:  YES    Call taken on 4/8/2019 at 2:43 PM by Mora Moreno

## 2019-04-09 ENCOUNTER — OFFICE VISIT (OUTPATIENT)
Dept: FAMILY MEDICINE | Facility: CLINIC | Age: 34
End: 2019-04-09
Payer: COMMERCIAL

## 2019-04-09 VITALS
HEART RATE: 84 BPM | BODY MASS INDEX: 17.6 KG/M2 | RESPIRATION RATE: 16 BRPM | SYSTOLIC BLOOD PRESSURE: 112 MMHG | WEIGHT: 107.4 LBS | TEMPERATURE: 98.5 F | DIASTOLIC BLOOD PRESSURE: 80 MMHG

## 2019-04-09 DIAGNOSIS — N92.6 IRREGULAR BLEEDING: Primary | ICD-10-CM

## 2019-04-09 DIAGNOSIS — F50.819 BINGE EATING DISORDER: ICD-10-CM

## 2019-04-09 LAB
BASOPHILS # BLD AUTO: 0.1 10E9/L (ref 0–0.2)
BASOPHILS NFR BLD AUTO: 1 %
DIFFERENTIAL METHOD BLD: NORMAL
EOSINOPHIL # BLD AUTO: 0.2 10E9/L (ref 0–0.7)
EOSINOPHIL NFR BLD AUTO: 2.8 %
ERYTHROCYTE [DISTWIDTH] IN BLOOD BY AUTOMATED COUNT: 12.6 % (ref 10–15)
HCG UR QL: NEGATIVE
HCT VFR BLD AUTO: 42.4 % (ref 35–47)
HGB BLD-MCNC: 15 G/DL (ref 11.7–15.7)
LYMPHOCYTES # BLD AUTO: 3.3 10E9/L (ref 0.8–5.3)
LYMPHOCYTES NFR BLD AUTO: 47.3 %
MCH RBC QN AUTO: 31.4 PG (ref 26.5–33)
MCHC RBC AUTO-ENTMCNC: 35.4 G/DL (ref 31.5–36.5)
MCV RBC AUTO: 89 FL (ref 78–100)
MONOCYTES # BLD AUTO: 0.6 10E9/L (ref 0–1.3)
MONOCYTES NFR BLD AUTO: 8.7 %
NEUTROPHILS # BLD AUTO: 2.8 10E9/L (ref 1.6–8.3)
NEUTROPHILS NFR BLD AUTO: 40.2 %
PLATELET # BLD AUTO: 165 10E9/L (ref 150–450)
RBC # BLD AUTO: 4.77 10E12/L (ref 3.8–5.2)
TSH SERPL DL<=0.005 MIU/L-ACNC: 1.59 MU/L (ref 0.4–4)
WBC # BLD AUTO: 6.9 10E9/L (ref 4–11)

## 2019-04-09 PROCEDURE — 81025 URINE PREGNANCY TEST: CPT | Performed by: NURSE PRACTITIONER

## 2019-04-09 PROCEDURE — 85025 COMPLETE CBC W/AUTO DIFF WBC: CPT | Performed by: NURSE PRACTITIONER

## 2019-04-09 PROCEDURE — 84443 ASSAY THYROID STIM HORMONE: CPT | Performed by: NURSE PRACTITIONER

## 2019-04-09 PROCEDURE — 99214 OFFICE O/P EST MOD 30 MIN: CPT | Performed by: NURSE PRACTITIONER

## 2019-04-09 PROCEDURE — 36415 COLL VENOUS BLD VENIPUNCTURE: CPT | Performed by: NURSE PRACTITIONER

## 2019-04-09 RX ORDER — LISDEXAMFETAMINE DIMESYLATE 10 MG/1
10 CAPSULE ORAL
Qty: 30 CAPSULE | Refills: 0 | Status: SHIPPED | OUTPATIENT
Start: 2019-06-17 | End: 2019-11-08

## 2019-04-09 RX ORDER — LISDEXAMFETAMINE DIMESYLATE 10 MG/1
10 CAPSULE ORAL
Qty: 30 CAPSULE | Refills: 0 | Status: SHIPPED | OUTPATIENT
Start: 2019-05-17 | End: 2019-11-08

## 2019-04-09 RX ORDER — LISDEXAMFETAMINE DIMESYLATE 10 MG/1
10 CAPSULE ORAL
Qty: 30 CAPSULE | Refills: 0 | Status: SHIPPED | OUTPATIENT
Start: 2019-04-09 | End: 2019-05-09

## 2019-04-09 RX ORDER — LISDEXAMFETAMINE DIMESYLATE 40 MG/1
40 CAPSULE ORAL DAILY
Qty: 30 CAPSULE | Refills: 0 | Status: SHIPPED | OUTPATIENT
Start: 2019-05-17 | End: 2019-11-08

## 2019-04-09 RX ORDER — LISDEXAMFETAMINE DIMESYLATE 10 MG/1
10 CAPSULE ORAL
Qty: 30 CAPSULE | Refills: 0 | Status: SHIPPED | OUTPATIENT
Start: 2019-04-17 | End: 2019-04-09

## 2019-04-09 RX ORDER — LISDEXAMFETAMINE DIMESYLATE 40 MG/1
40 CAPSULE ORAL DAILY
Qty: 30 CAPSULE | Refills: 0 | Status: SHIPPED | OUTPATIENT
Start: 2019-04-17 | End: 2019-11-08

## 2019-04-09 RX ORDER — LISDEXAMFETAMINE DIMESYLATE 40 MG/1
40 CAPSULE ORAL DAILY
Qty: 30 CAPSULE | Refills: 0 | Status: SHIPPED | OUTPATIENT
Start: 2019-06-17 | End: 2019-11-08

## 2019-04-09 NOTE — NURSING NOTE
"Initial /80   Pulse 84   Temp 98.5  F (36.9  C) (Tympanic)   Resp 16   Wt 48.7 kg (107 lb 6.4 oz)   LMP 03/19/2019 (Exact Date)   BMI 17.60 kg/m   Estimated body mass index is 17.6 kg/m  as calculated from the following:    Height as of 2/21/19: 1.664 m (5' 5.5\").    Weight as of this encounter: 48.7 kg (107 lb 6.4 oz). .    Radha Reyes, CMA on 4/9/2019 at 1:54 PM    "

## 2019-04-09 NOTE — PROGRESS NOTES
"  SUBJECTIVE:   Merissa Leung is a 34 year old female who presents to clinic today for the following   health issues:      Here today for follow up for Vyvanse.   Is not out, just needs new refills, starting for May.     Patient states that medications are helping with symptoms, no side effects, taking as prescribed.         Amount of exercise or physical activity: Not as much as I should. 15-30/ day sometimes.     Problems taking medications regularly: No    Medication side effects: none    Diet: regular (no restrictions)        Vaginal Bleeding (Dysmenorrhea)      Onset: 3/19/19 period started and is still experiencing bleeding.     Description:  Duration of bleeding episodes: Has not had an episode like this before, has had longer episodes in the past.  Frequency between periods:  unsure  Describe bleeding/flow:   Clots: YES- first week was regular menses, slowed, then not daily dime to quarter size clots that are bright red to dark brown color.   Number of pads/hour: Varies, from 2-3 panty liners and 4-8 regular tampons.   Cramping: severe    Intensity:  severe    Accompanying signs and symptoms: feeling \"foggy\", fatigue, bloating, constantly uncomfortable.     History (similar episodes/previous evaluation): Some.    Precipitating or alleviating factors: None    Therapies tried and outcome: None    HX of thrombocytopenia s/p splenectomy   Discussed heavy periods and cramping with OB/GYN- valium prescribed   She does not want birth control pills for regulation   Desires more children       Additional history: as documented    Reviewed  and updated as needed this visit by clinical staff  Tobacco  Meds  Med Hx  Surg Hx  Fam Hx  Soc Hx        Reviewed and updated as needed this visit by Provider         Patient Active Problem List   Diagnosis     Hyperlipidemia LDL goal <160     Headaches, tension     Chronic constipation     OCD (obsessive compulsive disorder)     Anxiety     Fibromyalgia     Chronic " abdominal pain     Underweight     Lower GI bleed     Immune thrombocytopenic purpura (H)     Past Surgical History:   Procedure Laterality Date     C/SECTION, LOW TRANSVERSE        SECTION, TUBAL LIGATION, COMBINED N/A 2/15/2016    NO tubal ligation done     COLONOSCOPY  2013    Procedure: COMBINED COLONOSCOPY, SINGLE BIOPSY/POLYPECTOMY BY BIOPSY;  Colonoscopy;  Surgeon: Maureen Valentin MD;  Location: [facility]     COLONOSCOPY N/A 2015    Procedure: COMBINED COLONOSCOPY, SINGLE OR MULTIPLE BIOPSY/POLYPECTOMY BY BIOPSY;  Surgeon: Maureen Valentin MD;  Location: [facility]     ESOPHAGOSCOPY, GASTROSCOPY, DUODENOSCOPY (EGD), COMBINED N/A 2018    Procedure: COMBINED ESOPHAGOSCOPY, GASTROSCOPY, DUODENOSCOPY (EGD), BIOPSY SINGLE OR MULTIPLE;  Gastroscopy;  Surgeon: Ray Amato MD;  Location: [facility]     LAPAROSCOPIC SPLENECTOMY N/A 2018    Procedure: Laparoscopic splenectomy, ;  Surgeon: Anthony Jackson DO;  Location: [facility]       Social History     Tobacco Use     Smoking status: Current Every Day Smoker     Packs/day: 0.50     Years: 6.00     Pack years: 3.00     Types: Cigarettes     Last attempt to quit: 2015     Years since quitting: 3.8     Smokeless tobacco: Never Used     Tobacco comment: 1/2 pack daily    Substance Use Topics     Alcohol use: No     Family History   Problem Relation Age of Onset     Gastrointestinal Disease Mother 46        perforated intestines-      Hepatitis Mother         C     Liver Disease Mother      Blood Disease Mother         anemia     Substance Abuse Mother         A&D     Psychotic Disorder Brother         Schizophrenia, OCD     Schizophrenia Brother      Anxiety Disorder Brother      Arthritis Maternal Grandmother      Cancer Paternal Grandmother         cervical     Breast Cancer Paternal Grandmother      Hypertension Paternal Grandmother      Diabetes Paternal Grandmother      Cerebrovascular Disease Paternal Grandfather       Heart Disease Paternal Grandfather      Bipolar Disorder Paternal Grandfather      Suicide Paternal Grandfather      Cancer Paternal Aunt         cervical     Coronary Artery Disease Maternal Grandfather         MI     Substance Abuse Father         recovered A&D     Heart Disease Father         MI-stents     Hypertension Father            ROS:  Constitutional, HEENT, cardiovascular, pulmonary, gi and gu systems are negative, except as otherwise noted.    OBJECTIVE:                                                    /80   Pulse 84   Temp 98.5  F (36.9  C) (Tympanic)   Resp 16   Wt 48.7 kg (107 lb 6.4 oz)   LMP 03/19/2019 (Exact Date)   BMI 17.60 kg/m    Body mass index is 17.6 kg/m .  GENERAL APPEARANCE: healthy, alert and no distress  RESP: lungs clear to auscultation - no rales, rhonchi or wheezes  CV: regular rates and rhythm, normal S1 S2, no S3 or S4 and no murmur, click or rub  ABDOMEN: soft, mild abdominal tenderness, without hepatosplenomegaly or masses and bowel sounds normal  SKIN: no suspicious lesions or rashes    Diagnostic test results:  Diagnostic Test Results:  Results for orders placed or performed in visit on 04/09/19 (from the past 24 hour(s))   CBC with platelets differential   Result Value Ref Range    WBC 6.9 4.0 - 11.0 10e9/L    RBC Count 4.77 3.8 - 5.2 10e12/L    Hemoglobin 15.0 11.7 - 15.7 g/dL    Hematocrit 42.4 35.0 - 47.0 %    MCV 89 78 - 100 fl    MCH 31.4 26.5 - 33.0 pg    MCHC 35.4 31.5 - 36.5 g/dL    RDW 12.6 10.0 - 15.0 %    Platelet Count 165 150 - 450 10e9/L    % Neutrophils 40.2 %    % Lymphocytes 47.3 %    % Monocytes 8.7 %    % Eosinophils 2.8 %    % Basophils 1.0 %    Absolute Neutrophil 2.8 1.6 - 8.3 10e9/L    Absolute Lymphocytes 3.3 0.8 - 5.3 10e9/L    Absolute Monocytes 0.6 0.0 - 1.3 10e9/L    Absolute Eosinophils 0.2 0.0 - 0.7 10e9/L    Absolute Basophils 0.1 0.0 - 0.2 10e9/L    Diff Method Automated Method    HCG Qual, Urine ([id number])   Result Value Ref  Range    HCG Qual Urine Negative NEG^Negative     TSH- pending      ASSESSMENT/PLAN:                                                    1. Irregular bleeding  Will obtain a pelvic US   Reassurance provided that platelets and hgb are stable   Will follow up with results of US     - CBC with platelets differential  - TSH with free T4 reflex  - HCG Qual, Urine ([id number])  - US Pelvic Complete with Transvaginal; Future    2. Binge eating disorder  Refilled vyvanse for 3 months   Symptoms have remained stable on current dosing     Patient Instructions   Ultrasound ordered and scheduled   Medications refilled for 3 months     Will be in contact with rest of results       Mohini Mccord NP  [facility]

## 2019-04-09 NOTE — PATIENT INSTRUCTIONS
Ultrasound ordered and scheduled   Medications refilled for 3 months     Will be in contact with rest of results

## 2019-04-09 NOTE — TELEPHONE ENCOUNTER
Pt had called back and LM later yesterday, appt scheduled with Анна today 1:20 PM.  LM to call clinic nurse if further questions.  ADA Rubi RN

## 2019-04-11 ENCOUNTER — ANCILLARY PROCEDURE (OUTPATIENT)
Dept: ULTRASOUND IMAGING | Facility: CLINIC | Age: 34
End: 2019-04-11
Attending: NURSE PRACTITIONER
Payer: COMMERCIAL

## 2019-04-11 DIAGNOSIS — N92.6 IRREGULAR BLEEDING: ICD-10-CM

## 2019-04-11 PROCEDURE — 76856 US EXAM PELVIC COMPLETE: CPT | Mod: TC

## 2019-04-11 PROCEDURE — 76830 TRANSVAGINAL US NON-OB: CPT | Mod: TC

## 2019-04-29 DIAGNOSIS — F41.1 GAD (GENERALIZED ANXIETY DISORDER): ICD-10-CM

## 2019-04-29 RX ORDER — CLONAZEPAM 1 MG/1
TABLET ORAL
Qty: 75 TABLET | Refills: 2 | Status: SHIPPED | OUTPATIENT
Start: 2019-04-29 | End: 2019-08-26

## 2019-04-29 NOTE — TELEPHONE ENCOUNTER
Requested Prescriptions   Pending Prescriptions Disp Refills     clonazePAM (KLONOPIN) 1 MG tablet [Pharmacy Med Name: CLONAZEPAM 1MG      TAB] 75 tablet 2     Sig: TAKE 1/2 (ONE-HALF) TABLET BY MOUTH TWICE DAILY FOR ANXIETY AND 1 & 1/2 (ONE & ONE-HALF) ONCE DAILY AT BEDTIME FOR SLEEP       There is no refill protocol information for this order        Last Written Prescription Date:  12/14/18  Last Fill Quantity: 75,  # refills: 2   Last office visit: 4/9/2019 with prescribing provider:  Mohini Mccord     Future Office Visit:

## 2019-06-24 ENCOUNTER — TELEPHONE (OUTPATIENT)
Dept: FAMILY MEDICINE | Facility: CLINIC | Age: 34
End: 2019-06-24

## 2019-06-24 DIAGNOSIS — Z32.01 PREGNANCY TEST POSITIVE: ICD-10-CM

## 2019-06-24 DIAGNOSIS — Z32.01 PREGNANCY TEST POSITIVE: Primary | ICD-10-CM

## 2019-06-24 LAB — B-HCG SERPL-ACNC: <1 IU/L (ref 0–5)

## 2019-06-24 PROCEDURE — 84702 CHORIONIC GONADOTROPIN TEST: CPT | Performed by: NURSE PRACTITIONER

## 2019-06-24 PROCEDURE — 36415 COLL VENOUS BLD VENIPUNCTURE: CPT | Performed by: NURSE PRACTITIONER

## 2019-06-24 NOTE — TELEPHONE ENCOUNTER
Reason for Call: Request for an order or referral:    Order or referral being requested: HCG blood test    Date needed: as soon as possible    Has the patient been seen by the PCP for this problem?     Additional comments: Pt had positive at home pregnancy test and would to have an HCG drawn today    Phone number Patient can be reached at:  Home number on file [phone] (home)    Best Time:  any    Can we leave a detailed message on this number?  YES    Call taken on 6/24/2019 at 8:31 AM by Mora Moreno

## 2019-07-22 DIAGNOSIS — F50.819 BINGE EATING DISORDER: ICD-10-CM

## 2019-07-22 RX ORDER — LISDEXAMFETAMINE DIMESYLATE 40 MG/1
40 CAPSULE ORAL DAILY
Qty: 30 CAPSULE | Refills: 0 | Status: SHIPPED | OUTPATIENT
Start: 2019-07-22 | End: 2019-11-06

## 2019-07-22 RX ORDER — LISDEXAMFETAMINE DIMESYLATE 40 MG/1
40 CAPSULE ORAL DAILY
Qty: 30 CAPSULE | Refills: 0 | Status: SHIPPED | OUTPATIENT
Start: 2019-08-22 | End: 2019-11-06

## 2019-07-22 RX ORDER — LISDEXAMFETAMINE DIMESYLATE 40 MG/1
40 CAPSULE ORAL DAILY
Qty: 30 CAPSULE | Refills: 0 | Status: SHIPPED | OUTPATIENT
Start: 2019-09-22 | End: 2019-11-06

## 2019-07-22 RX ORDER — LISDEXAMFETAMINE DIMESYLATE 10 MG/1
10 CAPSULE ORAL DAILY
Qty: 30 CAPSULE | Refills: 0 | Status: SHIPPED | OUTPATIENT
Start: 2019-07-22 | End: 2019-11-06

## 2019-07-22 RX ORDER — LISDEXAMFETAMINE DIMESYLATE 10 MG/1
10 CAPSULE ORAL DAILY
Qty: 30 CAPSULE | Refills: 0 | Status: SHIPPED | OUTPATIENT
Start: 2019-09-22 | End: 2019-11-06

## 2019-07-22 RX ORDER — LISDEXAMFETAMINE DIMESYLATE 10 MG/1
10 CAPSULE ORAL DAILY
Qty: 30 CAPSULE | Refills: 0 | Status: SHIPPED | OUTPATIENT
Start: 2019-08-22 | End: 2019-11-06

## 2019-07-22 RX ORDER — LISDEXAMFETAMINE DIMESYLATE 10 MG/1
10 CAPSULE ORAL
Qty: 30 CAPSULE | Refills: 0 | Status: CANCELLED | OUTPATIENT
Start: 2019-07-22

## 2019-07-22 RX ORDER — LISDEXAMFETAMINE DIMESYLATE 40 MG/1
40 CAPSULE ORAL DAILY
Qty: 30 CAPSULE | Refills: 0 | Status: CANCELLED | OUTPATIENT
Start: 2019-07-22

## 2019-08-26 DIAGNOSIS — F41.1 GAD (GENERALIZED ANXIETY DISORDER): ICD-10-CM

## 2019-08-26 RX ORDER — CLONAZEPAM 1 MG/1
TABLET ORAL
Qty: 75 TABLET | Refills: 0 | Status: SHIPPED | OUTPATIENT
Start: 2019-08-26 | End: 2019-10-04

## 2019-08-26 NOTE — TELEPHONE ENCOUNTER
PHQ-9 SCORE 9/7/2016 2/6/2018 6/4/2018   PHQ-9 Total Score - - -   PHQ-9 Total Score 4 4 9     EDWARD-7 SCORE 9/7/2016 2/6/2018 6/4/2018   Total Score - - -   Total Score 1 2 5     Please advise further refill, F/U.  ADA Rubi, RN

## 2019-08-26 NOTE — TELEPHONE ENCOUNTER
Requested Prescriptions   Pending Prescriptions Disp Refills     clonazePAM (KLONOPIN) 1 MG tablet [Pharmacy Med Name: CLONAZEPAM 1MG      TAB] 75 tablet      Sig: TAKE 1/2 (ONE-HALF) TABLET BY MOUTH TWICE DAILY FOR ANXIETY AND 1 & 1/2 (ONE & ONE-HALF) ONCE DAILY AT BEDTIME FOR SLEEP       There is no refill protocol information for this order        Last Written Prescription Date:  4/29/19  Last Fill Quantity: 75,  # refills: 2   Last office visit: 4/9/2019 with prescribing provider:     Future Office Visit:

## 2019-10-04 DIAGNOSIS — F41.1 GAD (GENERALIZED ANXIETY DISORDER): ICD-10-CM

## 2019-10-04 NOTE — TELEPHONE ENCOUNTER
CLONAZEPAM 1MG      TAB  Last Written Prescription Date:  8/1/2018  Last Fill Quantity: 20,  # refills: 0   Last office visit: 4/9/2019 with prescribing provider:  PRASAD   Future Office Visit:    Controlled Substance Refill Request for -  Problem List Complete:  Yes   checked in past 3 months?  No, route to RN

## 2019-10-04 NOTE — TELEPHONE ENCOUNTER
[place] reviewed:  reviewed- no concerns    [place]  [email or web address]

## 2019-10-06 RX ORDER — CLONAZEPAM 1 MG/1
TABLET ORAL
Qty: 75 TABLET | Refills: 0 | Status: SHIPPED | OUTPATIENT
Start: 2019-10-06 | End: 2019-11-06

## 2019-10-16 ENCOUNTER — OFFICE VISIT (OUTPATIENT)
Dept: FAMILY MEDICINE | Facility: CLINIC | Age: 34
End: 2019-10-16
Payer: COMMERCIAL

## 2019-10-16 VITALS
SYSTOLIC BLOOD PRESSURE: 112 MMHG | DIASTOLIC BLOOD PRESSURE: 60 MMHG | TEMPERATURE: 97.8 F | BODY MASS INDEX: 17.7 KG/M2 | WEIGHT: 108 LBS | OXYGEN SATURATION: 100 % | RESPIRATION RATE: 16 BRPM | HEART RATE: 89 BPM

## 2019-10-16 DIAGNOSIS — L30.9 DERMATITIS: Primary | ICD-10-CM

## 2019-10-16 DIAGNOSIS — L40.9 PSORIASIS: ICD-10-CM

## 2019-10-16 PROCEDURE — 99213 OFFICE O/P EST LOW 20 MIN: CPT | Performed by: NURSE PRACTITIONER

## 2019-10-16 RX ORDER — PREDNISONE 20 MG/1
TABLET ORAL
Qty: 20 TABLET | Refills: 0 | Status: SHIPPED | OUTPATIENT
Start: 2019-10-16 | End: 2019-11-08

## 2019-10-16 NOTE — PATIENT INSTRUCTIONS
Unclear what triggered this dermatitis rash-   Lets try prednisone taper to calm allergic response       I placed a referral for dermatology for psoriasis

## 2019-10-16 NOTE — NURSING NOTE
"Chief Complaint   Patient presents with     Derm Problem       Initial /60   Pulse 89   Temp 97.8  F (36.6  C) (Tympanic)   Resp 16   Wt 49 kg (108 lb)   SpO2 100%   BMI 17.70 kg/m   Estimated body mass index is 17.7 kg/m  as calculated from the following:    Height as of 2/21/19: 1.664 m (5' 5.5\").    Weight as of this encounter: 49 kg (108 lb).    Patient presents to the clinic using No DME    Health Maintenance that is potentially due pending provider review:  NONE    n/a    Is there anyone who you would like to be able to receive your results? No  If yes have patient fill out SAUD    Radha Reyes CMA([facility])    "

## 2019-10-16 NOTE — LETTER
October 16, 2019      Merissa Leung  [address] [facility] [address]        To Whom It May Concern:    Merissa Leung  was seen on 10/16/19. She is ok to return to school without restriction.        Sincerely,        Mohini Mccord NP

## 2019-10-16 NOTE — PROGRESS NOTES
Subjective     Merissa Leung is a 34 year old female who presents to clinic today for the following health issues:    HPI   Rash      Duration: 5 days    Description  Location: neck, back of neck, wrists  Itching: Severe at onset, since has not been so bad. Itches when touched    Intensity:  moderate    Accompanying signs and symptoms:     History (similar episodes/previous evaluation): None    Precipitating or alleviating factors:  New exposures:  None  Recent travel: no      Therapies tried and outcome: hydrocortisone cream -  not effective and Benadryl/diphenhydramine -  not effective. Calamine lotion - not effective      Reviewed and updated as needed this visit by Provider         Review of Systems   ROS COMP: Constitutional, HEENT, cardiovascular, pulmonary, gi and gu systems are negative, except as otherwise noted.      Objective    /60   Pulse 89   Temp 97.8  F (36.6  C) (Tympanic)   Resp 16   Wt 49 kg (108 lb)   SpO2 100%   BMI 17.70 kg/m    Body mass index is 17.7 kg/m .  Physical Exam   GENERAL: healthy, alert and no distress  NECK: no adenopathy, no asymmetry, masses, or scars and thyroid normal to palpation  RESP: lungs clear to auscultation - no rales, rhonchi or wheezes  CV: regular rate and rhythm, normal S1 S2, no S3 or S4, no murmur, click or rub, no peripheral edema and peripheral pulses strong  ABDOMEN: soft, nontender, no hepatosplenomegaly, no masses and bowel sounds normal  MS: no gross musculoskeletal defects noted, no edema  SKIN:  Examination of the rash reveals:  multiple pleomorphic, raised, well-defined, blanching patches with wheals and flares on chest and wrist     On the base of scalp there is plaqued psoriasis     Diagnostic Test Results:  Labs reviewed in Epic        Assessment & Plan       ICD-10-CM    1. Dermatitis L30.9 predniSONE (DELTASONE) 20 MG tablet   2. Psoriasis L40.9 DERMATOLOGY REFERRAL          Patient Instructions   Unclear what triggered this dermatitis  rash-   Lets try prednisone taper to calm allergic response       I placed a referral for dermatology for psoriasis       Return in about 2 weeks (around 10/30/2019), or if symptoms worsen or fail to improve.    Mohini Mccord NP  [facility]

## 2019-10-24 ENCOUNTER — OFFICE VISIT (OUTPATIENT)
Dept: FAMILY MEDICINE | Facility: CLINIC | Age: 34
End: 2019-10-24
Payer: COMMERCIAL

## 2019-10-24 VITALS
HEART RATE: 82 BPM | HEIGHT: 66 IN | SYSTOLIC BLOOD PRESSURE: 102 MMHG | BODY MASS INDEX: 17.39 KG/M2 | DIASTOLIC BLOOD PRESSURE: 70 MMHG | TEMPERATURE: 97.8 F | RESPIRATION RATE: 16 BRPM | WEIGHT: 108.2 LBS | OXYGEN SATURATION: 99 %

## 2019-10-24 DIAGNOSIS — M25.50 MULTIPLE JOINT PAIN: ICD-10-CM

## 2019-10-24 DIAGNOSIS — L40.9 PSORIASIS: ICD-10-CM

## 2019-10-24 DIAGNOSIS — R21 RASH: Primary | ICD-10-CM

## 2019-10-24 DIAGNOSIS — M79.10 MYALGIA: ICD-10-CM

## 2019-10-24 LAB
ALBUMIN SERPL-MCNC: 3.7 G/DL (ref 3.4–5)
ALP SERPL-CCNC: 51 U/L (ref 40–150)
ALT SERPL W P-5'-P-CCNC: 22 U/L (ref 0–50)
ANION GAP SERPL CALCULATED.3IONS-SCNC: 8 MMOL/L (ref 3–14)
AST SERPL W P-5'-P-CCNC: 16 U/L (ref 0–45)
BASOPHILS # BLD AUTO: 0.1 10E9/L (ref 0–0.2)
BASOPHILS NFR BLD AUTO: 0.9 %
BILIRUB SERPL-MCNC: 0.3 MG/DL (ref 0.2–1.3)
BUN SERPL-MCNC: 12 MG/DL (ref 7–30)
CALCIUM SERPL-MCNC: 8.8 MG/DL (ref 8.5–10.1)
CHLORIDE SERPL-SCNC: 104 MMOL/L (ref 94–109)
CO2 SERPL-SCNC: 30 MMOL/L (ref 20–32)
CREAT SERPL-MCNC: 0.74 MG/DL (ref 0.52–1.04)
CRP SERPL-MCNC: <2.9 MG/L (ref 0–8)
DEPRECATED S PYO AG THROAT QL EIA: NORMAL
DIFFERENTIAL METHOD BLD: ABNORMAL
EOSINOPHIL # BLD AUTO: 0.3 10E9/L (ref 0–0.7)
EOSINOPHIL NFR BLD AUTO: 4.1 %
ERYTHROCYTE [DISTWIDTH] IN BLOOD BY AUTOMATED COUNT: 13.4 % (ref 10–15)
ERYTHROCYTE [SEDIMENTATION RATE] IN BLOOD BY WESTERGREN METHOD: 3 MM/H (ref 0–20)
GFR SERPL CREATININE-BSD FRML MDRD: >90 ML/MIN/{1.73_M2}
GLUCOSE SERPL-MCNC: 67 MG/DL (ref 70–99)
HCT VFR BLD AUTO: 43.3 % (ref 35–47)
HGB BLD-MCNC: 14.6 G/DL (ref 11.7–15.7)
LYMPHOCYTES # BLD AUTO: 2.5 10E9/L (ref 0.8–5.3)
LYMPHOCYTES NFR BLD AUTO: 38.4 %
MCH RBC QN AUTO: 31.3 PG (ref 26.5–33)
MCHC RBC AUTO-ENTMCNC: 33.7 G/DL (ref 31.5–36.5)
MCV RBC AUTO: 93 FL (ref 78–100)
MONOCYTES # BLD AUTO: 0.7 10E9/L (ref 0–1.3)
MONOCYTES NFR BLD AUTO: 10.7 %
NEUTROPHILS # BLD AUTO: 3 10E9/L (ref 1.6–8.3)
NEUTROPHILS NFR BLD AUTO: 45.9 %
PLATELET # BLD AUTO: 100 10E9/L (ref 150–450)
POTASSIUM SERPL-SCNC: 3.2 MMOL/L (ref 3.4–5.3)
PROT SERPL-MCNC: 6.6 G/DL (ref 6.8–8.8)
RBC # BLD AUTO: 4.66 10E12/L (ref 3.8–5.2)
SODIUM SERPL-SCNC: 142 MMOL/L (ref 133–144)
SPECIMEN SOURCE: NORMAL
WBC # BLD AUTO: 6.5 10E9/L (ref 4–11)

## 2019-10-24 PROCEDURE — 88312 SPECIAL STAINS GROUP 1: CPT | Performed by: NURSE PRACTITIONER

## 2019-10-24 PROCEDURE — 86140 C-REACTIVE PROTEIN: CPT | Performed by: NURSE PRACTITIONER

## 2019-10-24 PROCEDURE — 87880 STREP A ASSAY W/OPTIC: CPT | Performed by: NURSE PRACTITIONER

## 2019-10-24 PROCEDURE — 88305 TISSUE EXAM BY PATHOLOGIST: CPT | Performed by: NURSE PRACTITIONER

## 2019-10-24 PROCEDURE — 80053 COMPREHEN METABOLIC PANEL: CPT | Performed by: NURSE PRACTITIONER

## 2019-10-24 PROCEDURE — 85652 RBC SED RATE AUTOMATED: CPT | Performed by: NURSE PRACTITIONER

## 2019-10-24 PROCEDURE — 99214 OFFICE O/P EST MOD 30 MIN: CPT | Performed by: NURSE PRACTITIONER

## 2019-10-24 PROCEDURE — 36415 COLL VENOUS BLD VENIPUNCTURE: CPT | Performed by: NURSE PRACTITIONER

## 2019-10-24 PROCEDURE — 87081 CULTURE SCREEN ONLY: CPT | Performed by: NURSE PRACTITIONER

## 2019-10-24 PROCEDURE — 85025 COMPLETE CBC W/AUTO DIFF WBC: CPT | Performed by: NURSE PRACTITIONER

## 2019-10-24 RX ORDER — MUPIROCIN 20 MG/G
OINTMENT TOPICAL 3 TIMES DAILY
Qty: 15 G | Refills: 0 | Status: SHIPPED | OUTPATIENT
Start: 2019-10-24 | End: 2019-11-08

## 2019-10-24 RX ORDER — HYDROXYZINE HYDROCHLORIDE 25 MG/1
25 TABLET, FILM COATED ORAL 3 TIMES DAILY PRN
Qty: 30 TABLET | Refills: 0 | Status: SHIPPED | OUTPATIENT
Start: 2019-10-24 | End: 2020-10-22

## 2019-10-24 ASSESSMENT — MIFFLIN-ST. JEOR: SCORE: 1209.6

## 2019-10-24 NOTE — PATIENT INSTRUCTIONS
Your labs are normal   Strep was negative     Biopsy completed today   Keep covered today   Change band aid daily and as needed   Wash with soap and water     Take today off of school   If feeling OK return on Saturday        could try aveno oatmeal bath   Avoid hot showers or baths- luke warm water     Use bactroban on psoriasis on neck three times a day x5 days (for infection)   Do not feel oral antibiotic are needed at this time     Hydroxyzine three times a day as needed for the itch   Do not drive while taking as can make you sleepy     See derm- appointment as below (could try and see if can get in sooner)   Rheumatology referral placed         Patient Education     Understanding Psoriatic Arthritis    Psoriatic arthritis is joint pain and swelling that occurs in some people who have psoriasis. Psoriasis is a skin disease that causes scaly skin patches. People who have psoriasis may develop psoriatic arthritis later.  How to say it  [id number] arthritis   What causes psoriatic arthritis?  The exact cause of psoriatic arthritis is not known, but it is linked to problems with the body s infection-fighting system (immune system). Other factors include:    Family history. People who have psoriatic arthritis often have relatives with either psoriasis or arthritis, or both.    Certain infections. These include streptococcal infections and HIV.    An injury to the skin or to a joint  What are the symptoms of psoriatic arthritis?  Symptoms may include:    Pain, tenderness, and swelling  in any joint, including the spine    Joint or back stiffness, especially in the morning    Patches of rough skin that are usually red underneath and scaly and white or silver on top    Fingernail problems such as pitted, or crumbly nails, or nails that are detached from the nail bed    Pain and swelling where muscles attach to bones    Swelling of fingers or toes    Eye redness or inflammation  How is psoriatic arthritis  treated?  Psoriatic arthritis does not go away. It is a long-term (chronic) condition that needs long-term treatment. Medicines are an important part of treatment. These medicines are often used:    Prescription or over-the-counter pain medicines. These help reduce swelling and pain.    Prescription medicines that limit the effect of the immune system. They may reduce or prevent joint damage. Methotrexate is a pill commonly used. Injectable  biologic  medicines and skin patches may also be used to treat psoriatic arthritis.    Steroid injections into affected joints. This may help relieve symptoms.    Topical medicines for rough skin patches. These may relieve discomfort and dryness.  In addition to medicines, these treatments may be recommended:    Regular exercise to improve flexibility and strength    Physical therapy to help relieve pain and improve flexibility    Heat packs to help relieve pain and swelling    Shoe inserts to improve foot and ankle stability, and help with foot pain  What are the complications of psoriatic arthritis?  Possible complications include:    Worsening joint damage    Reduced ability to use affected joints  When should I call my healthcare provider?  Call your healthcare provider right away if any of these occur:    You have a fever of 100.4 F (38 C) or higher, or as directed    You have pain that gets worse    You have symptoms that don t get better, or symptoms that get worse    You develop new symptoms   Date Last Reviewed: 5/1/2016 2000-2018 [employer]. [address] [place] [address], [place] [address]. All rights reserved. This information is not intended as a substitute for professional medical care. Always follow your healthcare professional's instructions.

## 2019-10-24 NOTE — LETTER
October 25, 2019      Merissa Leung  [address] [facility] [address]        Dear Merissa,       The results of your 24 hour throat culture were negative. Please contact your clinic if you have any questions or concerns.      Sincerely,        Mohini Mccord NP

## 2019-10-24 NOTE — PROGRESS NOTES
Subjective     Merissa Leung is a 34 year old female who presents to clinic today for the following health issues:    HPI   Rash      Duration: X ongoing- weeks    Description  Location: chest, arms, hands, base of scalp  Itching: severe    Intensity:  severe    Accompanying signs and symptoms: None    History (similar episodes/previous evaluation): long standing history of psoriasis on the base of the scalp    Precipitating or alleviating factors:  New exposures:  None  Recent travel: no      Therapies tried and outcome: topical steroid -, Benadryl/diphenhydramine -  not effective and prednisone    Patient was seen last week in by me in [place]- for rash ?dermatitis at that time  No lesion look more plaque like  She was given a prednisone taper  She reports lack of improvement in her symptoms   She feels systemically ill- flue like symptoms, muscle, joint pain, sore throat, fatigue    Has past history of fibromyalgia     Patient Active Problem List   Diagnosis     Hyperlipidemia LDL goal <160     Headaches, tension     Chronic constipation     OCD (obsessive compulsive disorder)     Anxiety     Fibromyalgia     Chronic abdominal pain     Underweight     Lower GI bleed     Immune thrombocytopenic purpura (H)     Past Surgical History:   Procedure Laterality Date     C/SECTION, LOW TRANSVERSE        SECTION, TUBAL LIGATION, COMBINED N/A 2/15/2016    NO tubal ligation done     COLONOSCOPY  2013    Procedure: COMBINED COLONOSCOPY, SINGLE BIOPSY/POLYPECTOMY BY BIOPSY;  Colonoscopy;  Surgeon: Maureen Valentin MD;  Location: [facility]     COLONOSCOPY N/A 2015    Procedure: COMBINED COLONOSCOPY, SINGLE OR MULTIPLE BIOPSY/POLYPECTOMY BY BIOPSY;  Surgeon: Maureen Valentin MD;  Location: [facility]     ESOPHAGOSCOPY, GASTROSCOPY, DUODENOSCOPY (EGD), COMBINED N/A 2018    Procedure: COMBINED ESOPHAGOSCOPY, GASTROSCOPY, DUODENOSCOPY (EGD), BIOPSY SINGLE OR MULTIPLE;  Gastroscopy;   "Surgeon: Ray Amato MD;  Location: [facility]     LAPAROSCOPIC SPLENECTOMY N/A 2018    Procedure: Laparoscopic splenectomy, ;  Surgeon: Anthony Jackson DO;  Location: [facility]       Social History     Tobacco Use     Smoking status: Current Every Day Smoker     Packs/day: 0.50     Years: 6.00     Pack years: 3.00     Types: Cigarettes     Last attempt to quit: 2015     Years since quittin.4     Smokeless tobacco: Never Used     Tobacco comment: 1/2 pack daily    Substance Use Topics     Alcohol use: No     Family History   Problem Relation Age of Onset     Gastrointestinal Disease Mother 46        perforated intestines-      Hepatitis Mother         C     Liver Disease Mother      Blood Disease Mother         anemia     Substance Abuse Mother         A&D     Psychotic Disorder Brother         Schizophrenia, OCD     Schizophrenia Brother      Anxiety Disorder Brother      Arthritis Maternal Grandmother      Cancer Paternal Grandmother         cervical     Breast Cancer Paternal Grandmother      Hypertension Paternal Grandmother      Diabetes Paternal Grandmother      Cerebrovascular Disease Paternal Grandfather      Heart Disease Paternal Grandfather      Bipolar Disorder Paternal Grandfather      Suicide Paternal Grandfather      Cancer Paternal Aunt         cervical     Coronary Artery Disease Maternal Grandfather         MI     Substance Abuse Father         recovered A&D     Heart Disease Father         MI-stents     Hypertension Father              Reviewed and updated as needed this visit by Provider         Review of Systems   ROS COMP: Constitutional, HEENT, cardiovascular, pulmonary, gi and gu systems are negative, except as otherwise noted.      Objective    /70   Pulse 82   Resp 16   Ht 1.664 m (5' 5.5\")   Wt 49.1 kg (108 lb 3.2 oz)   SpO2 99%   BMI 17.73 kg/m    Body mass index is 17.73 kg/m .  Physical Exam   GENERAL: healthy, alert and no distress  NECK: no " adenopathy, no asymmetry, masses, or scars and thyroid normal to palpation  RESP: lungs clear to auscultation - no rales, rhonchi or wheezes  CV: regular rate and rhythm, normal S1 S2, no S3 or S4, no murmur, click or rub, no peripheral edema and peripheral pulses strong  ABDOMEN: soft, nontender, no hepatosplenomegaly, no masses and bowel sounds normal  SKIN: Examination of the rash reveals: well-defined dry violaceous plaques on posterior scalp, there is multiple descrete plaque lesion on bilateral arms    Erythematous patches on neck                         Results for orders placed or performed in visit on 10/24/19   CBC with platelets differential     Status: Abnormal   Result Value Ref Range    WBC 6.5 4.0 - 11.0 10e9/L    RBC Count 4.66 3.8 - 5.2 10e12/L    Hemoglobin 14.6 11.7 - 15.7 g/dL    Hematocrit 43.3 35.0 - 47.0 %    MCV 93 78 - 100 fl    MCH 31.3 26.5 - 33.0 pg    MCHC 33.7 31.5 - 36.5 g/dL    RDW 13.4 10.0 - 15.0 %    Platelet Count 100 (L) 150 - 450 10e9/L    % Neutrophils 45.9 %    % Lymphocytes 38.4 %    % Monocytes 10.7 %    % Eosinophils 4.1 %    % Basophils 0.9 %    Absolute Neutrophil 3.0 1.6 - 8.3 10e9/L    Absolute Lymphocytes 2.5 0.8 - 5.3 10e9/L    Absolute Monocytes 0.7 0.0 - 1.3 10e9/L    Absolute Eosinophils 0.3 0.0 - 0.7 10e9/L    Absolute Basophils 0.1 0.0 - 0.2 10e9/L    Diff Method Automated Method    ESR: Erythrocyte sedimentation rate     Status: None   Result Value Ref Range    Sed Rate 3 0 - 20 mm/h   CRP inflammation     Status: None   Result Value Ref Range    CRP Inflammation <2.9 0.0 - 8.0 mg/L   Comprehensive metabolic panel     Status: Abnormal   Result Value Ref Range    Sodium 142 133 - 144 mmol/L    Potassium 3.2 (L) 3.4 - 5.3 mmol/L    Chloride 104 94 - 109 mmol/L    Carbon Dioxide 30 20 - 32 mmol/L    Anion Gap 8 3 - 14 mmol/L    Glucose 67 (L) 70 - 99 mg/dL    Urea Nitrogen 12 7 - 30 mg/dL    Creatinine 0.74 0.52 - 1.04 mg/dL    GFR Estimate >90 >60 mL/min/[1.73_m2]     GFR Estimate If Black >90 >60 mL/min/[1.73_m2]    Calcium 8.8 8.5 - 10.1 mg/dL    Bilirubin Total 0.3 0.2 - 1.3 mg/dL    Albumin 3.7 3.4 - 5.0 g/dL    Protein Total 6.6 (L) 6.8 - 8.8 g/dL    Alkaline Phosphatase 51 40 - 150 U/L    ALT 22 0 - 50 U/L    AST 16 0 - 45 U/L                      Rapid strep screen     Status: None   Result Value Ref Range    Specimen Description Throat     Rapid Strep A Screen       NEGATIVE: No Group A streptococcal antigen detected by immunoassay, await culture report.   Beta strep group A culture     Status: None   Result Value Ref Range    Specimen Description Throat     Culture Micro No beta hemolytic Streptococcus Group A isolated        After informed consent was obtained an area on left dorsal aspect of the hand near the base of the thumb was anesthetized  with 1% lidocaine with epi and the area was cleansed with betadine and biopsied using a 3.5 mm punch. Patient tolerated procedure and biopsy site was covered with bacitracin and a band aid.         Assessment & Plan     1. Rash  2. Psoriasis  Labs checked today and unremarkable   suspect rash is due to worsening psoriasis   Biopsy obtained today   Contacted dermatology to get patient in sooner - appointment scheduled for 11/14   Advised to rest the rest of the week- letter for school provided     - mupirocin (BACTROBAN) 2 % external ointment; Apply topically 3 times daily for 5 days  Dispense: 15 g; Refill: 0- prescribed due to excess drainage of area on scalp   - hydrOXYzine (ATARAX) 25 MG tablet; Take 1 tablet (25 mg) by mouth 3 times daily as needed for itching  Dispense: 30 tablet; Refill: 0- ordered to help with itching     3. Myalgia  4. Multiple joint pain'  Recommend that patient see rheumatology for further evaluation on possible psoriatic arthritis   - RHEUMATOLOGY REFERRAL         Patient Instructions     Your labs are normal   Strep was negative     Biopsy completed today   Keep covered today   Change band aid  daily and as needed   Wash with soap and water     Take today off of school   If feeling OK return on Saturday        could try aveno oatmeal bath   Avoid hot showers or baths- luke warm water     Use bactroban on psoriasis on neck three times a day x5 days (for infection)   Do not feel oral antibiotic are needed at this time     Hydroxyzine three times a day as needed for the itch   Do not drive while taking as can make you sleepy     See derm- appointment as below (could try and see if can get in sooner)   Rheumatology referral placed         Patient Education     Understanding Psoriatic Arthritis    Psoriatic arthritis is joint pain and swelling that occurs in some people who have psoriasis. Psoriasis is a skin disease that causes scaly skin patches. People who have psoriasis may develop psoriatic arthritis later.  How to say it  [id number] arthritis   What causes psoriatic arthritis?  The exact cause of psoriatic arthritis is not known, but it is linked to problems with the body s infection-fighting system (immune system). Other factors include:    Family history. People who have psoriatic arthritis often have relatives with either psoriasis or arthritis, or both.    Certain infections. These include streptococcal infections and HIV.    An injury to the skin or to a joint  What are the symptoms of psoriatic arthritis?  Symptoms may include:    Pain, tenderness, and swelling  in any joint, including the spine    Joint or back stiffness, especially in the morning    Patches of rough skin that are usually red underneath and scaly and white or silver on top    Fingernail problems such as pitted, or crumbly nails, or nails that are detached from the nail bed    Pain and swelling where muscles attach to bones    Swelling of fingers or toes    Eye redness or inflammation  How is psoriatic arthritis treated?  Psoriatic arthritis does not go away. It is a long-term (chronic) condition that needs long-term treatment.  Medicines are an important part of treatment. These medicines are often used:    Prescription or over-the-counter pain medicines. These help reduce swelling and pain.    Prescription medicines that limit the effect of the immune system. They may reduce or prevent joint damage. Methotrexate is a pill commonly used. Injectable  biologic  medicines and skin patches may also be used to treat psoriatic arthritis.    Steroid injections into affected joints. This may help relieve symptoms.    Topical medicines for rough skin patches. These may relieve discomfort and dryness.  In addition to medicines, these treatments may be recommended:    Regular exercise to improve flexibility and strength    Physical therapy to help relieve pain and improve flexibility    Heat packs to help relieve pain and swelling    Shoe inserts to improve foot and ankle stability, and help with foot pain  What are the complications of psoriatic arthritis?  Possible complications include:    Worsening joint damage    Reduced ability to use affected joints  When should I call my healthcare provider?  Call your healthcare provider right away if any of these occur:    You have a fever of 100.4 F (38 C) or higher, or as directed    You have pain that gets worse    You have symptoms that don t get better, or symptoms that get worse    You develop new symptoms   Date Last Reviewed: 5/1/2016 2000-2018 [employer]. [address] [place] [address], [place] [address]. All rights reserved. This information is not intended as a substitute for professional medical care. Always follow your healthcare professional's instructions.               Return in about 2 weeks (around 11/7/2019), or if symptoms worsen or fail to improve.    Mohini Mccord NP  [facility]

## 2019-10-24 NOTE — LETTER
[facility]  [address] [facility] [address]  Phone: [phone]  Fax: [phone]    October 24, 2019        Merissa Leung  [address] [facility] [address]          To whom it may concern:    RE: Merissa Leung    Patient was seen and treated today at our clinic and missed school part of the day yesterday. I recommend that she take today off. OK to return on Saturday.     Please contact me for questions or concerns.      Sincerely,        Mohini Mccord NP

## 2019-10-25 LAB
BACTERIA SPEC CULT: NORMAL
SPECIMEN SOURCE: NORMAL

## 2019-10-31 LAB — COPATH REPORT: NORMAL

## 2019-11-04 ENCOUNTER — TELEPHONE (OUTPATIENT)
Dept: FAMILY MEDICINE | Facility: CLINIC | Age: 34
End: 2019-11-04

## 2019-11-04 DIAGNOSIS — M79.10 MYALGIA: ICD-10-CM

## 2019-11-04 DIAGNOSIS — E87.6 HYPOKALEMIA: ICD-10-CM

## 2019-11-04 DIAGNOSIS — D69.3 IMMUNE THROMBOCYTOPENIA (H): Primary | ICD-10-CM

## 2019-11-04 DIAGNOSIS — J02.9 SORE THROAT: ICD-10-CM

## 2019-11-04 DIAGNOSIS — M25.50 MULTIPLE JOINT PAIN: ICD-10-CM

## 2019-11-04 DIAGNOSIS — M79.10 MYALGIA: Primary | ICD-10-CM

## 2019-11-04 LAB
ANION GAP SERPL CALCULATED.3IONS-SCNC: 5 MMOL/L (ref 3–14)
BUN SERPL-MCNC: 12 MG/DL (ref 7–30)
CALCIUM SERPL-MCNC: 8.7 MG/DL (ref 8.5–10.1)
CHLORIDE SERPL-SCNC: 107 MMOL/L (ref 94–109)
CO2 SERPL-SCNC: 24 MMOL/L (ref 20–32)
CREAT SERPL-MCNC: 0.73 MG/DL (ref 0.52–1.04)
ERYTHROCYTE [DISTWIDTH] IN BLOOD BY AUTOMATED COUNT: 12.9 % (ref 10–15)
GFR SERPL CREATININE-BSD FRML MDRD: >90 ML/MIN/{1.73_M2}
GLUCOSE SERPL-MCNC: 99 MG/DL (ref 70–99)
HCT VFR BLD AUTO: 44.3 % (ref 35–47)
HETEROPH AB SER QL: NEGATIVE
HGB BLD-MCNC: 14.8 G/DL (ref 11.7–15.7)
MCH RBC QN AUTO: 30.6 PG (ref 26.5–33)
MCHC RBC AUTO-ENTMCNC: 33.4 G/DL (ref 31.5–36.5)
MCV RBC AUTO: 92 FL (ref 78–100)
PLATELET # BLD AUTO: 89 10E9/L (ref 150–450)
POTASSIUM SERPL-SCNC: 3.4 MMOL/L (ref 3.4–5.3)
RBC # BLD AUTO: 4.84 10E12/L (ref 3.8–5.2)
SODIUM SERPL-SCNC: 136 MMOL/L (ref 133–144)
WBC # BLD AUTO: 9.8 10E9/L (ref 4–11)

## 2019-11-04 PROCEDURE — 86308 HETEROPHILE ANTIBODY SCREEN: CPT | Performed by: NURSE PRACTITIONER

## 2019-11-04 PROCEDURE — 36415 COLL VENOUS BLD VENIPUNCTURE: CPT | Performed by: NURSE PRACTITIONER

## 2019-11-04 PROCEDURE — 85027 COMPLETE CBC AUTOMATED: CPT | Performed by: NURSE PRACTITIONER

## 2019-11-04 PROCEDURE — 80048 BASIC METABOLIC PNL TOTAL CA: CPT | Performed by: NURSE PRACTITIONER

## 2019-11-04 NOTE — TELEPHONE ENCOUNTER
Merissa is wondering if Анна wants her to have labs today because she can come in shortly.    [facility] [place]

## 2019-11-05 ENCOUNTER — TRANSFERRED RECORDS (OUTPATIENT)
Dept: HEALTH INFORMATION MANAGEMENT | Facility: CLINIC | Age: 34
End: 2019-11-05

## 2019-11-06 ENCOUNTER — TELEPHONE (OUTPATIENT)
Dept: FAMILY MEDICINE | Facility: CLINIC | Age: 34
End: 2019-11-06

## 2019-11-06 DIAGNOSIS — F41.1 GAD (GENERALIZED ANXIETY DISORDER): ICD-10-CM

## 2019-11-06 DIAGNOSIS — D69.3 IMMUNE THROMBOCYTOPENIA (H): ICD-10-CM

## 2019-11-06 DIAGNOSIS — F50.819 BINGE EATING DISORDER: ICD-10-CM

## 2019-11-06 DIAGNOSIS — K58.1 IRRITABLE BOWEL SYNDROME WITH CONSTIPATION: ICD-10-CM

## 2019-11-06 LAB
ERYTHROCYTE [DISTWIDTH] IN BLOOD BY AUTOMATED COUNT: 12.9 % (ref 10–15)
HCT VFR BLD AUTO: 42.6 % (ref 35–47)
HGB BLD-MCNC: 14.4 G/DL (ref 11.7–15.7)
MCH RBC QN AUTO: 30.8 PG (ref 26.5–33)
MCHC RBC AUTO-ENTMCNC: 33.8 G/DL (ref 31.5–36.5)
MCV RBC AUTO: 91 FL (ref 78–100)
PLATELET # BLD AUTO: 62 10E9/L (ref 150–450)
RBC # BLD AUTO: 4.67 10E12/L (ref 3.8–5.2)
WBC # BLD AUTO: 8.6 10E9/L (ref 4–11)

## 2019-11-06 PROCEDURE — 85027 COMPLETE CBC AUTOMATED: CPT | Performed by: NURSE PRACTITIONER

## 2019-11-06 PROCEDURE — 36415 COLL VENOUS BLD VENIPUNCTURE: CPT | Performed by: NURSE PRACTITIONER

## 2019-11-06 RX ORDER — CLONAZEPAM 1 MG/1
TABLET ORAL
Qty: 75 TABLET | Refills: 1 | Status: SHIPPED | OUTPATIENT
Start: 2019-11-06 | End: 2020-01-17

## 2019-11-06 RX ORDER — LISDEXAMFETAMINE DIMESYLATE 10 MG/1
10 CAPSULE ORAL DAILY
Qty: 30 CAPSULE | Refills: 0 | Status: SHIPPED | OUTPATIENT
Start: 2020-01-06 | End: 2020-08-11

## 2019-11-06 RX ORDER — LISDEXAMFETAMINE DIMESYLATE 40 MG/1
40 CAPSULE ORAL DAILY
Qty: 30 CAPSULE | Refills: 0 | Status: SHIPPED | OUTPATIENT
Start: 2019-12-06 | End: 2020-08-11

## 2019-11-06 RX ORDER — LISDEXAMFETAMINE DIMESYLATE 40 MG/1
40 CAPSULE ORAL DAILY
Qty: 30 CAPSULE | Refills: 0 | Status: SHIPPED | OUTPATIENT
Start: 2020-01-06 | End: 2020-08-11

## 2019-11-06 RX ORDER — LISDEXAMFETAMINE DIMESYLATE 40 MG/1
40 CAPSULE ORAL DAILY
Qty: 30 CAPSULE | Refills: 0 | Status: SHIPPED | OUTPATIENT
Start: 2019-11-06 | End: 2020-08-11

## 2019-11-06 RX ORDER — LISDEXAMFETAMINE DIMESYLATE 10 MG/1
10 CAPSULE ORAL DAILY
Qty: 30 CAPSULE | Refills: 0 | Status: SHIPPED | OUTPATIENT
Start: 2019-11-06 | End: 2020-08-11

## 2019-11-06 RX ORDER — LISDEXAMFETAMINE DIMESYLATE 10 MG/1
10 CAPSULE ORAL DAILY
Qty: 30 CAPSULE | Refills: 0 | Status: SHIPPED | OUTPATIENT
Start: 2019-12-06 | End: 2020-08-11

## 2019-11-06 NOTE — TELEPHONE ENCOUNTER
----- Message from Bernadette Pena MD sent at 11/6/2019  8:55 AM CST -----  Regarding: RE: thrombocytopenia  I think her autoimmue disease flare up also flare up her ITP (immediated self destruction).    I hope the anti inflammatory Taltz will help her PL.     Thank.ADA  ----- Message -----  From: Mohini Mccord NP  Sent: 11/5/2019   7:17 PM CST  To: Bernadette Pena MD  Subject: thrombocytopenia                                 She had platelets checked this AM- 62    [facility] Dr. Pena-    I have a question regarding mutual patient Merissa Leung- you have seen her for idiopathic thrombocytopenia. She is s/p splenectomy 11/2018. Has been doing well up until the past few weeks when she has developed severe psoriasis with systemic symptoms suspected psoriatic arthritis.   She was evaluated by dermatology yesterday whom wants to start her on biologic therapy (Taltz). In the past 2 weeks her platelet count has been trending downward.     Platelet Count       Date                     Value               Ref Range           Status                11/04/2019               89 (L)              150 - 450 10e9#     Final            ----------  11/24/2019- 100     She will have her labs rechecked on Thursday.     Derm is requesting your input prior to starting Taltz.     Thank you in advance.   Mohini Mccord NP   ([facility])

## 2019-11-08 ENCOUNTER — APPOINTMENT (OUTPATIENT)
Dept: GENERAL RADIOLOGY | Facility: CLINIC | Age: 34
End: 2019-11-08
Attending: FAMILY MEDICINE
Payer: COMMERCIAL

## 2019-11-08 ENCOUNTER — HOSPITAL ENCOUNTER (EMERGENCY)
Facility: CLINIC | Age: 34
Discharge: HOME | End: 2019-11-08
Attending: FAMILY MEDICINE | Admitting: FAMILY MEDICINE
Payer: COMMERCIAL

## 2019-11-08 VITALS
SYSTOLIC BLOOD PRESSURE: 96 MMHG | OXYGEN SATURATION: 100 % | HEART RATE: 58 BPM | TEMPERATURE: 97.6 F | BODY MASS INDEX: 17.7 KG/M2 | WEIGHT: 108 LBS | DIASTOLIC BLOOD PRESSURE: 65 MMHG | RESPIRATION RATE: 12 BRPM

## 2019-11-08 DIAGNOSIS — B35.4 TINEA CORPORIS: ICD-10-CM

## 2019-11-08 DIAGNOSIS — D69.3 IMMUNE THROMBOCYTOPENIC PURPURA (H): Primary | ICD-10-CM

## 2019-11-08 DIAGNOSIS — J06.9 VIRAL URI: ICD-10-CM

## 2019-11-08 DIAGNOSIS — N92.0 MENORRHAGIA WITH REGULAR CYCLE: ICD-10-CM

## 2019-11-08 DIAGNOSIS — E87.6 HYPOKALEMIA: ICD-10-CM

## 2019-11-08 LAB
ALBUMIN SERPL-MCNC: 3.3 G/DL (ref 3.4–5)
ALP SERPL-CCNC: 51 U/L (ref 40–150)
ALT SERPL W P-5'-P-CCNC: 19 U/L (ref 0–50)
ANION GAP SERPL CALCULATED.3IONS-SCNC: 5 MMOL/L (ref 3–14)
AST SERPL W P-5'-P-CCNC: 17 U/L (ref 0–45)
BASOPHILS # BLD AUTO: 0.1 10E9/L (ref 0–0.2)
BASOPHILS NFR BLD AUTO: 1 %
BILIRUB SERPL-MCNC: 0.2 MG/DL (ref 0.2–1.3)
BUN SERPL-MCNC: 11 MG/DL (ref 7–30)
CALCIUM SERPL-MCNC: 8.7 MG/DL (ref 8.5–10.1)
CHLORIDE SERPL-SCNC: 109 MMOL/L (ref 94–109)
CO2 SERPL-SCNC: 29 MMOL/L (ref 20–32)
CREAT SERPL-MCNC: 0.79 MG/DL (ref 0.52–1.04)
CRP SERPL-MCNC: <2.9 MG/L (ref 0–8)
DIFFERENTIAL METHOD BLD: ABNORMAL
EOSINOPHIL # BLD AUTO: 0.2 10E9/L (ref 0–0.7)
EOSINOPHIL NFR BLD AUTO: 1.7 %
ERYTHROCYTE [DISTWIDTH] IN BLOOD BY AUTOMATED COUNT: 12.5 % (ref 10–15)
FLUAV+FLUBV AG SPEC QL: NEGATIVE
FLUAV+FLUBV AG SPEC QL: NEGATIVE
GFR SERPL CREATININE-BSD FRML MDRD: >90 ML/MIN/{1.73_M2}
GLUCOSE SERPL-MCNC: 113 MG/DL (ref 70–99)
HCT VFR BLD AUTO: 36.9 % (ref 35–47)
HGB BLD-MCNC: 12.8 G/DL (ref 11.7–15.7)
IMM GRANULOCYTES # BLD: 0 10E9/L (ref 0–0.4)
IMM GRANULOCYTES NFR BLD: 0.2 %
LYMPHOCYTES # BLD AUTO: 2.8 10E9/L (ref 0.8–5.3)
LYMPHOCYTES NFR BLD AUTO: 27.7 %
MCH RBC QN AUTO: 31.3 PG (ref 26.5–33)
MCHC RBC AUTO-ENTMCNC: 34.7 G/DL (ref 31.5–36.5)
MCV RBC AUTO: 90 FL (ref 78–100)
MONOCYTES # BLD AUTO: 0.8 10E9/L (ref 0–1.3)
MONOCYTES NFR BLD AUTO: 8.1 %
NEUTROPHILS # BLD AUTO: 6.2 10E9/L (ref 1.6–8.3)
NEUTROPHILS NFR BLD AUTO: 61.3 %
NRBC # BLD AUTO: 0 10*3/UL
NRBC BLD AUTO-RTO: 0 /100
PLATELET # BLD AUTO: 99 10E9/L (ref 150–450)
POTASSIUM SERPL-SCNC: 2.7 MMOL/L (ref 3.4–5.3)
PROT SERPL-MCNC: 6.3 G/DL (ref 6.8–8.8)
RBC # BLD AUTO: 4.09 10E12/L (ref 3.8–5.2)
SODIUM SERPL-SCNC: 143 MMOL/L (ref 133–144)
SPECIMEN SOURCE: NORMAL
WBC # BLD AUTO: 10 10E9/L (ref 4–11)

## 2019-11-08 PROCEDURE — 85025 COMPLETE CBC W/AUTO DIFF WBC: CPT | Performed by: FAMILY MEDICINE

## 2019-11-08 PROCEDURE — 25000132 ZZH RX MED GY IP 250 OP 250 PS 637: Performed by: FAMILY MEDICINE

## 2019-11-08 PROCEDURE — 96360 HYDRATION IV INFUSION INIT: CPT

## 2019-11-08 PROCEDURE — 99285 EMERGENCY DEPT VISIT HI MDM: CPT | Mod: 25

## 2019-11-08 PROCEDURE — 99285 EMERGENCY DEPT VISIT HI MDM: CPT | Mod: Z6 | Performed by: FAMILY MEDICINE

## 2019-11-08 PROCEDURE — 86140 C-REACTIVE PROTEIN: CPT | Performed by: FAMILY MEDICINE

## 2019-11-08 PROCEDURE — 96361 HYDRATE IV INFUSION ADD-ON: CPT

## 2019-11-08 PROCEDURE — 80053 COMPREHEN METABOLIC PANEL: CPT | Performed by: FAMILY MEDICINE

## 2019-11-08 PROCEDURE — 87804 INFLUENZA ASSAY W/OPTIC: CPT | Performed by: FAMILY MEDICINE

## 2019-11-08 PROCEDURE — 25800030 ZZH RX IP 258 OP 636: Performed by: FAMILY MEDICINE

## 2019-11-08 PROCEDURE — 71046 X-RAY EXAM CHEST 2 VIEWS: CPT

## 2019-11-08 RX ORDER — POTASSIUM CHLORIDE 1500 MG/1
20 TABLET, EXTENDED RELEASE ORAL ONCE
Status: COMPLETED | OUTPATIENT
Start: 2019-11-08 | End: 2019-11-08

## 2019-11-08 RX ORDER — SODIUM CHLORIDE 9 MG/ML
1000 INJECTION, SOLUTION INTRAVENOUS CONTINUOUS
Status: DISCONTINUED | OUTPATIENT
Start: 2019-11-08 | End: 2019-11-08 | Stop reason: HOSPADM

## 2019-11-08 RX ORDER — KETOCONAZOLE 20 MG/G
CREAM TOPICAL
Qty: 15 G | Refills: 0 | Status: SHIPPED | OUTPATIENT
Start: 2019-11-08 | End: 2020-07-13

## 2019-11-08 RX ADMIN — SODIUM CHLORIDE 1000 ML: 9 INJECTION, SOLUTION INTRAVENOUS at 14:44

## 2019-11-08 RX ADMIN — POTASSIUM CHLORIDE 20 MEQ: 20 TABLET, EXTENDED RELEASE ORAL at 15:37

## 2019-11-08 ASSESSMENT — ENCOUNTER SYMPTOMS
SORE THROAT: 1
DIAPHORESIS: 0
SHORTNESS OF BREATH: 0
POLYDIPSIA: 1
VOMITING: 0
FATIGUE: 1
RHINORRHEA: 1
NAUSEA: 1
COUGH: 1
DIARRHEA: 0
FEVER: 1
ABDOMINAL PAIN: 1
WEAKNESS: 1
MYALGIAS: 1
DIFFICULTY URINATING: 0
CHILLS: 1

## 2019-11-08 NOTE — ED PROVIDER NOTES
History     Chief Complaint   Patient presents with     Nausea     Generalized Weakness     Pt states has platelet disorder and vag bleeding worse last night - also c/o cough and myalgia - has psoriasis and now with new rash that she states had biopsied last week as well.      TRACEY Leung is a 34 year old female who presents with multiple complaints.  Primarily she has recent onset of fever and chills, productive cough, body aches.  She also has recent onset of a menstrual cycle with abnormally heavy flow in the context of ITP.  She also has a spreading rash which has been going on for several days.  She had one rash lesion biopsied about a week ago at primary care in this report came back with either treated psoriasis or a generic type of dermatitis.  She does not treat her psoriatic lesions with anything at this time.  The primary area of her new rashes on her upper chest and neck.  It is a burning warm rash.  She also complains of exacerbation of her scalp psoriasis which has some accompanying weeping.  She does admit to some nausea but no vomiting.  She has no diarrhea.  She has no dysuria symptoms.  She notes that she had a splenectomy due to her ITP and did receive pneumonia vaccination.    Allergies:  Allergies   Allergen Reactions     Dilaudid [Hydromorphone] Nausea and Vomiting     Food Other (See Comments) and Swelling     Processed food, sugar, high sodium, red grains, rice, potatoes(can have mashed, but no french fries or puffs)  Pain in extremities and face.   All over body at times.     Milk Protein Extract Swelling     Blow up like a balloon       Problem List:    Patient Active Problem List    Diagnosis Date Noted     Immune thrombocytopenic purpura (H) 11/06/2018     Priority: Medium     Lower GI bleed 10/31/2018     Priority: Medium     Underweight 11/09/2017     Priority: Medium     Chronic abdominal pain 10/06/2016     Priority: Medium     Has had workup through [facility]  [place]      Now followed by [facility] (started on bentyl)       Fibromyalgia 2015     Priority: Medium     Anxiety 2015     Priority: Medium     Hyperlipidemia LDL goal <160 07/15/2013     Priority: Medium     Headaches, tension 07/15/2013     Priority: Medium     Chronic constipation 07/15/2013     Priority: Medium     OCD (obsessive compulsive disorder) 07/15/2013     Priority: Medium     Binge eating  Started therapeutic trial of Luvox 100-300mg extended release at bedtime; category C in pregnancy (as all SSRI); not effective  Will refer to [facility]          Past Medical History:    Past Medical History:   Diagnosis Date     Anxiety      Binge eating disorder      Chickenpox      Food intolerance      IBS (irritable bowel syndrome)      MEDICAL HISTORY OF -        Past Surgical History:    Past Surgical History:   Procedure Laterality Date     C/SECTION, LOW TRANSVERSE        SECTION, TUBAL LIGATION, COMBINED N/A 2/15/2016    NO tubal ligation done     COLONOSCOPY  2013    Procedure: COMBINED COLONOSCOPY, SINGLE BIOPSY/POLYPECTOMY BY BIOPSY;  Colonoscopy;  Surgeon: Maureen Valentin MD;  Location: [facility]     COLONOSCOPY N/A 2015    Procedure: COMBINED COLONOSCOPY, SINGLE OR MULTIPLE BIOPSY/POLYPECTOMY BY BIOPSY;  Surgeon: Maureen Valentin MD;  Location: [facility]     ESOPHAGOSCOPY, GASTROSCOPY, DUODENOSCOPY (EGD), COMBINED N/A 2018    Procedure: COMBINED ESOPHAGOSCOPY, GASTROSCOPY, DUODENOSCOPY (EGD), BIOPSY SINGLE OR MULTIPLE;  Gastroscopy;  Surgeon: Ray Amato MD;  Location: [facility]     LAPAROSCOPIC SPLENECTOMY N/A 2018    Procedure: Laparoscopic splenectomy, ;  Surgeon: Anthony Jackson DO;  Location: [facility]       Family History:    Family History   Problem Relation Age of Onset     Gastrointestinal Disease Mother 46        perforated intestines-      Hepatitis Mother         C     Liver Disease Mother      Blood Disease Mother         anemia      Substance Abuse Mother         A&D     Psychotic Disorder Brother         Schizophrenia, OCD     Schizophrenia Brother      Anxiety Disorder Brother      Arthritis Maternal Grandmother      Cancer Paternal Grandmother         cervical     Breast Cancer Paternal Grandmother      Hypertension Paternal Grandmother      Diabetes Paternal Grandmother      Cerebrovascular Disease Paternal Grandfather      Heart Disease Paternal Grandfather      Bipolar Disorder Paternal Grandfather      Suicide Paternal Grandfather      Cancer Paternal Aunt         cervical     Coronary Artery Disease Maternal Grandfather         MI     Substance Abuse Father         recovered A&D     Heart Disease Father         MI-stents     Hypertension Father        Social History:  Marital Status:   [2]  Social History     Tobacco Use     Smoking status: Current Every Day Smoker     Packs/day: 0.50     Years: 6.00     Pack years: 3.00     Types: Cigarettes     Last attempt to quit: 2015     Years since quittin.4     Smokeless tobacco: Never Used     Tobacco comment: 1/2 pack daily    Substance Use Topics     Alcohol use: No     Drug use: No        Medications:    clonazePAM (KLONOPIN) 1 MG tablet  hydrOXYzine (ATARAX) 25 MG tablet  ketoconazole (NIZORAL) 2 % external cream  linaclotide (LINZESS) 145 MCG capsule  lisdexamfetamine (VYVANSE) 10 MG capsule  lisdexamfetamine (VYVANSE) 40 MG capsule  Multiple Vitamins-Minerals (MULTIVITAL PO)  sennosides (SENOKOT) 8.6 MG tablet  UNABLE TO FIND  ibuprofen (ADVIL/MOTRIN) 600 MG tablet  [START ON 2020] lisdexamfetamine (VYVANSE) 10 MG capsule  [START ON 2019] lisdexamfetamine (VYVANSE) 10 MG capsule  [START ON 2020] lisdexamfetamine (VYVANSE) 40 MG capsule  [START ON 2019] lisdexamfetamine (VYVANSE) 40 MG capsule  polyethylene glycol (MIRALAX/GLYCOLAX) powder  triamcinolone (KENALOG) 0.1 % external ointment          Review of Systems   Constitutional: Positive for chills,  fatigue and fever. Negative for diaphoresis.   HENT: Positive for congestion, rhinorrhea and sore throat. Negative for ear discharge and ear pain.    Respiratory: Positive for cough. Negative for shortness of breath.    Cardiovascular: Negative for chest pain.   Gastrointestinal: Positive for abdominal pain and nausea. Negative for diarrhea and vomiting.   Endocrine: Positive for polydipsia and polyuria.   Genitourinary: Positive for menstrual problem and vaginal bleeding. Negative for difficulty urinating.   Musculoskeletal: Positive for myalgias.   Skin: Positive for rash.   Neurological: Positive for weakness.       Physical Exam   BP: 118/75  Pulse: 105  Temp: 97.6  F (36.4  C)  Resp: 12  Weight: 49 kg (108 lb)  SpO2: 97 %      Physical Exam  Vitals signs and nursing note reviewed.   Constitutional:       General: She is not in acute distress.     Appearance: Normal appearance. She is not ill-appearing, toxic-appearing or diaphoretic.   HENT:      Head: Normocephalic and atraumatic.      Right Ear: Tympanic membrane, ear canal and external ear normal. There is no impacted cerumen.      Left Ear: Tympanic membrane, ear canal and external ear normal. There is no impacted cerumen.      Nose: Nose normal.      Mouth/Throat:      Mouth: Mucous membranes are moist.      Pharynx: Oropharynx is clear. No oropharyngeal exudate or posterior oropharyngeal erythema.   Eyes:      Extraocular Movements: Extraocular movements intact.      Pupils: Pupils are equal, round, and reactive to light.   Neck:      Musculoskeletal: Normal range of motion and neck supple.   Cardiovascular:      Rate and Rhythm: Normal rate and regular rhythm.      Heart sounds: No murmur.   Pulmonary:      Effort: Pulmonary effort is normal. No respiratory distress.      Breath sounds: Normal breath sounds. No wheezing or rales.   Abdominal:      General: Abdomen is flat.      Palpations: Abdomen is soft.      Tenderness: There is no tenderness.    Musculoskeletal: Normal range of motion.         General: No deformity.   Lymphadenopathy:      Cervical: Cervical adenopathy (submandibular) present.   Skin:     General: Skin is warm and dry.      Findings: Rash (rash on upper chest and neck.  Red with distinct border and some satellite lesions) present.   Neurological:      General: No focal deficit present.      Mental Status: She is alert.   Psychiatric:         Mood and Affect: Mood normal.         Thought Content: Thought content normal.         ED Course     ED Course as of Nov 08 1613 Fri Nov 08, 2019   1547 Platelet Count(!): 99     Procedures          Patient treated in the ED with 1 L IV fluids and oral potassium replacement.    Critical Care time:  none               Results for orders placed or performed during the hospital encounter of 11/08/19 (from the past 24 hour(s))   CBC with platelets differential   Result Value Ref Range    WBC 10.0 4.0 - 11.0 10e9/L    RBC Count 4.09 3.8 - 5.2 10e12/L    Hemoglobin 12.8 11.7 - 15.7 g/dL    Hematocrit 36.9 35.0 - 47.0 %    MCV 90 78 - 100 fl    MCH 31.3 26.5 - 33.0 pg    MCHC 34.7 31.5 - 36.5 g/dL    RDW 12.5 10.0 - 15.0 %    Platelet Count 99 (L) 150 - 450 10e9/L    Diff Method Automated Method     % Neutrophils 61.3 %    % Lymphocytes 27.7 %    % Monocytes 8.1 %    % Eosinophils 1.7 %    % Basophils 1.0 %    % Immature Granulocytes 0.2 %    Nucleated RBCs 0 0 /100    Absolute Neutrophil 6.2 1.6 - 8.3 10e9/L    Absolute Lymphocytes 2.8 0.8 - 5.3 10e9/L    Absolute Monocytes 0.8 0.0 - 1.3 10e9/L    Absolute Eosinophils 0.2 0.0 - 0.7 10e9/L    Absolute Basophils 0.1 0.0 - 0.2 10e9/L    Abs Immature Granulocytes 0.0 0 - 0.4 10e9/L    Absolute Nucleated RBC 0.0    Comprehensive metabolic panel   Result Value Ref Range    Sodium 143 133 - 144 mmol/L    Potassium 2.7 (L) 3.4 - 5.3 mmol/L    Chloride 109 94 - 109 mmol/L    Carbon Dioxide 29 20 - 32 mmol/L    Anion Gap 5 3 - 14 mmol/L    Glucose 113 (H) 70 - 99  mg/dL    Urea Nitrogen 11 7 - 30 mg/dL    Creatinine 0.79 0.52 - 1.04 mg/dL    GFR Estimate >90 >60 mL/min/[1.73_m2]    GFR Estimate If Black >90 >60 mL/min/[1.73_m2]    Calcium 8.7 8.5 - 10.1 mg/dL    Bilirubin Total 0.2 0.2 - 1.3 mg/dL    Albumin 3.3 (L) 3.4 - 5.0 g/dL    Protein Total 6.3 (L) 6.8 - 8.8 g/dL    Alkaline Phosphatase 51 40 - 150 U/L    ALT 19 0 - 50 U/L    AST 17 0 - 45 U/L   CRP inflammation   Result Value Ref Range    CRP Inflammation <2.9 0.0 - 8.0 mg/L   Influenza A/B antigen   Result Value Ref Range    Influenza A/B Agn Specimen Nasal     Influenza A Negative NEG^Negative    Influenza B Negative NEG^Negative   XR Chest 2 Views    Narrative    CHEST TWO VIEWS 11/8/2019 3:34 PM     HISTORY: Cough    COMPARISON: 1/24/2018    FINDINGS: Heart size and pulmonary vascularity are within normal  limits. The lungs are clear. No pneumothorax or pleural effusion.       Impression    IMPRESSION: Normal two views of the chest.     ARNALDO GIRON MD       Medications   0.9% sodium chloride BOLUS (1,000 mLs Intravenous New Bag 11/8/19 1444)     Followed by   sodium chloride 0.9% infusion (has no administration in time range)   potassium chloride ER (K-DUR/KLOR-CON M) CR tablet 20 mEq (20 mEq Oral Given 11/8/19 1537)       Assessments & Plan (with Medical Decision Making)     I have reviewed the nursing notes.    I have reviewed the findings, diagnosis, plan and need for follow up with the patient.   Complex set of symptoms which may or may not be connected.  She does have follow-up with her dermatologist on Tuesday next week.  I advised her to follow-up with her primary care next week for recheck on her labs.  I think that with a increased platelet count over her preop prior count she does not need to worry about excessive uterine bleeding with her current menstrual cycle.  Chest x-ray being clear and white count being normal would suggest that her Estrace symptoms are more likely due to a viral upper  respiratory infection.  Influenza swab was negative.    Her rash has almost more of a tinea characteristic to it and she has not yet tried any antifungals.  She has tried steroid creams which have not helped.  To that and I will send her out on antifungal cream with Derm follow-up as noted for next week.    New Prescriptions    KETOCONAZOLE (NIZORAL) 2 % EXTERNAL CREAM    Apply to rash on chest and neck twice daily       Final diagnoses:   Hypokalemia   Viral URI   Menorrhagia with regular cycle   Tinea corporis       11/8/2019   [facility] EMERGENCY DEPARTMENT     Jean Meng MD  11/08/19 9595

## 2019-11-08 NOTE — ED AVS SNAPSHOT
[facility] [place] [facility]  [address] [address]  Phone:  [phone]  Fax:  [phone]                                    Merissa Leung   MRN: [id number]    Department:  [facility] [place] [facility]   Date of Visit:  11/8/2019           After Visit Summary Signature Page    I have received my discharge instructions, and my questions have been answered. I have discussed any challenges I see with this plan with the nurse or doctor.    ..........................................................................................................................................  Patient/Patient Representative Signature      ..........................................................................................................................................  Patient Representative Print Name and Relationship to Patient    ..................................................               ................................................  Date                                   Time    ..........................................................................................................................................  Reviewed by Signature/Title    ...................................................              ..............................................  Date                                               Time          22EPIC Rev 08/18

## 2019-11-08 NOTE — ED NOTES
"Pt has mult complaints.  Rash spreading over entire body for past 4 wks.  No difficulty breathing.  No new meds or foods.  Also c/o heavy menses that started last night.  Pt c/o nausea.  Chills.  Feeling \"weak and confused.\"  Pt states the confusion started 4 wks ago as well.  In ER pt A&O x3 and obeys all commands.  "

## 2019-11-12 ENCOUNTER — TELEPHONE (OUTPATIENT)
Dept: FAMILY MEDICINE | Facility: CLINIC | Age: 34
End: 2019-11-12

## 2019-12-11 NOTE — NURSING NOTE
"Chief Complaint   Patient presents with     Sleep Problem     Refill meds, \"About the same\"     Abdominal Pain     Refill meds \"Same, Linzess drastically is helping\"     Throat Problem     Sore swollen throat the past 2 days, would like strep test done. Dry cough and runny nose too.       Initial /62 (BP Location: Left arm, Patient Position: Sitting, Cuff Size: Adult Small)  Pulse 74  Temp 98.2  F (36.8  C) (Tympanic)  Resp 18  Wt 104 lb 12.8 oz (47.5 kg)  BMI 17.17 kg/m2 Estimated body mass index is 17.17 kg/(m^2) as calculated from the following:    Height as of 1/2/18: 5' 5.5\" (1.664 m).    Weight as of this encounter: 104 lb 12.8 oz (47.5 kg).  Medication Reconciliation: complete- Needs refills-    RSTest obtained and to lab.    " Nausea & vomiting

## 2020-01-06 ENCOUNTER — ALLIED HEALTH/NURSE VISIT (OUTPATIENT)
Dept: FAMILY MEDICINE | Facility: CLINIC | Age: 35
End: 2020-01-06
Payer: COMMERCIAL

## 2020-01-06 DIAGNOSIS — Z23 NEED FOR PROPHYLACTIC VACCINATION AND INOCULATION AGAINST INFLUENZA: ICD-10-CM

## 2020-01-06 DIAGNOSIS — D69.3 IMMUNE THROMBOCYTOPENIC PURPURA (H): Primary | ICD-10-CM

## 2020-01-06 DIAGNOSIS — Z23 NEED FOR VACCINATION: ICD-10-CM

## 2020-01-06 PROCEDURE — 99207 ZZC NO CHARGE NURSE ONLY: CPT

## 2020-01-06 NOTE — PROGRESS NOTES
RN visit for teaching pt adminsubcutaneous injection Dupixent as ordered by pt's dermatologist.   Pt has been given drug fact information by derm, voices understanding.  Taught drug storage, syringe disposal, site prep, site rotation abd/ thighs for subcutaneous injection, areas to avoid injecting, abnormal post inj s/sx to watch for/ report, taught injection technique per handout.   Pt states has fear of needles, anxiousness of self admin med however able to self admin drug x2 syringes with teaching, support, encouragement.   Pt experienced slight sweating, lightheadedness immediately following inj however quickly passed with deep breathing. Pt remained in clinic x15 min following inj, no further sx.  Advised to have someone with her at home when admin inj. If needs further teaching, support with next inj please schedule RN visit. Pt voices understanding of info given.  ADA Rubi RN

## 2020-01-16 DIAGNOSIS — F41.1 GAD (GENERALIZED ANXIETY DISORDER): ICD-10-CM

## 2020-01-16 NOTE — TELEPHONE ENCOUNTER
.  PHQ-9 SCORE 9/7/2016 2/6/2018 6/4/2018   PHQ-9 Total Score - - -   PHQ-9 Total Score 4 4 9     EDWARD-7 SCORE 9/7/2016 2/6/2018 6/4/2018   Total Score - - -   Total Score 1 2 5      checked- no concerns.  Forwarded to Анна.  ADA Rubi, RN

## 2020-01-16 NOTE — TELEPHONE ENCOUNTER
Requested Prescriptions   Pending Prescriptions Disp Refills     clonazePAM (KLONOPIN) 1 MG tablet [Pharmacy Med Name: clonazePAM 1 MG Oral Tablet]  0     Sig: TAKE 1/2 (ONE-HALF) TABLET BY MOUTH TWICE DAILY FOR  ANXIETY  AND  ONE  AND  ONE-HALF  TABLET  ONCE  DAILY  AT  BEDTIME  FOR  SLEEP       There is no refill protocol information for this order        Last Written Prescription Date:  11/6/19  Last Fill Quantity: 75,  # refills: 1   Last office visit: 1/6/2020 with prescribing provider:     Future Office Visit:

## 2020-01-17 RX ORDER — CLONAZEPAM 1 MG/1
TABLET ORAL
Qty: 75 TABLET | Refills: 1 | Status: SHIPPED | OUTPATIENT
Start: 2020-01-17 | End: 2020-03-20

## 2020-01-30 ENCOUNTER — ALLIED HEALTH/NURSE VISIT (OUTPATIENT)
Dept: FAMILY MEDICINE | Facility: CLINIC | Age: 35
End: 2020-01-30
Payer: COMMERCIAL

## 2020-01-30 DIAGNOSIS — Z23 NEED FOR PROPHYLACTIC VACCINATION AND INOCULATION AGAINST INFLUENZA: Primary | ICD-10-CM

## 2020-01-30 PROCEDURE — 90732 PPSV23 VACC 2 YRS+ SUBQ/IM: CPT

## 2020-01-30 PROCEDURE — 90471 IMMUNIZATION ADMIN: CPT

## 2020-01-30 PROCEDURE — 90472 IMMUNIZATION ADMIN EACH ADD: CPT

## 2020-01-30 PROCEDURE — 90686 IIV4 VACC NO PRSV 0.5 ML IM: CPT

## 2020-01-30 PROCEDURE — 99207 ZZC NO CHARGE NURSE ONLY: CPT

## 2020-02-10 ENCOUNTER — OFFICE VISIT (OUTPATIENT)
Dept: FAMILY MEDICINE | Facility: CLINIC | Age: 35
End: 2020-02-10
Payer: COMMERCIAL

## 2020-02-10 ENCOUNTER — HEALTH MAINTENANCE LETTER (OUTPATIENT)
Age: 35
End: 2020-02-10

## 2020-02-10 VITALS
HEIGHT: 66 IN | OXYGEN SATURATION: 100 % | DIASTOLIC BLOOD PRESSURE: 70 MMHG | SYSTOLIC BLOOD PRESSURE: 110 MMHG | RESPIRATION RATE: 16 BRPM | TEMPERATURE: 97.4 F | BODY MASS INDEX: 17.68 KG/M2 | WEIGHT: 110 LBS | HEART RATE: 81 BPM

## 2020-02-10 DIAGNOSIS — D69.3 IMMUNE THROMBOCYTOPENIC PURPURA (H): Primary | ICD-10-CM

## 2020-02-10 DIAGNOSIS — L40.9 PSORIASIS: ICD-10-CM

## 2020-02-10 DIAGNOSIS — K58.1 IRRITABLE BOWEL SYNDROME WITH CONSTIPATION: ICD-10-CM

## 2020-02-10 DIAGNOSIS — F50.819 BINGE EATING DISORDER: ICD-10-CM

## 2020-02-10 PROCEDURE — 99213 OFFICE O/P EST LOW 20 MIN: CPT | Performed by: NURSE PRACTITIONER

## 2020-02-10 RX ORDER — LISDEXAMFETAMINE DIMESYLATE 40 MG/1
40 CAPSULE ORAL EVERY MORNING
Qty: 30 CAPSULE | Refills: 0 | Status: SHIPPED | OUTPATIENT
Start: 2020-02-10 | End: 2020-08-11

## 2020-02-10 RX ORDER — LISDEXAMFETAMINE DIMESYLATE 10 MG/1
10 CAPSULE ORAL DAILY
Qty: 30 CAPSULE | Refills: 0 | Status: SHIPPED | OUTPATIENT
Start: 2020-03-12 | End: 2020-08-11

## 2020-02-10 RX ORDER — LISDEXAMFETAMINE DIMESYLATE 10 MG/1
10 CAPSULE ORAL DAILY
Qty: 30 CAPSULE | Refills: 0 | Status: SHIPPED | OUTPATIENT
Start: 2020-04-12 | End: 2020-08-11

## 2020-02-10 RX ORDER — LISDEXAMFETAMINE DIMESYLATE 10 MG/1
10 CAPSULE ORAL DAILY
Qty: 30 CAPSULE | Refills: 0 | Status: SHIPPED | OUTPATIENT
Start: 2020-02-10 | End: 2020-08-11

## 2020-02-10 ASSESSMENT — MIFFLIN-ST. JEOR: SCORE: 1217.77

## 2020-02-10 NOTE — PROGRESS NOTES
SUBJECTIVE   Merissa Leung is a 34 year old female who presents with     Medication Followup of  Vyvanse     Taking Medication as prescribed: yes    Side Effects:  None    Medication Helping Symptoms:  yes     Doing well on medication   Denies any concerns   Due for labs for her psoriasis   Has not found the dupixent helpful     Has follow in March     Weight has remained stable   Wt Readings from Last 10 Encounters:   02/10/20 49.9 kg (110 lb)   11/08/19 49 kg (108 lb)   10/24/19 49.1 kg (108 lb 3.2 oz)   10/16/19 49 kg (108 lb)   04/09/19 48.7 kg (107 lb 6.4 oz)   02/21/19 49 kg (108 lb)   01/11/19 50.8 kg (112 lb)   11/30/18 48.1 kg (106 lb)   11/27/18 49.4 kg (109 lb)   11/20/18 49.4 kg (109 lb)     PCP   Mohini Mccord [phone]    Health Maintenance        Health Maintenance Due   Topic Date Due     URINE DRUG SCREEN  1987     DTAP/TDAP/TD IMMUNIZATION (1 - Tdap) 03/17/1998     PREVENTIVE CARE VISIT  11/30/2019     PHQ-2  01/01/2020       HPI        Patient Active Problem List   Diagnosis     Hyperlipidemia LDL goal <160     Headaches, tension     Chronic constipation     OCD (obsessive compulsive disorder)     Anxiety     Fibromyalgia     Chronic abdominal pain     Underweight     Lower GI bleed     Immune thrombocytopenic purpura (H)     Current Outpatient Medications   Medication     clonazePAM (KLONOPIN) 1 MG tablet     Dupilumab (DUPIXENT) 300 MG/2ML syringe     hydrOXYzine (ATARAX) 25 MG tablet     ketoconazole (NIZORAL) 2 % external cream     linaclotide (LINZESS) 145 MCG capsule     lisdexamfetamine (VYVANSE) 10 MG capsule     [START ON 3/12/2020] lisdexamfetamine (VYVANSE) 10 MG capsule     [START ON 4/12/2020] lisdexamfetamine (VYVANSE) 10 MG capsule     lisdexamfetamine (VYVANSE) 10 MG capsule     lisdexamfetamine (VYVANSE) 10 MG capsule     lisdexamfetamine (VYVANSE) 10 MG capsule     lisdexamfetamine (VYVANSE) 40 MG capsule     lisdexamfetamine (VYVANSE) 40 MG capsule      "lisdexamfetamine (VYVANSE) 40 MG capsule     lisdexamfetamine (VYVANSE) 40 MG capsule     lisdexamfetamine (VYVANSE) 40 MG capsule     lisdexamfetamine (VYVANSE) 40 MG capsule     Multiple Vitamins-Minerals (MULTIVITAL PO)     polyethylene glycol (MIRALAX/GLYCOLAX) powder     sennosides (SENOKOT) 8.6 MG tablet     UNABLE TO FIND     ibuprofen (ADVIL/MOTRIN) 600 MG tablet     No current facility-administered medications for this visit.          Reviewed and updated:  Tobacco  Allergies  Meds  Med Hx  Surg Hx  Fam Hx  Soc Hx     ROS:  Constitutional, HEENT, cardiovascular, pulmonary, gi and gu systems are negative, except as otherwise noted.    PHYSICAL EXAM   /70   Pulse 81   Temp 97.4  F (36.3  C) (Tympanic)   Resp 16   Ht 1.664 m (5' 5.5\")   Wt 49.9 kg (110 lb)   LMP 02/05/2020   SpO2 100%   BMI 18.03 kg/m    Body mass index is 18.03 kg/m .  GENERAL: healthy, alert and no distress  NECK: no adenopathy, no asymmetry, masses, or scars and thyroid normal to palpation  RESP: lungs clear to auscultation - no rales, rhonchi or wheezes  CV: regular rate and rhythm, normal S1 S2, no S3 or S4, no murmur, click or rub, no peripheral edema and peripheral pulses strong  ABDOMEN: soft, nontender, no hepatosplenomegaly, no masses and bowel sounds normal  MS: no gross musculoskeletal defects noted, no edema  Skin: psoriasis on neck and wrists    Assessment & Plan     1. Irritable bowel syndrome with constipation  Stable   Refilled   - linaclotide (LINZESS) 145 MCG capsule; Take 1 capsule (145 mcg) by mouth every morning (before breakfast)  Dispense: 30 capsule; Refill: 5    2. Immune thrombocytopenic purpura (H)  Will check labs this week  - CBC with platelets differential; Future    3. Binge eating disorder  Stable   refilld   - lisdexamfetamine (VYVANSE) 40 MG capsule; Take 1 capsule (40 mg) by mouth every morning  Dispense: 30 capsule; Refill: 0  - lisdexamfetamine (VYVANSE) 40 MG capsule; Take 1 capsule " (40 mg) by mouth every morning  Dispense: 30 capsule; Refill: 0  - lisdexamfetamine (VYVANSE) 40 MG capsule; Take 1 capsule (40 mg) by mouth every morning  Dispense: 30 capsule; Refill: 0  - lisdexamfetamine (VYVANSE) 10 MG capsule; Take 1 capsule (10 mg) by mouth daily At noon  Dispense: 30 capsule; Refill: 0  - lisdexamfetamine (VYVANSE) 10 MG capsule; Take 1 capsule (10 mg) by mouth daily  Dispense: 30 capsule; Refill: 0  - lisdexamfetamine (VYVANSE) 10 MG capsule; Take 1 capsule (10 mg) by mouth daily At noon  Dispense: 30 capsule; Refill: 0    4. Psoriasis  Followed by dermatology   Due for lab check   - CBC with platelets differential; Future  - Comprehensive metabolic panel; Future      Mohini Mccord NP  [facility]      Risks, benefits, side effects and rationale for treatment plan fully discussed with the patient and understanding expressed.

## 2020-02-20 ENCOUNTER — OFFICE VISIT (OUTPATIENT)
Dept: FAMILY MEDICINE | Facility: CLINIC | Age: 35
End: 2020-02-20
Payer: COMMERCIAL

## 2020-02-20 VITALS
WEIGHT: 107 LBS | SYSTOLIC BLOOD PRESSURE: 122 MMHG | OXYGEN SATURATION: 100 % | HEIGHT: 65 IN | HEART RATE: 88 BPM | RESPIRATION RATE: 16 BRPM | TEMPERATURE: 97.9 F | DIASTOLIC BLOOD PRESSURE: 68 MMHG | BODY MASS INDEX: 17.83 KG/M2

## 2020-02-20 DIAGNOSIS — R50.9 FEVER, UNSPECIFIED FEVER CAUSE: Primary | ICD-10-CM

## 2020-02-20 DIAGNOSIS — B34.9 VIRAL SYNDROME: ICD-10-CM

## 2020-02-20 PROBLEM — K92.2 LOWER GI BLEED: Status: ACTIVE | Noted: 2018-10-31

## 2020-02-20 PROBLEM — D69.3 IMMUNE THROMBOCYTOPENIC PURPURA (H): Chronic | Status: ACTIVE | Noted: 2018-11-06

## 2020-02-20 LAB
DEPRECATED S PYO AG THROAT QL EIA: NEGATIVE
FLUAV+FLUBV AG SPEC QL: NEGATIVE
FLUAV+FLUBV AG SPEC QL: NEGATIVE
SPECIMEN SOURCE: NORMAL
STREP GROUP A PCR: NOT DETECTED

## 2020-02-20 PROCEDURE — 87804 INFLUENZA ASSAY W/OPTIC: CPT | Performed by: NURSE PRACTITIONER

## 2020-02-20 PROCEDURE — 87651 STREP A DNA AMP PROBE: CPT | Performed by: NURSE PRACTITIONER

## 2020-02-20 PROCEDURE — 99213 OFFICE O/P EST LOW 20 MIN: CPT | Performed by: NURSE PRACTITIONER

## 2020-02-20 PROCEDURE — 40001204 ZZHCL STATISTIC STREP A RAPID: Performed by: NURSE PRACTITIONER

## 2020-02-20 ASSESSMENT — MIFFLIN-ST. JEOR: SCORE: 1196.23

## 2020-02-20 NOTE — PATIENT INSTRUCTIONS
"1. Fever, unspecified fever cause  Acute, stable  - Streptococcus A Rapid Scr w Reflx to PCR  - Influenza A/B antigen  - Group A Streptococcus PCR Throat Swab  Results for orders placed or performed in visit on 02/20/20   Streptococcus A Rapid Scr w Reflx to PCR     Status: None   Result Value Ref Range    Strep Specimen Description Throat     Streptococcus Group A Rapid Screen Negative NEG^Negative   Influenza A/B antigen     Status: None   Result Value Ref Range    Influenza A/B Agn Specimen Nasal     Influenza A Negative NEG^Negative    Influenza B Negative NEG^Negative       2. Viral syndrome  Acute, stable          Patient Education     Viral Syndrome (Adult)  A viral illness may cause a number of symptoms such as fever. Other symptoms depend on the part of the body that the virus affects. If it settles in your nose, throat, and lungs, it may cause cough, sore throat, congestion, runny nose, headache, earache and other ear symptoms, or shortness of breath. If it settles in your stomach and intestinal tract, it may cause nausea, vomiting, cramping, and diarrhea. Sometimes it causes generalized symptoms like \"aching all over,\" feeling tired, loss of energy, or loss of appetite.  A viral illness usually lasts anywhere from several days to several weeks, but sometimes it lasts longer. In some cases, a more serious infection can look like a viral syndrome in the first few days of the illness. You may need another exam and additional tests to know the difference. Watch for the warning signs listed below for when to seek medical advice.  Home care  Follow these guidelines for taking care of yourself at home:    If symptoms are severe, rest at home for the first 2 to 3 days.    Stay away from cigarette smoke - both your smoke and the smoke from others.    You may use over-the-counter acetaminophen or ibuprofen for fever, muscle aching, and headache, unless another medicine was prescribed for this. If you have chronic " liver or kidney disease or ever had a stomach ulcer or gastrointestinal bleeding, talk with your healthcare provider before using these medicines. No one who is younger than 18 and ill with a fever should take aspirin. It may cause severe disease or death.    Your appetite may be poor, so a light diet is fine. Avoid dehydration by drinking 8 to 12, 8-ounce glasses of fluids each day. This may include water; orange juice; lemonade; apple, grape, and cranberry juice; clear fruit drinks; electrolyte replacement and sports drinks; and decaffeinated teas and coffee. If you have been diagnosed with a kidney disease, ask your healthcare provider how much and what types of fluids you should drink to prevent dehydration. If you have kidney disease, drinking too much fluid can cause it build up in the your body and be dangerous to your health.    Over-the-counter remedies won't shorten the length of the illness but may be helpful for symptoms such as cough, sore throat, nasal and sinus congestion, or diarrhea. Don't use decongestants if you have high blood pressure.  Follow-up care  Follow up with your healthcare provider if you do not improve over the next week.  Call 911  Call 911 if any of the following occur:    Convulsion    Feeling weak, dizzy, or like you are going to faint    Chest pain, or more than mild shortness of breath  When to seek medical advice  Call your healthcare provider right away if any of these occur:    Cough with lots of colored sputum (mucus) or blood in your sputum    Chest pain, shortness of breath, wheezing, or trouble breathing    Severe headache; face, neck, or ear pain    Severe, constant pain in the lower right side of your belly (abdominal)    Continued vomiting (can t keep liquids down)    Frequent diarrhea (more than 5 times a day); blood (red or black color) or mucus in diarrhea    Feeling weak, dizzy, or like you are going to faint    Extreme thirst    Fever of 100.4 F (38 C) or higher,  or as directed by your healthcare provider  Date Last Reviewed: 4/1/2018 2000-2019 [employer]. [address] [place] [address], [place] [address]. All rights reserved. This information is not intended as a substitute for professional medical care. Always follow your healthcare professional's instructions.

## 2020-02-20 NOTE — LETTER
February 20, 2020      Merissa Leung  [address] [facility] [address]        To Whom It May Concern:    Merissa Leung was seen in our clinic. She may return to work without restrictions Monday 02/24/2020.       Sincerely,        Kina Chavez, CNP

## 2020-02-20 NOTE — PROGRESS NOTES
SUBJECTIVE   Merissa Leung is a  female who presents to clinic today for the following health issue(s):       RESPIRATORY SYMPTOMS    Duration: 1 day     Description  nasal congestion, rhinorrhea, facial pain/pressure, cough, wheezing, fever, chills, ear popping both, headache, fatigue/malaise, myalgias, conjunctival irritation and nausea    Severity: moderate    Accompanying signs and symptoms: None    History (predisposing factors):  none    Precipitating or alleviating factors: None    Therapies tried and outcome:  Dayquil, Thera Flu, herbal tea, and emergen C.        Health Maintenance        Health Maintenance Due   Topic Date Due     URINE DRUG SCREEN  1987     DTAP/TDAP/TD IMMUNIZATION (1 - Tdap) 03/17/1998     PREVENTIVE CARE VISIT  11/30/2019     PHQ-2  01/01/2020       PCP   Mohini Mccord -[phone]    PROBLEM LIST        Patient Active Problem List   Diagnosis     Hyperlipidemia LDL goal <160     Headaches, tension     Chronic constipation     OCD (obsessive compulsive disorder)     Anxiety     Fibromyalgia     Chronic abdominal pain     Underweight     Lower GI bleed     Immune thrombocytopenic purpura (H)       MEDICATIONS        Current Outpatient Medications   Medication     clonazePAM (KLONOPIN) 1 MG tablet     Dupilumab (DUPIXENT) 300 MG/2ML syringe     hydrOXYzine (ATARAX) 25 MG tablet     linaclotide (LINZESS) 145 MCG capsule     lisdexamfetamine (VYVANSE) 10 MG capsule     Multiple Vitamins-Minerals (MULTIVITAL PO)     polyethylene glycol (MIRALAX/GLYCOLAX) powder     sennosides (SENOKOT) 8.6 MG tablet     UNABLE TO FIND     ibuprofen (ADVIL/MOTRIN) 600 MG tablet     ketoconazole (NIZORAL) 2 % external cream     [START ON 3/12/2020] lisdexamfetamine (VYVANSE) 10 MG capsule     [START ON 4/12/2020] lisdexamfetamine (VYVANSE) 10 MG capsule     lisdexamfetamine (VYVANSE) 10 MG capsule     lisdexamfetamine (VYVANSE) 10 MG capsule     lisdexamfetamine (VYVANSE) 10 MG capsule      "lisdexamfetamine (VYVANSE) 40 MG capsule     lisdexamfetamine (VYVANSE) 40 MG capsule     lisdexamfetamine (VYVANSE) 40 MG capsule     lisdexamfetamine (VYVANSE) 40 MG capsule     lisdexamfetamine (VYVANSE) 40 MG capsule     lisdexamfetamine (VYVANSE) 40 MG capsule     No current facility-administered medications for this visit.        Reviewed and updated as needed this visit by Provider:  Tobacco  Allergies  Meds  Med Hx  Surg Hx  Fam Hx  Soc Hx     ROS      Constitutional, HEENT, cardiovascular, pulmonary, gi and gu systems are negative, except as otherwise noted.    PHYSICAL EXAM   /68   Pulse 88   Temp 97.9  F (36.6  C) (Tympanic)   Resp 16   Ht 1.651 m (5' 5\")   Wt 48.5 kg (107 lb)   LMP 02/05/2020   SpO2 100%   BMI 17.81 kg/m    Body mass index is 17.81 kg/m .  GENERAL APPEARANCE: healthy, alert and no distress  EYES: Eyes grossly normal to inspection, PERRL and conjunctivae and sclerae normal  HENT: ear canals and TM's normal and nose and mouth without ulcers or lesions nasal congestion, posterior oropharyngeal cobblestoning  NECK: no adenopathy, no asymmetry, masses, or scars and thyroid normal to palpation  RESP: lungs clear to auscultation - no rales, rhonchi or wheezes  CV: regular rates and rhythm, normal S1 S2, no S3 or S4 and no murmur, click or rub  ABDOMEN: soft, nontender, without hepatosplenomegaly or masses and bowel sounds normal  MS: extremities normal- no gross deformities noted  SKIN: no suspicious lesions or rashes  PSYCH: mentation appears normal and affect normal/bright  Results for orders placed or performed in visit on 02/20/20   Streptococcus A Rapid Scr w Reflx to PCR     Status: None   Result Value Ref Range    Strep Specimen Description Throat     Streptococcus Group A Rapid Screen Negative NEG^Negative   Influenza A/B antigen     Status: None   Result Value Ref Range    Influenza A/B Agn Specimen Nasal     Influenza A Negative NEG^Negative    Influenza B Negative " "NEG^Negative       ASSESSMENT & PLAN     1. Fever, unspecified fever cause  Acute, stable  - Streptococcus A Rapid Scr w Reflx to PCR  - Influenza A/B antigen  - Group A Streptococcus PCR Throat Swab  Results for orders placed or performed in visit on 02/20/20   Streptococcus A Rapid Scr w Reflx to PCR     Status: None   Result Value Ref Range    Strep Specimen Description Throat     Streptococcus Group A Rapid Screen Negative NEG^Negative   Influenza A/B antigen     Status: None   Result Value Ref Range    Influenza A/B Agn Specimen Nasal     Influenza A Negative NEG^Negative    Influenza B Negative NEG^Negative       2. Viral syndrome  Acute, stable          Patient Education     Viral Syndrome (Adult)  A viral illness may cause a number of symptoms such as fever. Other symptoms depend on the part of the body that the virus affects. If it settles in your nose, throat, and lungs, it may cause cough, sore throat, congestion, runny nose, headache, earache and other ear symptoms, or shortness of breath. If it settles in your stomach and intestinal tract, it may cause nausea, vomiting, cramping, and diarrhea. Sometimes it causes generalized symptoms like \"aching all over,\" feeling tired, loss of energy, or loss of appetite.  A viral illness usually lasts anywhere from several days to several weeks, but sometimes it lasts longer. In some cases, a more serious infection can look like a viral syndrome in the first few days of the illness. You may need another exam and additional tests to know the difference. Watch for the warning signs listed below for when to seek medical advice.  Home care  Follow these guidelines for taking care of yourself at home:    If symptoms are severe, rest at home for the first 2 to 3 days.    Stay away from cigarette smoke - both your smoke and the smoke from others.    You may use over-the-counter acetaminophen or ibuprofen for fever, muscle aching, and headache, unless another medicine was " prescribed for this. If you have chronic liver or kidney disease or ever had a stomach ulcer or gastrointestinal bleeding, talk with your healthcare provider before using these medicines. No one who is younger than 18 and ill with a fever should take aspirin. It may cause severe disease or death.    Your appetite may be poor, so a light diet is fine. Avoid dehydration by drinking 8 to 12, 8-ounce glasses of fluids each day. This may include water; orange juice; lemonade; apple, grape, and cranberry juice; clear fruit drinks; electrolyte replacement and sports drinks; and decaffeinated teas and coffee. If you have been diagnosed with a kidney disease, ask your healthcare provider how much and what types of fluids you should drink to prevent dehydration. If you have kidney disease, drinking too much fluid can cause it build up in the your body and be dangerous to your health.    Over-the-counter remedies won't shorten the length of the illness but may be helpful for symptoms such as cough, sore throat, nasal and sinus congestion, or diarrhea. Don't use decongestants if you have high blood pressure.  Follow-up care  Follow up with your healthcare provider if you do not improve over the next week.  Call 911  Call 911 if any of the following occur:    Convulsion    Feeling weak, dizzy, or like you are going to faint    Chest pain, or more than mild shortness of breath  When to seek medical advice  Call your healthcare provider right away if any of these occur:    Cough with lots of colored sputum (mucus) or blood in your sputum    Chest pain, shortness of breath, wheezing, or trouble breathing    Severe headache; face, neck, or ear pain    Severe, constant pain in the lower right side of your belly (abdominal)    Continued vomiting (can t keep liquids down)    Frequent diarrhea (more than 5 times a day); blood (red or black color) or mucus in diarrhea    Feeling weak, dizzy, or like you are going to faint    Extreme  thirst    Fever of 100.4 F (38 C) or higher, or as directed by your healthcare provider  Date Last Reviewed: 4/1/2018 2000-2019 [employer]. [address] [place] [address], [place] [address]. All rights reserved. This information is not intended as a substitute for professional medical care. Always follow your healthcare professional's instructions.             Risks, benefits, side effects and rationale for treatment plan fully discussed with the patient and understanding expressed.  ALIX Rivera-BC  [facility]

## 2020-02-20 NOTE — NURSING NOTE
"Chief Complaint   Patient presents with     Fatigue       Initial /68   Pulse 88   Temp 97.9  F (36.6  C) (Tympanic)   Resp 16   Ht 1.651 m (5' 5\")   Wt 48.5 kg (107 lb)   LMP 02/05/2020   SpO2 100%   BMI 17.81 kg/m   Estimated body mass index is 17.81 kg/m  as calculated from the following:    Height as of this encounter: 1.651 m (5' 5\").    Weight as of this encounter: 48.5 kg (107 lb).    Patient presents to the clinic using No DME    Health Maintenance that is potentially due pending provider review:  NONE    n/a    Is there anyone who you would like to be able to receive your results? No  If yes have patient fill out SAUD      "

## 2020-02-21 NOTE — RESULT ENCOUNTER NOTE
Dear Merissa,  Final Beta strep group A r/o culture is NEGATIVE for Group A streptococcus.    No treatment or change in treatment per [facility] Strep protocol.    Please contact our clinic via phone or My Chart if you have any questions or concerns.  Thanks,  ALIX Ye

## 2020-03-11 ENCOUNTER — TELEPHONE (OUTPATIENT)
Dept: FAMILY MEDICINE | Facility: CLINIC | Age: 35
End: 2020-03-11

## 2020-03-11 DIAGNOSIS — N94.6 DYSMENORRHEA: Primary | ICD-10-CM

## 2020-03-11 DIAGNOSIS — L40.9 PSORIASIS: ICD-10-CM

## 2020-03-11 DIAGNOSIS — N94.6 DYSMENORRHEA: ICD-10-CM

## 2020-03-11 DIAGNOSIS — D69.3 IMMUNE THROMBOCYTOPENIC PURPURA (H): ICD-10-CM

## 2020-03-11 LAB
ALBUMIN SERPL-MCNC: 4.4 G/DL (ref 3.4–5)
ALP SERPL-CCNC: 62 U/L (ref 40–150)
ALT SERPL W P-5'-P-CCNC: 23 U/L (ref 0–50)
ANION GAP SERPL CALCULATED.3IONS-SCNC: 4 MMOL/L (ref 3–14)
AST SERPL W P-5'-P-CCNC: 19 U/L (ref 0–45)
BASOPHILS # BLD AUTO: 0.1 10E9/L (ref 0–0.2)
BASOPHILS NFR BLD AUTO: 1 %
BILIRUB SERPL-MCNC: 0.2 MG/DL (ref 0.2–1.3)
BUN SERPL-MCNC: 13 MG/DL (ref 7–30)
CALCIUM SERPL-MCNC: 9.3 MG/DL (ref 8.5–10.1)
CHLORIDE SERPL-SCNC: 105 MMOL/L (ref 94–109)
CO2 SERPL-SCNC: 30 MMOL/L (ref 20–32)
CREAT SERPL-MCNC: 0.83 MG/DL (ref 0.52–1.04)
DIFFERENTIAL METHOD BLD: ABNORMAL
EOSINOPHIL # BLD AUTO: 0.1 10E9/L (ref 0–0.7)
EOSINOPHIL NFR BLD AUTO: 1 %
ERYTHROCYTE [DISTWIDTH] IN BLOOD BY AUTOMATED COUNT: 12.9 % (ref 10–15)
GFR SERPL CREATININE-BSD FRML MDRD: >90 ML/MIN/{1.73_M2}
GLUCOSE SERPL-MCNC: 101 MG/DL (ref 70–99)
HCG UR QL: NEGATIVE
HCT VFR BLD AUTO: 48 % (ref 35–47)
HGB BLD-MCNC: 15.9 G/DL (ref 11.7–15.7)
LYMPHOCYTES # BLD AUTO: 3.1 10E9/L (ref 0.8–5.3)
LYMPHOCYTES NFR BLD AUTO: 49 %
MCH RBC QN AUTO: 30.5 PG (ref 26.5–33)
MCHC RBC AUTO-ENTMCNC: 33.1 G/DL (ref 31.5–36.5)
MCV RBC AUTO: 92 FL (ref 78–100)
MONOCYTES # BLD AUTO: 0.7 10E9/L (ref 0–1.3)
MONOCYTES NFR BLD AUTO: 10.5 %
NEUTROPHILS # BLD AUTO: 2.4 10E9/L (ref 1.6–8.3)
NEUTROPHILS NFR BLD AUTO: 38.5 %
PLATELET # BLD AUTO: 121 10E9/L (ref 150–450)
POTASSIUM SERPL-SCNC: 3.1 MMOL/L (ref 3.4–5.3)
PROT SERPL-MCNC: 7.7 G/DL (ref 6.8–8.8)
RBC # BLD AUTO: 5.22 10E12/L (ref 3.8–5.2)
SODIUM SERPL-SCNC: 139 MMOL/L (ref 133–144)
WBC # BLD AUTO: 6.3 10E9/L (ref 4–11)

## 2020-03-11 PROCEDURE — 80053 COMPREHEN METABOLIC PANEL: CPT | Performed by: NURSE PRACTITIONER

## 2020-03-11 PROCEDURE — 81025 URINE PREGNANCY TEST: CPT | Performed by: NURSE PRACTITIONER

## 2020-03-11 PROCEDURE — 36415 COLL VENOUS BLD VENIPUNCTURE: CPT | Performed by: NURSE PRACTITIONER

## 2020-03-11 PROCEDURE — 85025 COMPLETE CBC W/AUTO DIFF WBC: CPT | Performed by: NURSE PRACTITIONER

## 2020-03-11 NOTE — TELEPHONE ENCOUNTER
Patient is having labs done at 10:45 today and would like to have a pregnancy test added to this. Please let patient know if this cannot be added.    Marcy [facility]-Station

## 2020-03-11 NOTE — TELEPHONE ENCOUNTER
Wait until she has missed a periods  Анна       (has history of having positive home pregnancy test and negative in clinic- unsure etiology)

## 2020-03-11 NOTE — TELEPHONE ENCOUNTER
Was in today for her regular labs and requested pregnancy test be added as she got a positive result at home. She says she is due for her period but has not missed it yet. Her UPT here in clinic today was negative. Do you want a serum Hcg done or wait awhile since she has not missed period yet?

## 2020-03-13 DIAGNOSIS — E87.6 HYPOKALEMIA: Primary | ICD-10-CM

## 2020-03-13 RX ORDER — POTASSIUM CHLORIDE 1500 MG/1
20 TABLET, EXTENDED RELEASE ORAL 2 TIMES DAILY
Qty: 30 TABLET | Refills: 3 | Status: SHIPPED | OUTPATIENT
Start: 2020-03-13 | End: 2021-04-20

## 2020-03-20 DIAGNOSIS — F41.1 GAD (GENERALIZED ANXIETY DISORDER): ICD-10-CM

## 2020-03-20 RX ORDER — CLONAZEPAM 1 MG/1
TABLET ORAL
Qty: 75 TABLET | Refills: 0 | Status: SHIPPED | OUTPATIENT
Start: 2020-03-20 | End: 2020-04-20

## 2020-03-20 NOTE — TELEPHONE ENCOUNTER
Routing refill request to provider for review/approval because:  Drug not on the [facility] refill protocol     Requested Prescriptions   Pending Prescriptions Disp Refills     clonazePAM (KLONOPIN) 1 MG tablet [Pharmacy Med Name: clonazePAM 1 MG Oral Tablet] 75 tablet 0     Sig: TAKE 1/2 (ONE-HALF) TABLET BY MOUTH TWICE DAILY FOR ANXIETY AND 1 & 1/2 (ONE & ONE-HALF) TABLETS EVERY DAY AT BEDTIME FOR SLEEP.       There is no refill protocol information for this order          Last Written Prescription Date:  1/17/2020  Last Fill Quantity: 75,  # refills: 1   Last office visit: 2/20/2020 with A Scott MAYS  Future Office Visit:      Guerline WILLIS RN

## 2020-04-19 DIAGNOSIS — F41.1 GAD (GENERALIZED ANXIETY DISORDER): ICD-10-CM

## 2020-04-19 NOTE — TELEPHONE ENCOUNTER
Requested Prescriptions   Pending Prescriptions Disp Refills     clonazePAM (KLONOPIN) 1 MG tablet [Pharmacy Med Name: clonazePAM 1 MG Oral Tablet] 75 tablet 0     Sig: TAKE 1/2 TABLET BY MOUTH TWICE DAILY FOR ANXIETY AND 1 AND 1/2 TABLETS BY MOUTH EVERY DAY AT BEDTIME FOR SLEEP       There is no refill protocol information for this order        Last Written Prescription Date:  3/20/20  Last Fill Quantity: 75,  # refills: 0   Last office visit: No previous visit found with prescribing provider:    Future Office Visit:      Routing refill request to provider for review/approval because:  Drug not on the [facility], [facility] or [facility] refill protocol or controlled substance

## 2020-04-20 RX ORDER — CLONAZEPAM 1 MG/1
TABLET ORAL
Qty: 75 TABLET | Refills: 0 | Status: SHIPPED | OUTPATIENT
Start: 2020-04-20 | End: 2020-05-22

## 2020-04-20 NOTE — TELEPHONE ENCOUNTER
[place] reviewed:  reviewed- no concerns    [place]:  [email or web address]      04/10/2020  1   02/10/2020  Vyvanse 10 Mg Capsule  30.00 30 An M,  [id number]  [facility] ([id number])  0/0  [place]   04/10/2020  1   02/10/2020  Vyvanse 40 Mg Capsule  30.00 30 An M,  [id number]  [facility] ([id number])  [place]   03/20/2020  1   03/20/2020  Clonazepam 1 Mg Tablet  75.00 30 An M,  [id number]  [facility] ([id number])  0/[place] 5.00 [place]   03/11/2020  1   02/10/2020  Vyvanse 10 Mg Capsule  30.00 30 An M,  [id number]  [facility] ([id number])  [place]   03/11/2020  1   02/10/2020  Vyvanse 40 Mg Capsule  30.00 30 An M,  [id number]  [facility] ([id number])  [place]   02/17/2020  1   01/17/2020  Clonazepam 1 Mg Tablet  75.00 30 An M,  [id number]  [facility] ([id number])  1/1 5.00 L[place]

## 2020-05-11 DIAGNOSIS — F50.819 BINGE EATING DISORDER: ICD-10-CM

## 2020-05-11 RX ORDER — LISDEXAMFETAMINE DIMESYLATE 10 MG/1
10 CAPSULE ORAL DAILY
Qty: 30 CAPSULE | Refills: 0 | Status: SHIPPED | OUTPATIENT
Start: 2020-06-11 | End: 2020-08-11

## 2020-05-11 RX ORDER — LISDEXAMFETAMINE DIMESYLATE 40 MG/1
40 CAPSULE ORAL EVERY MORNING
Qty: 30 CAPSULE | Refills: 0 | Status: SHIPPED | OUTPATIENT
Start: 2020-07-12 | End: 2020-08-11

## 2020-05-11 RX ORDER — LISDEXAMFETAMINE DIMESYLATE 10 MG/1
10 CAPSULE ORAL DAILY
Qty: 30 CAPSULE | Refills: 0 | Status: SHIPPED | OUTPATIENT
Start: 2020-07-12 | End: 2020-08-11

## 2020-05-11 RX ORDER — LISDEXAMFETAMINE DIMESYLATE 10 MG/1
10 CAPSULE ORAL DAILY
Qty: 30 CAPSULE | Refills: 0 | Status: SHIPPED | OUTPATIENT
Start: 2020-05-11 | End: 2020-08-11

## 2020-05-11 RX ORDER — LISDEXAMFETAMINE DIMESYLATE 40 MG/1
40 CAPSULE ORAL DAILY
Qty: 30 CAPSULE | Refills: 0 | Status: CANCELLED | OUTPATIENT
Start: 2020-05-11

## 2020-05-11 RX ORDER — LISDEXAMFETAMINE DIMESYLATE 40 MG/1
40 CAPSULE ORAL EVERY MORNING
Qty: 30 CAPSULE | Refills: 0 | Status: SHIPPED | OUTPATIENT
Start: 2020-06-11 | End: 2020-08-11

## 2020-05-11 RX ORDER — LISDEXAMFETAMINE DIMESYLATE 40 MG/1
40 CAPSULE ORAL EVERY MORNING
Qty: 30 CAPSULE | Refills: 0 | Status: SHIPPED | OUTPATIENT
Start: 2020-05-11 | End: 2020-08-11

## 2020-05-22 DIAGNOSIS — F41.1 GAD (GENERALIZED ANXIETY DISORDER): ICD-10-CM

## 2020-05-22 RX ORDER — CLONAZEPAM 1 MG/1
TABLET ORAL
Qty: 75 TABLET | Refills: 0 | Status: SHIPPED | OUTPATIENT
Start: 2020-05-22 | End: 2020-06-26

## 2020-05-22 NOTE — TELEPHONE ENCOUNTER
Routing refill request to provider for review/approval because:  Drug not on the [facility] refill protocol     clonazePAM (KLONOPIN) 1 MG tablet   Last Written Prescription Date:  4/20/2020  Last Fill Quantity: 75,  # refills: 0   Last office visit: 2/20/2020 with prescribing provider:  Flex MAYS   Future Office Visit:      Guerline WILLIS RN, BSN

## 2020-06-24 ENCOUNTER — VIRTUAL VISIT (OUTPATIENT)
Dept: RHEUMATOLOGY | Facility: CLINIC | Age: 35
End: 2020-06-24
Payer: COMMERCIAL

## 2020-06-24 DIAGNOSIS — L40.9 PSORIASIS: ICD-10-CM

## 2020-06-24 DIAGNOSIS — Z90.81 HISTORY OF SPLENECTOMY: ICD-10-CM

## 2020-06-24 DIAGNOSIS — M79.7 FIBROMYALGIA: ICD-10-CM

## 2020-06-24 DIAGNOSIS — D69.6 THROMBOCYTOPENIA (H): ICD-10-CM

## 2020-06-24 DIAGNOSIS — M19.90 ARTHRITIS: Primary | ICD-10-CM

## 2020-06-24 PROCEDURE — 99203 OFFICE O/P NEW LOW 30 MIN: CPT | Mod: GT | Performed by: INTERNAL MEDICINE

## 2020-06-24 NOTE — PROGRESS NOTES
"Merissa Leung is a 35 year old female who is being evaluated via a billable video visit.      The patient has been notified of following:     \"This video visit will be conducted via a call between you and your physician/provider. We have found that certain health care needs can be provided without the need for an in-person physical exam.  This service lets us provide the care you need with a video conversation.  If a prescription is necessary we can send it directly to your pharmacy.  If lab work is needed we can place an order for that and you can then stop by our lab to have the test done at a later time.    Video visits are billed at different rates depending on your insurance coverage.  Please reach out to your insurance provider with any questions.    If during the course of the call the physician/provider feels a video visit is not appropriate, you will not be charged for this service.\"    Patient has given verbal consent for Video visit? Yes    Will anyone else be joining your video visit? No    Rheumatology Video Visit      Merissa Leung MRN# [id number]   YOB: 1985 Age: 35 year old      Date of visit: 6/24/20   Referring provider: Mohini Mccord   PCP: Mohini Mccord     Chief Complaint   Patient presents with:  Consult    Assessment and Plan     1.  Psoriatic arthritis; psoriasis: Swelling of the MCPs consistent with an inflammatory arthritis; morning stiffness all day; pain and stiffness improve with activity and worsen with inactivity.  Lower back pain and stiffness is inflammatory in nature.  Was given dupixent by her dermatologist, per patient, that reportedly worsened the psoriasis; this would make sense mechanistically because by blocking IL-4 and IL-13 then IL-17 may be upregulated and result in worsening psoriasis.  Considering the inflammatory back pain, a TNF inhibitor or IL17 inhibitor would be reasonable; leaning toward IL17 due to TNF inhibitors association with DIL and " thrombocytopenia. Workup as noted below.   - CK total; Future  - Complement C3; Future  - Complement C4; Future  - Comprehensive metabolic panel; Future  - CRP inflammation; Future  - DNA double stranded antibodies; Future  - Protein  random urine with Creat Ratio; Future  - UA reflex to Microscopic and Culture; Future  - Erythrocyte sedimentation rate auto; Future  - Anti Nuclear Yesenia IgG by IFA with Reflex; Future  - CARLITOS antibody panel; Future  - Cardiolipin Yesenia IgG and IgM; Future  - Lupus Anticoagulant Panel; Future  - Beta 2 Glycoprotein 1 Antibody IgG; Future  - Beta 2 Glycoprotein 1 Antibody IgM; Future  - Cyclic Citrullinated Peptide Antibody IgG; Future  - HIV Antigen Antibody Combo; Future  - Hepatitis C Screen Reflex to HCV RNA Quant and Genotype; Future  - Hepatitis B surface antigen; Future  - Hepatitis B core antibody; Future  - Quantiferon TB Gold Plus; Future  - Lyme Disease Yesenia with reflex to WB Serum; Future  - Rheumatoid factor; Future  - XR Chest 2 Views; Future  - XR Hand Bilateral G/E 3 Views; Future  - XR Foot Bilateral G/E 3 Views; Future  - XR Sacroiliac Joint 1/2 Views; Future  - CBC with platelets differential; Future    2. Thrombocytopenia: seen by oncology and now s/p splenectomy; recheck with citrated tube; need f/u with hematology again?  See Labs in #1.  - Check CBC; use citrated tube to prevent platelet clumping.      3. Fibromyalgia: noted here for historical significance only.    4. Cigarette Smoking:  Encouraged complete cessation and discussed the health benefits.      # Note that this is a virtual visit to reduce the risk of COVID-19 exposure during this current pandemic.        Ms. Leung verbalized agreement with and understanding of the rational for the diagnosis and treatment plan.  All questions were answered to best of my ability and the patient's satisfaction. Ms. Leung was advised to contact the clinic with any questions that may arise after the clinic visit.       Thank you for involving me in the care of the patient    Return to clinic: 1 - 2 weeks      HPI   Merissa Leung is a 35 year old female with a past medical history significant for hyperlipidemia, tension headaches, chronic constipation, anxiety, obsessive-compulsive disorder, chronic abdominal pain, history of lower GI bleed, ITP history, and fibromyalgia who is seen in consultation at the request of Mohini Mccord for evaluation of psoriasis, myalgia, arthralgia.    1/8/2018 [facility] rheumatology note by Dr. Pavan Dimas documents ITP. Eval'd for CTD. And given hx of bloody nose ANCA was checked.     11/5/2019 [facility] clinic note by Juliana Mcnulty documents psoriasis and bDMARD was recommended (specifically Taltz). Hx of ITP and is s/p splenecomty.     Today, Ms. Leung reports that she has psoriasis, joint pain, and myalgia; has been present her whole life and she's just dealt with it and now is being eval'd.  Scalp psoriasis started on the back of her head; now involving her neck, lip and sometimes on her cheek, dorsal left wrist, volar right wrist. Was given Dupixent by her dermatologist that caused her psoriasis to worsen and ooze; so this was stopped. Never used Taltz or any other injection med for the psoriasis. Never on MTX.   MCPS, PIPs, DIPS, wrists, elbows, shoulders, hips, knees, ankles, and MTPs all ache constantly; worse in the AM, better with time and activity; worse with inactivity; feels like all joints swell but is most noticeable in the hands.  Difficulty making a fist in the AM.  Dropping items frequently, broken several cups.  Symptoms have been worsening over the past 10 years she says.  Wakes up in the AM and feels like she's 90 years old until she gets moving.  Feels much better when moving constantly. Morning stiffness for most of the day, better with activity.    Denies fevers, chills, nausea, vomiting.  On and off constipation and diarrhea; says that she was dx'd with  irritable bowel syndrome and pelvic floor disorder; she says that she has been seen at [facility] for this; has had 2 colonoscopies in the past. Pelvic flood disorder thought to be related to emergency  per patient. No abdominal pain. No chest pain/pressure, palpitations, or shortness of breath. No LE swelling. No neck pain. No oral or nasal sores.  No sicca symptoms. No photosensitivity or photophobia. No eye pain or redness. No history of inflammatory eye disease.  No history of DVT, pulmonary embolism, or miscarriage.   No history of serositis.  White discoloration of her fingers with cold exposure; then blue and red; painful with rewarming.  No known seizure disorder; faints with needles due to needle-phobia but says that she can still get labs.  No known renal disorder.      Tobacco: 1/2 PPD  EtOH: None  Drugs: None  Occupation: [employer]    ROS   GEN: No fevers, chills, night sweats, or weight change  SKIN: See HPI  HEENT: No epistaxis. No oral or nasal ulcers.  CV: No chest pain, pressure, palpitations, or dyspnea on exertion.  PULM: No SOB, wheeze, cough.  GI: No nausea, vomiting, constipation, diarrhea. No blood in stool. No abdominal pain.  : No blood in urine.  MSK: See HPI.  NEURO: No numbness or tingling  ENDO: No heat/cold intolerance.  EXT: No LE swelling  PSYCH: Anxiety    Active Problem List     Patient Active Problem List   Diagnosis     Hyperlipidemia LDL goal <160     Headaches, tension     Chronic constipation     OCD (obsessive compulsive disorder)     Anxiety     Fibromyalgia     Chronic abdominal pain     Underweight     History of Lower GI bleed     Immune thrombocytopenic purpura (H)     Past Medical History     Past Medical History:   Diagnosis Date     Anxiety      Binge eating disorder      Chickenpox     x3     Food intolerance      IBS (irritable bowel syndrome)      MEDICAL HISTORY OF -     pelvic floor disorder     Past Surgical History     Past Surgical History:   Procedure  Laterality Date     C/SECTION, LOW TRANSVERSE        SECTION, TUBAL LIGATION, COMBINED N/A 2/15/2016    NO tubal ligation done     COLONOSCOPY  2013    Procedure: COMBINED COLONOSCOPY, SINGLE BIOPSY/POLYPECTOMY BY BIOPSY;  Colonoscopy;  Surgeon: Maureen Valentin MD;  Location: [facility]     COLONOSCOPY N/A 2015    Procedure: COMBINED COLONOSCOPY, SINGLE OR MULTIPLE BIOPSY/POLYPECTOMY BY BIOPSY;  Surgeon: Maureen Valentin MD;  Location: [facility]     ESOPHAGOSCOPY, GASTROSCOPY, DUODENOSCOPY (EGD), COMBINED N/A 2018    Procedure: COMBINED ESOPHAGOSCOPY, GASTROSCOPY, DUODENOSCOPY (EGD), BIOPSY SINGLE OR MULTIPLE;  Gastroscopy;  Surgeon: Ray Amato MD;  Location: [facility]     LAPAROSCOPIC SPLENECTOMY N/A 2018    Procedure: Laparoscopic splenectomy, ;  Surgeon: Anthony Jackson DO;  Location: [facility]     Allergy     Allergies   Allergen Reactions     Dilaudid [Hydromorphone] Nausea and Vomiting     Food Other (See Comments) and Swelling     Processed food, sugar, high sodium, red grains, rice, potatoes(can have mashed, but no french fries or puffs)  Pain in extremities and face.   All over body at times.     Milk Protein Extract Swelling     Blow up like a balloon     Current Medication List     Current Outpatient Medications   Medication Sig     clonazePAM (KLONOPIN) 1 MG tablet TAKE 1/2 (ONE-HALF) TABLET BY MOUTH TWICE DAILY FOR ANXIETY AND ONE AND ONE-HALF TABS BY MOUTH AT BEDTIME FOR SLEEP.     hydrOXYzine (ATARAX) 25 MG tablet Take 1 tablet (25 mg) by mouth 3 times daily as needed for itching     ketoconazole (NIZORAL) 2 % external cream Apply to rash on chest and neck twice daily     linaclotide (LINZESS) 145 MCG capsule Take 1 capsule (145 mcg) by mouth every morning (before breakfast)     lisdexamfetamine (VYVANSE) 10 MG capsule Take 1 capsule (10 mg) by mouth daily With lunch     Multiple Vitamins-Minerals (MULTIVITAL PO) Take 1 chew tab by mouth daily Gummi      polyethylene glycol (MIRALAX/GLYCOLAX) powder Take 17 g by mouth daily as needed for constipation     potassium chloride ER (KLOR-CON M) 20 MEQ CR tablet Take 1 tablet (20 mEq) by mouth 2 times daily     sennosides (SENOKOT) 8.6 MG tablet Take 2 tablets by mouth 2 times daily     UNABLE TO FIND Take 2 teaspoonful by mouth At Bedtime Magnesium Calm     Dupilumab (DUPIXENT) 300 MG/2ML syringe Inject 2 mLs (300 mg) Subcutaneous once for 1 dose Inject 2 syringes subcutaneous on day one, then 1 syringe on day 15, then 1 syringe every other week     ibuprofen (ADVIL/MOTRIN) 600 MG tablet Take 1 tablet (600 mg) by mouth every 8 hours as needed for moderate pain (Patient not taking: Reported on 2/10/2020)     lisdexamfetamine (VYVANSE) 10 MG capsule Take 1 capsule (10 mg) by mouth daily In afternoon (Patient not taking: Reported on 6/24/2020)     [START ON 7/12/2020] lisdexamfetamine (VYVANSE) 10 MG capsule Take 1 capsule (10 mg) by mouth daily In afternoon (Patient not taking: Reported on 6/24/2020)     lisdexamfetamine (VYVANSE) 10 MG capsule Take 1 capsule (10 mg) by mouth daily With lunch (Patient not taking: Reported on 6/24/2020)     lisdexamfetamine (VYVANSE) 10 MG capsule Take 1 capsule (10 mg) by mouth daily With lunch (Patient not taking: Reported on 6/24/2020)     lisdexamfetamine (VYVANSE) 40 MG capsule Take 1 capsule (40 mg) by mouth every morning (Patient not taking: Reported on 6/24/2020)     [START ON 7/12/2020] lisdexamfetamine (VYVANSE) 40 MG capsule Take 1 capsule (40 mg) by mouth every morning (Patient not taking: Reported on 6/24/2020)     lisdexamfetamine (VYVANSE) 40 MG capsule Take 1 capsule (40 mg) by mouth daily (Patient not taking: Reported on 6/24/2020)     lisdexamfetamine (VYVANSE) 40 MG capsule Take 1 capsule (40 mg) by mouth daily (Patient not taking: Reported on 6/24/2020)     lisdexamfetamine (VYVANSE) 40 MG capsule Take 1 capsule (40 mg) by mouth daily (Patient not taking: Reported on  "2020)     No current facility-administered medications for this visit.          Social History   See HPI    Family History     Family History   Problem Relation Age of Onset     Gastrointestinal Disease Mother 46        perforated intestines-      Hepatitis Mother         C     Liver Disease Mother      Blood Disease Mother         anemia     Substance Abuse Mother         A&D     Psychotic Disorder Brother         Schizophrenia, OCD     Schizophrenia Brother      Anxiety Disorder Brother      Arthritis Maternal Grandmother      Cancer Paternal Grandmother         cervical     Breast Cancer Paternal Grandmother      Hypertension Paternal Grandmother      Diabetes Paternal Grandmother      Cerebrovascular Disease Paternal Grandfather      Heart Disease Paternal Grandfather      Bipolar Disorder Paternal Grandfather      Suicide Paternal Grandfather      Cancer Paternal Aunt         cervical     Coronary Artery Disease Maternal Grandfather         MI     Substance Abuse Father         recovered A&D     Heart Disease Father         MI-stents     Hypertension Father        Physical Exam     Temp Readings from Last 3 Encounters:   20 97.9  F (36.6  C) (Tympanic)   02/10/20 97.4  F (36.3  C) (Tympanic)   19 97.6  F (36.4  C) (Oral)     BP Readings from Last 5 Encounters:   20 122/68   02/10/20 110/70   19 96/65   10/24/19 102/70   10/16/19 112/60     Pulse Readings from Last 1 Encounters:   20 88     Resp Readings from Last 1 Encounters:   20 16     Estimated body mass index is 17.81 kg/m  as calculated from the following:    Height as of 20: 1.651 m (5' 5\").    Weight as of 20: 48.5 kg (107 lb).      GEN: NAD  HEENT: MMM.  Anicteric, noninjected sclera  PULM: No increased work of breathing  MSK: Mild swelling of the bilateral second and third MCPs.  Able to make a fist completely with both hands.  PIPs and DIPs without swelling.  Wrists without swelling.  Elbows " without swelling.  Knees and feet without swelling.  Scaly rash on the dorsal aspect of her left wrist.  Unable to passively dorsiflex the fifth MCPs by at least 90 degrees, oppose the thumbs to the volar aspect of the ipsilateral forearms, or hyperextend the knees or elbows.    PSYCH: Alert. Appropriate.  Tearful when talking about her joint pain        Labs / Imaging (select studies)     RNP/Sm/SSA/SSB  Recent Labs   Lab Test 07/17/15  1544   TREPAB Negative     C3/C4  Recent Labs   Lab Test 01/24/18  1230   V4HHAVB 84   H7JNVPJ 24     Antiphospholipid Antibodies  Recent Labs   Lab Test 01/24/18  1230   LUPINT Negative     ANCA  Recent Labs   Lab Test 01/24/18  1230   PR3IGG <0.2   MPOIGG <0.2     IgG  Recent Labs   Lab Test 11/24/17  1050      IGA 75   IGM 90     CBC  Recent Labs   Lab Test 03/11/20  1044 11/08/19  1445 11/06/19  0703  10/24/19  0824   WBC 6.3 10.0 8.6   < > 6.5   RBC 5.22* 4.09 4.67   < > 4.66   HGB 15.9* 12.8 14.4   < > 14.6   HCT 48.0* 36.9 42.6   < > 43.3   MCV 92 90 91   < > 93   RDW 12.9 12.5 12.9   < > 13.4   * 99* 62*   < > 100*   MCH 30.5 31.3 30.8   < > 31.3   MCHC 33.1 34.7 33.8   < > 33.7   NEUTROPHIL 38.5 61.3  --   --  45.9   LYMPH 49.0 27.7  --   --  38.4   MONOCYTE 10.5 8.1  --   --  10.7   EOSINOPHIL 1.0 1.7  --   --  4.1   BASOPHIL 1.0 1.0  --   --  0.9   ANEU 2.4 6.2  --   --  3.0   ALYM 3.1 2.8  --   --  2.5   CORRINA 0.7 0.8  --   --  0.7   AEOS 0.1 0.2  --   --  0.3   ABAS 0.1 0.1  --   --  0.1    < > = values in this interval not displayed.     CMP  Recent Labs   Lab Test 03/11/20  1044 11/08/19  1445 11/04/19  1041 10/24/19  0824 11/20/18  1506 11/08/18  0729    143 136 142 140 142   POTASSIUM 3.1* 2.7* 3.4 3.2* 3.5 3.5   CHLORIDE 105 109 107 104 106 107   CO2 30 29 24 30 24 28   ANIONGAP 4 5 5 8 10 7   * 113* 99 67* 101* 113*   BUN 13 11 12 12 9 9   CR 0.83 0.79 0.73 0.74 0.71 0.71   GFRESTIMATED >90 >90 >90 >90 >90 >90   GFRESTBLACK >90 >90 >90  >90 >90 >90   DREW 9.3 8.7 8.7 8.8 9.0 7.9*   BILITOTAL 0.2 0.2  --  0.3 0.2  --    ALBUMIN 4.4 3.3*  --  3.7 4.0  --    PROTTOTAL 7.7 6.3*  --  6.6* 7.5  --    ALKPHOS 62 51  --  51 56  --    AST 19 17  --  16 19  --    ALT 23 19  --  22 20  --      Calcium/VitaminD  Recent Labs   Lab Test 03/11/20  1044 11/08/19  1445 11/04/19  1041   DREW 9.3 8.7 8.7     ESR/CRP  Recent Labs   Lab Test 11/08/19  1445 10/24/19  0824 01/24/18  1230   SED  --  3 4   CRP <2.9 <2.9 <2.9     CK/Aldolase  Recent Labs   Lab Test 01/24/18  1230      ALDOLASE 6.3     TSH/T4  Recent Labs   Lab Test 04/09/19  1400 10/18/16  0940 11/21/14   TSH 1.59 1.90 1.1     Hepatitis B  Recent Labs   Lab Test 01/24/18  1230 07/17/15  1544 03/04/13  1230   HEPBANG Nonreactive Nonreactive Negative     Hepatitis C  Recent Labs   Lab Test 01/24/18  1230 03/04/13  1230   HCVAB Nonreactive Negative     HIV Screening  Recent Labs   Lab Test 07/17/15  1544   HIAGAB Nonreactive   HIV-1 p24 Ag & HIV-1/HIV-2 Ab Not Detected       UA  Recent Labs   Lab Test 11/20/18  1518 10/31/18  1715 01/30/18  1750 01/24/18  1150  07/17/15  1559   COLOR Leticia Leticia Dark Yellow Dark Yellow   < > Yellow   APPEARANCE Slightly Cloudy Slightly Cloudy Clear Clear   < > Clear   URINEGLC Negative Negative Negative Negative   < > Negative   URINEBILI Negative Negative Negative Negative   < > Negative   SG 1.021 1.020 1.019 1.021   < > >1.030   URINEPH 5.0 5.0 6.0 6.0   < > 6.0   PROTEIN 30* 30* 30* 10*   < > Negative   UROBILINOGEN  --   --   --   --   --  0.2   NITRITE Positive* Negative Negative Negative   < > Negative   UBLD Negative Negative Negative Moderate*   < > Negative   LEUKEST Large* Small* Trace* Trace*   < > Negative   WBCU 170* 48* 5* 3*   < >  --    RBCU 22* 44* 39* 132*   < >  --    BACTERIA Moderate* Moderate*  --  Few*  --   --    MUCOUS Present* Present* Present* Present*   < >  --     < > = values in this interval not displayed.     Urine Microscopic  Recent Labs    Lab Test 11/20/18  1518 10/31/18  1715 01/30/18  1750 01/24/18  1150   WBCU 170* 48* 5* 3*   RBCU 22* 44* 39* 132*   BACTERIA Moderate* Moderate*  --  Few*   MUCOUS Present* Present* Present* Present*     Immunization History     Immunization History   Administered Date(s) Administered     Hib (PRP-T) 10/19/2018     Influenza Vaccine IM > 6 months Valent IIV4 12/03/2018, 01/30/2020     Meningococcal (Menactra ) 10/19/2018     Pneumo Conj 13-V (2010&after) 10/19/2018     Pneumococcal 23 valent 01/30/2020          Chart documentation done in part with Dragon Voice recognition Software. Although reviewed after completion, some word and grammatical error may remain.    Video-Visit Details    Type of service:  Video Visit    Video Start Time: 9:00 AM  Video End Time: 9:40 AM    Originating Location (pt. Location): in an office at her place of employment    Distant Location (provider location):  Home    Platform used for Video Visit: Adeel Morrell MD

## 2020-06-24 NOTE — Clinical Note
So... dermatologist gave dupixent for psoriasis?  Mechanistically this would result in higher IL17 and theoretically worse psoriasis... that is what she experienced.  Any idea why they used it?    Buck

## 2020-06-26 DIAGNOSIS — F41.1 GAD (GENERALIZED ANXIETY DISORDER): ICD-10-CM

## 2020-06-26 RX ORDER — CLONAZEPAM 1 MG/1
TABLET ORAL
Qty: 28 TABLET | Refills: 0 | Status: SHIPPED | OUTPATIENT
Start: 2020-06-26 | End: 2020-07-13

## 2020-06-26 NOTE — TELEPHONE ENCOUNTER
Routing refill request to provider for review/approval because:  Drug not on the [facility] refill protocol     clonazePAM (KLONOPIN) 1 MG tablet   Last Written Prescription Date:  5/22/2020  Last Fill Quantity: 75,  # refills: 0   Last office visit: 2/20/2020 with Scott MAYS  Future Office Visit:   Next 5 appointments (look out 90 days)    Jul 02, 2020 12:40 PM CDT  Return Visit with Buck Morrell MD  [facility] ([address] [address]  [phone]         Guerline WILLIS RN, BSN

## 2020-06-26 NOTE — TELEPHONE ENCOUNTER
EDWARD-7 SCORE 9/7/2016 2/6/2018 6/4/2018   Total Score - - -   Total Score 1 2 5     2 weeks given. Overdue for follow-up.  Refilled in provider's absence. Send future refills to Mohini Mccord.   ALIX Ye

## 2020-06-29 ENCOUNTER — HOSPITAL ENCOUNTER (OUTPATIENT)
Dept: GENERAL RADIOLOGY | Facility: CLINIC | Age: 35
End: 2020-06-29
Attending: INTERNAL MEDICINE
Payer: COMMERCIAL

## 2020-06-29 ENCOUNTER — HOSPITAL ENCOUNTER (EMERGENCY)
Facility: CLINIC | Age: 35
Discharge: HOME | End: 2020-06-29
Attending: FAMILY MEDICINE | Admitting: FAMILY MEDICINE
Payer: COMMERCIAL

## 2020-06-29 VITALS
WEIGHT: 110 LBS | OXYGEN SATURATION: 100 % | DIASTOLIC BLOOD PRESSURE: 77 MMHG | RESPIRATION RATE: 11 BRPM | SYSTOLIC BLOOD PRESSURE: 112 MMHG | HEART RATE: 61 BPM | TEMPERATURE: 98.2 F | BODY MASS INDEX: 18.3 KG/M2

## 2020-06-29 DIAGNOSIS — M19.90 ARTHRITIS: ICD-10-CM

## 2020-06-29 DIAGNOSIS — R55 VASOVAGAL SYNCOPE: ICD-10-CM

## 2020-06-29 DIAGNOSIS — E87.6 HYPOKALEMIA: ICD-10-CM

## 2020-06-29 DIAGNOSIS — Z90.81 HISTORY OF SPLENECTOMY: ICD-10-CM

## 2020-06-29 DIAGNOSIS — D69.6 THROMBOCYTOPENIA (H): ICD-10-CM

## 2020-06-29 DIAGNOSIS — L40.9 PSORIASIS: ICD-10-CM

## 2020-06-29 LAB
ALBUMIN SERPL-MCNC: 4.4 G/DL (ref 3.4–5)
ALBUMIN UR-MCNC: NEGATIVE MG/DL
ALP SERPL-CCNC: 64 U/L (ref 40–150)
ALT SERPL W P-5'-P-CCNC: 26 U/L (ref 0–50)
AMORPH CRY #/AREA URNS HPF: ABNORMAL /HPF
ANION GAP SERPL CALCULATED.3IONS-SCNC: 6 MMOL/L (ref 3–14)
APPEARANCE UR: NORMAL
AST SERPL W P-5'-P-CCNC: 20 U/L (ref 0–45)
BASOPHILS # BLD AUTO: 0.1 10E9/L (ref 0–0.2)
BASOPHILS NFR BLD AUTO: 1 %
BILIRUB SERPL-MCNC: 0.2 MG/DL (ref 0.2–1.3)
BILIRUB UR QL STRIP: NEGATIVE
BUN SERPL-MCNC: 13 MG/DL (ref 7–30)
CALCIUM SERPL-MCNC: 8.9 MG/DL (ref 8.5–10.1)
CHLORIDE SERPL-SCNC: 106 MMOL/L (ref 94–109)
CK SERPL-CCNC: 85 U/L (ref 30–225)
CO2 SERPL-SCNC: 27 MMOL/L (ref 20–32)
COLOR UR AUTO: YELLOW
CREAT SERPL-MCNC: 0.82 MG/DL (ref 0.52–1.04)
CREAT UR-MCNC: 167 MG/DL
CRP SERPL-MCNC: <2.9 MG/L (ref 0–8)
DIFFERENTIAL METHOD BLD: ABNORMAL
EOSINOPHIL # BLD AUTO: 0 10E9/L (ref 0–0.7)
EOSINOPHIL NFR BLD AUTO: 0.4 %
ERYTHROCYTE [DISTWIDTH] IN BLOOD BY AUTOMATED COUNT: 13 % (ref 10–15)
ERYTHROCYTE [SEDIMENTATION RATE] IN BLOOD BY WESTERGREN METHOD: 3 MM/H (ref 0–20)
GFR SERPL CREATININE-BSD FRML MDRD: >90 ML/MIN/{1.73_M2}
GLUCOSE SERPL-MCNC: 105 MG/DL (ref 70–99)
GLUCOSE UR STRIP-MCNC: NEGATIVE MG/DL
HCT VFR BLD AUTO: 43.5 % (ref 35–47)
HGB BLD-MCNC: 14.7 G/DL (ref 11.7–15.7)
HGB UR QL STRIP: NEGATIVE
IMM GRANULOCYTES # BLD: 0 10E9/L (ref 0–0.4)
IMM GRANULOCYTES NFR BLD: 0.3 %
KETONES UR STRIP-MCNC: NEGATIVE MG/DL
LEUKOCYTE ESTERASE UR QL STRIP: NEGATIVE
LYMPHOCYTES # BLD AUTO: 2.5 10E9/L (ref 0.8–5.3)
LYMPHOCYTES NFR BLD AUTO: 32.5 %
MCH RBC QN AUTO: 31.1 PG (ref 26.5–33)
MCHC RBC AUTO-ENTMCNC: 33.8 G/DL (ref 31.5–36.5)
MCV RBC AUTO: 92 FL (ref 78–100)
MONOCYTES # BLD AUTO: 0.6 10E9/L (ref 0–1.3)
MONOCYTES NFR BLD AUTO: 7.2 %
NEUTROPHILS # BLD AUTO: 4.6 10E9/L (ref 1.6–8.3)
NEUTROPHILS NFR BLD AUTO: 58.6 %
NITRATE UR QL: NEGATIVE
NON-SQ EPI CELLS #/AREA URNS LPF: ABNORMAL /LPF
NRBC # BLD AUTO: 0 10*3/UL
NRBC BLD AUTO-RTO: 0 /100
PH UR STRIP: 6.5 PH (ref 5–7)
PLATELET # BLD AUTO: 92 10E9/L (ref 150–450)
POTASSIUM SERPL-SCNC: 3.1 MMOL/L (ref 3.4–5.3)
PROT SERPL-MCNC: 8.1 G/DL (ref 6.8–8.8)
PROT UR-MCNC: 0.23 G/L
PROT/CREAT 24H UR: 0.14 G/G CR (ref 0–0.2)
RBC # BLD AUTO: 4.72 10E12/L (ref 3.8–5.2)
RBC #/AREA URNS AUTO: ABNORMAL /HPF
SODIUM SERPL-SCNC: 139 MMOL/L (ref 133–144)
SOURCE: NORMAL
SP GR UR STRIP: 1.02 (ref 1–1.03)
UROBILINOGEN UR STRIP-ACNC: 0.2 EU/DL (ref 0.2–1)
WBC # BLD AUTO: 7.8 10E9/L (ref 4–11)
WBC #/AREA URNS AUTO: ABNORMAL /HPF

## 2020-06-29 PROCEDURE — 81001 URINALYSIS AUTO W/SCOPE: CPT | Performed by: INTERNAL MEDICINE

## 2020-06-29 PROCEDURE — 85730 THROMBOPLASTIN TIME PARTIAL: CPT | Performed by: INTERNAL MEDICINE

## 2020-06-29 PROCEDURE — 86225 DNA ANTIBODY NATIVE: CPT | Performed by: INTERNAL MEDICINE

## 2020-06-29 PROCEDURE — 86146 BETA-2 GLYCOPROTEIN ANTIBODY: CPT | Performed by: INTERNAL MEDICINE

## 2020-06-29 PROCEDURE — 73630 X-RAY EXAM OF FOOT: CPT | Mod: 50

## 2020-06-29 PROCEDURE — 85613 RUSSELL VIPER VENOM DILUTED: CPT | Performed by: INTERNAL MEDICINE

## 2020-06-29 PROCEDURE — 99284 EMERGENCY DEPT VISIT MOD MDM: CPT | Performed by: FAMILY MEDICINE

## 2020-06-29 PROCEDURE — 71046 X-RAY EXAM CHEST 2 VIEWS: CPT

## 2020-06-29 PROCEDURE — 80053 COMPREHEN METABOLIC PANEL: CPT | Performed by: INTERNAL MEDICINE

## 2020-06-29 PROCEDURE — 86481 TB AG RESPONSE T-CELL SUSP: CPT | Performed by: INTERNAL MEDICINE

## 2020-06-29 PROCEDURE — 87340 HEPATITIS B SURFACE AG IA: CPT | Performed by: INTERNAL MEDICINE

## 2020-06-29 PROCEDURE — 36415 COLL VENOUS BLD VENIPUNCTURE: CPT | Performed by: INTERNAL MEDICINE

## 2020-06-29 PROCEDURE — 86160 COMPLEMENT ANTIGEN: CPT | Performed by: INTERNAL MEDICINE

## 2020-06-29 PROCEDURE — 93010 ELECTROCARDIOGRAM REPORT: CPT | Mod: Z6 | Performed by: FAMILY MEDICINE

## 2020-06-29 PROCEDURE — 82550 ASSAY OF CK (CPK): CPT | Performed by: INTERNAL MEDICINE

## 2020-06-29 PROCEDURE — 86431 RHEUMATOID FACTOR QUANT: CPT | Performed by: INTERNAL MEDICINE

## 2020-06-29 PROCEDURE — 86235 NUCLEAR ANTIGEN ANTIBODY: CPT | Performed by: INTERNAL MEDICINE

## 2020-06-29 PROCEDURE — 86704 HEP B CORE ANTIBODY TOTAL: CPT | Performed by: INTERNAL MEDICINE

## 2020-06-29 PROCEDURE — 86200 CCP ANTIBODY: CPT | Performed by: INTERNAL MEDICINE

## 2020-06-29 PROCEDURE — 87389 HIV-1 AG W/HIV-1&-2 AB AG IA: CPT | Performed by: INTERNAL MEDICINE

## 2020-06-29 PROCEDURE — 86038 ANTINUCLEAR ANTIBODIES: CPT | Performed by: INTERNAL MEDICINE

## 2020-06-29 PROCEDURE — 84156 ASSAY OF PROTEIN URINE: CPT | Performed by: INTERNAL MEDICINE

## 2020-06-29 PROCEDURE — 93005 ELECTROCARDIOGRAM TRACING: CPT | Performed by: FAMILY MEDICINE

## 2020-06-29 PROCEDURE — 85652 RBC SED RATE AUTOMATED: CPT | Performed by: INTERNAL MEDICINE

## 2020-06-29 PROCEDURE — 00000167 ZZHCL STATISTIC INR NC: Performed by: INTERNAL MEDICINE

## 2020-06-29 PROCEDURE — 00000401 ZZHCL STATISTIC THROMBIN TIME NC: Performed by: INTERNAL MEDICINE

## 2020-06-29 PROCEDURE — 72200 X-RAY EXAM SI JOINTS: CPT

## 2020-06-29 PROCEDURE — 86618 LYME DISEASE ANTIBODY: CPT | Performed by: INTERNAL MEDICINE

## 2020-06-29 PROCEDURE — 86140 C-REACTIVE PROTEIN: CPT | Performed by: INTERNAL MEDICINE

## 2020-06-29 PROCEDURE — 73130 X-RAY EXAM OF HAND: CPT | Mod: 50

## 2020-06-29 PROCEDURE — 99284 EMERGENCY DEPT VISIT MOD MDM: CPT | Mod: 25 | Performed by: FAMILY MEDICINE

## 2020-06-29 PROCEDURE — 86147 CARDIOLIPIN ANTIBODY EA IG: CPT | Performed by: INTERNAL MEDICINE

## 2020-06-29 PROCEDURE — 40000275 ZZH STATISTIC RCP TIME EA 10 MIN

## 2020-06-29 PROCEDURE — 86803 HEPATITIS C AB TEST: CPT | Performed by: INTERNAL MEDICINE

## 2020-06-29 PROCEDURE — 85025 COMPLETE CBC W/AUTO DIFF WBC: CPT | Performed by: INTERNAL MEDICINE

## 2020-06-29 NOTE — ED NOTES
Pt's only c/o is being tired. Asking to call spouse, phone in room dialed for pt and pt spoke to spouse, asked RN to talk to spouse, spouse updated. Pt declines food or juice, provided water. Pt informed of need for purple top tube, drawn, pt tolerated well. Also needs UA, pt aware.

## 2020-06-29 NOTE — DISCHARGE INSTRUCTIONS
Return to the Emergency Room if the following occurs:     Recurring episodes of fainting, or for any concern at anytime.    Or, follow-up with the following provider as we discussed:     Return to your primary doctor as needed/scheduled.    Medications discussed:    None new.  No changes.    If you received pain-relieving or sedating medication during your time in the ER, avoid alcohol, driving automobiles, or working with machinery.  Also, a responsible adult must stay with you.        Call the Nurse Advice Line at [phone] or [phone] for any concern at anytime.

## 2020-06-29 NOTE — ED PROVIDER NOTES
HPI   The patient is a 35-year-old female presenting with syncope during a blood draw.  This occurred just prior to arrival here in the ED.  The patient had orders to be completed as an outpatient.  She arrived to the outpatient lab as scheduled.  While undergoing the phlebotomy she became lightheaded and fainted.  She has a known history of this but will usually tolerate blood draws without too much difficulty.  She denies new symptoms leading up to this episode.  No recent fever or obvious illness.  No recent trauma or injury.  No obvious blood loss recently.  She describes feeling lightheaded while the blood was being drawn.  She woke up on the ground and she continues to feel tired.  She denies chest pain or shortness of breath.  She denies headache or obvious injury.        Allergies:  Allergies   Allergen Reactions     Dilaudid [Hydromorphone] Nausea and Vomiting     Food Other (See Comments) and Swelling     Processed food, sugar, high sodium, red grains, rice, potatoes(can have mashed, but no french fries or puffs)  Pain in extremities and face.   All over body at times.     Milk Protein Extract Swelling     Blow up like a balloon     Problem List:    Patient Active Problem List    Diagnosis Date Noted     Immune thrombocytopenic purpura (H) 11/06/2018     Priority: Medium     History of Lower GI bleed 10/31/2018     Priority: Medium     Underweight 11/09/2017     Priority: Medium     Chronic abdominal pain 10/06/2016     Priority: Medium     Has had workup through [facility] [place]      Now followed by [facility] (started on bentyl)       Fibromyalgia 07/17/2015     Priority: Medium     Anxiety 07/06/2015     Priority: Medium     Hyperlipidemia LDL goal <160 07/15/2013     Priority: Medium     Headaches, tension 07/15/2013     Priority: Medium     Chronic constipation 07/15/2013     Priority: Medium     OCD (obsessive compulsive disorder) 07/15/2013     Priority: Medium     Binge eating  Started therapeutic  trial of Luvox 100-300mg extended release at bedtime; category C in pregnancy (as all SSRI); not effective  Will refer to [facility]        Past Medical History:    Past Medical History:   Diagnosis Date     Anxiety      Binge eating disorder      Chickenpox      Food intolerance      IBS (irritable bowel syndrome)      MEDICAL HISTORY OF -      Past Surgical History:    Past Surgical History:   Procedure Laterality Date     C/SECTION, LOW TRANSVERSE        SECTION, TUBAL LIGATION, COMBINED N/A 2/15/2016    NO tubal ligation done     COLONOSCOPY  2013    Procedure: COMBINED COLONOSCOPY, SINGLE BIOPSY/POLYPECTOMY BY BIOPSY;  Colonoscopy;  Surgeon: Maureen Valentin MD;  Location: [facility]     COLONOSCOPY N/A 2015    Procedure: COMBINED COLONOSCOPY, SINGLE OR MULTIPLE BIOPSY/POLYPECTOMY BY BIOPSY;  Surgeon: Maureen Valentin MD;  Location: [facility]     ESOPHAGOSCOPY, GASTROSCOPY, DUODENOSCOPY (EGD), COMBINED N/A 2018    Procedure: COMBINED ESOPHAGOSCOPY, GASTROSCOPY, DUODENOSCOPY (EGD), BIOPSY SINGLE OR MULTIPLE;  Gastroscopy;  Surgeon: Ray Amato MD;  Location: [facility]     LAPAROSCOPIC SPLENECTOMY N/A 2018    Procedure: Laparoscopic splenectomy, ;  Surgeon: Anthony Jackson DO;  Location: [facility]     Family History:    Family History   Problem Relation Age of Onset     Gastrointestinal Disease Mother 46        perforated intestines-      Hepatitis Mother         C     Liver Disease Mother      Blood Disease Mother         anemia     Substance Abuse Mother         A&D     Psychotic Disorder Brother         Schizophrenia, OCD     Schizophrenia Brother      Anxiety Disorder Brother      Arthritis Maternal Grandmother      Cancer Paternal Grandmother         cervical     Breast Cancer Paternal Grandmother      Hypertension Paternal Grandmother      Diabetes Paternal Grandmother      Cerebrovascular Disease Paternal Grandfather      Heart Disease Paternal  Grandfather      Bipolar Disorder Paternal Grandfather      Suicide Paternal Grandfather      Cancer Paternal Aunt         cervical     Coronary Artery Disease Maternal Grandfather         MI     Substance Abuse Father         recovered A&D     Heart Disease Father         MI-stents     Hypertension Father      Social History:  Marital Status:   [2]  Social History     Tobacco Use     Smoking status: Current Every Day Smoker     Packs/day: 0.50     Years: 6.00     Pack years: 3.00     Types: Cigarettes     Last attempt to quit: 2015     Years since quittin.1     Smokeless tobacco: Never Used     Tobacco comment: 1/2 pack daily    Substance Use Topics     Alcohol use: No     Drug use: No      Medications:    clonazePAM (KLONOPIN) 1 MG tablet  hydrOXYzine (ATARAX) 25 MG tablet  ibuprofen (ADVIL/MOTRIN) 600 MG tablet  ketoconazole (NIZORAL) 2 % external cream  linaclotide (LINZESS) 145 MCG capsule  lisdexamfetamine (VYVANSE) 10 MG capsule  [START ON 2020] lisdexamfetamine (VYVANSE) 10 MG capsule  lisdexamfetamine (VYVANSE) 10 MG capsule  lisdexamfetamine (VYVANSE) 10 MG capsule  lisdexamfetamine (VYVANSE) 10 MG capsule  lisdexamfetamine (VYVANSE) 40 MG capsule  [START ON 2020] lisdexamfetamine (VYVANSE) 40 MG capsule  lisdexamfetamine (VYVANSE) 40 MG capsule  lisdexamfetamine (VYVANSE) 40 MG capsule  lisdexamfetamine (VYVANSE) 40 MG capsule  Multiple Vitamins-Minerals (MULTIVITAL PO)  polyethylene glycol (MIRALAX/GLYCOLAX) powder  potassium chloride ER (KLOR-CON M) 20 MEQ CR tablet  sennosides (SENOKOT) 8.6 MG tablet  UNABLE TO FIND      Review of Systems   All other systems reviewed and are negative.      PE   BP: 114/72  Pulse: 62  Heart Rate: 58  Temp: 98.2  F (36.8  C)  Resp: 16  Weight: 49.9 kg (110 lb)  SpO2: 100 %  Physical Exam  Vitals signs reviewed.   Constitutional:       General: She is not in acute distress.     Appearance: She is well-developed.   HENT:      Head: Normocephalic  and atraumatic.      Right Ear: External ear normal.      Left Ear: External ear normal.      Nose: Nose normal.      Mouth/Throat:      Mouth: Mucous membranes are moist.      Pharynx: Oropharynx is clear.   Eyes:      Extraocular Movements: Extraocular movements intact.      Conjunctiva/sclera: Conjunctivae normal.      Pupils: Pupils are equal, round, and reactive to light.   Neck:      Musculoskeletal: Normal range of motion.   Cardiovascular:      Rate and Rhythm: Normal rate and regular rhythm.   Pulmonary:      Effort: Pulmonary effort is normal.   Abdominal:      Palpations: Abdomen is soft.      Tenderness: There is no abdominal tenderness.   Musculoskeletal: Normal range of motion.   Skin:     General: Skin is warm and dry.   Neurological:      Mental Status: She is alert and oriented to person, place, and time.   Psychiatric:         Behavior: Behavior normal.         ED COURSE and MDM   1327.  The patient fainted during a blood draw which is not unusual for her.  I spoke with the outpatient lab who is requesting a purple top sample of blood for evaluation.  This will be drawn here.  I will also provide them a urine if she is able to give one.  EKG pending.    EKG  (1238)   Interpretation performed by me.  Rate: 61     Rhythm: sinus     Axis: nl  Intervals: IA (12-2) 146, QRS (<12) 102, QTc (>5) 443  P wave: nl     QRS complex: nl  ST segment / T-wave: nl  Conclusion: nl    LABS  Labs Ordered and Resulted from Time of ED Arrival Up to the Time of Departure from the ED - No data to display    IMAGING  Images reviewed by me.  Radiology report also reviewed.  No orders to display       Procedures    Medications - No data to display      IMPRESSION       ICD-10-CM    1. Vasovagal syncope  R55             Medication List      ASK your doctor about these medications    * lisdexamfetamine 40 MG capsule  Commonly known as:  Vyvanse  40 mg, Oral, EVERY MORNING  Ask about: Should I take this medication?     *  lisdexamfetamine 40 MG capsule  Commonly known as:  Vyvanse  40 mg, Oral, EVERY MORNING  Ask about: Should I take this medication?     * lisdexamfetamine 40 MG capsule  Commonly known as:  Vyvanse  40 mg, Oral, EVERY MORNING  Ask about: Should I take this medication?     * lisdexamfetamine 10 MG capsule  Commonly known as:  Vyvanse  10 mg, Oral, DAILY, At noon  Ask about: Should I take this medication?     * lisdexamfetamine 10 MG capsule  Commonly known as:  Vyvanse  10 mg, Oral, DAILY  Ask about: Should I take this medication?     * lisdexamfetamine 10 MG capsule  Commonly known as:  Vyvanse  10 mg, Oral, DAILY, At noon  Ask about: Should I take this medication?     * lisdexamfetamine 40 MG capsule  Commonly known as:  Vyvanse  40 mg, Oral, EVERY MORNING  Ask about: Should I take this medication?     * lisdexamfetamine 10 MG capsule  Commonly known as:  Vyvanse  10 mg, Oral, DAILY, In afternoon  Ask about: Should I take this medication?     triamcinolone 0.1 % external ointment  Commonly known as:  KENALOG  Topical, 2 TIMES DAILY  Ask about: Should I take this medication?         * This list has 8 medication(s) that are the same as other medications prescribed for you. Read the directions carefully, and ask your doctor or other care provider to review them with you.                              Ap Wynn MD  06/29/20 7113

## 2020-06-29 NOTE — ED AVS SNAPSHOT
[facility]  [address] [address]  Phone:  [phone]  Fax:  [phone]                                    Merissa Leung   MRN: [id number]    Department:  [facility] Department   Date of Visit:  6/29/2020           After Visit Summary Signature Page    I have received my discharge instructions, and my questions have been answered. I have discussed any challenges I see with this plan with the nurse or doctor.    ..........................................................................................................................................  Patient/Patient Representative Signature      ..........................................................................................................................................  Patient Representative Print Name and Relationship to Patient    ..................................................               ................................................  Date                                   Time    ..........................................................................................................................................  Reviewed by Signature/Title    ...................................................              ..............................................  Date                                               Time          22EPIC Rev 08/18

## 2020-06-30 LAB
ANA SER QL IF: NEGATIVE
B BURGDOR IGG+IGM SER QL: 0.1 (ref 0–0.89)
B2 GLYCOPROT1 IGG SERPL IA-ACNC: 0.8 U/ML
B2 GLYCOPROT1 IGM SERPL IA-ACNC: <2.9 U/ML
C3 SERPL-MCNC: 111 MG/DL (ref 81–157)
C4 SERPL-MCNC: 26 MG/DL (ref 13–39)
CARDIOLIPIN ANTIBODY IGG: <1.6 GPL-U/ML (ref 0–19.9)
CARDIOLIPIN ANTIBODY IGM: 1.4 MPL-U/ML (ref 0–19.9)
CCP AB SER IA-ACNC: 1 U/ML
DSDNA AB SER-ACNC: <1 IU/ML
ENA RNP IGG SER IA-ACNC: <0.2 AI (ref 0–0.9)
ENA SM IGG SER-ACNC: <0.2 AI (ref 0–0.9)
ENA SS-A IGG SER IA-ACNC: <0.2 AI (ref 0–0.9)
ENA SS-B IGG SER IA-ACNC: <0.2 AI (ref 0–0.9)
HBV CORE AB SERPL QL IA: NONREACTIVE
HBV SURFACE AG SERPL QL IA: NONREACTIVE
HCV AB SERPL QL IA: NONREACTIVE
HIV 1+2 AB+HIV1 P24 AG SERPL QL IA: NONREACTIVE
RHEUMATOID FACT SER NEPH-ACNC: <7 IU/ML (ref 0–20)

## 2020-07-01 LAB
GAMMA INTERFERON BACKGROUND BLD IA-ACNC: 0.02 IU/ML
LA PPP-IMP: NEGATIVE
M TB IFN-G BLD-IMP: NEGATIVE
M TB IFN-G CD4+ BCKGRND COR BLD-ACNC: >10 IU/ML
MITOGEN IGNF BCKGRD COR BLD-ACNC: 0 IU/ML
MITOGEN IGNF BCKGRD COR BLD-ACNC: 0 IU/ML

## 2020-07-02 ENCOUNTER — VIRTUAL VISIT (OUTPATIENT)
Dept: RHEUMATOLOGY | Facility: CLINIC | Age: 35
End: 2020-07-02
Payer: COMMERCIAL

## 2020-07-02 DIAGNOSIS — D69.6 THROMBOCYTOPENIA (H): ICD-10-CM

## 2020-07-02 DIAGNOSIS — Z79.899 HIGH RISK MEDICATION USE: ICD-10-CM

## 2020-07-02 DIAGNOSIS — L40.9 PSORIASIS: ICD-10-CM

## 2020-07-02 DIAGNOSIS — L40.50 PSORIATIC ARTHRITIS (H): Primary | ICD-10-CM

## 2020-07-02 PROCEDURE — 99214 OFFICE O/P EST MOD 30 MIN: CPT | Mod: GT | Performed by: INTERNAL MEDICINE

## 2020-07-02 RX ORDER — SECUKINUMAB 150 MG/ML
150 INJECTION SUBCUTANEOUS
Qty: 1 ML | Refills: 5 | Status: SHIPPED | OUTPATIENT
Start: 2020-07-02 | End: 2020-10-09

## 2020-07-02 RX ORDER — SECUKINUMAB 150 MG/ML
150 INJECTION SUBCUTANEOUS WEEKLY
Qty: 5 ML | Refills: 0 | Status: SHIPPED | OUTPATIENT
Start: 2020-07-02 | End: 2020-10-09

## 2020-07-02 NOTE — Clinical Note
Dr. Pena,  I'm starting cosentyx for Ms. Leung's psoriatic arthritis. Still with thrombocytopenia - I asked that she follow-up with you.  Thanks!  Buck

## 2020-07-02 NOTE — PROGRESS NOTES
"Merissa Leung is a 35 year old female who is being evaluated via a billable video visit.      The patient has been notified of following:     \"This video visit will be conducted via a call between you and your physician/provider. We have found that certain health care needs can be provided without the need for an in-person physical exam.  This service lets us provide the care you need with a video conversation.  If a prescription is necessary we can send it directly to your pharmacy.  If lab work is needed we can place an order for that and you can then stop by our lab to have the test done at a later time.    Video visits are billed at different rates depending on your insurance coverage.  Please reach out to your insurance provider with any questions.    If during the course of the call the physician/provider feels a video visit is not appropriate, you will not be charged for this service.\"    Patient has given verbal consent for Video visit? Yes    Will anyone else be joining your video visit? No    Rheumatology Video Visit      Merissa Leung MRN# [id number]   YOB: 1985 Age: 35 year old      Date of visit: 7/02/20   PCP: Mohini Mccord   Heme/Onc: Dr. Bernadette Pena    Chief Complaint   psoriatic arthritis, psoriasis     Assessment and Plan     1.  Psoriatic arthritis; psoriasis: Swelling of the MCPs consistent with an inflammatory arthritis; morning stiffness all day; pain and stiffness improve with activity and worsen with inactivity.  Lower back pain and stiffness is inflammatory in nature.  Was given dupixent by her dermatologist, per patient, that reportedly worsened the psoriasis; this would make sense mechanistically because by blocking IL-4 and IL-13 then IL-17 may be upregulated and result in worsening psoriasis.  Considering the inflammatory back pain, a TNF inhibitor or IL17 inhibitor would be reasonable; prefer IL17 over TNF because of association with DIL and thrombocytopenia. Workup " was not suggestive of an IX-associated rheumatologic disorder; RF and CCP negative. Labs okay. Discussed tx options with Ms. Leung today; start cosentyx.    - Start cosentyx 150mg SQ once a week on weeks 0, 1, 2, 3, and 4 and then every 4 weeks thereafter  - Labs in 3 months: CBC, Creatinine, Hepatic Panel, ESR, CRP    # Cosentyx (Secukinumab) Risks and Benefits: The risks and benefits of secukinumab were discussed in detail and the patient verbalized understanding.  The risks discussed include, but are not limited to, the risk for hypersensitivity, anaphylaxis, anaphylactoid reactions, an increased risk for serious infections leading to hospitalization or death, a possible increased risk for lymphoma and other malignancies, possible reactivation of hepatitis B, and possible reactivation of latent tuberculosis.  Subcutaneous injections may result in injection site reactions and/or pain at the site of injection.  It was discussed that the medication would need to be discontinued if a serious infection develops.  It was discussed that live vaccinations should not be received while using secukinumab or within 30 days prior to starting secukinumab.  I encouraged reviewing the package insert and asking any questions about the medication.      2. Thrombocytopenia: seen by oncology and now s/p splenectomy.  Advised follow-up with hematology.  - Hematology referral    3. Fibromyalgia: noted here for historical significance only.    4. Cigarette Smoking:  Encouraged complete cessation and discussed the health benefits.      # Relevant labs and imaging were reviewed with the patient     # High risk medication toxicity monitoring: discussion and labs reviewed; appropriate labs ordered. See above.  Instructed that if confirmed to have COVID-19 or exposure to someone with confirmed COVID-19 to call this clinic for directions on DMARD management.    # Note that this is a virtual visit to reduce the risk of COVID-19 exposure  during this current pandemic.      # Considered to be at high risk of complications from the COVID-19 virus.  It is recommended to limit contact with other people and if possible to work remotely or provide a leave of absence to reduce the risk for COVID-19.      Ms. Leung verbalized agreement with and understanding of the rational for the diagnosis and treatment plan.  All questions were answered to best of my ability and the patient's satisfaction. Ms. Leung was advised to contact the clinic with any questions that may arise after the clinic visit.      Thank you for involving me in the care of the patient    Return to clinic 3mo      HPI   Merissa Leung is a 35 year old female with a past medical history significant for hyperlipidemia, tension headaches, chronic constipation, anxiety, obsessive-compulsive disorder, chronic abdominal pain, history of lower GI bleed, ITP history, and fibromyalgia who is seen for follow-up of arthritis and psoriasis.     1/8/2018 [facility] rheumatology note by Dr. Pavan Dimas documents ITP. Eval'd for CTD. And given hx of bloody nose ANCA was checked.     11/5/2019 [facility] clinic note by Juliana Mcnulty documents psoriasis and bDMARD was recommended (specifically Taltz). Hx of ITP and is s/p splenecomty.     6/24/2020: Ms. Leung reported that she has psoriasis, joint pain, and myalgia; has been present her whole life and she's just dealt with it and now is being eval'd.  Scalp psoriasis started on the back of her head; now involving her neck, lip and sometimes on her cheek, dorsal left wrist, volar right wrist. Was given Dupixent by her dermatologist that caused her psoriasis to worsen and ooze; so this was stopped. Never used Taltz or any other injection med for the psoriasis. Never on MTX.   MCPS, PIPs, DIPS, wrists, elbows, shoulders, hips, knees, ankles, and MTPs all ache constantly; worse in the AM, better with time and activity; worse with inactivity; feels  like all joints swell but is most noticeable in the hands.  Difficulty making a fist in the AM.  Dropping items frequently, broken several cups.  Symptoms have been worsening over the past 10 years she says.  Wakes up in the AM and feels like she's 90 years old until she gets moving.  Feels much better when moving constantly. Morning stiffness for most of the day, better with activity.    Today, 2020: here to review results.  She confirmed the previously reported hx documented above.    Denies fevers, chills, nausea, vomiting.  On and off constipation and diarrhea; says that she was dx'd with irritable bowel syndrome and pelvic floor disorder; she says that she has been seen at [place] for this; has had 2 colonoscopies in the past. Pelvic flood disorder thought to be related to emergency  per patient. No abdominal pain. No chest pain/pressure, palpitations, or shortness of breath. No LE swelling. No neck pain. No oral or nasal sores.  No sicca symptoms. No photosensitivity or photophobia. No eye pain or redness. No history of inflammatory eye disease.  No history of DVT, pulmonary embolism, or miscarriage.   No history of serositis.  White discoloration of her fingers with cold exposure; then blue and red; painful with rewarming.  No known seizure disorder; faints with needles due to needle-phobia but says that she can still get labs.  No known renal disorder.      Tobacco: 1/2 PPD  EtOH: None  Drugs: None  Occupation: [employer]    ROS   GEN: No fevers, chills, night sweats, or weight change  SKIN: See HPI  HEENT: No epistaxis. No oral or nasal ulcers.  CV: No chest pain, pressure, palpitations, or dyspnea on exertion.  PULM: No SOB, wheeze, cough.  GI: No nausea, vomiting, constipation, diarrhea. No blood in stool. No abdominal pain.  : No blood in urine.  MSK: See HPI.  NEURO: No numbness or tingling  ENDO: No heat/cold intolerance.  EXT: No LE swelling  PSYCH: Anxiety    Active Problem List      Patient Active Problem List   Diagnosis     Hyperlipidemia LDL goal <160     Headaches, tension     Chronic constipation     OCD (obsessive compulsive disorder)     Anxiety     Fibromyalgia     Chronic abdominal pain     Underweight     History of Lower GI bleed     Immune thrombocytopenic purpura (H)     Past Medical History     Past Medical History:   Diagnosis Date     Anxiety      Binge eating disorder      Chickenpox     x3     Food intolerance      IBS (irritable bowel syndrome)      MEDICAL HISTORY OF -     pelvic floor disorder     Past Surgical History     Past Surgical History:   Procedure Laterality Date     C/SECTION, LOW TRANSVERSE        SECTION, TUBAL LIGATION, COMBINED N/A 2/15/2016    NO tubal ligation done     COLONOSCOPY  2013    Procedure: COMBINED COLONOSCOPY, SINGLE BIOPSY/POLYPECTOMY BY BIOPSY;  Colonoscopy;  Surgeon: Maureen Valentin MD;  Location: [facility]     COLONOSCOPY N/A 2015    Procedure: COMBINED COLONOSCOPY, SINGLE OR MULTIPLE BIOPSY/POLYPECTOMY BY BIOPSY;  Surgeon: Maureen Valentin MD;  Location: [facility]     ESOPHAGOSCOPY, GASTROSCOPY, DUODENOSCOPY (EGD), COMBINED N/A 2018    Procedure: COMBINED ESOPHAGOSCOPY, GASTROSCOPY, DUODENOSCOPY (EGD), BIOPSY SINGLE OR MULTIPLE;  Gastroscopy;  Surgeon: Ray Amato MD;  Location: [facility]     LAPAROSCOPIC SPLENECTOMY N/A 2018    Procedure: Laparoscopic splenectomy, ;  Surgeon: Anthony Jackson DO;  Location: [facility]     Allergy     Allergies   Allergen Reactions     Dilaudid [Hydromorphone] Nausea and Vomiting     Food Other (See Comments) and Swelling     Processed food, sugar, high sodium, red grains, rice, potatoes(can have mashed, but no french fries or puffs)  Pain in extremities and face.   All over body at times.     Milk Protein Extract Swelling     Blow up like a balloon     Current Medication List     Current Outpatient Medications   Medication Sig     clonazePAM (KLONOPIN) 1 MG  tablet Take 1/2 tablet (0.5mg) by mouth twice daily for anxiety, and 1.5 tablets (1.5 mg) at bedtime for sleep     hydrOXYzine (ATARAX) 25 MG tablet Take 1 tablet (25 mg) by mouth 3 times daily as needed for itching     ibuprofen (ADVIL/MOTRIN) 600 MG tablet Take 1 tablet (600 mg) by mouth every 8 hours as needed for moderate pain (Patient not taking: Reported on 2/10/2020)     ketoconazole (NIZORAL) 2 % external cream Apply to rash on chest and neck twice daily     linaclotide (LINZESS) 145 MCG capsule Take 1 capsule (145 mcg) by mouth every morning (before breakfast)     lisdexamfetamine (VYVANSE) 10 MG capsule Take 1 capsule (10 mg) by mouth daily In afternoon (Patient not taking: Reported on 6/24/2020)     [START ON 7/12/2020] lisdexamfetamine (VYVANSE) 10 MG capsule Take 1 capsule (10 mg) by mouth daily In afternoon (Patient not taking: Reported on 6/24/2020)     lisdexamfetamine (VYVANSE) 10 MG capsule Take 1 capsule (10 mg) by mouth daily With lunch     lisdexamfetamine (VYVANSE) 10 MG capsule Take 1 capsule (10 mg) by mouth daily With lunch (Patient not taking: Reported on 6/24/2020)     lisdexamfetamine (VYVANSE) 10 MG capsule Take 1 capsule (10 mg) by mouth daily With lunch (Patient not taking: Reported on 6/24/2020)     lisdexamfetamine (VYVANSE) 40 MG capsule Take 1 capsule (40 mg) by mouth every morning (Patient not taking: Reported on 6/24/2020)     [START ON 7/12/2020] lisdexamfetamine (VYVANSE) 40 MG capsule Take 1 capsule (40 mg) by mouth every morning (Patient not taking: Reported on 6/24/2020)     lisdexamfetamine (VYVANSE) 40 MG capsule Take 1 capsule (40 mg) by mouth daily (Patient not taking: Reported on 6/24/2020)     lisdexamfetamine (VYVANSE) 40 MG capsule Take 1 capsule (40 mg) by mouth daily (Patient not taking: Reported on 6/24/2020)     lisdexamfetamine (VYVANSE) 40 MG capsule Take 1 capsule (40 mg) by mouth daily (Patient not taking: Reported on 6/24/2020)     Multiple Vitamins-Minerals  (MULTIVITAL PO) Take 1 chew tab by mouth daily Gummi     polyethylene glycol (MIRALAX/GLYCOLAX) powder Take 17 g by mouth daily as needed for constipation     potassium chloride ER (KLOR-CON M) 20 MEQ CR tablet Take 1 tablet (20 mEq) by mouth 2 times daily     sennosides (SENOKOT) 8.6 MG tablet Take 2 tablets by mouth 2 times daily     UNABLE TO FIND Take 2 teaspoonful by mouth At Bedtime Magnesium Calm     No current facility-administered medications for this visit.          Social History   See HPI    Family History     Family History   Problem Relation Age of Onset     Gastrointestinal Disease Mother 46        perforated intestines-      Hepatitis Mother         C     Liver Disease Mother      Blood Disease Mother         anemia     Substance Abuse Mother         A&D     Psychotic Disorder Brother         Schizophrenia, OCD     Schizophrenia Brother      Anxiety Disorder Brother      Arthritis Maternal Grandmother      Cancer Paternal Grandmother         cervical     Breast Cancer Paternal Grandmother      Hypertension Paternal Grandmother      Diabetes Paternal Grandmother      Cerebrovascular Disease Paternal Grandfather      Heart Disease Paternal Grandfather      Bipolar Disorder Paternal Grandfather      Suicide Paternal Grandfather      Cancer Paternal Aunt         cervical     Coronary Artery Disease Maternal Grandfather         MI     Substance Abuse Father         recovered A&D     Heart Disease Father         MI-stents     Hypertension Father        Physical Exam     Temp Readings from Last 3 Encounters:   20 98.2  F (36.8  C) (Oral)   20 97.9  F (36.6  C) (Tympanic)   02/10/20 97.4  F (36.3  C) (Tympanic)     BP Readings from Last 5 Encounters:   20 112/77   20 122/68   02/10/20 110/70   19 96/65   10/24/19 102/70     Pulse Readings from Last 1 Encounters:   20 61     Resp Readings from Last 1 Encounters:   20 11     Estimated body mass index is 18.3 kg/m   "as calculated from the following:    Height as of 2/20/20: 1.651 m (5' 5\").    Weight as of 6/29/20: 49.9 kg (110 lb).      GEN: NAD  HEENT: MMM.  Anicteric, noninjected sclera  PULM: No increased work of breathing  MSK: Mild swelling of the bilateral second and third MCPs.  Able to make a fist completely with both hands.  PIPs and DIPs without swelling.  Wrists without swelling.  Elbows without swelling.  Knees and feet without swelling.  Scaly rash on the dorsal aspect of her left wrist.  Unable to passively dorsiflex the fifth MCPs by at least 90 degrees, oppose the thumbs to the volar aspect of the ipsilateral forearms, or hyperextend the knees or elbows.    PSYCH: Alert. Appropriate.          Labs / Imaging (select studies)   RF/CCP  Recent Labs   Lab Test 06/29/20  1239   CCPIGG 1   RHF <7     XI  Recent Labs   Lab Test 06/29/20  1239   DALLAS Negative     RNP/Sm/SSA/SSB  Recent Labs   Lab Test 06/29/20  1239 07/17/15  1544   RNPIGG <0.2  --    SMIGG <0.2  --    SSAIGG <0.2  --    SSBIGG <0.2  --    TREPAB  --  Negative     dsDNA  Recent Labs   Lab Test 06/29/20  1239   DNA <1     C3/C4  Recent Labs   Lab Test 06/29/20  1239 01/24/18  1230   Z3XMTEM 111 84   P2NCDGM 26 24     Antiphospholipid Antibodies  Recent Labs   Lab Test 06/29/20  1239 01/24/18  1230   B2GPG 0.8  --    B2GPM <2.9  --    CARDG <1.6  --    CARDM 1.4  --    LUPINT Negative Negative     ANCA  Recent Labs   Lab Test 01/24/18  1230   PR3IGG <0.2   MPOIGG <0.2       CBC  Recent Labs   Lab Test 06/29/20  1330 03/11/20  1044 11/08/19  1445   WBC 7.8 6.3 10.0   RBC 4.72 5.22* 4.09   HGB 14.7 15.9* 12.8   HCT 43.5 48.0* 36.9   MCV 92 92 90   RDW 13.0 12.9 12.5   PLT 92* 121* 99*   MCH 31.1 30.5 31.3   MCHC 33.8 33.1 34.7   NEUTROPHIL 58.6 38.5 61.3   LYMPH 32.5 49.0 27.7   MONOCYTE 7.2 10.5 8.1   EOSINOPHIL 0.4 1.0 1.7   BASOPHIL 1.0 1.0 1.0   ANEU 4.6 2.4 6.2   ALYM 2.5 3.1 2.8   CORRINA 0.6 0.7 0.8   AEOS 0.0 0.1 0.2   ABAS 0.1 0.1 0.1 "     CMP  Recent Labs   Lab Test 06/29/20  1239 03/11/20  1044 11/08/19  1445 11/04/19  1041 10/24/19  0824 11/20/18  1506    139 143 136 142 140   POTASSIUM 3.1* 3.1* 2.7* 3.4 3.2* 3.5   CHLORIDE 106 105 109 107 104 106   CO2 27 30 29 24 30 24   ANIONGAP 6 4 5 5 8 10   * 101* 113* 99 67* 101*   BUN 13 13 11 12 12 9   CR 0.82 0.83 0.79 0.73 0.74 0.71   GFRESTIMATED >90 >90 >90 >90 >90 >90   GFRESTBLACK >90 >90 >90 >90 >90 >90   DREW 8.9 9.3 8.7 8.7 8.8 9.0   BILITOTAL 0.2 0.2 0.2  --  0.3 0.2   ALBUMIN 4.4 4.4 3.3*  --  3.7 4.0   PROTTOTAL 8.1 7.7 6.3*  --  6.6* 7.5   ALKPHOS 64 62 51  --  51 56   AST 20 19 17  --  16 19   ALT 26 23 19  --  22 20     Calcium/VitaminD  Recent Labs   Lab Test 06/29/20  1239 03/11/20  1044 11/08/19  1445   DREW 8.9 9.3 8.7     ESR/CRP  Recent Labs   Lab Test 06/29/20  1330 06/29/20  1239 11/08/19  1445 10/24/19  0824 01/24/18  1230   SED 3  --   --  3 4   CRP  --  <2.9 <2.9 <2.9 <2.9     CK/Aldolase  Recent Labs   Lab Test 06/29/20  1239 01/24/18  1230   CKT 85 129   ALDOLASE  --  6.3     TSH/T4  Recent Labs   Lab Test 04/09/19  1400 10/18/16  0940 11/21/14   TSH 1.59 1.90 1.1     Hepatitis B  Recent Labs   Lab Test 06/29/20  1239 01/24/18  1230 07/17/15  1544   HBCAB Nonreactive  --   --    HEPBANG Nonreactive Nonreactive Nonreactive     Hepatitis C  Recent Labs   Lab Test 06/29/20  1239 01/24/18  1230 03/04/13  1230   HCVAB Nonreactive Nonreactive Negative     Lyme ab screening  Recent Labs   Lab Test 06/29/20  1239   LYMEGM 0.10     Tuberculosis Screening  Recent Labs   Lab Test 06/29/20  1239   TBRES Negative     HIV Screening  Recent Labs   Lab Test 06/29/20  1239 07/17/15  1544   HIAGAB Nonreactive Nonreactive   HIV-1 p24 Ag & HIV-1/HIV-2 Ab Not Detected       UA  Recent Labs   Lab Test 06/29/20  1430 11/20/18  1518 10/31/18  1715 01/30/18  1750 01/24/18  1150  07/17/15  1559   COLOR Yellow Leticia Leticia Dark Yellow Dark Yellow   < > Yellow   APPEARANCE Cloudy Slightly  Cloudy Slightly Cloudy Clear Clear   < > Clear   URINEGLC Negative Negative Negative Negative Negative   < > Negative   URINEBILI Negative Negative Negative Negative Negative   < > Negative   SG 1.020 1.021 1.020 1.019 1.021   < > >1.030   URINEPH 6.5 5.0 5.0 6.0 6.0   < > 6.0   PROTEIN Negative 30* 30* 30* 10*   < > Negative   UROBILINOGEN 0.2  --   --   --   --   --  0.2   NITRITE Negative Positive* Negative Negative Negative   < > Negative   UBLD Negative Negative Negative Negative Moderate*   < > Negative   LEUKEST Negative Large* Small* Trace* Trace*   < > Negative   WBCU 0 - 5 170* 48* 5* 3*   < >  --    RBCU O - 2 22* 44* 39* 132*   < >  --    SQUAMOUSEPI Few  --   --   --   --   --   --    BACTERIA  --  Moderate* Moderate*  --  Few*  --   --    MUCOUS  --  Present* Present* Present* Present*   < >  --     < > = values in this interval not displayed.     Urine Microscopic  Recent Labs   Lab Test 06/29/20  1430 11/20/18  1518 10/31/18  1715 01/30/18  1750 01/24/18  1150   WBCU 0 - 5 170* 48* 5* 3*   RBCU O - 2 22* 44* 39* 132*   SQUAMOUSEPI Few  --   --   --   --    BACTERIA  --  Moderate* Moderate*  --  Few*   MUCOUS  --  Present* Present* Present* Present*     Urine Protein  Recent Labs   Lab Test 06/29/20  1430   UTP 0.23   UTPG 0.14   UCRR 167       Immunization History     Immunization History   Administered Date(s) Administered     Hib (PRP-T) 10/19/2018     Influenza Vaccine IM > 6 months Valent IIV4 12/03/2018, 01/30/2020     Meningococcal (Menactra ) 10/19/2018     Pneumo Conj 13-V (2010&after) 10/19/2018     Pneumococcal 23 valent 01/30/2020          Chart documentation done in part with Dragon Voice recognition Software. Although reviewed after completion, some word and grammatical error may remain.    Video-Visit Details    Type of service:  Video Visit    Video Start Time: 9:10 AM  Video End Time: 9:40 AM    Originating Location (pt. Location): in an office at her place of employment    Distant  Location (provider location):  Home    Platform used for Video Visit: Adeel Morrell MD

## 2020-07-07 ENCOUNTER — TELEPHONE (OUTPATIENT)
Dept: RHEUMATOLOGY | Facility: CLINIC | Age: 35
End: 2020-07-07

## 2020-07-07 NOTE — TELEPHONE ENCOUNTER
PA Initiation    Medication: cosentyx  Insurance Company: [employer] - Phone [phone] Fax [phone]  Pharmacy Filling the Rx: [facility] - [place], [place] - [facility] [address]  Filling Pharmacy Phone:    Filling Pharmacy Fax:    Start Date: 7/7/2020

## 2020-07-09 NOTE — TELEPHONE ENCOUNTER
Prior Authorization Approval    Authorization Effective Date: 6/9/2020  Authorization Expiration Date: 7/9/2022  Medication: cosentyx  Approved Dose/Quantity: loading and maintenance   Reference #: [id number]   Insurance Company: [employer] - Phone [phone] Fax [phone]  Expected CoPay: $0     CoPay Card Available: Yes    Foundation Assistance Needed: na  Which Pharmacy is filling the prescription (Not needed for infusion/clinic administered): [facility] - [place], [place] - [address]  Pharmacy Notified: Yes  Patient Notified: Yes

## 2020-07-12 NOTE — PROGRESS NOTES
Type of Visit: Video Visit  Patient has given verbal consent for Video visit.     Video Start Time: 2:00 pm  Video End Time: 2:30 pm      Originating Location (pt. Location): Home     Distant Location (provider location):  [facility]      Platform used for Video Visit: [facility]       HEMATOLOGY FOLLOWUP        CHIEF COMPLAINT AND REASON FOR VISIT:   ITP      HISTORY OF PRESENT ILLNESS:  She was seen first by Dr. Le on 11/20/2017 with severe thrombocytopenia isolated of PL around 30, working diagnosis ITP. Wbc/PL are fine.       She was offered prednisone 50 mg with slow taper over 2 or 3 months' period of time.  Her angela platelet count was around 21, was never back up to normal with the prednisone taper.        She reported steroid intolerance, not willing to get back on it.     She has continued severe thrombocytopenia.  S/p splenectomy 11/2018 with Dr. Jackson. Pre surgery PL 20-30, post op nl then went down to 100 in 10/2019. Around the same time dx with psoriasis. Dx with psoriatic arthritis by Dr. Morrell and started on Cosentyx (Secukinumab, anti IL-17 ab).      PAST MEDICAL HISTORY:  Binge eating on medication, chronic abdominal pain, constipation on constipation medication, fibromyalgia, anxiety, hyperlipidemia, underweight, obsessive-compulsive disorder. Psoriasis dx in 2019. Psoriatic arthritis dx by Dr. Morrell 6/2020 and advised on Cosentyx (Secukinumab, anti IL-17 ab).     SOCIAL HISTORY:  She is .  She has 2 kids.  Her last pregnancy was in 2016.  She denies smoking, denies alcohol abuse.  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      FAMILY HISTORY:  Denied family history of a low platelet count.        REVIEW OF SYSTEMS:    Her psoriasis is spreading, joints pain is worse, severe morning stiffness.    She has not started on Cosentyx (Secukinumab, anti IL-17 ab).      PHYSICAL EXAMINATION ATTAINABLE DURING VIDEO  VISIT:  CONSTITUTIONAL - Pt looks like stated age, pleasant, not in acute distress. Not obese.  NEURO: Oriented to time, person, and places. No tremor. Normal gait.   ENT, MOUTH: Pupils are equal.  Sclerae are anicteric.  Moist oral mucosa. No oral thrush.   NECK:  No jugular venous distention.  No thyroid enlargement.   RESPIRATORY: talk nl, no sob during conversation, no cough.   MUSCULOSKELETAL/EXTREMITIES:  No edema.  No joint deformity. Normal range of motion.  SKIN:  No petechiae.  Rash on skull and left wrist.  No signs of cellulitis.   PSYCHIATRIC: Normal mood and affect. Good memory. Proper insight and judgement.   THE REST OF A COMPREHENSIVE PHYSICIAL EXAM IS DEFERRED DUE TO COVID-19 PUBLIC HEALTH EMERGENCY RELATED VIDEO VISIT RESCTRICTION.          CURRENT LAB DATA REVIEWED TODAY WITH PATIENT DURING VISIT:    Pl 92 in 6/2020, 121 in 3/2002 62 in10/2019      CURRENT IMAGE DATA REVIEWED TODAY WITH PATIENT DURING VISIT:    X rays of SI joints, hands, feet 6/2020 - not remarkable.       OLD DATA REVIEWED WITH SUMMARY TODAY:   September 2018:  Platelet count 30.  White count normal.  Hemoglobin normal.   Her platelet count was normal back in 2016.  In 01/2017, platelet count was at 124 and then 11/2017 was at 32.     January 2018 ultrasound abdomen identified normal spleen size and liver size.     Her platelet count was normal back in 2016.  In 01/2017, platelet count was at 124 and then 11/2017 was at 32.         ASSESSMENT AND PLAN DISCUSSED WITH PATIENT TODAY DURING THE VISIT  1. ITP dx in 2017, poor tolerance and response to prednisone.   S/p elective splenectomy 11/2018, which normalized her PL.   Dx with Psoriasis in 2019 with dropping PL.   Psoriatic arthritis dx by Dr. Morrell 6/2020 and advised on Cosentyx (Secukinumab, anti IL-17 ab).     We talked about treatment options for ITP in terms of steroid rituximab, IVIG, immunosuppressive agent thrombopoietin agonist, different side effects associated  with different options.    Due to her underlying auto immune disease,it makes the most sense to tx her underlying disease, which should help her PL.     Will watch her PL while she is getting on Cosentyx (Secukinumab, anti IL-17 ab) for her psoriatic arthritis.    Virtual VISIT time is 25  minutes, spent in dicussion and review patient's ITP and treatment history, labs and images results, symptoms further follow up plan.

## 2020-07-13 ENCOUNTER — VIRTUAL VISIT (OUTPATIENT)
Dept: FAMILY MEDICINE | Facility: CLINIC | Age: 35
End: 2020-07-13
Payer: COMMERCIAL

## 2020-07-13 ENCOUNTER — VIRTUAL VISIT (OUTPATIENT)
Dept: ONCOLOGY | Facility: CLINIC | Age: 35
End: 2020-07-13
Attending: INTERNAL MEDICINE
Payer: COMMERCIAL

## 2020-07-13 DIAGNOSIS — M25.50 MULTIPLE JOINT PAIN: ICD-10-CM

## 2020-07-13 DIAGNOSIS — M79.10 MYALGIA: ICD-10-CM

## 2020-07-13 DIAGNOSIS — F41.1 GAD (GENERALIZED ANXIETY DISORDER): Primary | ICD-10-CM

## 2020-07-13 DIAGNOSIS — L40.9 PSORIASIS: ICD-10-CM

## 2020-07-13 DIAGNOSIS — D69.3 IMMUNE THROMBOCYTOPENIA (H): ICD-10-CM

## 2020-07-13 DIAGNOSIS — F50.819 BINGE EATING DISORDER: ICD-10-CM

## 2020-07-13 DIAGNOSIS — K58.1 IRRITABLE BOWEL SYNDROME WITH CONSTIPATION: ICD-10-CM

## 2020-07-13 DIAGNOSIS — D69.3 IMMUNE THROMBOCYTOPENIC PURPURA (H): Primary | ICD-10-CM

## 2020-07-13 PROCEDURE — 99213 OFFICE O/P EST LOW 20 MIN: CPT | Mod: GT | Performed by: NURSE PRACTITIONER

## 2020-07-13 PROCEDURE — 99214 OFFICE O/P EST MOD 30 MIN: CPT | Mod: GT | Performed by: INTERNAL MEDICINE

## 2020-07-13 PROCEDURE — 40001009 ZZH VIDEO/TELEPHONE VISIT; NO CHARGE

## 2020-07-13 RX ORDER — CLONAZEPAM 1 MG/1
TABLET ORAL
Qty: 28 TABLET | Refills: 5 | Status: SHIPPED | OUTPATIENT
Start: 2020-07-13 | End: 2020-10-07

## 2020-07-13 ASSESSMENT — ANXIETY QUESTIONNAIRES
GAD7 TOTAL SCORE: 2
7. FEELING AFRAID AS IF SOMETHING AWFUL MIGHT HAPPEN: NOT AT ALL
6. BECOMING EASILY ANNOYED OR IRRITABLE: SEVERAL DAYS
5. BEING SO RESTLESS THAT IT IS HARD TO SIT STILL: NOT AT ALL
1. FEELING NERVOUS, ANXIOUS, OR ON EDGE: NOT AT ALL
3. WORRYING TOO MUCH ABOUT DIFFERENT THINGS: SEVERAL DAYS
2. NOT BEING ABLE TO STOP OR CONTROL WORRYING: NOT AT ALL

## 2020-07-13 ASSESSMENT — PATIENT HEALTH QUESTIONNAIRE - PHQ9
5. POOR APPETITE OR OVEREATING: NOT AT ALL
SUM OF ALL RESPONSES TO PHQ QUESTIONS 1-9: 3

## 2020-07-13 NOTE — PROGRESS NOTES
"Merissa Leung is a 35 year old female who is being evaluated via a billable video visit.      The patient has been notified of following:     \"This video visit will be conducted via a call between you and your physician/provider. We have found that certain health care needs can be provided without the need for an in-person physical exam.  This service lets us provide the care you need with a video conversation.  If a prescription is necessary we can send it directly to your pharmacy.  If lab work is needed we can place an order for that and you can then stop by our lab to have the test done at a later time.    Video visits are billed at different rates depending on your insurance coverage.  Please reach out to your insurance provider with any questions.    If during the course of the call the physician/provider feels a video visit is not appropriate, you will not be charged for this service.\"    Patient has given verbal consent for Video visit? Yes  How would you like to obtain your AVS? DanialGay  Patient would like the video invitation sent by: Text to cell phone: [phone]  Will anyone else be joining your video visit? No    Subjective     Merissa Leung is a 35 year old female who presents today via video visit for the following health issues:    HPI  Anxiety Follow-Up    How are you doing with your anxiety since your last visit? No change    Are you having other symptoms that might be associated with anxiety? No    Have you had a significant life event? Health Concerns     Are you feeling depressed? No    Do you have any concerns with your use of alcohol or other drugs? No    Social History     Tobacco Use     Smoking status: Current Every Day Smoker     Packs/day: 0.50     Years: 6.00     Pack years: 3.00     Types: Cigarettes     Last attempt to quit: 2015     Years since quittin.1     Smokeless tobacco: Never Used     Tobacco comment: 1/2 pack daily    Substance Use Topics     Alcohol use: No     " Drug use: No     EDWARD-7 SCORE 2018   Total Score - - -   Total Score 2 5 2     PHQ 2018   PHQ-9 Total Score 4 9 3   Q9: Thoughts of better off dead/self-harm past 2 weeks Not at all Not at all Not at all     She will be starting cosyntex per rheumatology   Was on dupixent- made psoriasis much worse            Video Start Time: Start: 2020 02:15 pm       Patient Active Problem List   Diagnosis     Hyperlipidemia LDL goal <160     Headaches, tension     Chronic constipation     OCD (obsessive compulsive disorder)     Anxiety     Fibromyalgia     Chronic abdominal pain     Underweight     History of Lower GI bleed     Immune thrombocytopenic purpura (H)     Past Surgical History:   Procedure Laterality Date     C/SECTION, LOW TRANSVERSE        SECTION, TUBAL LIGATION, COMBINED N/A 2/15/2016    NO tubal ligation done     COLONOSCOPY  2013    Procedure: COMBINED COLONOSCOPY, SINGLE BIOPSY/POLYPECTOMY BY BIOPSY;  Colonoscopy;  Surgeon: Maureen Valentin MD;  Location: [facility]     COLONOSCOPY N/A 2015    Procedure: COMBINED COLONOSCOPY, SINGLE OR MULTIPLE BIOPSY/POLYPECTOMY BY BIOPSY;  Surgeon: Maureen Valentin MD;  Location: [facility]     ESOPHAGOSCOPY, GASTROSCOPY, DUODENOSCOPY (EGD), COMBINED N/A 2018    Procedure: COMBINED ESOPHAGOSCOPY, GASTROSCOPY, DUODENOSCOPY (EGD), BIOPSY SINGLE OR MULTIPLE;  Gastroscopy;  Surgeon: Ray Amato MD;  Location: [facility]     LAPAROSCOPIC SPLENECTOMY N/A 2018    Procedure: Laparoscopic splenectomy, ;  Surgeon: Anthony Jackson DO;  Location: [facility]       Social History     Tobacco Use     Smoking status: Current Every Day Smoker     Packs/day: 0.50     Years: 6.00     Pack years: 3.00     Types: Cigarettes     Last attempt to quit: 2015     Years since quittin.1     Smokeless tobacco: Never Used     Tobacco comment: 1/2 pack daily    Substance Use Topics     Alcohol use: No      Family History   Problem Relation Age of Onset     Gastrointestinal Disease Mother 46        perforated intestines-      Hepatitis Mother         C     Liver Disease Mother      Blood Disease Mother         anemia     Substance Abuse Mother         A&D     Psychotic Disorder Brother         Schizophrenia, OCD     Schizophrenia Brother      Anxiety Disorder Brother      Arthritis Maternal Grandmother      Cancer Paternal Grandmother         cervical     Breast Cancer Paternal Grandmother      Hypertension Paternal Grandmother      Diabetes Paternal Grandmother      Cerebrovascular Disease Paternal Grandfather      Heart Disease Paternal Grandfather      Bipolar Disorder Paternal Grandfather      Suicide Paternal Grandfather      Cancer Paternal Aunt         cervical     Coronary Artery Disease Maternal Grandfather         MI     Substance Abuse Father         recovered A&D     Heart Disease Father         MI-stents     Hypertension Father            Reviewed and updated as needed this visit by Provider         Review of Systems   Constitutional, HEENT, cardiovascular, pulmonary, gi and gu systems are negative, except as otherwise noted.      Objective             Physical Exam     GENERAL: Healthy, alert and no distress  EYES: Eyes grossly normal to inspection.  No discharge or erythema, or obvious scleral/conjunctival abnormalities.  RESP: No audible wheeze, cough, or visible cyanosis.  No visible retractions or increased work of breathing.    SKIN: Visible skin clear. No significant rash, abnormal pigmentation or lesions.  NEURO: Cranial nerves grossly intact.  Mentation and speech appropriate for age.  PSYCH: Mentation appears normal, affect normal/bright, judgement and insight intact, normal speech and appearance well-groomed.      Diagnostic Test Results:  Labs reviewed in Epic        Assessment & Plan     1. EDWARD (generalized anxiety disorder)  Stable on clonazepam   - clonazePAM (KLONOPIN) 1 MG tablet; Take  1/2 tablet (0.5mg) by mouth twice daily for anxiety, and 1.5 tablets (1.5 mg) at bedtime for sleep  Dispense: 28 tablet; Refill: 5    2. Binge eating disorder  Stable on vyvanse     3. Irritable bowel syndrome with constipation  Stable on linzess     4. Psoriasis  5. Multiple joint pain  6. Myalgia  Will be starting cosyntex per rheumatology     7. Immune thrombocytopenia (H)  Has follow up with hematology today        Tobacco Cessation:   reports that she has been smoking cigarettes. She has a 3.00 pack-year smoking history. She has never used smokeless tobacco.  Tobacco Cessation Action Plan: Information offered: Patient not interested at this time        Patient Instructions   Refilled clonazepam  Follow up in 3 months          Return in about 3 months (around 10/13/2020) for med refills.    Mohini Mccord NP  [facility]      Video-Visit Details    Type of service:  Video Visit    Video End Time:Start:    Stop: 07/13/2020 02:24 pm     Originating Location (pt. Location): Home    Distant Location (provider location):  [facility]     Platform used for Video Visit: [employer]    Return in about 3 months (around 10/13/2020) for med refills.       Mohini Mccord NP

## 2020-07-13 NOTE — LETTER
7/13/2020         RE: Merissa Leung  [address] [facility] [address]        Dear Colleague,    Thank you for referring your patient, Merissa Leung, to the [facility]. Please see a copy of my visit note below.    Type of Visit: Video Visit  Patient has given verbal consent for Video visit.     Video Start Time: 2:00 pm  Video End Time: 2:30 pm      Originating Location (pt. Location): Home     Distant Location (provider location):  [facility]      Platform used for Video Visit: [employer]       HEMATOLOGY FOLLOWUP        CHIEF COMPLAINT AND REASON FOR VISIT:   ITP      HISTORY OF PRESENT ILLNESS:  She was seen first by Dr. Le on 11/20/2017 with severe thrombocytopenia isolated of PL around 30, working diagnosis ITP. Wbc/PL are fine.       She was offered prednisone 50 mg with slow taper over 2 or 3 months' period of time.  Her angela platelet count was around 21, was never back up to normal with the prednisone taper.        She reported steroid intolerance, not willing to get back on it.     She has continued severe thrombocytopenia.  S/p splenectomy 11/2018 with Dr. Jackson. Pre surgery PL 20-30, post op nl then went down to 100 in 10/2019. Around the same time dx with psoriasis. Dx with psoriatic arthritis by Dr. Morrell and started on Cosentyx (Secukinumab, anti IL-17 ab).      PAST MEDICAL HISTORY:  Binge eating on medication, chronic abdominal pain, constipation on constipation medication, fibromyalgia, anxiety, hyperlipidemia, underweight, obsessive-compulsive disorder. Psoriasis dx in 2019. Psoriatic arthritis dx by Dr. Morrell 6/2020 and advised on Cosentyx (Secukinumab, anti IL-17 ab).     SOCIAL HISTORY:  She is .  She has 2 kids.  Her last pregnancy was in 2016.  She denies smoking, denies alcohol abuse.  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      FAMILY HISTORY:  Denied family history of a low platelet  count.        REVIEW OF SYSTEMS:    Her psoriasis is spreading, joints pain is worse, severe morning stiffness.    She has not started on Cosentyx (Secukinumab, anti IL-17 ab).      PHYSICAL EXAMINATION ATTAINABLE DURING VIDEO VISIT:  CONSTITUTIONAL - Pt looks like stated age, pleasant, not in acute distress. Not obese.  NEURO: Oriented to time, person, and places. No tremor. Normal gait.   ENT, MOUTH: Pupils are equal.  Sclerae are anicteric.  Moist oral mucosa. No oral thrush.   NECK:  No jugular venous distention.  No thyroid enlargement.   RESPIRATORY: talk nl, no sob during conversation, no cough.   MUSCULOSKELETAL/EXTREMITIES:  No edema.  No joint deformity. Normal range of motion.  SKIN:  No petechiae.  Rash on skull and left wrist.  No signs of cellulitis.   PSYCHIATRIC: Normal mood and affect. Good memory. Proper insight and judgement.   THE REST OF A COMPREHENSIVE PHYSICIAL EXAM IS DEFERRED DUE TO COVID-19 PUBLIC HEALTH EMERGENCY RELATED VIDEO VISIT RESCTRICTION.          CURRENT LAB DATA REVIEWED TODAY WITH PATIENT DURING VISIT:    Pl 92 in 6/2020, 121 in 3/2002 62 in10/2019      CURRENT IMAGE DATA REVIEWED TODAY WITH PATIENT DURING VISIT:    X rays of SI joints, hands, feet 6/2020 - not remarkable.       OLD DATA REVIEWED WITH SUMMARY TODAY:   September 2018:  Platelet count 30.  White count normal.  Hemoglobin normal.   Her platelet count was normal back in 2016.  In 01/2017, platelet count was at 124 and then 11/2017 was at 32.     January 2018 ultrasound abdomen identified normal spleen size and liver size.     Her platelet count was normal back in 2016.  In 01/2017, platelet count was at 124 and then 11/2017 was at 32.         ASSESSMENT AND PLAN DISCUSSED WITH PATIENT TODAY DURING THE VISIT  1. ITP dx in 2017, poor tolerance and response to prednisone.   S/p elective splenectomy 11/2018, which normalized her PL.   Dx with Psoriasis in 2019 with dropping PL.   Psoriatic arthritis dx by Dr. Morrell  "6/2020 and advised on Cosentyx (Secukinumab, anti IL-17 ab).     We talked about treatment options for ITP in terms of steroid rituximab, IVIG, immunosuppressive agent thrombopoietin agonist, different side effects associated with different options.    Due to her underlying auto immune disease,it makes the most sense to tx her underlying disease, which should help her PL.     Will watch her PL while she is getting on Cosentyx (Secukinumab, anti IL-17 ab) for her psoriatic arthritis.    Virtual VISIT time is 25  minutes, spent in dicussion and review patient's ITP and treatment history, labs and images results, symptoms further follow up plan.         Merissa Leung is a 35 year old female who is being evaluated via a billable video visit.      The patient has been notified of following:     \"This video visit will be conducted via a call between you and your physician/provider. We have found that certain health care needs can be provided without the need for an in-person physical exam.  This service lets us provide the care you need with a video conversation.  If a prescription is necessary we can send it directly to your pharmacy.  If lab work is needed we can place an order for that and you can then stop by our lab to have the test done at a later time.    Video visits are billed at different rates depending on your insurance coverage.  Please reach out to your insurance provider with any questions.    If during the course of the call the physician/provider feels a video visit is not appropriate, you will not be charged for this service.\"    Patient has given verbal consent for Video visit? Yes  How would you like to obtain your AVS? Aramis  Patient would like the video invitation sent by: Send to e-mail at: [email or web address]  Will anyone else be joining your video visit? No        Video-Visit Details    Type of service:  Video Visit      Originating Location (pt. Location): Home    Distant Location " (provider location):  [facility]     Ashley Sultana CMA        Again, thank you for allowing me to participate in the care of your patient.        Sincerely,        Bernadette Pena MD, MD

## 2020-07-13 NOTE — PROGRESS NOTES
"              HAND THERAPY/OCCUPATIONAL THERAPY PROGRESS REPORT     Date:  2019 Clinic Number: [id number]  Patient: Ashu Thomas        : 1977      Age: 41 y.o.     Sex: male                       Hand dominance: right  Referring Physician: Dr. Ferreira  RTD:  2019  Diagnosis: Complete laceration of right ulnar wrist with ulnar nerve and artery laceration and FCU, FDS, and FDP to RF and SF  Precautions:  Splint wear/care; infection control; no functional use of    Date of Injury: 19  Date of Surgery: 19 - right hand I&D with ulnar artery/laceration repair; 19 - right hand tendon/nerve repair  Surgical Procedure: Right hand I&D with ulnar artery/laceration repair on 19; Right hand tendon, nerve repair on 19    Visit #:  (new referral)  Time In: 11:40 AM  Time Out: 12:40 PM  Billable Time: 55 minutes  Charges: fluido, 3 TE    Occupation:  for [employer]  Work Status: Light duty    SUBJECTIVE     Pt reports that he was compliant with his HEP.    Functional Pain Scale Rating 0-10: 0/10 most times, 1/10 occasionally  Location: ulnar hand and volar central palm  Description: Aching and Tingling  Activities which increase pain: "If I move the wrong way"  Activities which decrease pain: not needed    OBJECTIVE     Today's Treatment: Therapeutic exercises x 35 min,  fluido x 15 min    Current Treatment: manual therapy/joint mobilization, Therapeutic exercises, Sensory re-education, Desensitization, Scar management, Edema management, Joint protection and Orthotic fitting/training    Sensation: Ulnar Nerve Impaired     Dela[place]aine Chris Monofilament Test:   6.65 (Deep pressure only) -  small finger  4.56 (Loss of protective sensation)   4.31 (Diminished Protective sensation)   3.61(Diminished light touch) ring finger  2.83 (Normal) - throughout median nerve distribution  *Improvement noted on ulnar side of palm with loss of protective sensation    Scar " "Merissa Leung is a 35 year old female who is being evaluated via a billable video visit.      The patient has been notified of following:     \"This video visit will be conducted via a call between you and your physician/provider. We have found that certain health care needs can be provided without the need for an in-person physical exam.  This service lets us provide the care you need with a video conversation.  If a prescription is necessary we can send it directly to your pharmacy.  If lab work is needed we can place an order for that and you can then stop by our lab to have the test done at a later time.    Video visits are billed at different rates depending on your insurance coverage.  Please reach out to your insurance provider with any questions.    If during the course of the call the physician/provider feels a video visit is not appropriate, you will not be charged for this service.\"    Patient has given verbal consent for Video visit? Yes  How would you like to obtain your AVS? Aramis  Patient would like the video invitation sent by: Send to e-mail at: [email or web address]  Will anyone else be joining your video visit? No        Video-Visit Details    Type of service:  Video Visit      Originating Location (pt. Location): Home    Distant Location (provider location):  [facility]     Ashley Sultana CMA      " Assessment: Hypersensitivity in central portion of scar. Scar well healed.    Edema: Circumferential measurements: as follows: (compared to 4/8/19)   Index Middle Ring Small   Proximal Phalanx 6.8 cm (-.2) 7.0 cm (-.1) 6.8 cm (n/c) 6.1 cm (-.1)     MCP's 22.2 cm (-.3)   Distal Palmar Crease 22 cm (-1)   Proximal Wrist Crease  18.8 cm (-1.6)         AROM is as follows:    Range of Motion: Active Ext/Flex 5/6/2019 (in degrees) Changes noted in () since 4/8/2019  (Ext/Flex) Index Middle Ring Small   MCP Jt 0/85(+10) 0/85(+12/+15) 0/74(+19) 0/73 (+13)   PIP Jt 0/92(+8/+2) 0/90(+15/n/c) 29/95 (+11/n/c) 10/93(+20/+3)   DIP Jt 0/60(n/c) 0/74(+5/+12) 0/60(+5/+5) 0/70(+10/+12)   ACOSTA 237 (+20) 249(+59) 200(+50) 226(+58)     Thumb MP ext/flex 0/55  Thumb IP ext/flex 0/67    Elbow Ext:  WNL Flex:  WNL   Wrist Ext:  50(+10) Flex:  40(+10)   Wrist RD:  15 (n/c) UD:  10(n/c)   Forearm Pron:  WNL Sup:  WNL      Strength: (in pounds/psi)        Date 5/6/2019 5/6/2019    Left Right   Rung II 91 33 (+22) since 4/15/2019   Lateral pinch 28 7   Tripod pinch 30 5   Tip pinch 20 4         Ashu received the following supervised modalities after being cleared for contradictions for 15 minutes:   -Patient received  fluido for 15 minutes to increase blood flow, circulation, pain management and for tissue elasticity prior to therex.     Ashu received manual therapy for 5 minutes including:  RM and STM to right hand/wrist    Ashu received therapeutic exercises for 40 minutes including:  -PROM as follows: jt blocking, thumb opposition, finger flexion, finger extension with MPs at 90, wrist flex and wrist ext x 10 reps  -gentle AROM as follows: jt blocking, TGEs, thumb opposition, wrist flex/ext with fingers flexed and RD/UD with fingers flexed, finger ext, abd/add 2  x 10 reps  -towel scrunches x 10 reps  - 3 lb wrist 3 ways 2 x 15 reps  -large pom poms 3 containers with red cp  -small pom poms 3 containers nesting  -green putty: 2'  molding, 30 grasps, 3 logs each thumb and ring/ small finger, 5 flowers   -Metal hand gripper 3rd rung x 30 reps  -green digi-extend x 30 reps  -red theraflex bar smiles, frowns and twists 2  x 15 reps  -rebounder with red ball x 30 reps      Functional Limitations: Patient presents with the following functional Limitations:   Self Care / ADL: managing two handed but some difficulty with right hand  Home/Work Activities: working light duty  Leisure: working out    Home Exercises Provided: Postural exercises, passive digit flexion (ind and composite) with active extension with MCPs flexed; Edema control techniques, orthotic fabrication/fit/training, instruction in use, wear and care precautions for orthotic and patient reported good understanding of above 10 reps each, 4-6 x day.  3/11/19: Patient provided with right large isotoner glove to be worn daily/nightly for edema control. Patient purchased vibration massager to be used for sensory re-education and edema management for 5 min 1-2 x daily. Issued AROM exercises today.  3/21/2019: Issued finger ext and abd/add ex today for HEP    Education provided re: tendon, nerve, artery repair protocols, healing time frames  Ashu verbalized good understanding of education provided.   No spiritual or educational barriers to learning provided    ASSESSMENT     Patient is now 12 W 0 days s/p I&D with ulnar artery/laceration repair and 11W 2 D s/p tendon, nerve repair. Advanced strengthening activities without difficulty. Advanced strengthening exercises with no difficulty.    Treatment Diagnosis/ Problem List RUE Decreased ROM, Decreased  strength, Decreased pinch strength, Decreased functional hand use, Increased pain, Edema, Joint Stiffness, Scar Adhesion potential and Diminished/Impaired Sensation     Short Term Goals: Increase ROM 3-5 degrees to increase functional hand use for ADLs/work/leisure activities,- Met 5/6/2019 Increase  strength 3-5 lbs. to improve  functional grasp for ADLs/work/leisure activities, - Met 5/6/2019 Increase pinch 1-3 psi's to increase IND wiht button and FM Coordination.,- Met 5/6/2019 Decrease edema .2-.3 mm to increase joint mobility/flexibility for hand use. - Met 5/6/2019     Long Term Goals: 1. Increase  strength 10 lbs to increase functional hand use for ADLs/IADLs. - Progressing  2. Pt. Will report [place] with playing sports. - progressing      Pt prognosis is Excellent. Pt will continue to benefit from skilled outpatient hand therapy to address the deficits listed in the problem list, provide pt education and to maximize pt's level of independence in the home and community environment.      PLAN:  Cont with OT      Will continue/progress with established Plan of Care towards OT goals as orders are received 2 x week for 6-8 more weeks.  Thank you!    LEONEL Olvera, CHT  5/13/2019    I certify the need for these services furnished under this plan of treatment and while under my care.     ____________________________________                         __________________  Physician/Referring Practitioner                                               Date of Signature

## 2020-07-14 ASSESSMENT — ANXIETY QUESTIONNAIRES: GAD7 TOTAL SCORE: 2

## 2020-08-11 DIAGNOSIS — F50.819 BINGE EATING DISORDER: Primary | ICD-10-CM

## 2020-08-11 DIAGNOSIS — F50.819 BINGE EATING DISORDER: ICD-10-CM

## 2020-08-11 RX ORDER — LISDEXAMFETAMINE DIMESYLATE 40 MG/1
40 CAPSULE ORAL EVERY MORNING
Qty: 30 CAPSULE | Refills: 0 | Status: SHIPPED | OUTPATIENT
Start: 2020-09-11 | End: 2020-10-11

## 2020-08-11 RX ORDER — LISDEXAMFETAMINE DIMESYLATE 40 MG/1
40 CAPSULE ORAL EVERY MORNING
Qty: 30 CAPSULE | Refills: 0 | Status: CANCELLED | OUTPATIENT
Start: 2020-08-11

## 2020-08-11 RX ORDER — LISDEXAMFETAMINE DIMESYLATE 10 MG/1
10 CAPSULE ORAL
Qty: 30 CAPSULE | Refills: 0 | Status: SHIPPED | OUTPATIENT
Start: 2020-09-11 | End: 2020-10-11

## 2020-08-11 RX ORDER — LISDEXAMFETAMINE DIMESYLATE 10 MG/1
10 CAPSULE ORAL
Qty: 30 CAPSULE | Refills: 0 | Status: SHIPPED | OUTPATIENT
Start: 2020-10-12 | End: 2020-11-11

## 2020-08-11 RX ORDER — LISDEXAMFETAMINE DIMESYLATE 40 MG/1
40 CAPSULE ORAL EVERY MORNING
Qty: 30 CAPSULE | Refills: 0 | Status: SHIPPED | OUTPATIENT
Start: 2020-10-12 | End: 2020-11-11

## 2020-08-11 RX ORDER — LISDEXAMFETAMINE DIMESYLATE 40 MG/1
40 CAPSULE ORAL EVERY MORNING
Qty: 30 CAPSULE | Refills: 0 | Status: SHIPPED | OUTPATIENT
Start: 2020-08-11 | End: 2020-09-10

## 2020-08-11 RX ORDER — LISDEXAMFETAMINE DIMESYLATE 10 MG/1
10 CAPSULE ORAL
Qty: 30 CAPSULE | Refills: 0 | Status: SHIPPED | OUTPATIENT
Start: 2020-08-11 | End: 2020-09-10

## 2020-08-11 RX ORDER — LISDEXAMFETAMINE DIMESYLATE 10 MG/1
10 CAPSULE ORAL DAILY
Qty: 30 CAPSULE | Refills: 0 | Status: CANCELLED | OUTPATIENT
Start: 2020-08-11

## 2020-09-09 ENCOUNTER — TELEPHONE (OUTPATIENT)
Dept: RHEUMATOLOGY | Facility: CLINIC | Age: 35
End: 2020-09-09

## 2020-09-09 NOTE — TELEPHONE ENCOUNTER
Patient calling states would like a change on her Cosentyx states current dosage is not helping would like increased.  States weekly dosage she had better control. Please call to advise.

## 2020-09-09 NOTE — TELEPHONE ENCOUNTER
Called and spoke with patient who reports she would like to increase the frequency of her Cosentyx injection. Patient says that her psoriasis started acting up after she finished the weekly starter doses and switched to monthly. She has dry psoriasis patches on her arms, neck, chest, and behind the ears. Patient's last dose was on 9/1/20. Forwarded to Dr. Morrell to advise on dosing.    ( okay)    Jc Ramos RN....9/9/2020 2:08 PM

## 2020-09-09 NOTE — TELEPHONE ENCOUNTER
RN: Please call to notify Merissa Leung that cosentyx takes 3-6 months to see improvement; if she was already seeing improvement after 5 weeks of taking it then that is great news to hear she is responding.   I recommend that she continue taking Cosentyx as prescribed and if not better at follow-up then we may consider increasing the dose, but the frequency of every 4 weeks needs to remain the same.        Buck Morrell MD  9/9/2020 6:00 PM

## 2020-09-10 NOTE — TELEPHONE ENCOUNTER
Called patient and left a detailed message relaying Dr. Morrell's message below. Patient will have follow up appointment on 10/9/20.    Jc Ramos RN....9/10/2020 9:16 AM

## 2020-09-21 ENCOUNTER — VIRTUAL VISIT (OUTPATIENT)
Dept: FAMILY MEDICINE | Facility: CLINIC | Age: 35
End: 2020-09-21
Payer: COMMERCIAL

## 2020-09-21 ENCOUNTER — TELEPHONE (OUTPATIENT)
Dept: FAMILY MEDICINE | Facility: CLINIC | Age: 35
End: 2020-09-21

## 2020-09-21 DIAGNOSIS — J06.9 VIRAL UPPER RESPIRATORY TRACT INFECTION: Primary | ICD-10-CM

## 2020-09-21 PROCEDURE — 99213 OFFICE O/P EST LOW 20 MIN: CPT | Mod: TEL | Performed by: NURSE PRACTITIONER

## 2020-09-21 PROCEDURE — U0003 INFECTIOUS AGENT DETECTION BY NUCLEIC ACID (DNA OR RNA); SEVERE ACUTE RESPIRATORY SYNDROME CORONAVIRUS 2 (SARS-COV-2) (CORONAVIRUS DISEASE [COVID-19]), AMPLIFIED PROBE TECHNIQUE, MAKING USE OF HIGH THROUGHPUT TECHNOLOGIES AS DESCRIBED BY CMS-2020-01-R: HCPCS | Performed by: NURSE PRACTITIONER

## 2020-09-22 LAB
SARS-COV-2 RNA SPEC QL NAA+PROBE: NOT DETECTED
SPECIMEN SOURCE: NORMAL

## 2020-09-22 NOTE — PROGRESS NOTES
"Merissa Leung is a 35 year old female who is being evaluated via a billable telephone visit.      The patient has been notified of following:     \"This telephone visit will be conducted via a call between you and your physician/provider. We have found that certain health care needs can be provided without the need for a physical exam.  This service lets us provide the care you need with a short phone conversation.  If a prescription is necessary we can send it directly to your pharmacy.  If lab work is needed we can place an order for that and you can then stop by our lab to have the test done at a later time.    Telephone visits are billed at different rates depending on your insurance coverage. During this emergency period, for some insurers they may be billed the same as an in-person visit.  Please reach out to your insurance provider with any questions.    If during the course of the call the physician/provider feels a telephone visit is not appropriate, you will not be charged for this service.\"    Patient has given verbal consent for Telephone visit?  Yes    What phone number would you like to be contacted at? [phone]    How would you like to obtain your AVS? MyChart    Subjective       HPI  Merissa Leung is a 35 year old female who presents via phone visit today for the following health issues:    Patient called clinic with concerns of wanting COVID testing. She reports that her 4 year son became ill last week with fevers and URI symptoms and her 13 year old son similar symptoms over the weekend. She does not feel well with body aches, fatigue and fevers. She is high risk as is immunosuppressed and on biologic therapy. She is not in any respiratory distress.     Review of Systems   Constitutional, HEENT, cardiovascular, pulmonary, gi and gu systems are negative, except as otherwise noted.       Objective          Vitals:  No vitals were obtained today due to virtual visit.    healthy, alert and no " distress  PSYCH: Alert and oriented times 3; coherent speech, normal   rate and volume, able to articulate logical thoughts, able   to abstract reason, no tangential thoughts, no hallucinations   or delusions  Her affect is normal  RESP: No cough, no audible wheezing, able to talk in full sentences  Remainder of exam unable to be completed due to telephone visits        Assessment/Plan:    Assessment & Plan     Viral upper respiratory tract infection  - Symptomatic COVID-19 Virus (Coronavirus) by PCR       COVID swab was ordered and she was swabbed in the clinic parking lot.    Family members were also swabbed   Quarantine information was provided       Mohini Mccord NP  [facility]    Phone call duration:  8 minutes

## 2020-09-22 NOTE — PATIENT INSTRUCTIONS
"  Patient Education   Discharge Instructions for COVID-19 Patients  You  may have--COVID-19. Please follow the instructions listed below.     How can I protect others?  If you have symptoms (fever, cough, body aches or trouble breathing):    Stay home and away from others (self-isolate) until:  ? At least 10 days have passed since your symptoms started. And   ? You've had no fever--and no medicine that reduces fever--for 1 full day (24 hours). And   ? Your other symptoms have resolved (gotten better).  If you don't show symptoms, but testing showed that you have COVID-19:    Stay home and away from others (self-isolate) until at least 10 days have passed since the date of your first positive COVID-19 test.  During this time    Stay in your own room, even for meals. Use your own bathroom if you can.    Stay away from others in your home. No hugging, kissing or shaking hands. No visitors.    Don't go to work, school or anywhere else.    Clean \"high touch\" surfaces often (doorknobs, counters, handles). Use household cleaning spray or wipes. You'll find a full list of  on the EPA website: www.epa.gov/pesticide-registration/list-n-disinfectants-use-against-sars-cov-2.    Cover your mouth and nose with a mask or other face covering to avoid spreading germs.    Wash your hands and face often. Use soap and water.    Caregivers in these groups are at risk for severe illness due to COVID-19:  ? People 65 years and older  ? People who live in a nursing home or long-term care facility  ? People with chronic disease (lung, heart, cancer, diabetes, kidney, liver, immunologic)  ? People who have a weakened immune system, including those who:    Are in cancer treatment    Take medicine that weakens the immune system, such as corticosteroids    Had a bone marrow or organ transplant    Have an immune deficiency    Have poorly controlled HIV or AIDS    Are obese (body mass index of 40 or higher)    Smoke " regularly    Caregivers should wear gloves while washing dishes, handling laundry and cleaning bedrooms and bathrooms.    Use caution when washing and drying laundry: Don't shake dirty laundry and use the warmest water setting that you can.    For more tips on managing your health at home, go to www.cdc.gov/coronavirus/2019-ncov/downloads/10Things.pdf.  How can I take care of myself at home?  1. Get lots of rest. Drink extra fluids (unless a doctor has told you not to).  2. Take Tylenol (acetaminophen) for fever or pain. If you have liver or kidney problems, ask your family doctor if it's okay to take Tylenol.     Adults can take either:  ? 650 mg (two 325 mg pills) every 4 to 6 hours, or   ? 1,000 mg (two 500 mg pills) every 8 hours as needed.  ? Note: Don't take more than 3,000 mg in one day. Acetaminophen is found in many medicines (both prescribed and over-the-counter medicines). Read all labels to be sure you don't take too much.   For children, check the Tylenol bottle for the right dose. The dose is based on the child's age or weight.  3. If you have other health problems (like cancer, heart failure, an organ transplant or severe kidney disease): Call your specialty clinic if you don't feel better in the next 2 days.  4. Know when to call 911. Emergency warning signs include:  ? Trouble breathing or shortness of breath  ? Pain or pressure in the chest that doesn't go away  ? Feeling confused like you haven't felt before, or not being able to wake up  ? Bluish-colored lips or face  5. Your doctor may have prescribed a blood thinner medicine. Follow their instructions.  Where can I get more information?     [employer] - About COVID-19: [email or web address]    CDC - What to Do If You're Sick: www.cdc.gov/coronavirus/2019-ncov/about/steps-when-sick.html    CDC - Ending Home Isolation: www.cdc.gov/coronavirus/2019-ncov/hcp/disposition-in-home-patients.html    CDC - Caring for Someone:  www.cdc.gov/coronavirus/2019-ncov/if-you-are-sick/care-for-someone.html    [facility] - Interim Guidance for Hospital Discharge to Home: www.health.[facility].[place]/diseases/coronavirus/hcp/hospdischarge.pdf    [facility] clinical trials (COVID-19 research studies): clinicalaffairs.[facility]-clinical-trials    Below are the COVID-19 hotlines at the [place] Department of Health ([facility]). Interpreters are available.  ? For health questions: Call 993-446-2632 or 1-781.243.6435 (7 a.m. to 7 p.m.)  ? For questions about schools and childcare: Call 812-337-2835 or 1-719.373.3853 (7 a.m. to 7 p.m.)    For informational purposes only. Not to replace the advice of your health care provider. Clinically reviewed by the Infection Prevention Team. Copyright   2020 [facility]. All rights reserved. [employer] [id number] - REV 08/04/20.

## 2020-10-08 NOTE — PROGRESS NOTES
"Merissa Leung is a 35 year old female who is being evaluated via a billable video visit.      The patient has been notified of following:     \"This video visit will be conducted via a call between you and your physician/provider. We have found that certain health care needs can be provided without the need for an in-person physical exam.  This service lets us provide the care you need with a video conversation.  If a prescription is necessary we can send it directly to your pharmacy.  If lab work is needed we can place an order for that and you can then stop by our lab to have the test done at a later time.    Video visits are billed at different rates depending on your insurance coverage.  Please reach out to your insurance provider with any questions.    If during the course of the call the physician/provider feels a video visit is not appropriate, you will not be charged for this service.\"    Patient has given verbal consent for Video visit? Yes  How would you like to obtain your AVS? MyChart  If you are dropped from the video visit, the video invite should be resent to: Text to cell phone: [phone]  Will anyone else be joining your video visit? No    Rheumatology Video Visit      Merissa Leung MRN# [id number]   YOB: 1985 Age: 35 year old      Date of visit: 10/09/20   PCP: Mohini Mccord   Heme/Onc: Dr. Bernadette Pena    Chief Complaint   psoriatic arthritis, psoriasis     Assessment and Plan     1.  Psoriatic arthritis; psoriasis: Swelling of the MCPs consistent with an inflammatory arthritis; morning stiffness all day; pain and stiffness improve with activity and worsen with inactivity.  Lower back pain and stiffness is inflammatory in nature.  Was given dupixent by her dermatologist, per patient, that reportedly worsened the psoriasis (possibly because by blocking IL-4 and IL-13 then IL-17 may be upregulated and result in worsening psoriasis).  Changed to Cosentyx with improvement of the " psoriasis and arthritis but persistent symptoms.  Felt better when on cosentyx weekly dosing at the beginning than she does now.  Active psoriasis, and active psoriatic arthritis symptoms; increase Cosentyx.  Note that she has inflammatory back pain.  If not doing better at follow-up then will consider TNF inhibitor.  Avoiding MTX due to thrombocytopenia.  Follow-up with dermatology for psoriasis management in addition to cosentyx.    - Increase cosentyx to 300mg SQ every 4 weeks   - Labs now and in 3 months: CBC, Creatinine, Hepatic Panel, ESR, CRP    # Cosentyx (Secukinumab) Risks and Benefits: The risks and benefits of secukinumab were discussed in detail and the patient verbalized understanding.  The risks discussed include, but are not limited to, the risk for hypersensitivity, anaphylaxis, anaphylactoid reactions, an increased risk for serious infections leading to hospitalization or death, a possible increased risk for lymphoma and other malignancies, possible reactivation of hepatitis B, and possible reactivation of latent tuberculosis.  Subcutaneous injections may result in injection site reactions and/or pain at the site of injection.  It was discussed that the medication would need to be discontinued if a serious infection develops.  It was discussed that live vaccinations should not be received while using secukinumab or within 30 days prior to starting secukinumab.  I encouraged reviewing the package insert and asking any questions about the medication.      2. Thrombocytopenia: seen by oncology and now s/p splenectomy.  Continue following with heme/onc.    3. Fibromyalgia: noted here for historical significance only.  Follow with PCP for management.    4. Cigarette Smoking:  Encouraged complete cessation and discussed the health benefits.      # Relevant labs and imaging were reviewed with the patient     # High risk medication toxicity monitoring: discussion and labs reviewed; appropriate labs  ordered. See above.  Instructed that if confirmed to have COVID-19 or exposure to someone with confirmed COVID-19 to call this clinic for directions on DMARD management.    # Note that this is a virtual visit to reduce the risk of COVID-19 exposure during this current pandemic.      # Considered to be at high risk of complications from the COVID-19 virus.  It is recommended to limit contact with other people and if possible to work remotely or provide a leave of absence to reduce the risk for COVID-19.      Ms. Leung verbalized agreement with and understanding of the rational for the diagnosis and treatment plan.  All questions were answered to best of my ability and the patient's satisfaction. Ms. Leung was advised to contact the clinic with any questions that may arise after the clinic visit.      Thank you for involving me in the care of the patient    Return to clinic: 3 mo      HPI   Merissa Leung is a 35 year old female with a past medical history significant for hyperlipidemia, tension headaches, chronic constipation, anxiety, obsessive-compulsive disorder, chronic abdominal pain, history of lower GI bleed, ITP history, and fibromyalgia who is seen for follow-up of arthritis and psoriasis.     1/8/2018 [facility] rheumatology note by Dr. Pavan Dimas documents ITP. Eval'd for CTD. And given hx of bloody nose ANCA was checked.     11/5/2019 [facility] clinic note by Juliana Mcnulty documents psoriasis and bDMARD was recommended (specifically Taltz). Hx of ITP and is s/p splenecomty.     6/24/2020: Ms. Leung reported that she has psoriasis, joint pain, and myalgia; has been present her whole life and she's just dealt with it and now is being eval'd.  Scalp psoriasis started on the back of her head; now involving her neck, lip and sometimes on her cheek, dorsal left wrist, volar right wrist. Was given Dupixent by her dermatologist that caused her psoriasis to worsen and ooze; so this was stopped.  Never used Taltz or any other injection med for the psoriasis. Never on MTX.   MCPS, PIPs, DIPS, wrists, elbows, shoulders, hips, knees, ankles, and MTPs all ache constantly; worse in the AM, better with time and activity; worse with inactivity; feels like all joints swell but is most noticeable in the hands.  Difficulty making a fist in the AM.  Dropping items frequently, broken several cups.  Symptoms have been worsening over the past 10 years she says.  Wakes up in the AM and feels like she's 90 years old until she gets moving.  Feels much better when moving constantly. Morning stiffness for most of the day, better with activity.    Today, 10/9/2020: Reports of the once weekly dosing of Cosentyx was much more effective than the every 28-day dosing.  Overall feels better on Cosentyx than she was prior to using Cosentyx, with regard to her arthritis and psoriasis.  Morning stiffness last for most of the day, improved with time activity, and reaching a baseline after several hours.  She is now able to make a fist in the morning easier than before, but still has some difficulty in doing so.  Still dropping items, but not as frequently.      Denies fevers, chills, nausea, vomiting.  On and off constipation and diarrhea; says that she was dx'd with irritable bowel syndrome and pelvic floor disorder; she says that she has been seen at [facility] for this; has had 2 colonoscopies in the past. Pelvic flood disorder thought to be related to emergency  per patient. No abdominal pain.  Denies blood in the stool.  No chest pain/pressure, palpitations, or shortness of breath. No LE swelling. No neck pain. No oral or nasal sores.  No sicca symptoms. No photosensitivity or photophobia. No eye pain or redness. No history of inflammatory eye disease.  No history of DVT, pulmonary embolism, or miscarriage.   No history of serositis.  White discoloration of her fingers with cold exposure; then blue and red; painful with  rewarming.  No known seizure disorder; faints with needles due to needle-phobia but says that she can still get labs.  No known renal disorder.      Tobacco: 1/2 PPD  EtOH: None  Drugs: None  Occupation: [employer]    ROS   GEN: No fevers, chills, night sweats, or weight change  SKIN: See HPI  HEENT: No oral or nasal ulcers.  CV: No chest pain, pressure, palpitations, or dyspnea on exertion.  PULM: No SOB, wheeze, cough.  GI: See HPI  : No blood in urine.  MSK: See HPI.  NEURO: No numbness or tingling  EXT: No LE swelling   PSYCH: Anxiety    Active Problem List     Patient Active Problem List   Diagnosis     Hyperlipidemia LDL goal <160     Headaches, tension     Chronic constipation     OCD (obsessive compulsive disorder)     Anxiety     Fibromyalgia     Chronic abdominal pain     Underweight     History of Lower GI bleed     Immune thrombocytopenic purpura (H)     Past Medical History     Past Medical History:   Diagnosis Date     Anxiety      Binge eating disorder      Chickenpox     x3     Food intolerance      IBS (irritable bowel syndrome)      MEDICAL HISTORY OF -     pelvic floor disorder     Past Surgical History     Past Surgical History:   Procedure Laterality Date     C/SECTION, LOW TRANSVERSE        SECTION, TUBAL LIGATION, COMBINED N/A 2/15/2016    NO tubal ligation done     COLONOSCOPY  2013    Procedure: COMBINED COLONOSCOPY, SINGLE BIOPSY/POLYPECTOMY BY BIOPSY;  Colonoscopy;  Surgeon: Maureen Valentin MD;  Location: [facility]     COLONOSCOPY N/A 2015    Procedure: COMBINED COLONOSCOPY, SINGLE OR MULTIPLE BIOPSY/POLYPECTOMY BY BIOPSY;  Surgeon: Maureen Valentin MD;  Location: [facility]     ESOPHAGOSCOPY, GASTROSCOPY, DUODENOSCOPY (EGD), COMBINED N/A 2018    Procedure: COMBINED ESOPHAGOSCOPY, GASTROSCOPY, DUODENOSCOPY (EGD), BIOPSY SINGLE OR MULTIPLE;  Gastroscopy;  Surgeon: Ray Amato MD;  Location: [facility]     LAPAROSCOPIC SPLENECTOMY N/A 2018     Procedure: Laparoscopic splenectomy, ;  Surgeon: Anthony Jackson, DO;  Location: [facility]     Allergy     Allergies   Allergen Reactions     Dilaudid [Hydromorphone] Nausea and Vomiting     Food Other (See Comments) and Swelling     Processed food, sugar, high sodium, red grains, rice, potatoes(can have mashed, but no french fries or puffs)  Pain in extremities and face.   All over body at times.     Milk Protein Extract Swelling     Blow up like a balloon     Current Medication List     Current Outpatient Medications   Medication Sig     clonazePAM (KLONOPIN) 1 MG tablet TAKE 1/2 (ONE-HALF) TABLET BY MOUTH TWICE DAILY FOR ANXIETY AND 1 & 1/2 (ONE & ONE-HALF) AT BEDTIME FOR SLEEP     hydrOXYzine (ATARAX) 25 MG tablet Take 1 tablet (25 mg) by mouth 3 times daily as needed for itching     ibuprofen (ADVIL/MOTRIN) 600 MG tablet Take 1 tablet (600 mg) by mouth every 8 hours as needed for moderate pain     linaclotide (LINZESS) 145 MCG capsule Take 1 capsule (145 mcg) by mouth every morning (before breakfast)     lisdexamfetamine (VYVANSE) 10 MG capsule Take 1 capsule (10 mg) by mouth daily (with lunch)     Multiple Vitamins-Minerals (MULTIVITAL PO) Take 1 chew tab by mouth daily Gummi     polyethylene glycol (MIRALAX/GLYCOLAX) powder Take 17 g by mouth daily as needed for constipation     potassium chloride ER (KLOR-CON M) 20 MEQ CR tablet Take 1 tablet (20 mEq) by mouth 2 times daily     secukinumab (COSENTYX SENSOREADY PEN) 150 MG/ML Sensoready pen Inject 1 mL (150 mg) Subcutaneous every 28 days Hold for signs of infection, and seek medical attention.     sennosides (SENOKOT) 8.6 MG tablet Take 2 tablets by mouth 2 times daily     UNABLE TO FIND Take 2 teaspoonful by mouth At Bedtime Magnesium Calm     [START ON 10/12/2020] lisdexamfetamine (VYVANSE) 10 MG capsule Take 1 capsule (10 mg) by mouth daily (with lunch) (Patient not taking: Reported on 10/8/2020)     lisdexamfetamine (VYVANSE) 40 MG capsule Take 1  capsule (40 mg) by mouth every morning (Patient not taking: Reported on 10/8/2020)     [START ON 10/12/2020] lisdexamfetamine (VYVANSE) 40 MG capsule Take 1 capsule (40 mg) by mouth every morning (Patient not taking: Reported on 10/8/2020)     secukinumab (COSENTYX SENSOREADY PEN) 150 MG/ML Sensoready pen Inject 1 mL (150 mg) Subcutaneous once a week on weeks 0, 1, 2, 3, and 4 and then every 4 weeks thereafter (Patient not taking: Reported on 10/8/2020)     No current facility-administered medications for this visit.          Social History   See HPI    Family History     Family History   Problem Relation Age of Onset     Gastrointestinal Disease Mother 46        perforated intestines-      Hepatitis Mother         C     Liver Disease Mother      Blood Disease Mother         anemia     Substance Abuse Mother         A&D     Psychotic Disorder Brother         Schizophrenia, OCD     Schizophrenia Brother      Anxiety Disorder Brother      Arthritis Maternal Grandmother      Cancer Paternal Grandmother         cervical     Breast Cancer Paternal Grandmother      Hypertension Paternal Grandmother      Diabetes Paternal Grandmother      Cerebrovascular Disease Paternal Grandfather      Heart Disease Paternal Grandfather      Bipolar Disorder Paternal Grandfather      Suicide Paternal Grandfather      Cancer Paternal Aunt         cervical     Coronary Artery Disease Maternal Grandfather         MI     Substance Abuse Father         recovered A&D     Heart Disease Father         MI-stents     Hypertension Father        Physical Exam     Temp Readings from Last 3 Encounters:   20 98.2  F (36.8  C) (Oral)   20 97.9  F (36.6  C) (Tympanic)   02/10/20 97.4  F (36.3  C) (Tympanic)     BP Readings from Last 5 Encounters:   20 112/77   20 122/68   02/10/20 110/70   19 96/65   10/24/19 102/70     Pulse Readings from Last 1 Encounters:   20 61     Resp Readings from Last 1 Encounters:  "  06/29/20 11     Estimated body mass index is 18.3 kg/m  as calculated from the following:    Height as of 2/20/20: 1.651 m (5' 5\").    Weight as of 6/29/20: 49.9 kg (110 lb).      GEN: NAD  HEENT: MMM.  Anicteric, noninjected sclera  PULM: No increased work of breathing  MSK: Mild swelling of the bilateral second and third MCPs.  Able to make a fist completely with each hand.  PIPs and DIPs without swelling.  Wrists without swelling.  Elbows without swelling.  Knees and feet without swelling.    SKIN: Scaly rash on the dorsal aspect of her left wrist.  No evidence of current or previous ischemic insult by visual inspection of the fingertips.  PSYCH: Alert. Appropriate.          Labs / Imaging (select studies)     RF/CCP  Recent Labs   Lab Test 06/29/20  1239   CCPIGG 1   RHF <7     CBC  Recent Labs   Lab Test 06/29/20  1330 03/11/20  1044 11/08/19  1445   WBC 7.8 6.3 10.0   RBC 4.72 5.22* 4.09   HGB 14.7 15.9* 12.8   HCT 43.5 48.0* 36.9   MCV 92 92 90   RDW 13.0 12.9 12.5   PLT 92* 121* 99*   MCH 31.1 30.5 31.3   MCHC 33.8 33.1 34.7   NEUTROPHIL 58.6 38.5 61.3   LYMPH 32.5 49.0 27.7   MONOCYTE 7.2 10.5 8.1   EOSINOPHIL 0.4 1.0 1.7   BASOPHIL 1.0 1.0 1.0   ANEU 4.6 2.4 6.2   ALYM 2.5 3.1 2.8   CORRINA 0.6 0.7 0.8   AEOS 0.0 0.1 0.2   ABAS 0.1 0.1 0.1     CMP  Recent Labs   Lab Test 06/29/20  1239 03/11/20  1044 11/08/19  1445    139 143   POTASSIUM 3.1* 3.1* 2.7*   CHLORIDE 106 105 109   CO2 27 30 29   ANIONGAP 6 4 5   * 101* 113*   BUN 13 13 11   CR 0.82 0.83 0.79   GFRESTIMATED >90 >90 >90   GFRESTBLACK >90 >90 >90   DREW 8.9 9.3 8.7   BILITOTAL 0.2 0.2 0.2   ALBUMIN 4.4 4.4 3.3*   PROTTOTAL 8.1 7.7 6.3*   ALKPHOS 64 62 51   AST 20 19 17   ALT 26 23 19     Calcium/VitaminD  Recent Labs   Lab Test 06/29/20  1239 03/11/20  1044 11/08/19  1445   DREW 8.9 9.3 8.7     ESR/CRP  Recent Labs   Lab Test 06/29/20  1330 06/29/20  1239 11/08/19  1445 10/24/19  0824 01/24/18  1230   SED 3  --   --  3 4   CRP  --  <2.9 " <2.9 <2.9 <2.9       Hepatitis B  Recent Labs   Lab Test 06/29/20  1239 01/24/18  1230 07/17/15  1544   HBCAB Nonreactive  --   --    HEPBANG Nonreactive Nonreactive Nonreactive     Hepatitis C  Recent Labs   Lab Test 06/29/20  1239 01/24/18  1230 03/04/13  1230   HCVAB Nonreactive Nonreactive Negative     Lyme ab screening  Recent Labs   Lab Test 06/29/20  1239   LYMEGM 0.10     Tuberculosis Screening  Recent Labs   Lab Test 06/29/20  1239   TBRES Negative     HIV Screening  Recent Labs   Lab Test 06/29/20  1239 07/17/15  1544   HIAGAB Nonreactive Nonreactive   HIV-1 p24 Ag & HIV-1/HIV-2 Ab Not Detected           Immunization History     Immunization History   Administered Date(s) Administered     Hib (PRP-T) 10/19/2018     Influenza Vaccine IM > 6 months Valent IIV4 12/03/2018, 01/30/2020     Meningococcal (Menactra ) 10/19/2018     Pneumo Conj 13-V (2010&after) 10/19/2018     Pneumococcal 23 valent 01/30/2020          Chart documentation done in part with Dragon Voice recognition Software. Although reviewed after completion, some word and grammatical error may remain.    Video-Visit Details    Type of service:  Video Visit    Video Start Time: 9:25 AM  Video End Time: 9:40 AM    Originating Location (pt. Location): [place], in [place]    Distant Location (provider location):  Home    Platform used for Video Visit: Adeel Morrell MD

## 2020-10-09 ENCOUNTER — VIRTUAL VISIT (OUTPATIENT)
Dept: RHEUMATOLOGY | Facility: CLINIC | Age: 35
End: 2020-10-09
Payer: COMMERCIAL

## 2020-10-09 DIAGNOSIS — L40.9 PSORIASIS: ICD-10-CM

## 2020-10-09 DIAGNOSIS — Z79.899 HIGH RISK MEDICATIONS (NOT ANTICOAGULANTS) LONG-TERM USE: ICD-10-CM

## 2020-10-09 DIAGNOSIS — L40.50 PSORIATIC ARTHRITIS (H): Primary | ICD-10-CM

## 2020-10-09 DIAGNOSIS — M79.7 FIBROMYALGIA: ICD-10-CM

## 2020-10-09 DIAGNOSIS — F17.200 TOBACCO USE DISORDER: ICD-10-CM

## 2020-10-09 PROCEDURE — 99213 OFFICE O/P EST LOW 20 MIN: CPT | Mod: GT | Performed by: INTERNAL MEDICINE

## 2020-10-09 RX ORDER — SECUKINUMAB 150 MG/ML
300 INJECTION SUBCUTANEOUS
Qty: 2 ML | Refills: 4 | Status: SHIPPED | OUTPATIENT
Start: 2020-10-09 | End: 2020-10-28

## 2020-10-12 DIAGNOSIS — Z79.899 HIGH RISK MEDICATION USE: ICD-10-CM

## 2020-10-12 DIAGNOSIS — L40.50 PSORIATIC ARTHRITIS (H): ICD-10-CM

## 2020-10-12 DIAGNOSIS — D69.6 THROMBOCYTOPENIA (H): ICD-10-CM

## 2020-10-12 LAB
ALBUMIN SERPL-MCNC: 3.9 G/DL (ref 3.4–5)
ALP SERPL-CCNC: 59 U/L (ref 40–150)
ALT SERPL W P-5'-P-CCNC: 22 U/L (ref 0–50)
AST SERPL W P-5'-P-CCNC: 20 U/L (ref 0–45)
BASOPHILS # BLD AUTO: 0.1 10E9/L (ref 0–0.2)
BASOPHILS NFR BLD AUTO: 1.2 %
BILIRUB DIRECT SERPL-MCNC: 0.1 MG/DL (ref 0–0.2)
BILIRUB SERPL-MCNC: 0.5 MG/DL (ref 0.2–1.3)
CREAT SERPL-MCNC: 0.94 MG/DL (ref 0.52–1.04)
CRP SERPL-MCNC: <2.9 MG/L (ref 0–8)
DIFFERENTIAL METHOD BLD: ABNORMAL
EOSINOPHIL # BLD AUTO: 0.1 10E9/L (ref 0–0.7)
EOSINOPHIL NFR BLD AUTO: 0.5 %
ERYTHROCYTE [DISTWIDTH] IN BLOOD BY AUTOMATED COUNT: 12.1 % (ref 10–15)
ERYTHROCYTE [SEDIMENTATION RATE] IN BLOOD BY WESTERGREN METHOD: 2 MM/H (ref 0–20)
GFR SERPL CREATININE-BSD FRML MDRD: 79 ML/MIN/{1.73_M2}
HCT VFR BLD AUTO: 44.3 % (ref 35–47)
HGB BLD-MCNC: 15.1 G/DL (ref 11.7–15.7)
IMM GRANULOCYTES # BLD: 0 10E9/L (ref 0–0.4)
IMM GRANULOCYTES NFR BLD: 0.2 %
LYMPHOCYTES # BLD AUTO: 2.5 10E9/L (ref 0.8–5.3)
LYMPHOCYTES NFR BLD AUTO: 25.6 %
MCH RBC QN AUTO: 30.7 PG (ref 26.5–33)
MCHC RBC AUTO-ENTMCNC: 34.1 G/DL (ref 31.5–36.5)
MCV RBC AUTO: 90 FL (ref 78–100)
MONOCYTES # BLD AUTO: 0.7 10E9/L (ref 0–1.3)
MONOCYTES NFR BLD AUTO: 6.7 %
NEUTROPHILS # BLD AUTO: 6.5 10E9/L (ref 1.6–8.3)
NEUTROPHILS NFR BLD AUTO: 65.8 %
NRBC # BLD AUTO: 0 10*3/UL
NRBC BLD AUTO-RTO: 0 /100
PLATELET # BLD AUTO: 41 10E9/L (ref 150–450)
PROT SERPL-MCNC: 7.2 G/DL (ref 6.8–8.8)
RBC # BLD AUTO: 4.92 10E12/L (ref 3.8–5.2)
WBC # BLD AUTO: 9.9 10E9/L (ref 4–11)

## 2020-10-12 PROCEDURE — 82565 ASSAY OF CREATININE: CPT | Performed by: INTERNAL MEDICINE

## 2020-10-12 PROCEDURE — 85652 RBC SED RATE AUTOMATED: CPT | Performed by: INTERNAL MEDICINE

## 2020-10-12 PROCEDURE — 80076 HEPATIC FUNCTION PANEL: CPT | Performed by: INTERNAL MEDICINE

## 2020-10-12 PROCEDURE — 85025 COMPLETE CBC W/AUTO DIFF WBC: CPT | Performed by: INTERNAL MEDICINE

## 2020-10-12 PROCEDURE — 36415 COLL VENOUS BLD VENIPUNCTURE: CPT | Performed by: INTERNAL MEDICINE

## 2020-10-12 PROCEDURE — 86140 C-REACTIVE PROTEIN: CPT | Performed by: INTERNAL MEDICINE

## 2020-10-14 ENCOUNTER — TELEPHONE (OUTPATIENT)
Dept: RHEUMATOLOGY | Facility: CLINIC | Age: 35
End: 2020-10-14

## 2020-10-14 DIAGNOSIS — D69.6 THROMBOCYTOPENIA (H): Primary | ICD-10-CM

## 2020-10-14 DIAGNOSIS — Z79.899 HIGH RISK MEDICATIONS (NOT ANTICOAGULANTS) LONG-TERM USE: ICD-10-CM

## 2020-10-14 NOTE — TELEPHONE ENCOUNTER
Reason for call:  Results   Name of test or procedure: Labs   Date of test or procedure: 10/12/2020  Location of test or procedure: [place]     Additional comments: Please call to go over what results mean     Phone number to reach patient:  Home number on file [phone] (home)    Best Time:  any    Can we leave a detailed message on this number?  YES    Travel screening: Negative

## 2020-10-15 NOTE — TELEPHONE ENCOUNTER
RN: please ask Merissa Leung to have a repeat blood check now for the platelets and again in 2 weeks. Sooner rheumatology follow-up video visit scheduled for 2:20 PM on November 4, 2020.  Stop taking cosentyx for now.     Dr. Pena: Holding cosentyx (for psoriatic arthritis) due to worsening thrombocytopenia.  Not clear if the worsening thrombocytopenia is related to underlying ITP +/- related to cosentyx.     Labs now and in 2 weeks: CBC, in citrated tubes to prevent platelet clumping       Buck Morrell MD  10/14/2020 7:59 PM

## 2020-10-15 NOTE — TELEPHONE ENCOUNTER
Could be from both. Since her PL was nl not long ago, likely more from the meds. Not sure when she started Cosentyx.     Agree with holding the meds and monitor PL.

## 2020-10-15 NOTE — TELEPHONE ENCOUNTER
Called patient and reviewed Dr. Morrell and Dr. Pena's recommendations below. Patient agreed with the plan and will stop taking Cosentyx for now. She confirmed that the video visit on 11/4/20 works for her. RN offered to schedule her lab appointments however patient stated she will call to schedule them.     Jc Ramos RN....10/15/2020 2:04 PM

## 2020-10-22 DIAGNOSIS — L40.9 PSORIASIS: Primary | ICD-10-CM

## 2020-10-26 ENCOUNTER — VIRTUAL VISIT (OUTPATIENT)
Dept: DERMATOLOGY | Facility: CLINIC | Age: 35
End: 2020-10-26
Attending: NURSE PRACTITIONER
Payer: COMMERCIAL

## 2020-10-26 DIAGNOSIS — L40.50 PSORIATIC ARTHRITIS (H): ICD-10-CM

## 2020-10-26 DIAGNOSIS — Z51.81 THERAPEUTIC DRUG MONITORING: Primary | ICD-10-CM

## 2020-10-26 DIAGNOSIS — L40.9 PSORIASIS: ICD-10-CM

## 2020-10-26 PROCEDURE — 99203 OFFICE O/P NEW LOW 30 MIN: CPT | Mod: GT | Performed by: PHYSICIAN ASSISTANT

## 2020-10-26 NOTE — PROGRESS NOTES
HPI:   Chief complaints: Merissa Leung is a 35 year old female who presents via video visit for psoriasis. She has had psoriasis and psoriatic arthritis for the past several years. Was initially started on Dupixent which did not help. She started Cosentyx about 4 months ago which initially helped quite a bit and then stopped. She has patches on her hands, elbows, knees and across her chest. She has joint pain in numerous joints. She currently sees rheumatology as well. She is an  and is currently unable to work due to the degree of skin involvement on her hands.     Pmhx: has ITP; most recent platelet count was 44. She currently sees heme/onc for this. She is post splenectomy.     Shx: lives in [place]. Owns own salon/spa.     Review Of Systems  Eyes: negative  Ears/Nose/Throat: negative  Respiratory: No shortness of breath, dyspnea on exertion, cough, or hemoptysis  Cardiovascular: negative  Gastrointestinal: negative  Genitourinary: negative  Musculoskeletal: negative  Neurologic: negative  Psychiatric: negative  Skin: positive for psoriasis      PHYSICAL EXAM:    There were no vitals taken for this visit.  Skin exam performed as follows: Type 2 skin. Mood appropriate  Alert and Oriented X 3. Well developed, well nourished in no distress.  General appearance: Normal  Head including face: Normal  Eyes: conjunctiva and lids: Normal  Mouth: Lips, teeth, gums: Normal  Neck: Normal    Skin: Scalp and body hair: See below    Psoriasiform dermatitis on the hands, jawline    ASSESSMENT/PLAN:     1. Psoriasis with psoriatic arthritis - failing cosentyx. Discussed switching to Humira and she is amenable to this. Discussed that we will perform more frequent lab monitoring to ensure her platelets do not drop further as this has been described with Humira. Discussed with her heme/onc Dr Pena who agrees with plan.   --Start Humira. Potential side effects of decreased immunity, hepatic dysfunction, risk of  developing lymphoma, increased risk of cardiovascular events, reactivation of latent TB or other respiratory fungal infections discussed at length.   --Check CBC, CMP  --Had a quantiferon gold 2/20 so is up to date with this.           Follow-up: 4 weeks after starting Humira  CC:   Scribed By: Merissa Muñoz, MS, PA-C

## 2020-10-26 NOTE — LETTER
10/26/2020         RE: Merissa Leugn  [address]  [facility] [address]        Dear Colleague,    Thank you for referring your patient, Merissa Leung, to the [facility] [place]. Please see a copy of my visit note below.    HPI:   Chief complaints: Merissa Leung is a 35 year old female who presents via video visit for psoriasis. She has had psoriasis and psoriatic arthritis for the past several years. Was initially started on Dupixent which did not help. She started Cosentyx about 4 months ago which initially helped quite a bit and then stopped. She has patches on her hands, elbows, knees and across her chest. She has joint pain in numerous joints. She currently sees rheumatology as well. She is an  and is currently unable to work due to the degree of skin involvement on her hands.     Pmhx: has ITP; most recent platelet count was 44. She currently sees heme/onc for this. She is post splenectomy.     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     Review Of Systems  Eyes: negative  Ears/Nose/Throat: negative  Respiratory: No shortness of breath, dyspnea on exertion, cough, or hemoptysis  Cardiovascular: negative  Gastrointestinal: negative  Genitourinary: negative  Musculoskeletal: negative  Neurologic: negative  Psychiatric: negative  Skin: positive for psoriasis      PHYSICAL EXAM:    There were no vitals taken for this visit.  Skin exam performed as follows: Type 2 skin. Mood appropriate  Alert and Oriented X 3. Well developed, well nourished in no distress.  General appearance: Normal  Head including face: Normal  Eyes: conjunctiva and lids: Normal  Mouth: Lips, teeth, gums: Normal  Neck: Normal    Skin: Scalp and body hair: See below    Psoriasiform dermatitis on the hands, jawline    ASSESSMENT/PLAN:     1. Psoriasis with psoriatic arthritis - failing cosentyx. Discussed switching to Humira and she is amenable to this. Discussed that we will perform more frequent lab  monitoring to ensure her platelets do not drop further as this has been described with Humira. Discussed with her heme/onc Dr Pena who agrees with plan.   --Start Humira. Potential side effects of decreased immunity, hepatic dysfunction, risk of developing lymphoma, increased risk of cardiovascular events, reactivation of latent TB or other respiratory fungal infections discussed at length.   --Check CBC, CMP  --Had a quantiferon gold 2/20 so is up to date with this.           Follow-up: 4 weeks after starting Humira  CC:   Scribed By: Merissa Muñoz, MS, PA-C          Again, thank you for allowing me to participate in the care of your patient.        Sincerely,        Merissa Muñoz PA-C

## 2020-10-26 NOTE — NURSING NOTE
Psoriasis, full body. Currently on Cosentyx monthly (3 months), having bad flares before next shot. Currently using baby oil and eucerin and wrapping. Has tried previous rx topicals that did not help.    Leslye Huynh, CMA

## 2020-10-27 ENCOUNTER — TELEPHONE (OUTPATIENT)
Dept: DERMATOLOGY | Facility: CLINIC | Age: 35
End: 2020-10-27

## 2020-10-27 NOTE — TELEPHONE ENCOUNTER
PA Initiation    Medication: humira  Insurance Company: [employer] - Phone [phone] Fax [phone]  Pharmacy Filling the Rx: [facility] - [place], [place] - [facility] [address]  Filling Pharmacy Phone:    Filling Pharmacy Fax:    Start Date: 10/27/2020

## 2020-10-27 NOTE — TELEPHONE ENCOUNTER
PA Initiation    Medication: humira   Insurance Company: [employer] - Phone [phone] Fax [phone]  Pharmacy Filling the Rx: [facility] - [place], [place] - [facility] [address]  Filling Pharmacy Phone:    Filling Pharmacy Fax:    Start Date: 10/27/2020

## 2020-10-29 NOTE — TELEPHONE ENCOUNTER
Prior Authorization Approval    Authorization Effective Date: 9/29/2020  Authorization Expiration Date: 10/29/2022  Medication: humira  Approved Dose/Quantity: loading and maintenance   Reference #: [id number]   Insurance Company: [employer] - Phone [phone] Fax [phone]  Expected CoPay: $0     CoPay Card Available: Yes    Foundation Assistance Needed: na  Which Pharmacy is filling the prescription (Not needed for infusion/clinic administered): [facility] - [place] [address]  Pharmacy Notified: Yes  Patient Notified: Yes

## 2020-10-30 DIAGNOSIS — Z79.899 HIGH RISK MEDICATIONS (NOT ANTICOAGULANTS) LONG-TERM USE: ICD-10-CM

## 2020-10-30 DIAGNOSIS — Z51.81 THERAPEUTIC DRUG MONITORING: ICD-10-CM

## 2020-10-30 DIAGNOSIS — D69.6 THROMBOCYTOPENIA (H): ICD-10-CM

## 2020-10-30 LAB
ALBUMIN SERPL-MCNC: 4.3 G/DL (ref 3.4–5)
ALP SERPL-CCNC: 61 U/L (ref 40–150)
ALT SERPL W P-5'-P-CCNC: 21 U/L (ref 0–50)
ANION GAP SERPL CALCULATED.3IONS-SCNC: 5 MMOL/L (ref 3–14)
AST SERPL W P-5'-P-CCNC: 17 U/L (ref 0–45)
BASOPHILS # BLD AUTO: 0.1 10E9/L (ref 0–0.2)
BASOPHILS NFR BLD AUTO: 1.6 %
BILIRUB SERPL-MCNC: 0.4 MG/DL (ref 0.2–1.3)
BUN SERPL-MCNC: 11 MG/DL (ref 7–30)
CALCIUM SERPL-MCNC: 9.2 MG/DL (ref 8.5–10.1)
CHLORIDE SERPL-SCNC: 105 MMOL/L (ref 94–109)
CO2 SERPL-SCNC: 30 MMOL/L (ref 20–32)
CREAT SERPL-MCNC: 0.89 MG/DL (ref 0.52–1.04)
DIFFERENTIAL METHOD BLD: ABNORMAL
EOSINOPHIL # BLD AUTO: 0.1 10E9/L (ref 0–0.7)
EOSINOPHIL NFR BLD AUTO: 0.9 %
ERYTHROCYTE [DISTWIDTH] IN BLOOD BY AUTOMATED COUNT: 12.3 % (ref 10–15)
GFR SERPL CREATININE-BSD FRML MDRD: 85 ML/MIN/{1.73_M2}
GLUCOSE SERPL-MCNC: 105 MG/DL (ref 70–99)
HCT VFR BLD AUTO: 44.1 % (ref 35–47)
HGB BLD-MCNC: 15.2 G/DL (ref 11.7–15.7)
IMM GRANULOCYTES # BLD: 0 10E9/L (ref 0–0.4)
IMM GRANULOCYTES NFR BLD: 0.3 %
LYMPHOCYTES # BLD AUTO: 3.2 10E9/L (ref 0.8–5.3)
LYMPHOCYTES NFR BLD AUTO: 42.9 %
MCH RBC QN AUTO: 30.8 PG (ref 26.5–33)
MCHC RBC AUTO-ENTMCNC: 34.5 G/DL (ref 31.5–36.5)
MCV RBC AUTO: 90 FL (ref 78–100)
MONOCYTES # BLD AUTO: 0.8 10E9/L (ref 0–1.3)
MONOCYTES NFR BLD AUTO: 10.7 %
NEUTROPHILS # BLD AUTO: 3.3 10E9/L (ref 1.6–8.3)
NEUTROPHILS NFR BLD AUTO: 43.6 %
NRBC # BLD AUTO: 0 10*3/UL
NRBC BLD AUTO-RTO: 0 /100
PLATELET # BLD AUTO: 93 10E9/L (ref 150–450)
PLATELET # BLD EST: ABNORMAL 10*3/UL
POTASSIUM SERPL-SCNC: 3.8 MMOL/L (ref 3.4–5.3)
PROT SERPL-MCNC: 7.3 G/DL (ref 6.8–8.8)
RBC # BLD AUTO: 4.93 10E12/L (ref 3.8–5.2)
RBC MORPH BLD: NORMAL
SODIUM SERPL-SCNC: 140 MMOL/L (ref 133–144)
WBC # BLD AUTO: 7.6 10E9/L (ref 4–11)

## 2020-10-30 PROCEDURE — 36415 COLL VENOUS BLD VENIPUNCTURE: CPT | Performed by: PHYSICIAN ASSISTANT

## 2020-10-30 PROCEDURE — 80053 COMPREHEN METABOLIC PANEL: CPT | Performed by: PHYSICIAN ASSISTANT

## 2020-10-30 PROCEDURE — 85025 COMPLETE CBC W/AUTO DIFF WBC: CPT | Performed by: PHYSICIAN ASSISTANT

## 2020-11-05 ENCOUNTER — TELEPHONE (OUTPATIENT)
Dept: DERMATOLOGY | Facility: CLINIC | Age: 35
End: 2020-11-05

## 2020-11-05 ENCOUNTER — MEDICAL CORRESPONDENCE (OUTPATIENT)
Dept: HEALTH INFORMATION MANAGEMENT | Facility: CLINIC | Age: 35
End: 2020-11-05

## 2020-11-05 DIAGNOSIS — L40.50 PSORIATIC ARTHRITIS (H): ICD-10-CM

## 2020-11-05 DIAGNOSIS — L40.9 PSORIASIS: ICD-10-CM

## 2020-11-05 NOTE — TELEPHONE ENCOUNTER
Typically the Rx needs to go to the  so I am sending it there. If not correct, we may need to send another one to [facility] but that shouldn't be the case.

## 2020-11-05 NOTE — TELEPHONE ENCOUNTER
Reason for Call:  Other     Detailed comments: Pt started Humira. Had a misfire issue with first dose of 80 mg shot. Pt called Humira and took care of it by making a claim. Was advised to call provider and inform her that pt needs another rx for the 80 mg Humira sent to  Specialty Pharmacy. - If questions, contact pt     Phone Number Patient can be reached at: Home number on file [phone] (home)    Best Time: Any    Can we leave a detailed message on this number? YES    Call taken on 11/5/2020 at 10:24 AM by Denise Behrendt

## 2020-11-09 DIAGNOSIS — F50.819 BINGE EATING DISORDER: Primary | ICD-10-CM

## 2020-11-09 DIAGNOSIS — F41.1 GAD (GENERALIZED ANXIETY DISORDER): ICD-10-CM

## 2020-11-09 RX ORDER — LISDEXAMFETAMINE DIMESYLATE 10 MG/1
10 CAPSULE ORAL
Qty: 30 CAPSULE | Refills: 0 | Status: SHIPPED | OUTPATIENT
Start: 2020-12-10 | End: 2021-01-09

## 2020-11-09 RX ORDER — LISDEXAMFETAMINE DIMESYLATE 40 MG/1
40 CAPSULE ORAL EVERY MORNING
Qty: 30 CAPSULE | Refills: 0 | Status: SHIPPED | OUTPATIENT
Start: 2021-01-10 | End: 2021-02-09

## 2020-11-09 RX ORDER — CLONAZEPAM 1 MG/1
TABLET ORAL
Qty: 28 TABLET | Refills: 5 | Status: SHIPPED | OUTPATIENT
Start: 2020-11-09

## 2020-11-09 RX ORDER — LISDEXAMFETAMINE DIMESYLATE 40 MG/1
40 CAPSULE ORAL EVERY MORNING
Qty: 30 CAPSULE | Refills: 0 | Status: SHIPPED | OUTPATIENT
Start: 2020-12-10 | End: 2021-01-09

## 2020-11-09 RX ORDER — LISDEXAMFETAMINE DIMESYLATE 10 MG/1
10 CAPSULE ORAL
Qty: 30 CAPSULE | Refills: 0 | Status: SHIPPED | OUTPATIENT
Start: 2021-01-10 | End: 2021-02-09

## 2020-11-09 RX ORDER — LISDEXAMFETAMINE DIMESYLATE 10 MG/1
10 CAPSULE ORAL
Qty: 30 CAPSULE | Refills: 0 | Status: SHIPPED | OUTPATIENT
Start: 2020-11-09 | End: 2020-12-09

## 2020-11-09 RX ORDER — LISDEXAMFETAMINE DIMESYLATE 40 MG/1
40 CAPSULE ORAL EVERY MORNING
Qty: 30 CAPSULE | Refills: 0 | Status: SHIPPED | OUTPATIENT
Start: 2020-11-09 | End: 2020-12-09

## 2020-11-16 ENCOUNTER — HEALTH MAINTENANCE LETTER (OUTPATIENT)
Age: 35
End: 2020-11-16

## 2020-11-22 DIAGNOSIS — K58.1 IRRITABLE BOWEL SYNDROME WITH CONSTIPATION: ICD-10-CM

## 2020-11-25 RX ORDER — LINACLOTIDE 145 UG/1
CAPSULE, GELATIN COATED ORAL
Qty: 30 CAPSULE | Refills: 2 | Status: SHIPPED | OUTPATIENT
Start: 2020-11-25

## 2020-11-25 NOTE — TELEPHONE ENCOUNTER
Requested Prescriptions   Pending Prescriptions Disp Refills     LINZESS 145 MCG capsule [Pharmacy Med Name: Linzess 145 MCG Oral Capsule] 30 capsule 5     Sig: TAKE 1 CAPSULE BY MOUTH IN THE MORNING BEFORE BREAKFAST             Last Written Prescription Date:  2/10/20  Last Fill Quantity: 30,   # refills: 5  Last Office Visit:   Future Office visit:    Next 5 appointments (look out 90 days)    Jan 12, 2021  2:40 PM  Return Visit with Buck Morrell MD  [facility] ([place] [id number] [facility] [id number]  [phone]           Routing refill request to provider for review/approval because:  Drug not on the [facility] or [facility] refill protocol or controlled substance

## 2020-12-01 ENCOUNTER — OFFICE VISIT (OUTPATIENT)
Dept: FAMILY MEDICINE | Facility: CLINIC | Age: 35
End: 2020-12-01
Payer: COMMERCIAL

## 2020-12-01 VITALS
TEMPERATURE: 97.7 F | DIASTOLIC BLOOD PRESSURE: 80 MMHG | HEART RATE: 93 BPM | RESPIRATION RATE: 12 BRPM | SYSTOLIC BLOOD PRESSURE: 120 MMHG | OXYGEN SATURATION: 100 %

## 2020-12-01 DIAGNOSIS — H69.93 DYSFUNCTION OF BOTH EUSTACHIAN TUBES: Primary | ICD-10-CM

## 2020-12-01 PROCEDURE — 99213 OFFICE O/P EST LOW 20 MIN: CPT | Performed by: NURSE PRACTITIONER

## 2020-12-01 RX ORDER — FLUTICASONE PROPIONATE 50 MCG
1 SPRAY, SUSPENSION (ML) NASAL DAILY
Qty: 16 G | Refills: 0 | Status: SHIPPED | OUTPATIENT
Start: 2020-12-01

## 2020-12-01 NOTE — NURSING NOTE
"Chief Complaint   Patient presents with     Ear Problem     on and off 6 months - now consistant past 3 days      /80   Pulse 93   Temp 97.7  F (36.5  C) (Tympanic)   Resp 12   SpO2 100%  Estimated body mass index is 18.3 kg/m  as calculated from the following:    Height as of 2/20/20: 1.651 m (5' 5\").    Weight as of 6/29/20: 49.9 kg (110 lb).  Patient presents to the clinic using No DME      Health Maintenance that is potentially due pending provider review:    Health Maintenance Due   Topic Date Due     URINE DRUG SCREEN  1987     DTAP/TDAP/TD IMMUNIZATION (1 - Tdap) 03/17/2012     PREVENTIVE CARE VISIT  11/30/2019     INFLUENZA VACCINE (1) 09/01/2020        n/a        "

## 2020-12-01 NOTE — PATIENT INSTRUCTIONS
1. Dysfunction of both eustachian tubes  Try flonase nasal spray   - fluticasone (FLONASE) 50 MCG/ACT nasal spray; Spray 1 spray into both nostrils daily  Dispense: 16 g; Refill: 0    Try could try OVER THE COUNTER antihistamine claritin, zyrtec or allegra- daily     Patient Education     Earache, No Infection (Adult)   Earaches can happen without an infection. They can occur when air and fluid build up behind the eardrum. They may cause a feeling of fullness and discomfort. They may also impair hearing. This is called otitis media with effusion (OME) or serous otitis media. It means there is fluid in the middle ear. It is not the same as acute otitis media, which is often from an infection.  OME can happen when you have a cold if congestion blocks the passage that drains the middle ear. This passage is called the eustachian tube. OME may also occur with nasal allergies or after a bacterial infection in the middle ear. Other causes are:    Trauma    Improper cleaning of wax from the ear    Bacterial infection of the mastoid bone (mastoiditis)    Tumor    Jaw pain    Changes in pressure, such as from flying or scuba diving    The pain or discomfort may come and go. You may hear clicking or popping sounds when you chew or swallow. You may feel that your balance is off. Or you may hear ringing in the ear.  It often takes from several weeks up to 3 months for the fluid to clear on its own. Oral pain relievers and ear drops help if there is pain. Decongestants and antihistamines sometimes help. Antibiotics don't help since there is no infection. Your healthcare provider may give you a nasal spray to help reduce swelling in the nose and eustachian tube. This can allow the ear to drain.  If your OME doesn't get better after 3 months, surgery may be used to drain the fluid. A small tube may also be put in the eardrum to help with drainage.  Because the middle ear fluid can become infected, watch for signs of an infection.  These may develop later. They may include increased ear pain, fever, or drainage from the ear.  Home care  These home-care tips will help you take care of yourself:    You may use over-the-counter medicine as directed by your healthcare provider to control pain, unless medicine was prescribed. If you have chronic liver or kidney disease or ever had a stomach ulcer or GI bleeding, talk with your healthcare provider before using any medicines.    Aspirin should never be used in anyone younger than age 18 who has a fever. It may cause severe liver damage.    Ask your healthcare provider if you may use over-the-counter decongestants such as phenylephrine or pseudoephedrine. Keep in mind they are not always helpful.    Talk with your healthcare provider about using nasal spray decongestants. Don't use them for more than 3 days, or as directed by your healthcare provider. Longer use can make congestion worse. Prescription nasal sprays from your healthcare provider don't often have such restrictions.    Antihistamines may help if you are also having allergy symptoms.    You may use medicines such as guaifenesin to thin mucus and help with drainage.  Follow-up care  Follow up with your healthcare provider or as advised if you are not feeling better after 3 days.  When to seek medical advice  Call your healthcare provider right away if any of these occur:    Ear pain that gets worse or that does not start to get better     Fever of 100.4 F (38 C) or higher, or as directed by your healthcare provider    Fluid or blood draining from the ear    Headache or sinus pain    Stiff neck    Unusual drowsiness or confusion  [employer] last reviewed this educational content on 12/1/2019 2000-2020 [employer]. [address] [place] [address], [place] [address]. All rights reserved. This information is not intended as a substitute for professional medical care. Always follow your healthcare professional's instructions.

## 2020-12-01 NOTE — PROGRESS NOTES
SUBJECTIVE   Merissa Leung is a 35 year old female who presents with     Acute Illness  Acute illness concerns: ear pain  Onset/Duration: 6 months, worsening the last 3 days  Symptoms:  Fever: YES- 99  Chills/Sweats: YES- chills - normal for pt  Headache (location?): no  Sinus Pressure: YES- left side - started today  Conjunctivitis:  no  Ear Pain: YES: has been bilateral - more so on left side today, right ear will pop  Rhinorrhea: YES- since wearing mask  Congestion: YES- left side of face  Sore Throat: no  Cough: no  Wheeze: no  Decreased Appetite: YES  Nausea: no  Vomiting: no  Diarrhea: no  Dysuria/Freq.: no  Dysuria or Hematuria: no  Fatigue/Achiness: YES- more than normal  Sick/Strep Exposure: no  Therapies tried and outcome: ibuprofen      PCP   Mohini Mccord -[phone]    Health Maintenance        Health Maintenance Due   Topic Date Due     URINE DRUG SCREEN  1987     DTAP/TDAP/TD IMMUNIZATION (1 - Tdap) 03/17/2012     PREVENTIVE CARE VISIT  11/30/2019     INFLUENZA VACCINE (1) 09/01/2020       HPI        Patient Active Problem List   Diagnosis     Hyperlipidemia LDL goal <160     Headaches, tension     Chronic constipation     OCD (obsessive compulsive disorder)     Anxiety     Fibromyalgia     Chronic abdominal pain     Underweight     History of Lower GI bleed     Immune thrombocytopenic purpura (H)     Current Outpatient Medications   Medication     adalimumab (HUMIRA *CF*) 40 MG/0.4ML pen kit     clonazePAM (KLONOPIN) 1 MG tablet     fluticasone (FLONASE) 50 MCG/ACT nasal spray     ibuprofen (ADVIL/MOTRIN) 600 MG tablet     LINZESS 145 MCG capsule     lisdexamfetamine (VYVANSE) 10 MG capsule     [START ON 12/10/2020] lisdexamfetamine (VYVANSE) 10 MG capsule     [START ON 1/10/2021] lisdexamfetamine (VYVANSE) 10 MG capsule     lisdexamfetamine (VYVANSE) 40 MG capsule     [START ON 12/10/2020] lisdexamfetamine (VYVANSE) 40 MG capsule     [START ON 1/10/2021] lisdexamfetamine (VYVANSE) 40  MG capsule     Multiple Vitamins-Minerals (MULTIVITAL PO)     polyethylene glycol (MIRALAX/GLYCOLAX) powder     potassium chloride ER (KLOR-CON M) 20 MEQ CR tablet     sennosides (SENOKOT) 8.6 MG tablet     UNABLE TO FIND     adalimumab (HUMIRA *CF*) 80 MG/0.8ML & 40MG/0.4ML pen kit     No current facility-administered medications for this visit.          Reviewed and updated:  Tobacco  Allergies  Meds  Med Hx  Surg Hx  Fam Hx  Soc Hx     ROS:  Constitutional, HEENT, cardiovascular, pulmonary, gi and gu systems are negative, except as otherwise noted.    PHYSICAL EXAM   /80   Pulse 93   Temp 97.7  F (36.5  C) (Tympanic)   Resp 12   SpO2 100%   There is no height or weight on file to calculate BMI.  GENERAL: healthy, alert and no distress  HENT: normal cephalic/atraumatic, both ears: bulging membrane, nose and mouth without ulcers or lesions, oropharynx clear and oral mucous membranes moist  NECK: no adenopathy, no asymmetry, masses, or scars and thyroid normal to palpation  RESP: lungs clear to auscultation - no rales, rhonchi or wheezes  CV: regular rate and rhythm, normal S1 S2, no S3 or S4, no murmur, click or rub, no peripheral edema and peripheral pulses strong  ABDOMEN: soft, nontender, no hepatosplenomegaly, no masses and bowel sounds normal  MS: no gross musculoskeletal defects noted, no edema  SKIN: psoriatic plaque lesions on bilateral hands, wrists and neck     Assessment & Plan   Patient Instructions   1. Dysfunction of both eustachian tubes  Try flonase nasal spray   - fluticasone (FLONASE) 50 MCG/ACT nasal spray; Spray 1 spray into both nostrils daily  Dispense: 16 g; Refill: 0    Try could try OVER THE COUNTER antihistamine claritin, zyrtec or allegra- daily     Patient Education     Earache, No Infection (Adult)   Earaches can happen without an infection. They can occur when air and fluid build up behind the eardrum. They may cause a feeling of fullness and discomfort. They may also  impair hearing. This is called otitis media with effusion (OME) or serous otitis media. It means there is fluid in the middle ear. It is not the same as acute otitis media, which is often from an infection.  OME can happen when you have a cold if congestion blocks the passage that drains the middle ear. This passage is called the eustachian tube. OME may also occur with nasal allergies or after a bacterial infection in the middle ear. Other causes are:    Trauma    Improper cleaning of wax from the ear    Bacterial infection of the mastoid bone (mastoiditis)    Tumor    Jaw pain    Changes in pressure, such as from flying or scuba diving    The pain or discomfort may come and go. You may hear clicking or popping sounds when you chew or swallow. You may feel that your balance is off. Or you may hear ringing in the ear.  It often takes from several weeks up to 3 months for the fluid to clear on its own. Oral pain relievers and ear drops help if there is pain. Decongestants and antihistamines sometimes help. Antibiotics don't help since there is no infection. Your healthcare provider may give you a nasal spray to help reduce swelling in the nose and eustachian tube. This can allow the ear to drain.  If your OME doesn't get better after 3 months, surgery may be used to drain the fluid. A small tube may also be put in the eardrum to help with drainage.  Because the middle ear fluid can become infected, watch for signs of an infection. These may develop later. They may include increased ear pain, fever, or drainage from the ear.  Home care  These home-care tips will help you take care of yourself:    You may use over-the-counter medicine as directed by your healthcare provider to control pain, unless medicine was prescribed. If you have chronic liver or kidney disease or ever had a stomach ulcer or GI bleeding, talk with your healthcare provider before using any medicines.    Aspirin should never be used in anyone younger  than age 18 who has a fever. It may cause severe liver damage.    Ask your healthcare provider if you may use over-the-counter decongestants such as phenylephrine or pseudoephedrine. Keep in mind they are not always helpful.    Talk with your healthcare provider about using nasal spray decongestants. Don't use them for more than 3 days, or as directed by your healthcare provider. Longer use can make congestion worse. Prescription nasal sprays from your healthcare provider don't often have such restrictions.    Antihistamines may help if you are also having allergy symptoms.    You may use medicines such as guaifenesin to thin mucus and help with drainage.  Follow-up care  Follow up with your healthcare provider or as advised if you are not feeling better after 3 days.  When to seek medical advice  Call your healthcare provider right away if any of these occur:    Ear pain that gets worse or that does not start to get better     Fever of 100.4 F (38 C) or higher, or as directed by your healthcare provider    Fluid or blood draining from the ear    Headache or sinus pain    Stiff neck    Unusual drowsiness or confusion  [employer] last reviewed this educational content on 12/1/2019 2000-2020 [employer]. [address] [place] [address]. All rights reserved. This information is not intended as a substitute for professional medical care. Always follow your healthcare professional's instructions.               Return in about 2 weeks (around 12/15/2020), or if symptoms worsen or fail to improve.    Mohini Mccord NP  [facility]      Risks, benefits, side effects and rationale for treatment plan fully discussed with the patient and understanding expressed.

## 2020-12-03 ENCOUNTER — TELEPHONE (OUTPATIENT)
Dept: DERMATOLOGY | Facility: CLINIC | Age: 35
End: 2020-12-03

## 2020-12-03 NOTE — TELEPHONE ENCOUNTER
Reason for Call:  Other     Detailed comments: Pt says when she is taking her Humira shots, shortly after is having significant flare ups that last 3-5 days. Specialty pharmacy advised that she notify derm clinic about this. Pharmacy questioning if pt should continue on the Humira or if pt should be switched to something else - Please contact pt     Phone Number Patient can be reached at: Home number on file [phone] (home)    Best Time: Any    Can we leave a detailed message on this number? YES    Call taken on 12/3/2020 at 11:10 AM by Denise Behrendt

## 2020-12-03 NOTE — TELEPHONE ENCOUNTER
"Discussed with provider. Called patient and discussed side effects and concerns she is having. Offered continued use of Humira and following-up.     Patient stated she is miserable after injections. Feels as though she is \"breaking out of skin\" and does not want to continue use, if possible, would like to change to something else.     Will route to provider for consideration.   Master REGAN RN   [facility] Clinics   "

## 2020-12-04 NOTE — TELEPHONE ENCOUNTER
Pt returned call and given info as documented below. Virtual visit scheduled on 12/07 @ 3:45    Denise Behrendt  Specialty CSS

## 2020-12-04 NOTE — TELEPHONE ENCOUNTER
Left message for pt to call and make follow up appt and she can stop the Humira.  Mela REECE RN BSN PHN  [facility] Clinics

## 2020-12-07 ENCOUNTER — VIRTUAL VISIT (OUTPATIENT)
Dept: DERMATOLOGY | Facility: CLINIC | Age: 35
End: 2020-12-07
Payer: COMMERCIAL

## 2020-12-07 DIAGNOSIS — L40.50 PSORIATIC ARTHRITIS (H): ICD-10-CM

## 2020-12-07 DIAGNOSIS — L40.9 PSORIASIS: ICD-10-CM

## 2020-12-07 DIAGNOSIS — Z51.81 THERAPEUTIC DRUG MONITORING: Primary | ICD-10-CM

## 2020-12-07 PROCEDURE — 99213 OFFICE O/P EST LOW 20 MIN: CPT | Mod: GT | Performed by: PHYSICIAN ASSISTANT

## 2020-12-07 RX ORDER — USTEKINUMAB 45 MG/.5ML
INJECTION, SOLUTION SUBCUTANEOUS
Qty: 0.5 ML | Refills: 3 | Status: SHIPPED | OUTPATIENT
Start: 2020-12-07 | End: 2021-02-17

## 2020-12-07 NOTE — PROGRESS NOTES
"Merissa Leung is a 35 year old female who is being evaluated via a billable video visit.      The patient has been notified of following:      \"This video visit will be conducted via a call between you and your physician/provider. We have found that certain health care needs can be provided without the need for an in-person physical exam.  This service lets us provide the care you need with a video conversation.  If a prescription is necessary we can send it directly to your pharmacy.  If lab work is needed we can place an order for that and you can then stop by our lab to have the test done at a later time.    Video visits are billed at different rates depending on your insurance coverage.  Please reach out to your insurance provider with any questions.    If during the course of the call the physician/provider feels a video visit is not appropriate, you will not be charged for this service.\"    Patient has given verbal consent for Video visit? Yes  How would you like to obtain your AVS? MyChart  If you are dropped from the video visit, the video invite should be resent to: Text to cell phone: [phone]  Will anyone else be joining your video visit? No       HPI:   Chief complaints: Merissa Leung is a 35 year old female who presents via video visit for recheck of psoriasis and PsA. Worse on Humira. Joints about the same but she has aches everywhere.  She has had psoriasis and psoriatic arthritis for the past several years. Was initially started on Dupixent which did not help. She started Cosentyx about 4 months ago which initially helped quite a bit and then stopped. She has patches on her hands, elbows, knees and across her chest. She has joint pain in numerous joints. She currently sees rheumatology as well. She is an  and is currently unable to work due to the degree of skin involvement on her hands.     Pmhx: has ITP; most recent platelet count was 91 (up from 44 pre-Humira). She currently " sees heme/onc for this. She is post splenectomy.     Shx: lives in [place]. Owns own salon/spa.     Review Of Systems  Eyes: negative  Ears/Nose/Throat: negative  Respiratory: No shortness of breath, dyspnea on exertion, cough, or hemoptysis  Cardiovascular: negative  Gastrointestinal: negative  Genitourinary: negative  Musculoskeletal: negative  Neurologic: negative  Psychiatric: negative  Skin: positive for psoriasis      PHYSICAL EXAM:    There were no vitals taken for this visit.  Skin exam performed as follows: Type 2 skin. Mood appropriate  Alert and Oriented X 3. Well developed, well nourished in no distress.  General appearance: Normal  Head including face: Normal  Eyes: conjunctiva and lids: Normal  Mouth: Lips, teeth, gums: Normal  Neck: Normal    Skin: Scalp and body hair: See below    Psoriasiform dermatitis on the hands, jawline    ASSESSMENT/PLAN:     1. Psoriasis with psoriatic arthritis - failing Humira. Discussed switching to Stelara and she is amenable to this. Discussed that we will perform more frequent lab monitoring to ensure her platelets do not drop further as this has been described with Humira. Discussed with her heme/onc Dr Pena who agrees with plan.   --Start Stelara. Potential side effects of decreased immunity, hepatic dysfunction, risk of developing lymphoma, increased risk of cardiovascular events, reactivation of latent TB or other respiratory fungal infections discussed at length.   --Check CBC, CMP  --Had a quantiferon gold 2/20 so is up to date with this.           Follow-up: 6-8 weeks  CC:   Scribed By: Merissa Muñoz, MS, PA-C

## 2020-12-07 NOTE — LETTER
"    12/7/2020         RE: Merissa Leung  [address] [facility] [address]        Dear Colleague,    Thank you for referring your patient, Merissa Leung, to the [facility]. Please see a copy of my visit note below.    Merissa Leung is a 35 year old female who is being evaluated via a billable video visit.      The patient has been notified of following:      \"This video visit will be conducted via a call between you and your physician/provider. We have found that certain health care needs can be provided without the need for an in-person physical exam.  This service lets us provide the care you need with a video conversation.  If a prescription is necessary we can send it directly to your pharmacy.  If lab work is needed we can place an order for that and you can then stop by our lab to have the test done at a later time.    Video visits are billed at different rates depending on your insurance coverage.  Please reach out to your insurance provider with any questions.    If during the course of the call the physician/provider feels a video visit is not appropriate, you will not be charged for this service.\"    Patient has given verbal consent for Video visit? Yes  How would you like to obtain your AVS? MyChart  If you are dropped from the video visit, the video invite should be resent to: Text to cell phone: [phone]  Will anyone else be joining your video visit? No       HPI:   Chief complaints: Merissa Leung is a 35 year old female who presents via video visit for recheck of psoriasis and PsA. Worse on Humira. Joints about the same but she has aches everywhere.  She has had psoriasis and psoriatic arthritis for the past several years. Was initially started on Dupixent which did not help. She started Cosentyx about 4 months ago which initially helped quite a bit and then stopped. She has patches on her hands, elbows, knees and across her chest. She has joint pain in numerous " joints. She currently sees rheumatology as well. She is an  and is currently unable to work due to the degree of skin involvement on her hands.     Pmhx: has ITP; most recent platelet count was 91 (up from 44 pre-Humira). She currently sees heme/onc for this. She is post splenectomy.     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     Review Of Systems  Eyes: negative  Ears/Nose/Throat: negative  Respiratory: No shortness of breath, dyspnea on exertion, cough, or hemoptysis  Cardiovascular: negative  Gastrointestinal: negative  Genitourinary: negative  Musculoskeletal: negative  Neurologic: negative  Psychiatric: negative  Skin: positive for psoriasis      PHYSICAL EXAM:    There were no vitals taken for this visit.  Skin exam performed as follows: Type 2 skin. Mood appropriate  Alert and Oriented X 3. Well developed, well nourished in no distress.  General appearance: Normal  Head including face: Normal  Eyes: conjunctiva and lids: Normal  Mouth: Lips, teeth, gums: Normal  Neck: Normal    Skin: Scalp and body hair: See below    Psoriasiform dermatitis on the hands, jawline    ASSESSMENT/PLAN:     1. Psoriasis with psoriatic arthritis - failing Humira. Discussed switching to Stelara and she is amenable to this. Discussed that we will perform more frequent lab monitoring to ensure her platelets do not drop further as this has been described with Humira. Discussed with her heme/onc Dr Pena who agrees with plan.   --Start Stelara. Potential side effects of decreased immunity, hepatic dysfunction, risk of developing lymphoma, increased risk of cardiovascular events, reactivation of latent TB or other respiratory fungal infections discussed at length.   --Check CBC, CMP  --Had a quantiferon gold 2/20 so is up to date with this.           Follow-up: 6-8 weeks  CC:   Scribed By: Merissa Muñoz, MS, PA-C                Again, thank you for allowing me to participate in the care of your patient.         Sincerely,        Merissa Meehan PA-C

## 2020-12-08 ENCOUNTER — TELEPHONE (OUTPATIENT)
Dept: DERMATOLOGY | Facility: CLINIC | Age: 35
End: 2020-12-08

## 2020-12-08 NOTE — TELEPHONE ENCOUNTER
PA Initiation    Medication: stelara  Insurance Company: [employer] - Phone [phone] Fax [phone]  Pharmacy Filling the Rx: [facility] - [place], [place] - [facility] [address]  Filling Pharmacy Phone:    Filling Pharmacy Fax:    Start Date: 12/8/2020

## 2020-12-14 NOTE — TELEPHONE ENCOUNTER
Prior Authorization Approval    Authorization Effective Date: 12/8/2020  Authorization Expiration Date: 12/8/2021  Medication: stelara  Approved Dose/Quantity: loading and maintenance   Reference #: [id number]   Insurance Company: [employer] - Phone [phone] Fax [phone]  Expected CoPay: na     CoPay Card Available: Yes    Foundation Assistance Needed: na  Which Pharmacy is filling the prescription (Not needed for infusion/clinic administered): [facility] - [place] [address] [address]  Pharmacy Notified: Yes  Patient Notified: Yes

## 2020-12-15 NOTE — TELEPHONE ENCOUNTER
Prior authorization has been updated for [facility] to dispense as the facility vs [facility] on the approval below. Prior authorization approval number has been updated to #[id number] dates are the same from 12/08/2020-12/08/2021

## 2021-01-28 ENCOUNTER — TELEPHONE (OUTPATIENT)
Dept: DERMATOLOGY | Facility: CLINIC | Age: 36
End: 2021-01-28

## 2021-01-28 DIAGNOSIS — L40.50 PSORIATIC ARTHRITIS (H): ICD-10-CM

## 2021-01-28 DIAGNOSIS — L40.9 PSORIASIS: Primary | ICD-10-CM

## 2021-01-28 NOTE — TELEPHONE ENCOUNTER
It's my JOB to know if she is having a hard time with any aspect of treatment so please let me know of any issues in the future.     We can try Taltz - I have sent this to the specialty pharmacy if she wants to try.

## 2021-01-28 NOTE — TELEPHONE ENCOUNTER
PA Initiation    Medication: taltz  Insurance Company: [employer] - Phone [phone] Fax [phone]  Pharmacy Filling the Rx: [facility] - [place], [place] - [facility] [address]  Filling Pharmacy Phone:    Filling Pharmacy Fax:    Start Date: 1/28/2021

## 2021-01-28 NOTE — TELEPHONE ENCOUNTER
The pharmacist from the speciality pharmacy reached out to me and said that Merissa is having a tough time with Stelara injections. One option would be for her to actually get the injections done for her in clinic. Would she like to do this? We would need to have the medicaiton sent directly here in order to administer it for her. Let me know!

## 2021-01-28 NOTE — TELEPHONE ENCOUNTER
Patient is being followed by Nikunj PEPE pharmacist at [facility]. Please keep him in the loop on outcome.

## 2021-01-28 NOTE — TELEPHONE ENCOUNTER
Called patient and she stated she was having a hard time giving herself the injection. She stated that she didn't want to bother the provider with this. Pt would like to know if there is another medication that would be an auto injector instead of syringe. Pt would prefer this option over coming to clinic. Thanks.   Mela REECE RN BSN PHN  Specialty Clinics

## 2021-01-28 NOTE — CONFIDENTIAL NOTE
Pt notified and she reported she is happy with the change.   Mela REECE RN BSN PHN  Specialty Clinics

## 2021-02-02 NOTE — TELEPHONE ENCOUNTER
Please notify patient - would she like to try Enbrel? This comes in an auto-injector and is done twice weekly for the first 3 months, then once weekly thereafter.     Alternatively she can try Otezla which is an oral pill but she might get considerable diarrhea with this. There is also a rare risk of depression.

## 2021-02-02 NOTE — TELEPHONE ENCOUNTER
PRIOR AUTHORIZATION DENIED    Medication: taltz    Denial Date: 2/2/2021    Denial Rational: patient must try/fail ALL step one medications for at least 3 months. She will need to try/fail Otezla and Enbrel in order for Taltz to be covered.

## 2021-02-03 NOTE — TELEPHONE ENCOUNTER
Pt calling back stating she would like to try the Enbrel auto injector. Please send to  Specialty pharmacy.     Daylin Bernstein  Specialty CSS

## 2021-02-03 NOTE — TELEPHONE ENCOUNTER
Message left to return call or read My chart message as new phone system can be tough to return call. Katelynn Flores RN

## 2021-02-04 ENCOUNTER — TELEPHONE (OUTPATIENT)
Dept: DERMATOLOGY | Facility: CLINIC | Age: 36
End: 2021-02-04

## 2021-02-04 NOTE — TELEPHONE ENCOUNTER
PA Initiation    Medication: enbrel   Insurance Company: [employer] - Phone [phone] Fax [phone]  Pharmacy Filling the Rx: [facility] - [place], [place] - [facility] [address]  Filling Pharmacy Phone:    Filling Pharmacy Fax:    Start Date: 2/4/2021

## 2021-02-08 NOTE — TELEPHONE ENCOUNTER
Prior Authorization Approval    Authorization Effective Date: 1/8/2021  Authorization Expiration Date: 2/8/2023  Medication: enbrel   Approved Dose/Quantity: 4/28ds  Reference #: [id number]   Insurance Company: [employer] - Phone [phone] Fax [phone]  Expected CoPay: 772.50 copay card available      CoPay Card Available: Yes    Foundation Assistance Needed: na  Which Pharmacy is filling the prescription (Not needed for infusion/clinic administered): [facility] - [place], [place] - [facility] [address]   Pharmacy Notified: Yes  Patient Notified: Yes

## 2021-02-09 ENCOUNTER — TELEPHONE (OUTPATIENT)
Dept: DERMATOLOGY | Facility: CLINIC | Age: 36
End: 2021-02-09

## 2021-02-09 DIAGNOSIS — L40.50 PSORIATIC ARTHRITIS (H): ICD-10-CM

## 2021-02-09 DIAGNOSIS — L40.9 PSORIASIS: ICD-10-CM

## 2021-02-09 NOTE — TELEPHONE ENCOUNTER
I'd like to keep the rx the same for now. If she is doing well we can consider decreasing to once weekly, but sometimes we keep the rx at twice weekly.

## 2021-02-09 NOTE — TELEPHONE ENCOUNTER
Spoke to pharmacist.     Can we dispense Twice weekly 50 mg dosing for 3 months, then go to once weekly 50 mg dosing thereafter?     If she needs to go up at that point, then we can try for a prior auth to keep her on twice a week dosing if needed.     Please advise. Katelynn Flores RN

## 2021-02-09 NOTE — CONFIDENTIAL NOTE
Reason for Call:  Other prescription    Detailed comments: minor calling from  specialty pharmacy stating ENBREL SURECLICK directions for starting does should be 2x a week for 3 months then 1 x a wk there after. Please update rx and re send to pharmacy.    Phone Number Patient can be reached at: Other phone number:  [phone]*    Best Time: any     Can we leave a detailed message on this number? YES    Call taken on 2/9/2021 at 12:21 PM by Daylin Bernstein

## 2021-02-17 ENCOUNTER — VIRTUAL VISIT (OUTPATIENT)
Dept: RHEUMATOLOGY | Facility: CLINIC | Age: 36
End: 2021-02-17
Payer: COMMERCIAL

## 2021-02-17 DIAGNOSIS — L40.50 PSORIATIC ARTHRITIS (H): Primary | ICD-10-CM

## 2021-02-17 DIAGNOSIS — L40.9 PSORIASIS: ICD-10-CM

## 2021-02-17 DIAGNOSIS — D69.6 THROMBOCYTOPENIA (H): ICD-10-CM

## 2021-02-17 DIAGNOSIS — F17.200 TOBACCO USE DISORDER: ICD-10-CM

## 2021-02-17 PROCEDURE — 99214 OFFICE O/P EST MOD 30 MIN: CPT | Mod: GT | Performed by: INTERNAL MEDICINE

## 2021-02-17 NOTE — PROGRESS NOTES
Merissa Leung  is a 35 year old year old female who is being evaluated via a billable video visit.      How would you like to obtain your AVS? MyChart  If the video visit is dropped, the invitation should be resent by: Text to cell phone: [phone]  Will anyone else be joining your video visit? No    Rheumatology Video Visit      Merissa Leung MRN# [id number]   YOB: 1985 Age: 35 year old      Date of visit: 2/17/21   PCP: Mohini Mccord   Heme/Onc: Dr. Bernadette Pena  Dermatology: Merissa Wanda    Chief Complaint   psoriatic arthritis, psoriasis     Assessment and Plan     1.  Psoriatic arthritis; psoriasis: Swelling of the MCPs consistent with an inflammatory arthritis; morning stiffness all day; pain and stiffness improve with activity and worsen with inactivity.  Lower back pain and stiffness is inflammatory in nature.  Was given dupixent by her dermatologist, per patient, that reportedly worsened the psoriasis (possibly because by blocking IL-4 and IL-13 then IL-17 may be upregulated and result in worsening psoriasis).  Changed to Cosentyx with improvement of the psoriasis and arthritis but persistent symptoms so the dose was increased to 300 mg every 4 weeks in October 2020.  She did not improve by the end of October so she was changed to Humira but reportedly this caused worsening psoriasis.  She was then changed to Stelara but after half of an injection she realized she could not do self-injections.  Now prescribed Enbrel from dermatology and planning to start this soon.  I agree with starting Enbrel.  If Enbrel is not tolerated then another TNF inhibitor could be tried.  If that is not effective then could consider using a different IL-17.  If Stelara is needed in the future then may consider having this administered at the infusion center.  Chronic illness, progressive, severe  - Start Enbrel per dermatology (Enbrel is already been prescribed but she has not started it yet)    2.  Thrombocytopenia: seen by oncology and now s/p splenectomy.  Continue following with heme/onc.    3. Fibromyalgia: noted here for historical significance only.  Follow with PCP for management.    4. Cigarette Smoking:  Encouraged complete cessation and discussed the health benefits.      # At this time the COVID-19 vaccine (currently available from [employer]) is recommended based on our knowledge of this vaccine and vaccines in the past.  Based on the data for the mRNA COVID-19 vaccines available in the [place], there is no preference for one COVID-19 vaccine over another. Note that there is no data currently available to specifically establish safety and efficacy for this vaccine in immunocompromised people.      #  Instructed that if confirmed to have COVID-19 or exposure to someone with confirmed COVID-19 to call this clinic for directions on DMARD management.    # This is a virtual visit to reduce the risk of COVID-19 exposure during this current pandemic.      # Considered to be at high risk of complications from the COVID-19 virus.  It is recommended to limit contact with other people and if possible to work remotely or provide a leave of absence to reduce the risk for COVID-19.      Total minutes spent in evaluation with patient, documentation, , and review of pertinent studies and chart notes: 21       Ms. Leung verbalized agreement with and understanding of the rational for the diagnosis and treatment plan.  All questions were answered to best of my ability and the patient's satisfaction. Ms. Leung was advised to contact the clinic with any questions that may arise after the clinic visit.      Thank you for involving me in the care of the patient    Return to clinic: 3 mo      HPI   Merissa Leung is a 35 year old female with a past medical history significant for hyperlipidemia, tension headaches, chronic constipation, anxiety, obsessive-compulsive disorder, chronic abdominal  pain, history of lower GI bleed, ITP history, and fibromyalgia who is seen for follow-up of arthritis and psoriasis.     1/8/2018 [facility] rheumatology note by Dr. Pavan Dimas documents ITP. Eval'd for CTD. And given hx of bloody nose ANCA was checked.     11/5/2019 [facility] clinic note by Juliana Mcnulty documents psoriasis and bDMARD was recommended (specifically Taltz). Hx of ITP and is s/p splenecomty.     6/24/2020: Ms. Leung reported that she has psoriasis, joint pain, and myalgia; has been present her whole life and she's just dealt with it and now is being eval'd.  Scalp psoriasis started on the back of her head; now involving her neck, lip and sometimes on her cheek, dorsal left wrist, volar right wrist. Was given Dupixent by her dermatologist that caused her psoriasis to worsen and ooze; so this was stopped. Never used Taltz or any other injection med for the psoriasis. Never on MTX.   MCPS, PIPs, DIPS, wrists, elbows, shoulders, hips, knees, ankles, and MTPs all ache constantly; worse in the AM, better with time and activity; worse with inactivity; feels like all joints swell but is most noticeable in the hands.  Difficulty making a fist in the AM.  Dropping items frequently, broken several cups.  Symptoms have been worsening over the past 10 years she says.  Wakes up in the AM and feels like she's 90 years old until she gets moving.  Feels much better when moving constantly. Morning stiffness for most of the day, better with activity.    10/9/2020: Reports of the once weekly dosing of Cosentyx was much more effective than the every 28-day dosing.  Overall feels better on Cosentyx than she was prior to using Cosentyx, with regard to her arthritis and psoriasis.  Morning stiffness last for most of the day, improved with time activity, and reaching a baseline after several hours.  She is now able to make a fist in the morning easier than before, but still has some difficulty in doing so.  Still  dropping items, but not as frequently.  cosentyx was increased at that time    2021: Since last seen, was seen by dermatology where Humira as tried that caused a flare of arthritis and psoriasis with each injection. Then changed to Stelara that was not an autoinjector and couldn't tolerate doing self-injections; injected half a shot in 2020.  Therefore, changed to Enbrel but hasn't started yet.  She says that her joints are very swollen; hard to get up and use the bathroom; hard to walk. Knees, hips, low back, and hands. Symptoms are worse in the AM and improve with heat, time, and activity. +gelling. Psoriasis still very active, on her chest, back, extremities, and ears.     Denies fevers, chills, nausea, vomiting.  On and off constipation and diarrhea; says that she was dx'd with irritable bowel syndrome and pelvic floor disorder; she says that she has been seen at [facility] for this; has had 2 colonoscopies in the past. Pelvic flood disorder thought to be related to emergency  per patient. No abdominal pain.  Denies blood in the stool.  No chest pain/pressure, palpitations, or shortness of breath. No LE swelling. No neck pain. No oral or nasal sores.  No sicca symptoms. No photosensitivity or photophobia. No eye pain or redness. No history of inflammatory eye disease.  No history of DVT, pulmonary embolism, or miscarriage.   No history of serositis.  White discoloration of her fingers with cold exposure; then blue and red; painful with rewarming.  No known seizure disorder; faints with needles due to needle-phobia but says that she can still get labs.  No known renal disorder.      Tobacco: 1/2 PPD  EtOH: None  Drugs: None  Occupation: [employer]    ROS   GEN: No fevers, chills, night sweats, or weight change  SKIN: See HPI  HEENT: No oral or nasal ulcers.  CV: No chest pain, pressure, palpitations, or dyspnea on exertion.  PULM: No SOB, wheeze, cough.  GI: See HPI  : No blood in urine.  MSK: See  HPI.  NEURO: No numbness or tingling  EXT: No LE swelling   PSYCH: Anxiety    Active Problem List     Patient Active Problem List   Diagnosis     Hyperlipidemia LDL goal <160     Headaches, tension     Chronic constipation     OCD (obsessive compulsive disorder)     Anxiety     Fibromyalgia     Chronic abdominal pain     Underweight     History of Lower GI bleed     Immune thrombocytopenic purpura (H)     Past Medical History     Past Medical History:   Diagnosis Date     Anxiety      Binge eating disorder      Chickenpox     x3     Food intolerance      IBS (irritable bowel syndrome)      MEDICAL HISTORY OF -     pelvic floor disorder     Past Surgical History     Past Surgical History:   Procedure Laterality Date     C/SECTION, LOW TRANSVERSE        SECTION, TUBAL LIGATION, COMBINED N/A 2/15/2016    NO tubal ligation done     COLONOSCOPY  2013    Procedure: COMBINED COLONOSCOPY, SINGLE BIOPSY/POLYPECTOMY BY BIOPSY;  Colonoscopy;  Surgeon: Maureen Valentin MD;  Location: [facility]     COLONOSCOPY N/A 2015    Procedure: COMBINED COLONOSCOPY, SINGLE OR MULTIPLE BIOPSY/POLYPECTOMY BY BIOPSY;  Surgeon: Maureen Valentin MD;  Location: [facility]     ESOPHAGOSCOPY, GASTROSCOPY, DUODENOSCOPY (EGD), COMBINED N/A 2018    Procedure: COMBINED ESOPHAGOSCOPY, GASTROSCOPY, DUODENOSCOPY (EGD), BIOPSY SINGLE OR MULTIPLE;  Gastroscopy;  Surgeon: Ray Amato MD;  Location: [facility]     LAPAROSCOPIC SPLENECTOMY N/A 2018    Procedure: Laparoscopic splenectomy, ;  Surgeon: Anthony Jackson DO;  Location: [facility]     Allergy     Allergies   Allergen Reactions     Dilaudid [Hydromorphone] Nausea and Vomiting     Food Other (See Comments) and Swelling     Processed food, sugar, high sodium, red grains, rice, potatoes(can have mashed, but no french fries or puffs)  Pain in extremities and face.   All over body at times.     Milk Protein Extract Swelling     Blow up like a balloon     Current  Medication List     Current Outpatient Medications   Medication Sig     clonazePAM (KLONOPIN) 1 MG tablet TAKE 1/2 (ONE-HALF) TABLET BY MOUTH TWICE DAILY FOR ANXIETY AND 1 & 1/2 (ONE & ONE-HALF) AT BEDTIME FOR SLEEP     fluticasone (FLONASE) 50 MCG/ACT nasal spray Spray 1 spray into both nostrils daily     LINZESS 145 MCG capsule TAKE 1 CAPSULE BY MOUTH IN THE MORNING BEFORE BREAKFAST     Multiple Vitamins-Minerals (MULTIVITAL PO) Take 1 chew tab by mouth daily Gummi     polyethylene glycol (MIRALAX/GLYCOLAX) powder Take 17 g by mouth daily as needed for constipation     potassium chloride ER (KLOR-CON M) 20 MEQ CR tablet Take 1 tablet (20 mEq) by mouth 2 times daily     sennosides (SENOKOT) 8.6 MG tablet Take 2 tablets by mouth 2 times daily     UNABLE TO FIND Take 2 teaspoonful by mouth At Bedtime Magnesium Calm     etanercept (ENBREL SURECLICK) 50 MG/ML autoinjector Inject 50 mg Subcutaneous twice a week 50 mg twice weekly for 3 months then 50 mg once weekly thereafter     ibuprofen (ADVIL/MOTRIN) 600 MG tablet Take 1 tablet (600 mg) by mouth every 8 hours as needed for moderate pain (Patient not taking: Reported on 2020)     No current facility-administered medications for this visit.          Social History   See HPI    Family History     Family History   Problem Relation Age of Onset     Gastrointestinal Disease Mother 46        perforated intestines-      Hepatitis Mother         C     Liver Disease Mother      Blood Disease Mother         anemia     Substance Abuse Mother         A&D     Psychotic Disorder Brother         Schizophrenia, OCD     Schizophrenia Brother      Anxiety Disorder Brother      Arthritis Maternal Grandmother      Cancer Paternal Grandmother         cervical     Breast Cancer Paternal Grandmother      Hypertension Paternal Grandmother      Diabetes Paternal Grandmother      Heart Disease Paternal Grandmother      Cerebrovascular Disease Paternal Grandfather      Heart Disease  "Paternal Grandfather      Bipolar Disorder Paternal Grandfather      Suicide Paternal Grandfather      Cancer Paternal Aunt         cervical     Heart Disease Paternal Aunt      Coronary Artery Disease Maternal Grandfather         MI     Substance Abuse Father         recovered A&D     Heart Disease Father         MI-stents     Hypertension Father        Physical Exam     Temp Readings from Last 3 Encounters:   12/01/20 97.7  F (36.5  C) (Tympanic)   06/29/20 98.2  F (36.8  C) (Oral)   02/20/20 97.9  F (36.6  C) (Tympanic)     BP Readings from Last 5 Encounters:   12/01/20 120/80   06/29/20 112/77   02/20/20 122/68   02/10/20 110/70   11/08/19 96/65     Pulse Readings from Last 1 Encounters:   12/01/20 93     Resp Readings from Last 1 Encounters:   12/01/20 12     Estimated body mass index is 18.3 kg/m  as calculated from the following:    Height as of 2/20/20: 1.651 m (5' 5\").    Weight as of 6/29/20: 49.9 kg (110 lb).      GEN: NAD  HEENT: MMM.  Anicteric, noninjected sclera  PULM: No increased work of breathing  MSK: Mild swelling of the bilateral second and third MCPs and PIPs.  Able to make a fist completely with each hand.  DIPs without swelling.  Wrists without swelling.  Elbows without swelling.  Knees and feet without swelling.    SKIN: Scaly rash on the dorsal aspect of her left wrist and neck.   PSYCH: Alert. Appropriate.          Labs / Imaging (select studies)     RF/CCP  Recent Labs   Lab Test 06/29/20  1239   CCPIGG 1   RHF <7     CBC  Recent Labs   Lab Test 10/30/20  1411 10/12/20  1529 06/29/20  1330   WBC 7.6 9.9 7.8   RBC 4.93 4.92 4.72   HGB 15.2 15.1 14.7   HCT 44.1 44.3 43.5   MCV 90 90 92   RDW 12.3 12.1 13.0   PLT 93* 41* 92*   MCH 30.8 30.7 31.1   MCHC 34.5 34.1 33.8   NEUTROPHIL 43.6 65.8 58.6   LYMPH 42.9 25.6 32.5   MONOCYTE 10.7 6.7 7.2   EOSINOPHIL 0.9 0.5 0.4   BASOPHIL 1.6 1.2 1.0   ANEU 3.3 6.5 4.6   ALYM 3.2 2.5 2.5   CORRINA 0.8 0.7 0.6   AEOS 0.1 0.1 0.0   ABAS 0.1 0.1 0.1 "     CMP  Recent Labs   Lab Test 10/30/20  1411 10/12/20  1529 06/29/20  1239 03/11/20  1044     --  139 139   POTASSIUM 3.8  --  3.1* 3.1*   CHLORIDE 105  --  106 105   CO2 30  --  27 30   ANIONGAP 5  --  6 4   *  --  105* 101*   BUN 11  --  13 13   CR 0.89 0.94 0.82 0.83   GFRESTIMATED 85 79 >90 >90   GFRESTBLACK >90 >90 >90 >90   DREW 9.2  --  8.9 9.3   BILITOTAL 0.4 0.5 0.2 0.2   ALBUMIN 4.3 3.9 4.4 4.4   PROTTOTAL 7.3 7.2 8.1 7.7   ALKPHOS 61 59 64 62   AST 17 20 20 19   ALT 21 22 26 23     Calcium/VitaminD  Recent Labs   Lab Test 10/30/20  1411 06/29/20  1239 03/11/20  1044   DREW 9.2 8.9 9.3     ESR/CRP  Recent Labs   Lab Test 10/12/20  1529 06/29/20  1330 06/29/20  1239 11/08/19  1445 10/24/19  0824   SED 2 3  --   --  3   CRP <2.9  --  <2.9 <2.9 <2.9     Hepatitis B  Recent Labs   Lab Test 06/29/20  1239 01/24/18  1230 07/17/15  1544   HBCAB Nonreactive  --   --    HEPBANG Nonreactive Nonreactive Nonreactive     Hepatitis C  Recent Labs   Lab Test 06/29/20  1239 01/24/18  1230 03/04/13  1230   HCVAB Nonreactive Nonreactive Negative     Lyme ab screening  Recent Labs   Lab Test 06/29/20  1239   LYMEGM 0.10       Tuberculosis Screening  Recent Labs   Lab Test 06/29/20  1239   TBRES Negative     HIV Screening  Recent Labs   Lab Test 06/29/20  1239 07/17/15  1544   HIAGAB Nonreactive Nonreactive   HIV-1 p24 Ag & HIV-1/HIV-2 Ab Not Detected         Immunization History     Immunization History   Administered Date(s) Administered     Hib (PRP-T) 10/19/2018     Influenza Vaccine IM > 6 months Valent IIV4 12/03/2018, 01/30/2020     Meningococcal (Menactra ) 10/19/2018     Pneumo Conj 13-V (2010&after) 10/19/2018     Pneumococcal 23 valent 01/30/2020          Chart documentation done in part with Dragon Voice recognition Software. Although reviewed after completion, some word and grammatical error may remain.      Video-Visit Details    Type of service:  Video Visit    Video Start Time: 1:39 PM    Video  End Time:1:54 PM    Originating Location (pt. Location): [place], [place]    Distant Location (provider location):  Home    Platform used for Video Visit: Adeel Morrell MD

## 2021-02-17 NOTE — PATIENT INSTRUCTIONS
RHEUMATOLOGY    Dr. Buck Morrell    [facility]  [address] [place] [address]    Our new phone number for Rheumatology is [phone], this number will be able to help you schedule appointments for Dr. Morrell or if you have any message you would like sent to us.    Thank you for choosing [facility]!  Deirdre Rollins [facility] Rheumatology

## 2021-04-03 ENCOUNTER — HEALTH MAINTENANCE LETTER (OUTPATIENT)
Age: 36
End: 2021-04-03

## 2021-04-12 ENCOUNTER — TELEPHONE (OUTPATIENT)
Dept: RHEUMATOLOGY | Facility: CLINIC | Age: 36
End: 2021-04-12

## 2021-04-12 NOTE — TELEPHONE ENCOUNTER
RN: please call to advise Merissa Leung to Discontinue Enbrel immediately due to conception.  Please schedule a follow-up visit to discuss alternatives and to discuss how pregnancy may affect symptoms.  Please cancel Enbrel Rx and cancel Enbrel at her pharmacy as well to prevent accidental dispensing.     Buck Morrell MD  4/12/2021 1:28 PM

## 2021-04-12 NOTE — TELEPHONE ENCOUNTER
Patient returned the call and reports she received the voicemail. She has a follow up on 5/26/21 and is wondering if she needs a sooner apt. Also concerned about whether prior use of Enbrel has any impact of her pregnancy and if she needs to be doing or taking anything.    Jc CANTU RN....4/12/2021 2:17 PM

## 2021-04-12 NOTE — TELEPHONE ENCOUNTER
.Reason for Call:  embrel injection    Detailed comments: Patient just found out she is pregnant, is on a shot called embrel 2 x a week, pcp told her to hold that injection,  as is supposed to have it today; what to do moving forward? Please advise      Phone Number Patient can be reached at: Home number on file [phone] (home)    Best Time: anytime    Can we leave a detailed message on this number? YES    Call taken on 4/12/2021 at 12:26 PM by Екатерина Chadwick

## 2021-04-12 NOTE — TELEPHONE ENCOUNTER
Called patient however there was no answer. Left a detailed message advising patient to discontinue Enbrel immediately and to return the call to schedule a follow up visit. Also contacted the [facility] specialty pharmacy and cancelled the Enbrel prescription.    Jc CANTU RN....4/12/2021 2:01 PM

## 2021-04-12 NOTE — TELEPHONE ENCOUNTER
Prior use of Enbrel is unlikely to have any effect on current pregnancy. Data suggests that all TNF inhibitors are okay to use during pregnancy, but only Cimzia is approved for this; so still stop using Enbrel at this time.  She may keep her May appointment if she prefers to wait until then.     Buck Morrell MD  4/12/2021 4:11 PM

## 2021-04-14 NOTE — PROGRESS NOTES
Merissa Leung is a 36 year old year old female who is being evaluated via a billable telephone visit.      What phone number would you like to be contacted at? [phone]   How would you like to obtain your AVS? [facility]    Rheumatology Telephone/Telehealth  Visit      Merissa Leung MRN# [id number]   YOB: 1985 Age: 36 year old      Date of visit: 4/15/21   PCP: Mohini Mccord   Heme/Onc: Dr. Bernadette Pena  Dermatology: Merissa Hernandezgan    Chief Complaint   psoriatic arthritis, psoriasis     Patient presents with:  RECHECK: Discuss med alternatives due to pregnancy    Assessment and Plan     1.  Psoriatic arthritis; psoriasis: initially presented 6/24/2020 with swelling of the MCPs consistent with an inflammatory arthritis; morning stiffness all day; pain and stiffness improve with activity and worsen with inactivity.  Lower back pain and stiffness is inflammatory in nature.  Was given dupixent by her dermatologist, per patient, that reportedly worsened the psoriasis.  Changed to Cosentyx with improvement of the psoriasis and arthritis but persistent symptoms so the dose was increased to 300 mg every 4 weeks in October 2020.  She did not improve by the end of October so she was changed to Humira but reportedly this caused worsening psoriasis.  She was then changed to Stelara but after half of an injection she realized she could not do self-injections.  Then started Enbrel in 2/2021 with improvement of the psoriasis and psoriatic arthritis.  Now pregnant.  She contacted this clinic and was advised to hold Enbrel until we can discuss further.  We reviewed data available for Enbrel during pregnancy; I advised that she switch to Cimzia or try without medication.  She prefers to avoid medications during pregnancy unless her symptoms worsen.  Given the severity of her arthritis and psoriasis she may require a TNF inhibitor during pregnancy.  She is going to discuss this with her  and if she decides to  start Cimzia that she will notify this clinic for the prescription.  Otherwise she will stay off of medication and consider starting Cimzia if she cannot tolerate symptoms.   Chronic illness, progressive.    - Not on treatment now, but if arthritis / psoriasis worsen then may start Cimzia.  If post-partum at the time of needing tx then okay to use Enbrel again.     2. Thrombocytopenia: seen by oncology and now s/p splenectomy.  Continue following with heme/onc.    3. Fibromyalgia: noted here for historical significance only.  Follow with PCP for management.    4. Cigarette Smoking:  Advised complete cessation and discussed the health benefits with regard to psoriatic arthritis and pregnancy.      # At this time the COVID-19 vaccine (currently available from [employer]) is recommended based on our knowledge of this vaccine and vaccines in the past.  Based on our current knowledge there is no preference for one COVID-19 vaccine over another. Note that there is no data currently available to specifically establish safety and efficacy for these vaccines in immunocompromised people.    Total minutes spent in evaluation with patient, documentation, , and review of pertinent studies and chart notes: 20    Ms. Leung verbalized agreement with and understanding of the rational for the diagnosis and treatment plan.  All questions were answered to best of my ability and the patient's satisfaction. Ms. Leung was advised to contact the clinic with any questions that may arise after the clinic visit.      Thank you for involving me in the care of the patient    Return to clinic: 1 month      HPI   Merissa Leung is a 36 year old female with a past medical history significant for hyperlipidemia, tension headaches, chronic constipation, anxiety, obsessive-compulsive disorder, chronic abdominal pain, history of lower GI bleed, ITP history, and fibromyalgia who is seen for follow-up of psoriatic arthritis and  psoriasis.     1/8/2018 [facility] rheumatology note by Dr. Pavan Dimas documents ITP. Eval'd for CTD. And given hx of bloody nose ANCA was checked.     11/5/2019 [facility] note by Juliana Mcnulty documents psoriasis and bDMARD was recommended (specifically Taltz). Hx of ITP and is s/p splenecomty.     6/24/2020: Ms. Leung reported that she has psoriasis, joint pain, and myalgia; has been present her whole life and she's just dealt with it and now is being eval'd.  Scalp psoriasis started on the back of her head; now involving her neck, lip and sometimes on her cheek, dorsal left wrist, volar right wrist. Was given Dupixent by her dermatologist that caused her psoriasis to worsen and ooze; so this was stopped. Never used Taltz or any other injection med for the psoriasis. Never on MTX.   MCPS, PIPs, DIPS, wrists, elbows, shoulders, hips, knees, ankles, and MTPs all ache constantly; worse in the AM, better with time and activity; worse with inactivity; feels like all joints swell but is most noticeable in the hands.  Difficulty making a fist in the AM.  Dropping items frequently, broken several cups.  Symptoms have been worsening over the past 10 years she says.  Wakes up in the AM and feels like she's 90 years old until she gets moving.  Feels much better when moving constantly. Morning stiffness for most of the day, better with activity.    10/9/2020: Reports of the once weekly dosing of Cosentyx was much more effective than the every 28-day dosing.  Overall feels better on Cosentyx than she was prior to using Cosentyx, with regard to her arthritis and psoriasis.  Morning stiffness last for most of the day, improved with time activity, and reaching a baseline after several hours.  She is now able to make a fist in the morning easier than before, but still has some difficulty in doing so.  Still dropping items, but not as frequently.  cosentyx was increased at that time    2/17/2021: Since last  seen, was seen by dermatology where Humira as tried that caused a flare of arthritis and psoriasis with each injection. Then changed to Stelara that was not an autoinjector and couldn't tolerate doing self-injections; injected half a shot in 2020.  Therefore, changed to Enbrel but hasn't started yet.  She says that her joints are very swollen; hard to get up and use the bathroom; hard to walk. Knees, hips, low back, and hands. Symptoms are worse in the AM and improve with heat, time, and activity. +gelling. Psoriasis still very active, on her chest, back, extremities, and ears.     Today, 4/15/2021: presenting sooner than previously scheduled due to pregnancy. When she informed this clinic of pregnancy she was advised to stop Enbrel until this could be discussed more.  Overall psoriasis and psoriatic arthritis were improving with Enbrel but now pregnant and she prefers to remain off medications if possible.  Still with psoriasis and joint pain but overall much improved.     Tobacco: 1/2 PPD  EtOH: None  Drugs: None  Occupation: [employer]    ROS   12 point review of system was completed and negative except as noted in the HPI    Active Problem List     Patient Active Problem List   Diagnosis     Hyperlipidemia LDL goal <160     Headaches, tension     Chronic constipation     OCD (obsessive compulsive disorder)     Anxiety     Fibromyalgia     Chronic abdominal pain     Underweight     History of Lower GI bleed     Immune thrombocytopenic purpura (H)     Past Medical History     Past Medical History:   Diagnosis Date     Anxiety      Binge eating disorder      Chickenpox     x3     Food intolerance      IBS (irritable bowel syndrome)      MEDICAL HISTORY OF -     pelvic floor disorder     Past Surgical History     Past Surgical History:   Procedure Laterality Date     C/SECTION, LOW TRANSVERSE        SECTION, TUBAL LIGATION, COMBINED N/A 2/15/2016    NO tubal ligation done     COLONOSCOPY  2013     Procedure: COMBINED COLONOSCOPY, SINGLE BIOPSY/POLYPECTOMY BY BIOPSY;  Colonoscopy;  Surgeon: Maureen Valentin MD;  Location: [facility]     COLONOSCOPY N/A 5/29/2015    Procedure: COMBINED COLONOSCOPY, SINGLE OR MULTIPLE BIOPSY/POLYPECTOMY BY BIOPSY;  Surgeon: Maureen Valentin MD;  Location: [facility]     ESOPHAGOSCOPY, GASTROSCOPY, DUODENOSCOPY (EGD), COMBINED N/A 1/2/2018    Procedure: COMBINED ESOPHAGOSCOPY, GASTROSCOPY, DUODENOSCOPY (EGD), BIOPSY SINGLE OR MULTIPLE;  Gastroscopy;  Surgeon: Ray Amato MD;  Location: [facility]     LAPAROSCOPIC SPLENECTOMY N/A 11/6/2018    Procedure: Laparoscopic splenectomy, ;  Surgeon: Anthony Jackson DO;  Location: [facility]     Allergy     Allergies   Allergen Reactions     Dilaudid [Hydromorphone] Nausea and Vomiting     Food Other (See Comments) and Swelling     Processed food, sugar, high sodium, red grains, rice, potatoes(can have mashed, but no french fries or puffs)  Pain in extremities and face.   All over body at times.     Milk Protein Extract Swelling     Blow up like a balloon     Current Medication List     Current Outpatient Medications   Medication Sig     clonazePAM (KLONOPIN) 1 MG tablet TAKE 1/2 (ONE-HALF) TABLET BY MOUTH TWICE DAILY FOR ANXIETY AND 1 & 1/2 (ONE & ONE-HALF) AT BEDTIME FOR SLEEP     fluticasone (FLONASE) 50 MCG/ACT nasal spray Spray 1 spray into both nostrils daily     LINZESS 145 MCG capsule TAKE 1 CAPSULE BY MOUTH IN THE MORNING BEFORE BREAKFAST     Multiple Vitamins-Minerals (MULTIVITAL PO) Take 1 chew tab by mouth daily Gummi     polyethylene glycol (MIRALAX/GLYCOLAX) powder Take 17 g by mouth daily as needed for constipation     sennosides (SENOKOT) 8.6 MG tablet Take 2 tablets by mouth 2 times daily     UNABLE TO FIND Take 2 teaspoonful by mouth At Bedtime Magnesium Calm     ibuprofen (ADVIL/MOTRIN) 600 MG tablet Take 1 tablet (600 mg) by mouth every 8 hours as needed for moderate pain (Patient not taking: Reported on  "2020)     potassium chloride ER (KLOR-CON M) 20 MEQ CR tablet Take 1 tablet (20 mEq) by mouth 2 times daily (Patient not taking: Reported on 2021)     No current facility-administered medications for this visit.          Social History   See HPI    Family History     Family History   Problem Relation Age of Onset     Gastrointestinal Disease Mother 46        perforated intestines-      Hepatitis Mother         C     Liver Disease Mother      Blood Disease Mother         anemia     Substance Abuse Mother         A&D     Psychotic Disorder Brother         Schizophrenia, OCD     Schizophrenia Brother      Anxiety Disorder Brother      Arthritis Maternal Grandmother      Cancer Paternal Grandmother         cervical     Breast Cancer Paternal Grandmother      Hypertension Paternal Grandmother      Diabetes Paternal Grandmother      Heart Disease Paternal Grandmother      Cerebrovascular Disease Paternal Grandfather      Heart Disease Paternal Grandfather      Bipolar Disorder Paternal Grandfather      Suicide Paternal Grandfather      Cancer Paternal Aunt         cervical     Heart Disease Paternal Aunt      Coronary Artery Disease Maternal Grandfather         MI     Substance Abuse Father         recovered A&D     Heart Disease Father         MI-stents     Hypertension Father        Physical Exam     Temp Readings from Last 3 Encounters:   20 97.7  F (36.5  C) (Tympanic)   20 98.2  F (36.8  C) (Oral)   20 97.9  F (36.6  C) (Tympanic)     BP Readings from Last 5 Encounters:   20 120/80   20 112/77   20 122/68   02/10/20 110/70   19 96/65     Pulse Readings from Last 1 Encounters:   20 93     Resp Readings from Last 1 Encounters:   20 12     Estimated body mass index is 18.3 kg/m  as calculated from the following:    Height as of 20: 1.651 m (5' 5\").    Weight as of 20: 49.9 kg (110 lb).    GEN: alert and no distress  PSYCH: Alert; coherent " speech, normal rate and volume, able to articulate logical thoughts, able   to abstract reason, no tangential thoughts. Normal affect.   RESP: No cough, no audible wheezing, able to talk in full sentences  Remainder of exam unable to be completed due to telephone visits      Labs / Imaging (select studies)     RF/CCP  Recent Labs   Lab Test 06/29/20  1239   CCPIGG 1   RHF <7     CBC  Recent Labs   Lab Test 10/30/20  1411 10/12/20  1529 06/29/20  1330   WBC 7.6 9.9 7.8   RBC 4.93 4.92 4.72   HGB 15.2 15.1 14.7   HCT 44.1 44.3 43.5   MCV 90 90 92   RDW 12.3 12.1 13.0   PLT 93* 41* 92*   MCH 30.8 30.7 31.1   MCHC 34.5 34.1 33.8   NEUTROPHIL 43.6 65.8 58.6   LYMPH 42.9 25.6 32.5   MONOCYTE 10.7 6.7 7.2   EOSINOPHIL 0.9 0.5 0.4   BASOPHIL 1.6 1.2 1.0   ANEU 3.3 6.5 4.6   ALYM 3.2 2.5 2.5   CORRINA 0.8 0.7 0.6   AEOS 0.1 0.1 0.0   ABAS 0.1 0.1 0.1     CMP  Recent Labs   Lab Test 10/30/20  1411 10/12/20  1529 06/29/20  1239 03/11/20  1044     --  139 139   POTASSIUM 3.8  --  3.1* 3.1*   CHLORIDE 105  --  106 105   CO2 30  --  27 30   ANIONGAP 5  --  6 4   *  --  105* 101*   BUN 11  --  13 13   CR 0.89 0.94 0.82 0.83   GFRESTIMATED 85 79 >90 >90   GFRESTBLACK >90 >90 >90 >90   DREW 9.2  --  8.9 9.3   BILITOTAL 0.4 0.5 0.2 0.2   ALBUMIN 4.3 3.9 4.4 4.4   PROTTOTAL 7.3 7.2 8.1 7.7   ALKPHOS 61 59 64 62   AST 17 20 20 19   ALT 21 22 26 23     Calcium/VitaminD  Recent Labs   Lab Test 10/30/20  1411 06/29/20  1239 03/11/20  1044   DREW 9.2 8.9 9.3     ESR/CRP  Recent Labs   Lab Test 10/12/20  1529 06/29/20  1330 06/29/20  1239 11/08/19  1445 10/24/19  0824   SED 2 3  --   --  3   CRP <2.9  --  <2.9 <2.9 <2.9     Hepatitis B  Recent Labs   Lab Test 06/29/20  1239 01/24/18  1230 07/17/15  1544   HBCAB Nonreactive  --   --    HEPBANG Nonreactive Nonreactive Nonreactive     Hepatitis C  Recent Labs   Lab Test 06/29/20  1239 01/24/18  1230 03/04/13  1230   HCVAB Nonreactive Nonreactive Negative     Lyme ab screening  Recent  Labs   Lab Test 06/29/20  1239   LYMEGM 0.10     Tuberculosis Screening  Recent Labs   Lab Test 06/29/20  1239   TBRES Negative     HIV Screening  Recent Labs   Lab Test 06/29/20  1239 07/17/15  1544   HIAGAB Nonreactive Nonreactive   HIV-1 p24 Ag & HIV-1/HIV-2 Ab Not Detected       Immunization History     Immunization History   Administered Date(s) Administered     Hib (PRP-T) 10/19/2018     Influenza Vaccine IM > 6 months Valent IIV4 12/03/2018, 01/30/2020     Meningococcal (Menactra ) 10/19/2018     Pneumo Conj 13-V (2010&after) 10/19/2018     Pneumococcal 23 valent 01/30/2020          Chart documentation done in part with Dragon Voice recognition Software. Although reviewed after completion, some word and grammatical error may remain.    Phone call duration with patient (in minutes): 7    Location of patient: [place], [place]   Location of provider: [facility] in [place], [place]    Buck Morrell MD

## 2021-04-14 NOTE — TELEPHONE ENCOUNTER
Called and informed patient of Dr. Morrell's message below. Patient verbalized understanding. Scheduled patient a sooner visit tomorrow. Patient will keep her apt in May as well and cancel it later if need be.    Jc CANTU RN....4/14/2021 8:59 AM

## 2021-04-15 ENCOUNTER — VIRTUAL VISIT (OUTPATIENT)
Dept: RHEUMATOLOGY | Facility: CLINIC | Age: 36
End: 2021-04-15
Payer: COMMERCIAL

## 2021-04-15 DIAGNOSIS — L40.50 PSORIATIC ARTHRITIS (H): Primary | ICD-10-CM

## 2021-04-15 DIAGNOSIS — F17.200 TOBACCO USE DISORDER: ICD-10-CM

## 2021-04-15 DIAGNOSIS — L40.9 PSORIASIS: ICD-10-CM

## 2021-04-15 DIAGNOSIS — Z34.90 PREGNANCY, UNSPECIFIED GESTATIONAL AGE: ICD-10-CM

## 2021-04-15 PROCEDURE — 99213 OFFICE O/P EST LOW 20 MIN: CPT | Mod: TEL | Performed by: INTERNAL MEDICINE

## 2021-04-15 NOTE — Clinical Note
Merissa Mills (patient) is pregnant so holding Enbrel. Sometimes arthritis may improve during pregnancy and she wants to be off of meds if possible during pregnancy.  If she chooses that the dz activity is too much for her during pregnancy then I plan to start Cimzia.  Note that Enbrel was helping her.   Are you okay with this plan?  Thanks!  Buck

## 2021-04-20 ENCOUNTER — APPOINTMENT (OUTPATIENT)
Dept: OBGYN | Facility: CLINIC | Age: 36
End: 2021-04-20
Payer: COMMERCIAL

## 2021-04-20 ENCOUNTER — PRENATAL OFFICE VISIT (OUTPATIENT)
Dept: OBGYN | Facility: CLINIC | Age: 36
End: 2021-04-20

## 2021-04-20 DIAGNOSIS — Z34.80 PRENATAL CARE, SUBSEQUENT PREGNANCY: Primary | ICD-10-CM

## 2021-04-20 PROCEDURE — 99207 PR NO CHARGE NURSE ONLY: CPT | Performed by: OBSTETRICS & GYNECOLOGY

## 2021-04-20 RX ORDER — PRENATAL VIT/IRON FUM/FOLIC AC 27MG-0.8MG
1 TABLET ORAL DAILY
COMMUNITY
Start: 2021-04-14

## 2021-04-20 SDOH — HEALTH STABILITY: MENTAL HEALTH: HOW OFTEN DO YOU HAVE 6 OR MORE DRINKS ON ONE OCCASION?: NOT ASKED

## 2021-04-20 SDOH — HEALTH STABILITY: MENTAL HEALTH: HOW OFTEN DO YOU HAVE A DRINK CONTAINING ALCOHOL?: NOT ASKED

## 2021-04-20 SDOH — HEALTH STABILITY: MENTAL HEALTH: HOW MANY STANDARD DRINKS CONTAINING ALCOHOL DO YOU HAVE ON A TYPICAL DAY?: NOT ASKED
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
## 2021-04-22 ENCOUNTER — OFFICE VISIT (OUTPATIENT)
Dept: OBGYN | Facility: CLINIC | Age: 36
End: 2021-04-22
Payer: COMMERCIAL

## 2021-04-22 VITALS
HEART RATE: 103 BPM | TEMPERATURE: 99.7 F | BODY MASS INDEX: 17.52 KG/M2 | HEIGHT: 66 IN | RESPIRATION RATE: 18 BRPM | DIASTOLIC BLOOD PRESSURE: 71 MMHG | SYSTOLIC BLOOD PRESSURE: 112 MMHG | WEIGHT: 109 LBS

## 2021-04-22 DIAGNOSIS — Z34.81 PRENATAL CARE, SUBSEQUENT PREGNANCY IN FIRST TRIMESTER: Primary | ICD-10-CM

## 2021-04-22 LAB
ABO + RH BLD: NORMAL
ABO + RH BLD: NORMAL
ALBUMIN UR-MCNC: NEGATIVE MG/DL
AMORPH CRY #/AREA URNS HPF: ABNORMAL /HPF
APPEARANCE UR: ABNORMAL
BACTERIA #/AREA URNS HPF: ABNORMAL /HPF
BILIRUB UR QL STRIP: NEGATIVE
BLD GP AB SCN SERPL QL: NORMAL
BLOOD BANK CMNT PATIENT-IMP: NORMAL
COLOR UR AUTO: YELLOW
ERYTHROCYTE [DISTWIDTH] IN BLOOD BY AUTOMATED COUNT: 13 % (ref 10–15)
GLUCOSE UR STRIP-MCNC: NEGATIVE MG/DL
HBV SURFACE AG SERPL QL IA: NONREACTIVE
HCT VFR BLD AUTO: 44.1 % (ref 35–47)
HGB BLD-MCNC: 14.9 G/DL (ref 11.7–15.7)
HGB UR QL STRIP: NEGATIVE
HIV 1+2 AB+HIV1 P24 AG SERPL QL IA: NONREACTIVE
KETONES UR STRIP-MCNC: ABNORMAL MG/DL
LEUKOCYTE ESTERASE UR QL STRIP: NEGATIVE
MCH RBC QN AUTO: 31.3 PG (ref 26.5–33)
MCHC RBC AUTO-ENTMCNC: 33.8 G/DL (ref 31.5–36.5)
MCV RBC AUTO: 93 FL (ref 78–100)
NITRATE UR QL: NEGATIVE
NON-SQ EPI CELLS #/AREA URNS LPF: ABNORMAL /LPF
PH UR STRIP: 7.5 PH (ref 5–7)
PLATELET # BLD AUTO: 221 10E9/L (ref 150–450)
RBC # BLD AUTO: 4.76 10E12/L (ref 3.8–5.2)
RBC #/AREA URNS AUTO: ABNORMAL /HPF
SOURCE: ABNORMAL
SP GR UR STRIP: 1.01 (ref 1–1.03)
SPECIMEN EXP DATE BLD: NORMAL
T PALLIDUM AB SER QL: NONREACTIVE
UROBILINOGEN UR STRIP-ACNC: 0.2 EU/DL (ref 0.2–1)
WBC # BLD AUTO: 7.6 10E9/L (ref 4–11)
WBC #/AREA URNS AUTO: ABNORMAL /HPF

## 2021-04-22 PROCEDURE — 99000 SPECIMEN HANDLING OFFICE-LAB: CPT | Performed by: OBSTETRICS & GYNECOLOGY

## 2021-04-22 PROCEDURE — 86900 BLOOD TYPING SEROLOGIC ABO: CPT | Performed by: OBSTETRICS & GYNECOLOGY

## 2021-04-22 PROCEDURE — 87389 HIV-1 AG W/HIV-1&-2 AB AG IA: CPT | Performed by: OBSTETRICS & GYNECOLOGY

## 2021-04-22 PROCEDURE — 86901 BLOOD TYPING SEROLOGIC RH(D): CPT | Performed by: OBSTETRICS & GYNECOLOGY

## 2021-04-22 PROCEDURE — 86850 RBC ANTIBODY SCREEN: CPT | Performed by: OBSTETRICS & GYNECOLOGY

## 2021-04-22 PROCEDURE — G0145 SCR C/V CYTO,THINLAYER,RESCR: HCPCS | Performed by: OBSTETRICS & GYNECOLOGY

## 2021-04-22 PROCEDURE — 87340 HEPATITIS B SURFACE AG IA: CPT | Performed by: OBSTETRICS & GYNECOLOGY

## 2021-04-22 PROCEDURE — 99207 PR FIRST OB VISIT: CPT | Performed by: OBSTETRICS & GYNECOLOGY

## 2021-04-22 PROCEDURE — 87086 URINE CULTURE/COLONY COUNT: CPT | Performed by: OBSTETRICS & GYNECOLOGY

## 2021-04-22 PROCEDURE — 87624 HPV HI-RISK TYP POOLED RSLT: CPT | Performed by: OBSTETRICS & GYNECOLOGY

## 2021-04-22 PROCEDURE — 85027 COMPLETE CBC AUTOMATED: CPT | Performed by: OBSTETRICS & GYNECOLOGY

## 2021-04-22 PROCEDURE — 36415 COLL VENOUS BLD VENIPUNCTURE: CPT | Performed by: OBSTETRICS & GYNECOLOGY

## 2021-04-22 PROCEDURE — 76817 TRANSVAGINAL US OBSTETRIC: CPT | Performed by: OBSTETRICS & GYNECOLOGY

## 2021-04-22 PROCEDURE — 81001 URINALYSIS AUTO W/SCOPE: CPT | Performed by: OBSTETRICS & GYNECOLOGY

## 2021-04-22 PROCEDURE — 86762 RUBELLA ANTIBODY: CPT | Performed by: OBSTETRICS & GYNECOLOGY

## 2021-04-22 PROCEDURE — 86780 TREPONEMA PALLIDUM: CPT | Mod: 90 | Performed by: OBSTETRICS & GYNECOLOGY

## 2021-04-22 ASSESSMENT — MIFFLIN-ST. JEOR: SCORE: 1203.23

## 2021-04-22 NOTE — PROGRESS NOTES
Discussed physician coverage, tertiary support, diet, exercise, weight gain, schedule of visits, routine and indicated ultrasounds, childbirth education and antepartum testing for certain birth defects.  Encouraged patient to review contents of Prenatal Breastfeeding Education Toolkit. Offered opportunity to answer questions regarding the importance of skin to skin contact, early initiation of exclusive breastfeeding for the first six months and rooming in while in the hospital.    Syphilis is a sexually transmitted disease that can cause birth defects in the babies of untreated mothers. Every pregnant patient is tested for syphilis early in each pregnancy as part of the routine lab work. The [place] Department of [facility] has seen an increase in the rate of syphilis in [place]. [facility] now recommends testing for syphilis 2 times during a pregnancy, the new prenatal visit, and 28 weeks or when admitted for delivery. Patient accepts lab testing for syphilis.    Group call support for deliveries was discussed, as well as tertiary support in event of high risk issues within [facility] [place].    Discussed current state of COVID-19 in pregnancy, when to seek out emergency care, how to self care at home with milder symptoms.        Options for  testing for birth defects were discussed with the patient, generally including CVS testing, Quad screen serum testing, nuchal lucency/blood marker testing, genetic amniocentesis, Verifi testing  and/or level 2 ultrasound.    Current issues include: has stopped Enbrel and med for eating disorder; continues on Linzess and prn Clonazepam    Past medical, surgical, social and family histories reviewed on OB questionaire and included on episode summary.  Pertinent review of systems items stated above. See OB questionaire for pertinent components of HPI.    Past Medical History:   Diagnosis Date     Anxiety      Binge eating disorder      Chickenpox     x3      Food intolerance      IBS (irritable bowel syndrome)      MEDICAL HISTORY OF -     pelvic floor disorder     See epic chart    Past Surgical History:   Procedure Laterality Date     C/SECTION, LOW TRANSVERSE  2007      SECTION, TUBAL LIGATION, COMBINED N/A 02/15/2016    NO tubal ligation done     COLONOSCOPY  2013    Procedure: COMBINED COLONOSCOPY, SINGLE BIOPSY/POLYPECTOMY BY BIOPSY;  Colonoscopy;  Surgeon: Maureen Valentin MD;  Location: [facility]     COLONOSCOPY N/A 2015    Procedure: COMBINED COLONOSCOPY, SINGLE OR MULTIPLE BIOPSY/POLYPECTOMY BY BIOPSY;  Surgeon: Maureen Valentin MD;  Location: [facility]     ESOPHAGOSCOPY, GASTROSCOPY, DUODENOSCOPY (EGD), COMBINED N/A 2018    Procedure: COMBINED ESOPHAGOSCOPY, GASTROSCOPY, DUODENOSCOPY (EGD), BIOPSY SINGLE OR MULTIPLE;  Gastroscopy;  Surgeon: Ray Amato MD;  Location: [facility]     LAPAROSCOPIC SPLENECTOMY N/A 2018    Procedure: Laparoscopic splenectomy, ;  Surgeon: Anthony Jackson DO;  Location: [facility]     See epic chart    Patient Active Problem List    Diagnosis Date Noted     Immune thrombocytopenic purpura (H) 2018     Priority: Medium     History of Lower GI bleed 10/31/2018     Priority: Medium     Underweight 2017     Priority: Medium     Prenatal care, subsequent pregnancy 2017     Priority: Medium     Chronic abdominal pain 10/06/2016     Priority: Medium     Has had workup through [place] and [place]      Now followed by [facility] (started on bentyl)       Fibromyalgia 2015     Priority: Medium     Anxiety 2015     Priority: Medium     Hyperlipidemia LDL goal <160 07/15/2013     Priority: Medium     Headaches, tension 07/15/2013     Priority: Medium     Chronic constipation 07/15/2013     Priority: Medium     OCD (obsessive compulsive disorder) 07/15/2013     Priority: Medium     Binge eating  Started therapeutic trial of Luvox 100-300mg extended release at  "bedtime; category C in pregnancy (as all SSRI); not effective  Will refer to [facility]         OBJECTIVE: /71 (BP Location: Right arm, Patient Position: Chair, Cuff Size: Adult Regular)   Pulse 103   Temp 99.7  F (37.6  C) (Tympanic)   Resp 18   Ht 1.664 m (5' 5.5\")   Wt 49.4 kg (109 lb)   LMP 03/10/2021   Breastfeeding No   BMI 17.86 kg/m      GENERAL APPEARANCE: healthy, alert and no distress  ABDOMEN:  soft, nontender, no hepato-splenomegaly or hernias  PELVIC:  EGBUS:  within normal limits  VAGINA:  normoestrogenic, well-supported, no unusual discharge  CERVIX:  smooth, non-friable, no gross lesions, thin-layer PAP was taken   UTERUS:  anteverted, not enlarged, non tender  ADNEXAE:  non-tender, no masses palpable, no cul de sac nodularity     NECK: no adenopathy, no asymmetry, masses, or scars and thyroid normal to palpation     RESP: lungs clear to auscultation , respiratory effort WNL     CV: regular rates and rhythm, normal S1 S2, no S3 or S4 and no murmur, click or rub -     SKIN: no suspicious lesions or rashes     PSYCH: mentation appears normal. and affect normal/bright, oriented to person/place/time     LYMPHATICS: No axillary, cervical, inguinal, or supraclavicular nodes    Transvaginal ultrasound was performed. A viable intrauterine pregnancy was seen.  CRL=too small to measure c/w 6 weeks,  days.  EDC by sono =12/15/21    Fetal heart motion was visualized.                ASSESSMENT:    ICD-10-CM    1. Prenatal care, subsequent pregnancy in first trimester  Z34.81 CBC with platelets     ABO/Rh type and screen     Hepatitis B surface antigen     Rubella Antibody IgG Quantitative     Urine Culture Aerobic Bacterial     *UA reflex to Microscopic     HIV Antigen Antibody Combo     US OB <14 Weeks w Transvaginal Single     Treponema Abs w Reflex to RPR and Titer     Pap imaged thin layer screen with HPV - recommended age 30 - 65 years (select HPV order below)     HPV High Risk Types DNA " Cervical         PLAN: see orders and OB problem list annotations  Discussed strategy for approaching eating disorder in pregnancy--try for 15 lbs weight gain; do not weigh at home; exercise  drinkk plenty of water  F/u 2 weeks for repeat ultrasound  Follow platelets in pregnancy    Carola Sanchez MD

## 2021-04-22 NOTE — NURSING NOTE
"Initial /71 (BP Location: Right arm, Patient Position: Chair, Cuff Size: Adult Regular)   Pulse 103   Temp 99.7  F (37.6  C) (Tympanic)   Resp 18   Ht 1.664 m (5' 5.5\")   Wt 49.4 kg (109 lb)   LMP 03/10/2021   Breastfeeding No   BMI 17.86 kg/m   Estimated body mass index is 17.86 kg/m  as calculated from the following:    Height as of this encounter: 1.664 m (5' 5.5\").    Weight as of this encounter: 49.4 kg (109 lb). .      "

## 2021-04-23 ENCOUNTER — TELEPHONE (OUTPATIENT)
Dept: NURSING | Facility: CLINIC | Age: 36
End: 2021-04-23

## 2021-04-23 LAB
BACTERIA SPEC CULT: NO GROWTH
Lab: NORMAL
RUBV IGG SERPL IA-ACNC: 31 IU/ML
SPECIMEN SOURCE: NORMAL

## 2021-04-23 NOTE — TELEPHONE ENCOUNTER
Called patient, discussed current results with her.     Patient informed that there are several results still pending but the provider will reach out to her with their finalized test and offer recommendations if needed.     Fabienne Sheth RN on 4/23/2021 at 2:23 PM

## 2021-04-23 NOTE — TELEPHONE ENCOUNTER
Merissa calls in to request interpretation of all her labs results.  There is not a lab result note that I can read to her.  Please have someone call her and interpret the results for her at [phone].  She is just nervous and unsure of what to think about all results.

## 2021-04-27 LAB
COPATH REPORT: NORMAL
PAP: NORMAL

## 2021-05-05 ENCOUNTER — PRENATAL OFFICE VISIT (OUTPATIENT)
Dept: OBGYN | Facility: CLINIC | Age: 36
End: 2021-05-05
Payer: COMMERCIAL

## 2021-05-05 VITALS
BODY MASS INDEX: 17.19 KG/M2 | TEMPERATURE: 98.3 F | HEIGHT: 66 IN | SYSTOLIC BLOOD PRESSURE: 108 MMHG | WEIGHT: 107 LBS | RESPIRATION RATE: 16 BRPM | DIASTOLIC BLOOD PRESSURE: 69 MMHG | HEART RATE: 82 BPM

## 2021-05-05 DIAGNOSIS — O21.9 NAUSEA/VOMITING IN PREGNANCY: ICD-10-CM

## 2021-05-05 DIAGNOSIS — Z34.81 PRENATAL CARE, SUBSEQUENT PREGNANCY IN FIRST TRIMESTER: Primary | ICD-10-CM

## 2021-05-05 PROCEDURE — 99207 PR PRENATAL VISIT: CPT | Performed by: OBSTETRICS & GYNECOLOGY

## 2021-05-05 PROCEDURE — 76817 TRANSVAGINAL US OBSTETRIC: CPT | Performed by: OBSTETRICS & GYNECOLOGY

## 2021-05-05 RX ORDER — ONDANSETRON 4 MG/1
4-8 TABLET, ORALLY DISINTEGRATING ORAL EVERY 6 HOURS PRN
Qty: 20 TABLET | Refills: 3 | Status: ON HOLD | OUTPATIENT
Start: 2021-05-05 | End: 2021-11-16

## 2021-05-05 ASSESSMENT — MIFFLIN-ST. JEOR: SCORE: 1194.16

## 2021-05-05 NOTE — NURSING NOTE
"Initial /69 (BP Location: Right arm, Patient Position: Chair, Cuff Size: Adult Regular)   Pulse 82   Temp 98.3  F (36.8  C) (Tympanic)   Resp 16   Ht 1.664 m (5' 5.5\")   Wt 48.5 kg (107 lb)   LMP 03/10/2021   Breastfeeding No   BMI 17.53 kg/m   Estimated body mass index is 17.53 kg/m  as calculated from the following:    Height as of this encounter: 1.664 m (5' 5.5\").    Weight as of this encounter: 48.5 kg (107 lb). .        "

## 2021-05-05 NOTE — PROGRESS NOTES
"CC: Here for routine prenatal visit @ 8w0d   HPI:  Reviewed lab results; no bleeding or cramping; having a lot of nausea    PE: /69 (BP Location: Right arm, Patient Position: Chair, Cuff Size: Adult Regular)   Pulse 82   Temp 98.3  F (36.8  C) (Tympanic)   Resp 16   Ht 1.664 m (5' 5.5\")   Wt 48.5 kg (107 lb)   LMP 03/10/2021   Breastfeeding No   BMI 17.53 kg/m     See OB flowsheet.    Transvaginal ultrasound was performed. A live single intrauterine pregnancy was seen.  CRL=1.62 cm, c/w 8 weeks, 0 days.  EDC by sono =12/15/21    Fetal heart motion was visualized. RYH=358 bpm                      A:  1. Prenatal care, subsequent pregnancy in first trimester  EDC confirmed by sonogram today    2. Nausea/vomiting in pregnancy    - ondansetron (ZOFRAN-ODT) 4 MG ODT tab; Take 1-2 tablets (4-8 mg) by mouth every 6 hours as needed for nausea  Dispense: 20 tablet; Refill: 3      Routine prenatal care  RTC 4 weeks.      Carola Sanchez M.D.     "

## 2021-05-26 ENCOUNTER — VIRTUAL VISIT (OUTPATIENT)
Dept: RHEUMATOLOGY | Facility: CLINIC | Age: 36
End: 2021-05-26
Payer: COMMERCIAL

## 2021-05-26 DIAGNOSIS — L40.9 PSORIASIS: ICD-10-CM

## 2021-05-26 DIAGNOSIS — L40.50 PSORIATIC ARTHRITIS (H): Primary | ICD-10-CM

## 2021-05-26 PROCEDURE — 99214 OFFICE O/P EST MOD 30 MIN: CPT | Mod: GT | Performed by: INTERNAL MEDICINE

## 2021-05-26 NOTE — PATIENT INSTRUCTIONS
RHEUMATOLOGY    Dr. Buck Morrell    [facility]  [address] [place] [address]    Our new phone number for Rheumatology is [phone], this number will be able to help you schedule appointments for Dr. Morrell or if you have any message you would like sent to us.    Thank you for choosing [facility]!    Deirdre Rollins [facility] Rheumatology

## 2021-05-26 NOTE — PROGRESS NOTES
Merissa Leung  is a 36 year old year old female who is being evaluated via a billable video visit.      How would you like to obtain your AVS? MyChart  If the video visit is dropped, the invitation should be resent by: Text to cell phone: [phone]  Will anyone else be joining your video visit? No    Rheumatology Video Visit      Merissa Leung MRN# [id number]   YOB: 1985 Age: 36 year old      Date of visit: 5/26/21   PCP: Mohini Mccord   Heme/Onc: Dr. Bernadette Pena  Dermatology: Merissaumu HernandezWanda    Chief Complaint   psoriatic arthritis, psoriasis     Patient presents with:  Arthritis: psoriatic. doing terrible    Assessment and Plan     1.  Psoriatic arthritis; psoriasis: initially presented 6/24/2020 with swelling of the MCPs consistent with an inflammatory arthritis; morning stiffness all day; pain and stiffness improve with activity and worsen with inactivity.  Lower back pain and stiffness is inflammatory in nature.  Was given dupixent by her dermatologist, per patient, that reportedly worsened the psoriasis.  Changed to Cosentyx with improvement of the psoriasis and arthritis but persistent symptoms so the dose was increased to 300 mg every 4 weeks in October 2020.  She did not improve by the end of October so she was changed to Humira but reportedly this caused worsening psoriasis.  She was then changed to Stelara but after half of an injection she realized she could not do self-injections.  Then started Enbrel in 2/2021 with improvement of the psoriasis and psoriatic arthritis.  Now pregnant.  She contacted this clinic and was advised to hold Enbrel until we can discuss further.  We reviewed data available for Enbrel during pregnancy; I advised that she switch to Cimzia or try without medication.  She prefers to avoid medications during pregnancy unless her symptoms worsen significantly.  Given the severity of her arthritis and psoriasis she may require a TNF inhibitor during pregnancy but  she prefers to remain off medications at this time.  She changes her mind and will plan to start Cimzia.  Cimzia may be administered in the infusion center as she is unable to give herself medications that are not from autoinjectors.  Note that her  was present during the visit today and all questions from both of them were answered.  Chronic illness, progressive  - Not on treatment now, but if she decides that she would like to treat her arthritis and psoriasis then may start Cimzia. If post-partum at the time of needing tx then okay to use Enbrel again.     2. Thrombocytopenia: seen by oncology and now s/p splenectomy.  Continue following with heme/onc.    3. Fibromyalgia: noted here for historical significance only.  Follow with PCP for management.    # At this time the COVID-19 vaccine is recommended based on our knowledge of this vaccine and vaccines in the past.  As long as there is no history of allergic reaction to the vaccine, the COVID19 vaccine from [employer] or PureSense may be received.  The Ayo&Ayo / [employer] COVID-19 vaccine has a risk of a rare adverse event called thrombosis with thrombocytopenia syndrome that has been reported in women younger than 50 years old; therefore, an alternative to the J&J/Ion vaccine should be used for women under 50 years old and for any patient with a history of blood clots.  Note that there is limited data currently available to specifically establish safety and efficacy for these vaccines in immunocompromised people.  I also advised that she speak with Dr. Sanchez (OB/GYN) prior to receiving the vaccine considering that she is currently pregnant    Total minutes spent in evaluation with patient, documentation, , and review of pertinent studies and chart notes: 18     Ms. Leung verbalized agreement with and understanding of the rational for the diagnosis and treatment plan.  All questions were answered to best of my ability and the patient's  satisfaction. Ms. Leung was advised to contact the clinic with any questions that may arise after the clinic visit.      Thank you for involving me in the care of the patient    Return to clinic: 3-4 months      HPI   Merissa Leung is a 36 year old female with a past medical history significant for hyperlipidemia, tension headaches, chronic constipation, anxiety, obsessive-compulsive disorder, chronic abdominal pain, history of lower GI bleed, ITP history, and fibromyalgia who is seen for follow-up of psoriatic arthritis and psoriasis.     1/8/2018 [facility] rheumatology note by Dr. Paavn Dimas documents ITP. Eval'd for CTD. And given hx of bloody nose ANCA was checked.     11/5/2019 [facility] note by Juliana Mcnulty documents psoriasis and bDMARD was recommended (specifically Taltz). Hx of ITP and is s/p splenecomty.     6/24/2020: Ms. Leung reported that she has psoriasis, joint pain, and myalgia; has been present her whole life and she's just dealt with it and now is being eval'd.  Scalp psoriasis started on the back of her head; now involving her neck, lip and sometimes on her cheek, dorsal left wrist, volar right wrist. Was given Dupixent by her dermatologist that caused her psoriasis to worsen and ooze; so this was stopped. Never used Taltz or any other injection med for the psoriasis. Never on MTX.   MCPS, PIPs, DIPS, wrists, elbows, shoulders, hips, knees, ankles, and MTPs all ache constantly; worse in the AM, better with time and activity; worse with inactivity; feels like all joints swell but is most noticeable in the hands.  Difficulty making a fist in the AM.  Dropping items frequently, broken several cups.  Symptoms have been worsening over the past 10 years she says.  Wakes up in the AM and feels like she's 90 years old until she gets moving.  Feels much better when moving constantly. Morning stiffness for most of the day, better with activity.    10/9/2020: Reports of the once  "weekly dosing of Cosentyx was much more effective than the every 28-day dosing.  Overall feels better on Cosentyx than she was prior to using Cosentyx, with regard to her arthritis and psoriasis.  Morning stiffness last for most of the day, improved with time activity, and reaching a baseline after several hours.  She is now able to make a fist in the morning easier than before, but still has some difficulty in doing so.  Still dropping items, but not as frequently.  cosentyx was increased at that time    2/17/2021: Since last seen, was seen by dermatology where Humira as tried that caused a flare of arthritis and psoriasis with each injection. Then changed to Stelara that was not an autoinjector and couldn't tolerate doing self-injections; injected half a shot in 12/2020.  Therefore, changed to Enbrel but hasn't started yet.  She says that her joints are very swollen; hard to get up and use the bathroom; hard to walk. Knees, hips, low back, and hands. Symptoms are worse in the AM and improve with heat, time, and activity. +gelling. Psoriasis still very active, on her chest, back, extremities, and ears.     4/15/2021: presenting sooner than previously scheduled due to pregnancy. When she informed this clinic of pregnancy she was advised to stop Enbrel until this could be discussed more.  Overall psoriasis and psoriatic arthritis were improving with Enbrel but now pregnant and she prefers to remain off medications if possible.  Still with psoriasis and joint pain but overall much improved.     Today, 5/26/2021: psoriasis on the neck, scalp, behind ears, and arms, but not on her legs.  Joints are \"absolutely terrible\" - affecting the hips, knees, lower back, wrists, and fingers.  Morning stiffness for most of the day, better after a few hours; +gelling.  Has discussed with her  and she says that they don't want to start a medication at this time because of current pregnancy and wanting to limit medications, but " if she feels like it is needed in the future then she will notify me.  She notes that Enbrel was helping because she was doing much better when she was on it.    Tobacco: Former smoker, quit 2021  EtOH: None  Drugs: None  Occupation: [employer]    ROS   12 point review of system was completed and negative except as noted in the HPI    Active Problem List     Patient Active Problem List   Diagnosis     Hyperlipidemia LDL goal <160     Headaches, tension     Chronic constipation     OCD (obsessive compulsive disorder)     Anxiety     Fibromyalgia     Chronic abdominal pain     Prenatal care, subsequent pregnancy     Underweight     History of Lower GI bleed     Immune thrombocytopenic purpura (H)     Past Medical History     Past Medical History:   Diagnosis Date     Anxiety      Binge eating disorder      Chickenpox     x3     Food intolerance      IBS (irritable bowel syndrome)      MEDICAL HISTORY OF -     pelvic floor disorder     Past Surgical History     Past Surgical History:   Procedure Laterality Date     C/SECTION, LOW TRANSVERSE  2007      SECTION, TUBAL LIGATION, COMBINED N/A 02/15/2016    NO tubal ligation done     COLONOSCOPY  2013    Procedure: COMBINED COLONOSCOPY, SINGLE BIOPSY/POLYPECTOMY BY BIOPSY;  Colonoscopy;  Surgeon: Maureen Valentin MD;  Location: [facility]     COLONOSCOPY N/A 2015    Procedure: COMBINED COLONOSCOPY, SINGLE OR MULTIPLE BIOPSY/POLYPECTOMY BY BIOPSY;  Surgeon: Maureen Valentin MD;  Location: [facility]     ESOPHAGOSCOPY, GASTROSCOPY, DUODENOSCOPY (EGD), COMBINED N/A 2018    Procedure: COMBINED ESOPHAGOSCOPY, GASTROSCOPY, DUODENOSCOPY (EGD), BIOPSY SINGLE OR MULTIPLE;  Gastroscopy;  Surgeon: Ray Amato MD;  Location: [facility]     LAPAROSCOPIC SPLENECTOMY N/A 2018    Procedure: Laparoscopic splenectomy, ;  Surgeon: Anthony Jackson DO;  Location: [facility]     Allergy     Allergies   Allergen Reactions     Dilaudid  [Hydromorphone] Nausea and Vomiting     Food Other (See Comments) and Swelling     Processed food, sugar, high sodium, red grains, rice, potatoes(can have mashed, but no french fries or puffs)  Pain in extremities and face.   All over body at times.     Milk Protein Extract Swelling     Blow up like a balloon     Current Medication List     Current Outpatient Medications   Medication Sig     clonazePAM (KLONOPIN) 1 MG tablet TAKE 1/2 (ONE-HALF) TABLET BY MOUTH TWICE DAILY FOR ANXIETY AND 1 & 1/2 (ONE & ONE-HALF) AT BEDTIME FOR SLEEP     fluticasone (FLONASE) 50 MCG/ACT nasal spray Spray 1 spray into both nostrils daily     LINZESS 145 MCG capsule TAKE 1 CAPSULE BY MOUTH IN THE MORNING BEFORE BREAKFAST     ondansetron (ZOFRAN-ODT) 4 MG ODT tab Take 1-2 tablets (4-8 mg) by mouth every 6 hours as needed for nausea     polyethylene glycol (MIRALAX/GLYCOLAX) powder Take 17 g by mouth daily as needed for constipation     Prenatal Vit-Fe Fumarate-FA (PRENATAL MULTIVITAMIN W/IRON) 27-0.8 MG tablet Take 1 tablet by mouth daily     sennosides (SENOKOT) 8.6 MG tablet Take 2 tablets by mouth 2 times daily     UNABLE TO FIND Take 2 teaspoonful by mouth At Bedtime Magnesium Calm     No current facility-administered medications for this visit.          Social History   See HPI    Family History     Family History   Problem Relation Age of Onset     Gastrointestinal Disease Mother 46        perforated intestines-      Hepatitis Mother         C     Liver Disease Mother      Blood Disease Mother         anemia     Substance Abuse Mother         A&D     Psychotic Disorder Brother         Schizophrenia, OCD     Anxiety Disorder Brother      Arthritis Maternal Grandmother      Heart Disease Maternal Grandmother      Cancer Paternal Grandmother         cervical     Breast Cancer Paternal Grandmother      Hypertension Paternal Grandmother      Diabetes Paternal Grandmother      Heart Disease Paternal Grandmother      Cerebrovascular  "Disease Paternal Grandfather      Heart Disease Paternal Grandfather      Bipolar Disorder Paternal Grandfather      Suicide Paternal Grandfather      Cancer Paternal Aunt         cervical     Heart Disease Paternal Aunt      Coronary Artery Disease Maternal Grandfather         MI     Substance Abuse Father         recovered A&D     Heart Disease Father         MI-stents     Hypertension Father        Physical Exam     Temp Readings from Last 3 Encounters:   05/05/21 98.3  F (36.8  C) (Tympanic)   04/22/21 99.7  F (37.6  C) (Tympanic)   12/01/20 97.7  F (36.5  C) (Tympanic)     BP Readings from Last 5 Encounters:   05/05/21 108/69   04/22/21 112/71   12/01/20 120/80   06/29/20 112/77   02/20/20 122/68     Pulse Readings from Last 1 Encounters:   05/05/21 82     Resp Readings from Last 1 Encounters:   05/05/21 16     Estimated body mass index is 17.53 kg/m  as calculated from the following:    Height as of 5/5/21: 1.664 m (5' 5.5\").    Weight as of 5/5/21: 48.5 kg (107 lb).      GEN: NAD. Healthy appearing adult.   HEENT: MMM.  Anicteric, noninjected sclera. No obvious external lesions of the ear and nose. Hearing intact.  PULM: No increased work of breathing  MSK:  Hands and wrists without swelling. Elbows without swelling. Shoulders with normal ROM. Knees without swelling. Feet without swelling. Joints without overlying erythema.   SKIN: Rash with appearance of psoriasis on her neck, upper chest, and bilateral upper extremities  PSYCH: Alert. Appropriate.        Labs / Imaging (select studies)     RF/CCP  Recent Labs   Lab Test 06/29/20  1239   CCPIGG 1   RHF <7     CBC  Recent Labs   Lab Test 10/30/20  1411 10/12/20  1529 06/29/20  1330   WBC 7.6 9.9 7.8   RBC 4.93 4.92 4.72   HGB 15.2 15.1 14.7   HCT 44.1 44.3 43.5   MCV 90 90 92   RDW 12.3 12.1 13.0   PLT 93* 41* 92*   MCH 30.8 30.7 31.1   MCHC 34.5 34.1 33.8   NEUTROPHIL 43.6 65.8 58.6   LYMPH 42.9 25.6 32.5   MONOCYTE 10.7 6.7 7.2   EOSINOPHIL 0.9 0.5 0.4 "   BASOPHIL 1.6 1.2 1.0   ANEU 3.3 6.5 4.6   ALYM 3.2 2.5 2.5   CORRINA 0.8 0.7 0.6   AEOS 0.1 0.1 0.0   ABAS 0.1 0.1 0.1     CMP  Recent Labs   Lab Test 10/30/20  1411 10/12/20  1529 06/29/20  1239 03/11/20  1044     --  139 139   POTASSIUM 3.8  --  3.1* 3.1*   CHLORIDE 105  --  106 105   CO2 30  --  27 30   ANIONGAP 5  --  6 4   *  --  105* 101*   BUN 11  --  13 13   CR 0.89 0.94 0.82 0.83   GFRESTIMATED 85 79 >90 >90   GFRESTBLACK >90 >90 >90 >90   DREW 9.2  --  8.9 9.3   BILITOTAL 0.4 0.5 0.2 0.2   ALBUMIN 4.3 3.9 4.4 4.4   PROTTOTAL 7.3 7.2 8.1 7.7   ALKPHOS 61 59 64 62   AST 17 20 20 19   ALT 21 22 26 23     Calcium/VitaminD  Recent Labs   Lab Test 10/30/20  1411 06/29/20  1239 03/11/20  1044   DREW 9.2 8.9 9.3     ESR/CRP  Recent Labs   Lab Test 10/12/20  1529 06/29/20  1330 06/29/20  1239 11/08/19  1445 10/24/19  0824   SED 2 3  --   --  3   CRP <2.9  --  <2.9 <2.9 <2.9     Hepatitis B  Recent Labs   Lab Test 06/29/20  1239 01/24/18  1230 07/17/15  1544   HBCAB Nonreactive  --   --    HEPBANG Nonreactive Nonreactive Nonreactive     Hepatitis C  Recent Labs   Lab Test 06/29/20  1239 01/24/18  1230 03/04/13  1230   HCVAB Nonreactive Nonreactive Negative     Lyme ab screening  Recent Labs   Lab Test 06/29/20  1239   LYMEGM 0.10     Tuberculosis Screening  Recent Labs   Lab Test 06/29/20  1239   TBRES Negative     HIV Screening  Recent Labs   Lab Test 06/29/20  1239 07/17/15  1544   HIAGAB Nonreactive Nonreactive   HIV-1 p24 Ag & HIV-1/HIV-2 Ab Not Detected       Immunization History     Immunization History   Administered Date(s) Administered     Flu, Unspecified 12/03/2018, 01/30/2020     Hib (PRP-T) 10/19/2018     Influenza Vaccine IM > 6 months Valent IIV4 12/03/2018, 01/30/2020     Meningococcal (Menactra ) 10/19/2018     Pneumo Conj 13-V (2010&after) 10/19/2018     Pneumococcal 23 valent 01/30/2020          Chart documentation done in part with Dragon Voice recognition Software. Although reviewed  after completion, some word and grammatical error may remain.      Video-Visit Details    Type of service:  Video Visit    Video Start Time: 10:35 AM    Video End Time: 11:50 AM    Originating Location (pt. Location): [place], [place]    Distant Location (provider location):  Home    Platform used for Video Visit: Adeel Morrell MD

## 2021-06-24 ENCOUNTER — TELEPHONE (OUTPATIENT)
Dept: OBGYN | Facility: CLINIC | Age: 36
End: 2021-06-24

## 2021-06-24 ENCOUNTER — PRENATAL OFFICE VISIT (OUTPATIENT)
Dept: OBGYN | Facility: CLINIC | Age: 36
End: 2021-06-24
Payer: COMMERCIAL

## 2021-06-24 VITALS
RESPIRATION RATE: 18 BRPM | TEMPERATURE: 98.9 F | HEIGHT: 66 IN | HEART RATE: 78 BPM | WEIGHT: 114 LBS | SYSTOLIC BLOOD PRESSURE: 109 MMHG | DIASTOLIC BLOOD PRESSURE: 62 MMHG | BODY MASS INDEX: 18.32 KG/M2

## 2021-06-24 DIAGNOSIS — R53.83 FATIGUE, UNSPECIFIED TYPE: ICD-10-CM

## 2021-06-24 DIAGNOSIS — Z34.82 PRENATAL CARE, SUBSEQUENT PREGNANCY IN SECOND TRIMESTER: Primary | ICD-10-CM

## 2021-06-24 LAB
FERRITIN SERPL-MCNC: 12 NG/ML (ref 12–150)
TSH SERPL DL<=0.005 MIU/L-ACNC: 1.95 MU/L (ref 0.4–4)

## 2021-06-24 PROCEDURE — 84443 ASSAY THYROID STIM HORMONE: CPT | Performed by: OBSTETRICS & GYNECOLOGY

## 2021-06-24 PROCEDURE — 82728 ASSAY OF FERRITIN: CPT | Performed by: OBSTETRICS & GYNECOLOGY

## 2021-06-24 PROCEDURE — 36415 COLL VENOUS BLD VENIPUNCTURE: CPT | Performed by: OBSTETRICS & GYNECOLOGY

## 2021-06-24 PROCEDURE — 99207 PR PRENATAL VISIT: CPT | Performed by: OBSTETRICS & GYNECOLOGY

## 2021-06-24 ASSESSMENT — MIFFLIN-ST. JEOR: SCORE: 1225.91

## 2021-06-24 NOTE — TELEPHONE ENCOUNTER
Called patient.   Extremely tired. Eating and drinking okay. No headache, cough or fever.   Been feeling very fatigued entire pregnancy. This is not a new concern.  Patient wondering if today's appointment can be switched to virtual or if she will need to be present in clinic?    Will route to provider for consideration of patient request  Master REGAN RN   Specialty Clinics

## 2021-06-24 NOTE — TELEPHONE ENCOUNTER
Would be best to be seen if possible   Carola Sanchez MD   Formerly named [facility]     Called patient. Informed of above. Will be in for appointment     Master REGAN RN   Specialty Clinics

## 2021-06-24 NOTE — NURSING NOTE
"Initial /62 (BP Location: Right arm, Patient Position: Chair, Cuff Size: Adult Regular)   Pulse 78   Temp 98.9  F (37.2  C) (Tympanic)   Resp 18   Ht 1.664 m (5' 5.5\")   Wt 51.7 kg (114 lb)   LMP 03/10/2021   BMI 18.68 kg/m   Estimated body mass index is 18.68 kg/m  as calculated from the following:    Height as of this encounter: 1.664 m (5' 5.5\").    Weight as of this encounter: 51.7 kg (114 lb). .      "

## 2021-06-24 NOTE — PROGRESS NOTES
"CC: Here for routine prenatal visit @ 15w1d   HPI:  Feeling extreme fatigue; no SOB, no palpitations or chest pain; states can feel this way when psoriasis/RA is flaring; would prefer to not use her biologic injections    PE: /62 (BP Location: Right arm, Patient Position: Chair, Cuff Size: Adult Regular)   Pulse 78   Temp 98.9  F (37.2  C) (Tympanic)   Resp 18   Ht 1.664 m (5' 5.5\")   Wt 51.7 kg (114 lb)   LMP 03/10/2021   BMI 18.68 kg/m     See OB flowsheet      A:  1. Prenatal care, subsequent pregnancy in second trimester    - US OB > 14 Weeks; Future    2. Fatigue, unspecified type    - TSH with free T4 reflex  - Ferritin    20 week anomaly screen ultrasound  Routine prenatal care  RTC 4 weeks.      Carola Sanchez M.D.     "

## 2021-06-24 NOTE — TELEPHONE ENCOUNTER
Reason for Call:  Other     Detailed comments: Pt states she is feeling very lethargic and would like to switch her appt. With Dr. Sanchez today (4pm) to a virtual appt,. If possible, or is it necessary for her to come in. Pt is 15 weeks pregnant. EDC 12/15/21    Phone Number Patient can be reached at: Home number on file [phone] (home)    Best Time:     Can we leave a detailed message on this number? Not Applicable    Call taken on 6/24/2021 at 12:22 PM by Geovanny Cruz

## 2021-07-12 ENCOUNTER — TELEPHONE (OUTPATIENT)
Dept: OBGYN | Facility: CLINIC | Age: 36
End: 2021-07-12

## 2021-07-12 NOTE — TELEPHONE ENCOUNTER
Sounds like nerve generated pain, which is not uncommon.  Unfortunately, not a lot can be done other than trying to change position, warm bath   Carola Sanchez MD   [facility]    Message text      Pt notified of above.  Pt reports understanding.  Pt does not have further questions or concerns.  Patient will follow up at next scheduled routine OB appointment or call sooner with further questions or concerns.    Julia Renee   Ob/Gyn Clinic  RN

## 2021-07-12 NOTE — TELEPHONE ENCOUNTER
"Patient has been having some low pelvic pressure followed by a \"electric\" type pain. Cannot pin-point a movement that causes pain, happens sporadically, noticed at rest as well while sitting on couch.     Happening over last week. Increased nausea and fatigue. Temp 99.5. No urinary sx or bowel complaints    Please advise any recommendations regarding patient's current sx     Thank you,   Master REGAN RN   Specialty Clinics     "

## 2021-07-26 ENCOUNTER — PRENATAL OFFICE VISIT (OUTPATIENT)
Dept: OBGYN | Facility: CLINIC | Age: 36
End: 2021-07-26
Payer: COMMERCIAL

## 2021-07-26 ENCOUNTER — HOSPITAL ENCOUNTER (OUTPATIENT)
Dept: ULTRASOUND IMAGING | Facility: CLINIC | Age: 36
Discharge: HOME | End: 2021-07-26
Attending: OBSTETRICS & GYNECOLOGY | Admitting: OBSTETRICS & GYNECOLOGY
Payer: COMMERCIAL

## 2021-07-26 VITALS
HEIGHT: 65 IN | BODY MASS INDEX: 18.99 KG/M2 | RESPIRATION RATE: 18 BRPM | WEIGHT: 114 LBS | HEART RATE: 63 BPM | DIASTOLIC BLOOD PRESSURE: 67 MMHG | TEMPERATURE: 98.5 F | SYSTOLIC BLOOD PRESSURE: 113 MMHG

## 2021-07-26 DIAGNOSIS — Z34.82 PRENATAL CARE, SUBSEQUENT PREGNANCY IN SECOND TRIMESTER: Primary | ICD-10-CM

## 2021-07-26 DIAGNOSIS — Z34.82 PRENATAL CARE, SUBSEQUENT PREGNANCY IN SECOND TRIMESTER: ICD-10-CM

## 2021-07-26 PROCEDURE — 99207 PR PRENATAL VISIT: CPT | Performed by: OBSTETRICS & GYNECOLOGY

## 2021-07-26 PROCEDURE — 76805 OB US >/= 14 WKS SNGL FETUS: CPT

## 2021-07-26 ASSESSMENT — MIFFLIN-ST. JEOR: SCORE: 1217.98

## 2021-07-26 NOTE — NURSING NOTE
"Initial /67 (BP Location: Right arm, Patient Position: Chair, Cuff Size: Adult Regular)   Pulse 63   Temp 98.5  F (36.9  C) (Tympanic)   Resp 18   Ht 1.651 m (5' 5\")   Wt 51.7 kg (114 lb)   LMP 03/10/2021   BMI 18.97 kg/m   Estimated body mass index is 18.97 kg/m  as calculated from the following:    Height as of this encounter: 1.651 m (5' 5\").    Weight as of this encounter: 51.7 kg (114 lb). .      "

## 2021-07-26 NOTE — PROGRESS NOTES
"CC: Here for routine prenatal visit @ 19w5d   HPI:  Sonogram says it's a BOY; trying to eat well and stay hydrated    PE: /67 (BP Location: Right arm, Patient Position: Chair, Cuff Size: Adult Regular)   Pulse 63   Temp 98.5  F (36.9  C) (Tympanic)   Resp 18   Ht 1.651 m (5' 5\")   Wt 51.7 kg (114 lb)   LMP 03/10/2021   BMI 18.97 kg/m     See OB flowsheet      A:  1. Prenatal care, subsequent pregnancy in second trimester    - CBC with platelets; Future  - Glucose tolerance gest screen 1 hour; Future  - Treponema Abs w Reflex to RPR and Titer; Future      Routine prenatal care  RTC 4 weeks.      Carola Sanchez M.D.     "

## 2021-08-09 ENCOUNTER — TELEPHONE (OUTPATIENT)
Dept: OBGYN | Facility: CLINIC | Age: 36
End: 2021-08-09

## 2021-08-09 NOTE — TELEPHONE ENCOUNTER
"Called Merissa for update.   Has been feeling okay. Does feel \"a little out of sorts and foggy\" which she says happened to her with psoriasis flares in the past prior to being on medication for that. Is off all psoriasis meds, has been dealing with a flare, unsure if that may be the cause of her sx.     +FM, -bleeding or LOF, no fever. Staying hydrated. Patient advised to go to ED if she feels increasingly dizzy and/or sx worsen.     Will route to provider as FYI. Please advise if any other recommendation. Patient will go to ED if worsens    Master REGAN RN   Specialty Clinics   "

## 2021-08-09 NOTE — TELEPHONE ENCOUNTER
Reason for Call:  Other     Detailed comments: Pt wanted to let Dr. Sanchez her know she had an incident-she fainted (unconscious twice), felt hot. Pt states she is  not dehydrated. Ambulance came and all her vitals checked out fine, blood sugar (102) was fine. Appetite fine, just feeling really tired and has been hydrating. She states she did not fall on the baby and she has since felt fetal movement. Pt is 23 weeks pregnant and EDC is 12/05/21. Please give pt follow up call if needed.    Phone Number Patient can be reached at: Home number on file [phone] (home)    Best Time: any    Can we leave a detailed message on this number? YES    Call taken on 8/9/2021 at 11:57 AM by Geovanny Cruz

## 2021-08-25 ENCOUNTER — PRENATAL OFFICE VISIT (OUTPATIENT)
Dept: OBGYN | Facility: CLINIC | Age: 36
End: 2021-08-25
Payer: COMMERCIAL

## 2021-08-25 VITALS
SYSTOLIC BLOOD PRESSURE: 115 MMHG | BODY MASS INDEX: 18.48 KG/M2 | WEIGHT: 115 LBS | DIASTOLIC BLOOD PRESSURE: 53 MMHG | HEART RATE: 91 BPM | TEMPERATURE: 97.6 F | RESPIRATION RATE: 18 BRPM | HEIGHT: 66 IN

## 2021-08-25 DIAGNOSIS — Z34.82 PRENATAL CARE, SUBSEQUENT PREGNANCY IN SECOND TRIMESTER: ICD-10-CM

## 2021-08-25 LAB
ERYTHROCYTE [DISTWIDTH] IN BLOOD BY AUTOMATED COUNT: 12 % (ref 10–15)
GLUCOSE 1H P 50 G GLC PO SERPL-MCNC: 129 MG/DL (ref 70–129)
HCT VFR BLD AUTO: 36 % (ref 35–47)
HGB BLD-MCNC: 12.4 G/DL (ref 11.7–15.7)
MCH RBC QN AUTO: 30 PG (ref 26.5–33)
MCHC RBC AUTO-ENTMCNC: 34.4 G/DL (ref 31.5–36.5)
MCV RBC AUTO: 87 FL (ref 78–100)
PLATELET # BLD AUTO: 64 10E3/UL (ref 150–450)
RBC # BLD AUTO: 4.13 10E6/UL (ref 3.8–5.2)
T PALLIDUM AB SER QL: NONREACTIVE
WBC # BLD AUTO: 7.1 10E3/UL (ref 4–11)

## 2021-08-25 PROCEDURE — 85027 COMPLETE CBC AUTOMATED: CPT | Performed by: OBSTETRICS & GYNECOLOGY

## 2021-08-25 PROCEDURE — 36415 COLL VENOUS BLD VENIPUNCTURE: CPT | Performed by: OBSTETRICS & GYNECOLOGY

## 2021-08-25 PROCEDURE — 99207 PR PRENATAL VISIT: CPT | Performed by: OBSTETRICS & GYNECOLOGY

## 2021-08-25 PROCEDURE — 82952 GTT-ADDED SAMPLES: CPT | Performed by: OBSTETRICS & GYNECOLOGY

## 2021-08-25 PROCEDURE — 86780 TREPONEMA PALLIDUM: CPT | Performed by: OBSTETRICS & GYNECOLOGY

## 2021-08-25 ASSESSMENT — MIFFLIN-ST. JEOR: SCORE: 1230.45

## 2021-08-25 NOTE — PROGRESS NOTES
"CC: Here for routine prenatal visit @ 24w0d   HPI:  Pt had syncopal episode (witnesses report \"foaming at mouth\"; she had some amnesia for the event, no full seizure activity);     PE: /53 (BP Location: Right arm, Patient Position: Chair, Cuff Size: Adult Regular)   Pulse 91   Temp 97.6  F (36.4  C) (Tympanic)   Resp 18   Ht 1.664 m (5' 5.5\")   Wt 52.2 kg (115 lb)   LMP 03/10/2021   BMI 18.85 kg/m     See OB flowsheet      A:  1. Prenatal care, subsequent pregnancy in second trimester    - Treponema Abs w Reflex to RPR and Titer  - Glucose tolerance gest screen 1 hour  - CBC with platelets  - US OB >14 Weeks Follow Up; Future    Growth ultrasound dt low weight gain  Consider neurology referral  Routine prenatal care  RTC 4 weeks.      Carola Sanchez M.D.     "

## 2021-08-25 NOTE — NURSING NOTE
"Initial /53 (BP Location: Right arm, Patient Position: Chair, Cuff Size: Adult Regular)   Pulse 91   Temp 97.6  F (36.4  C) (Tympanic)   Resp 18   Ht 1.664 m (5' 5.5\")   Wt 52.2 kg (115 lb)   LMP 03/10/2021   BMI 18.85 kg/m   Estimated body mass index is 18.85 kg/m  as calculated from the following:    Height as of this encounter: 1.664 m (5' 5.5\").    Weight as of this encounter: 52.2 kg (115 lb). .      "

## 2021-08-26 ENCOUNTER — MYC MEDICAL ADVICE (OUTPATIENT)
Dept: OBGYN | Facility: CLINIC | Age: 36
End: 2021-08-26

## 2021-08-26 DIAGNOSIS — Z86.2 HISTORY OF AUTOIMMUNE THROMBOCYTOPENIA: Primary | ICD-10-CM

## 2021-08-26 NOTE — TELEPHONE ENCOUNTER
Spoke with Dr. Blanc who recommended patient follow up with Hematology since her platelets should have stabilized post-splenectomy.  Spoke with patient directly about Hematology referral (placed)      Jahaira Benjamin M.D.

## 2021-08-26 NOTE — TELEPHONE ENCOUNTER
Please review patient MyChart message and advise on patient question and concerns in Dr. Sanchez's absence.    CBC yesterday  Platelet count 64    Julia Renee   [facility]  RN

## 2021-09-02 ENCOUNTER — OFFICE VISIT (OUTPATIENT)
Dept: HEMATOLOGY | Facility: CLINIC | Age: 36
End: 2021-09-02
Attending: OBSTETRICS & GYNECOLOGY
Payer: COMMERCIAL

## 2021-09-02 VITALS
TEMPERATURE: 97.7 F | HEART RATE: 69 BPM | DIASTOLIC BLOOD PRESSURE: 56 MMHG | BODY MASS INDEX: 18.52 KG/M2 | SYSTOLIC BLOOD PRESSURE: 101 MMHG | WEIGHT: 118 LBS | HEIGHT: 67 IN | RESPIRATION RATE: 14 BRPM | OXYGEN SATURATION: 100 %

## 2021-09-02 DIAGNOSIS — Z86.2 HISTORY OF AUTOIMMUNE THROMBOCYTOPENIA: ICD-10-CM

## 2021-09-02 PROCEDURE — G0463 HOSPITAL OUTPT CLINIC VISIT: HCPCS

## 2021-09-02 PROCEDURE — 99215 OFFICE O/P EST HI 40 MIN: CPT | Performed by: INTERNAL MEDICINE

## 2021-09-02 ASSESSMENT — PAIN SCALES - GENERAL: PAINLEVEL: SEVERE PAIN (6)

## 2021-09-02 ASSESSMENT — MIFFLIN-ST. JEOR: SCORE: 1259.93

## 2021-09-02 NOTE — PROGRESS NOTES
[facility]  [address] [place] [id number]  Phone: [phone], Fax: [phone]    Outpatient Visit Note:    Patient: Merissa Leung  MRN: [id number]  : 1985  MEHDI: Sep 2, 2021     Reason for Consultation:  Merissa Leung is a referred by Dr Jahaira Benjamin for evaluation and treatment of ITP during pregnacy.    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      We discussed management of the pregnancy today and I explained that she will have greater treatment options after delivery.  During the pregnancy my goal is to keep her platelet count at least 30 for her own safety.  If possible, I will avoid treating her but we will carefully monitor her platelet counts.  Certainly if she develops bleeding noted be clear indication for treatment as well.    As you get closer to delivery date, I explained to her that our goal would be to keep the platelet count at least 50 to ensure safe  section.  Ideally, we keep the platelet count at least 80 for safety of spinal anesthesia.  That can be accomplished then she understands would have to have general anesthesia and explained that general anesthesia is associated with worse maternal and fetal outcomes but we may be restricted due to her platelet count.    Treatment options discussed today included  1.  First-line IVIG given that she has had minimal steroid response in the past and does not have any improvement in  ITP or her psoriatic arthritis  2.  She may also benefit from treatment of her psoriatic arthritis as her platelet count drop does seem to have coincided with stopping her biologic therapy.  I will communicate with her rheumatologist about this as well.  3.  If she fails to improve  with both IVIG and initiation of therapy for psoriatic arthritis we could consider rituximab.  It is associated with lymphopenia in the  but in general has had good fetal outcomes when used.  4.  Most other therapies are contraindicated during pregnancy so our options are otherwise quite limited.  Cyclosporine has been used in pregnancy but has a long duration between initiation of therapy and response.    Recommendations:  1. I counseled the patient about my assessment of contributions to her ITP including psoriatic arthritis and I reassured her that I am not suspicious for underlying bone marrow disorder.  2. We discussed an approach to pregnancy and ITP and I emphasized to her the 3 important cutoffs of 30 for maternal safety for bleeding, 50 for safe  and 84 neuraxial anesthesia.  3. We will plan to monitor her with at least monthly CBCs until I see her again around 32 weeks.  4. First line of therapy will be IVIG and/or biologic therapy for her psoriatic arthritis.  I will touch base with her rheumatologist in our system.  5. Return around 32 weeks, for discussion of management as we approach her due date and planned .  She has our contact information in case she has any bleeding symptoms or questions or concerns.    65 minutes spent on the date of the encounter doing chart review, review of test results, interpretation of tests, patient visit and documentation     Jerzy Mahoney MD   of Medicine, Division of Hematology, Oncology and Transplantation  [employer]     -------------------------------  History: Merissa Leung is a 36 year old woman with a history of a Psoriatic arthritis and relapsed ITP during pregnancy who presents to [facility] care.     She initially presented 2017 with severe thrombocytopenia isolated of platelets around 30 (previously had normal PLT count in 2016).  She was offered prednisone 50 mg with slow taper  over 2 or 3 months' period of time.  She had an initial response to prednisone with a doubling of the platelet count to 71 but failed to reach complete remission and then relapsed with the prednisone taper.    She then had persistent ITP throughout early  with a platelet count between 30 and 60.  She attempted IVIG in Oct 2018 but could not tolerate this well.  She then opted for laparoscopic splenectomy on 2018.    Splenectomy resulted in her first complete remission.  Postoperatively her platelet count tyler to 344.  Unfortunately her response was not durable and by  her platelet count had fallen to 41.    In  she was diagnosed with psoriatic arthritis by Dr. Morrell and started on Cosentyx (Secukinumab, anti IL-17 ab).  She had some improvement on this therapy but more recently has changed to Enbrel and she feels she had some good improvement in her rash and joint pain.  She is on this therapy only 2 months and then became pregnant so stopped this therapy due to risks to the fetus.    Unfortunately, since stopping therapy she has been quite symptomatic from a psoriatic arthritis standpoint.  She reports severe aching pains and fatigue.  She has had no epistaxis easy bruising or bleeding symptoms.    She reports that her current delivery plan is for planned  on 2021.  She has previously had spinal anesthesia and would like to plan for this with the next delivery.  She would like to avoid biologic therapy during the pregnancy due to concerns about risks to the fetus.  She feels that if she cannot tolerate the symptoms she would go back on therapy as discussed with Dr. Morrell.  She is not willing to go back on prednisone because she feels that it is never made a difference for her.    Medications were reviewed.  She has no drug allergies.  She does not smoke and is currently abstinent from alcohol.  Family history is notable for no blood disorders.    Physical  Exam:  Vitals: B/P: 101/56, T: 97.7, P: 69, R: 14, Wt: 118 lbs 0 oz Body mass index is 18.76 kg/m .   Exam:   Gen: Appears thin but well, and uncomfortable at times, no distress  HEENT: no scleral icterus or hemorrhage, no wet purpura, no lymphadenopathy  Abd: gravid  Ext: no edema  Skin: no ecchymoses or hematomas  Neuro: no focal deficits, affect and cognition are normal    Labs:  Results for ALLEGRA LUZ (MRN [id number]) as of 9/3/2021 08:12   Ref. Range 10/30/2020 14:11 4/22/2021 11:25 8/25/2021 12:42   WBC Latest Ref Range: 4.0 - 11.0 10e3/uL 7.6 7.6 7.1   Hemoglobin Latest Ref Range: 11.7 - 15.7 g/dL 15.2 14.9 12.4   Hematocrit Latest Ref Range: 35.0 - 47.0 % 44.1 44.1 36.0   Platelet Count Latest Ref Range: 150 - 450 10e3/uL 93 (L) 221 64 (L)       Imaging:  none

## 2021-09-02 NOTE — NURSING NOTE
Merissa Leung  [id number]  1985    Merissa Leung is here for New Bleed visit with Dr. Mahoney . She is being seen for ITP/Pregnancy.    Vital signs were taken and assessed.  Allergies and medications were reviewed and updated by [facility] staff.    I introduced myself and my role and provided her with a contact card containing our information.    Plan:    I  thanked her for coming and encouraged her to call with any concerns or questions.     Gloria Burch, MSN, RN, PHN -Nurse Clinician, [facility] [phone]

## 2021-09-14 ENCOUNTER — VIRTUAL VISIT (OUTPATIENT)
Dept: RHEUMATOLOGY | Facility: CLINIC | Age: 36
End: 2021-09-14
Payer: COMMERCIAL

## 2021-09-14 DIAGNOSIS — Z79.899 HIGH RISK MEDICATIONS (NOT ANTICOAGULANTS) LONG-TERM USE: ICD-10-CM

## 2021-09-14 DIAGNOSIS — L40.50 PSORIATIC ARTHRITIS (H): Primary | ICD-10-CM

## 2021-09-14 PROCEDURE — 99213 OFFICE O/P EST LOW 20 MIN: CPT | Mod: GT | Performed by: INTERNAL MEDICINE

## 2021-09-14 NOTE — PROGRESS NOTES
Merissa Leung  is a 36 year old year old female who is being evaluated via a billable video visit.      How would you like to obtain your AVS? MyChart  If the video visit is dropped, the invitation should be resent by: Text to cell phone: [phone]  Will anyone else be joining your video visit? No     Rheumatology Video Visit      Merissa Leung MRN# [id number]   YOB: 1985 Age: 36 year old      Date of visit: 21   PCP: Mohini Mccord   Heme/Onc: Dr. Bernadette Pena  Dermatology: Merissa Hernandezgan    Chief Complaint     Patient presents with:  Psoriatic arthritis    Assessment and Plan     1.  Psoriatic arthritis; psoriasis: initially presented 2020 with swelling of the MCPs consistent with an inflammatory arthritis; morning stiffness all day; pain and stiffness improve with activity and worsen with inactivity.  Lower back pain and stiffness is inflammatory in nature.  Was given dupixent by her dermatologist, per patient, that reportedly worsened the psoriasis.  Changed to Cosentyx with improvement of the psoriasis and arthritis but persistent symptoms so the dose was increased to 300 mg every 4 weeks in 2020.  She did not improve by the end of October so she was changed to Humira but reportedly this caused worsening psoriasis.  She was then changed to Stelara but after half of an injection she realized she could not do self-injections.  Then started Enbrel in 2021 with improvement of the psoriasis and psoriatic arthritis.  Then she contacted this clinic regarding pregnancy. Discussed treatments during pregnancy and she preferred to be off of tx during pregnancy. Discussed starting Cimzia if needed; and if needed then this can be administered at the infusion center due to patient's difficulty giving medications that are not autoinjectors.  She will notify me if she would like to start Cimzia during pregnancy; otherwise she plans to start Enbrel at least 2 weeks after  as long  as there is no evidence of infection and no delayed wound healing.   Chronic illness, progressive  - Not on treatment now, but if she decides that she would like to treat her arthritis and psoriasis then may start Cimzia. If post-partum at the time of needing tx then okay to use Enbrel again.   - Lab: quantiferon TB gold plus    2. Thrombocytopenia: seen by oncology and s/p splenectomy.  Continue following with heme/onc.    3. Fibromyalgia: noted here for historical significance only.  Follow with PCP for management.    4. Currently pregnant: following with heme and OB    # At this time the COVID-19 vaccine is recommended.  As long as there is no history of allergic reaction to the vaccine, the COVID-19 vaccine may be received.  It is recommended to receive an mRNA vaccine (Pfizer or Moderna) over the Ayo&Ayo vaccine.  I also advised that she speak with her OB provider prior to receiving the vaccine considering that she is currently pregnant     Following COVID-19 vaccination, please continue following all public health guidelines regarding physical distancing and other preventive measures.    Total minutes spent in evaluation with patient, documentation, , and review of pertinent studies and chart notes: 23     Ms. Leung verbalized agreement with and understanding of the rational for the diagnosis and treatment plan.  All questions were answered to best of my ability and the patient's satisfaction. Ms. Leung was advised to contact the clinic with any questions that may arise after the clinic visit.      Thank you for involving me in the care of the patient    Return to clinic: 3-4 months      HPI   Merissa Leung is a 36 year old female with a past medical history significant for hyperlipidemia, tension headaches, chronic constipation, anxiety, obsessive-compulsive disorder, chronic abdominal pain, history of lower GI bleed, ITP history, and fibromyalgia who is seen for follow-up of  psoriatic arthritis and psoriasis.     1/8/2018 [facility] rheumatology note by Dr. Pavan Dimas documents ITP. Eval'd for CTD. And given hx of bloody nose ANCA was checked.     11/5/2019 [facility] note by Juliana Mcnulty documents psoriasis and bDMARD was recommended (specifically Taltz). Hx of ITP and is s/p splenecomty.     6/24/2020: Ms. Leung reported that she has psoriasis, joint pain, and myalgia; has been present her whole life and she's just dealt with it and now is being eval'd.  Scalp psoriasis started on the back of her head; now involving her neck, lip and sometimes on her cheek, dorsal left wrist, volar right wrist. Was given Dupixent by her dermatologist that caused her psoriasis to worsen and ooze; so this was stopped. Never used Taltz or any other injection med for the psoriasis. Never on MTX.   MCPS, PIPs, DIPS, wrists, elbows, shoulders, hips, knees, ankles, and MTPs all ache constantly; worse in the AM, better with time and activity; worse with inactivity; feels like all joints swell but is most noticeable in the hands.  Difficulty making a fist in the AM.  Dropping items frequently, broken several cups.  Symptoms have been worsening over the past 10 years she says.  Wakes up in the AM and feels like she's 90 years old until she gets moving.  Feels much better when moving constantly. Morning stiffness for most of the day, better with activity.    10/9/2020: Reports of the once weekly dosing of Cosentyx was much more effective than the every 28-day dosing.  Overall feels better on Cosentyx than she was prior to using Cosentyx, with regard to her arthritis and psoriasis.  Morning stiffness last for most of the day, improved with time activity, and reaching a baseline after several hours.  She is now able to make a fist in the morning easier than before, but still has some difficulty in doing so.  Still dropping items, but not as frequently.  cosentyx was increased at that  "time    2/17/2021: Since last seen, was seen by dermatology where Humira as tried that caused a flare of arthritis and psoriasis with each injection. Then changed to Stelara that was not an autoinjector and couldn't tolerate doing self-injections; injected half a shot in 12/2020.  Therefore, changed to Enbrel but hasn't started yet.  She says that her joints are very swollen; hard to get up and use the bathroom; hard to walk. Knees, hips, low back, and hands. Symptoms are worse in the AM and improve with heat, time, and activity. +gelling. Psoriasis still very active, on her chest, back, extremities, and ears.     4/15/2021: presenting sooner than previously scheduled due to pregnancy. When she informed this clinic of pregnancy she was advised to stop Enbrel until this could be discussed more.  Overall psoriasis and psoriatic arthritis were improving with Enbrel but now pregnant and she prefers to remain off medications if possible.  Still with psoriasis and joint pain but overall much improved.     5/26/2021: psoriasis on the neck, scalp, behind ears, and arms, but not on her legs.  Joints are \"absolutely terrible\" - affecting the hips, knees, lower back, wrists, and fingers.  Morning stiffness for most of the day, better after a few hours; +gelling.  Has discussed with her  and she says that they don't want to start a medication at this time because of current pregnancy and wanting to limit medications, but if she feels like it is needed in the future then she will notify me.  She notes that Enbrel was helping because she was doing much better when she was on it.    Today, 9/14/2021: more back pain that she says depends on where the baby is laying.  She does not want to start Cimzia at this time but is still considering it.  Hands ache too. All joint symptoms are worse in the AM and improve with time and activity.  Morning stiffness for all day, worse in the AM.  Currently planning to manage pain by trying " to be physically active with low impact activity such as walking.  She will notify me if she would like to start Cimzia; otherwise will plan to start Enbrel after her  and she verbalized understanding that it needs to be at least 2 weeks after the , and only if there is no delayed wound healing and no evidence of infection.    Tobacco: Former smoker, quit 2021  EtOH: None  Drugs: None  Occupation: [employer]    ROS   12 point review of system was completed and negative except as noted in the HPI    Active Problem List     Patient Active Problem List   Diagnosis     Hyperlipidemia LDL goal <160     Headaches, tension     Chronic constipation     OCD (obsessive compulsive disorder)     Anxiety     Fibromyalgia     Chronic abdominal pain     Prenatal care, subsequent pregnancy     Underweight     History of Lower GI bleed     Immune thrombocytopenic purpura (H)     Past Medical History     Past Medical History:   Diagnosis Date     Anxiety      Binge eating disorder      Chickenpox     x3     Food intolerance      IBS (irritable bowel syndrome)      MEDICAL HISTORY OF -     pelvic floor disorder     Past Surgical History     Past Surgical History:   Procedure Laterality Date     C/SECTION, LOW TRANSVERSE  2007      SECTION, TUBAL LIGATION, COMBINED N/A 02/15/2016    NO tubal ligation done     COLONOSCOPY  2013    Procedure: COMBINED COLONOSCOPY, SINGLE BIOPSY/POLYPECTOMY BY BIOPSY;  Colonoscopy;  Surgeon: Maureen Valentin MD;  Location: [facility]     COLONOSCOPY N/A 2015    Procedure: COMBINED COLONOSCOPY, SINGLE OR MULTIPLE BIOPSY/POLYPECTOMY BY BIOPSY;  Surgeon: Maureen Valentin MD;  Location: [facility]     ESOPHAGOSCOPY, GASTROSCOPY, DUODENOSCOPY (EGD), COMBINED N/A 2018    Procedure: COMBINED ESOPHAGOSCOPY, GASTROSCOPY, DUODENOSCOPY (EGD), BIOPSY SINGLE OR MULTIPLE;  Gastroscopy;  Surgeon: Ray Amato MD;  Location: [facility]     LAPAROSCOPIC  SPLENECTOMY N/A 2018    Procedure: Laparoscopic splenectomy, ;  Surgeon: Anthony Jackson, DO;  Location: [facility]     Allergy     Allergies   Allergen Reactions     Dairy Products [Milk Protein Extract] Swelling     Blow up like a balloon     Dilaudid [Hydromorphone] Nausea and Vomiting     Food Other (See Comments) and Swelling     Processed food, sugar, high sodium, red grains, rice, potatoes(can have mashed, but no french fries or puffs)  Pain in extremities and face.   All over body at times.     Current Medication List     Current Outpatient Medications   Medication Sig     clonazePAM (KLONOPIN) 1 MG tablet TAKE 1/2 (ONE-HALF) TABLET BY MOUTH TWICE DAILY FOR ANXIETY AND 1 & 1/2 (ONE & ONE-HALF) AT BEDTIME FOR SLEEP     fluticasone (FLONASE) 50 MCG/ACT nasal spray Spray 1 spray into both nostrils daily     LINZESS 145 MCG capsule TAKE 1 CAPSULE BY MOUTH IN THE MORNING BEFORE BREAKFAST     ondansetron (ZOFRAN-ODT) 4 MG ODT tab Take 1-2 tablets (4-8 mg) by mouth every 6 hours as needed for nausea     polyethylene glycol (MIRALAX/GLYCOLAX) powder Take 17 g by mouth daily as needed for constipation     Prenatal Vit-Fe Fumarate-FA (PRENATAL MULTIVITAMIN W/IRON) 27-0.8 MG tablet Take 1 tablet by mouth daily     sennosides (SENOKOT) 8.6 MG tablet Take 2 tablets by mouth 2 times daily     UNABLE TO FIND Take 2 teaspoonful by mouth At Bedtime Magnesium Calm     No current facility-administered medications for this visit.         Social History   See HPI    Family History     Family History   Problem Relation Age of Onset     Gastrointestinal Disease Mother 46        perforated intestines-      Hepatitis Mother         C     Liver Disease Mother      Blood Disease Mother         anemia     Substance Abuse Mother         A&D     Psychotic Disorder Brother         Schizophrenia, OCD     Anxiety Disorder Brother      Arthritis Maternal Grandmother      Heart Disease Maternal Grandmother      Cancer Paternal  "Grandmother         cervical     Breast Cancer Paternal Grandmother      Hypertension Paternal Grandmother      Diabetes Paternal Grandmother      Heart Disease Paternal Grandmother      Cerebrovascular Disease Paternal Grandfather      Heart Disease Paternal Grandfather      Bipolar Disorder Paternal Grandfather      Suicide Paternal Grandfather      Cancer Paternal Aunt         cervical     Heart Disease Paternal Aunt      Coronary Artery Disease Maternal Grandfather         MI     Substance Abuse Father         recovered A&D     Heart Disease Father         MI-stents     Hypertension Father        Physical Exam     Temp Readings from Last 3 Encounters:   09/02/21 97.7  F (36.5  C) (Oral)   08/25/21 97.6  F (36.4  C) (Tympanic)   07/26/21 98.5  F (36.9  C) (Tympanic)     BP Readings from Last 5 Encounters:   09/02/21 101/56   08/25/21 115/53   07/26/21 113/67   06/24/21 109/62   05/05/21 108/69     Pulse Readings from Last 1 Encounters:   09/02/21 69     Resp Readings from Last 1 Encounters:   09/02/21 14     Estimated body mass index is 18.76 kg/m  as calculated from the following:    Height as of 9/2/21: 1.689 m (5' 6.5\").    Weight as of 9/2/21: 53.5 kg (118 lb).    GEN: NAD. Healthy appearing adult.   HEENT: MMM.  Anicteric, noninjected sclera. No obvious external lesions of the ear and nose. Hearing intact.  PULM: No increased work of breathing  MSK:  Hands and wrists without swelling.   PSYCH: Alert. Appropriate.     Labs / Imaging (select studies)     RF/CCP  Recent Labs   Lab Test 06/29/20  1239   CCPIGG 1   RHF <7     CBC  Recent Labs   Lab Test 08/25/21  1242 04/22/21  1125 10/30/20  1411 10/12/20  1529 06/29/20  1330   WBC 7.1 7.6 7.6 9.9 7.8   RBC 4.13 4.76 4.93 4.92 4.72   HGB 12.4 14.9 15.2 15.1 14.7   HCT 36.0 44.1 44.1 44.3 43.5   MCV 87 93 90 90 92   RDW 12.0 13.0 12.3 12.1 13.0   PLT 64* 221 93* 41* 92*   MCH 30.0 31.3 30.8 30.7 31.1   MCHC 34.4 33.8 34.5 34.1 33.8   NEUTROPHIL  --   --  43.6 " 65.8 58.6   LYMPH  --   --  42.9 25.6 32.5   MONOCYTE  --   --  10.7 6.7 7.2   EOSINOPHIL  --   --  0.9 0.5 0.4   BASOPHIL  --   --  1.6 1.2 1.0   ANEU  --   --  3.3 6.5 4.6   ALYM  --   --  3.2 2.5 2.5   CORRINA  --   --  0.8 0.7 0.6   AEOS  --   --  0.1 0.1 0.0   ABAS  --   --  0.1 0.1 0.1     CMP  Recent Labs   Lab Test 10/30/20  1411 10/12/20  1529 06/29/20  1239 03/11/20  1044     --  139 139   POTASSIUM 3.8  --  3.1* 3.1*   CHLORIDE 105  --  106 105   CO2 30  --  27 30   ANIONGAP 5  --  6 4   *  --  105* 101*   BUN 11  --  13 13   CR 0.89 0.94 0.82 0.83   GFRESTIMATED 85 79 >90 >90   GFRESTBLACK >90 >90 >90 >90   DREW 9.2  --  8.9 9.3   BILITOTAL 0.4 0.5 0.2 0.2   ALBUMIN 4.3 3.9 4.4 4.4   PROTTOTAL 7.3 7.2 8.1 7.7   ALKPHOS 61 59 64 62   AST 17 20 20 19   ALT 21 22 26 23     Calcium/VitaminD  Recent Labs   Lab Test 10/30/20  1411 06/29/20  1239 03/11/20  1044   DREW 9.2 8.9 9.3     ESR/CRP  Recent Labs   Lab Test 10/12/20  1529 06/29/20  1330 06/29/20  1239 11/08/19  1445 10/24/19  0824   SED 2 3  --   --  3   CRP <2.9  --  <2.9 <2.9 <2.9     Hepatitis B  Recent Labs   Lab Test 04/22/21  1125 06/29/20  1239 01/24/18  1230   HBCAB  --  Nonreactive  --    HEPBANG Nonreactive Nonreactive Nonreactive     Hepatitis C  Recent Labs   Lab Test 06/29/20  1239 01/24/18  1230   HCVAB Nonreactive Nonreactive     Lyme ab screening  Recent Labs   Lab Test 06/29/20  1239   LYMEGM 0.10     Tuberculosis Screening  Recent Labs   Lab Test 06/29/20  1239   TBRES Negative     HIV Screening  Recent Labs   Lab Test 04/22/21  1125 06/29/20  1239 07/17/15  1544   HIAGAB Nonreactive Nonreactive Nonreactive   HIV-1 p24 Ag & HIV-1/HIV-2 Ab Not Detected       Immunization History     Immunization History   Administered Date(s) Administered     Flu, Unspecified 12/03/2018, 01/30/2020     Hib (PRP-T) 10/19/2018     Influenza Vaccine IM > 6 months Valent IIV4 (Alfuria,Fluzone) 12/03/2018, 01/30/2020     Meningococcal (Menactra )  10/19/2018     Pneumo Conj 13-V (2010&after) 10/19/2018     Pneumococcal 23 valent 01/30/2020          Chart documentation done in part with Dragon Voice recognition Software. Although reviewed after completion, some word and grammatical error may remain.        Video-Visit Details    Type of service:  Video Visit    Video Start Time: 10:32 AM    Video End Time:10:51 AM    Originating Location (pt. Location): [place], [place]    Distant Location (provider location):  Home    Platform used for Video Visit: Roni Morrell MD

## 2021-09-14 NOTE — PATIENT INSTRUCTIONS
RHEUMATOLOGY    Dr. Buck Morrell    [address] [place] [address]  Phone number: [phone]  Fax number: [phone]    Thank you for choosing [facility]!    Deirdre Rollins CMA Rheumatology

## 2021-09-17 ENCOUNTER — TELEPHONE (OUTPATIENT)
Dept: OBGYN | Facility: CLINIC | Age: 36
End: 2021-09-17

## 2021-09-17 ENCOUNTER — HOSPITAL ENCOUNTER (OUTPATIENT)
Facility: CLINIC | Age: 36
Discharge: HOME | End: 2021-09-17
Attending: OBSTETRICS & GYNECOLOGY | Admitting: OBSTETRICS & GYNECOLOGY
Payer: COMMERCIAL

## 2021-09-17 VITALS — SYSTOLIC BLOOD PRESSURE: 111 MMHG | HEART RATE: 85 BPM | TEMPERATURE: 97.7 F | DIASTOLIC BLOOD PRESSURE: 71 MMHG

## 2021-09-17 PROBLEM — Z34.90 PREGNANT: Status: ACTIVE | Noted: 2021-09-17

## 2021-09-17 LAB — RUPTURE OF FETAL MEMBRANES BY ROM PLUS: NEGATIVE

## 2021-09-17 PROCEDURE — G0463 HOSPITAL OUTPT CLINIC VISIT: HCPCS | Mod: 25

## 2021-09-17 PROCEDURE — 84112 EVAL AMNIOTIC FLUID PROTEIN: CPT | Performed by: OBSTETRICS & GYNECOLOGY

## 2021-09-17 PROCEDURE — 59025 FETAL NON-STRESS TEST: CPT

## 2021-09-17 PROCEDURE — 59025 FETAL NON-STRESS TEST: CPT | Mod: 26 | Performed by: OBSTETRICS & GYNECOLOGY

## 2021-09-17 RX ORDER — PROCHLORPERAZINE MALEATE 10 MG
10 TABLET ORAL EVERY 6 HOURS PRN
Status: DISCONTINUED | OUTPATIENT
Start: 2021-09-17 | End: 2021-09-17 | Stop reason: HOSPADM

## 2021-09-17 RX ORDER — METOCLOPRAMIDE HYDROCHLORIDE 5 MG/ML
10 INJECTION INTRAMUSCULAR; INTRAVENOUS EVERY 6 HOURS PRN
Status: DISCONTINUED | OUTPATIENT
Start: 2021-09-17 | End: 2021-09-17 | Stop reason: HOSPADM

## 2021-09-17 RX ORDER — ONDANSETRON 4 MG/1
4 TABLET, ORALLY DISINTEGRATING ORAL EVERY 6 HOURS PRN
Status: DISCONTINUED | OUTPATIENT
Start: 2021-09-17 | End: 2021-09-17 | Stop reason: HOSPADM

## 2021-09-17 RX ORDER — LANOLIN ALCOHOL/MO/W.PET/CERES
400 CREAM (GRAM) TOPICAL DAILY
COMMUNITY

## 2021-09-17 RX ORDER — METOCLOPRAMIDE 10 MG/1
10 TABLET ORAL EVERY 6 HOURS PRN
Status: DISCONTINUED | OUTPATIENT
Start: 2021-09-17 | End: 2021-09-17 | Stop reason: HOSPADM

## 2021-09-17 RX ORDER — ONDANSETRON 2 MG/ML
4 INJECTION INTRAMUSCULAR; INTRAVENOUS EVERY 6 HOURS PRN
Status: DISCONTINUED | OUTPATIENT
Start: 2021-09-17 | End: 2021-09-17 | Stop reason: HOSPADM

## 2021-09-17 RX ORDER — PROCHLORPERAZINE 25 MG
25 SUPPOSITORY, RECTAL RECTAL EVERY 12 HOURS PRN
Status: DISCONTINUED | OUTPATIENT
Start: 2021-09-17 | End: 2021-09-17 | Stop reason: HOSPADM

## 2021-09-17 NOTE — TELEPHONE ENCOUNTER
Reason for Call:  Other call back    Detailed comments: States Dr. Sanchez told her to call in if she is having a decrease in fetal movent.  Past 4 days only felt baby once or twice a day and having weird like watery fluid come out of her but then it stopped yesterday.  27 1/2 wks.    Phone Number Patient can be reached at: Home number on file [phone] (home) warm transferred to RN    Best Time:     Can we leave a detailed message on this number? YES    Call taken on 9/17/2021 at 2:09 PM by Dea Lew

## 2021-09-17 NOTE — DISCHARGE INSTRUCTIONS
Discharge Instructions for Undelivered Patients  [facility] [phone]    Diet:    Drink 8 to 12 glasses of water every day.    You may eat meals and snacks as before    Activity:    If you are experiencing  labor, rest the pelvic area. No sex. Do not stimulate breasts or nipples.    Rest often as needed.    Count fetal kicks every day.     Call your doctor if your baby is moving less than usual.    Medicines:    My care team has reviewed my medicines with me.    My care team has given me a list of my medicines.    My care team has prescribed a new medicine. They have either sent it home with me or ordered it from the pharmacy.    Call your provider if you notice:    Swelling in your face or increased swelling in your hands or legs.    Headaches that are not relieved by Tylenol (acetaminophen).    Changes in your vision (blurring; seeing spots or stars).    Nausea (sick to your stomach) and vomiting (throwing up).    Weight gain of 5 pounds per week.    Heartburn that doesn't go away.    Signs of bladder infection: pain when you urinate (use the toilet), needing to go more often or more urgently.    The bag of santana (membranes) breaks, or you notice leaking in your underwear.    Bright red blood in your underwear.    Abdominal (lower belly) or stomach pain.    For Second (plus) baby: Contractions (tightenings) less than 10 minutes apart and getting stronger.    Increase or change in vaginal discharge (note the color and amount).      Follow up with your provider as scheduled.

## 2021-09-17 NOTE — TELEPHONE ENCOUNTER
Spoke to pt and she is 27 weeks 2 days gestation. She has been having a bad flare up of her psoriatic arthritis and has been in bed the last week. She stated she is having less fetal movements. She is feeling baby moving once or twice a day for the last few days. She said the movements have been very light.  She also noted some fluids coming out vaginally. She is unsure if it is urine or if it is vaginal. She said it is colorless and odorless. She said it is just like water. She will feel it gush and then dribbles a little bit after the gush. Pt is wondering if she needs to come to clinic or be seen in the birth center.  Mela REECE RN BSN PHN  Specialty Clinics

## 2021-09-17 NOTE — PROGRESS NOTES
Notified Dr. Hays of neg ROM+ and reactive NST(verified by Gloria [facility]). Pt to discharge home. Pt has a BPP and OB appt 9/20. Discharge instructions gone over with pt and . Verbal understanding from both. Pt amb with  to car.

## 2021-09-17 NOTE — PROGRESS NOTES
Multip arrived at 1624 stating that she hasn't felt baby move much in the past two days and she thinks her water may have broke 2 days ago. FHT's 145 with moderate variability. No contractions.  ROM + done and waiting results.

## 2021-09-17 NOTE — TELEPHONE ENCOUNTER
Huddled with Dr. Hays   Patient to come to the Birthcenter now for further evaluation.  Patient going to call her  and then they will come.    Pt in agreement and reports understanding of plan for further evaluation.    Julia Renee   Ob/Gyn Clinic  RN

## 2021-09-18 ENCOUNTER — HEALTH MAINTENANCE LETTER (OUTPATIENT)
Age: 36
End: 2021-09-18

## 2021-09-20 ENCOUNTER — HOSPITAL ENCOUNTER (OUTPATIENT)
Dept: ULTRASOUND IMAGING | Facility: CLINIC | Age: 36
Discharge: HOME | End: 2021-09-20
Attending: OBSTETRICS & GYNECOLOGY | Admitting: OBSTETRICS & GYNECOLOGY
Payer: COMMERCIAL

## 2021-09-20 ENCOUNTER — PRENATAL OFFICE VISIT (OUTPATIENT)
Dept: OBGYN | Facility: CLINIC | Age: 36
End: 2021-09-20
Payer: COMMERCIAL

## 2021-09-20 VITALS
TEMPERATURE: 96.9 F | WEIGHT: 115 LBS | RESPIRATION RATE: 16 BRPM | HEIGHT: 67 IN | DIASTOLIC BLOOD PRESSURE: 71 MMHG | BODY MASS INDEX: 18.05 KG/M2 | HEART RATE: 72 BPM | SYSTOLIC BLOOD PRESSURE: 98 MMHG

## 2021-09-20 DIAGNOSIS — Z34.82 PRENATAL CARE, SUBSEQUENT PREGNANCY IN SECOND TRIMESTER: ICD-10-CM

## 2021-09-20 DIAGNOSIS — Z34.83 PRENATAL CARE, SUBSEQUENT PREGNANCY IN THIRD TRIMESTER: Primary | ICD-10-CM

## 2021-09-20 DIAGNOSIS — Z86.2 HISTORY OF AUTOIMMUNE THROMBOCYTOPENIA: ICD-10-CM

## 2021-09-20 PROCEDURE — 99207 PR PRENATAL VISIT: CPT | Performed by: OBSTETRICS & GYNECOLOGY

## 2021-09-20 PROCEDURE — 76816 OB US FOLLOW-UP PER FETUS: CPT

## 2021-09-20 PROCEDURE — 76819 FETAL BIOPHYS PROFIL W/O NST: CPT

## 2021-09-20 ASSESSMENT — MIFFLIN-ST. JEOR: SCORE: 1246.33

## 2021-09-20 NOTE — PROGRESS NOTES
"CC: Here for routine prenatal visit @ 27w5d   HPI:  Sonogram today showed EFW 1238gm (69%); has seen her hematologist and wishes her to check platelets monthly, so standing order put in    PE: BP 98/71 (BP Location: Left arm, Patient Position: Chair, Cuff Size: Adult Regular)   Pulse 72   Temp 96.9  F (36.1  C) (Tympanic)   Resp 16   Ht 1.689 m (5' 6.5\")   Wt 52.2 kg (115 lb)   LMP 03/10/2021   BMI 18.28 kg/m     See OB flowsheet      A:  1. Prenatal care, subsequent pregnancy in third trimester      2. History of autoimmune thrombocytopenia    - CBC with platelets differential  - Lab Blood Morphology Pathologist Review      Routine prenatal care  RTC 2 weeks.      Carola Sanchez M.D.     "

## 2021-09-20 NOTE — NURSING NOTE
"Initial BP 98/71 (BP Location: Left arm, Patient Position: Chair, Cuff Size: Adult Regular)   Pulse 72   Temp 96.9  F (36.1  C) (Tympanic)   Resp 16   Ht 1.689 m (5' 6.5\")   Wt 52.2 kg (115 lb)   LMP 03/10/2021   BMI 18.28 kg/m   Estimated body mass index is 18.28 kg/m  as calculated from the following:    Height as of this encounter: 1.689 m (5' 6.5\").    Weight as of this encounter: 52.2 kg (115 lb). .      "

## 2021-09-23 ENCOUNTER — MYC MEDICAL ADVICE (OUTPATIENT)
Dept: RHEUMATOLOGY | Facility: CLINIC | Age: 36
End: 2021-09-23

## 2021-09-23 ENCOUNTER — HOSPITAL ENCOUNTER (EMERGENCY)
Facility: CLINIC | Age: 36
Discharge: HOME | End: 2021-09-23
Attending: EMERGENCY MEDICINE | Admitting: EMERGENCY MEDICINE
Payer: COMMERCIAL

## 2021-09-23 VITALS
SYSTOLIC BLOOD PRESSURE: 103 MMHG | TEMPERATURE: 98.1 F | HEIGHT: 65 IN | HEART RATE: 76 BPM | RESPIRATION RATE: 16 BRPM | WEIGHT: 115 LBS | DIASTOLIC BLOOD PRESSURE: 67 MMHG | BODY MASS INDEX: 19.16 KG/M2 | OXYGEN SATURATION: 99 %

## 2021-09-23 DIAGNOSIS — L40.9 PSORIASIS: ICD-10-CM

## 2021-09-23 DIAGNOSIS — L03.313 CELLULITIS OF CHEST WALL: ICD-10-CM

## 2021-09-23 LAB
ALBUMIN SERPL-MCNC: 2.3 G/DL (ref 3.4–5)
ALBUMIN UR-MCNC: NEGATIVE MG/DL
ALP SERPL-CCNC: 56 U/L (ref 40–150)
ALT SERPL W P-5'-P-CCNC: 13 U/L (ref 0–50)
ANION GAP SERPL CALCULATED.3IONS-SCNC: 6 MMOL/L (ref 3–14)
APPEARANCE UR: ABNORMAL
AST SERPL W P-5'-P-CCNC: 13 U/L (ref 0–45)
BACTERIA #/AREA URNS HPF: ABNORMAL /HPF
BASOPHILS # BLD AUTO: 0 10E3/UL (ref 0–0.2)
BASOPHILS NFR BLD AUTO: 0 %
BILIRUB SERPL-MCNC: 0.2 MG/DL (ref 0.2–1.3)
BILIRUB UR QL STRIP: NEGATIVE
BUN SERPL-MCNC: 8 MG/DL (ref 7–30)
CALCIUM SERPL-MCNC: 7.6 MG/DL (ref 8.5–10.1)
CHLORIDE BLD-SCNC: 112 MMOL/L (ref 94–109)
CO2 SERPL-SCNC: 23 MMOL/L (ref 20–32)
COLOR UR AUTO: YELLOW
CREAT SERPL-MCNC: 0.6 MG/DL (ref 0.52–1.04)
EOSINOPHIL # BLD AUTO: 0 10E3/UL (ref 0–0.7)
EOSINOPHIL NFR BLD AUTO: 0 %
ERYTHROCYTE [DISTWIDTH] IN BLOOD BY AUTOMATED COUNT: 12.4 % (ref 10–15)
GFR SERPL CREATININE-BSD FRML MDRD: >90 ML/MIN/1.73M2
GLUCOSE BLD-MCNC: 83 MG/DL (ref 70–99)
GLUCOSE UR STRIP-MCNC: NEGATIVE MG/DL
HCT VFR BLD AUTO: 34 % (ref 35–47)
HGB BLD-MCNC: 11.7 G/DL (ref 11.7–15.7)
HGB UR QL STRIP: NEGATIVE
HOLD SPECIMEN: NORMAL
HOLD SPECIMEN: NORMAL
IMM GRANULOCYTES # BLD: 0.1 10E3/UL
IMM GRANULOCYTES NFR BLD: 1 %
KETONES UR STRIP-MCNC: NEGATIVE MG/DL
LACTATE SERPL-SCNC: 0.8 MMOL/L (ref 0.7–2)
LEUKOCYTE ESTERASE UR QL STRIP: NEGATIVE
LYMPHOCYTES # BLD AUTO: 1.8 10E3/UL (ref 0.8–5.3)
LYMPHOCYTES NFR BLD AUTO: 18 %
MCH RBC QN AUTO: 29.1 PG (ref 26.5–33)
MCHC RBC AUTO-ENTMCNC: 34.4 G/DL (ref 31.5–36.5)
MCV RBC AUTO: 85 FL (ref 78–100)
MONOCYTES # BLD AUTO: 0.8 10E3/UL (ref 0–1.3)
MONOCYTES NFR BLD AUTO: 8 %
MUCOUS THREADS #/AREA URNS LPF: PRESENT /LPF
NEUTROPHILS # BLD AUTO: 7.2 10E3/UL (ref 1.6–8.3)
NEUTROPHILS NFR BLD AUTO: 73 %
NITRATE UR QL: NEGATIVE
NRBC # BLD AUTO: 0 10E3/UL
NRBC BLD AUTO-RTO: 0 /100
PH UR STRIP: 6 [PH] (ref 5–7)
PLATELET # BLD AUTO: 54 10E3/UL (ref 150–450)
POTASSIUM BLD-SCNC: 3.3 MMOL/L (ref 3.4–5.3)
PROT SERPL-MCNC: 5.9 G/DL (ref 6.8–8.8)
RBC # BLD AUTO: 4.02 10E6/UL (ref 3.8–5.2)
RBC URINE: 1 /HPF
SARS-COV-2 RNA RESP QL NAA+PROBE: NEGATIVE
SODIUM SERPL-SCNC: 141 MMOL/L (ref 133–144)
SP GR UR STRIP: 1.01 (ref 1–1.03)
SQUAMOUS EPITHELIAL: 11 /HPF
UROBILINOGEN UR STRIP-MCNC: NORMAL MG/DL
WBC # BLD AUTO: 9.9 10E3/UL (ref 4–11)
WBC URINE: 4 /HPF

## 2021-09-23 PROCEDURE — 87635 SARS-COV-2 COVID-19 AMP PRB: CPT | Performed by: EMERGENCY MEDICINE

## 2021-09-23 PROCEDURE — 258N000003 HC RX IP 258 OP 636: Performed by: EMERGENCY MEDICINE

## 2021-09-23 PROCEDURE — 250N000013 HC RX MED GY IP 250 OP 250 PS 637: Performed by: EMERGENCY MEDICINE

## 2021-09-23 PROCEDURE — 87529 HSV DNA AMP PROBE: CPT | Mod: 59 | Performed by: EMERGENCY MEDICINE

## 2021-09-23 PROCEDURE — 87040 BLOOD CULTURE FOR BACTERIA: CPT | Performed by: EMERGENCY MEDICINE

## 2021-09-23 PROCEDURE — 85025 COMPLETE CBC W/AUTO DIFF WBC: CPT | Performed by: EMERGENCY MEDICINE

## 2021-09-23 PROCEDURE — C9803 HOPD COVID-19 SPEC COLLECT: HCPCS | Performed by: EMERGENCY MEDICINE

## 2021-09-23 PROCEDURE — 96365 THER/PROPH/DIAG IV INF INIT: CPT | Performed by: EMERGENCY MEDICINE

## 2021-09-23 PROCEDURE — 99284 EMERGENCY DEPT VISIT MOD MDM: CPT | Mod: 25 | Performed by: EMERGENCY MEDICINE

## 2021-09-23 PROCEDURE — 99284 EMERGENCY DEPT VISIT MOD MDM: CPT | Performed by: EMERGENCY MEDICINE

## 2021-09-23 PROCEDURE — 81003 URINALYSIS AUTO W/O SCOPE: CPT | Performed by: EMERGENCY MEDICINE

## 2021-09-23 PROCEDURE — 36415 COLL VENOUS BLD VENIPUNCTURE: CPT | Performed by: EMERGENCY MEDICINE

## 2021-09-23 PROCEDURE — 250N000011 HC RX IP 250 OP 636: Performed by: EMERGENCY MEDICINE

## 2021-09-23 PROCEDURE — 82040 ASSAY OF SERUM ALBUMIN: CPT | Performed by: EMERGENCY MEDICINE

## 2021-09-23 PROCEDURE — 83605 ASSAY OF LACTIC ACID: CPT | Performed by: EMERGENCY MEDICINE

## 2021-09-23 PROCEDURE — 96361 HYDRATE IV INFUSION ADD-ON: CPT | Performed by: EMERGENCY MEDICINE

## 2021-09-23 RX ORDER — CEPHALEXIN 500 MG/1
500 CAPSULE ORAL 4 TIMES DAILY
Qty: 28 CAPSULE | Refills: 0 | Status: SHIPPED | OUTPATIENT
Start: 2021-09-23 | End: 2021-09-30

## 2021-09-23 RX ORDER — ACETAMINOPHEN 500 MG
1000 TABLET ORAL ONCE
Status: COMPLETED | OUTPATIENT
Start: 2021-09-23 | End: 2021-09-23

## 2021-09-23 RX ORDER — CEFAZOLIN SODIUM 2 G/100ML
2 INJECTION, SOLUTION INTRAVENOUS ONCE
Status: COMPLETED | OUTPATIENT
Start: 2021-09-23 | End: 2021-09-23

## 2021-09-23 RX ADMIN — CEFAZOLIN SODIUM 2 G: 2 INJECTION, SOLUTION INTRAVENOUS at 16:53

## 2021-09-23 RX ADMIN — ACETAMINOPHEN 1000 MG: 500 TABLET, FILM COATED ORAL at 19:39

## 2021-09-23 RX ADMIN — SODIUM CHLORIDE, POTASSIUM CHLORIDE, SODIUM LACTATE AND CALCIUM CHLORIDE 1000 ML: 600; 310; 30; 20 INJECTION, SOLUTION INTRAVENOUS at 16:47

## 2021-09-23 ASSESSMENT — MIFFLIN-ST. JEOR: SCORE: 1222.52

## 2021-09-23 NOTE — PROGRESS NOTES
DATE OF CONSULTATION:  05/13/2020

PULMONARY CONSULTATION



CONSULTING PHYSICIAN:  Phillip Torre MD.



HISTORY OF PRESENT ILLNESS:  This is a 72-year-old male with a known

history of hypertension who came to hospital with diarrhea.  These were

reported to be loose and watery.  Patient also had a syncopal episode and

was found down.  Patient was seen and evaluated overnight in the hospital.

He is currently in the ICU.  His workup has been notable for significant

anemia.  A CT of the abdomen was performed, which showed an abdominal

mass.  He has also been found to have bilateral pulmonary emboli on CT

imaging.



PAST HISTORY:  Otherwise unremarkable.



REVIEW OF SYSTEMS:  Denies any headaches, hematemesis, melena,

hematochezia, night sweats, or weight loss.



PREVIOUS SURGERIES:  None.



PHYSICAL EXAMINATION:

GENERAL:  Reveals a 72-year-old male.

HEENT:  Unremarkable.

LUNGS:  Clear breath sounds bilaterally.

ABDOMEN:  Soft.

EXTREMITIES:  There is no edema.

VITAL SIGNS:  Blood pressure is 100/60, heart rate 100, respirations are

20, O2 saturation 100% on 2 L of oxygen.



LABORATORY DATA:  Lab testing shows hemoglobin 3.5.  He is currently being

transfused.  White count 27,000, platelet count is normal.  Chemistries

are notable for creatinine 1.2.  Lactic acid 6.8, now 7.7.  Coags are

normal with INR 1.4.  Urinalysis is negative with few pus cells.



IMAGING STUDIES:  Head CT is negative.  Abdominal and pelvis CT shows

filling defects within the lower pulmonary vasculature suspicious for

pulmonary embolism as well as pulmonary infarcts.  There is a necrotic

mass in the gastric fundus.



IMPRESSION:

1. Gastric mass.

2. Severe anemia.

3. Pulmonary embolism.



DISCUSSION:  Admit to the hospital.  Patient ________ IVC filter.  Needs

blood transfusion, IV fluids.  He will need surgical intervention with

endoscopy and gastric resection.  We will follow carefully.









  ______________________________________________

  Phillip Torre M.D.





DR:  DHAVAL

D:  05/13/2020 11:02

T:  05/13/2020 19:21

JOB#:  [id number]

CC: FHT's done by dopler 150's. Fetus is active. Pt reports no contractions, LOF or bleeding. Reported to ED RN.

## 2021-09-23 NOTE — ED PROVIDER NOTES
History     Chief Complaint   Patient presents with     Cellulitis     29 weeks pregnant. psoriasis flare.      HPI  Merissa Leung is a 36 year old female who presents from outside clinic.  Patient reports 3 days of fever, T-max one 101.3.  Shaking chills and rigors.  Headache.  Mild nausea no vomiting.  Taking fluids well.  Continues to make urine without urinary complaint.  Denies diarrhea.  Patient with known psoriasis, off biologic secondary to pregnancy.  She is -0-0-2 at 29 weeks without complication.  Reports weight gain slowly.  Does not smoke, use alcohol or illicit substance.  Followed by Dr. Sanchez OB/GYN here.    Allergies:  Allergies   Allergen Reactions     Dairy Products [Milk Protein Extract] Swelling     Blow up like a balloon     Dilaudid [Hydromorphone] Nausea and Vomiting     Food Other (See Comments) and Swelling     Processed food, sugar, high sodium, red grains, rice, potatoes(can have mashed, but no french fries or puffs)  Pain in extremities and face.   All over body at times.       Problem List:    Patient Active Problem List    Diagnosis Date Noted     Pregnant 2021     Priority: Medium     Immune thrombocytopenic purpura (H) 2018     Priority: Medium     History of Lower GI bleed 10/31/2018     Priority: Medium     Underweight 2017     Priority: Medium     Prenatal care, subsequent pregnancy 2017     Priority: Medium     Chronic abdominal pain 10/06/2016     Priority: Medium     Has had workup through [facility] and [facility]      Now followed by [facility] (started on bentyl)       Fibromyalgia 2015     Priority: Medium     Anxiety 2015     Priority: Medium     Hyperlipidemia LDL goal <160 07/15/2013     Priority: Medium     Headaches, tension 07/15/2013     Priority: Medium     Chronic constipation 07/15/2013     Priority: Medium     OCD (obsessive compulsive disorder) 07/15/2013     Priority: Medium     Binge eating  Started therapeutic trial of  Luvox 100-300mg extended release at bedtime; category C in pregnancy (as all SSRI); not effective  Will refer to [facility]          Past Medical History:    Past Medical History:   Diagnosis Date     Anxiety      Binge eating disorder      Chickenpox      Food intolerance      IBS (irritable bowel syndrome)      MEDICAL HISTORY OF -        Past Surgical History:    Past Surgical History:   Procedure Laterality Date     C/SECTION, LOW TRANSVERSE  2007      SECTION, TUBAL LIGATION, COMBINED N/A 02/15/2016    NO tubal ligation done     COLONOSCOPY  2013    Procedure: COMBINED COLONOSCOPY, SINGLE BIOPSY/POLYPECTOMY BY BIOPSY;  Colonoscopy;  Surgeon: Maureen Valentin MD;  Location: [facility]     COLONOSCOPY N/A 2015    Procedure: COMBINED COLONOSCOPY, SINGLE OR MULTIPLE BIOPSY/POLYPECTOMY BY BIOPSY;  Surgeon: Maureen Valentin MD;  Location: [facility]     ESOPHAGOSCOPY, GASTROSCOPY, DUODENOSCOPY (EGD), COMBINED N/A 2018    Procedure: COMBINED ESOPHAGOSCOPY, GASTROSCOPY, DUODENOSCOPY (EGD), BIOPSY SINGLE OR MULTIPLE;  Gastroscopy;  Surgeon: Ray Amato MD;  Location: [facility]     LAPAROSCOPIC SPLENECTOMY N/A 2018    Procedure: Laparoscopic splenectomy, ;  Surgeon: Anthony Jackson DO;  Location: [facility]       Family History:    Family History   Problem Relation Age of Onset     Gastrointestinal Disease Mother 46        perforated intestines-      Hepatitis Mother         C     Liver Disease Mother      Blood Disease Mother         anemia     Substance Abuse Mother         A&D     Psychotic Disorder Brother         Schizophrenia, OCD     Anxiety Disorder Brother      Arthritis Maternal Grandmother      Heart Disease Maternal Grandmother      Cancer Paternal Grandmother         cervical     Breast Cancer Paternal Grandmother      Hypertension Paternal Grandmother      Diabetes Paternal Grandmother      Heart Disease Paternal Grandmother      Cerebrovascular  "Disease Paternal Grandfather      Heart Disease Paternal Grandfather      Bipolar Disorder Paternal Grandfather      Suicide Paternal Grandfather      Cancer Paternal Aunt         cervical     Heart Disease Paternal Aunt      Coronary Artery Disease Maternal Grandfather         MI     Substance Abuse Father         recovered A&D     Heart Disease Father         MI-stents     Hypertension Father        Social History:  Marital Status:   [2]  Social History     Tobacco Use     Smoking status: Former Smoker     Packs/day: 0.50     Years: 6.00     Pack years: 3.00     Types: Cigarettes     Quit date: 2021     Years since quittin.4     Smokeless tobacco: Never Used   Vaping Use     Vaping Use: Never used   Substance Use Topics     Alcohol use: Not Currently     Comment: rare-quit with pregnancy     Drug use: No        Medications:    cephALEXin (KEFLEX) 500 MG capsule  clonazePAM (KLONOPIN) 1 MG tablet  fluticasone (FLONASE) 50 MCG/ACT nasal spray  LINZESS 145 MCG capsule  magnesium oxide (MAG-OX) 400 (240 Mg) MG tablet  ondansetron (ZOFRAN-ODT) 4 MG ODT tab  polyethylene glycol (MIRALAX/GLYCOLAX) powder  Prenatal Vit-Fe Fumarate-FA (PRENATAL MULTIVITAMIN W/IRON) 27-0.8 MG tablet  sennosides (SENOKOT) 8.6 MG tablet  UNABLE TO FIND          Review of Systems  All other systems reviewed and are negative.    Physical Exam   BP: 120/71  Pulse: 91  Temp: 98.4  F (36.9  C)  Resp: 20  Height: 165.1 cm (5' 5\")  Weight: 52.2 kg (115 lb)  SpO2: 98 %      Physical Exam  Nontoxic appearing no respiratory distress alert and oriented ×3, appears acutely ill  Head atraumatic normocephalic  Conjunctiva noninjected, oropharynx moist without lesions or erythema  No cervical adenopathy   Neck supple full active painless range of motion  Lungs clear to auscultation  Heart regular no murmur  Abdomen soft nontender bowel sounds positive   Strength and sensation grossly intact throughout the extremities, gait and station " normal  Speech is fluent, good eye contact, thought processes are rational  Lower extremities without swelling, redness or tenderness  Pedal pulses symmetrical and strong  Pain is significant for scaling over the chest anteriorly, associated erythema and tenderness with some scant weeping present.  This extends up to the anterior neck and submandibular region.  No crepitus is appreciated.  There are some areas on the bilateral volar wrists that are inflamed and weeping and fissured with associated tenderness and mild erythema.  Remainder of skin is clear with exception of the labia, there are lesions present that measure approximately 3 to 4 mm in diameter, scattered diffusely bilaterally, no associated erythema, no vesicles present, no induration.  There is tenderness to palpation bilaterally.  There is no vaginal discharge appreciated.    ED Course    36-year-old female 29-week pregnant -0-0-2 presents with fevers x3 days, off biologic for psoriasis secondary to pregnancy.  Diffuse erythema tenderness over the chest as described above.  Septic work-up, 1 L LR ordered, 2 g Ancef IV.  Anticipate admission.  Call to OB to come down and monitor baby.        Procedures              Critical Care time:  none               Results for orders placed or performed during the hospital encounter of 21 (from the past 24 hour(s))   CBC with platelets differential    Narrative    The following orders were created for panel order CBC with platelets differential.  Procedure                               Abnormality         Status                     ---------                               -----------         ------                     CBC with platelets and d...[[id number]]  Abnormal            Final result                 Please view results for these tests on the individual orders.   Comprehensive metabolic panel   Result Value Ref Range    Sodium 141 133 - 144 mmol/L    Potassium 3.3 (L) 3.4 - 5.3 mmol/L    Chloride  112 (H) 94 - 109 mmol/L    Carbon Dioxide (CO2) 23 20 - 32 mmol/L    Anion Gap 6 3 - 14 mmol/L    Urea Nitrogen 8 7 - 30 mg/dL    Creatinine 0.60 0.52 - 1.04 mg/dL    Calcium 7.6 (L) 8.5 - 10.1 mg/dL    Glucose 83 70 - 99 mg/dL    Alkaline Phosphatase 56 40 - 150 U/L    AST 13 0 - 45 U/L    ALT 13 0 - 50 U/L    Protein Total 5.9 (L) 6.8 - 8.8 g/dL    Albumin 2.3 (L) 3.4 - 5.0 g/dL    Bilirubin Total 0.2 0.2 - 1.3 mg/dL    GFR Estimate >90 >60 mL/min/1.73m2   Lactic acid whole blood   Result Value Ref Range    Lactic Acid 0.8 0.7 - 2.0 mmol/L   CBC with platelets and differential   Result Value Ref Range    WBC Count 9.9 4.0 - 11.0 10e3/uL    RBC Count 4.02 3.80 - 5.20 10e6/uL    Hemoglobin 11.7 11.7 - 15.7 g/dL    Hematocrit 34.0 (L) 35.0 - 47.0 %    MCV 85 78 - 100 fL    MCH 29.1 26.5 - 33.0 pg    MCHC 34.4 31.5 - 36.5 g/dL    RDW 12.4 10.0 - 15.0 %    Platelet Count 54 (L) 150 - 450 10e3/uL    % Neutrophils 73 %    % Lymphocytes 18 %    % Monocytes 8 %    % Eosinophils 0 %    % Basophils 0 %    % Immature Granulocytes 1 %    NRBCs per 100 WBC 0 <1 /100    Absolute Neutrophils 7.2 1.6 - 8.3 10e3/uL    Absolute Lymphocytes 1.8 0.8 - 5.3 10e3/uL    Absolute Monocytes 0.8 0.0 - 1.3 10e3/uL    Absolute Eosinophils 0.0 0.0 - 0.7 10e3/uL    Absolute Basophils 0.0 0.0 - 0.2 10e3/uL    Absolute Immature Granulocytes 0.1 (H) <=0.0 10e3/uL    Absolute NRBCs 0.0 10e3/uL   Asymptomatic COVID-19 Virus (Coronavirus) by PCR Nasopharyngeal    Specimen: Nasopharyngeal; Swab   Result Value Ref Range    SARS CoV2 PCR Negative Negative    Narrative    Testing was performed using the donald  SARS-CoV-2 & Influenza A/B Assay on the donald  Anitha  System.  This test should be ordered for the detection of SARS-COV-2 in individuals who meet SARS-CoV-2 clinical and/or epidemiological criteria. Test performance is unknown in asymptomatic patients.  This test is for in vitro diagnostic use under the FDA EUA for laboratories certified under CLIA  to perform moderate and/or high complexity testing. This test has not been FDA cleared or approved.  A negative test does not rule out the presence of PCR inhibitors in the specimen or target RNA in concentration below the limit of detection for the assay. The possibility of a false negative should be considered if the patient's recent exposure or clinical presentation suggests COVID-19.  [facility] are certified under the Clinical Laboratory Improvement Amendments of 1988 (CLIA-88) as qualified to perform moderate and/or high complexity laboratory testing.   Gravel Switch Draw    Narrative    The following orders were created for panel order Gravel Switch Draw.  Procedure                               Abnormality         Status                     ---------                               -----------         ------                     Extra Blue Top Tube[[id number]]                              Final result               Extra Red Top Tube[[id number]]                               Final result                 Please view results for these tests on the individual orders.   Extra Blue Top Tube   Result Value Ref Range    Hold Specimen JIC    Extra Red Top Tube   Result Value Ref Range    Hold Specimen JIC    UA with Microscopic reflex to Culture    Specimen: Urine, Midstream   Result Value Ref Range    Color Urine Yellow Colorless, Straw, Light Yellow, Yellow    Appearance Urine Slightly Cloudy (A) Clear    Glucose Urine Negative Negative mg/dL    Bilirubin Urine Negative Negative    Ketones Urine Negative Negative mg/dL    Specific Gravity Urine 1.015 1.003 - 1.035    Blood Urine Negative Negative    pH Urine 6.0 5.0 - 7.0    Protein Albumin Urine Negative Negative mg/dL    Urobilinogen Urine Normal Normal, 2.0 mg/dL    Nitrite Urine Negative Negative    Leukocyte Esterase Urine Negative Negative    Bacteria Urine Few (A) None Seen /HPF    Mucus Urine Present (A) None Seen /LPF    RBC Urine 1 <=2 /HPF    WBC  Urine 4 <=5 /HPF    Squamous Epithelials Urine 11 (H) <=1 /HPF    Narrative    Urine Culture not indicated       Medications   lactated ringers BOLUS 1,000 mL (0 mLs Intravenous Stopped 21)   ceFAZolin (ANCEF) intermittent infusion 2 g in 100 mL dextrose PRE-MIX (0 g Intravenous Stopped 21)   acetaminophen (TYLENOL) tablet 1,000 mg (1,000 mg Oral Given 21 193)       Assessments & Plan (with Medical Decision Making)  36-year-old female -0-0-2 with history of psoriasis off biologic secondary to current pregnancy at 29 weeks presents with redness weeping and fever over the chest and redness at the wrists as well as labial rash and bumps.  There does appear to be some cellulitic change over the chest wall, the fever is concerning.  Her lactate and white count are normal.  Blood cultures are done and pending.  No indication for admission.  She received a liter of crystalloid as well as 2 g Ancef and will discharge on cephalexin.  Recommend Tylenol for discomfort and warm packs.  Touch base with dermatology tomorrow morning regarding further evaluation and treatment.  With respect to the labial bumps, HSV 1 and 2 swab is sent and pending.  No treatment rendered at this time.  Recommend fluids, advance diet as tolerated.  Return here for any concerns.     I have reviewed the nursing notes.    I have reviewed the findings, diagnosis, plan and need for follow up with the patient.       New Prescriptions    CEPHALEXIN (KEFLEX) 500 MG CAPSULE    Take 1 capsule (500 mg) by mouth 4 times daily for 7 days       Final diagnoses:   Psoriasis   Cellulitis of chest wall       2021   [facility] EMERGENCY DEPT     Saqib Rollins MD  21

## 2021-09-23 NOTE — TELEPHONE ENCOUNTER
I agree that she needs to be evaluated in the emergency department given concern for infection in the setting of pregnancy, with uncontrolled psoriasis and psoriatic arthritis. Please notify the patient that she should be evaluated today by her PCP, or in the emergency department    Buck Morrell MD  9/23/2021 12:31 PM

## 2021-09-24 LAB
HSV1 DNA SPEC QL NAA+PROBE: NOT DETECTED
HSV2 DNA SPEC QL NAA+PROBE: NOT DETECTED

## 2021-09-24 NOTE — DISCHARGE INSTRUCTIONS
Warm packs are reasonable    Tylenol for discomfort    Cephalexin as prescribed    Touch base with dermatology tomorrow regarding further evaluation and treatment    Return here for high fevers, vomiting, poor intake, progressive redness or any other concern

## 2021-09-24 NOTE — RESULT ENCOUNTER NOTE
Final result for Herpes Simplex Virus 1 PCR is [NEGATIVE] and Herpes Simplex Virus 2 PCR is [NEGATIVE].    Recommendations in Treatment per [facility] ED Lab Result Herpes Simplex Virus Protocol

## 2021-09-27 ENCOUNTER — MYC MEDICAL ADVICE (OUTPATIENT)
Dept: DERMATOLOGY | Facility: CLINIC | Age: 36
End: 2021-09-27

## 2021-09-27 NOTE — TELEPHONE ENCOUNTER
Last seen in December 2020.     See lengthy My chart message. 1st Derm appointment availability is in Mid November as we have 255+ on the wait list.     Please advise. Katelynn Flores RN

## 2021-09-27 NOTE — TELEPHONE ENCOUNTER
I would recommend follow-up with whoever ordered the labs to discuss the results    I would like to see her for a follow-up appt - I'd recommend we biopsy her skin to ensure it is indeed psoriasis and not something mimicking psoriasis. I've never seen her in person so would recommend an in office visit.     Congratulations on the pregnancy! Unfortunately treatment is quite limited during pregnancy.

## 2021-09-28 LAB
BACTERIA BLD CULT: NO GROWTH
BACTERIA BLD CULT: NO GROWTH

## 2021-10-06 ENCOUNTER — TELEPHONE (OUTPATIENT)
Dept: OBGYN | Facility: CLINIC | Age: 36
End: 2021-10-06

## 2021-10-06 NOTE — TELEPHONE ENCOUNTER
Reason for Call:  Other     Detailed comments: Pt states she is 30wks pregnant and EDC Dec. 8th Woke up from nap soaking wet. This is the second time this has happened. This time is different-has some color to flluid, maroon or soft pink in color. Pt reports last movement of baby about 10min ago. Transferred to nurse Julia.    Phone Number Patient can be reached at: Home number on file [phone] (home)    Best Time: any    Can we leave a detailed message on this number? YES    Call taken on 10/6/2021 at 3:09 PM by Geovanny Cruz

## 2021-10-06 NOTE — TELEPHONE ENCOUNTER
"Spoke with patient on the phone.  Last office visit 2021        S-(situation):pt reports she woke up from a nap and noticed she was \" soaking wet through my clothes.\" patient reports she noticed in her underwear, some light pink-maroon colored wet area. Patient has not noticed any more wetness or wet feelings since she changed her clothes and cleaned up.    Patient report baby is active.  Patient denies pain, no cramping, no abdominal pain, no low back pain.  Pressure in pelvis earlier in the week - has subsided now, last time was 2 days ago and lasted a couple hours.  Patient went to the bathroom three times since she woke up - patient reports urgency to go to the bathroom, this is a little new for her. Patient denies painful urination. Patient denies odor.  Patient reports similar incident several weeks ago - negative for SROM.    Patient has not had any bright red bleeding or clots passed that she has noticed.  Patient denies nausea or vomiting.  Patient denies fevers.    B-(background):  at 30 weeks today, autoimmune disorders,  ER 2021  Last office visit 2021  Next office visit tomorrow.  Low platelets   Splenectomy    A-(assessment): ? ROM     R-(recommendations): Reassurance provided. Continue to monitor. Rest and pool and stand, monitor for fluid. Gave phone number [phone] for OB triage outside of clinic hours. Call with bright red bleeding, leaking of fluid when standing up or with incident like this afternoon, decreased fetal movement, labor like contractions, abdominal pain or cramping, low back pain or fever.    Clinic appointment tomorrow with Dr. Sanchez at 4 pm     Patient agreeable to plan.    Julia Renee   [facility] Clinic  RN    "

## 2021-10-07 ENCOUNTER — PRENATAL OFFICE VISIT (OUTPATIENT)
Dept: OBGYN | Facility: CLINIC | Age: 36
End: 2021-10-07
Payer: COMMERCIAL

## 2021-10-07 VITALS
HEART RATE: 80 BPM | BODY MASS INDEX: 18.83 KG/M2 | RESPIRATION RATE: 18 BRPM | WEIGHT: 120 LBS | DIASTOLIC BLOOD PRESSURE: 70 MMHG | TEMPERATURE: 97.1 F | SYSTOLIC BLOOD PRESSURE: 100 MMHG | HEIGHT: 67 IN

## 2021-10-07 DIAGNOSIS — R35.0 URINARY FREQUENCY: ICD-10-CM

## 2021-10-07 DIAGNOSIS — Z34.82 PRENATAL CARE, SUBSEQUENT PREGNANCY IN SECOND TRIMESTER: Primary | ICD-10-CM

## 2021-10-07 DIAGNOSIS — D69.3 IMMUNE THROMBOCYTOPENIC PURPURA (H): Chronic | ICD-10-CM

## 2021-10-07 DIAGNOSIS — M54.50 LOW BACK PAIN, UNSPECIFIED BACK PAIN LATERALITY, UNSPECIFIED CHRONICITY, UNSPECIFIED WHETHER SCIATICA PRESENT: ICD-10-CM

## 2021-10-07 LAB
ALBUMIN UR-MCNC: NEGATIVE MG/DL
APPEARANCE UR: CLEAR
BACTERIA #/AREA URNS HPF: ABNORMAL /HPF
BILIRUB UR QL STRIP: NEGATIVE
COLOR UR AUTO: YELLOW
GLUCOSE UR STRIP-MCNC: NEGATIVE MG/DL
HGB UR QL STRIP: NEGATIVE
KETONES UR STRIP-MCNC: NEGATIVE MG/DL
LEUKOCYTE ESTERASE UR QL STRIP: NEGATIVE
NITRATE UR QL: NEGATIVE
PH UR STRIP: 6.5 [PH] (ref 5–7)
RBC #/AREA URNS AUTO: ABNORMAL /HPF
SP GR UR STRIP: 1.02 (ref 1–1.03)
SQUAMOUS #/AREA URNS AUTO: ABNORMAL /LPF
UROBILINOGEN UR STRIP-ACNC: 0.2 E.U./DL
WBC #/AREA URNS AUTO: ABNORMAL /HPF

## 2021-10-07 PROCEDURE — 99207 PR PRENATAL VISIT: CPT | Performed by: OBSTETRICS & GYNECOLOGY

## 2021-10-07 PROCEDURE — 81001 URINALYSIS AUTO W/SCOPE: CPT | Performed by: OBSTETRICS & GYNECOLOGY

## 2021-10-07 RX ORDER — HYDROXYZINE PAMOATE 50 MG/1
50 CAPSULE ORAL
Qty: 20 CAPSULE | Refills: 1 | Status: SHIPPED | OUTPATIENT
Start: 2021-10-07

## 2021-10-07 ASSESSMENT — MIFFLIN-ST. JEOR: SCORE: 1269.01

## 2021-10-07 NOTE — PROGRESS NOTES
"CC: Here for routine prenatal visit @ 30w1d   HPI:  She feels very weak, low back pain; has message out to Dr. Morrell, rheumatologist, to likely initiate IV therapy; last platelet count 49K  Report difficulty sleeping due to low back pain; reports perineum feeling  Moist;  Last growth scan 9/20/21 1239 gm (69%)    Bedside ultrasound today: Cephalic, ample amniotic fluid volume noted; fetal movement and breathing seen    PE: /70 (BP Location: Right arm, Patient Position: Chair, Cuff Size: Adult Regular)   Pulse 80   Temp 97.1  F (36.2  C) (Tympanic)   Resp 18   Ht 1.689 m (5' 6.5\")   Wt 54.4 kg (120 lb)   LMP 03/10/2021   BMI 19.08 kg/m     See OB flowsheet  No fluid seen at introitus    A:  1. Low back pain, unspecified back pain laterality, unspecified chronicity, unspecified whether sciatica present    - hydrOXYzine (VISTARIL) 50 MG capsule; Take 1 capsule (50 mg) by mouth nightly as needed for itching  Dispense: 20 capsule; Refill: 1    2. Urinary frequency    - UA with Microscopic    3. Prenatal care, subsequent pregnancy in second trimester    - US OB >14 Weeks Follow Up; Future      Recommend f/u with Rheumatology and Hematology, particularly given clear over activation of her immune system, and dropping platelet count  Growth scan 10/18/21 with f/u OB visit  Carola Sanchez MD  [facility]        Carola Sanchez M.D.     "

## 2021-10-07 NOTE — NURSING NOTE
"Initial /70 (BP Location: Right arm, Patient Position: Chair, Cuff Size: Adult Regular)   Pulse 80   Temp 97.1  F (36.2  C) (Tympanic)   Resp 18   Ht 1.689 m (5' 6.5\")   Wt 54.4 kg (120 lb)   LMP 03/10/2021   BMI 19.08 kg/m   Estimated body mass index is 19.08 kg/m  as calculated from the following:    Height as of this encounter: 1.689 m (5' 6.5\").    Weight as of this encounter: 54.4 kg (120 lb). .      "

## 2021-10-18 ENCOUNTER — PRENATAL OFFICE VISIT (OUTPATIENT)
Dept: OBGYN | Facility: CLINIC | Age: 36
End: 2021-10-18
Payer: COMMERCIAL

## 2021-10-18 ENCOUNTER — HOSPITAL ENCOUNTER (OUTPATIENT)
Dept: ULTRASOUND IMAGING | Facility: CLINIC | Age: 36
Discharge: HOME | End: 2021-10-18
Attending: OBSTETRICS & GYNECOLOGY | Admitting: OBSTETRICS & GYNECOLOGY
Payer: COMMERCIAL

## 2021-10-18 VITALS
BODY MASS INDEX: 18.99 KG/M2 | TEMPERATURE: 97.7 F | HEIGHT: 67 IN | RESPIRATION RATE: 18 BRPM | DIASTOLIC BLOOD PRESSURE: 55 MMHG | WEIGHT: 121 LBS | SYSTOLIC BLOOD PRESSURE: 110 MMHG | HEART RATE: 70 BPM

## 2021-10-18 DIAGNOSIS — Z34.83 PRENATAL CARE, SUBSEQUENT PREGNANCY IN THIRD TRIMESTER: Primary | ICD-10-CM

## 2021-10-18 DIAGNOSIS — D69.3 IMMUNE THROMBOCYTOPENIC PURPURA (H): ICD-10-CM

## 2021-10-18 DIAGNOSIS — Z11.1 SCREENING EXAMINATION FOR PULMONARY TUBERCULOSIS: ICD-10-CM

## 2021-10-18 LAB
ERYTHROCYTE [DISTWIDTH] IN BLOOD BY AUTOMATED COUNT: 14.1 % (ref 10–15)
HCT VFR BLD AUTO: 35.1 % (ref 35–47)
HGB BLD-MCNC: 11.7 G/DL (ref 11.7–15.7)
MCH RBC QN AUTO: 28.4 PG (ref 26.5–33)
MCHC RBC AUTO-ENTMCNC: 33.3 G/DL (ref 31.5–36.5)
MCV RBC AUTO: 85 FL (ref 78–100)
PLAT MORPH BLD: NORMAL
PLATELET # BLD AUTO: 29 10E3/UL (ref 150–450)
RBC # BLD AUTO: 4.12 10E6/UL (ref 3.8–5.2)
RBC MORPH BLD: NORMAL
WBC # BLD AUTO: 9 10E3/UL (ref 4–11)

## 2021-10-18 PROCEDURE — 85027 COMPLETE CBC AUTOMATED: CPT | Performed by: OBSTETRICS & GYNECOLOGY

## 2021-10-18 PROCEDURE — 99207 PR PRENATAL VISIT: CPT | Performed by: OBSTETRICS & GYNECOLOGY

## 2021-10-18 PROCEDURE — 36415 COLL VENOUS BLD VENIPUNCTURE: CPT | Performed by: OBSTETRICS & GYNECOLOGY

## 2021-10-18 PROCEDURE — 86481 TB AG RESPONSE T-CELL SUSP: CPT | Performed by: OBSTETRICS & GYNECOLOGY

## 2021-10-18 PROCEDURE — 76816 OB US FOLLOW-UP PER FETUS: CPT

## 2021-10-18 ASSESSMENT — MIFFLIN-ST. JEOR: SCORE: 1273.54

## 2021-10-18 NOTE — NURSING NOTE
"Initial /55 (BP Location: Right arm, Patient Position: Chair, Cuff Size: Adult Regular)   Pulse 70   Temp 97.7  F (36.5  C) (Tympanic)   Resp 18   Ht 1.689 m (5' 6.5\")   Wt 54.9 kg (121 lb)   LMP 03/10/2021   Breastfeeding No   BMI 19.24 kg/m   Estimated body mass index is 19.24 kg/m  as calculated from the following:    Height as of this encounter: 1.689 m (5' 6.5\").    Weight as of this encounter: 54.9 kg (121 lb). .      "

## 2021-10-18 NOTE — PROGRESS NOTES
"CC: Here for routine prenatal visit @ 31w5d   HPI:  Rheum/hem starting her on systemic therapy--needs CBC with platelets and TB screen    PE: /55 (BP Location: Right arm, Patient Position: Chair, Cuff Size: Adult Regular)   Pulse 70   Temp 97.7  F (36.5  C) (Tympanic)   Resp 18   Ht 1.689 m (5' 6.5\")   Wt 54.9 kg (121 lb)   LMP 03/10/2021   Breastfeeding No   BMI 19.24 kg/m     See OB flowsheet      A:  1. Prenatal care, subsequent pregnancy in third trimester      2. Immune thrombocytopenic purpura (H)    - CBC with platelets; Future    3. Screening examination for pulmonary tuberculosis    - Quantiferon-TB Gold Plus; Future      Routine prenatal care  RTC 2 weeks.      Carola Sanchez M.D.     "

## 2021-10-20 PROBLEM — L40.50 PSORIATIC ARTHRITIS (H): Status: ACTIVE | Noted: 2021-10-20

## 2021-10-20 PROBLEM — Z34.90 PREGNANT: Status: RESOLVED | Noted: 2021-09-17 | Resolved: 2021-10-20

## 2021-10-20 PROBLEM — F40.298 NEEDLE PHOBIA: Status: ACTIVE | Noted: 2021-10-20

## 2021-10-20 LAB
GAMMA INTERFERON BACKGROUND BLD IA-ACNC: 0.03 IU/ML
M TB IFN-G BLD-IMP: NEGATIVE
M TB IFN-G CD4+ BCKGRND COR BLD-ACNC: 9.97 IU/ML
MITOGEN IGNF BCKGRD COR BLD-ACNC: 0.02 IU/ML
MITOGEN IGNF BCKGRD COR BLD-ACNC: 0.12 IU/ML
QUANTIFERON MITOGEN: 10 IU/ML
QUANTIFERON NIL TUBE: 0.03 IU/ML
QUANTIFERON TB1 TUBE: 0.05 IU/ML
QUANTIFERON TB2 TUBE: 0.15

## 2021-10-22 ENCOUNTER — INFUSION THERAPY VISIT (OUTPATIENT)
Dept: INFUSION THERAPY | Facility: CLINIC | Age: 36
End: 2021-10-22
Attending: INTERNAL MEDICINE
Payer: COMMERCIAL

## 2021-10-22 VITALS — DIASTOLIC BLOOD PRESSURE: 74 MMHG | SYSTOLIC BLOOD PRESSURE: 124 MMHG | TEMPERATURE: 98.2 F | HEART RATE: 88 BPM

## 2021-10-22 DIAGNOSIS — F40.298 NEEDLE PHOBIA: ICD-10-CM

## 2021-10-22 DIAGNOSIS — L40.50 PSORIATIC ARTHRITIS (H): Primary | ICD-10-CM

## 2021-10-22 PROCEDURE — 96372 THER/PROPH/DIAG INJ SC/IM: CPT | Performed by: INTERNAL MEDICINE

## 2021-10-22 PROCEDURE — 250N000011 HC RX IP 250 OP 636: Performed by: INTERNAL MEDICINE

## 2021-10-22 RX ORDER — ALBUTEROL SULFATE 0.83 MG/ML
2.5 SOLUTION RESPIRATORY (INHALATION)
Status: CANCELLED | OUTPATIENT
Start: 2021-11-05

## 2021-10-22 RX ORDER — MEPERIDINE HYDROCHLORIDE 25 MG/ML
25 INJECTION INTRAMUSCULAR; INTRAVENOUS; SUBCUTANEOUS EVERY 30 MIN PRN
Status: CANCELLED | OUTPATIENT
Start: 2021-11-05

## 2021-10-22 RX ORDER — EPINEPHRINE 1 MG/ML
0.3 INJECTION, SOLUTION, CONCENTRATE INTRAVENOUS EVERY 5 MIN PRN
Status: CANCELLED | OUTPATIENT
Start: 2021-11-05

## 2021-10-22 RX ORDER — NALOXONE HYDROCHLORIDE 0.4 MG/ML
0.2 INJECTION, SOLUTION INTRAMUSCULAR; INTRAVENOUS; SUBCUTANEOUS
Status: CANCELLED | OUTPATIENT
Start: 2021-11-05

## 2021-10-22 RX ORDER — METHYLPREDNISOLONE SODIUM SUCCINATE 125 MG/2ML
125 INJECTION, POWDER, LYOPHILIZED, FOR SOLUTION INTRAMUSCULAR; INTRAVENOUS
Status: CANCELLED
Start: 2021-11-05

## 2021-10-22 RX ORDER — DIPHENHYDRAMINE HYDROCHLORIDE 50 MG/ML
50 INJECTION INTRAMUSCULAR; INTRAVENOUS
Status: CANCELLED
Start: 2021-11-05

## 2021-10-22 RX ORDER — ALBUTEROL SULFATE 90 UG/1
1-2 AEROSOL, METERED RESPIRATORY (INHALATION)
Status: CANCELLED
Start: 2021-11-05

## 2021-10-22 RX ADMIN — CERTOLIZUMAB PEGOL 400 MG: KIT at 15:53

## 2021-10-22 NOTE — PROGRESS NOTES
Infusion Nursing Note:  Merissa Leung presents today for Cimzia.    Patient seen by provider today: No   present during visit today: Not Applicable.    Note: N/A.    Intravenous Access:  No Intravenous access/labs at this visit.    ~~~ NOTE: If the patient answers yes to any of the questions below, hold the infusion and contact ordering provider or on-call provider.    1. Have you recently had an elevated temperature, fever, chills, productive cough, coughing for 3 weeks or longer or hemoptysis,  abnormal vital signs, night sweats,  chest pain or have you noticed a decrease in your appetite, unexplained weight loss or fatigue? No  2. Do you have any open wounds or new incisions? No  3. Do you have any recent or upcoming hospitalizations, surgeries or dental procedures? No  4. Do you currently have or recently have had any signs of illness or infection or are you on any antibiotics? No  5. Have you had any new, sudden or worsening abdominal pain? No  6. Have you or anyone in your household received a live vaccination in the past 4 weeks? Please note:  No live vaccines while on biologic/chemotherapy until 6 months after the last treatment.  Patient can receive the flu vaccine (shot only) and the pneumovax.  It is optimal for the patient to get these vaccines mid cycle, but they can be given at any time as long as it is not on the day of the infusion. No  7. Have you recently been diagnosed with any new nervous system diseases (ie. Multiple sclerosis, Guillain [place], seizures, neurological changes) or cancer diagnosis? Are you on any form of radiation or chemotherapy? No  8. Are you pregnant or breast feeding or do you have plans of pregnancy in the future? Yes, 32 weeks pregnant  9. Have you been having any signs of worsening depression or suicidal ideations?  (benlysta only) No  10. Have there been any other new onset medical symptoms? No   Post Infusion Assessment:  Patient tolerated injection without  incident.  Site patent and intact, free from redness, edema or discomfort.   Discharge Plan:   Patient discharged in stable condition accompanied by: self.  Departure Mode: Ambulatory.    Rahul Murcia RN

## 2021-10-26 ENCOUNTER — TELEPHONE (OUTPATIENT)
Dept: NURSING | Facility: CLINIC | Age: 36
End: 2021-10-26

## 2021-10-26 ENCOUNTER — TELEPHONE (OUTPATIENT)
Dept: OBGYN | Facility: CLINIC | Age: 36
End: 2021-10-26

## 2021-10-26 NOTE — TELEPHONE ENCOUNTER
.  [facility]: Nurse Triage Note  SITUATION/BACKGROUND                                                      Merissa Leung is a 36 year old female 33 weeks gestation  LMP 3/10/21.   Patient calling with concern of having intermittent contractions this morning that began around 8 am. Each contraction interval about every 2-3 minutes and lasting 45 seconds. Currently has extended to every 7 minutes. Patient has abdominal, low pelvic and upper rib pain rates 6-7/10. No fetal movement noted in the past 6 to 8 hours. Small amount of clear discharge noted. .   This nurse contact [place] [facility] and warm transfer given to RN, Magalie Pope for further assistance.

## 2021-10-26 NOTE — TELEPHONE ENCOUNTER
Patient called triage line stating that her contractions had stopped, but baby hadn't moved in several hours.  Triage transferred call to [facility]. I spoke with patient and instructed her to come in to the [facility] to be monitored. Patient agreed.  Patient then call [place] [facility] an hour later, stating that the baby was now moving and she was not coming in. [place] relayed that information to [facility]. I attempted to reach her by phone, unable and left message.

## 2021-10-29 ENCOUNTER — TELEPHONE (OUTPATIENT)
Dept: OBGYN | Facility: CLINIC | Age: 36
End: 2021-10-29

## 2021-10-29 ENCOUNTER — HOSPITAL ENCOUNTER (OUTPATIENT)
Facility: CLINIC | Age: 36
End: 2021-10-29
Admitting: OBSTETRICS & GYNECOLOGY
Payer: COMMERCIAL

## 2021-10-29 ENCOUNTER — HOSPITAL ENCOUNTER (OUTPATIENT)
Facility: CLINIC | Age: 36
Discharge: HOME | End: 2021-10-29
Attending: OBSTETRICS & GYNECOLOGY | Admitting: OBSTETRICS & GYNECOLOGY
Payer: COMMERCIAL

## 2021-10-29 VITALS — DIASTOLIC BLOOD PRESSURE: 71 MMHG | SYSTOLIC BLOOD PRESSURE: 127 MMHG | TEMPERATURE: 97.7 F

## 2021-10-29 PROBLEM — Z34.90 PREGNANCY: Status: ACTIVE | Noted: 2021-10-29

## 2021-10-29 LAB
ALBUMIN SERPL-MCNC: 2.4 G/DL (ref 3.4–5)
ALBUMIN UR-MCNC: 30 MG/DL
ALP SERPL-CCNC: 79 U/L (ref 40–150)
ALT SERPL W P-5'-P-CCNC: 14 U/L (ref 0–50)
ANION GAP SERPL CALCULATED.3IONS-SCNC: 8 MMOL/L (ref 3–14)
APPEARANCE UR: CLEAR
AST SERPL W P-5'-P-CCNC: 12 U/L (ref 0–45)
BACTERIA #/AREA URNS HPF: ABNORMAL /HPF
BILIRUB SERPL-MCNC: 0.2 MG/DL (ref 0.2–1.3)
BILIRUB UR QL STRIP: NEGATIVE
BUN SERPL-MCNC: 13 MG/DL (ref 7–30)
CALCIUM SERPL-MCNC: 8.6 MG/DL (ref 8.5–10.1)
CHLORIDE BLD-SCNC: 108 MMOL/L (ref 94–109)
CO2 SERPL-SCNC: 22 MMOL/L (ref 20–32)
COLOR UR AUTO: ABNORMAL
CREAT SERPL-MCNC: 0.57 MG/DL (ref 0.52–1.04)
ERYTHROCYTE [DISTWIDTH] IN BLOOD BY AUTOMATED COUNT: 14.4 % (ref 10–15)
GFR SERPL CREATININE-BSD FRML MDRD: >90 ML/MIN/1.73M2
GLUCOSE BLD-MCNC: 83 MG/DL (ref 70–99)
GLUCOSE UR STRIP-MCNC: NEGATIVE MG/DL
HCT VFR BLD AUTO: 32.4 % (ref 35–47)
HGB BLD-MCNC: 10.7 G/DL (ref 11.7–15.7)
HGB UR QL STRIP: NEGATIVE
HYALINE CASTS: 4 /LPF
KETONES UR STRIP-MCNC: 5 MG/DL
LEUKOCYTE ESTERASE UR QL STRIP: NEGATIVE
MCH RBC QN AUTO: 28 PG (ref 26.5–33)
MCHC RBC AUTO-ENTMCNC: 33 G/DL (ref 31.5–36.5)
MCV RBC AUTO: 85 FL (ref 78–100)
MUCOUS THREADS #/AREA URNS LPF: PRESENT /LPF
NITRATE UR QL: NEGATIVE
PH UR STRIP: 5 [PH] (ref 5–7)
PLATELET # BLD AUTO: 39 10E3/UL (ref 150–450)
POTASSIUM BLD-SCNC: 3.4 MMOL/L (ref 3.4–5.3)
PROT SERPL-MCNC: 6.1 G/DL (ref 6.8–8.8)
RBC # BLD AUTO: 3.82 10E6/UL (ref 3.8–5.2)
RBC URINE: 1 /HPF
SODIUM SERPL-SCNC: 138 MMOL/L (ref 133–144)
SP GR UR STRIP: 1.02 (ref 1–1.03)
SQUAMOUS EPITHELIAL: 1 /HPF
TRANSITIONAL EPI: <1 /HPF
UROBILINOGEN UR STRIP-MCNC: NORMAL MG/DL
WBC # BLD AUTO: 11.7 10E3/UL (ref 4–11)
WBC URINE: 6 /HPF

## 2021-10-29 PROCEDURE — 250N000013 HC RX MED GY IP 250 OP 250 PS 637: Performed by: OBSTETRICS & GYNECOLOGY

## 2021-10-29 PROCEDURE — 81001 URINALYSIS AUTO W/SCOPE: CPT | Performed by: OBSTETRICS & GYNECOLOGY

## 2021-10-29 PROCEDURE — 99214 OFFICE O/P EST MOD 30 MIN: CPT | Performed by: OBSTETRICS & GYNECOLOGY

## 2021-10-29 PROCEDURE — 85014 HEMATOCRIT: CPT | Performed by: OBSTETRICS & GYNECOLOGY

## 2021-10-29 PROCEDURE — 80053 COMPREHEN METABOLIC PANEL: CPT | Performed by: OBSTETRICS & GYNECOLOGY

## 2021-10-29 PROCEDURE — 36415 COLL VENOUS BLD VENIPUNCTURE: CPT | Performed by: OBSTETRICS & GYNECOLOGY

## 2021-10-29 PROCEDURE — 59025 FETAL NON-STRESS TEST: CPT | Mod: 76

## 2021-10-29 PROCEDURE — G0463 HOSPITAL OUTPT CLINIC VISIT: HCPCS | Mod: 25

## 2021-10-29 RX ORDER — METOCLOPRAMIDE HYDROCHLORIDE 5 MG/ML
10 INJECTION INTRAMUSCULAR; INTRAVENOUS EVERY 6 HOURS PRN
Status: DISCONTINUED | OUTPATIENT
Start: 2021-10-29 | End: 2021-10-29 | Stop reason: HOSPADM

## 2021-10-29 RX ORDER — CALCIUM CARBONATE 500 MG/1
500 TABLET, CHEWABLE ORAL 2 TIMES DAILY PRN
Status: DISCONTINUED | OUTPATIENT
Start: 2021-10-29 | End: 2021-10-29 | Stop reason: HOSPADM

## 2021-10-29 RX ORDER — LORAZEPAM 1 MG/1
2 TABLET ORAL EVERY 4 HOURS PRN
Status: DISCONTINUED | OUTPATIENT
Start: 2021-10-29 | End: 2021-10-29 | Stop reason: HOSPADM

## 2021-10-29 RX ORDER — ONDANSETRON 4 MG/1
4 TABLET, ORALLY DISINTEGRATING ORAL EVERY 6 HOURS PRN
Status: DISCONTINUED | OUTPATIENT
Start: 2021-10-29 | End: 2021-10-29 | Stop reason: HOSPADM

## 2021-10-29 RX ORDER — METOCLOPRAMIDE 10 MG/1
10 TABLET ORAL EVERY 6 HOURS PRN
Status: DISCONTINUED | OUTPATIENT
Start: 2021-10-29 | End: 2021-10-29 | Stop reason: HOSPADM

## 2021-10-29 RX ORDER — PROCHLORPERAZINE MALEATE 10 MG
10 TABLET ORAL EVERY 6 HOURS PRN
Status: DISCONTINUED | OUTPATIENT
Start: 2021-10-29 | End: 2021-10-29 | Stop reason: HOSPADM

## 2021-10-29 RX ORDER — LORAZEPAM 1 MG/1
1-2 TABLET ORAL EVERY 4 HOURS PRN
Status: DISCONTINUED | OUTPATIENT
Start: 2021-10-29 | End: 2021-10-29 | Stop reason: HOSPADM

## 2021-10-29 RX ORDER — PROCHLORPERAZINE 25 MG
25 SUPPOSITORY, RECTAL RECTAL EVERY 12 HOURS PRN
Status: DISCONTINUED | OUTPATIENT
Start: 2021-10-29 | End: 2021-10-29 | Stop reason: HOSPADM

## 2021-10-29 RX ORDER — ONDANSETRON 2 MG/ML
4 INJECTION INTRAMUSCULAR; INTRAVENOUS EVERY 6 HOURS PRN
Status: DISCONTINUED | OUTPATIENT
Start: 2021-10-29 | End: 2021-10-29 | Stop reason: HOSPADM

## 2021-10-29 RX ADMIN — CALCIUM CARBONATE (ANTACID) CHEW TAB 500 MG 500 MG: 500 CHEW TAB at 10:51

## 2021-10-29 RX ADMIN — LORAZEPAM 1 MG: 0.5 TABLET ORAL at 10:22

## 2021-10-29 NOTE — PROGRESS NOTES
CBC, CMP and UA done which show her platelets have improved to 39K, normal CMP and UA c/w dehydration.  EFM: no further contractions seen; reactive tracing    Pt is much more relaxed, no longer reporting pain or writhing in pain, after given Ativan 1mg po    A: 33w2d IUP  ITP  Anxiety    P; her condition has signficantly improved with no s/s of infection, no indication of labor or fetal duress  Will discharge home with advice to f/u with rheumatologist regarding future drug therapy  Pt has supply of Klonopin to take prn anxiety  Carola Sanchez MD  [facility] [place]

## 2021-10-29 NOTE — DISCHARGE INSTRUCTIONS
Discharge Instructions for Undelivered Patients      Diet:    Drink 8 to 12 glasses of water every day.    You may eat meals and snacks as before    Activity:    Rest often as needed.    Count fetal kicks every day.     Call your doctor if your baby is moving less than usual.    Medicines:    My care team has reviewed my medicines with me.    My care team has given me a list of my medicines.    My care team has prescribed a new medicine. They have either sent it home with me or ordered it from the pharmacy.    Call your provider if you notice:    Swelling in your face or increased swelling in your hands or legs.    Headaches that are not relieved by Tylenol (acetaminophen).    Changes in your vision (blurring; seeing spots or stars).    Nausea (sick to your stomach) and vomiting (throwing up).    Weight gain of 5 pounds per week.    Heartburn that doesn't go away.    Signs of bladder infection: pain when you urinate (use the toilet), needing to go more often or more urgently.    The bag of santana (membranes) breaks, or you notice leaking in your underwear.    Bright red blood in your underwear.    Abdominal (lower belly) or stomach pain.    For Second (plus) baby: Contractions (tightenings) less than 10 minutes apart and getting stronger.    Increase or change in vaginal discharge (note the color and amount).  Follow up with your provider as scheduled.

## 2021-10-29 NOTE — H&P
[facility] Labor and Delivery History and Physical    Merissa Leung MRN# [id number]   Age: 36 year old YOB: 1985     Date of Admission:  10/29/2021    Primary care provider: Mohini Mccord           Chief Complaint:   Merissa Leung is a 36 year old female who is 33w2d pregnant and being admitted for concern about diffuse body pain, starting at back of head and all the way down her back, both legs, diffuse abdomen; she states no bleeding or leaking of fluid; unclear about whether the pain is wave like; reports some milky discharge  Reports normal FM  Her pregnancy is complicated by ITP and psoriasis, followed by Rheumatologist and Hematologist due to severe thombocytopenia and flare of psoriasis; she has been steroid intolerant in the past.  She received Cimzia (tumor necrosis factor blocker) earlier this week, and presents today thinking her diffuse body symptoms are from this medication.  The symptoms have been present since yesterday..          Pregnancy history:     OBSTETRIC HISTORY:    OB History    Para Term  AB Living   4 2 2 0 1 1   SAB TAB Ectopic Multiple Live Births   1 0 0 0 1      # Outcome Date GA Lbr Brandon/2nd Weight Sex Delivery Anes PTL Lv   4 Current            3 Term 02/15/16 39w0d  2.92 kg (6 lb 7 oz) M CS-LTranv Spinal        Name: Lewis      Apgar1: 9  Apgar5: 9   2 Term 07 40w0d  2.438 kg (5 lb 6 oz) M CS-LTranv   JH      Birth Comments: CAN x2      Complications: Fetal Intolerance      Name: Abdirizak Crouch SAB      SAB          EDC: Estimated Date of Delivery: 12/15/21    Prenatal Labs:   Lab Results   Component Value Date    ABO A 2021    RH Pos 2021    AS Neg 2021    HEPBANG Nonreactive 2021    TREPAB Negative 2015    HGB 11.7 10/18/2021       GBS Status:   Lab Results   Component Value Date    GBS  2015     Negative  No GBS DNA detected, presumed negative for GBS or number of bacteria may  be   below the limit of detection of the assay.   Assay performed on incubated broth culture of specimen using Honey real-time   PCR.         Active Problem List  Patient Active Problem List   Diagnosis     Hyperlipidemia LDL goal <160     Headaches, tension     Chronic constipation     OCD (obsessive compulsive disorder)     Anxiety     Fibromyalgia     Chronic abdominal pain     Prenatal care, subsequent pregnancy     Underweight     History of Lower GI bleed     Immune thrombocytopenic purpura (H)     Psoriatic arthritis (H)     Needle phobia     Pregnancy       Medication Prior to Admission  Medications Prior to Admission   Medication Sig Dispense Refill Last Dose     clonazePAM (KLONOPIN) 1 MG tablet TAKE 1/2 (ONE-HALF) TABLET BY MOUTH TWICE DAILY FOR ANXIETY AND 1 & 1/2 (ONE & ONE-HALF) AT BEDTIME FOR SLEEP 28 tablet 5      fluticasone (FLONASE) 50 MCG/ACT nasal spray Spray 1 spray into both nostrils daily 16 g 0      hydrOXYzine (VISTARIL) 50 MG capsule Take 1 capsule (50 mg) by mouth nightly as needed for itching 20 capsule 1      LINZESS 145 MCG capsule TAKE 1 CAPSULE BY MOUTH IN THE MORNING BEFORE BREAKFAST 30 capsule 2      magnesium oxide (MAG-OX) 400 (240 Mg) MG tablet Take 400 mg by mouth daily        ondansetron (ZOFRAN-ODT) 4 MG ODT tab Take 1-2 tablets (4-8 mg) by mouth every 6 hours as needed for nausea 20 tablet 3      polyethylene glycol (MIRALAX/GLYCOLAX) powder Take 17 g by mouth daily as needed for constipation        potassium aminobenzoate 500 MG CAPS capsule         Prenatal Vit-Fe Fumarate-FA (PRENATAL MULTIVITAMIN W/IRON) 27-0.8 MG tablet Take 1 tablet by mouth daily        sennosides (SENOKOT) 8.6 MG tablet Take 2 tablets by mouth 2 times daily        UNABLE TO FIND Take 2 teaspoonful by mouth At Bedtime Magnesium Calm      .        Maternal Past Medical History:     Past Medical History:   Diagnosis Date     Anxiety      Binge eating disorder      Chickenpox     x3     Food  intolerance      IBS (irritable bowel syndrome)      MEDICAL HISTORY OF -     pelvic floor disorder                       Family History:     Family History   Problem Relation Age of Onset     Gastrointestinal Disease Mother 46        perforated intestines-      Hepatitis Mother         C     Liver Disease Mother      Blood Disease Mother         anemia     Substance Abuse Mother         A&D     Psychotic Disorder Brother         Schizophrenia, OCD     Anxiety Disorder Brother      Arthritis Maternal Grandmother      Heart Disease Maternal Grandmother      Cancer Paternal Grandmother         cervical     Breast Cancer Paternal Grandmother      Hypertension Paternal Grandmother      Diabetes Paternal Grandmother      Heart Disease Paternal Grandmother      Cerebrovascular Disease Paternal Grandfather      Heart Disease Paternal Grandfather      Bipolar Disorder Paternal Grandfather      Suicide Paternal Grandfather      Cancer Paternal Aunt         cervical     Heart Disease Paternal Aunt      Coronary Artery Disease Maternal Grandfather         MI     Substance Abuse Father         recovered A&D     Heart Disease Father         MI-stents     Hypertension Father      Family history reviewed and updated in [facility]            Social History:     Social History     Tobacco Use     Smoking status: Former Smoker     Packs/day: 0.50     Years: 6.00     Pack years: 3.00     Types: Cigarettes     Quit date: 2021     Years since quittin.5     Smokeless tobacco: Never Used   Substance Use Topics     Alcohol use: Not Currently     Comment: rare-quit with pregnancy            Review of Systems:   The Review of Systems is negative other than noted in the HPI          Physical Exam:   Vitals were reviewed  Temp: 97.7  F (36.5  C) Temp src: Oral BP: 127/71              Constitutional:   Awake, appears in distress, but easily distracted     Lungs:   No increased work of breathing, good air exchange, clear to auscultation  bilaterally, no crackles or wheezing     Cardiovascular:   Normal apical impulse, regular rate and rhythm, normal S1 and S2, no S3 or S4, and no murmur noted     Abdomen:   Gravid, tense diffusely; no rebound; cannot appreciate if astrid     Skin:   Warm, dry; well perfused; psoriatric rash on chest      Cervix:   Membranes: intact   Dilation: closed   Effacement: 30%   Station:-2   Consistency: firm   Position: Posterior  Presentation:Cephalic  Fetal Heart Rate Tracing: reactive and reassuring, Tier 1 (normal)  Tocometer: external monitor      Component      Latest Ref Rng & Units 10/29/2021   WBC      4.0 - 11.0 10e3/uL 11.7 (H)   RBC Count      3.80 - 5.20 10e6/uL 3.82   Hemoglobin      11.7 - 15.7 g/dL 10.7 (L)   Hematocrit      35.0 - 47.0 % 32.4 (L)   MCV      78 - 100 fL 85   MCH      26.5 - 33.0 pg 28.0   MCHC      31.5 - 36.5 g/dL 33.0   RDW      10.0 - 15.0 % 14.4   Platelet Count      150 - 450 10e3/uL 39 (LL)   Sodium      133 - 144 mmol/L 138   Potassium      3.4 - 5.3 mmol/L 3.4   Chloride      94 - 109 mmol/L 108             Assessment:   Merissa Leung is a 33w2d pregnant female admitted with diffuse muscle tension  ?acute anxiety reaction vs reaction to Cimzia.          Plan:   Check CBC with platelets and CMP  UA, C & S  Hydration  Ativan 1-2mg po to try to help her relax and better distinguish source of her distress    Carola Sanchez MD

## 2021-10-29 NOTE — DISCHARGE SUMMARY
[facility] Discharge Summary    Merissa Leung MRN# [id number]   Age: 36 year old YOB: 1985     Date of Admission:  10/29/2021  Date of Discharge::  10/29/2021  Admitting Physician:  Carola Sanchez MD  Discharge Physician:  Carola Sanchez MD     Home clinic: [facility]          Admission Diagnoses:   Pregnancy [Z34.90], 33 weeks  Idiopathic thrombocytopenia  anxiety          Discharge Diagnosis:   Undelivered, 33 week pregnancy          Procedures:   Procedure(s): none       No other procedures performed during this admission           Medications Prior to Admission:     Medications Prior to Admission   Medication Sig Dispense Refill Last Dose     clonazePAM (KLONOPIN) 1 MG tablet TAKE 1/2 (ONE-HALF) TABLET BY MOUTH TWICE DAILY FOR ANXIETY AND 1 & 1/2 (ONE & ONE-HALF) AT BEDTIME FOR SLEEP 28 tablet 5      fluticasone (FLONASE) 50 MCG/ACT nasal spray Spray 1 spray into both nostrils daily 16 g 0      hydrOXYzine (VISTARIL) 50 MG capsule Take 1 capsule (50 mg) by mouth nightly as needed for itching 20 capsule 1      LINZESS 145 MCG capsule TAKE 1 CAPSULE BY MOUTH IN THE MORNING BEFORE BREAKFAST 30 capsule 2      magnesium oxide (MAG-OX) 400 (240 Mg) MG tablet Take 400 mg by mouth daily        ondansetron (ZOFRAN-ODT) 4 MG ODT tab Take 1-2 tablets (4-8 mg) by mouth every 6 hours as needed for nausea 20 tablet 3      polyethylene glycol (MIRALAX/GLYCOLAX) powder Take 17 g by mouth daily as needed for constipation        potassium aminobenzoate 500 MG CAPS capsule         Prenatal Vit-Fe Fumarate-FA (PRENATAL MULTIVITAMIN W/IRON) 27-0.8 MG tablet Take 1 tablet by mouth daily        sennosides (SENOKOT) 8.6 MG tablet Take 2 tablets by mouth 2 times daily        UNABLE TO FIND Take 2 teaspoonful by mouth At Bedtime Magnesium Calm                Discharge Medications:     Current Discharge Medication List      CONTINUE these medications which have  NOT CHANGED    Details   clonazePAM (KLONOPIN) 1 MG tablet TAKE 1/2 (ONE-HALF) TABLET BY MOUTH TWICE DAILY FOR ANXIETY AND 1 & 1/2 (ONE & ONE-HALF) AT BEDTIME FOR SLEEP  Qty: 28 tablet, Refills: 5    Associated Diagnoses: EDWARD (generalized anxiety disorder)      fluticasone (FLONASE) 50 MCG/ACT nasal spray Spray 1 spray into both nostrils daily  Qty: 16 g, Refills: 0    Associated Diagnoses: Dysfunction of both eustachian tubes      hydrOXYzine (VISTARIL) 50 MG capsule Take 1 capsule (50 mg) by mouth nightly as needed for itching  Qty: 20 capsule, Refills: 1    Associated Diagnoses: Low back pain, unspecified back pain laterality, unspecified chronicity, unspecified whether sciatica present      LINZESS 145 MCG capsule TAKE 1 CAPSULE BY MOUTH IN THE MORNING BEFORE BREAKFAST  Qty: 30 capsule, Refills: 2    Associated Diagnoses: Irritable bowel syndrome with constipation      magnesium oxide (MAG-OX) 400 (240 Mg) MG tablet Take 400 mg by mouth daily      ondansetron (ZOFRAN-ODT) 4 MG ODT tab Take 1-2 tablets (4-8 mg) by mouth every 6 hours as needed for nausea  Qty: 20 tablet, Refills: 3    Associated Diagnoses: Nausea/vomiting in pregnancy      polyethylene glycol (MIRALAX/GLYCOLAX) powder Take 17 g by mouth daily as needed for constipation      potassium aminobenzoate 500 MG CAPS capsule       Prenatal Vit-Fe Fumarate-FA (PRENATAL MULTIVITAMIN W/IRON) 27-0.8 MG tablet Take 1 tablet by mouth daily      sennosides (SENOKOT) 8.6 MG tablet Take 2 tablets by mouth 2 times daily      UNABLE TO FIND Take 2 teaspoonful by mouth At Bedtime Magnesium Calm                   Consultations:   No consultations were requested during this admission          Brief History of Labor:       n/a       Hospital Course:   Pt presented c/o difffuse abdominal and body pain after receiving Cimvia earlier in the week;she was given Ativan 1mg po and had improvement in her symptoms; CBC, CMP and UA were all as expected without indication of  infection  Post-partum hemoglobin:   Hemoglobin   Date Value Ref Range Status   10/29/2021 10.7 (L) 11.7 - 15.7 g/dL Final   04/22/2021 14.9 11.7 - 15.7 g/dL Final             Discharge Instructions and Follow-Up:   Discharge diet: Regular   Discharge activity: Activity as tolerated   Discharge follow-up: Follow up with Dr. Sanchez in 1 weeks   Wound care: Drink plenty of fluids           Discharge Disposition:   Discharged to home      Attestation:  I have reviewed today's vital signs, notes, medications, labs and imaging.    Carola Sanchez MD

## 2021-10-29 NOTE — TELEPHONE ENCOUNTER
"Reason for Call:  Other call back    Detailed comments: Having severe pain in spine that has been going on for about a week now.  Front lower part of abdomen - like down in her pelvis, and lower back.  34 weeks pregnant.  Having a lot of thick discharge come out - started 3 days ago.  Getting progressively worse.    \"I tried to call yesterday and I could not get through so thought I would just wait it out\"  Having a lot of pain in her ribs - started 2 weeks ago.    Phone Number Patient can be reached at: Home number on file [phone] (home)    Best Time:     Can we leave a detailed message on this number? YES    Call taken on 10/29/2021 at 7:53 AM by Dea Lew      "

## 2021-10-29 NOTE — TELEPHONE ENCOUNTER
S-(situation):increasingabominal tightness, frequently,  back pain and discharge . Lasting 45 seconds q 2-5 minutesl began 1 week ago, increasing in intensity.    B-(background):34 weeks pregnant     A-(assessment): Reviewd [facility] charge RN, who discussed with ON Call MD.    R-(recommendations):Directed to go to Birth Center to be evaluated. Patient has a . Patient states will be there shortly.    Mayuri Dubon RN on 10/29/2021 at 8:19 AM

## 2021-10-29 NOTE — PROGRESS NOTES
Pt arrived to the birth center at 0937 stating that she has been in pain that comes in waves since last Friday after her injection of Cimzia. Cat 1 tracing, irritability on the toco. Pt abd palpate soft. Pt is very tense, holding her abd and moaning in pain. Dr. Sanchez at bedside, SVE by Dr. Sanchez, closed. Orders received.  Ativan given at 1022 along with a warm pack to her lower back. Pt states feeling better at 1100. Labs reviewed with Dr. Sanchez. Dr. Sanchez talked with pt also. Discharge instructions given to pt and . Verbal understanding from pt and . Pt amb to car with .

## 2021-11-02 ENCOUNTER — MYC MEDICAL ADVICE (OUTPATIENT)
Dept: RHEUMATOLOGY | Facility: CLINIC | Age: 36
End: 2021-11-02

## 2021-11-03 ENCOUNTER — TELEPHONE (OUTPATIENT)
Dept: OBGYN | Facility: CLINIC | Age: 36
End: 2021-11-03

## 2021-11-03 ENCOUNTER — PRENATAL OFFICE VISIT (OUTPATIENT)
Dept: OBGYN | Facility: CLINIC | Age: 36
End: 2021-11-03
Payer: COMMERCIAL

## 2021-11-03 VITALS
SYSTOLIC BLOOD PRESSURE: 107 MMHG | TEMPERATURE: 98.1 F | RESPIRATION RATE: 18 BRPM | HEART RATE: 92 BPM | HEIGHT: 67 IN | DIASTOLIC BLOOD PRESSURE: 67 MMHG | BODY MASS INDEX: 20.25 KG/M2 | WEIGHT: 129 LBS

## 2021-11-03 DIAGNOSIS — Z34.83 PRENATAL CARE, SUBSEQUENT PREGNANCY IN THIRD TRIMESTER: Primary | ICD-10-CM

## 2021-11-03 DIAGNOSIS — D69.3 IMMUNE THROMBOCYTOPENIC PURPURA (H): ICD-10-CM

## 2021-11-03 LAB
ERYTHROCYTE [DISTWIDTH] IN BLOOD BY AUTOMATED COUNT: 14.8 % (ref 10–15)
HCT VFR BLD AUTO: 34.2 % (ref 35–47)
HGB BLD-MCNC: 11.3 G/DL (ref 11.7–15.7)
MCH RBC QN AUTO: 27.8 PG (ref 26.5–33)
MCHC RBC AUTO-ENTMCNC: 33 G/DL (ref 31.5–36.5)
MCV RBC AUTO: 84 FL (ref 78–100)
PLAT MORPH BLD: NORMAL
PLATELET # BLD AUTO: 34 10E3/UL (ref 150–450)
RBC # BLD AUTO: 4.06 10E6/UL (ref 3.8–5.2)
RBC MORPH BLD: NORMAL
WBC # BLD AUTO: 13.3 10E3/UL (ref 4–11)

## 2021-11-03 PROCEDURE — 85027 COMPLETE CBC AUTOMATED: CPT | Performed by: OBSTETRICS & GYNECOLOGY

## 2021-11-03 PROCEDURE — 36415 COLL VENOUS BLD VENIPUNCTURE: CPT | Performed by: OBSTETRICS & GYNECOLOGY

## 2021-11-03 PROCEDURE — 59426 ANTEPARTUM CARE ONLY: CPT | Performed by: OBSTETRICS & GYNECOLOGY

## 2021-11-03 PROCEDURE — 99207 PR PRENATAL VISIT: CPT | Performed by: OBSTETRICS & GYNECOLOGY

## 2021-11-03 ASSESSMENT — MIFFLIN-ST. JEOR: SCORE: 1309.83

## 2021-11-03 NOTE — PROGRESS NOTES
"CC: Here for routine prenatal visit @ 34w0d   HPI:  Feels miserable from psoriasis, the Cymzia and overall pregnancy; no active bleeding.    PE: /67 (BP Location: Right arm, Patient Position: Chair, Cuff Size: Adult Regular)   Pulse 92   Temp 98.1  F (36.7  C) (Tympanic)   Resp 18   Ht 1.689 m (5' 6.5\")   Wt 58.5 kg (129 lb)   LMP 03/10/2021   BMI 20.51 kg/m     See OB flowsheet      A:  1. Need for prophylactic vaccination and inoculation against influenza  Flu shot not given    2. Immune thrombocytopenic purpura (H)    - [facility] Referral - Pregnancy; Future    3. Prenatal care, subsequent pregnancy in third trimester    - [facility] Referral - Pregnancy; Future    Recommend MFM consult and growth ultrasound, so assist with decision around timing and place for delivery given her ITP  Routine prenatal care  RTC 2 weeks.      Carola Sanchez M.D.     "

## 2021-11-03 NOTE — NURSING NOTE
"Initial /67 (BP Location: Right arm, Patient Position: Chair, Cuff Size: Adult Regular)   Pulse 92   Temp 98.1  F (36.7  C) (Tympanic)   Resp 18   Ht 1.689 m (5' 6.5\")   Wt 58.5 kg (129 lb)   LMP 03/10/2021   BMI 20.51 kg/m   Estimated body mass index is 20.51 kg/m  as calculated from the following:    Height as of this encounter: 1.689 m (5' 6.5\").    Weight as of this encounter: 58.5 kg (129 lb). .      "

## 2021-11-03 NOTE — TELEPHONE ENCOUNTER
Patient was seen in clinic today.  Lab called with critical value - platelet count of 21.    Dr. Truong ( provider on call ) was notified by telephone and given critical lab result of 21 ( twenty one )  Dr. Truong read back value and reported understanding.    No new orders.  No further action needed at this time by writer.    Julia Renee   [facility] Clinic  RN

## 2021-11-04 ENCOUNTER — MYC MEDICAL ADVICE (OUTPATIENT)
Dept: OBGYN | Facility: CLINIC | Age: 36
End: 2021-11-04

## 2021-11-04 ENCOUNTER — VIRTUAL VISIT (OUTPATIENT)
Dept: HEMATOLOGY | Facility: CLINIC | Age: 36
End: 2021-11-04
Attending: INTERNAL MEDICINE
Payer: COMMERCIAL

## 2021-11-04 VITALS — WEIGHT: 129 LBS | BODY MASS INDEX: 20.51 KG/M2

## 2021-11-04 DIAGNOSIS — D69.3 IMMUNE THROMBOCYTOPENIC PURPURA (H): Primary | ICD-10-CM

## 2021-11-04 DIAGNOSIS — M62.830 SPASM OF BACK MUSCLES: Primary | ICD-10-CM

## 2021-11-04 PROCEDURE — 99215 OFFICE O/P EST HI 40 MIN: CPT | Mod: GT | Performed by: INTERNAL MEDICINE

## 2021-11-04 RX ORDER — DIPHENHYDRAMINE HCL 25 MG
25 CAPSULE ORAL ONCE
Status: CANCELLED
Start: 2021-11-04

## 2021-11-04 RX ORDER — CYCLOBENZAPRINE HCL 10 MG
10 TABLET ORAL 3 TIMES DAILY PRN
Qty: 20 TABLET | Refills: 3 | Status: SHIPPED | OUTPATIENT
Start: 2021-11-04

## 2021-11-04 RX ORDER — HEPARIN SODIUM,PORCINE 10 UNIT/ML
5 VIAL (ML) INTRAVENOUS
Status: CANCELLED | OUTPATIENT
Start: 2021-11-04

## 2021-11-04 RX ORDER — DIPHENHYDRAMINE HYDROCHLORIDE 50 MG/ML
50 INJECTION INTRAMUSCULAR; INTRAVENOUS
Status: CANCELLED
Start: 2021-11-04

## 2021-11-04 RX ORDER — NALOXONE HYDROCHLORIDE 0.4 MG/ML
0.2 INJECTION, SOLUTION INTRAMUSCULAR; INTRAVENOUS; SUBCUTANEOUS
Status: CANCELLED | OUTPATIENT
Start: 2021-11-04

## 2021-11-04 RX ORDER — ALBUTEROL SULFATE 0.83 MG/ML
2.5 SOLUTION RESPIRATORY (INHALATION)
Status: CANCELLED | OUTPATIENT
Start: 2021-11-04

## 2021-11-04 RX ORDER — ACETAMINOPHEN 325 MG/1
650 TABLET ORAL ONCE
Status: CANCELLED
Start: 2021-11-04

## 2021-11-04 RX ORDER — MEPERIDINE HYDROCHLORIDE 25 MG/ML
25 INJECTION INTRAMUSCULAR; INTRAVENOUS; SUBCUTANEOUS EVERY 30 MIN PRN
Status: CANCELLED | OUTPATIENT
Start: 2021-11-04

## 2021-11-04 RX ORDER — ALBUTEROL SULFATE 90 UG/1
1-2 AEROSOL, METERED RESPIRATORY (INHALATION)
Status: CANCELLED
Start: 2021-11-04

## 2021-11-04 RX ORDER — HEPARIN SODIUM (PORCINE) LOCK FLUSH IV SOLN 100 UNIT/ML 100 UNIT/ML
5 SOLUTION INTRAVENOUS
Status: CANCELLED | OUTPATIENT
Start: 2021-11-04

## 2021-11-04 RX ORDER — EPINEPHRINE 1 MG/ML
0.3 INJECTION, SOLUTION, CONCENTRATE INTRAVENOUS EVERY 5 MIN PRN
Status: CANCELLED | OUTPATIENT
Start: 2021-11-04

## 2021-11-04 NOTE — PROGRESS NOTES
Due to the ongoing COVID-19 outbreak, this visit was conducted by video, with the patient's approval.        [facility]  [facility] [place] [address]  Phone: [phone], Fax: [phone]      Outpatient Visit Note:    Patient: Merissa Leung  MRN: [id number]  : 1985  MEHDI: 2021    Merissa Leung is a 36 year old woman with a history of chronic ITP, acutely worsened during pregnancy who returns for routine follow up.      Assessment:  In summary, Merissa Leung is a 36 year old woman with chronic ITP and psoriatic arthritis, currently with a stable platelet count ~30 for the last several weeks and about 34 weeks pregnant, recently reinitiating immunosuppression for psoriasis, with GERMAINE (39 weeks) Dec 8.  Overall my goal is to get her to delivery as safely as possible with PLT > 30 during pregnancy and then give IVIG +/- transfusion for PLT sufficiently high for neuraxial anesthesia (ideally > 80) for delivery.    If PLT are consistently < 30 then I told her I think she needs to start IVIG treatment (or if bleeding symptoms worsen).  Depending on fetal risk, we may need to move her her delivery date.  I worry that IVIG was poorly tolerated in the past (though apparently effective) as well.      Recommendations:  1. For now, continue observation with weekly CBC and she will meet with M for a delivery plan.  I think having their opinion about the earliest the baby could be safely delivered (and best location) would be extremely helpful as I worry that with this borderline platelet count she is putting her own safety at risk.  2. IVIG if PLT < 30 or bleeding symptoms worsen (orders written today)  3. IVIG should be given at least 3 days before delivery with goal PLT > 80 for neuraxial anesthesia.  Consider admission for planned C Section so we can carefully follow the PLT count (though certainly not required by me).  4. Generally IVIG lasts about 3  weeks so we may need to repeat dosing depending on timing of delivery  5. Agree with Psoriatic Arthritis therapy as this may help with ITP as well  6. I will touch base with M as well about our plans.    40 minutes spent on the date of the encounter doing chart review, review of test results, interpretation of tests, patient visit, documentation and discussion with other provider(s)     Jerzy Mahoney MD   of Medicine, Division of Hematology, Oncology and Transplantation  [facility] [employer]     --------------------------------------------------------  Forward History:  She initially presented 11/20/2017 with severe thrombocytopenia isolated of platelets around 30 (previously had normal PLT count in 2016).  She was offered prednisone 50 mg with slow taper over 2 or 3 months' period of time.  She had an initial response to prednisone with a doubling of the platelet count to 71 but failed to reach complete remission and then relapsed with the prednisone taper.     She then had persistent ITP throughout early 2018 with a platelet count between 30 and 60.  She attempted IVIG in Oct 2018 but could not tolerate this well.  She then opted for laparoscopic splenectomy on 11/6/2018.     Splenectomy resulted in her first complete remission.  Postoperatively her platelet count tyler to 344.  Unfortunately her response was not durable and by October, 2020 her platelet count had fallen to 41.     In 2018 she was diagnosed with psoriatic arthritis by Dr. Morrell and started on Cosentyx (Secukinumab, anti IL-17 ab).  She had some improvement on this therapy but more recently has changed to Enbrel and she feels she had some good improvement in her rash and joint pain.  She is on this therapy only 2 months and then became pregnant so stopped this therapy due to risks to the fetus.    2021 pregnancy she has had a progressively worsening platelet count 30s since October, then started Cimzia  for psoriatic arthritis end of Oct    History: Merissa Luz is a 36 year old woman with a history of recurrent and chronic ITP and psoriatic arthritis on biologic therapy, who is now about 34 weeks pregnant with estimated delivery date of  (39 weeks).  She is planning on  at [facility].    Since I saw her last she reports she has had continued problems primarily related to psoriatic arthritis with severe joint pain which is debilitating.  She started Cimzia therapy with Dr. Morrell recently and were hoping that hopefully this will improve her platelet count as well.  She notes easy bruising which is chronic.  She intermittently has nosebleeds but nothing serious.  She has gum bleeding from brushing her teeth but this resolved spontaneously.  No wet purpura or other major bleeding symptoms.    Reviewed her recent platelet counts with been approximately 30 for the last several weeks.  She is receiving no therapy and emphasizes again that prednisone causes severe toxicity and she does not want this treatment.  She reiterates that IVIG did seem to help quite a bit when she was bridging to a splenectomy in the past.  It did not make her symptoms better but she notes that the platelet count did allow her to get to surgery.    Objective:  Exam:  Body mass index is 20.51 kg/m .   Constitutional: Appears very uncomfortable but in no distress  Eyes: no discharge, injection or icterus  Respiratory: no cough or labored breathing  Skin: no rashes or petechiae  Neurological: no deficits appreciated, speech is fluent  Psych: affect is normal    Labs:  Results for MERISSA LUZ (MRN [id number]) as of 2021 17:14   Ref. Range 10/18/2021 16:27 10/29/2021 10:13 11/3/2021 16:30   WBC Latest Ref Range: 4.0 - 11.0 10e3/uL 9.0 11.7 (H) 13.3 (H)   Hemoglobin Latest Ref Range: 11.7 - 15.7 g/dL 11.7 10.7 (L) 11.3 (L)   Hematocrit Latest Ref Range: 35.0 - 47.0 % 35.1 32.4 (L) 34.2 (L)   Platelet Count  Latest Ref Range: 150 - 450 10e3/uL 29 (LL) 39 (LL) 34 (LL)       Imaging:  none      Video-Visit Details:    Type of service:  Video Visit  Video Start Time: 1008 AM  Video End Time (time video stopped): 1031 AM    Originating Location (pt. Location): Home    Distant Location (provider location):  [facility]     Mode of Communication:  Video Conference via [employer]

## 2021-11-04 NOTE — TELEPHONE ENCOUNTER
Carlyn Mahoney and Daniel: Please see Merissa's MyCZolat message.  Cimzia takes 3 months to work; sometimes improving symptoms sooner.  Cimzia was started on 10/22/2021, so I don't recommend stopping it unless she is having side effects from it.  Prednisone may provide some relief of symptoms if she is willing to use.     Buck Morrell MD  11/3/2021 10:40 PM

## 2021-11-04 NOTE — PROGRESS NOTES
Patient was contacted to complete the pre-visit call prior to their video visit with the provider.      Allergies and medications were reviewed.    I thanked them for their time to cover this information     Primitivo Cuellar CMA

## 2021-11-05 ENCOUNTER — PRE VISIT (OUTPATIENT)
Dept: MATERNAL FETAL MEDICINE | Facility: CLINIC | Age: 36
End: 2021-11-05

## 2021-11-05 ENCOUNTER — INFUSION THERAPY VISIT (OUTPATIENT)
Dept: INFUSION THERAPY | Facility: CLINIC | Age: 36
End: 2021-11-05
Attending: INTERNAL MEDICINE
Payer: COMMERCIAL

## 2021-11-05 VITALS
HEART RATE: 75 BPM | SYSTOLIC BLOOD PRESSURE: 102 MMHG | TEMPERATURE: 98.1 F | RESPIRATION RATE: 18 BRPM | DIASTOLIC BLOOD PRESSURE: 64 MMHG

## 2021-11-05 DIAGNOSIS — F40.298 NEEDLE PHOBIA: ICD-10-CM

## 2021-11-05 DIAGNOSIS — L40.50 PSORIATIC ARTHRITIS (H): Primary | ICD-10-CM

## 2021-11-05 PROCEDURE — 250N000011 HC RX IP 250 OP 636: Performed by: INTERNAL MEDICINE

## 2021-11-05 PROCEDURE — 96372 THER/PROPH/DIAG INJ SC/IM: CPT | Performed by: INTERNAL MEDICINE

## 2021-11-05 RX ORDER — DIPHENHYDRAMINE HYDROCHLORIDE 50 MG/ML
50 INJECTION INTRAMUSCULAR; INTRAVENOUS
Status: CANCELLED
Start: 2021-11-19

## 2021-11-05 RX ORDER — DIPHENHYDRAMINE HYDROCHLORIDE 50 MG/ML
50 INJECTION INTRAMUSCULAR; INTRAVENOUS
Status: DISCONTINUED | OUTPATIENT
Start: 2021-11-05 | End: 2021-11-05 | Stop reason: HOSPADM

## 2021-11-05 RX ORDER — ALBUTEROL SULFATE 90 UG/1
1-2 AEROSOL, METERED RESPIRATORY (INHALATION)
Status: DISCONTINUED | OUTPATIENT
Start: 2021-11-05 | End: 2021-11-05 | Stop reason: HOSPADM

## 2021-11-05 RX ORDER — MEPERIDINE HYDROCHLORIDE 25 MG/ML
25 INJECTION INTRAMUSCULAR; INTRAVENOUS; SUBCUTANEOUS EVERY 30 MIN PRN
Status: CANCELLED | OUTPATIENT
Start: 2021-11-19

## 2021-11-05 RX ORDER — ALBUTEROL SULFATE 0.83 MG/ML
2.5 SOLUTION RESPIRATORY (INHALATION)
Status: DISCONTINUED | OUTPATIENT
Start: 2021-11-05 | End: 2021-11-05 | Stop reason: HOSPADM

## 2021-11-05 RX ORDER — EPINEPHRINE 1 MG/ML
0.3 INJECTION, SOLUTION, CONCENTRATE INTRAVENOUS EVERY 5 MIN PRN
Status: CANCELLED | OUTPATIENT
Start: 2021-11-19

## 2021-11-05 RX ORDER — METHYLPREDNISOLONE SODIUM SUCCINATE 125 MG/2ML
125 INJECTION, POWDER, LYOPHILIZED, FOR SOLUTION INTRAMUSCULAR; INTRAVENOUS
Status: CANCELLED
Start: 2021-11-19

## 2021-11-05 RX ORDER — NALOXONE HYDROCHLORIDE 0.4 MG/ML
0.2 INJECTION, SOLUTION INTRAMUSCULAR; INTRAVENOUS; SUBCUTANEOUS
Status: DISCONTINUED | OUTPATIENT
Start: 2021-11-05 | End: 2021-11-05 | Stop reason: HOSPADM

## 2021-11-05 RX ORDER — NALOXONE HYDROCHLORIDE 0.4 MG/ML
0.2 INJECTION, SOLUTION INTRAMUSCULAR; INTRAVENOUS; SUBCUTANEOUS
Status: CANCELLED | OUTPATIENT
Start: 2021-11-19

## 2021-11-05 RX ORDER — ALBUTEROL SULFATE 90 UG/1
1-2 AEROSOL, METERED RESPIRATORY (INHALATION)
Status: CANCELLED
Start: 2021-11-19

## 2021-11-05 RX ORDER — MEPERIDINE HYDROCHLORIDE 25 MG/ML
25 INJECTION INTRAMUSCULAR; INTRAVENOUS; SUBCUTANEOUS EVERY 30 MIN PRN
Status: DISCONTINUED | OUTPATIENT
Start: 2021-11-05 | End: 2021-11-05 | Stop reason: HOSPADM

## 2021-11-05 RX ORDER — METHYLPREDNISOLONE SODIUM SUCCINATE 125 MG/2ML
125 INJECTION, POWDER, LYOPHILIZED, FOR SOLUTION INTRAMUSCULAR; INTRAVENOUS
Status: DISCONTINUED | OUTPATIENT
Start: 2021-11-05 | End: 2021-11-05 | Stop reason: HOSPADM

## 2021-11-05 RX ORDER — ALBUTEROL SULFATE 0.83 MG/ML
2.5 SOLUTION RESPIRATORY (INHALATION)
Status: CANCELLED | OUTPATIENT
Start: 2021-11-19

## 2021-11-05 RX ORDER — EPINEPHRINE 1 MG/ML
0.3 INJECTION, SOLUTION, CONCENTRATE INTRAVENOUS EVERY 5 MIN PRN
Status: DISCONTINUED | OUTPATIENT
Start: 2021-11-05 | End: 2021-11-05 | Stop reason: HOSPADM

## 2021-11-05 RX ADMIN — CERTOLIZUMAB PEGOL 400 MG: KIT at 15:40

## 2021-11-05 NOTE — PROGRESS NOTES
Infusion Nursing Note:  Merissa Leung presents today for Cimzia.    Patient seen by provider today: No   present during visit today: Not Applicable.    Note: N/A.      Intravenous Access:  No Intravenous access/labs at this visit.    Treatment Conditions:  Biological Infusion Checklist:  ~~~ NOTE: If the patient answers yes to any of the questions below, hold the infusion and contact ordering provider or on-call provider.    1. Have you recently had an elevated temperature, fever, chills, productive cough, coughing for 3 weeks or longer or hemoptysis, abnormal vital signs, night sweats,  chest pain or have you noticed a decrease in your appetite, unexplained weight loss or fatigue? n  2. Do you have any open wounds or new incisions? No  3. Do you have any recent or upcoming hospitalizations, surgeries or dental procedures? No  4. Do you currently have or recently have had any signs of illness or infection or are you on any antibiotics? No  5. Have you had any new, sudden or worsening abdominal pain? No  6. Have you or anyone in your household received a live vaccination in the past 4 weeks? Please note:  No live vaccines while on biologic/chemotherapy until 6 months after the last treatment.  Patient can receive the flu vaccine (shot only) and the pneumovax.  It is optimal for the patient to get these vaccines mid cycle, but they can be given at any time as long as it is not on the day of the infusion. No  7. Have you recently been diagnosed with any new nervous system diseases (ie. Multiple sclerosis, Guillain [place], seizures, neurological changes) or cancer diagnosis? No  8. Are you on any form of radiation or chemotherapy? No  9. Are you pregnant or breast feeding or do you have plans of pregnancy in the future? YES  10. Have you been having any signs of worsening depression or suicidal ideations?  (benlysta only) No  11. Have there been any other new onset medical symptoms? No        Post Infusion  Assessment:  Patient tolerated injection without incident.       Discharge Plan:   Discharge instructions reviewed with: Patient and .  Patient discharged in stable condition accompanied by: self.  Departure Mode: Ambulatory.      Marilyn Christianson RN

## 2021-11-07 ENCOUNTER — NURSE TRIAGE (OUTPATIENT)
Dept: NURSING | Facility: CLINIC | Age: 36
End: 2021-11-07
Payer: COMMERCIAL

## 2021-11-08 NOTE — TELEPHONE ENCOUNTER
OB Triage Call      Is patient's OB/Midwife with the [facility] or [facility]? [facility]- Proceed with triage     Reason for call: Abdominal Pain    Assessment: Patient calling reporting having abdominal pain. States she has constant abdominal pain at 5/10 and . Increased vaginal discharge. Taken tums but has not helped with the heartburn. Patient states she has intermittent severe abdominal contractions that occurred 3 times today.     Advised patient to be seen at Labor and Deliver. Patient refused disposition and states she would like to wait.     Paged Dr. Villarreal for [facility]. Dr. Villarreal called RN and advised patient to be seen at Labor and Deliver.     RN called patient and advised she should be seen at Labor and Delivery and not wait to be evaluated. Patient refused disposition and states she will monitor and call back if she feels worse.     Plan: Labor and Delivery.     Patient plans to deliver at [facility]    Patient's primary OB Provider is Dr. Azul    Per protocol recommendations Consult needed for [facility] or Midwife.  Patient's primary OB is [facility].  On-Call provider JARAD VILLARREAL MD paged at 6:28pm. Call returned at 6:34 and advised on triage assessment.  Provider recommendations: To be evaluated in L&D. [facility] ([phone]) notified of patient's pending arrival.  Report given to Patient Refused disposition.    Is patient's delivering hospital on divert? No      34w4d    Estimated Date of Delivery: Dec 15, 2021        OB History    Para Term  AB Living   4 2 2 0 1 2   SAB IAB Ectopic Multiple Live Births   1 0 0 0 2      # Outcome Date GA Lbr Brandon/2nd Weight Sex Delivery Anes PTL Lv   4 Current            3 Term 02/15/16 39w0d  2.92 kg (6 lb 7 oz) M CS-LTranv Spinal  JH      Name: Lewis      Apgar1: 9  Apgar5: 9   2 Term 07 40w0d  2.438 kg (5 lb 6 oz) M CS-LTranv   [facility]      Birth Comments: CAN x2      Complications: Fetal Intolerance      " Name: Abdirizak Crouch SAB      SAB          Lab Results   Component Value Date    GBS  12/31/2015     Negative  No GBS DNA detected, presumed negative for GBS or number of bacteria may be   below the limit of detection of the assay.   Assay performed on incubated broth culture of specimen using [employer] real-time   PCR.            Jim Balderas RN 11/07/21 6:17 PM  [facility] Nurse Advisor    Reason for Disposition    MODERATE-SEVERE abdominal pain (e.g., interferes with normal activities, awakens from sleep)    Additional Information    Negative: Passed out (i.e., lost consciousness, collapsed and was not responding)    Negative: Shock suspected (e.g., cold/pale/clammy skin, too weak to stand, low BP, rapid pulse)    Negative: Difficult to awaken or acting confused (e.g., disoriented, slurred speech)    Negative: [1] SEVERE abdominal pain (e.g., excruciating) AND [2] constant AND [3] present > 1 hour    Negative: SEVERE vaginal bleeding (e.g., continuous red blood from vagina, large blood clots)    Negative: Sounds like a life-threatening emergency to the triager    Negative: [1] Vomiting AND [2] contains red blood or black (\"coffee ground\") material  (Exception: few red streaks in vomit that only happened once)    Protocols used: PREGNANCY - ABDOMINAL PAIN GREATER THAN 20 WEEKS EGA-A-AH      "

## 2021-11-09 ENCOUNTER — TELEPHONE (OUTPATIENT)
Dept: OBGYN | Facility: CLINIC | Age: 36
End: 2021-11-09
Payer: COMMERCIAL

## 2021-11-09 NOTE — PROGRESS NOTES
Maternal-Fetal Medicine Consultation    Merissa Leung  : 1985  MRN: [id number]    REFERRAL:  Merissa Leung is a 36 year old sent by Dr. Sanchez from [facility] for MFM care    HPI:  Merissa Leung is a 36 year old  at 35w1d by LMP consistent with 8w0d US here for MFM consultation regarding ITP.    She is here with her partner.     Patient has been following closely with hematology in preparation for this delivery. Patient denies spontaneous bleeds, worsening bruising. She has been having some abdominal pain that she states is not labor pain, pain is associated with fetal movement. She has had some light spotting.     Patient states she is taking klonopin daily. She has been prescribed to take 1.5 tabs each night for sleep. She does take this daily. She has also been prescribed to have daytime klonopin for anxiety as needed. She does not take this daily.     Prenatal Care:  Primary OB care this pregnancy has been with Dr. Sanchez.    Obstetrics History:  OB History    Para Term  AB Living   4 2 2 0 1 2   SAB IAB Ectopic Multiple Live Births   1 0 0 0 2      # Outcome Date GA Lbr Brandon/2nd Weight Sex Delivery Anes PTL Lv   4 Current            3 Term 02/15/16 39w0d  2.92 kg (6 lb 7 oz) M CS-LTranv Spinal  [facility]      Name: Lewis      Apgar1: 9  Apgar5: 9   2 Term 07 40w0d  2.438 kg (5 lb 6 oz) M CS-LTranv   [facility]      Birth Comments: CAN x2      Complications: Fetal Intolerance      Name: Abdirizak   1 SAB      SAB          Past Medical History:  Past Medical History:   Diagnosis Date     Anxiety      Binge eating disorder      Chickenpox     x3     Food intolerance      IBS (irritable bowel syndrome)      MEDICAL HISTORY OF -     pelvic floor disorder       Past Surgical History:  Past Surgical History:   Procedure Laterality Date     C/SECTION, LOW TRANSVERSE  2007      SECTION, TUBAL LIGATION, COMBINED N/A 02/15/2016    NO tubal ligation done     COLONOSCOPY   09/27/2013    Procedure: COMBINED COLONOSCOPY, SINGLE BIOPSY/POLYPECTOMY BY BIOPSY;  Colonoscopy;  Surgeon: Maureen Valentin MD;  Location: [facility]     COLONOSCOPY N/A 05/29/2015    Procedure: COMBINED COLONOSCOPY, SINGLE OR MULTIPLE BIOPSY/POLYPECTOMY BY BIOPSY;  Surgeon: Maureen Valentin MD;  Location: [facility]     ESOPHAGOSCOPY, GASTROSCOPY, DUODENOSCOPY (EGD), COMBINED N/A 01/02/2018    Procedure: COMBINED ESOPHAGOSCOPY, GASTROSCOPY, DUODENOSCOPY (EGD), BIOPSY SINGLE OR MULTIPLE;  Gastroscopy;  Surgeon: Ray Amato MD;  Location: [facility]     LAPAROSCOPIC SPLENECTOMY N/A 11/06/2018    Procedure: Laparoscopic splenectomy, ;  Surgeon: Anthony Jackson DO;  Location: [facility]       Current Medications:  Prior to Admission medications    Medication Sig Last Dose Taking? Auth Provider   clonazePAM (KLONOPIN) 1 MG tablet TAKE 1/2 (ONE-HALF) TABLET BY MOUTH TWICE DAILY FOR ANXIETY AND 1 & 1/2 (ONE & ONE-HALF) AT BEDTIME FOR SLEEP   Mohini Mccord APRN CNP   cyclobenzaprine (FLEXERIL) 10 MG tablet Take 1 tablet (10 mg) by mouth 3 times daily as needed for muscle spasms   Carola Sanchez MD   fluticasone (FLONASE) 50 MCG/ACT nasal spray Spray 1 spray into both nostrils daily   Mohnii Mccord APRN CNP   hydrOXYzine (VISTARIL) 50 MG capsule Take 1 capsule (50 mg) by mouth nightly as needed for itching   Carola Sanchez MD   LINZESS 145 MCG capsule TAKE 1 CAPSULE BY MOUTH IN THE MORNING BEFORE BREAKFAST   Viri Hansen APRN CNP   magnesium oxide (MAG-OX) 400 (240 Mg) MG tablet Take 400 mg by mouth daily   Reported, Patient   ondansetron (ZOFRAN-ODT) 4 MG ODT tab Take 1-2 tablets (4-8 mg) by mouth every 6 hours as needed for nausea   Carola Sanchez MD   polyethylene glycol (MIRALAX/GLYCOLAX) powder Take 17 g by mouth daily as needed for constipation   Reported, Patient   potassium aminobenzoate 500 MG CAPS capsule    Reported, Patient   Prenatal Vit-Fe  Fumarate-FA (PRENATAL MULTIVITAMIN W/IRON) 27-0.8 MG tablet Take 1 tablet by mouth daily   Reported, Patient   sennosides (SENOKOT) 8.6 MG tablet Take 2 tablets by mouth 2 times daily   Reported, Patient   UNABLE TO FIND Take 2 teaspoonful by mouth At Bedtime Magnesium Calm   Reported, Patient       Allergies:  Dairy products [milk protein extract], Dilaudid [hydromorphone], and Food    ROS:  10-point ROS negative except as in HPI     PHYSICAL EXAM:  Deferred     Other Imaging:   US today with possible aortic coarctation. Will get  echo to assess arch. Otherwise normal US.    ASSESSMENT/PLAN:  Merissa Leung is a 36 year old  at 35w1d by LMP consistent with 8w0d US here for MFM consultation regarding:     #Immune thromobocytopenia purpura  - Immune thrombocytopenia purpura (ITP) is thought to include genetic as well as acquired factors, but the etiology is unclear.  The pathogenesis of ITP is presumed to be related to platelet destruction and/or inhibition of megakaryocyte platelet production via the production of specific IgG autoantibodies by the patient's B cells, most often directed against platelet membrane glycoproteins such as GPIIb/IIIa.  These B cell autoimmune IgG responses appear to be driven by CD4+ helper T cells reacting to platelet membrane glycoprotein epitopes.  Antibodies are not demonstrable in all patients with ITP and assays for antiplatelet antibodies have not yet proven to be important for management decisions.    - Clinically important bleeding rarely occurs with platelet counts above 10,000/microL, unless the patient is febrile or has a serious systemic illness.  However, the standard of practice for adults with ITP is that treatment is generally initiated when the platelet count is less than 30,000 to 50,000/microL.  Treatment modalities include steroids or IVIG.  The patient has had a splenectomy    - The concern for  thrombocytopenia is present in true ITP, but  more than 90% of infants will have normal or safe platelet counts.  Complications include  intracerebral hemorrhage (<1% risk) and scalp hematoma.  There is an approximately 10% risk that the  will have platelet count of <50,000/microL and five percent of platelet counts < 20,000/microL.      Recommendations:  - After discussion with hematology, plan will be for patient to be admitted to antepartum service at 35w2d (tomorrow ) for two days IVIG treatment prior to delivery. Please see IVIG order set. Patient's hematologist, Dr. Trujillo is on service this week for hematology, will be able to guide inpatient management.   - Patient will require delivery at [facility] for ability to manage both maternal and possible  bleeding prophylaxis and treatment as necessary   - Patient is also to receive dexamethasone for both fetal lung maturity with hope for concomitant improvement in her platelets.   - Patient should have inpatient anesthesia consult at time of this admission to assess candidacy for neuraxial anesthesia. Hematology recommends epidural anesthesia is considered to be safe in women who have platelet counts above 50,000 to 80,000/microL.    - Platelets most recently 34 on 11/3/2021  - Patient is s/p splenectomy after worsening ITP  - History of two prior  sections, will require third repeat CS and desires tubal ligation. Plan for delivery early next week after completion of IVIG.   - Continue follow-up consultation with hematology inpatient  - Monitoring of maternal platelet values after delivery, immediately postpartum and at 2 week follow-up.  - Avoid antiplatelet drugs (e.g., nonsteroidal anti-inflammatory drugs) postpartum.   - NICU Consult inpatient  #Psoriatic arthritis  - Continue Cimzia, Q2 week treament, not needed while inpatient  - Recommend following up with rheumatologist within 2 weeks of delivery    #EDWARD  - Taking daily klonopin for anxiety  - Discussed possibility of  baby withdrawing from klonopin and increased NICU stay    #Possible fetal aortic coarctation  - Recommend  echo to assess aortic arch  - NICU consult while inpatient     The patient was seen and evaluated with Dr. Gonzalez.     Thank you for allowing us to participate in the care of your patient. Please do not hesitate to contact us if you have further questions regarding the management of your patient.     Maykel Rollins MD  Obstetrics, Gynecology, and Women's Health  PGY2  9:25 AM 2021     Physician Attestation   I, Jose Gonzalez MD, saw this patient and agree with the findings and plan of care as documented in the note.      Items personally reviewed/procedural attestation: History and plan of care management. Total time spent in all patient care activities on the day of this visit was 30 minutes.    Jose Gonzalez MD

## 2021-11-09 NOTE — TELEPHONE ENCOUNTER
"Spoke with patient on the phone.    S-(situation): Patient feeling like her baby dropped last night and is lower than he was before. Patient reports only concern is with new symptom - she feels is the pelvic pressure from feeling baby lower in her pelvis. Patient reports she is more uncomfortable with walking due to this.     Patient reports other symptoms that she is experiencing are not new to her like low back pain and abdominal pain \" on the front of my belly.\" Patient reports she was having some contractions this morning between 0730 and 0945 but these have al subsided.    Patient denies leaking of fluid like her water broke. Patient denies any bright red bleeding. Patient reports baby is active as she would expect. Patient has an appointment Thursday [facility] and Dr. Sanchez next Monday.    B-(background):  at 34w6d. See patient history for medical issues. Patient has psoriatic arthritis, thrombocytopenia, fibromyalgia, eating disorder, s/p splenectomy, tension headaches, constipation    A-(assessment): pelvic pressure,    R-(recommendations): Reassurance provided. Discussed baby moving lower in the pelvic and how this can feel. Comfort measures. Take a warm shower or warm bath. Rest on left side with pillow between the legs. Push fluids to stay well hydrated.    Monitor for bright red bleeding, uterine contractions like labor, leaking of fluid like water broke or decreased fetal movement. Patient given phone number to triage [phone] to call outside of clinic hours for new symptoms or further triage.    Reviewed with patient when to [facility].    Close monitoring.  Call with any questions/concerns.    Pt in agreement and reports understanding.    Julia Renee   [facility]  RN      "

## 2021-11-09 NOTE — TELEPHONE ENCOUNTER
Reason for Call:  Other call back    Detailed comments: Pt states she has been having constant contractions that come and go, but last night around 11p.m. she felt the baby drop down into her pelvis. Pt states she could see a lump of baby's head in her pelvis above her vagina.  No blood, but milky white discharge. Pt is 35 weeks pregnant tomorrow. Pt states she doesn't have contractions right now but can't put her legs together. Warm transferred to nurse Dumont    Phone Number Patient can be reached at: Home number on file [phone] (home)    Best Time: any    Can we leave a detailed message on this number? YES    Call taken on 11/9/2021 at 11:56 AM by Geovanny Cruz

## 2021-11-11 ENCOUNTER — ANESTHESIA (OUTPATIENT)
Dept: ULTRASOUND IMAGING | Facility: CLINIC | Age: 36
End: 2021-11-11

## 2021-11-11 ENCOUNTER — HOSPITAL ENCOUNTER (OUTPATIENT)
Dept: ULTRASOUND IMAGING | Facility: CLINIC | Age: 36
End: 2021-11-11
Attending: OBSTETRICS & GYNECOLOGY
Payer: COMMERCIAL

## 2021-11-11 ENCOUNTER — OFFICE VISIT (OUTPATIENT)
Dept: MATERNAL FETAL MEDICINE | Facility: CLINIC | Age: 36
End: 2021-11-11
Attending: OBSTETRICS & GYNECOLOGY
Payer: COMMERCIAL

## 2021-11-11 DIAGNOSIS — O99.113 IMMUNE THROMBOCYTOPENIA AFFECTING PREGNANCY IN THIRD TRIMESTER (H): Primary | ICD-10-CM

## 2021-11-11 DIAGNOSIS — O26.90 PREGNANCY RELATED CONDITION, ANTEPARTUM: ICD-10-CM

## 2021-11-11 DIAGNOSIS — D69.3 IMMUNE THROMBOCYTOPENIA AFFECTING PREGNANCY IN THIRD TRIMESTER (H): Primary | ICD-10-CM

## 2021-11-11 LAB — SARS-COV-2 RNA RESP QL NAA+PROBE: NEGATIVE

## 2021-11-11 PROCEDURE — U0003 INFECTIOUS AGENT DETECTION BY NUCLEIC ACID (DNA OR RNA); SEVERE ACUTE RESPIRATORY SYNDROME CORONAVIRUS 2 (SARS-COV-2) (CORONAVIRUS DISEASE [COVID-19]), AMPLIFIED PROBE TECHNIQUE, MAKING USE OF HIGH THROUGHPUT TECHNOLOGIES AS DESCRIBED BY CMS-2020-01-R: HCPCS | Performed by: OBSTETRICS & GYNECOLOGY

## 2021-11-11 PROCEDURE — 76811 OB US DETAILED SNGL FETUS: CPT

## 2021-11-11 PROCEDURE — 99203 OFFICE O/P NEW LOW 30 MIN: CPT | Mod: 25 | Performed by: OBSTETRICS & GYNECOLOGY

## 2021-11-11 PROCEDURE — G0463 HOSPITAL OUTPT CLINIC VISIT: HCPCS | Mod: 25

## 2021-11-11 PROCEDURE — 76811 OB US DETAILED SNGL FETUS: CPT | Mod: 26 | Performed by: OBSTETRICS & GYNECOLOGY

## 2021-11-11 NOTE — NURSING NOTE
"Pt here for L2/Consult d/t psoriatic arthritis, ITP w/ critical platelet counts. Upon rooming, pt is ambulating with difficulty, clutching her ULQ in pain. States this has been going on for a long time, attributes it to fetal movement. Also states this is very different from what she recalls in prior pregnancies, but \"the whole pregnancy has been different.\" Has had flares of fibromyalgia, arthritis, psoriasis during this pregnancy. Appears more comfortable once seated. Settled for ultrasound, will see Dr. Gonzalez for consult following.     Pt complains of pain in lower abdomen and all over.  Pt states her plts are low and hematology wants her to be delivered.      Dr. Gonzalez and Dr. Rollins in to see pt. Pt to go to L&D tomorrow at 9 am. Pt had Covid swab done today and denied having Covid in the past 90 days.    Pt left amb and stable. Map and directions given to L&D for her appointment tomorrow.    Tameka Kerr RN    "

## 2021-11-12 ENCOUNTER — HOSPITAL ENCOUNTER (INPATIENT)
Facility: CLINIC | Age: 36
LOS: 5 days | Discharge: HOME | End: 2021-11-17
Attending: OBSTETRICS & GYNECOLOGY | Admitting: OBSTETRICS & GYNECOLOGY
Payer: COMMERCIAL

## 2021-11-12 ENCOUNTER — ANESTHESIA EVENT (OUTPATIENT)
Dept: ULTRASOUND IMAGING | Facility: CLINIC | Age: 36
End: 2021-11-12

## 2021-11-12 ENCOUNTER — ANESTHESIA (OUTPATIENT)
Dept: OBGYN | Facility: CLINIC | Age: 36
End: 2021-11-12

## 2021-11-12 ENCOUNTER — ANESTHESIA EVENT (OUTPATIENT)
Dept: OBGYN | Facility: CLINIC | Age: 36
End: 2021-11-12

## 2021-11-12 DIAGNOSIS — Z98.891 S/P C-SECTION: ICD-10-CM

## 2021-11-12 DIAGNOSIS — Z3A.35 35 WEEKS GESTATION OF PREGNANCY: Primary | ICD-10-CM

## 2021-11-12 PROBLEM — Z36.89 ENCOUNTER FOR TRIAGE IN PREGNANT PATIENT: Status: ACTIVE | Noted: 2021-11-12

## 2021-11-12 LAB
ABO/RH(D): NORMAL
ANTIBODY SCREEN: NEGATIVE
APTT PPP: 28 SECONDS (ref 22–38)
CRP SERPL-MCNC: <2.9 MG/L (ref 0–8)
ERYTHROCYTE [DISTWIDTH] IN BLOOD BY AUTOMATED COUNT: 15.5 % (ref 10–15)
FIBRINOGEN PPP-MCNC: 358 MG/DL (ref 170–490)
HCT VFR BLD AUTO: 34.6 % (ref 35–47)
HGB BLD-MCNC: 11.2 G/DL (ref 11.7–15.7)
HOLD SPECIMEN: NORMAL
MCH RBC QN AUTO: 27.4 PG (ref 26.5–33)
MCHC RBC AUTO-ENTMCNC: 32.4 G/DL (ref 31.5–36.5)
MCV RBC AUTO: 85 FL (ref 78–100)
PLATELET # BLD AUTO: 25 10E3/UL (ref 150–450)
RBC # BLD AUTO: 4.09 10E6/UL (ref 3.8–5.2)
SPECIMEN EXPIRATION DATE: NORMAL
WBC # BLD AUTO: 14.6 10E3/UL (ref 4–11)

## 2021-11-12 PROCEDURE — 36415 COLL VENOUS BLD VENIPUNCTURE: CPT | Performed by: OBSTETRICS & GYNECOLOGY

## 2021-11-12 PROCEDURE — 96374 THER/PROPH/DIAG INJ IV PUSH: CPT

## 2021-11-12 PROCEDURE — 250N000013 HC RX MED GY IP 250 OP 250 PS 637: Performed by: STUDENT IN AN ORGANIZED HEALTH CARE EDUCATION/TRAINING PROGRAM

## 2021-11-12 PROCEDURE — 86140 C-REACTIVE PROTEIN: CPT | Performed by: STUDENT IN AN ORGANIZED HEALTH CARE EDUCATION/TRAINING PROGRAM

## 2021-11-12 PROCEDURE — 96366 THER/PROPH/DIAG IV INF ADDON: CPT

## 2021-11-12 PROCEDURE — 59025 FETAL NON-STRESS TEST: CPT

## 2021-11-12 PROCEDURE — 85384 FIBRINOGEN ACTIVITY: CPT | Performed by: STUDENT IN AN ORGANIZED HEALTH CARE EDUCATION/TRAINING PROGRAM

## 2021-11-12 PROCEDURE — 85027 COMPLETE CBC AUTOMATED: CPT | Performed by: STUDENT IN AN ORGANIZED HEALTH CARE EDUCATION/TRAINING PROGRAM

## 2021-11-12 PROCEDURE — 999N000105 HC STATISTIC NO DOCUMENTATION TO SUPPORT CHARGE

## 2021-11-12 PROCEDURE — 250N000011 HC RX IP 250 OP 636: Performed by: STUDENT IN AN ORGANIZED HEALTH CARE EDUCATION/TRAINING PROGRAM

## 2021-11-12 PROCEDURE — 85610 PROTHROMBIN TIME: CPT | Performed by: STUDENT IN AN ORGANIZED HEALTH CARE EDUCATION/TRAINING PROGRAM

## 2021-11-12 PROCEDURE — 86780 TREPONEMA PALLIDUM: CPT | Performed by: STUDENT IN AN ORGANIZED HEALTH CARE EDUCATION/TRAINING PROGRAM

## 2021-11-12 PROCEDURE — 120N000002 HC R&B MED SURG/OB UMMC

## 2021-11-12 PROCEDURE — 258N000003 HC RX IP 258 OP 636

## 2021-11-12 PROCEDURE — 99221 1ST HOSP IP/OBS SF/LOW 40: CPT | Mod: AI | Performed by: OBSTETRICS & GYNECOLOGY

## 2021-11-12 PROCEDURE — 86900 BLOOD TYPING SEROLOGIC ABO: CPT | Performed by: STUDENT IN AN ORGANIZED HEALTH CARE EDUCATION/TRAINING PROGRAM

## 2021-11-12 PROCEDURE — 85730 THROMBOPLASTIN TIME PARTIAL: CPT | Performed by: STUDENT IN AN ORGANIZED HEALTH CARE EDUCATION/TRAINING PROGRAM

## 2021-11-12 RX ORDER — NALOXONE HYDROCHLORIDE 0.4 MG/ML
0.2 INJECTION, SOLUTION INTRAMUSCULAR; INTRAVENOUS; SUBCUTANEOUS
Status: DISCONTINUED | OUTPATIENT
Start: 2021-11-12 | End: 2021-11-14

## 2021-11-12 RX ORDER — METOCLOPRAMIDE HYDROCHLORIDE 5 MG/ML
10 INJECTION INTRAMUSCULAR; INTRAVENOUS EVERY 6 HOURS PRN
Status: DISCONTINUED | OUTPATIENT
Start: 2021-11-12 | End: 2021-11-14

## 2021-11-12 RX ORDER — ACETAMINOPHEN 325 MG/1
650 TABLET ORAL
Status: DISCONTINUED | OUTPATIENT
Start: 2021-11-12 | End: 2021-11-14

## 2021-11-12 RX ORDER — DIPHENHYDRAMINE HYDROCHLORIDE 50 MG/ML
50 INJECTION INTRAMUSCULAR; INTRAVENOUS
Status: DISCONTINUED | OUTPATIENT
Start: 2021-11-12 | End: 2021-11-14

## 2021-11-12 RX ORDER — NALOXONE HYDROCHLORIDE 0.4 MG/ML
0.4 INJECTION, SOLUTION INTRAMUSCULAR; INTRAVENOUS; SUBCUTANEOUS
Status: DISCONTINUED | OUTPATIENT
Start: 2021-11-12 | End: 2021-11-14

## 2021-11-12 RX ORDER — CLONAZEPAM 1 MG/1
1 TABLET ORAL
Status: DISCONTINUED | OUTPATIENT
Start: 2021-11-12 | End: 2021-11-12

## 2021-11-12 RX ORDER — SODIUM CHLORIDE 9 MG/ML
INJECTION, SOLUTION INTRAVENOUS
Status: COMPLETED
Start: 2021-11-12 | End: 2021-11-12

## 2021-11-12 RX ORDER — METOCLOPRAMIDE 10 MG/1
10 TABLET ORAL EVERY 6 HOURS PRN
Status: DISCONTINUED | OUTPATIENT
Start: 2021-11-12 | End: 2021-11-14

## 2021-11-12 RX ORDER — FENTANYL CITRATE 50 UG/ML
50-100 INJECTION, SOLUTION INTRAMUSCULAR; INTRAVENOUS
Status: DISCONTINUED | OUTPATIENT
Start: 2021-11-12 | End: 2021-11-12

## 2021-11-12 RX ORDER — HYDROXYZINE HYDROCHLORIDE 25 MG/1
50 TABLET, FILM COATED ORAL EVERY 6 HOURS
Status: DISCONTINUED | OUTPATIENT
Start: 2021-11-12 | End: 2021-11-17 | Stop reason: HOSPADM

## 2021-11-12 RX ORDER — PROCHLORPERAZINE MALEATE 10 MG
10 TABLET ORAL EVERY 6 HOURS PRN
Status: DISCONTINUED | OUTPATIENT
Start: 2021-11-12 | End: 2021-11-14

## 2021-11-12 RX ORDER — DIPHENHYDRAMINE HCL 50 MG
50 CAPSULE ORAL
Status: DISCONTINUED | OUTPATIENT
Start: 2021-11-12 | End: 2021-11-14

## 2021-11-12 RX ORDER — PROCHLORPERAZINE 25 MG
25 SUPPOSITORY, RECTAL RECTAL EVERY 12 HOURS PRN
Status: DISCONTINUED | OUTPATIENT
Start: 2021-11-12 | End: 2021-11-14

## 2021-11-12 RX ORDER — DIPHENHYDRAMINE HCL 25 MG
25 CAPSULE ORAL EVERY 6 HOURS PRN
Status: DISCONTINUED | OUTPATIENT
Start: 2021-11-12 | End: 2021-11-14

## 2021-11-12 RX ORDER — AMOXICILLIN 250 MG
2 CAPSULE ORAL 2 TIMES DAILY
Status: DISCONTINUED | OUTPATIENT
Start: 2021-11-12 | End: 2021-11-14

## 2021-11-12 RX ORDER — LIDOCAINE 40 MG/G
CREAM TOPICAL
Status: DISCONTINUED | OUTPATIENT
Start: 2021-11-12 | End: 2021-11-14

## 2021-11-12 RX ORDER — DIPHENHYDRAMINE HCL 50 MG
50 CAPSULE ORAL ONCE
Status: COMPLETED | OUTPATIENT
Start: 2021-11-12 | End: 2021-11-12

## 2021-11-12 RX ORDER — SIMETHICONE 80 MG
160 TABLET,CHEWABLE ORAL EVERY 4 HOURS PRN
Status: DISCONTINUED | OUTPATIENT
Start: 2021-11-12 | End: 2021-11-14

## 2021-11-12 RX ORDER — HYDROXYZINE HYDROCHLORIDE 50 MG/1
100 TABLET, FILM COATED ORAL ONCE
Status: COMPLETED | OUTPATIENT
Start: 2021-11-12 | End: 2021-11-12

## 2021-11-12 RX ORDER — MAGNESIUM OXIDE 400 MG/1
400 TABLET ORAL DAILY
Status: DISCONTINUED | OUTPATIENT
Start: 2021-11-12 | End: 2021-11-17 | Stop reason: HOSPADM

## 2021-11-12 RX ORDER — POLYETHYLENE GLYCOL 3350 17 G/17G
17 POWDER, FOR SOLUTION ORAL DAILY PRN
Status: DISCONTINUED | OUTPATIENT
Start: 2021-11-12 | End: 2021-11-13

## 2021-11-12 RX ORDER — ACETAMINOPHEN 325 MG/1
650 TABLET ORAL ONCE
Status: COMPLETED | OUTPATIENT
Start: 2021-11-12 | End: 2021-11-12

## 2021-11-12 RX ORDER — METHYLPREDNISOLONE SODIUM SUCCINATE 125 MG/2ML
125 INJECTION, POWDER, LYOPHILIZED, FOR SOLUTION INTRAMUSCULAR; INTRAVENOUS
Status: DISCONTINUED | OUTPATIENT
Start: 2021-11-12 | End: 2021-11-12

## 2021-11-12 RX ORDER — PANTOPRAZOLE SODIUM 20 MG/1
40 TABLET, DELAYED RELEASE ORAL
Status: DISCONTINUED | OUTPATIENT
Start: 2021-11-13 | End: 2021-11-14

## 2021-11-12 RX ORDER — DIPHENHYDRAMINE HYDROCHLORIDE 50 MG/ML
25 INJECTION INTRAMUSCULAR; INTRAVENOUS EVERY 6 HOURS PRN
Status: DISCONTINUED | OUTPATIENT
Start: 2021-11-12 | End: 2021-11-14

## 2021-11-12 RX ORDER — DIPHENHYDRAMINE HYDROCHLORIDE 50 MG/ML
50 INJECTION INTRAMUSCULAR; INTRAVENOUS ONCE
Status: COMPLETED | OUTPATIENT
Start: 2021-11-12 | End: 2021-11-12

## 2021-11-12 RX ORDER — ACETAMINOPHEN 325 MG/1
975 TABLET ORAL EVERY 6 HOURS PRN
Status: DISCONTINUED | OUTPATIENT
Start: 2021-11-12 | End: 2021-11-14

## 2021-11-12 RX ORDER — DEXAMETHASONE SODIUM PHOSPHATE 4 MG/ML
6 INJECTION, SOLUTION INTRA-ARTICULAR; INTRALESIONAL; INTRAMUSCULAR; INTRAVENOUS; SOFT TISSUE EVERY 12 HOURS
Status: COMPLETED | OUTPATIENT
Start: 2021-11-12 | End: 2021-11-14

## 2021-11-12 RX ORDER — FENTANYL CITRATE 50 UG/ML
50 INJECTION, SOLUTION INTRAMUSCULAR; INTRAVENOUS EVERY 4 HOURS PRN
Status: DISCONTINUED | OUTPATIENT
Start: 2021-11-12 | End: 2021-11-13

## 2021-11-12 RX ORDER — ONDANSETRON 4 MG/1
4 TABLET, ORALLY DISINTEGRATING ORAL EVERY 6 HOURS PRN
Status: DISCONTINUED | OUTPATIENT
Start: 2021-11-12 | End: 2021-11-14

## 2021-11-12 RX ORDER — ONDANSETRON 2 MG/ML
4 INJECTION INTRAMUSCULAR; INTRAVENOUS EVERY 6 HOURS PRN
Status: DISCONTINUED | OUTPATIENT
Start: 2021-11-12 | End: 2021-11-14

## 2021-11-12 RX ADMIN — DIPHENHYDRAMINE HYDROCHLORIDE 50 MG: 50 CAPSULE ORAL at 10:39

## 2021-11-12 RX ADMIN — ACETAMINOPHEN 975 MG: 325 TABLET, FILM COATED ORAL at 16:12

## 2021-11-12 RX ADMIN — DIPHENHYDRAMINE HCL 25 MG: 25 CAPSULE ORAL at 18:43

## 2021-11-12 RX ADMIN — FENTANYL CITRATE 50 MCG: 50 INJECTION, SOLUTION INTRAMUSCULAR; INTRAVENOUS at 09:48

## 2021-11-12 RX ADMIN — DEXAMETHASONE SODIUM PHOSPHATE 6 MG: 4 INJECTION, SOLUTION INTRAMUSCULAR; INTRAVENOUS at 23:21

## 2021-11-12 RX ADMIN — FENTANYL CITRATE 50 MCG: 50 INJECTION, SOLUTION INTRAMUSCULAR; INTRAVENOUS at 19:25

## 2021-11-12 RX ADMIN — LINACLOTIDE 145 MCG: 145 CAPSULE, GELATIN COATED ORAL at 11:05

## 2021-11-12 RX ADMIN — HYDROXYZINE HYDROCHLORIDE 100 MG: 50 TABLET, FILM COATED ORAL at 10:39

## 2021-11-12 RX ADMIN — DOCUSATE SODIUM AND SENNOSIDES 2 TABLET: 8.6; 5 TABLET ORAL at 19:23

## 2021-11-12 RX ADMIN — HYDROXYZINE HYDROCHLORIDE 50 MG: 50 TABLET, FILM COATED ORAL at 16:16

## 2021-11-12 RX ADMIN — HYDROXYZINE HYDROCHLORIDE 50 MG: 50 TABLET, FILM COATED ORAL at 22:11

## 2021-11-12 RX ADMIN — ACETAMINOPHEN 650 MG: 325 TABLET, FILM COATED ORAL at 10:41

## 2021-11-12 RX ADMIN — SODIUM CHLORIDE, PRESERVATIVE FREE 3 ML: 5 INJECTION INTRAVENOUS at 10:30

## 2021-11-12 RX ADMIN — DEXAMETHASONE SODIUM PHOSPHATE 6 MG: 4 INJECTION, SOLUTION INTRAMUSCULAR; INTRAVENOUS at 11:51

## 2021-11-12 RX ADMIN — FENTANYL CITRATE 50 MCG: 50 INJECTION, SOLUTION INTRAMUSCULAR; INTRAVENOUS at 15:14

## 2021-11-12 RX ADMIN — HUMAN IMMUNOGLOBULIN G 57 G: 20 LIQUID INTRAVENOUS at 12:02

## 2021-11-12 RX ADMIN — Medication 400 MG: at 19:22

## 2021-11-12 RX ADMIN — DIPHENHYDRAMINE HYDROCHLORIDE 50 MG: 50 CAPSULE ORAL at 10:41

## 2021-11-12 ASSESSMENT — MIFFLIN-ST. JEOR: SCORE: 1339.09

## 2021-11-12 NOTE — H&P
[facility]  OB History and Physical      Merissa Leung MRN# [id number]   Age: 36 year old YOB: 1985     CC: IVIG planned admission, severe ITP in pregnancy    HPI:  Ms. Merissa Leung is a 36 year old  at 35w2d by LMP c/w 8w0d U/S, who presents for scheduled admission for IVIG infusion in setting of low platelets prior to  delivery. She reports that this morning at 0830 she began to have severe abdominal pain in her lower pelvis and upper abdomen that also involves her entire body. She has been experiencing this almost daily since ~August per pt. She says it hurts most when baby moves and that this is not new or abnormal for her in pregnancy.     She reports taking klonopin nightly for sleep. She confirms she would still like a tubal ligation at time of her  section.  at bedside, supportive.    Pregnancy Complications:  - H/o C/S x2  - H/o ITP s/p splenectomy - dx 2018  - H/o bulimia   - Thrombocytopenia (34 on )  - Psoriatic arthritis   - IBS  - Anxiety   - OCD  - Fibromyalgia  - Possible aortic arch narrowing on U/S   -chronic benzodiazapine treatment - using daily only at night for a long time for severe anxiety    Prenatal Labs:   Lab Results   Component Value Date    ABO A 2021    RH Pos 2021    AS Negative 2021    HEPBANG Nonreactive 2021    TREPAB Negative 2015    HGB 11.2 (L) 2021       GBS Status:   Lab Results   Component Value Date    GBS  2015     Negative  No GBS DNA detected, presumed negative for GBS or number of bacteria may be   below the limit of detection of the assay.   Assay performed on incubated broth culture of specimen using [employer] real-time   PCR.         Ultrasounds  [facility] U/S Comprehensive (21):   - Cardiac activity present.  bpm.  - Fetal movements present.  - Presentation cephalic.  - Placenta posterior.  - Umbilical cord 3 vessel cord.  - Amniotic  fluid Amount of AF: normal. MVP 5.8 cm.  - EFW 36%ile at 5 lb 8 oz, AC 33%ile    OB History  OB History    Para Term  AB Living   4 2 2 0 1 2   SAB IAB Ectopic Multiple Live Births   1 0 0 0 2      # Outcome Date GA Lbr Brandon/2nd Weight Sex Delivery Anes PTL Lv   4 Current            3 Term 02/15/16 39w0d  2.92 kg (6 lb 7 oz) M CS-LTranv Spinal  JH      Name: Lewis      Apgar1: 9  Apgar5: 9   2 Term 07 40w0d  2.438 kg (5 lb 6 oz) M CS-LTranv   JH      Birth Comments: CAN x2      Complications: Fetal Intolerance      Name: Abdirizak   1 SAB      SAB        PMHx:   Past Medical History:   Diagnosis Date     Anxiety      Binge eating disorder      Chickenpox     x3     Food intolerance      IBS (irritable bowel syndrome)      MEDICAL HISTORY OF -     pelvic floor disorder     PSHx:   Past Surgical History:   Procedure Laterality Date     C/SECTION, LOW TRANSVERSE  2007      SECTION, TUBAL LIGATION, COMBINED N/A 02/15/2016    NO tubal ligation done     COLONOSCOPY  2013    Procedure: COMBINED COLONOSCOPY, SINGLE BIOPSY/POLYPECTOMY BY BIOPSY;  Colonoscopy;  Surgeon: Maureen Valentin MD;  Location: [facility]     COLONOSCOPY N/A 2015    Procedure: COMBINED COLONOSCOPY, SINGLE OR MULTIPLE BIOPSY/POLYPECTOMY BY BIOPSY;  Surgeon: Maureen Valentin MD;  Location: [facility]     ESOPHAGOSCOPY, GASTROSCOPY, DUODENOSCOPY (EGD), COMBINED N/A 2018    Procedure: COMBINED ESOPHAGOSCOPY, GASTROSCOPY, DUODENOSCOPY (EGD), BIOPSY SINGLE OR MULTIPLE;  Gastroscopy;  Surgeon: Ray Amato MD;  Location: [facility]     LAPAROSCOPIC SPLENECTOMY N/A 2018    Procedure: Laparoscopic splenectomy, ;  Surgeon: Anthony Jackson DO;  Location: [facility]     Meds:   Medications Prior to Admission   Medication Sig Dispense Refill Last Dose     clonazePAM (KLONOPIN) 1 MG tablet TAKE 1/2 (ONE-HALF) TABLET BY MOUTH TWICE DAILY FOR ANXIETY AND 1 & 1/2 (ONE & ONE-HALF) AT BEDTIME FOR  SLEEP 28 tablet 5 11/11/2021 at 2100     cyclobenzaprine (FLEXERIL) 10 MG tablet Take 1 tablet (10 mg) by mouth 3 times daily as needed for muscle spasms 20 tablet 3 Unknown at Unknown time     magnesium oxide (MAG-OX) 400 (240 Mg) MG tablet Take 400 mg by mouth daily   11/12/2021 at 0700     Prenatal Vit-Fe Fumarate-FA (PRENATAL MULTIVITAMIN W/IRON) 27-0.8 MG tablet Take 1 tablet by mouth daily   11/12/2021 at 0700     sennosides (SENOKOT) 8.6 MG tablet Take 2 tablets by mouth 2 times daily   11/12/2021 at 0700     fluticasone (FLONASE) 50 MCG/ACT nasal spray Spray 1 spray into both nostrils daily (Patient not taking: Reported on 11/11/2021) 16 g 0      hydrOXYzine (VISTARIL) 50 MG capsule Take 1 capsule (50 mg) by mouth nightly as needed for itching (Patient not taking: Reported on 11/11/2021) 20 capsule 1      LINZESS 145 MCG capsule TAKE 1 CAPSULE BY MOUTH IN THE MORNING BEFORE BREAKFAST 30 capsule 2      ondansetron (ZOFRAN-ODT) 4 MG ODT tab Take 1-2 tablets (4-8 mg) by mouth every 6 hours as needed for nausea (Patient not taking: Reported on 11/11/2021) 20 tablet 3      polyethylene glycol (MIRALAX/GLYCOLAX) powder Take 17 g by mouth daily as needed for constipation        potassium aminobenzoate 500 MG CAPS capsule         UNABLE TO FIND Take 2 teaspoonful by mouth At Bedtime Magnesium Calm        Allergies:    Allergies   Allergen Reactions     Dairy Products [Milk Protein Extract] Swelling     Blow up like a balloon     Dilaudid [Hydromorphone] Nausea and Vomiting     Food Other (See Comments) and Swelling     Processed food, sugar, high sodium, red grains, rice, potatoes(can have mashed, but no french fries or puffs)  Pain in extremities and face.   All over body at times.        ROS: Complete 10-point ROS negative except as noted in HPI.    PE:  Vit:   Patient Vitals for the past 4 hrs:   BP Temp Temp src Resp Height Weight   11/12/21 0949 116/65 97.6  F (36.4  C) Axillary 18 -- --   11/12/21 0931 -- --  "-- -- 1.651 m (5' 5\") 63.8 kg (140 lb 11.2 oz)      Gen: Pt very visibly uncomfortable, tearful prior to med admin, NAD and only wincing with some fetal movements on repeat visit  CV: Regular rate  Pulm: Nonlabored breathing on room air  Abd: Soft, gravid, tender to palpation mostly in lower quadrants however no rebound or guarding  Ext: Trace -+1 LE edema b/l  Cx: Closed/50% effaced. No bleeding noted. Difficult exam due to patient discomfort.    FHT: Baseline 130, mod variability, + accelerations, no decelerations   [place]: Uterine irritability    Assessment and Plan:  Ms. Merissa Leung is a 36 year old , at 35w2d by LMP c/w 8w0d U/S, who presents for planned admission for IVIG administration due to thrombocytopenia in setting of ITP, prior to planned  delivery. Pregnancy otherwise complicated by h/o C/S x2, desires permanent sterilization, h/o bulimia, psoriatic arthritis, IBS, anxiety, and OCD.     ITP  S/p splenectomy   - Plt 24 today  - daily CBCs  - per hematology, 1g/kg q24h of IVIg x2 doses   - goal of giving 6mg dexamethasone q12h for 4 doses instead of standard pre and post steroids as this will also give benefit for fetal lung maturity. Patient has not tolerated prednisone well in past (reports swelling, pain) and is apprehensive about any steroids but after counseling on benefits, agrees to try at least 1 dose of the dexamethasone and see how she tolerates it  - Followed by Dr.Marshall Mahoney (Hematology) cell phone: [phone]     Vaginal Bleeding  - Did not appear to have any bleeding on SVE   - Will continue to monitor    Abdominal pain  Fibromyalgia  - R/o PTL: Cervix was closed on exam, astrid intermittently on toco  - per pt, pain is related to fetal movements for her. Also reports that this is how she intermittently will feel on daily basis for several months now so this is likely her \"normal\" baseline pain at this time  - patient's fibromyalgia is likely large contributor " to her pain      H/o bulimia  Anxiety  OCD  - 100 mg atarax given on admission for help with current acute anxiety, sched 50mg q6h throughout admission. Patient very agreeable to this  - will continue PTA 1-1.5 mg klonopin at bedtime  - SW consult postpartum    Psoriatic arthritis   - s/p cimzia recently  - Followed by Dr. Buck Morrell (Rheumatology)    PNC  - Rh +, Ab neg, Rubella immune, passed GCT, GBS unknown   - will collect GBS  - RPR, HepBSag, HIV Ab NR  - posterior placenta    Constipation  - continue sched senna BID  - prn miralax available      FWB  - EFW 36%ile yesterday with AC 33%ile  - Possible aortic arch narrowing on U/S    - will need  echo   - Fetal Monitoring: reactive and reassuring NST. will do some extended monitoring, but given has been reassuring will do BID NSTs while inpatient   - NICU for delivery   - Intrauterine resuscitative measures prn  - MOD: Pt desires rLTCS. Also desires sterilization at that time. Has private insurance through '[employer] union   - tentatively planning c/s for Monday after IVIg doses, however discussed the specific date may change pending her platelet counts    The patient was discussed with Dr. Chirinos who is in agreement with the treatment plan.    Geeta Alarcon MD  Obstetrics & Gynecology PGY-3  11:22 AM 2021    Physician Attestation     I, Genoveva Chirinos MD, saw this patient with the resident and agree with the resident s findings and plan of care as documented in the resident s note.       I personally reviewed vital signs, medications, labs, imaging and EFM.     Key findings: Severe ITP in pregnancy, late , in a patient requiring surgical delivery. Will initiate IVIg as well as dexamethasone (fetal lung maturity but may also help platelet levels).     Appreciate ongoing collaboration with Dr. Mahoney's team.       Genoveva Chirinos MD  Maternal Fetal Medicine  Date of Service (when I saw the patient): 21

## 2021-11-12 NOTE — ANESTHESIA PREPROCEDURE EVALUATION
Anesthesia Pre-Procedure Evaluation    Patient: Merissa Leung   MRN: [id number] : 1985        Preoperative Diagnosis: * No surgery found *    Procedure :           Past Medical History:   Diagnosis Date     Anxiety      Binge eating disorder      Chickenpox     x3     Food intolerance      IBS (irritable bowel syndrome)      MEDICAL HISTORY OF -     pelvic floor disorder      Past Surgical History:   Procedure Laterality Date     C/SECTION, LOW TRANSVERSE  2007      SECTION, TUBAL LIGATION, COMBINED N/A 02/15/2016    NO tubal ligation done     COLONOSCOPY  2013    Procedure: COMBINED COLONOSCOPY, SINGLE BIOPSY/POLYPECTOMY BY BIOPSY;  Colonoscopy;  Surgeon: Maureen Valentin MD;  Location: [facility]     COLONOSCOPY N/A 2015    Procedure: COMBINED COLONOSCOPY, SINGLE OR MULTIPLE BIOPSY/POLYPECTOMY BY BIOPSY;  Surgeon: Maureen Valentin MD;  Location: [facility]     ESOPHAGOSCOPY, GASTROSCOPY, DUODENOSCOPY (EGD), COMBINED N/A 2018    Procedure: COMBINED ESOPHAGOSCOPY, GASTROSCOPY, DUODENOSCOPY (EGD), BIOPSY SINGLE OR MULTIPLE;  Gastroscopy;  Surgeon: Ray Amato MD;  Location: [facility]     LAPAROSCOPIC SPLENECTOMY N/A 2018    Procedure: Laparoscopic splenectomy, ;  Surgeon: Anthony Jackson DO;  Location: [facility]      Allergies   Allergen Reactions     Dairy Products [Milk Protein Extract] Swelling     Blow up like a balloon     Dilaudid [Hydromorphone] Nausea and Vomiting     Food Other (See Comments) and Swelling     Processed food, sugar, high sodium, red grains, rice, potatoes(can have mashed, but no french fries or puffs)  Pain in extremities and face.   All over body at times.      Social History     Tobacco Use     Smoking status: Former Smoker     Packs/day: 0.50     Years: 6.00     Pack years: 3.00     Types: Cigarettes     Quit date: 2021     Years since quittin.5     Smokeless tobacco: Never Used   Substance Use Topics     Alcohol  use: Not Currently     Comment: rare-quit with pregnancy      Wt Readings from Last 1 Encounters:   21 63.8 kg (140 lb 11.2 oz)        Anesthesia Evaluation            ROS/MED HX  ENT/Pulmonary:       Neurologic:       Cardiovascular:       METS/Exercise Tolerance:     Hematologic: Comments: History of ITP, worsened 4-5 years ago that required treatment      Musculoskeletal: Comment: Hx of psoriatic arthritis in SI joints, Fibromyalgia      GI/Hepatic:     (+) Inflammatory bowel disease,     Renal/Genitourinary:       Endo:       Psychiatric/Substance Use:     (+) psychiatric history anxiety     Infectious Disease:       Malignancy:       Other:          Awaiting for platelets to reach >75k for spinal candidate, currently having treatment with steroids and IVG. The patient wishes to have a  and so discussed spinal anesthesia with her as well as the possibility of GA if the platelets do not reach goal or if spinal is not adequate. Patient is understanding and agrees to GA plan if required. Patient is also agreeable to TAP block which was also discussed.   Physical Exam    Airway        Mallampati: II   TM distance: > 3 FB   Neck ROM: full   Mouth opening: > 3 cm    Respiratory Devices and Support         Dental       (+) missing      Cardiovascular             Pulmonary                   OUTSIDE LABS:  CBC:   Lab Results   Component Value Date    WBC 14.6 (H) 2021    WBC 13.3 (H) 2021    HGB 11.2 (L) 2021    HGB 11.3 (L) 2021    HCT 34.6 (L) 2021    HCT 34.2 (L) 2021    PLT 25 (LL) 2021    PLT 34 (LL) 2021     BMP:   Lab Results   Component Value Date     10/29/2021     2021    POTASSIUM 3.4 10/29/2021    POTASSIUM 3.3 (L) 2021    CHLORIDE 108 10/29/2021    CHLORIDE 112 (H) 2021    CO2 22 10/29/2021    CO2 23 2021    BUN 13 10/29/2021    BUN 8 2021    CR 0.57 10/29/2021    CR 0.60 2021    GLC 83 10/29/2021     GLC 83 09/23/2021     COAGS:   Lab Results   Component Value Date    PTT 28 11/12/2021    INR 0.85 (L) 11/12/2021    FIBR 358 11/12/2021     POC:   Lab Results   Component Value Date    HCG Negative 03/11/2020    HCGS Negative 11/20/2018     HEPATIC:   Lab Results   Component Value Date    ALBUMIN 2.4 (L) 10/29/2021    PROTTOTAL 6.1 (L) 10/29/2021    ALT 14 10/29/2021    AST 12 10/29/2021    ALKPHOS 79 10/29/2021    BILITOTAL 0.2 10/29/2021     OTHER:   Lab Results   Component Value Date    LACT 0.8 09/23/2021    DREW 8.6 10/29/2021    MAG 2.1 10/18/2016    LIPASE 90 11/20/2018    TSH 1.95 06/24/2021    CRP <2.9 11/12/2021    SED 2 10/12/2020         Anesthesia Plan          Anesthesia Type: Spinal.              Consents    Anesthesia Plan(s) and associated risks, benefits, and realistic alternatives discussed. Questions answered and patient/representative(s) expressed understanding.     - Discussed with:  Patient      - Extended Intubation/Ventilatory Support Discussed: Yes.    Use of blood products discussed: Yes.     - Discussed with: Patient.     Postoperative Care            Comments:                Maicol Peoples MD

## 2021-11-12 NOTE — LETTER
Please excuse Emeterio Leung from work and work-related duties from 11/12- 11/19/21 due to support of family member medical-related issues.     If you have any questions or concerns, please don't hesitate to call.    Geeta Alarcon MD MPH

## 2021-11-12 NOTE — PLAN OF CARE
Data: Today is hospital day one. Maternal status complains of generalized pain.  Fetal assessment is normal  Action: Continue with plan of care, which is TID fetal and uterine monitoring. Patient encouraged to move extremities while in bed.  Response: Patient coping with plan of care.

## 2021-11-13 LAB
ERYTHROCYTE [DISTWIDTH] IN BLOOD BY AUTOMATED COUNT: 15.3 % (ref 10–15)
HCT VFR BLD AUTO: 30.7 % (ref 35–47)
HGB BLD-MCNC: 10.1 G/DL (ref 11.7–15.7)
MCH RBC QN AUTO: 27.7 PG (ref 26.5–33)
MCHC RBC AUTO-ENTMCNC: 32.9 G/DL (ref 31.5–36.5)
MCV RBC AUTO: 84 FL (ref 78–100)
PLATELET # BLD AUTO: 49 10E3/UL (ref 150–450)
RBC # BLD AUTO: 3.64 10E6/UL (ref 3.8–5.2)
WBC # BLD AUTO: 14.2 10E3/UL (ref 4–11)

## 2021-11-13 PROCEDURE — 250N000013 HC RX MED GY IP 250 OP 250 PS 637: Performed by: STUDENT IN AN ORGANIZED HEALTH CARE EDUCATION/TRAINING PROGRAM

## 2021-11-13 PROCEDURE — 99231 SBSQ HOSP IP/OBS SF/LOW 25: CPT | Mod: 25 | Performed by: OBSTETRICS & GYNECOLOGY

## 2021-11-13 PROCEDURE — 250N000011 HC RX IP 250 OP 636: Performed by: STUDENT IN AN ORGANIZED HEALTH CARE EDUCATION/TRAINING PROGRAM

## 2021-11-13 PROCEDURE — 250N000013 HC RX MED GY IP 250 OP 250 PS 637: Performed by: OBSTETRICS & GYNECOLOGY

## 2021-11-13 PROCEDURE — 120N000002 HC R&B MED SURG/OB UMMC

## 2021-11-13 PROCEDURE — 36415 COLL VENOUS BLD VENIPUNCTURE: CPT | Performed by: STUDENT IN AN ORGANIZED HEALTH CARE EDUCATION/TRAINING PROGRAM

## 2021-11-13 PROCEDURE — 59025 FETAL NON-STRESS TEST: CPT | Mod: 26 | Performed by: OBSTETRICS & GYNECOLOGY

## 2021-11-13 PROCEDURE — 85027 COMPLETE CBC AUTOMATED: CPT | Performed by: STUDENT IN AN ORGANIZED HEALTH CARE EDUCATION/TRAINING PROGRAM

## 2021-11-13 PROCEDURE — 250N000013 HC RX MED GY IP 250 OP 250 PS 637

## 2021-11-13 PROCEDURE — 87653 STREP B DNA AMP PROBE: CPT | Performed by: STUDENT IN AN ORGANIZED HEALTH CARE EDUCATION/TRAINING PROGRAM

## 2021-11-13 RX ORDER — DIPHENHYDRAMINE HYDROCHLORIDE 50 MG/ML
50 INJECTION INTRAMUSCULAR; INTRAVENOUS
Status: DISCONTINUED | OUTPATIENT
Start: 2021-11-13 | End: 2021-11-13

## 2021-11-13 RX ORDER — ACETAMINOPHEN 325 MG/1
650 TABLET ORAL
Status: DISCONTINUED | OUTPATIENT
Start: 2021-11-13 | End: 2021-11-13

## 2021-11-13 RX ORDER — DIPHENHYDRAMINE HCL 50 MG
50 CAPSULE ORAL ONCE
Status: COMPLETED | OUTPATIENT
Start: 2021-11-13 | End: 2021-11-13

## 2021-11-13 RX ORDER — DIPHENHYDRAMINE HCL 50 MG
50 CAPSULE ORAL
Status: DISCONTINUED | OUTPATIENT
Start: 2021-11-13 | End: 2021-11-13

## 2021-11-13 RX ORDER — DIPHENHYDRAMINE HYDROCHLORIDE 50 MG/ML
50 INJECTION INTRAMUSCULAR; INTRAVENOUS ONCE
Status: COMPLETED | OUTPATIENT
Start: 2021-11-13 | End: 2021-11-13

## 2021-11-13 RX ORDER — POLYETHYLENE GLYCOL 3350 17 G/17G
17 POWDER, FOR SOLUTION ORAL DAILY
Status: DISCONTINUED | OUTPATIENT
Start: 2021-11-13 | End: 2021-11-14

## 2021-11-13 RX ORDER — ACETAMINOPHEN 325 MG/1
650 TABLET ORAL ONCE
Status: COMPLETED | OUTPATIENT
Start: 2021-11-13 | End: 2021-11-13

## 2021-11-13 RX ADMIN — HUMAN IMMUNOGLOBULIN G 57 G: 20 LIQUID INTRAVENOUS at 11:07

## 2021-11-13 RX ADMIN — DEXAMETHASONE SODIUM PHOSPHATE 6 MG: 4 INJECTION, SOLUTION INTRAMUSCULAR; INTRAVENOUS at 10:36

## 2021-11-13 RX ADMIN — POLYETHYLENE GLYCOL 3350 17 G: 17 POWDER, FOR SOLUTION ORAL at 11:28

## 2021-11-13 RX ADMIN — DIPHENHYDRAMINE HYDROCHLORIDE 50 MG: 50 CAPSULE ORAL at 10:35

## 2021-11-13 RX ADMIN — HYDROXYZINE HYDROCHLORIDE 50 MG: 50 TABLET, FILM COATED ORAL at 16:17

## 2021-11-13 RX ADMIN — HYDROXYZINE HYDROCHLORIDE 50 MG: 50 TABLET, FILM COATED ORAL at 10:19

## 2021-11-13 RX ADMIN — PANTOPRAZOLE SODIUM 40 MG: 20 TABLET, DELAYED RELEASE ORAL at 07:50

## 2021-11-13 RX ADMIN — FENTANYL CITRATE 50 MCG: 50 INJECTION, SOLUTION INTRAMUSCULAR; INTRAVENOUS at 00:18

## 2021-11-13 RX ADMIN — HYDROXYZINE HYDROCHLORIDE 50 MG: 50 TABLET, FILM COATED ORAL at 04:56

## 2021-11-13 RX ADMIN — DOCUSATE SODIUM AND SENNOSIDES 2 TABLET: 8.6; 5 TABLET ORAL at 20:57

## 2021-11-13 RX ADMIN — HYDROXYZINE HYDROCHLORIDE 50 MG: 50 TABLET, FILM COATED ORAL at 21:59

## 2021-11-13 RX ADMIN — DOCUSATE SODIUM AND SENNOSIDES 2 TABLET: 8.6; 5 TABLET ORAL at 07:50

## 2021-11-13 RX ADMIN — Medication 400 MG: at 07:50

## 2021-11-13 RX ADMIN — FENTANYL CITRATE 50 MCG: 50 INJECTION, SOLUTION INTRAMUSCULAR; INTRAVENOUS at 04:57

## 2021-11-13 RX ADMIN — ACETAMINOPHEN 650 MG: 325 TABLET, FILM COATED ORAL at 10:19

## 2021-11-13 RX ADMIN — CLONAZEPAM 1.5 MG: 1 TABLET ORAL at 04:01

## 2021-11-13 RX ADMIN — LINACLOTIDE 145 MCG: 145 CAPSULE, GELATIN COATED ORAL at 07:50

## 2021-11-13 RX ADMIN — ACETAMINOPHEN 975 MG: 325 TABLET, FILM COATED ORAL at 20:57

## 2021-11-13 ASSESSMENT — ACTIVITIES OF DAILY LIVING (ADL)
FALL_HISTORY_WITHIN_LAST_SIX_MONTHS: NO
TOILETING_ISSUES: NO

## 2021-11-13 NOTE — PROGRESS NOTES
Antepartum Progress Note  DOS: 21  MRN: [id number]    S: Patient feeling well today. Excited to hear her platelets have increased. Reporting mild left-sided abdominal pain. Also experiencing constipation, says it's been a few days since having a BM. Said her meetings with hematology and anesthesia yesterday were productive. Denies vaginal bleeding, contractions, LOF, headaches, vision changes, chest pain or shortness of breath. + fetal movement. Denies any other acute concerns.    O:   Vitals:    21 0750 21 1105 21 1330 21 1410   BP: 107/59 108/64  105/57   Pulse: 86 76  83   Resp: 20 18 20 20   Temp: 97.7  F (36.5  C)   98.6  F (37  C)   TempSrc: Axillary   Oral   Weight:       Height:         Gen: NAD  Resp: normal inspiratory effort, nonlabored breathing on RA  CV: regular rate, well-perfused  Abdomen: soft, gravid, non-tender  Extremities: no lower extremity edema b/l    FHT: 130s baseline, mod variability, + accelerations, no decelerations  [place]: quiet    Labs/imaging:     Component      Latest Ref Rng & Units 2021             WBC      4.0 - 11.0 10e3/uL 14.2 (H)   RBC Count      3.80 - 5.20 10e6/uL 3.64 (L)   Hemoglobin      11.7 - 15.7 g/dL 10.1 (L)   Hematocrit      35.0 - 47.0 % 30.7 (L)   MCV      78 - 100 fL 84   MCH      26.5 - 33.0 pg 27.7   MCHC      31.5 - 36.5 g/dL 32.9   RDW      10.0 - 15.0 % 15.3 (H)   Platelet Count      150 - 450 10e3/uL 49 (LL)     [facility] U/S Comprehensive (21):   - Cardiac activity present.  bpm.  - Fetal movements present.  - Presentation cephalic.  - Placenta posterior.  - Umbilical cord 3 vessel cord.  - Amniotic fluid Amount of AF: normal. MVP 5.8 cm.  - EFW 36%ile at 5 lb 8 oz, AC 33%ile    Assessment and Plan:  Ms. Merissa Leung is a 36 year old , at 35w3d by LMP c/w 8w0d U/S on HD#2 admitted for planned admission for IVIG administration due to thrombocytopenia in setting of ITP, prior to planned  delivery.  "Pregnancy otherwise complicated by h/o C/S x2, desires permanent sterilization, h/o bulimia, psoriatic arthritis, IBS, anxiety, and OCD.      ITP  S/p splenectomy   - Plt 24  >> 49 today  - INR 0.85, aPTT nl, fibrinogen 358 ()  - daily CBCs  - per hematology, 1g/kg q24h of IVIg x2 doses - will have dose 2 today                 - goal of giving 6mg dexamethasone q12h for 4 doses instead of standard pre and post steroids as this will also give benefit for fetal lung maturity.    - Followed by Dr.Marshall Mahoney (Hematology) cell phone: [phone]      Abdominal pain  Fibromyalgia  - R/o PTL: Cervix was closed on exam, astrid intermittently on toco  - per pt, pain is related to fetal movements for her. Also reports that this is how she intermittently will feel on daily basis for several months now so this is likely her \"normal\" baseline pain at this time  - patient's fibromyalgia is likely large contributor to her pain  - Received prn fentanyl x2 , x2  - will discontinue prn fentanyl today      H/o bulimia  Anxiety  OCD  - 100 mg atarax given on admission for help with current acute anxiety on presentation, sched 50mg q6h throughout admission. Patient very agreeable to this.  - will continue PTA 1-1.5 mg klonopin at bedtime  - SW consult postpartum     Psoriatic arthritis   - s/p cimzia recently  - Followed by Dr. Buck Morrell (Rheumatology)     PNC  - Rh +, Ab neg, Rubella immune, passed GCT, GBS pending  - RPR, HepBSag, HIV Ab NR  - posterior placenta     Constipation  - Pt w/o BM for ~5 days  - continue sched senna BID  - prn miralax >> scheduled today  - prn suppository added today      FWB  - EFW 36%ile  with AC 33%ile  - Possible aortic arch narrowing on U/S                  - will need  echo   - Fetal Monitoring: BID NSTs  - NICU for delivery   - Intrauterine resuscitative measures prn  - MOD: Pt desires rLTCS. Also desires sterilization at that time. Has private " insurance through 's union                 - tentatively planning c/s for Monday after IVIg doses, however discussed the specific date may change pending her platelet counts   - will need to sign consent     The patient was discussed with Dr. Kovacs  who is in agreement with the treatment plan.    Tiff Hansen, MS4    Physician Attestation   IAmber DO, saw and evaluated Merissa Leung with the medical student and resident team.     I personally reviewed the vital signs, medications, labs and imaging.    My key history or physical exam findings:   Admitted for course of IVIG in the setting of severe ITP s/p splenectomy.  Patient's plts improved to 49k today.  Hx of chronic pain- see above for management plan.  Patient has no other complaints.     Key management decisions made by me:   - IVIG today, premedicated with tylenol and benadryl.   - continue Dexa IM for FLM  - repeat CBC tomorrow  - If ptls >75k plan would be for delivery by repeat c/s tomorrow under spinal  - Will discuss plan with Dr. Mahoney after plt count tomorrow    FHT: category 1, reactive      Amber Kovacs DO  Date of Service (when I saw the patient): 11/13/21    Time Spent on this Encounter   I, Amber Kovacs DO, spent a total of 15 minutes face to face or coordinating care of Merissa Leung.  Over 50% of my time on the unit was spent counseling the patient and/or coordinating care regarding ITP.

## 2021-11-13 NOTE — PLAN OF CARE
Patient admitted for IVIG infusion. [facility] in severe abdominal pain. Pain well controlled with IV Fentanyl and some more NSAID. See MAR. Patient attested to be comfortable at this moment. Report to Jose Caldwell for care continue.

## 2021-11-13 NOTE — PLAN OF CARE
VSS, pt reports pain in L side of abdomen that radiates to her pelvis and R side of abdomen, fentanyl given. Pt awaiting next dose of IVIG tomorrow morning. FHTs appropriate for gestational age, see flowsheet. Will continue with plan of care.

## 2021-11-13 NOTE — DISCHARGE SUMMARY
[facility]   Discharge Summary    Merissa Leung MRN# [id number]   Age: 36 year old YOB: 1985     Date of Admission:  2021  Date of Discharge::  2021  Admitting Physician:  Genoveva Chirinos MD  Discharge Physician:  Dr Francisco          Admission Diagnoses:   -   - IUP at 35w3d  - H/o C/S x2  - H/o ITP s/p splenectomy - dx 2018  - H/o bulimia   - Thrombocytopenia (34 on )  - Psoriatic arthritis   - IBS  - Anxiety   - OCD  - Fibromyalgia  - anemia  - Possible aortic arch narrowing on U/S   - Chronic benzodiazapine treatment - using daily only at night for a long time for severe anxiety          Discharge Diagnosis:   -   - acute on chronic anemia            Procedures:     Procedure(s): IV immunoglobulin, Steroid course   Repeat  section with bilateral salpingectomy  Spinal  TAP block  Fetal monitoring              Medications Prior to Admission:     Medications Prior to Admission   Medication Sig Dispense Refill Last Dose     clonazePAM (KLONOPIN) 1 MG tablet TAKE 1/2 (ONE-HALF) TABLET BY MOUTH TWICE DAILY FOR ANXIETY AND 1 & 1/2 (ONE & ONE-HALF) AT BEDTIME FOR SLEEP 28 tablet 5 2021 at 2100     cyclobenzaprine (FLEXERIL) 10 MG tablet Take 1 tablet (10 mg) by mouth 3 times daily as needed for muscle spasms 20 tablet 3 Unknown at Unknown time     magnesium oxide (MAG-OX) 400 (240 Mg) MG tablet Take 400 mg by mouth daily   2021 at 0700     Prenatal Vit-Fe Fumarate-FA (PRENATAL MULTIVITAMIN W/IRON) 27-0.8 MG tablet Take 1 tablet by mouth daily   2021 at 0700     sennosides (SENOKOT) 8.6 MG tablet Take 2 tablets by mouth 2 times daily   2021 at 0700     fluticasone (FLONASE) 50 MCG/ACT nasal spray Spray 1 spray into both nostrils daily (Patient not taking: Reported on 2021) 16 g 0      hydrOXYzine (VISTARIL) 50 MG capsule Take 1 capsule (50 mg) by mouth nightly as needed for itching (Patient not  taking: Reported on 2021) 20 capsule 1      LINZESS 145 MCG capsule TAKE 1 CAPSULE BY MOUTH IN THE MORNING BEFORE BREAKFAST 30 capsule 2      polyethylene glycol (MIRALAX/GLYCOLAX) powder Take 17 g by mouth daily as needed for constipation        potassium aminobenzoate 500 MG CAPS capsule         UNABLE TO FIND Take 2 teaspoonful by mouth At Bedtime Magnesium Calm        [DISCONTINUED] ondansetron (ZOFRAN-ODT) 4 MG ODT tab Take 1-2 tablets (4-8 mg) by mouth every 6 hours as needed for nausea (Patient not taking: Reported on 2021) 20 tablet 3              Discharge Medications:        Review of your medicines      START taking      Dose / Directions   acetaminophen 325 MG tablet  Commonly known as: TYLENOL  Used for: S/P       Dose: 650 mg  Take 2 tablets (650 mg) by mouth every 6 hours as needed for mild pain Start after Delivery.  Quantity: 100 tablet  Refills: 0     ferrous sulfate 325 (65 Fe) MG tablet  Commonly known as: FEROSUL  Used for: S/P       Dose: 325 mg  Take 1 tablet (325 mg) by mouth daily (with breakfast)  Quantity: 90 tablet  Refills: 0     gabapentin 100 MG capsule  Commonly known as: NEURONTIN  Used for: S/P       Dose: 100 mg  Take 1 capsule (100 mg) by mouth 3 times daily  Quantity: 15 capsule  Refills: 0     ibuprofen 600 MG tablet  Commonly known as: ADVIL/MOTRIN  Used for: S/P       Dose: 600 mg  Take 1 tablet (600 mg) by mouth every 6 hours as needed for moderate pain Start after delivery  Quantity: 60 tablet  Refills: 0     oxyCODONE 5 MG tablet  Commonly known as: ROXICODONE  Used for: S/P       Dose: 5 mg  Take 1 tablet (5 mg) by mouth every 6 hours as needed for pain  Quantity: 15 tablet  Refills: 0     senna-docusate 8.6-50 MG tablet  Commonly known as: SENOKOT-S/PERICOLACE  Used for: S/P       Dose: 1 tablet  Take 1 tablet by mouth daily Start after delivery.  Quantity: 100 tablet  Refills: 0        CONTINUE these  medicines which have NOT CHANGED      Dose / Directions   clonazePAM 1 MG tablet  Commonly known as: klonoPIN  Used for: EDWARD (generalized anxiety disorder)      TAKE 1/2 (ONE-HALF) TABLET BY MOUTH TWICE DAILY FOR ANXIETY AND 1 & 1/2 (ONE & ONE-HALF) AT BEDTIME FOR SLEEP  Quantity: 28 tablet  Refills: 5     cyclobenzaprine 10 MG tablet  Commonly known as: FLEXERIL  Used for: Spasm of back muscles      Dose: 10 mg  Take 1 tablet (10 mg) by mouth 3 times daily as needed for muscle spasms  Quantity: 20 tablet  Refills: 3     fluticasone 50 MCG/ACT nasal spray  Commonly known as: FLONASE  Used for: Dysfunction of both eustachian tubes      Dose: 1 spray  Spray 1 spray into both nostrils daily  Quantity: 16 g  Refills: 0     hydrOXYzine 50 MG capsule  Commonly known as: VISTARIL  Used for: Low back pain, unspecified back pain laterality, unspecified chronicity, unspecified whether sciatica present      Dose: 50 mg  Take 1 capsule (50 mg) by mouth nightly as needed for itching  Quantity: 20 capsule  Refills: 1     Linzess 145 MCG capsule  Used for: Irritable bowel syndrome with constipation  Generic drug: linaclotide      TAKE 1 CAPSULE BY MOUTH IN THE MORNING BEFORE BREAKFAST  Quantity: 30 capsule  Refills: 2     magnesium oxide 400 (240 Mg) MG tablet  Commonly known as: MAG-OX      Dose: 400 mg  Take 400 mg by mouth daily  Refills: 0     polyethylene glycol 17 GM/Dose powder  Commonly known as: MIRALAX      Dose: 17 g  Take 17 g by mouth daily as needed for constipation  Refills: 0     potassium aminobenzoate 500 MG Caps capsule      Refills: 0     prenatal multivitamin w/iron 27-0.8 MG tablet      Dose: 1 tablet  Take 1 tablet by mouth daily  Refills: 0     sennosides 8.6 MG tablet  Commonly known as: SENOKOT      Dose: 2 tablet  Take 2 tablets by mouth 2 times daily  Refills: 0     UNABLE TO FIND      Dose: 2 teaspoonful  Take 2 teaspoonful by mouth At Bedtime Magnesium Calm  Refills: 0        STOP taking     ondansetron 4 MG ODT tab  Commonly known as: ZOFRAN-ODT              Where to get your medicines      These medications were sent to [facility] [place], [place] - [address]  [address] [facility] [address], [facility] [address]    Phone: [phone]     acetaminophen 325 MG tablet    ferrous sulfate 325 (65 Fe) MG tablet    gabapentin 100 MG capsule    ibuprofen 600 MG tablet    senna-docusate 8.6-50 MG tablet     Some of these will need a paper prescription and others can be bought over the counter. Ask your nurse if you have questions.    Bring a paper prescription for each of these medications    oxyCODONE 5 MG tablet               Consultations:   - Hematology   - Anesthesiology   - Social Work  - Lactation          Brief History of Admission and Antepartum Course:   Merissa Leung is a 36 year old , at 35w2d by LMP c/w 8w0d U/S, who presents for planned admission for IVIG administration due to thrombocytopenia in setting of ITP, prior to planned  delivery. Pregnancy otherwise notable for h/o C/S x2, desires permanent sterilization, h/o bulimia, psoriatic arthritis, IBS, anxiety, and OCD.     On admission she was noted to have significant thrombocytopenia (platelet 24k), and was started on IVIG and dexamethasone for ITP per hematology. She received 2 doses of IVIG and 4 doses of dexamethasone. On HD#3 her platelets appropriately tyler to 87 and a repeat  was recommended. She also desired no further children and requested a bilateral salpingectomy. She has private health insurance. The risks, benefits, and alternatives of  section and bilateral salpingectomy were discussed with the patient, and she agreed to proceed.          Operative Course:   The procedure was uncomplicated. . See operative not for more details.    Findings:   1. Moderate subcutaneous scarring, no rectofascial adhesions, thickened peritoneum, no intraabdominal adhesions, filmy adhesions  between bladder and lower uterine segment  2. Clear amniotic fluid  3. Liveborn male infant in transverse presentation with back down. The infant was internally rotated to cephalic and delivered atraumatically. Born at 1149 on 11/14/21. Apgars 8 at 1 minute & 8 at 5 minutes. Weight 6lb 6.3oz (2900g).  4. Normal uterus, fallopian tubes, and ovaries. Fallopian tubes resected 1cm from cornua with ligasure device with hemostatic pedicles.  5. Cord gasses below     Results for GILA LUZ (MRN [id number]) as of 11/14/2021 13:20    Ref. Range 11/14/2021 12:02   Base Excess/Deficit (+/-) Latest Ref Range: -8.1 - 1.9 mmol/L -2.7   Ph Cord Arterial Latest Ref Range: 7.16 - 7.39  7.30   PCO2 Cord Arterial Latest Ref Range: 35 - 71 mm Hg 46   PO2 Cord Arterial Latest Ref Range: 3 - 33 mm Hg 20   Bicarbonate Cord Arterial Latest Ref Range: 16 - 24 mmol/L 23   Ph Cord Blood Venous Latest Ref Range: 7.21 - 7.45  7.33   PCO2 Cord Venous Latest Ref Range: 27 - 57 mm Hg 45   PO2 Cord Venous Latest Ref Range: 21 - 37 mm Hg 21   Bicarbonate Cord Venous Latest Ref Range: 16 - 24 mmol/L 24   Base Excess Cord Arterial Latest Ref Range: -9.6 - 2.0 mmol/L -4.1            Postpartum Hospital Course:   The patient's postpartum course was remarkable for poor pain control. She had flexeril, lidocaine patches and gabapentin added.  On discharge, her pain was well controlled. Vaginal bleeding is similar to peak menstrual flow.  Voiding without difficulty.  Ambulating well and tolerating a normal diet.  No fever.  Bottle feeding well due to concern for infant sedation with her PO pain regimen and nightly klonopin.  Infant is stable.  She was discharged on post-partum day #3.    Post-partum hemoglobin: 8.9    Contraception: s/p bilateral salpingectomy    Rhogam was not indicated          Discharge Instructions and Follow-Up:     Discharge diet: Regular   Discharge activity: Lifting restricted to 15 pounds for 6 weeks. Nothing in vagina for  6 weeks   Discharge follow-up: Follow up with primary ObGyn in 1 week and 6 weeks  Follow up with rheumatology in 2 weeks   Wound care: Drink plenty of fluids  Keep wound clean and dry           Discharge Disposition:     Discharged to home      Geeta Alarcon MD  Obstetrics & Gynecology PGY-3  6:18 AM 11/17/2021    [facility] staff:  Appreciate note by Dr. Alarcon.  I have seen and examined the patient without the resident. I have reviewed, edited, and agree with the note.      Cheyanne Francisco MD, FACOG  11/23/2021  6:08 PM

## 2021-11-13 NOTE — PLAN OF CARE
Data: Maternal status is stable. VSS, afebrile. Patient is not feeling contractions but does c/o lower back pain. She is using hot packs to treat back pain. Fetal assessment is appropriate for gestational age. Patient platelets went from 25 to 49.   Action: Continue with plan of care, which is close monitoring. Patient is receiving IVIG today and has had not adverse reaction. Patient encouraged to move extremities while in bed and is using SCD.   Response: Patient coping with support of family on phone and expects  to visit this afternoon. If platelets high enough plan for c/s next week.

## 2021-11-14 ENCOUNTER — ANESTHESIA EVENT (OUTPATIENT)
Dept: OBGYN | Facility: CLINIC | Age: 36
End: 2021-11-14
Payer: COMMERCIAL

## 2021-11-14 ENCOUNTER — ANESTHESIA (OUTPATIENT)
Dept: OBGYN | Facility: CLINIC | Age: 36
End: 2021-11-14
Payer: COMMERCIAL

## 2021-11-14 LAB
APTT PPP: 29 SECONDS (ref 22–38)
BASOPHILS # BLD AUTO: 0 10E3/UL (ref 0–0.2)
BASOPHILS NFR BLD AUTO: 0 %
EOSINOPHIL # BLD AUTO: 0 10E3/UL (ref 0–0.7)
EOSINOPHIL NFR BLD AUTO: 0 %
ERYTHROCYTE [DISTWIDTH] IN BLOOD BY AUTOMATED COUNT: 15.6 % (ref 10–15)
ERYTHROCYTE [DISTWIDTH] IN BLOOD BY AUTOMATED COUNT: 16 % (ref 10–15)
FETAL RBC % LFV: 0 %
FETAL RBC (ML): 0 ML
FIBRINOGEN PPP-MCNC: 302 MG/DL (ref 170–490)
GP B STREP DNA SPEC QL NAA+PROBE: NEGATIVE
HCT VFR BLD AUTO: 28.9 % (ref 35–47)
HCT VFR BLD AUTO: 29.4 % (ref 35–47)
HGB BLD-MCNC: 9.4 G/DL (ref 11.7–15.7)
HGB BLD-MCNC: 9.5 G/DL (ref 11.7–15.7)
HOLD SPECIMEN: NORMAL
HOLD SPECIMEN: NORMAL
IF INDICATED RECOMMENDED DOSE OF RH IMMUNE GLOBULIN UG: 300 UG
IMM GRANULOCYTES # BLD: 0.1 10E3/UL
IMM GRANULOCYTES NFR BLD: 1 %
INR PPP: 0.96 (ref 0.86–1.14)
LYMPHOCYTES # BLD AUTO: 1.8 10E3/UL (ref 0.8–5.3)
LYMPHOCYTES NFR BLD AUTO: 17 %
MCH RBC QN AUTO: 27.3 PG (ref 26.5–33)
MCH RBC QN AUTO: 27.5 PG (ref 26.5–33)
MCHC RBC AUTO-ENTMCNC: 32.3 G/DL (ref 31.5–36.5)
MCHC RBC AUTO-ENTMCNC: 32.5 G/DL (ref 31.5–36.5)
MCV RBC AUTO: 84 FL (ref 78–100)
MCV RBC AUTO: 85 FL (ref 78–100)
MONOCYTES # BLD AUTO: 0.4 10E3/UL (ref 0–1.3)
MONOCYTES NFR BLD AUTO: 4 %
NEUTROPHILS # BLD AUTO: 7.9 10E3/UL (ref 1.6–8.3)
NEUTROPHILS NFR BLD AUTO: 78 %
NRBC # BLD AUTO: 0.1 10E3/UL
NRBC BLD AUTO-RTO: 1 /100
PATIENT PENICILLIN, AMOXICILLIN, CEPHALOSPORINS ALLERGY: NO
PLATELET # BLD AUTO: 87 10E3/UL (ref 150–450)
PLATELET # BLD AUTO: 97 10E3/UL (ref 150–450)
RBC # BLD AUTO: 3.44 10E6/UL (ref 3.8–5.2)
RBC # BLD AUTO: 3.45 10E6/UL (ref 3.8–5.2)
T PALLIDUM AB SER QL: NONREACTIVE
WBC # BLD AUTO: 10.3 10E3/UL (ref 4–11)
WBC # BLD AUTO: 18.6 10E3/UL (ref 4–11)

## 2021-11-14 PROCEDURE — C9290 INJ, BUPIVACAINE LIPOSOME: HCPCS | Performed by: ANESTHESIOLOGY

## 2021-11-14 PROCEDURE — 250N000013 HC RX MED GY IP 250 OP 250 PS 637: Performed by: STUDENT IN AN ORGANIZED HEALTH CARE EDUCATION/TRAINING PROGRAM

## 2021-11-14 PROCEDURE — 710N000010 HC RECOVERY PHASE 1, LEVEL 2, PER MIN: Performed by: OBSTETRICS & GYNECOLOGY

## 2021-11-14 PROCEDURE — 0UT70ZZ RESECTION OF BILATERAL FALLOPIAN TUBES, OPEN APPROACH: ICD-10-PCS | Performed by: OBSTETRICS & GYNECOLOGY

## 2021-11-14 PROCEDURE — 258N000003 HC RX IP 258 OP 636

## 2021-11-14 PROCEDURE — 88302 TISSUE EXAM BY PATHOLOGIST: CPT | Mod: TC | Performed by: STUDENT IN AN ORGANIZED HEALTH CARE EDUCATION/TRAINING PROGRAM

## 2021-11-14 PROCEDURE — 85025 COMPLETE CBC W/AUTO DIFF WBC: CPT | Performed by: STUDENT IN AN ORGANIZED HEALTH CARE EDUCATION/TRAINING PROGRAM

## 2021-11-14 PROCEDURE — 59514 CESAREAN DELIVERY ONLY: CPT | Mod: GC | Performed by: OBSTETRICS & GYNECOLOGY

## 2021-11-14 PROCEDURE — 999N000016 HC STATISTIC ATTENDANCE AT DELIVERY

## 2021-11-14 PROCEDURE — 999N000141 HC STATISTIC PRE-PROCEDURE NURSING ASSESSMENT: Performed by: OBSTETRICS & GYNECOLOGY

## 2021-11-14 PROCEDURE — 250N000011 HC RX IP 250 OP 636

## 2021-11-14 PROCEDURE — 370N000017 HC ANESTHESIA TECHNICAL FEE, PER MIN: Performed by: OBSTETRICS & GYNECOLOGY

## 2021-11-14 PROCEDURE — 58611 LIGATE OVIDUCT(S) ADD-ON: CPT | Mod: GC | Performed by: OBSTETRICS & GYNECOLOGY

## 2021-11-14 PROCEDURE — 250N000011 HC RX IP 250 OP 636: Performed by: STUDENT IN AN ORGANIZED HEALTH CARE EDUCATION/TRAINING PROGRAM

## 2021-11-14 PROCEDURE — 85460 HEMOGLOBIN FETAL: CPT | Performed by: STUDENT IN AN ORGANIZED HEALTH CARE EDUCATION/TRAINING PROGRAM

## 2021-11-14 PROCEDURE — 36415 COLL VENOUS BLD VENIPUNCTURE: CPT | Performed by: STUDENT IN AN ORGANIZED HEALTH CARE EDUCATION/TRAINING PROGRAM

## 2021-11-14 PROCEDURE — 272N000001 HC OR GENERAL SUPPLY STERILE: Performed by: OBSTETRICS & GYNECOLOGY

## 2021-11-14 PROCEDURE — 85610 PROTHROMBIN TIME: CPT | Performed by: STUDENT IN AN ORGANIZED HEALTH CARE EDUCATION/TRAINING PROGRAM

## 2021-11-14 PROCEDURE — 250N000013 HC RX MED GY IP 250 OP 250 PS 637

## 2021-11-14 PROCEDURE — 271N000001 HC OR GENERAL SUPPLY NON-STERILE: Performed by: OBSTETRICS & GYNECOLOGY

## 2021-11-14 PROCEDURE — 85730 THROMBOPLASTIN TIME PARTIAL: CPT | Performed by: STUDENT IN AN ORGANIZED HEALTH CARE EDUCATION/TRAINING PROGRAM

## 2021-11-14 PROCEDURE — 99231 SBSQ HOSP IP/OBS SF/LOW 25: CPT | Performed by: NURSE PRACTITIONER

## 2021-11-14 PROCEDURE — 250N000009 HC RX 250: Performed by: STUDENT IN AN ORGANIZED HEALTH CARE EDUCATION/TRAINING PROGRAM

## 2021-11-14 PROCEDURE — 85384 FIBRINOGEN ACTIVITY: CPT | Performed by: STUDENT IN AN ORGANIZED HEALTH CARE EDUCATION/TRAINING PROGRAM

## 2021-11-14 PROCEDURE — 120N000002 HC R&B MED SURG/OB UMMC

## 2021-11-14 PROCEDURE — 258N000003 HC RX IP 258 OP 636: Performed by: STUDENT IN AN ORGANIZED HEALTH CARE EDUCATION/TRAINING PROGRAM

## 2021-11-14 PROCEDURE — 360N000076 HC SURGERY LEVEL 3, PER MIN: Performed by: OBSTETRICS & GYNECOLOGY

## 2021-11-14 PROCEDURE — 85027 COMPLETE CBC AUTOMATED: CPT | Performed by: STUDENT IN AN ORGANIZED HEALTH CARE EDUCATION/TRAINING PROGRAM

## 2021-11-14 PROCEDURE — 88302 TISSUE EXAM BY PATHOLOGIST: CPT | Mod: 26 | Performed by: PATHOLOGY

## 2021-11-14 PROCEDURE — 250N000011 HC RX IP 250 OP 636: Performed by: ANESTHESIOLOGY

## 2021-11-14 RX ORDER — MISOPROSTOL 200 UG/1
400 TABLET ORAL
Status: DISCONTINUED | OUTPATIENT
Start: 2021-11-14 | End: 2021-11-14

## 2021-11-14 RX ORDER — TRANEXAMIC ACID 10 MG/ML
1 INJECTION, SOLUTION INTRAVENOUS EVERY 30 MIN PRN
Status: DISCONTINUED | OUTPATIENT
Start: 2021-11-14 | End: 2021-11-14

## 2021-11-14 RX ORDER — SODIUM CHLORIDE, SODIUM LACTATE, POTASSIUM CHLORIDE, CALCIUM CHLORIDE 600; 310; 30; 20 MG/100ML; MG/100ML; MG/100ML; MG/100ML
INJECTION, SOLUTION INTRAVENOUS
Status: COMPLETED
Start: 2021-11-14 | End: 2021-11-14

## 2021-11-14 RX ORDER — NALOXONE HYDROCHLORIDE 0.4 MG/ML
0.4 INJECTION, SOLUTION INTRAMUSCULAR; INTRAVENOUS; SUBCUTANEOUS
Status: DISCONTINUED | OUTPATIENT
Start: 2021-11-14 | End: 2021-11-14

## 2021-11-14 RX ORDER — BUPIVACAINE HYDROCHLORIDE 7.5 MG/ML
INJECTION, SOLUTION INTRASPINAL
Status: COMPLETED | OUTPATIENT
Start: 2021-11-14 | End: 2021-11-14

## 2021-11-14 RX ORDER — ONDANSETRON 2 MG/ML
INJECTION INTRAMUSCULAR; INTRAVENOUS PRN
Status: DISCONTINUED | OUTPATIENT
Start: 2021-11-14 | End: 2021-11-14

## 2021-11-14 RX ORDER — MISOPROSTOL 200 UG/1
800 TABLET ORAL
Status: DISCONTINUED | OUTPATIENT
Start: 2021-11-14 | End: 2021-11-14

## 2021-11-14 RX ORDER — OXYTOCIN 10 [USP'U]/ML
10 INJECTION, SOLUTION INTRAMUSCULAR; INTRAVENOUS
Status: DISCONTINUED | OUTPATIENT
Start: 2021-11-14 | End: 2021-11-17 | Stop reason: HOSPADM

## 2021-11-14 RX ORDER — FENTANYL CITRATE 50 UG/ML
25 INJECTION, SOLUTION INTRAMUSCULAR; INTRAVENOUS EVERY 5 MIN PRN
Status: DISCONTINUED | OUTPATIENT
Start: 2021-11-14 | End: 2021-11-14

## 2021-11-14 RX ORDER — OXYTOCIN/0.9 % SODIUM CHLORIDE 30/500 ML
100-340 PLASTIC BAG, INJECTION (ML) INTRAVENOUS CONTINUOUS PRN
Status: DISCONTINUED | OUTPATIENT
Start: 2021-11-14 | End: 2021-11-14

## 2021-11-14 RX ORDER — ONDANSETRON 2 MG/ML
4 INJECTION INTRAMUSCULAR; INTRAVENOUS EVERY 6 HOURS PRN
Status: DISCONTINUED | OUTPATIENT
Start: 2021-11-14 | End: 2021-11-17 | Stop reason: HOSPADM

## 2021-11-14 RX ORDER — CEFAZOLIN SODIUM 2 G/100ML
2 INJECTION, SOLUTION INTRAVENOUS SEE ADMIN INSTRUCTIONS
Status: DISCONTINUED | OUTPATIENT
Start: 2021-11-14 | End: 2021-11-14

## 2021-11-14 RX ORDER — POLYETHYLENE GLYCOL 3350 17 G/17G
17 POWDER, FOR SOLUTION ORAL DAILY
Status: DISCONTINUED | OUTPATIENT
Start: 2021-11-14 | End: 2021-11-17 | Stop reason: HOSPADM

## 2021-11-14 RX ORDER — OXYTOCIN 10 [USP'U]/ML
10 INJECTION, SOLUTION INTRAMUSCULAR; INTRAVENOUS
Status: DISCONTINUED | OUTPATIENT
Start: 2021-11-14 | End: 2021-11-14

## 2021-11-14 RX ORDER — ONDANSETRON 2 MG/ML
4 INJECTION INTRAMUSCULAR; INTRAVENOUS EVERY 30 MIN PRN
Status: DISCONTINUED | OUTPATIENT
Start: 2021-11-14 | End: 2021-11-14

## 2021-11-14 RX ORDER — CARBOPROST TROMETHAMINE 250 UG/ML
250 INJECTION, SOLUTION INTRAMUSCULAR
Status: DISCONTINUED | OUTPATIENT
Start: 2021-11-14 | End: 2021-11-17 | Stop reason: HOSPADM

## 2021-11-14 RX ORDER — OXYTOCIN/0.9 % SODIUM CHLORIDE 30/500 ML
340 PLASTIC BAG, INJECTION (ML) INTRAVENOUS CONTINUOUS PRN
Status: COMPLETED | OUTPATIENT
Start: 2021-11-14 | End: 2021-11-14

## 2021-11-14 RX ORDER — OXYTOCIN/0.9 % SODIUM CHLORIDE 30/500 ML
340 PLASTIC BAG, INJECTION (ML) INTRAVENOUS CONTINUOUS PRN
Status: DISCONTINUED | OUTPATIENT
Start: 2021-11-14 | End: 2021-11-14

## 2021-11-14 RX ORDER — METOCLOPRAMIDE 10 MG/1
10 TABLET ORAL EVERY 6 HOURS PRN
Status: DISCONTINUED | OUTPATIENT
Start: 2021-11-14 | End: 2021-11-17 | Stop reason: HOSPADM

## 2021-11-14 RX ORDER — CITRIC ACID/SODIUM CITRATE 334-500MG
30 SOLUTION, ORAL ORAL
Status: COMPLETED | OUTPATIENT
Start: 2021-11-14 | End: 2021-11-14

## 2021-11-14 RX ORDER — CYCLOBENZAPRINE HCL 5 MG
5-10 TABLET ORAL 3 TIMES DAILY PRN
Status: DISCONTINUED | OUTPATIENT
Start: 2021-11-14 | End: 2021-11-15

## 2021-11-14 RX ORDER — ACETAMINOPHEN 325 MG/1
975 TABLET ORAL EVERY 6 HOURS
Status: DISCONTINUED | OUTPATIENT
Start: 2021-11-14 | End: 2021-11-16

## 2021-11-14 RX ORDER — ONDANSETRON 4 MG/1
4 TABLET, ORALLY DISINTEGRATING ORAL EVERY 30 MIN PRN
Status: DISCONTINUED | OUTPATIENT
Start: 2021-11-14 | End: 2021-11-14

## 2021-11-14 RX ORDER — METHYLERGONOVINE MALEATE 0.2 MG/ML
200 INJECTION INTRAVENOUS
Status: DISCONTINUED | OUTPATIENT
Start: 2021-11-14 | End: 2021-11-14

## 2021-11-14 RX ORDER — LIDOCAINE 40 MG/G
CREAM TOPICAL
Status: DISCONTINUED | OUTPATIENT
Start: 2021-11-14 | End: 2021-11-14

## 2021-11-14 RX ORDER — MORPHINE SULFATE 1 MG/ML
INJECTION, SOLUTION EPIDURAL; INTRATHECAL; INTRAVENOUS
Status: COMPLETED | OUTPATIENT
Start: 2021-11-14 | End: 2021-11-14

## 2021-11-14 RX ORDER — FENTANYL CITRATE 50 UG/ML
INJECTION, SOLUTION INTRAMUSCULAR; INTRAVENOUS
Status: COMPLETED | OUTPATIENT
Start: 2021-11-14 | End: 2021-11-14

## 2021-11-14 RX ORDER — HYDROCORTISONE 2.5 %
CREAM (GRAM) TOPICAL 3 TIMES DAILY PRN
Status: DISCONTINUED | OUTPATIENT
Start: 2021-11-14 | End: 2021-11-17 | Stop reason: HOSPADM

## 2021-11-14 RX ORDER — TRANEXAMIC ACID 10 MG/ML
1 INJECTION, SOLUTION INTRAVENOUS EVERY 30 MIN PRN
Status: DISCONTINUED | OUTPATIENT
Start: 2021-11-14 | End: 2021-11-17 | Stop reason: HOSPADM

## 2021-11-14 RX ORDER — FLUMAZENIL 0.1 MG/ML
0.2 INJECTION, SOLUTION INTRAVENOUS
Status: DISCONTINUED | OUTPATIENT
Start: 2021-11-14 | End: 2021-11-14

## 2021-11-14 RX ORDER — FENTANYL CITRATE 50 UG/ML
25-50 INJECTION, SOLUTION INTRAMUSCULAR; INTRAVENOUS
Status: DISCONTINUED | OUTPATIENT
Start: 2021-11-14 | End: 2021-11-14

## 2021-11-14 RX ORDER — FENTANYL CITRATE 50 UG/ML
50 INJECTION, SOLUTION INTRAMUSCULAR; INTRAVENOUS ONCE
Status: COMPLETED | OUTPATIENT
Start: 2021-11-14 | End: 2021-11-14

## 2021-11-14 RX ORDER — CEFAZOLIN SODIUM 2 G/100ML
2 INJECTION, SOLUTION INTRAVENOUS
Status: COMPLETED | OUTPATIENT
Start: 2021-11-14 | End: 2021-11-14

## 2021-11-14 RX ORDER — FENTANYL CITRATE-0.9 % NACL/PF 10 MCG/ML
PLASTIC BAG, INJECTION (ML) INTRAVENOUS CONTINUOUS PRN
Status: DISCONTINUED | OUTPATIENT
Start: 2021-11-14 | End: 2021-11-14

## 2021-11-14 RX ORDER — METOCLOPRAMIDE HYDROCHLORIDE 5 MG/ML
10 INJECTION INTRAMUSCULAR; INTRAVENOUS EVERY 6 HOURS PRN
Status: DISCONTINUED | OUTPATIENT
Start: 2021-11-14 | End: 2021-11-17 | Stop reason: HOSPADM

## 2021-11-14 RX ORDER — ACETAMINOPHEN 325 MG/1
975 TABLET ORAL ONCE
Status: DISCONTINUED | OUTPATIENT
Start: 2021-11-14 | End: 2021-11-14

## 2021-11-14 RX ORDER — MISOPROSTOL 200 UG/1
400 TABLET ORAL
Status: DISCONTINUED | OUTPATIENT
Start: 2021-11-14 | End: 2021-11-17 | Stop reason: HOSPADM

## 2021-11-14 RX ORDER — NALOXONE HYDROCHLORIDE 0.4 MG/ML
0.4 INJECTION, SOLUTION INTRAMUSCULAR; INTRAVENOUS; SUBCUTANEOUS
Status: DISCONTINUED | OUTPATIENT
Start: 2021-11-14 | End: 2021-11-17 | Stop reason: HOSPADM

## 2021-11-14 RX ORDER — CYCLOBENZAPRINE HCL 5 MG
5-10 TABLET ORAL 3 TIMES DAILY PRN
Status: DISCONTINUED | OUTPATIENT
Start: 2021-11-14 | End: 2021-11-14

## 2021-11-14 RX ORDER — FENTANYL CITRATE-0.9 % NACL/PF 10 MCG/ML
100 PLASTIC BAG, INJECTION (ML) INTRAVENOUS EVERY 5 MIN PRN
Status: DISCONTINUED | OUTPATIENT
Start: 2021-11-14 | End: 2021-11-14

## 2021-11-14 RX ORDER — NALOXONE HYDROCHLORIDE 0.4 MG/ML
0.2 INJECTION, SOLUTION INTRAMUSCULAR; INTRAVENOUS; SUBCUTANEOUS
Status: DISCONTINUED | OUTPATIENT
Start: 2021-11-14 | End: 2021-11-17 | Stop reason: HOSPADM

## 2021-11-14 RX ORDER — DEXTROSE, SODIUM CHLORIDE, SODIUM LACTATE, POTASSIUM CHLORIDE, AND CALCIUM CHLORIDE 5; .6; .31; .03; .02 G/100ML; G/100ML; G/100ML; G/100ML; G/100ML
INJECTION, SOLUTION INTRAVENOUS CONTINUOUS
Status: DISCONTINUED | OUTPATIENT
Start: 2021-11-14 | End: 2021-11-17 | Stop reason: HOSPADM

## 2021-11-14 RX ORDER — NALOXONE HYDROCHLORIDE 0.4 MG/ML
0.2 INJECTION, SOLUTION INTRAMUSCULAR; INTRAVENOUS; SUBCUTANEOUS
Status: DISCONTINUED | OUTPATIENT
Start: 2021-11-14 | End: 2021-11-14

## 2021-11-14 RX ORDER — SIMETHICONE 80 MG
80 TABLET,CHEWABLE ORAL 4 TIMES DAILY PRN
Status: DISCONTINUED | OUTPATIENT
Start: 2021-11-14 | End: 2021-11-14

## 2021-11-14 RX ORDER — CEFAZOLIN SODIUM 2 G/100ML
INJECTION, SOLUTION INTRAVENOUS
Status: COMPLETED
Start: 2021-11-14 | End: 2021-11-14

## 2021-11-14 RX ORDER — LIDOCAINE 40 MG/G
CREAM TOPICAL
Status: DISCONTINUED | OUTPATIENT
Start: 2021-11-14 | End: 2021-11-17 | Stop reason: HOSPADM

## 2021-11-14 RX ORDER — CITRIC ACID/SODIUM CITRATE 334-500MG
SOLUTION, ORAL ORAL
Status: DISCONTINUED
Start: 2021-11-14 | End: 2021-11-14 | Stop reason: HOSPADM

## 2021-11-14 RX ORDER — METHYLERGONOVINE MALEATE 0.2 MG/ML
200 INJECTION INTRAVENOUS
Status: DISCONTINUED | OUTPATIENT
Start: 2021-11-14 | End: 2021-11-17 | Stop reason: HOSPADM

## 2021-11-14 RX ORDER — CARBOPROST TROMETHAMINE 250 UG/ML
250 INJECTION, SOLUTION INTRAMUSCULAR
Status: DISCONTINUED | OUTPATIENT
Start: 2021-11-14 | End: 2021-11-14

## 2021-11-14 RX ORDER — AMOXICILLIN 250 MG
2 CAPSULE ORAL 2 TIMES DAILY
Status: DISCONTINUED | OUTPATIENT
Start: 2021-11-14 | End: 2021-11-17 | Stop reason: HOSPADM

## 2021-11-14 RX ORDER — OXYCODONE HYDROCHLORIDE 5 MG/1
5 TABLET ORAL EVERY 4 HOURS PRN
Status: DISCONTINUED | OUTPATIENT
Start: 2021-11-14 | End: 2021-11-14

## 2021-11-14 RX ORDER — SODIUM CHLORIDE, SODIUM LACTATE, POTASSIUM CHLORIDE, CALCIUM CHLORIDE 600; 310; 30; 20 MG/100ML; MG/100ML; MG/100ML; MG/100ML
INJECTION, SOLUTION INTRAVENOUS CONTINUOUS
Status: DISCONTINUED | OUTPATIENT
Start: 2021-11-14 | End: 2021-11-14

## 2021-11-14 RX ORDER — IBUPROFEN 800 MG/1
800 TABLET, FILM COATED ORAL EVERY 6 HOURS
Status: DISCONTINUED | OUTPATIENT
Start: 2021-11-15 | End: 2021-11-17 | Stop reason: HOSPADM

## 2021-11-14 RX ORDER — KETOROLAC TROMETHAMINE 30 MG/ML
30 INJECTION, SOLUTION INTRAMUSCULAR; INTRAVENOUS EVERY 6 HOURS
Status: COMPLETED | OUTPATIENT
Start: 2021-11-14 | End: 2021-11-15

## 2021-11-14 RX ORDER — NALBUPHINE HYDROCHLORIDE 10 MG/ML
2.5-5 INJECTION, SOLUTION INTRAMUSCULAR; INTRAVENOUS; SUBCUTANEOUS EVERY 6 HOURS PRN
Status: DISCONTINUED | OUTPATIENT
Start: 2021-11-14 | End: 2021-11-14

## 2021-11-14 RX ORDER — ONDANSETRON 4 MG/1
4 TABLET, ORALLY DISINTEGRATING ORAL EVERY 6 HOURS PRN
Status: DISCONTINUED | OUTPATIENT
Start: 2021-11-14 | End: 2021-11-17 | Stop reason: HOSPADM

## 2021-11-14 RX ORDER — SIMETHICONE 80 MG
80 TABLET,CHEWABLE ORAL 4 TIMES DAILY
Status: DISCONTINUED | OUTPATIENT
Start: 2021-11-14 | End: 2021-11-17 | Stop reason: HOSPADM

## 2021-11-14 RX ORDER — BISACODYL 10 MG
10 SUPPOSITORY, RECTAL RECTAL DAILY PRN
Status: DISCONTINUED | OUTPATIENT
Start: 2021-11-16 | End: 2021-11-17 | Stop reason: HOSPADM

## 2021-11-14 RX ORDER — MISOPROSTOL 200 UG/1
800 TABLET ORAL
Status: DISCONTINUED | OUTPATIENT
Start: 2021-11-14 | End: 2021-11-17 | Stop reason: HOSPADM

## 2021-11-14 RX ORDER — OXYCODONE HYDROCHLORIDE 5 MG/1
5-10 TABLET ORAL EVERY 4 HOURS PRN
Status: DISCONTINUED | OUTPATIENT
Start: 2021-11-14 | End: 2021-11-15

## 2021-11-14 RX ORDER — BUPIVACAINE HYDROCHLORIDE 2.5 MG/ML
INJECTION, SOLUTION EPIDURAL; INFILTRATION; INTRACAUDAL
Status: COMPLETED | OUTPATIENT
Start: 2021-11-14 | End: 2021-11-14

## 2021-11-14 RX ORDER — PROCHLORPERAZINE MALEATE 10 MG
10 TABLET ORAL EVERY 6 HOURS PRN
Status: DISCONTINUED | OUTPATIENT
Start: 2021-11-14 | End: 2021-11-17 | Stop reason: HOSPADM

## 2021-11-14 RX ORDER — MODIFIED LANOLIN
OINTMENT (GRAM) TOPICAL
Status: DISCONTINUED | OUTPATIENT
Start: 2021-11-14 | End: 2021-11-17 | Stop reason: HOSPADM

## 2021-11-14 RX ORDER — PROCHLORPERAZINE 25 MG
25 SUPPOSITORY, RECTAL RECTAL EVERY 12 HOURS PRN
Status: DISCONTINUED | OUTPATIENT
Start: 2021-11-14 | End: 2021-11-17 | Stop reason: HOSPADM

## 2021-11-14 RX ORDER — MORPHINE SULFATE 2 MG/ML
2-4 INJECTION, SOLUTION INTRAMUSCULAR; INTRAVENOUS
Status: COMPLETED | OUTPATIENT
Start: 2021-11-14 | End: 2021-11-14

## 2021-11-14 RX ADMIN — PANTOPRAZOLE SODIUM 40 MG: 20 TABLET, DELAYED RELEASE ORAL at 07:53

## 2021-11-14 RX ADMIN — ONDANSETRON 4 MG: 2 INJECTION INTRAMUSCULAR; INTRAVENOUS at 11:24

## 2021-11-14 RX ADMIN — DEXAMETHASONE SODIUM PHOSPHATE 6 MG: 4 INJECTION, SOLUTION INTRAMUSCULAR; INTRAVENOUS at 00:27

## 2021-11-14 RX ADMIN — Medication 50 MCG/MIN: at 11:18

## 2021-11-14 RX ADMIN — OXYCODONE HYDROCHLORIDE 10 MG: 5 TABLET ORAL at 14:17

## 2021-11-14 RX ADMIN — OXYTOCIN-SODIUM CHLORIDE 0.9% IV SOLN 30 UNIT/500ML 600 ML/HR: 30-0.9/5 SOLUTION at 11:50

## 2021-11-14 RX ADMIN — Medication 340 ML/HR: at 14:17

## 2021-11-14 RX ADMIN — CLONAZEPAM 1.5 MG: 1 TABLET ORAL at 00:01

## 2021-11-14 RX ADMIN — MORPHINE SULFATE 0.15 MG: 1 INJECTION EPIDURAL; INTRATHECAL; INTRAVENOUS at 11:22

## 2021-11-14 RX ADMIN — LINACLOTIDE 145 MCG: 145 CAPSULE, GELATIN COATED ORAL at 08:22

## 2021-11-14 RX ADMIN — Medication 200 MCG: at 11:25

## 2021-11-14 RX ADMIN — HYDROXYZINE HYDROCHLORIDE 50 MG: 50 TABLET, FILM COATED ORAL at 20:14

## 2021-11-14 RX ADMIN — OXYCODONE HYDROCHLORIDE 10 MG: 5 TABLET ORAL at 22:12

## 2021-11-14 RX ADMIN — ACETAMINOPHEN 975 MG: 325 TABLET, FILM COATED ORAL at 06:24

## 2021-11-14 RX ADMIN — BUPIVACAINE HYDROCHLORIDE IN DEXTROSE 1.5 ML: 7.5 INJECTION, SOLUTION SUBARACHNOID at 11:22

## 2021-11-14 RX ADMIN — FENTANYL CITRATE 10 MCG: 50 INJECTION, SOLUTION INTRAMUSCULAR; INTRAVENOUS at 11:22

## 2021-11-14 RX ADMIN — METHYLERGONOVINE MALEATE 200 MCG: 0.2 INJECTION INTRAVENOUS at 11:53

## 2021-11-14 RX ADMIN — KETOROLAC TROMETHAMINE 30 MG: 30 INJECTION, SOLUTION INTRAMUSCULAR at 13:56

## 2021-11-14 RX ADMIN — SODIUM CHLORIDE, POTASSIUM CHLORIDE, SODIUM LACTATE AND CALCIUM CHLORIDE 250 ML: 600; 310; 30; 20 INJECTION, SOLUTION INTRAVENOUS at 09:55

## 2021-11-14 RX ADMIN — DOCUSATE SODIUM 50MG AND SENNOSIDES 8.6MG 2 TABLET: 8.6; 5 TABLET, FILM COATED ORAL at 20:14

## 2021-11-14 RX ADMIN — PHENYLEPHRINE HYDROCHLORIDE 200 MCG: 10 INJECTION INTRAVENOUS at 11:19

## 2021-11-14 RX ADMIN — Medication 2 G: at 11:14

## 2021-11-14 RX ADMIN — CYCLOBENZAPRINE HYDROCHLORIDE 10 MG: 5 TABLET, FILM COATED ORAL at 22:12

## 2021-11-14 RX ADMIN — SIMETHICONE 80 MG: 80 TABLET, CHEWABLE ORAL at 22:12

## 2021-11-14 RX ADMIN — MORPHINE SULFATE 4 MG: 2 INJECTION, SOLUTION INTRAMUSCULAR; INTRAVENOUS at 16:28

## 2021-11-14 RX ADMIN — SODIUM CITRATE AND CITRIC ACID MONOHYDRATE 30 ML: 500; 334 SOLUTION ORAL at 11:05

## 2021-11-14 RX ADMIN — SODIUM CHLORIDE, POTASSIUM CHLORIDE, SODIUM LACTATE AND CALCIUM CHLORIDE: 600; 310; 30; 20 INJECTION, SOLUTION INTRAVENOUS at 11:15

## 2021-11-14 RX ADMIN — POLYETHYLENE GLYCOL 3350 17 G: 17 POWDER, FOR SOLUTION ORAL at 08:22

## 2021-11-14 RX ADMIN — HYDROXYZINE HYDROCHLORIDE 50 MG: 50 TABLET, FILM COATED ORAL at 06:22

## 2021-11-14 RX ADMIN — FENTANYL CITRATE 50 MCG: 50 INJECTION, SOLUTION INTRAMUSCULAR; INTRAVENOUS at 02:22

## 2021-11-14 RX ADMIN — BUPIVACAINE HYDROCHLORIDE 20 ML: 2.5 INJECTION, SOLUTION EPIDURAL; INFILTRATION; INTRACAUDAL at 13:02

## 2021-11-14 RX ADMIN — PHENYLEPHRINE HYDROCHLORIDE 100 MCG: 10 INJECTION INTRAVENOUS at 11:51

## 2021-11-14 RX ADMIN — DOCUSATE SODIUM AND SENNOSIDES 2 TABLET: 8.6; 5 TABLET ORAL at 07:53

## 2021-11-14 RX ADMIN — ACETAMINOPHEN 975 MG: 325 TABLET, FILM COATED ORAL at 21:13

## 2021-11-14 RX ADMIN — Medication 400 MG: at 07:53

## 2021-11-14 RX ADMIN — BUPIVACAINE 20 ML: 13.3 INJECTION, SUSPENSION, LIPOSOMAL INFILTRATION at 13:02

## 2021-11-14 RX ADMIN — ACETAMINOPHEN 975 MG: 325 TABLET, FILM COATED ORAL at 14:58

## 2021-11-14 RX ADMIN — TRANEXAMIC ACID 1 G: 10 INJECTION, SOLUTION INTRAVENOUS at 11:51

## 2021-11-14 RX ADMIN — KETOROLAC TROMETHAMINE 30 MG: 30 INJECTION, SOLUTION INTRAMUSCULAR at 20:15

## 2021-11-14 RX ADMIN — MORPHINE SULFATE 4 MG: 2 INJECTION, SOLUTION INTRAMUSCULAR; INTRAVENOUS at 20:15

## 2021-11-14 NOTE — CONSULTS
_       [facility]                      Neonatology Advanced Practice Antepartum Counseling Consult      I was asked to provide antepartum counseling for Merissa Leung at the request of Amber Kovacs, secondary to  delivery scheduled this afternoon. Ms. Leung is currently 35.4 weeks and has a hx significant for ITP requiring IVIg, abdominal pain, and possible fetal aortic arch narrowing. Dexamethasome was administered on 11/4 x4 doses. Ms. Leung  was counseled on the expected hospital course, potential risks, and outcomes associated with an infant born at approximately 35 weeks gestation. The counseling included: initial delivery room stabilization, respiratory course, lung development, RDS, hyperbilirubinemia, nutrition, growth and development. Criteria for NICU admission and NICU discharge discussed.  Please feel free to call with any additional questions or concerns.          Stefan Kebede   Advanced Practice Service    Intensive Care Unit  [facility]      Floor Time (min): 5  Face to Face Time (min): 15  Total Time (minutes): 20  More than 50% of my time was spent in direct, face to face, antepartum counseling with the above patient.

## 2021-11-14 NOTE — PROGRESS NOTES
Met pt in prep for repeat c/s.  See daily note for details. In short, pt admitted for tx of ITP in pregnancy. S/p steroids. Plts uptrending.  Feeling well.     Plan per MFM team, heme and anesthesia to move forward with c/s today.      Plts are on hold if needed.  C/s consent and blood transfusion complete.     Cheyanne Francisco MD, FACOG  (she/her/hers)    Department of Ob/Gyn/Women's Health  [facility]  [facility]  [address] [place], [place] [address]. [place] [address]  [email or web address]  p. [phone]  f. [phone]  11/14/2021  11:09 AM

## 2021-11-14 NOTE — PROGRESS NOTES
Antepartum Progress Note  DOS: 21  MRN: [id number]    S:   Still having a lot of pain in her abdomen this morning from baby moving. Pain similar to last night. Thinks that steroids have made it worse. Please that her platelets are at an appropriate level. Amenable to a CS today given platelets above 75.    O:   Vitals:    21 2350 21 0215 21 0625 21 0750   BP: 95/51 109/60 102/57    BP Location:       Pulse:    78   Resp:   20   Temp: 97.8  F (36.6  C) 97.8  F (36.6  C)  98.7  F (37.1  C)   TempSrc: Oral Oral  Axillary   Weight:       Height:         Gen: sitting in bed, mild distress with abdominal discomfort  Resp: normal inspiratory effort, nonlabored breathing on RA  CV: well-perfused  Abdomen: gravid  Extremities: no lower extremity edema b/l    FHT: 120-150 baseline, mod variability, + accelerations, no decelerations  [place]: quiet    Labs/imaging:   CMPNo lab results found in last 7 days.  CBCRecent Labs   Lab 21  02421  0823 21  0939   WBC 10.3 14.2* 14.6*   RBC 3.44* 3.64* 4.09   HGB 9.4* 10.1* 11.2*   HCT 28.9* 30.7* 34.6*   MCV 84 84 85   MCH 27.3 27.7 27.4   MCHC 32.5 32.9 32.4   RDW 15.6* 15.3* 15.5*   PLT 87* 49* 25*     INR  Recent Labs   Lab 21  0245 21  0939   INR 0.96 0.85*     Fibrinogen: 302  KB: neg  APTT: 29    MFM U/S Comprehensive (21):   - Cardiac activity present.  bpm.  - Fetal movements present.  - Presentation cephalic.  - Placenta posterior.  - Umbilical cord 3 vessel cord.  - Amniotic fluid Amount of AF: normal. MVP 5.8 cm.  - EFW 36%ile at 5 lb 8 oz, AC 33%ile    Assessment and Plan:  Ms. Merissa Leung is a 36 year old , at 35w3d by LMP c/w 8w0d U/S on HD#4 admitted for planned admission for IVIG administration due to thrombocytopenia in setting of ITP, prior to planned  delivery. Pregnancy otherwise complicated by h/o C/S x2, desires permanent sterilization, h/o bulimia, psoriatic arthritis,  "IBS, anxiety, and OCD. Platelets 85 this morning, appropriate for regional anesthesia (spinal) and will therefore plan for RLTCS today.     ITP  - Plan for RLTCS + BTL today as platelets at appropriate level for spinal anesthesia  - S/p splenectomy   - Plt 24 11/12 >> 49 > 87  - INR 0.85, aPTT nl, fibrinogen 358 (11/12)  - s/p 2D IVIG. s/p 4 doses Dexamethasone 6mg IV instead of standard pre/post steroids per heme, as this will also give benefit for fetal lung maturity.    - Followed by Dr.Marshall Mahoney (Hematology) cell phone: [phone]     RLTCS + BTL  - NPO  - Plan spinal anesthesia  - 2g Ancef   - Op note reviewed, no mention of adhesions  - No contraindication to uterotonics  - Private insurance  - Discussed r/b/a of procedure including bleeding, infection, injury, risk of regret with BTL. Informed consent signed. Patient agreeable to blood products if needed.    Abdominal pain  Fibromyalgia  - R/o PTL: Cervix was closed on exam, astrid intermittently on toco  - per pt, pain is related to fetal movements for her. Also reports that this is how she intermittently will feel on daily basis for several months now so this is likely her \"normal\" baseline pain at this time  - patient's fibromyalgia is likely large contributor to her pain  - Received prn fentanyl x2 11/12, x2 11/13  - Abdominal pain again overnight with negative abruption work-up and c/l/h on speculum exam     H/o bulimia  Anxiety  OCD  - 100 mg atarax given on admission for help with current acute anxiety on presentation, sched 50mg q6h throughout admission. Patient very agreeable to this.  - will continue PTA 1-1.5 mg klonopin at bedtime  - SW consult postpartum     Psoriatic arthritis   - s/p cimzia recently  - Followed by Dr. Buck Morrell (Rheumatology)     PNC  - Rh +, Ab neg, Rubella immune, passed GCT, GBS pending  - RPR, HepBSag, HIV Ab NR  - posterior placenta     Constipation  - Pt w/o BM for ~5 days  - continue sched senna BID  - " prn miralax >> scheduled today  - prn suppository added today       The patient was discussed with Dr. Kovacs  who is in agreement with the treatment plan.    Yaakov Parks MD MPH  OB/Gyn PGY-3  21 10:05 AM    Physician Attestation   Amber VARMA DO, saw and evaluated Merissa Leung with the resident.      I personally reviewed the vital signs, medications, labs and imaging.    My key history or physical exam findings:   Patient had significant pain overnight, worse with baby moving.  Required fentanyl to help the pain.  No bleeding or contractions.  +FM.  No leaking or bleeding.     Patient's platelets are 87k.      Key management decisions made by me:   Given that the platelet count has now met goal in order to have regional anesthesia, recommend delivery.      Discussed with Dr. Mahoney ([facility]).  TXA can be used if bleeding.  Plt transfusion if needed- not likely.     Amber Kovacs DO  Date of Service (when I saw the patient): 21    Time Spent on this Encounter   IAmber DO, spent a total of 15 minutes face to face or coordinating care of Merissa Leung.  Over 50% of my time on the unit was spent counseling the patient and/or coordinating care regarding plan for  delivery due to refractory ITP.

## 2021-11-14 NOTE — PLAN OF CARE
Decision was made to proceed to C/S today. Consents were signed and witnessed, baby boy was born at 1149, NICU was in attendance but baby was stable enough to stay with mom in the care of normal  nursery. Patient did receive TAPS block following procedure. Continue to monitor closely.

## 2021-11-14 NOTE — ANESTHESIA PROCEDURE NOTES
Intrathecal injection Procedure Note    Pre-Procedure   Staff -        Anesthesiologist:  Guilherme Irvin MD       Resident/Fellow: Reggie Santoyo Jr., MD       Performed By: resident       Location: OR       Pre-Anesthestic Checklist: patient identified, IV checked, risks and benefits discussed, informed consent, monitors and equipment checked, pre-op evaluation, at physician/surgeon's request and post-op pain management  Timeout:       Correct Patient: Yes        Correct Procedure: Yes        Correct Site: Yes        Correct Position: Yes   Procedure Documentation  Procedure: intrathecal injection       Patient Position: sitting       Skin prep: Chloraprep       Insertion Site: L3-4. (midline approach).       Needle Gauge: 25.        Needle Length (Inches): 3.5        Spinal Needle Type: Pencan       Introducer used      # of attempts: 1 and  # of redirects:     Assessment/Narrative         Paresthesias: No.       Sensory Level: T5       CSF fluid: clear.    Medication(s) Administered   0.75% Hyperbaric Bupivacaine (Intrathecal), 1.5 mL  Fentanyl PF (Intrathecal), 10 mcg  Morphine PF 1 mg/mL (Intrathecal), 0.15 mg  Medication Administration Time: 11/14/2021 11:22 AM

## 2021-11-14 NOTE — PLAN OF CARE
Data: Assumed care of Merissa VINICIO Xochitl at 1520.  Mishra removed at 1530, pt stable for transfer.  Transferred to [facility] via gurney at 1537. Baby transferred via parent's arms.  Action: Receiving unit notified of transfer: Yes. Patient and family notified of room change. Report given to Nikky LAKE at 1545. Belongings sent to receiving unit. Accompanied by Registered Nurse. Oriented patient to surroundings. Call light within reach. ID bands double-checked with receiving RN.  Response: Patient tolerated transfer and is stable.

## 2021-11-14 NOTE — ANESTHESIA CARE TRANSFER NOTE
Patient: Merissa Leung    Procedure: Procedure(s):   SECTION, WITH POSTPARTUM TUBAL LIGATION       Diagnosis: 35 weeks gestation of pregnancy [Z3A.35]  Diagnosis Additional Information: No value filed.    Anesthesia Type:   Spinal     Note:    Oropharynx: spontaneously breathing  Level of Consciousness: awake  Oxygen Supplementation: room air    Independent Airway: airway patency satisfactory and stable  Dentition: dentition unchanged  Vital Signs Stable: post-procedure vital signs reviewed and stable  Report to RN Given: handoff report given  Patient transferred to: PACU    Handoff Report: Identifed the Patient, Identified the Reponsible Provider, Reviewed the pertinent medical history, Discussed the surgical course, Reviewed Intra-OP anesthesia mangement and issues during anesthesia, Set expectations for post-procedure period and Allowed opportunity for questions and acknowledgement of understanding      Vitals:  Vitals Value Taken Time   BP     Temp     Pulse     Resp     SpO2         Electronically Signed By: Reggie Chakraborty Junior, MD  2021  1:19 PM

## 2021-11-14 NOTE — PROGRESS NOTES
"Paged by RN as pt is having \"intense pain and is moaning\". Went to evaluate the pt.     Merissa notes she has had this severe pain since about 5 months whenever he is moving \"it really hurts\", the pain has been more pronounced since she got the steroids as baby is moving a lot more. Denies new sensation of pain, just \"its really intense and we have to do something, its been a few hours\" of pain like this. She notes fentanyl did help last night. Vistaril does not help at all. She denies vaginal bleeding. When I ask what she thinks it is she says \"I don't know he just needs to come out. He is moving so much and hitting my ribs\".       Patient Vitals for the past 24 hrs:   BP Temp Temp src Pulse Resp   11/14/21 0215 109/60 -- -- -- --   11/13/21 2350 95/51 97.8  F (36.6  C) Oral -- 20   11/13/21 1900 122/58 -- -- 83 --   11/13/21 1618 119/56 98.2  F (36.8  C) Oral 81 18   11/13/21 1410 105/57 98.6  F (37  C) Oral 83 20   11/13/21 1330 -- -- -- -- 20   11/13/21 1105 108/64 -- -- 76 18   11/13/21 0750 107/59 97.7  F (36.5  C) Axillary 86 20   11/13/21 0403 105/72 -- -- -- 16       Gen: moaning in pain, breathing through pain continuously - not rhythmic contraction like pain   Abdomen is diffusely tender, baby is very active, visibly moving fetus  : no vaginal bleeding   SVE: C/L/H does not tolerate exam well, moves away and very uncomfortable for her     Concern for abruption, bleeding, uterine rupture, labor. Though patient has been in pain like this for a few hours and vitally has been stable, HR 80, BP 100s/50s-60s    - Vitally stable, no bleeding currently   - STAT abruption labs  - back on monitor now, fetus visibly moving and audibly in the 140s  - Fentanyl 50mcg ordered  - Plan to repeat speculum exam     Saray Ramon MD   OBGYN PGY3  11/14/21 2:19 AM     "

## 2021-11-14 NOTE — PROVIDER NOTIFICATION
11/14/21 0223   Provider Notification   Provider Name/Title Dr. Ramon   Method of Notification At Bedside   Notification Reason SVE     Dr. Ramon notified that patient called out with 10/10 pain over her whole body at 0145. She feels the pain stems from her active fetal movement post steriod injection. Fetus is visually moving very actively. No vaginal bleeding. Patient appears to be in acute distress. No orders for pain medication that RN can give at this time.     Provider in room to assess patient. Patient placed on monitor at 0213, FHT's 160's. VSS. Afebrile. Provider ordering STAT abruption labs. Order for one time dose of fentanyl.     Will continue close monitoring.

## 2021-11-14 NOTE — ANESTHESIA POSTPROCEDURE EVALUATION
Patient: Merissa Leung    Procedure: Procedure(s):   SECTION, WITH POSTPARTUM TUBAL LIGATION       Diagnosis:35 weeks gestation of pregnancy [Z3A.35]  Diagnosis Additional Information: No value filed.    Anesthesia Type:  Spinal    Note:  Disposition: Inpatient   Postop Pain Control: Uneventful            Sign Out: Well controlled pain   PONV: No   Neuro/Psych: Uneventful            Sign Out: Acceptable/Baseline neuro status   Airway/Respiratory: Uneventful            Sign Out: Acceptable/Baseline resp. status   CV/Hemodynamics: Uneventful            Sign Out: Acceptable CV status   Other NRE: NONE   DID A NON-ROUTINE EVENT OCCUR? No           Last vitals:  Vitals Value Taken Time   /46 21 1515   Temp 36.5  C (97.7  F) 21 1445   Pulse 47 21 1518   Resp 20 21 1445   SpO2 99 % 21 1518   Vitals shown include unvalidated device data.    Electronically Signed By: Guilherme Irvin MD  2021  4:51 PM

## 2021-11-14 NOTE — PLAN OF CARE
Patient resting this evening in bed watching tv. Verbalizes her pain is unchanged and rates 7/10 that presents in her spine, legs, and abdomen. Tylenol and heat packs given to help with pain. Provider updated on pain status. Vital signs remain within order parameters. Category 1 fetal heart tracing. Saline lock in place and flushes well. GBS swab collected and sent to lab. Continue to monitor.

## 2021-11-14 NOTE — PLAN OF CARE
VSS. Afebrile. Denies bleeding, LOF, cramping. Does complain of all over body pain, worsened by active fetal movement. Given fentanyl x1, tylenol and vistaril overnight. Patient sleeping between cares. SCD's on. EFM completed overnight. Continue to monitor. Report to leandro Agrawal relinquished at 0730.

## 2021-11-14 NOTE — OP NOTE
[facility]  Operative Note     Surgery Date:  2021  Surgeon:  Cheyanne Francisco MD  Assistants:  Yaakov Parks MD MPH, PGY-3    Faye Roach MD, PGY-1    Pre-op Diagnosis:    - Intrauterine pregnancy at 35w4d  - Hx CS x2  - ITP s/p splenectomy  - Thrombocytopenia  - Desire for permanent sterility       Post-op Diagnosis:    - Same   - Liveborn male infant     Procedure:    - Repeat low-transverse  section with single layer uterine closure via pfannenstiel incision  - Bilateral salpingectomy    Anesthesia:  Spinal, TAP block  QBL:    598 mL  IVF:    1200 mL crystalloid  UOP:    100 mL clear urine at the end of the case  Drains:   Mishra Catheter   Specimens:   Routine cord blood/segment, bilateral fallopian tubes  Antibiotics:  2g Ancef  Additional medications: 1g TXA IV, 0.2mg methergine IM  Complications:  None    Indications:   Merissa Leung is a 36 year old  at 35w4d w/ PMH of ITP s/p splenectomy, thrombocytopenia, and hx of CS x2 who was admitted for IVIG administration to bring platelets to a level compatible with spinal anesthesia. She received 2 doses of IVIG and 4 doses of dexamethasone. On HD#3 her platelets appropriately tyler to 87 and a repeat  was recommended. She also desired no further children and requested a bilateral salpingectomy. She has private health insurance. The risks, benefits, and alternatives of  section and bilateral salpingectomy were discussed with the patient, and she agreed to proceed.     Findings:   Moderate subcutaneous scarring, no rectofascial adhesions, thickened peritoneum, no intraabdominal adhesions, filmy adhesions between bladder and lower uterine segment  Clear amniotic fluid  Liveborn male infant in transverse presentation with back down. The infant was internally rotated to cephalic and delivered atraumatically. Born at 1149 on 21. Apgars 8 at 1 minute & 8 at 5 minutes. Weight 6lb 6.3oz  (2900g).  Normal uterus, fallopian tubes, and ovaries. Fallopian tubes resected 1cm from cornua with ligasure device with hemostatic pedicles.  Cord gasses below    Results for GILA LUZ (MRN [id number]) as of 11/14/2021 13:20   Ref. Range 11/14/2021 12:02   Base Excess/Deficit (+/-) Latest Ref Range: -8.1 - 1.9 mmol/L -2.7   Ph Cord Arterial Latest Ref Range: 7.16 - 7.39  7.30   PCO2 Cord Arterial Latest Ref Range: 35 - 71 mm Hg 46   PO2 Cord Arterial Latest Ref Range: 3 - 33 mm Hg 20   Bicarbonate Cord Arterial Latest Ref Range: 16 - 24 mmol/L 23   Ph Cord Blood Venous Latest Ref Range: 7.21 - 7.45  7.33   PCO2 Cord Venous Latest Ref Range: 27 - 57 mm Hg 45   PO2 Cord Venous Latest Ref Range: 21 - 37 mm Hg 21   Bicarbonate Cord Venous Latest Ref Range: 16 - 24 mmol/L 24   Base Excess Cord Arterial Latest Ref Range: -9.6 - 2.0 mmol/L -4.1     Procedure Details:   The patient was brought to the OR, where adequate spinal anesthesia was administered. She was placed in the dorsal supine position with a slight leftward tilt. She was prepped and draped in the usual sterile fashion. A surgical time out was performed. A pfannenstiel skin incision was made with the scalpel, and carried down to the underlying fascia with sharp and blunt dissection. The fascia was incised in the midline, and the incision was extended laterally with the Flores scissors. The superior aspect of the fascia was grasped with the Kocher clamps and dissected off of the underlying rectus muscles with blunt and sharp dissection. Attention was then turned to the inferior aspect of the fascia, which was similarly dissected off of the underlying rectus muscles. The rectus muscles were  in the midline, and the peritoneum was entered bluntly. The peritoneum was thick and a combination of sharp and blunt dissection was used to extent the opening inferiorly. The bladder blade was placed. The vesicouterine peritoneum was incised in the  midline, and the incision was extended laterally with the Metzenbaum scissors. A bladder flap was created digitally and the bladder blade was replaced. A transverse hysterotomy was made with the scalpel in the lower uterine segment, and the incision was extended with digital pressure. The infant was noted to be transverse with back down.  Attempts were made to identify the fetal legs but were unsuccessful. The baby was then internally verted to cephalic and was delivered atraumatically. The shoulders delivered easily. Single nuchal cord was noted and reduced after deilvery. The cord was doubly clamped and cut immediately, and the infant was handed off to the awaiting NICU staff. A segment of cord was cut and sent to lab. Cord gasses were collected. The placenta was delivered with gentle traction on the umbilical cord and uterine massage. The placenta was discarded. The uterus was exteriorized and cleared of all clots and debris. Uterine tone was noted to be firm with 30 units of pitocin given through the running IV and uterine massage. IV TXA and IM methergine were given. The hysterotomy was closed with a running locked suture of 0 Vicryl. The right aspect of the hysterotomy partially incorporated the round ligament. Attention was then turned to the fallopian tubes. The right fallopian tube was grasped with rosa clamps and walked out to the fimbria. The handheld ligasure was then used to sequentially clamp, cauterize, and transect the mesosalpinx below the fallopian tube. The tube was transected approximately 1cm from the cornua. The process was repeated on the left side. The pedicles were examined and noted to be hemostatic. The hysterotomy was reexamined and noted to be hemostatic. The posterior cul-de-sac was cleared of all clots and debris. The uterus was returned to the abdomen. The pericolic gutters were cleared of all clots and debris. The hysterotomy was reexamined and noted to be hemostatic. The fascia  and rectus muscles were examined and areas of oozing were controlled with electrocautery. The fascia was closed with a running 0 Vicryl suture. The subcutaneous tissue was irrigated and areas of oozing were controlled with electrocautery. The subcutaneous tissue was less than 2cm in thickness, and was therefore not closed. The skin was closed with a running subcuticular 4-0 Monocryl suture and covered with a sterile dressing.    All sponge, needle, and instrument counts were correct. The patient tolerated the procedure well, and was transferred to recovery in stable condition. Dr. Francisco was present and scrubbed for the procedure.     Yaakov Parks MD MPH  OB/Gyn PGY-3  11/14/21 1:07 PM    Staff:  I was scrubbed and present for entire case and agree w/ above note.    Cheyanne Francisco MD

## 2021-11-14 NOTE — PLAN OF CARE
Patient s/p C/S & PPTL, delivery 1149 male infant, patient received TAPS block, Torodol at 1400 and 10mg oral Roxicet @ 1415. Patient states pain rating is still 8-9 at this time. Continue to monitor pain level.

## 2021-11-14 NOTE — ANESTHESIA PROCEDURE NOTES
TAP Procedure Note    Pre-Procedure   Staff -        Anesthesiologist:  Guilherme Irvin MD       Resident/Fellow: Reggie Santoyo Jr., MD       Performed By: resident       Location: OR       Pre-Anesthestic Checklist: patient identified, IV checked, site marked, risks and benefits discussed, informed consent, monitors and equipment checked, pre-op evaluation, at physician/surgeon's request and post-op pain management  Timeout:       Correct Patient: Yes        Correct Procedure: Yes        Correct Site: Yes        Correct Position: Yes        Correct Laterality: Yes        Site Marked: Yes  Procedure Documentation  Procedure: TAP       Laterality: bilateral       Patient Position: supine       Skin prep: Chloraprep       Insertion Site: T11-12.       Needle Gauge: 21.        Ultrasound guided       1. Ultrasound was used to identify targeted nerve, plexus, vascular marker, or fascial plane and place a needle adjacent to it in real-time.       2. Ultrasound was used to visualize the spread of anesthetic in close proximity to the above referenced structure.    Assessment/Narrative         The placement was negative for: blood aspirated, painful injection and site bleeding       Paresthesias: No.     Bolus given via needle..        Secured via.        Insertion/Infusion Method: Single Shot    Medication(s) Administered   Bupivacaine 0.25% PF (Infiltration), 20 mL  Bupivacaine liposome (Exparel) 1.3% LA inj susp (Infiltration), 20 mL  Medication Administration Time: 11/14/2021 1:02 PM

## 2021-11-14 NOTE — ANESTHESIA PREPROCEDURE EVALUATION
Anesthesia Pre-Procedure Evaluation    Patient: Mreissa Leung   MRN: [id number] : 1985        Preoperative Diagnosis: * No pre-op diagnosis entered *    Procedure :           Past Medical History:   Diagnosis Date     Anxiety      Binge eating disorder      Chickenpox     x3     Food intolerance      IBS (irritable bowel syndrome)      MEDICAL HISTORY OF -     pelvic floor disorder      Past Surgical History:   Procedure Laterality Date     C/SECTION, LOW TRANSVERSE  2007      SECTION, TUBAL LIGATION, COMBINED N/A 02/15/2016    NO tubal ligation done     COLONOSCOPY  2013    Procedure: COMBINED COLONOSCOPY, SINGLE BIOPSY/POLYPECTOMY BY BIOPSY;  Colonoscopy;  Surgeon: Maureen Valentin MD;  Location: [facility]     COLONOSCOPY N/A 2015    Procedure: COMBINED COLONOSCOPY, SINGLE OR MULTIPLE BIOPSY/POLYPECTOMY BY BIOPSY;  Surgeon: Maureen Valentin MD;  Location: [facility]     ESOPHAGOSCOPY, GASTROSCOPY, DUODENOSCOPY (EGD), COMBINED N/A 2018    Procedure: COMBINED ESOPHAGOSCOPY, GASTROSCOPY, DUODENOSCOPY (EGD), BIOPSY SINGLE OR MULTIPLE;  Gastroscopy;  Surgeon: Ray Amato MD;  Location: [facility]     LAPAROSCOPIC SPLENECTOMY N/A 2018    Procedure: Laparoscopic splenectomy, ;  Surgeon: Anthony Jackson DO;  Location: [facility]      Allergies   Allergen Reactions     Dairy Products [Milk Protein Extract] Swelling     Blow up like a balloon     Dilaudid [Hydromorphone] Nausea and Vomiting     Food Other (See Comments) and Swelling     Processed food, sugar, high sodium, red grains, rice, potatoes(can have mashed, but no french fries or puffs)  Pain in extremities and face.   All over body at times.      Social History     Tobacco Use     Smoking status: Former Smoker     Packs/day: 0.50     Years: 6.00     Pack years: 3.00     Types: Cigarettes     Quit date: 2021     Years since quittin.5     Smokeless tobacco: Never Used   Substance Use  Topics     Alcohol use: Not Currently     Comment: rare-quit with pregnancy      Wt Readings from Last 1 Encounters:   11/12/21 63.8 kg (140 lb 11.2 oz)        Anesthesia Evaluation            ROS/MED HX  ENT/Pulmonary:       Neurologic:       Cardiovascular:       METS/Exercise Tolerance:     Hematologic: Comments: History of ITP, worsened 4-5 years ago that required treatment      Musculoskeletal: Comment: Hx of psoriatic arthritis in SI joints, Fibromyalgia      GI/Hepatic:     (+) Inflammatory bowel disease,     Renal/Genitourinary:       Endo:       Psychiatric/Substance Use:     (+) psychiatric history anxiety     Infectious Disease:       Malignancy:       Other:            Physical Exam    Airway  airway exam normal      Mallampati: I   TM distance: > 3 FB   Neck ROM: full   Mouth opening: > 3 cm    Respiratory Devices and Support         Dental  no notable dental history         Cardiovascular   cardiovascular exam normal          Pulmonary   pulmonary exam normal                OUTSIDE LABS:  CBC:   Lab Results   Component Value Date    WBC 10.3 11/14/2021    WBC 14.2 (H) 11/13/2021    HGB 9.4 (L) 11/14/2021    HGB 10.1 (L) 11/13/2021    HCT 28.9 (L) 11/14/2021    HCT 30.7 (L) 11/13/2021    PLT 87 (L) 11/14/2021    PLT 49 (LL) 11/13/2021     BMP:   Lab Results   Component Value Date     10/29/2021     09/23/2021    POTASSIUM 3.4 10/29/2021    POTASSIUM 3.3 (L) 09/23/2021    CHLORIDE 108 10/29/2021    CHLORIDE 112 (H) 09/23/2021    CO2 22 10/29/2021    CO2 23 09/23/2021    BUN 13 10/29/2021    BUN 8 09/23/2021    CR 0.57 10/29/2021    CR 0.60 09/23/2021    GLC 83 10/29/2021    GLC 83 09/23/2021     COAGS:   Lab Results   Component Value Date    PTT 29 11/14/2021    INR 0.96 11/14/2021    FIBR 302 11/14/2021     POC:   Lab Results   Component Value Date    HCG Negative 03/11/2020    HCGS Negative 11/20/2018     HEPATIC:   Lab Results   Component Value Date    ALBUMIN 2.4 (L) 10/29/2021     PROTTOTAL 6.1 (L) 10/29/2021    ALT 14 10/29/2021    AST 12 10/29/2021    ALKPHOS 79 10/29/2021    BILITOTAL 0.2 10/29/2021     OTHER:   Lab Results   Component Value Date    LACT 0.8 09/23/2021    DREW 8.6 10/29/2021    MAG 2.1 10/18/2016    LIPASE 90 11/20/2018    TSH 1.95 06/24/2021    CRP <2.9 11/12/2021    SED 2 10/12/2020       Anesthesia Plan    ASA Status:  3   NPO Status:  NPO Appropriate    Anesthesia Type: Spinal.              Consents    Anesthesia Plan(s) and associated risks, benefits, and realistic alternatives discussed. Questions answered and patient/representative(s) expressed understanding.     - Discussed with:  Patient      - Extended Intubation/Ventilatory Support Discussed: No.      - Patient is DNR/DNI Status: No    Use of blood products discussed: Yes.     - Discussed with: Patient.     Postoperative Care    Pain management: IV analgesics, Multi-modal analgesia.   PONV prophylaxis: Ondansetron (or other 5HT-3)     Comments:    Most recent platelets ( 11/14/2021) 84K.  Additional use of non bupivacaine local anesthetics including lidocaine should generally be avoided within 96 hours following administration of bupivacaine liposome.            Reggie Chakraborty Junior, MD

## 2021-11-15 LAB
ERYTHROCYTE [DISTWIDTH] IN BLOOD BY AUTOMATED COUNT: 15.9 % (ref 10–15)
HCT VFR BLD AUTO: 27.7 % (ref 35–47)
HGB BLD-MCNC: 8.9 G/DL (ref 11.7–15.7)
MCH RBC QN AUTO: 27.6 PG (ref 26.5–33)
MCHC RBC AUTO-ENTMCNC: 32.1 G/DL (ref 31.5–36.5)
MCV RBC AUTO: 86 FL (ref 78–100)
PLATELET # BLD AUTO: 163 10E3/UL (ref 150–450)
RBC # BLD AUTO: 3.23 10E6/UL (ref 3.8–5.2)
WBC # BLD AUTO: 12.7 10E3/UL (ref 4–11)

## 2021-11-15 PROCEDURE — 250N000011 HC RX IP 250 OP 636

## 2021-11-15 PROCEDURE — 250N000013 HC RX MED GY IP 250 OP 250 PS 637: Performed by: OBSTETRICS & GYNECOLOGY

## 2021-11-15 PROCEDURE — 250N000013 HC RX MED GY IP 250 OP 250 PS 637: Performed by: STUDENT IN AN ORGANIZED HEALTH CARE EDUCATION/TRAINING PROGRAM

## 2021-11-15 PROCEDURE — 250N000011 HC RX IP 250 OP 636: Performed by: STUDENT IN AN ORGANIZED HEALTH CARE EDUCATION/TRAINING PROGRAM

## 2021-11-15 PROCEDURE — 36415 COLL VENOUS BLD VENIPUNCTURE: CPT | Performed by: STUDENT IN AN ORGANIZED HEALTH CARE EDUCATION/TRAINING PROGRAM

## 2021-11-15 PROCEDURE — 120N000002 HC R&B MED SURG/OB UMMC

## 2021-11-15 PROCEDURE — 85014 HEMATOCRIT: CPT | Performed by: STUDENT IN AN ORGANIZED HEALTH CARE EDUCATION/TRAINING PROGRAM

## 2021-11-15 RX ORDER — OXYCODONE HYDROCHLORIDE 5 MG/1
5-10 TABLET ORAL
Status: DISCONTINUED | OUTPATIENT
Start: 2021-11-15 | End: 2021-11-16

## 2021-11-15 RX ORDER — LIDOCAINE 4 G/G
1 PATCH TOPICAL EVERY 24 HOURS
Status: DISCONTINUED | OUTPATIENT
Start: 2021-11-15 | End: 2021-11-17 | Stop reason: HOSPADM

## 2021-11-15 RX ORDER — KETOROLAC TROMETHAMINE 30 MG/ML
INJECTION, SOLUTION INTRAMUSCULAR; INTRAVENOUS
Status: COMPLETED
Start: 2021-11-15 | End: 2021-11-15

## 2021-11-15 RX ORDER — HYDROMORPHONE HCL IN WATER/PF 6 MG/30 ML
0.2 PATIENT CONTROLLED ANALGESIA SYRINGE INTRAVENOUS
Status: DISCONTINUED | OUTPATIENT
Start: 2021-11-15 | End: 2021-11-17 | Stop reason: HOSPADM

## 2021-11-15 RX ORDER — LIDOCAINE 4 G/G
1 PATCH TOPICAL
Status: DISCONTINUED | OUTPATIENT
Start: 2021-11-16 | End: 2021-11-15

## 2021-11-15 RX ORDER — CYCLOBENZAPRINE HCL 5 MG
5 TABLET ORAL 3 TIMES DAILY
Status: DISCONTINUED | OUTPATIENT
Start: 2021-11-15 | End: 2021-11-15

## 2021-11-15 RX ORDER — CYCLOBENZAPRINE HCL 5 MG
5-10 TABLET ORAL 3 TIMES DAILY
Status: DISCONTINUED | OUTPATIENT
Start: 2021-11-15 | End: 2021-11-15

## 2021-11-15 RX ORDER — CYCLOBENZAPRINE HCL 5 MG
5-10 TABLET ORAL 3 TIMES DAILY
Status: DISCONTINUED | OUTPATIENT
Start: 2021-11-15 | End: 2021-11-16

## 2021-11-15 RX ADMIN — Medication 400 MG: at 08:06

## 2021-11-15 RX ADMIN — ACETAMINOPHEN 975 MG: 325 TABLET, FILM COATED ORAL at 10:59

## 2021-11-15 RX ADMIN — POLYETHYLENE GLYCOL 3350 17 G: 17 POWDER, FOR SOLUTION ORAL at 08:06

## 2021-11-15 RX ADMIN — SIMETHICONE 80 MG: 80 TABLET, CHEWABLE ORAL at 20:15

## 2021-11-15 RX ADMIN — SIMETHICONE 80 MG: 80 TABLET, CHEWABLE ORAL at 08:06

## 2021-11-15 RX ADMIN — CYCLOBENZAPRINE HYDROCHLORIDE 10 MG: 5 TABLET, FILM COATED ORAL at 14:03

## 2021-11-15 RX ADMIN — HYDROXYZINE HYDROCHLORIDE 50 MG: 50 TABLET, FILM COATED ORAL at 01:22

## 2021-11-15 RX ADMIN — CYCLOBENZAPRINE HYDROCHLORIDE 5 MG: 5 TABLET, FILM COATED ORAL at 07:08

## 2021-11-15 RX ADMIN — HYDROXYZINE HYDROCHLORIDE 50 MG: 50 TABLET, FILM COATED ORAL at 20:16

## 2021-11-15 RX ADMIN — IBUPROFEN 800 MG: 800 TABLET, FILM COATED ORAL at 20:16

## 2021-11-15 RX ADMIN — SIMETHICONE 80 MG: 80 TABLET, CHEWABLE ORAL at 12:53

## 2021-11-15 RX ADMIN — OXYCODONE HYDROCHLORIDE 5 MG: 5 TABLET ORAL at 10:59

## 2021-11-15 RX ADMIN — DOCUSATE SODIUM 50MG AND SENNOSIDES 8.6MG 2 TABLET: 8.6; 5 TABLET, FILM COATED ORAL at 08:05

## 2021-11-15 RX ADMIN — OXYCODONE HYDROCHLORIDE 5 MG: 5 TABLET ORAL at 08:06

## 2021-11-15 RX ADMIN — KETOROLAC TROMETHAMINE 30 MG: 30 INJECTION, SOLUTION INTRAMUSCULAR at 01:22

## 2021-11-15 RX ADMIN — ACETAMINOPHEN 975 MG: 325 TABLET, FILM COATED ORAL at 17:34

## 2021-11-15 RX ADMIN — ACETAMINOPHEN 975 MG: 325 TABLET, FILM COATED ORAL at 04:15

## 2021-11-15 RX ADMIN — OXYCODONE HYDROCHLORIDE 5 MG: 5 TABLET ORAL at 04:15

## 2021-11-15 RX ADMIN — CYCLOBENZAPRINE HYDROCHLORIDE 10 MG: 5 TABLET, FILM COATED ORAL at 20:15

## 2021-11-15 RX ADMIN — LINACLOTIDE 145 MCG: 145 CAPSULE, GELATIN COATED ORAL at 08:05

## 2021-11-15 RX ADMIN — OXYCODONE HYDROCHLORIDE 5 MG: 5 TABLET ORAL at 12:52

## 2021-11-15 RX ADMIN — KETOROLAC TROMETHAMINE 30 MG: 30 INJECTION, SOLUTION INTRAMUSCULAR at 08:10

## 2021-11-15 RX ADMIN — LIDOCAINE PATCH 4% 1 PATCH: 40 PATCH TOPICAL at 13:39

## 2021-11-15 RX ADMIN — HYDROXYZINE HYDROCHLORIDE 50 MG: 50 TABLET, FILM COATED ORAL at 14:03

## 2021-11-15 RX ADMIN — OXYCODONE HYDROCHLORIDE 10 MG: 5 TABLET ORAL at 15:55

## 2021-11-15 RX ADMIN — IBUPROFEN 800 MG: 800 TABLET, FILM COATED ORAL at 14:03

## 2021-11-15 RX ADMIN — HYDROXYZINE HYDROCHLORIDE 50 MG: 50 TABLET, FILM COATED ORAL at 08:06

## 2021-11-15 RX ADMIN — SIMETHICONE 80 MG: 80 TABLET, CHEWABLE ORAL at 15:55

## 2021-11-15 RX ADMIN — DOCUSATE SODIUM 50MG AND SENNOSIDES 8.6MG 2 TABLET: 8.6; 5 TABLET, FILM COATED ORAL at 20:15

## 2021-11-15 NOTE — PLAN OF CARE
VSS. Fundus firm, midline at U/2. Lochia rubra and scant. Tolerating regular diet.   Rating incisional pain 8/10 upon arrival to unit - PRN morphine given x2 and helped bring pain down to 5/10. Toradol, oxycodone, flexeril, and tylenol also given for pain and has helped. Ambulated with standby assist. Had BM this evening. Had troubles voiding at first. Voided 100 mL at , bladder scan by resource RN showed 540. Writer prepared to straight cath but pt able to void 200 mL and post residual bladder scan showed 81 mL and per pt, felt like she emptied her bladder. Passed on to incoming RN to expect larger void amount with next void and bladder scan if not.   Breastfeeding with assistance and supplementing with maternal expressed breast milk and formula due to infant late  status. Mom has begun Pumping after feedings. Bonding well with baby. Continue current plan of care.

## 2021-11-15 NOTE — PROGRESS NOTES
Patient arrived to [facility] unit via zoom cart at 1545,with belongings, accompanied by spouse/ significant other, with infant in arms. Received report from RN and checked bands. Unit and room orientation completd. Call light given and within arms reach; no concerns present at this time. Continue with plan of care.

## 2021-11-15 NOTE — LACTATION NOTE
This note was copied from a baby's chart.  Consult for: Third baby, first one born late .     History: Repeat  delivery @ 35w4d, AGA infant @ 6# 5.9oz. birthweight, 3.5% loss at 24 hours, serum bilirubin not drawn yet.  Maternal history of fibromyalgia managed with Flexeril 5 to 10 mg TID prn for spasms (Hale L3 for this dose, monitor for drowsiness, sedation, dry mouth, poor feeding and vomiting), psoriasis and psoriatic arthritis (per H&P s/p recent cimzia which is Hale L3, limited data probably compatible), IBS managed with Linzness (FYI Linzness contraindicated in pediatric patients under 7 y/o due to deaths in juvenile mice after one to two daily doses, however ability to transfer into human milk unlikely due to both parent drug and metabolite unable to be measured in plasma after normal dosing. Per [employer] Medications [facility] Mothers' Milk, Linzness is L3, monitor for diarrhea and dehydration). Mom also has anemia, anxiety managed with clonazepam 1.5 mg q HS (L3, monitor for sedation, slowed breathing, not waking to feed/poor feeding and poor weight gain), OCD and eating disorder (binge eating, bulimia). Late  delivery due to severe thrombocytopenia late in pregnancy (ITP s/p splenectomy, diagnosed in 2018), hospital inpatient for IVIG prior to scheduled . Currently taking acetaminophen and ibuprofen along with oxycodone 5 to 10 mg prn (L3, not recommend > 30 mg in 24 hours; monitor for sedation, slowed breathing, pallor, difficulty feeding, constipation and appropriate weight gain). Last 24 hours oxycodone total was >30 mg, started with 10 mg each time now taking 5 mg per dose.     Breast exam of mom: Soft, symmetric with intact, everted nipples bilaterally. Merissa noted early tenderness & bilateral breast growth during pregnancy.     Oral exam of baby: Not done this visit.    Feeding assessment:  Mom had infant latched independently without nipple shield, good latch with occasional  "swallow heard mom using breast compressions while feeding.     Education provided: Discussed potential feeding challenges of and ways to support LPT infants including benefits of extra supplements, rationale for nipple shield use initially, importance of expressing milk in early days/week(s) and LC follow up outpatient. Reviewed positioning & using pillows/blankets for support, benefits of shield and ways to maximize transfer if not using it, nutritive vs. non-nutritive sucking (\"the great pretender\" may appear to feed well but not transfer much) and how to hear swallows, benefits of frequent skin to skin and feeding on cue but no longer than 3 hours between, breast massage/compressions while feeding, supply and demand relying mostly on pump to bring milk in with  baby. Taught difference between Initiate and Maintain functions of pump, how and when to switch. Reviewed what to expect in the coming days and preventing engorgement, how to tell if getting enough & normal  weight loss, breastfeeding log with when and who to call if concerns, [facility] pump cleaning handout and outpatient lactation resources. Talked through paced bottle feeding benefits, basic how to and encouraged practicing this prior to discharge.     Plan:  Please encourage frequent skin to skin and milk expression. Feed on cue at least every 2-3 hours and supplement after, feed to satiety. Encourage using nipple shield (smallest size comfortable for mom) and breast massage, compressions while feeding to help maximize milk transfer. Limit time at breast to 15-20 minutes (once milk is \"in,\" up to 30 minutes if actively feeding) until closer to full term age, leaving time for pumping & help conserve baby's energy. Hands on pumping &/or hand express after each feeding, at least 6-8 times in 24 hours to help maximize supply. Follow up with outpatient lactation consultant within a week of discharge for support with LPT infant, help to monitor " infant weight and milk transfer, establish supply & eventually wean from pumping and nipple shield. Due to infant prematurity, recommend renting hospital grade breast pump at discharge to help bring in full milk supply. Please demo paced bottle feeding prior to discharge.     Supplement Guidelines for infants <37 weeks gestation or < 6 lbs per the [facility] Feeding Methods for the  Infant (35-42 weeks) Policy ([facility] Guidelines):  Birth-24 hours of age: 5ml (1 tsp) every 2-3 hours, at least 8 times in 24 hours  24-48 hours of age: 10 ml (2 tsp) every 2-3 hours, at least 8 times in 24 hours   48-72 hours of age: 15 ml (3 tsp) every 2-3 hours, at least 8 times in 24 hours    Addendum: Mom taking 10 mg per oxycodone dose again, spoke with Dr. Barba with concerns above and made plan to breastfeed at every other feeding and use only formula for supplements, not mom's colostrum. Merissa to continue pumping for stimulation, either discard milk or ely label specifically NOT to use due to medications - (will make plan with mother and provider tomorrow whether to discard this milk or dilute out with other milk at later time). Spoke with parents who are supportive and in agreement with this plan, continue until oxycodone needs are below 30 mg/24 hours).

## 2021-11-15 NOTE — PLAN OF CARE
Data: Vital signs within normal limits. Postpartum checks within normal limits - see flow record. Patient eating and drinking normally. Patient able to empty bladder independently and is up ambulating. No apparent signs of infection. Incision covered with dressing, dried drainage on bottom of dressing.  Patient plans to shower later and will remove. Patient performing self cares and is able to care for infant.  Action: Patient medicated during the shift for  generalized abdominal pain, gas pain and discomfort, incisional pain . See MAR. Patient reporting not getting adequate relief with medicaitons.  MD was paged to increase Flexeril, add Lidocaine patch and will update MD patient's response to additional interventions.  Encouraged patient to walk in room and hallway to pass additional flatus.  Patient education done about pain relief measures and medications. See flow record.  Response: Positive attachment behaviors observed with infant. Support persons  present.   Plan: Anticipate discharge POD 2-3.

## 2021-11-15 NOTE — PROVIDER NOTIFICATION
11/14/21 1945   Provider Notification   Provider Name/Title Jaimenachumaira   Method of Notification Electronic Page   Request Evaluate-Remote   Notification Reason Medication Request   will pt have a continuing morphine order or just the two doses that were ordered? per pt, she had PCA morphine for each delivery and has chronic pain. please advise thanks

## 2021-11-15 NOTE — PLAN OF CARE
VSS, soft BP but asymptomatic. Up ad sheila. Fundus firm at 1 cm below umbilicus. Lochia scant. Voids adequately. +BS, passing flatus, denies nausea. Incisional pain managed with Oxycodone 5 mg, Atarax 50 mg, Tylenol and Toradol. Wound dressing with dried drainage. Minimal  assistance with breast feeding, uses nipple shield, and supplements baby with formula per finger feeding. Continue plan of care.     Vitals:    11/14/21 2210 11/15/21 0102 11/15/21 0152 11/15/21 0409   BP: 95/68 (!) 88/61 92/56 92/46   BP Location:  Left arm  Right arm   Pulse: 62 64 62 60   Resp: 18 16  16   Temp: 97.7  F (36.5  C) 97.5  F (36.4  C)  98.2  F (36.8  C)   TempSrc: Oral Oral  Oral   SpO2:    100%   Weight:       Height:       Addendum: 0625: writer was able to talk to one of the providers and mentioned about the low BP at beginning of shift. Low  platelet but Pt remains asymptomatic.

## 2021-11-15 NOTE — PROGRESS NOTES
Obstetrics Postpartum Progress Note  DOS: 11/15/21  MRN: [id number]    POD#1 after RLTCS, BTL    S: Patient states she is doing well.  Pain overall okay with current pain meds. Lochia minimal.  Eating and drinking without nausea/vomiting.  She is passing flatus and has had a BM. Ambulating without difficulty.  Voiding without difficulty.  Denies headaches, vision changes, chest pain, SOB.  Breast and formula feeding. No other acute concerns  O:  Vitals:    21 2210 11/15/21 0102 11/15/21 0152 11/15/21 0409   BP: 95/68 (!) 88/61 92/56 92/46   BP Location:  Left arm  Right arm   Pulse: 62 64 62 60   Resp: 18 16  16   Temp: 97.7  F (36.5  C) 97.5  F (36.4  C)  98.2  F (36.8  C)   TempSrc: Oral Oral  Oral   SpO2:    100%   Weight:       Height:             Gen:  NAD  CV: Regular rate, well perfused  Resp: Nonlabored on room air, normal inspiratory effort  Abd: deferred  Ext: no peripheral edema    Urine output: 700 mLs yesterday, 450 mLs since midnight    Vitals:    21 0931   Weight: 63.8 kg (140 lb 11.2 oz)       Labs:  Hemoglobin   Date Value Ref Range Status   2021 9.5 (L) 11.7 - 15.7 g/dL Final   2021 9.4 (L) 11.7 - 15.7 g/dL Final   2021 14.9 11.7 - 15.7 g/dL Final   10/30/2020 15.2 11.7 - 15.7 g/dL Final       Rubella Antibody IgG Quantitative   Date Value Ref Range Status   2021 31 IU/mL Final     Comment:     Positive.  Suggests previous exposure or immunization and probable immunity  Reference Range:    Unvaccinated Negative 0-7 IU/mL  Vaccinated or previous exposure Positive 10 IU/ml or greater         Assessment and Plan: 36 year old  POD#1 s/p RLTCS, doing well postpartum. She was admitted antepartum for IVIg in setting of severe thrombocytopenia.     Psoriatic arthritis  - prn rheumatology consult to discuss mgmt postpartum   - Followed by Dr. Buck Morrell (Rheumatology)    ITP  - plt improved significantly with IVIg, most recently 97 yesterday  - daily CBCs  -  "hematology aware of her admission    - Followed by Dr.Marshall Mahoney (Hematology) cell phone: [phone]    Routine postpartum:    Heme:  Appropriate blood loss during surgery. No symptoms of ABLA. Will discharge home w/ PO iron if Hgb under 10    Pain: c/b severe fibromyalgia. Currently well-controlled with tylenol, ibuprofen and oxycodone prn. S/p TAP block. Will schedule her flexeril    Continue PTA klonopin hs for sleep    Rh pos/Rubella immune. No intervention required.    Feeding: breast and bottle    :  s/p narayanan, voiding    PPX: Encourage ambulation    Continue regular diet, scheduled bowel regimen, prn antiemetics    Contraception: s/p BTL    Dispo: Anticipate discharge home POD#2-3    Geeta Alarcon MD  Obstetrics and Gynecology PGY-3  6:49 AM 11/15/2021    BP 96/52   Pulse 69   Temp 98.5  F (36.9  C) (Oral)   Resp 14   Ht 1.651 m (5' 5\")   Wt 63.8 kg (140 lb 11.2 oz)   LMP 03/10/2021   SpO2 100%   Breastfeeding Unknown   BMI 23.41 kg/m    Hemoglobin   Date Value Ref Range Status   11/15/2021 8.9 (L) 11.7 - 15.7 g/dL Final           Platelet Count   Date Value Ref Range Status   11/15/2021 163 150 - 450 10e3/uL Final     The patient was seen and examined by me separately from the team.  I have reviewed and agree with the above note.  She is overall doing well this morning, struggling with pain control-just getting a dose of toradol now.  She has had some flatus, vaginal bleeding is light.  She is asymptomatic from her acute on chronic anemia with hgb today appropriate for QBL during her procedure.  On exam her abdomen is noted to be distended and tympanic.  Encouraged ambulation today.  Continue routine post op care.     Rebekah Pedro MD, FACOG      "

## 2021-11-16 PROCEDURE — 250N000013 HC RX MED GY IP 250 OP 250 PS 637: Performed by: STUDENT IN AN ORGANIZED HEALTH CARE EDUCATION/TRAINING PROGRAM

## 2021-11-16 PROCEDURE — 250N000013 HC RX MED GY IP 250 OP 250 PS 637: Performed by: OBSTETRICS & GYNECOLOGY

## 2021-11-16 PROCEDURE — 120N000002 HC R&B MED SURG/OB UMMC

## 2021-11-16 RX ORDER — CYCLOBENZAPRINE HCL 5 MG
10 TABLET ORAL 3 TIMES DAILY
Status: DISCONTINUED | OUTPATIENT
Start: 2021-11-16 | End: 2021-11-17 | Stop reason: HOSPADM

## 2021-11-16 RX ORDER — OXYCODONE HYDROCHLORIDE 5 MG/1
5-10 TABLET ORAL
Status: DISCONTINUED | OUTPATIENT
Start: 2021-11-16 | End: 2021-11-17 | Stop reason: HOSPADM

## 2021-11-16 RX ORDER — AMOXICILLIN 250 MG
1 CAPSULE ORAL DAILY
Qty: 100 TABLET | Refills: 0 | Status: SHIPPED | OUTPATIENT
Start: 2021-11-16

## 2021-11-16 RX ORDER — IBUPROFEN 600 MG/1
600 TABLET, FILM COATED ORAL EVERY 6 HOURS PRN
Qty: 60 TABLET | Refills: 0 | Status: SHIPPED | OUTPATIENT
Start: 2021-11-16

## 2021-11-16 RX ORDER — GABAPENTIN 100 MG/1
300 CAPSULE ORAL 3 TIMES DAILY
Status: DISCONTINUED | OUTPATIENT
Start: 2021-11-17 | End: 2021-11-17 | Stop reason: HOSPADM

## 2021-11-16 RX ORDER — FERROUS SULFATE 325(65) MG
325 TABLET ORAL
Qty: 90 TABLET | Refills: 0 | Status: SHIPPED | OUTPATIENT
Start: 2021-11-16

## 2021-11-16 RX ORDER — ACETAMINOPHEN 325 MG/1
650 TABLET ORAL EVERY 6 HOURS
Status: DISCONTINUED | OUTPATIENT
Start: 2021-11-16 | End: 2021-11-17 | Stop reason: HOSPADM

## 2021-11-16 RX ORDER — ACETAMINOPHEN 325 MG/1
650 TABLET ORAL EVERY 6 HOURS PRN
Qty: 100 TABLET | Refills: 0 | Status: SHIPPED | OUTPATIENT
Start: 2021-11-16

## 2021-11-16 RX ORDER — HYDROCODONE BITARTRATE AND ACETAMINOPHEN 10; 325 MG/1; MG/1
1 TABLET ORAL EVERY 6 HOURS PRN
Status: DISCONTINUED | OUTPATIENT
Start: 2021-11-16 | End: 2021-11-17

## 2021-11-16 RX ADMIN — Medication 400 MG: at 08:45

## 2021-11-16 RX ADMIN — OXYCODONE HYDROCHLORIDE 5 MG: 5 TABLET ORAL at 14:49

## 2021-11-16 RX ADMIN — OXYCODONE HYDROCHLORIDE 10 MG: 5 TABLET ORAL at 11:41

## 2021-11-16 RX ADMIN — HYDROXYZINE HYDROCHLORIDE 50 MG: 50 TABLET, FILM COATED ORAL at 20:47

## 2021-11-16 RX ADMIN — ACETAMINOPHEN 975 MG: 325 TABLET, FILM COATED ORAL at 17:39

## 2021-11-16 RX ADMIN — LINACLOTIDE 145 MCG: 145 CAPSULE, GELATIN COATED ORAL at 08:45

## 2021-11-16 RX ADMIN — HYDROCODONE BITARTRATE AND ACETAMINOPHEN 1 TABLET: 10; 325 TABLET ORAL at 18:59

## 2021-11-16 RX ADMIN — IBUPROFEN 800 MG: 800 TABLET, FILM COATED ORAL at 08:45

## 2021-11-16 RX ADMIN — HYDROXYZINE HYDROCHLORIDE 50 MG: 50 TABLET, FILM COATED ORAL at 01:39

## 2021-11-16 RX ADMIN — SIMETHICONE 80 MG: 80 TABLET, CHEWABLE ORAL at 20:48

## 2021-11-16 RX ADMIN — IBUPROFEN 800 MG: 800 TABLET, FILM COATED ORAL at 20:47

## 2021-11-16 RX ADMIN — CYCLOBENZAPRINE HYDROCHLORIDE 10 MG: 5 TABLET, FILM COATED ORAL at 14:49

## 2021-11-16 RX ADMIN — ACETAMINOPHEN 975 MG: 325 TABLET, FILM COATED ORAL at 05:55

## 2021-11-16 RX ADMIN — IBUPROFEN 800 MG: 800 TABLET, FILM COATED ORAL at 01:39

## 2021-11-16 RX ADMIN — CYCLOBENZAPRINE HYDROCHLORIDE 10 MG: 5 TABLET, FILM COATED ORAL at 20:47

## 2021-11-16 RX ADMIN — HYDROXYZINE HYDROCHLORIDE 50 MG: 50 TABLET, FILM COATED ORAL at 14:49

## 2021-11-16 RX ADMIN — SIMETHICONE 80 MG: 80 TABLET, CHEWABLE ORAL at 14:49

## 2021-11-16 RX ADMIN — HYDROXYZINE HYDROCHLORIDE 50 MG: 50 TABLET, FILM COATED ORAL at 08:45

## 2021-11-16 RX ADMIN — DOCUSATE SODIUM 50MG AND SENNOSIDES 8.6MG 2 TABLET: 8.6; 5 TABLET, FILM COATED ORAL at 20:47

## 2021-11-16 RX ADMIN — CYCLOBENZAPRINE HYDROCHLORIDE 10 MG: 5 TABLET, FILM COATED ORAL at 08:46

## 2021-11-16 RX ADMIN — ACETAMINOPHEN 975 MG: 325 TABLET, FILM COATED ORAL at 11:40

## 2021-11-16 RX ADMIN — IBUPROFEN 800 MG: 800 TABLET, FILM COATED ORAL at 14:49

## 2021-11-16 RX ADMIN — SIMETHICONE 80 MG: 80 TABLET, CHEWABLE ORAL at 08:45

## 2021-11-16 RX ADMIN — POLYETHYLENE GLYCOL 3350 17 G: 17 POWDER, FOR SOLUTION ORAL at 08:45

## 2021-11-16 RX ADMIN — DOCUSATE SODIUM 50MG AND SENNOSIDES 8.6MG 2 TABLET: 8.6; 5 TABLET, FILM COATED ORAL at 08:45

## 2021-11-16 RX ADMIN — ACETAMINOPHEN 975 MG: 325 TABLET, FILM COATED ORAL at 00:05

## 2021-11-16 NOTE — PLAN OF CARE
VSS. Postpartum check WDL. Pain managed with oral meds. Up ad sheila. IV saline locked; patent. Tolerating regular diet. Voiding without difficulty. Passing gas and had BM this AM. Has swelling in lower extremities; compression stockings given to pt. Breastfeeding and supplementing with formula via fingerfeeding. Seen by RONNIE today (see note).  at bedside and supportive. Bonding well with baby. EDS 17. Seen by INDU this AM; contacted INDU to update about EDS score. INDU will follow up with pt tomorrow. Continue cares.

## 2021-11-16 NOTE — PROVIDER NOTIFICATION
Alisa CHAN (INDU) notified that pt's EDS was 17 with question #10 scored 0. INDU already saw pt this AM , but plans to follow up with pt tomorrow.

## 2021-11-16 NOTE — CONSULTS
"[facility]ILDR[facility]  MATERNAL CHILD HEALTH   INITIAL PSYCHOSOCIAL ASSESSMENT     DATA:     Presenting Information: Mom, Merissa, is a 36 year old  who delivered a boy , Emeterio, on 2021 at35wk in the setting of emergency  . SW was consulted for anxiety/depression and chronic pain.    Living Situation: Parents, Merissa and Emeterio, are , who live together in [place], along with their 14 and 5 year old.    Social Support: Merissa's dad, stepmom, friends and Emeterio's family    Education/Employment: Dad is a  that has some time off. 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     Insurance: [employer] private    Source of Financial Support: parental employment    Mental Health History: yes    Diagnoses: anxiety, depression    Medications: yes for anxiety    Therapy: in the past and is currently interested in a referral     History of Postpartum Mood Disorders: no    INTERVENTION:       Chart review    Collaboration with team: JAYA Bhat    Conducted Psychosocial Assessment    Introduction to Maternal Child Health SW role and scope of practice    Validated emotions and provided supportive listening    Provided psychoeducation on  mood and anxiety disorders    ASSESSMENT:     Coping: Parents were both present, very quiet and both had flat affect. Mom said the  was difficult and she is in a lot of pain. She mentioned the pregnancy was extremely difficult due to her multiple painful medical conditions. She had to stop some of her infusions that gave her some relief due to pregnancy. She found one option that provided some relief and will try that again after she goes home. She seemed to be in a lot of pain and very tired.     She also mentioned she likes to do yoga and walk but has \"been very limited\" by this pregnancy and her conditions. Her salon closed, which she mentioned was \"okay\" and she will stay home with baby for now. Emeterio has " some time off as well, but was vague about how much time or if this would be paid.     SW will check in one more time before they discharge to ensure they have the support they need.     Assessment of parental risk for PMAD: high risk due to history of maternal depression and anxiety.     Risk Factors: other children at home needing care and attention , hospitalization during a global pandemic and maternal mental health history or concerns     Resiliency Factors & Strengths: local family, experienced parents, strong social support, parental employment, stable housing and able and willing to ask for help/accept help    PLAN:     SW will continue to follow for supportive intervention.    CHANELL Barfield, [facility]  Maternal and Child Health   Office: [phone]  Pager: [phone]  After Hours Pager: [phone]  [email or web address]

## 2021-11-16 NOTE — PLAN OF CARE
Pt is stable, low BP readings, normal for pt per pt. Postpartum checks within normal limits. Adequate output. Abdomen distended despite multiple BMs and passing flatus. Encouraged pt to increase fluids and ambulation, walked on unit x2, this evening. Pain not well managed. Pain level decreases to 8 with PO meds, See MAR.  Patient performing self cares and is able to care for infant. Decreased breastfeeding session to every-other along with pumping and dumping due to high intake of narcotics. See MAR. Positive attachment behaviors observed with infant. Support person present and very supportive. Will continue to assess/assist as needed.    Refill authorized per protocol.

## 2021-11-16 NOTE — PROGRESS NOTES
Obstetrics Postpartum Progress Note  DOS: 21  MRN: [id number]    POD#2 after RLTCS, BTL    S: Patient in bathroom when I attempted to round  O:  Vitals:    11/15/21 0835 11/15/21 1734 11/15/21 2017 11/16/21 0355   BP: 96/52 (!) 70/56 102/57 104/79   BP Location:    Right arm   Pulse: 69 109 75 74   Resp: 14 16 16 17   Temp: 98.5  F (36.9  C) 98.2  F (36.8  C) 98.2  F (36.8  C) 98  F (36.7  C)   TempSrc: Oral  Oral Oral   SpO2:       Weight:       Height:           Exam not performed    Urine output: 1100 mLs yesterday    Vitals:    21 0931   Weight: 63.8 kg (140 lb 11.2 oz)       Labs:  Hemoglobin   Date Value Ref Range Status   11/15/2021 8.9 (L) 11.7 - 15.7 g/dL Final   2021 9.5 (L) 11.7 - 15.7 g/dL Final   2021 14.9 11.7 - 15.7 g/dL Final   10/30/2020 15.2 11.7 - 15.7 g/dL Final       Rubella Antibody IgG Quantitative   Date Value Ref Range Status   2021 31 IU/mL Final     Comment:     Positive.  Suggests previous exposure or immunization and probable immunity  Reference Range:    Unvaccinated Negative 0-7 IU/mL  Vaccinated or previous exposure Positive 10 IU/ml or greater         Assessment and Plan: 36 year old  POD#2 s/p RLTCS, doing well postpartum. She was admitted antepartum for IVIg in setting of severe thrombocytopenia.     Psoriatic arthritis  - see rheumatology by 2 weeks postpartum   - Followed by Dr. Buck Morrell (Rheumatology)    ITP  - plt improved significantly with IVIg, most recently 163 yesterday  - hematology aware of her admission    - Followed by Dr.Marshall Mahoney (Hematology) cell phone: [phone]    Routine postpartum:    Heme:  Appropriate blood loss during surgery. No symptoms of ABLA. Will discharge home w/ PO iron if Hgb under 10    Pain: c/b severe fibromyalgia. Currently has tylenol, ibuprofen and oxycodone prn. S/p TAP block. Scheduled flexeril added yesterday. Will order lidocaine patch today    Continue PTA klonopin hs for sleep    Rh  pos/Rubella immune. No intervention required.    Feeding: breast and bottle    :  s/p narayanan, voiding    PPX: Encourage ambulation    Continue regular diet, scheduled bowel regimen, prn antiemetics    Contraception: s/p BTL    Dispo: Anticipate discharge home POD#2-3    Geeta Alarcon MD  Obstetrics and Gynecology PGY-3  11/16/21    Patient seen and examined separately from the resident.  Patient is struggling with pain control today as she tried to wean off oxycodone yesterday afternoon for concern for infant exposure.  She is working closely with lactation and risks and benefits of all medications reviewed and she is happy with the plan to continue oxycodone and alternate feedings with breast milk and formula.  On exam vitals are stable, incision is still dressed with shadowing; however, per patient unchanged.  Plan to continue to work on pain control, plans to shower and remove dressing when partner returns.  Likely discharge home tomorrow.    Rose Rivas MD

## 2021-11-16 NOTE — PROGRESS NOTES
OBGYN Attending Progress Note    Saw Merissa with JAYA Martin and lactation consultant. Infant risk recommended against breastfeeding given risk of somnolence in an  infant with multiple agents until Merissa no longer needs narcotic pain medication.    Reviewed this recommendation with Merissa and her partner. She agrees that she would like to continue to pump with the goal of transitioning back to breastfeeding when she no longer requires narcotics for pain control. I advised that if this is no longer her goal (to pump), she can revisit the plan with me at any time.    Merissa states that the oxycodone is not helping as much with the pain as it is causing her to be sleepy. Will try hydrocodone instead. Tylenol dosing modified to limit >4g/day. She requests fentanyl; explained that this is only available IV and I would try to limit this as it is not something she can go home with. She agrees with it at this time.    Orders changed.    Emy Lyman MD, MSCI    [facility]/OBGYN

## 2021-11-16 NOTE — LACTATION NOTE
"This note was copied from a baby's chart.  Follow up visit with Merissa this morning, concern for her rapidly stopping oxycodone use yesterday despite encouragement to continue taking pain medications as needed while keeping safe levels for infant by putting to breast at every other feeding if oxycodone >30 mg/24 hours. We discussed Merissa's goals for breastfeeding and her concerns about medications in milk, also brought in [facility]itzi's \"Medications & Mothers' Milk\" to review Enbrel which she hopes to start taking for her RA in the near future (L2 likely little to no transfer into milk and not orally bioavailable to infant).    Merissa shares her main goal is to recover right now. She wants to breastfeed, but also remembers how hard it was with her second child, Gnosticism: constant skin to skin, feeding and pumping felt like all the time, was exhausting and really hard. She stopped after two months was just too much to keep up with. She shares she had and still does have a \"really close pulido with Gnosticism that continues to this day,\" and appreciates that resulting from that time, but sounds like it was all consuming for her. Merissa wasn't sure if she'd be able to continue breastfeeding with her new biologic after she meets with rheumatologist, appeared relieved that Enbrel was considered compatible with breastfeeding when reviewed with her (above). Looking into future, not sure how long she hopes to continue breastfeeding, today working on bonding with this baby and recovery.     Encouraged Merissa to take her pain medications as ordered and continue to discuss medications she's taking with pediatric provider they choose after discharge. With her Fibromyalgia and psoriatic arthritis, so important to manage stress and self care trying to maximize sleep, etc. to keep herself healthy and prevent flares, want to keep her pain under control after her surgery as part of that whole self care picture. She endorses pain has been much " higher since stopping oxycodone and agree to start again, taking 10 mg now and once under control again, sustain with 5 to 10 mg weaning down when able to maintain activity needed for self and infant care. For now, reminder baby getting most of his volume from supplements and mom getting most of her stimulation from pumping.     Addendum, [facility] called back and advise with four different sedating medications and late  infant, would not recommend breastfeeding at least until one or two of these medications are no longer needed. IRC advised not to breastfeed at all while taking oxycodone.  Once that is no longer needed, to check in with dosing of ongoing maintenance medication and make plan for breastfeeding or use of breastmilk (mentioned potentially breastfeed during the day and avoid at night if both clonazepam and hydroxyzine are still needed each night?). Update give to both OB and infant's providers via phone. With Dr. Lyman at bedside, LC reviewed pumping & feeding plan below and they made plan for bringing Merissa's pain under control. Merissa agree and supportive of plan.     Feeding plan: Bottle feed formula on cue at least every 2 to 3 hours, increase volumes as tolerated. Avoid breastfeeding for for first week or so, then check in with Infant risk and provider about updated medication dosing when no longer taking oxycodone.     Mom pumping to establish supply, plan to pump and dump once milk is in. They are saving colostrum for option of diluting out small doses of it later, please ely clearly on label NOT to give full strength due to medications, so parents can save in  container at home.

## 2021-11-16 NOTE — PLAN OF CARE
VSS. Up ad ib. Fundus firm at 1 cm below umbilicus. Lochia scant. Voids spont. +BS, passing flatus, denies nausea, +BM. Incisional pain managed with Tylenol, Ibuprofen and Atarax. On Aqua-K. Hot pack applied. Lidocaine patch removed. Declined Oxycodone. Pt is able to sleep this shift. Wound dressing has dried drainage, encouraged to shower today and try to remove top dressing while in shower. Declined to breast feed this shift due to pain, undecided on infant feeding plans at this time. Continued to do formula milk per finger feeding by Dad. Continue plan of care.

## 2021-11-17 VITALS
HEIGHT: 65 IN | DIASTOLIC BLOOD PRESSURE: 70 MMHG | TEMPERATURE: 98.1 F | HEART RATE: 82 BPM | RESPIRATION RATE: 16 BRPM | WEIGHT: 140.7 LBS | OXYGEN SATURATION: 100 % | SYSTOLIC BLOOD PRESSURE: 95 MMHG | BODY MASS INDEX: 23.44 KG/M2

## 2021-11-17 LAB
PATH REPORT.COMMENTS IMP SPEC: NORMAL
PATH REPORT.COMMENTS IMP SPEC: NORMAL
PATH REPORT.FINAL DX SPEC: NORMAL
PATH REPORT.GROSS SPEC: NORMAL
PATH REPORT.MICROSCOPIC SPEC OTHER STN: NORMAL
PATH REPORT.RELEVANT HX SPEC: NORMAL
PHOTO IMAGE: NORMAL

## 2021-11-17 PROCEDURE — 250N000013 HC RX MED GY IP 250 OP 250 PS 637: Performed by: STUDENT IN AN ORGANIZED HEALTH CARE EDUCATION/TRAINING PROGRAM

## 2021-11-17 PROCEDURE — 250N000013 HC RX MED GY IP 250 OP 250 PS 637: Performed by: OBSTETRICS & GYNECOLOGY

## 2021-11-17 RX ORDER — GABAPENTIN 100 MG/1
100 CAPSULE ORAL 3 TIMES DAILY
Qty: 15 CAPSULE | Refills: 0 | Status: SHIPPED | OUTPATIENT
Start: 2021-11-17

## 2021-11-17 RX ORDER — OXYCODONE HYDROCHLORIDE 5 MG/1
5 TABLET ORAL EVERY 6 HOURS PRN
Qty: 15 TABLET | Refills: 0 | Status: SHIPPED | OUTPATIENT
Start: 2021-11-17 | End: 2021-11-21

## 2021-11-17 RX ADMIN — ACETAMINOPHEN 650 MG: 325 TABLET, FILM COATED ORAL at 09:16

## 2021-11-17 RX ADMIN — DOCUSATE SODIUM 50MG AND SENNOSIDES 8.6MG 2 TABLET: 8.6; 5 TABLET, FILM COATED ORAL at 09:15

## 2021-11-17 RX ADMIN — CYCLOBENZAPRINE HYDROCHLORIDE 10 MG: 5 TABLET, FILM COATED ORAL at 09:24

## 2021-11-17 RX ADMIN — LINACLOTIDE 145 MCG: 145 CAPSULE, GELATIN COATED ORAL at 09:16

## 2021-11-17 RX ADMIN — ACETAMINOPHEN 650 MG: 325 TABLET, FILM COATED ORAL at 00:24

## 2021-11-17 RX ADMIN — ACETAMINOPHEN, ASPIRIN, CAFFEINE 1 TABLET: 250; 65; 250 TABLET, FILM COATED ORAL at 14:09

## 2021-11-17 RX ADMIN — OXYCODONE HYDROCHLORIDE 5 MG: 5 TABLET ORAL at 05:22

## 2021-11-17 RX ADMIN — POLYETHYLENE GLYCOL 3350 17 G: 17 POWDER, FOR SOLUTION ORAL at 09:15

## 2021-11-17 RX ADMIN — OXYCODONE HYDROCHLORIDE 5 MG: 5 TABLET ORAL at 00:24

## 2021-11-17 RX ADMIN — SIMETHICONE 80 MG: 80 TABLET, CHEWABLE ORAL at 09:16

## 2021-11-17 RX ADMIN — HYDROXYZINE HYDROCHLORIDE 50 MG: 50 TABLET, FILM COATED ORAL at 02:25

## 2021-11-17 RX ADMIN — IBUPROFEN 800 MG: 800 TABLET, FILM COATED ORAL at 02:25

## 2021-11-17 RX ADMIN — IBUPROFEN 800 MG: 800 TABLET, FILM COATED ORAL at 09:15

## 2021-11-17 RX ADMIN — OXYCODONE HYDROCHLORIDE 5 MG: 5 TABLET ORAL at 14:21

## 2021-11-17 RX ADMIN — GABAPENTIN 300 MG: 100 CAPSULE ORAL at 00:23

## 2021-11-17 RX ADMIN — OXYCODONE HYDROCHLORIDE 5 MG: 5 TABLET ORAL at 09:23

## 2021-11-17 RX ADMIN — Medication 400 MG: at 09:15

## 2021-11-17 RX ADMIN — GABAPENTIN 300 MG: 100 CAPSULE ORAL at 09:23

## 2021-11-17 NOTE — PROVIDER NOTIFICATION
Pt is c/o of a migraine headache for about 2hrs now. She wants excedrine ordered. Also wants flexeril ordered for discharge medication if possible. Thanks Dr. Rossi/g2 text paged and updated.

## 2021-11-17 NOTE — PROGRESS NOTES
Obstetrics Postpartum Progress Note  DOS: 21  MRN: [id number]    POD#3 after RLTCS, BTL    S: Patient sleeping soundly  O:  Vitals:    21 0355 21 1220 21 1739 21 0012   BP: 104/79 119/62 111/76 111/53   BP Location: Right arm      Pulse: 74 79 109 75   Resp:    Temp: 98  F (36.7  C) 98.3  F (36.8  C) 98.3  F (36.8  C) 98.2  F (36.8  C)   TempSrc: Oral Oral Oral Oral   SpO2:       Weight:       Height:           Gen: NAD  Resp: nonlabored on room air    Vitals:    21 0931   Weight: 63.8 kg (140 lb 11.2 oz)       Labs:  Hemoglobin   Date Value Ref Range Status   11/15/2021 8.9 (L) 11.7 - 15.7 g/dL Final   2021 9.5 (L) 11.7 - 15.7 g/dL Final   2021 14.9 11.7 - 15.7 g/dL Final   10/30/2020 15.2 11.7 - 15.7 g/dL Final       Rubella Antibody IgG Quantitative   Date Value Ref Range Status   2021 31 IU/mL Final     Comment:     Positive.  Suggests previous exposure or immunization and probable immunity  Reference Range:    Unvaccinated Negative 0-7 IU/mL  Vaccinated or previous exposure Positive 10 IU/ml or greater         Assessment and Plan: 36 year old  POD#3 s/p RLTCS, doing well postpartum. She was admitted antepartum for IVIg in setting of severe thrombocytopenia.     Psoriatic arthritis  - see rheumatology by 2 weeks postpartum   - Followed by Dr. Buck Morrell (Rheumatology)    ITP  - plt improved significantly with IVIg, most recently 163  - hematology aware of her admission    - Followed by Dr.Marshall Mahoney (Hematology) cell phone: [phone]  Have in basked messaged Dr. Mahoney re: heme f/u.     Routine postpartum:    Heme:  Appropriate blood loss during surgery. No symptoms of ABLA. Will discharge home w/ PO iron if Hgb under 10    Pain: c/b severe fibromyalgia. Currently has tylenol, ibuprofen and oxycodone prn. S/p TAP block. sched flexeril, lidocaine patches and gabapentin also added    Continue PTA klonopin hs for sleep    Rh  pos/Rubella immune. No intervention required.    Feeding: breast and bottle    :  s/p narayanan, voiding    PPX: Encourage ambulation    Continue regular diet, scheduled bowel regimen, prn antiemetics    Contraception: s/p BTL    Dispo: Anticipate discharge home today    Geeta Alarcon MD  Obstetrics and Gynecology PGY-3  11/17/21    [facility] staff:  Appreciate note by Dr. Alarcon.  I have seen and examined the patient without the resident. I have reviewed, edited, and agree with the note.    Pt is stable, ready for discharge. Heme and rheum f/u needed.       Cheyanne Francisco MD, FACOG  11/17/2021  9:26 AM

## 2021-11-17 NOTE — PROGRESS NOTES
"Brief ob/gyn progress note    S: Notified by RN that patient's pain is not improved. She continues to have abdominal pain, not improved with her medications. She is able to walk around, no dizziness or lightheadedness. None of the medications have helped. The abdominal binder does help. She is passing flatus and has had BMs. Denies HA, vision changes, CP, SOB. She is no longer breastfeeding    O:  /53   Pulse 75   Temp 98.2  F (36.8  C) (Oral)   Resp 16   Ht 1.651 m (5' 5\")   Wt 63.8 kg (140 lb 11.2 oz)   LMP 03/10/2021   SpO2 100%   Breastfeeding Unknown   BMI 23.41 kg/m      Gen: alert, NAD, ambulating in the room  Abd: soft, mildly tympanic, fundus firm at the umbilicus, abdomen mildly tender to palpation, appropriate    A/P:  36 year old  POD#2 s/p RLTCS, BTL. Struggling with post op pain. Vital signs stable, exam appropriate for post op. She thinks the oxycodone was more helpful than the norco and she would like to switch back to oxycodone and tylenol. Continue flexeril, lidocaine patches, heat, ice, abdominal binder. Plan to add gabapentin. Now that she is no longer breastfeeding, ok to add this med. She will let us know if she gets too sedated. Continue to monitor pain.     Terrell Guadalupe MD  OB/GYN PGY-2  2021 1:37 AM      "

## 2021-11-17 NOTE — PROGRESS NOTES
Data: VSS, postpartum assessments WNL. She is voiding without difficulty, up ad sheila, passing gas, eating and drinking normally. Incision is covered and unable to view (dried drainage on the bandage), lochia WNL, no s/s infection. Bottle feeding infant and Pumping with minimal assistance. Taking ibuprofen, tylenol, attarax, flexeril and Norco for pain and using heat, ice, essential oil inhalers. Reports inadequate pain management. Oxycodone was discontinued and Norco was ordered starting at 7pm. She did not get much relief from her first dose of Norco.  She was instructed by lactation to stop breast feeding while on higher doses of pain meds.  Action: Education provided on non pharm pain control  Response: Pt is agreeable with her plan of care. Positive attachment behaviors observed with infant. Support person,  Emeterio, valeria. Anticipate discharge per plan of care.

## 2021-11-17 NOTE — PROVIDER NOTIFICATION
11/16/21 2151   Provider Notification   Provider Name/Title Dr. Guadalupe/G2   Method of Notification Electronic Page   Request Evaluate-Remote   Notification Reason Status Update   patient received new pain plan today around 1800. Patient reports no change in pain levels (reports 8-9). Asked if there is a 2nd plan in place?

## 2021-11-17 NOTE — PLAN OF CARE
VSS. Afebrile. Up ad sheila. Voiding and has had BMs. C/o gas pain and walking in room to help alleviate. Pumping but dumping milk. Formula feeding baby. FOB here and supportive. C/o pain and medicated with relief. Discharge instructions and some discharge meds reviewed and given to pt. WIll discharge today.Will get a breast pump from her insurance somewhere else.     Still waiting on oxycodone and neurontin for discharge meds.

## 2021-11-17 NOTE — PLAN OF CARE
VSS and postpartum assessments WDL.  Up ad sheila with steady gait and independent with cares.  Bonding well with infant. Formula feeding until pain is controlled and pain medication can be decreased.  Pain now better managed with Tylenol, Ibuprofen, oxycodone, and gabapentin. Pain was improving overnight after adding gabapentin and oxycodone.  PIV in right forearm.   Emeterio present and supportive.  Will continue with postpartum cares and education per plan of care.

## 2021-11-18 ENCOUNTER — PATIENT OUTREACH (OUTPATIENT)
Dept: CARE COORDINATION | Facility: CLINIC | Age: 36
End: 2021-11-18
Payer: COMMERCIAL

## 2021-11-18 DIAGNOSIS — Z71.89 OTHER SPECIFIED COUNSELING: ICD-10-CM

## 2021-11-18 NOTE — PROGRESS NOTES
Clinic Care Coordination Contact  [facility]: Post-Discharge Note  SITUATION                                                      Admission:    Admission Date: 21   Reason for Admission: - - IUP at 35w3d- H/o C/S x2- H/o ITP s/p splenectomy - dx 2018- H/o bulimia - Thrombocytopenia (34 on )- Psoriatic arthritis - IBS- Anxiety - OCD- Fibromyalgia- anemia- Possible aortic arch narrowing on U/S - Chronic benzodiazapine treatment - using daily only at night for a long time for severe anxiety  Discharge:   Discharge Date: 21  Discharge Diagnosis: - - IUP at 35w3d- H/o C/S x2- H/o ITP s/p splenectomy - dx 2018- H/o bulimia - Thrombocytopenia (34 on )- Psoriatic arthritis - IBS- Anxiety - OCD- Fibromyalgia- anemia- Possible aortic arch narrowing on U/S - Chronic benzodiazapine treatment - using daily only at night for a long time for severe anxiety    BACKGROUND                                                      Merissa Leung is a 36 year old , at 35w2d by LMP c/w 8w0d U/S, who presents for planned admission for IVIG administration due to thrombocytopenia in setting of ITP, prior to planned  delivery. Pregnancy otherwise notable for h/o C/S x2, desires permanent sterilization, h/o bulimia, psoriatic arthritis, IBS, anxiety, and OCD.    ASSESSMENT      Enrollment  Primary Care Care Coordination Status: Not a Candidate    Discharge Assessment  How are you doing now that you are home?: feeling the same  How are your symptoms? (Red Flag symptoms escalate to triage hotline per guidelines): Unchanged  Do you feel your condition is stable enough to be safe at home until your provider visit?: Yes  Does the patient have their discharge instructions? : Yes  Does the patient have questions regarding their discharge instructions? : No  Were you started on any new medications or were there changes to any of your previous medications? : No  Does the patient have all  of their medications?: Yes  Do you have questions regarding any of your medications? : No  Do you have all of your needed medical supplies or equipment (DME)?  (i.e. oxygen tank, CPAP, cane, etc.): Yes  Discharge follow-up appointment scheduled within 14 calendar days? : No  Is patient agreeable to assistance with scheduling? : No (Will call clinic today)    Post-op ([facility] Only)  If the patient had a surgery or procedure, do they have any questions for a nurse?: No         PLAN                                                      Outpatient Plan:    Follow up with primary ObGyn in 1 week and 6 weeks    Future Appointments   Date Time Provider Department Center   11/19/2021  2:30 PM [facility]   12/17/2021  2:30 PM [facility]   1/4/2022  9:20 AM Buck Morrell MD [facility]         For any urgent concerns, please contact our 24 hour nurse triage line: [phone] ([phone])         JACKI Grover  [phone]  Connected Care Resource [facility]

## 2021-12-01 ENCOUNTER — TELEPHONE (OUTPATIENT)
Dept: HEMATOLOGY | Facility: CLINIC | Age: 36
End: 2021-12-01
Payer: COMMERCIAL

## 2021-12-02 NOTE — TELEPHONE ENCOUNTER
Merissa Leung has history of chronic ITP.  She is requesting that standing orders be faxed to her local clinic at [facility] at phone [phone] and Fax [phone].  This was done.  [facility] [facility] is to fax results to our center.    Lauren Maciel RN - Nurse Clinician - [facility] - [phone]

## 2021-12-06 ENCOUNTER — TELEPHONE (OUTPATIENT)
Dept: ONCOLOGY | Facility: CLINIC | Age: 36
End: 2021-12-06
Payer: COMMERCIAL

## 2021-12-06 NOTE — TELEPHONE ENCOUNTER
----- Message from Jerzy Mahoney MD sent at 12/6/2021  3:07 PM CST -----  Regarding: RE: IVIG coverage?  No need for IVIG - she received it in the hospital for her delivery.  At this point do not anticipate a need for this  Jerzy  ----- Message -----  From: Ivette Zamora RN  Sent: 12/6/2021  11:08 AM CST  To: Jerzy Mahoney MD, #  Subject: IVIG coverage?                                   Hi all,    Dr. Mahoney put orders in for this pt to get IVIG.  I dont see that it has been authorized yet.    Where are you at with this process?    Dr. Mahoney is it still the plan to give IVIG if it is approved?    Thank you,    Ivette Zamora, JAYA   [facility] [facility]  [phone]

## 2021-12-07 ENCOUNTER — DOCUMENTATION ONLY (OUTPATIENT)
Dept: OBGYN | Facility: CLINIC | Age: 36
End: 2021-12-07
Payer: COMMERCIAL

## 2021-12-07 NOTE — PROGRESS NOTES
Significantly elevated PPDS without HI, SI, or hallucination. Mother interested in medication. Per recommendations, should be seen within one week.

## 2021-12-27 ENCOUNTER — VIRTUAL VISIT (OUTPATIENT)
Dept: OBGYN | Facility: CLINIC | Age: 36
End: 2021-12-27
Payer: COMMERCIAL

## 2021-12-27 ENCOUNTER — TELEPHONE (OUTPATIENT)
Dept: OBGYN | Facility: CLINIC | Age: 36
End: 2021-12-27

## 2021-12-27 PROBLEM — Z34.80 PRENATAL CARE, SUBSEQUENT PREGNANCY: Status: RESOLVED | Noted: 2017-07-20 | Resolved: 2021-12-27

## 2021-12-27 PROBLEM — Z36.89 ENCOUNTER FOR TRIAGE IN PREGNANT PATIENT: Status: RESOLVED | Noted: 2021-11-12 | Resolved: 2021-12-27

## 2021-12-27 PROBLEM — Z34.90 PREGNANCY: Status: RESOLVED | Noted: 2021-10-29 | Resolved: 2021-12-27

## 2021-12-27 PROCEDURE — 99214 OFFICE O/P EST MOD 30 MIN: CPT | Mod: TEL | Performed by: OBSTETRICS & GYNECOLOGY

## 2021-12-27 RX ORDER — SERTRALINE HYDROCHLORIDE 100 MG/1
100 TABLET, FILM COATED ORAL DAILY
Qty: 90 TABLET | Refills: 1 | Status: SHIPPED | OUTPATIENT
Start: 2021-12-27

## 2021-12-27 ASSESSMENT — PATIENT HEALTH QUESTIONNAIRE - PHQ9
5. POOR APPETITE OR OVEREATING: NEARLY EVERY DAY
SUM OF ALL RESPONSES TO PHQ QUESTIONS 1-9: 19

## 2021-12-27 ASSESSMENT — ANXIETY QUESTIONNAIRES
2. NOT BEING ABLE TO STOP OR CONTROL WORRYING: NEARLY EVERY DAY
GAD7 TOTAL SCORE: 15
7. FEELING AFRAID AS IF SOMETHING AWFUL MIGHT HAPPEN: NOT AT ALL
5. BEING SO RESTLESS THAT IT IS HARD TO SIT STILL: NOT AT ALL
1. FEELING NERVOUS, ANXIOUS, OR ON EDGE: NEARLY EVERY DAY
6. BECOMING EASILY ANNOYED OR IRRITABLE: NEARLY EVERY DAY
IF YOU CHECKED OFF ANY PROBLEMS ON THIS QUESTIONNAIRE, HOW DIFFICULT HAVE THESE PROBLEMS MADE IT FOR YOU TO DO YOUR WORK, TAKE CARE OF THINGS AT HOME, OR GET ALONG WITH OTHER PEOPLE: EXTREMELY DIFFICULT
3. WORRYING TOO MUCH ABOUT DIFFERENT THINGS: NEARLY EVERY DAY

## 2021-12-27 NOTE — TELEPHONE ENCOUNTER
[place] in today due to the snow. She would like to change her 2:15 appt today to a video visit if possible. She is a 2 week post op . Please call patient and let her know decision. Thank you.   Mela REECE RN BSN PHN  [facility]

## 2021-12-27 NOTE — PATIENT INSTRUCTIONS
DEPRESSION: PATIENT SELF CARE PLAN    Self-Care for Depression  Here s the deal. Your body and mind are really not as separate as most people think.  What you do and think affects how you feel and how you feel influences what you do and think. This means if you do things that people who feel good do, it will help you feel better.  Sometimes this is all it takes.  There is also a place for medication and therapy depending on how severe your depression is, so be sure to consult with your medical provider and/ or Behavioral Health Consultant if your symptoms are worsening or not improving.

## 2021-12-27 NOTE — PROGRESS NOTES
Merissa is a 36 year old who is being evaluated via a billable telephone visit.      What phone number would you like to be contacted at? [phone]    How would you like to obtain your AVS? Aramis Craven is a 36 year old   female who presents for virtual visit due to possible postpartum depression; she is 2 weeks s/p unscheduled RCS due to severe ITP; she states she has waves of feeling disinterested in taking care of baby, no thoughts of suicide or homocide, just very sad at times, crying periodically.  She has known anxiety, takes Clonazepam.  Has known eating disorder so appetite is not often good at her baseline.  Spoke with her  who is with her today and he states she is sad but not expressing suicidal ideation.  She is not on an antidepressant.    Patient Active Problem List    Diagnosis Date Noted     Psoriatic arthritis (H) 10/20/2021     Priority: High     Rheumatologist: Buck Morrell   Cell: [phone]          Immune thrombocytopenic purpura (H) 2018     Priority: High     Followed by Dr. Buck Morrell (Rheumatology) and Dr.Marshall Mahoney (Hematology)  Dr. Mahoney : cell  [phone]     Pt has h/o splenectomy; has been prednisone resistant in the past, and poorly tolerant of IVIG         Needle phobia 10/20/2021     Priority: Medium     History of Lower GI bleed 10/31/2018     Priority: Medium     Underweight 2017     Priority: Medium     Chronic abdominal pain 10/06/2016     Priority: Medium     Has had workup through [facility]      Now followed by [facility] (started on bentyl)       Fibromyalgia 2015     Priority: Medium     Anxiety 2015     Priority: Medium     Hyperlipidemia LDL goal <160 07/15/2013     Priority: Medium     Headaches, tension 07/15/2013     Priority: Medium     Chronic constipation 07/15/2013     Priority: Medium     OCD (obsessive compulsive disorder) 07/15/2013     Priority: Medium     Binge eating  Started therapeutic trial of  Luvox 100-300mg extended release at bedtime; category C in pregnancy (as all SSRI); not effective  Will refer to [facility]         All systems were reviewed and pertinent information in noted in subjective/HPI.    Past Medical History:   Diagnosis Date     Anxiety      Binge eating disorder      Chickenpox     x3     Food intolerance      IBS (irritable bowel syndrome)      MEDICAL HISTORY OF -     pelvic floor disorder       Past Surgical History:   Procedure Laterality Date     C/SECTION, LOW TRANSVERSE  2007      SECTION, TUBAL LIGATION, COMBINED N/A 02/15/2016    NO tubal ligation done      SECTION, TUBAL LIGATION, COMBINED N/A 2021    RCS with BTL     COLONOSCOPY  2013    Procedure: COMBINED COLONOSCOPY, SINGLE BIOPSY/POLYPECTOMY BY BIOPSY;  Colonoscopy;  Surgeon: Maureen Valentin MD;  Location: [facility]     COLONOSCOPY N/A 2015    Procedure: COMBINED COLONOSCOPY, SINGLE OR MULTIPLE BIOPSY/POLYPECTOMY BY BIOPSY;  Surgeon: Maureen Valentin MD;  Location: [facility]     ESOPHAGOSCOPY, GASTROSCOPY, DUODENOSCOPY (EGD), COMBINED N/A 2018    Procedure: COMBINED ESOPHAGOSCOPY, GASTROSCOPY, DUODENOSCOPY (EGD), BIOPSY SINGLE OR MULTIPLE;  Gastroscopy;  Surgeon: Ray Amato MD;  Location: [facility]     LAPAROSCOPIC SPLENECTOMY N/A 2018    Procedure: Laparoscopic splenectomy, ;  Surgeon: Anthony Jcakson DO;  Location: [facility]         Current Outpatient Medications:      clonazePAM (KLONOPIN) 1 MG tablet, TAKE 1/2 (ONE-HALF) TABLET BY MOUTH TWICE DAILY FOR ANXIETY AND 1 & 1/2 (ONE & ONE-HALF) AT BEDTIME FOR SLEEP, Disp: 28 tablet, Rfl: 5     LINZESS 145 MCG capsule, TAKE 1 CAPSULE BY MOUTH IN THE MORNING BEFORE BREAKFAST, Disp: 30 capsule, Rfl: 2     magnesium oxide (MAG-OX) 400 (240 Mg) MG tablet, Take 400 mg by mouth daily, Disp: , Rfl:      polyethylene glycol (MIRALAX/GLYCOLAX) powder, Take 17 g by mouth daily as needed for constipation, Disp:  , Rfl:      sertraline (ZOLOFT) 100 MG tablet, Take 1 tablet (100 mg) by mouth daily, Disp: 90 tablet, Rfl: 1     acetaminophen (TYLENOL) 325 MG tablet, Take 2 tablets (650 mg) by mouth every 6 hours as needed for mild pain Start after Delivery. (Patient not taking: Reported on 12/27/2021), Disp: 100 tablet, Rfl: 0     cyclobenzaprine (FLEXERIL) 10 MG tablet, Take 1 tablet (10 mg) by mouth 3 times daily as needed for muscle spasms (Patient not taking: Reported on 12/27/2021), Disp: 20 tablet, Rfl: 3     ferrous sulfate (FEROSUL) 325 (65 Fe) MG tablet, Take 1 tablet (325 mg) by mouth daily (with breakfast) (Patient not taking: Reported on 12/27/2021), Disp: 90 tablet, Rfl: 0     fluticasone (FLONASE) 50 MCG/ACT nasal spray, Spray 1 spray into both nostrils daily (Patient not taking: Reported on 11/11/2021), Disp: 16 g, Rfl: 0     gabapentin (NEURONTIN) 100 MG capsule, Take 1 capsule (100 mg) by mouth 3 times daily (Patient not taking: Reported on 12/27/2021), Disp: 15 capsule, Rfl: 0     hydrOXYzine (VISTARIL) 50 MG capsule, Take 1 capsule (50 mg) by mouth nightly as needed for itching (Patient not taking: Reported on 11/11/2021), Disp: 20 capsule, Rfl: 1     ibuprofen (ADVIL/MOTRIN) 600 MG tablet, Take 1 tablet (600 mg) by mouth every 6 hours as needed for moderate pain Start after delivery (Patient not taking: Reported on 12/27/2021), Disp: 60 tablet, Rfl: 0     potassium aminobenzoate 500 MG CAPS capsule, , Disp: , Rfl:      Prenatal Vit-Fe Fumarate-FA (PRENATAL MULTIVITAMIN W/IRON) 27-0.8 MG tablet, Take 1 tablet by mouth daily (Patient not taking: Reported on 12/27/2021), Disp: , Rfl:      senna-docusate (SENOKOT-S/PERICOLACE) 8.6-50 MG tablet, Take 1 tablet by mouth daily Start after delivery. (Patient not taking: Reported on 12/27/2021), Disp: 100 tablet, Rfl: 0     sennosides (SENOKOT) 8.6 MG tablet, Take 2 tablets by mouth 2 times daily (Patient not taking: Reported on 12/27/2021), Disp: , Rfl:      UNABLE  TO FIND, Take 2 teaspoonful by mouth At Bedtime Magnesium Calm (Patient not taking: Reported on 2021), Disp: , Rfl:     ALLERGIES:  Dairy products [milk protein extract], Dilaudid [hydromorphone], and Food    Social History     Socioeconomic History     Marital status:      Spouse name: Not on file     Number of children: Not on file     Years of education: Not on file     Highest education level: Not on file   Occupational History     Not on file   Tobacco Use     Smoking status: Former Smoker     Packs/day: 0.50     Years: 6.00     Pack years: 3.00     Types: Cigarettes     Quit date: 2021     Years since quittin.6     Smokeless tobacco: Never Used   Vaping Use     Vaping Use: Never used   Substance and Sexual Activity     Alcohol use: Not Currently     Comment: rare-quit with pregnancy     Drug use: No     Sexual activity: Yes     Partners: Male   Other Topics Concern     Parent/sibling w/ CABG, MI or angioplasty before 65F 55M? No   Social History Narrative     Not on file     Social Determinants of Health     Financial Resource Strain: Not on file   Food Insecurity: Not on file   Transportation Needs: Not on file   Physical Activity: Not on file   Stress: Not on file   Social Connections: Not on file   Intimate Partner Violence: Not on file   Housing Stability: Not on file       Family History   Problem Relation Age of Onset     Gastrointestinal Disease Mother 46        perforated intestines-      Hepatitis Mother         C     Liver Disease Mother      Blood Disease Mother         anemia     Substance Abuse Mother         A&D     Psychotic Disorder Brother         Schizophrenia, OCD     Anxiety Disorder Brother      Arthritis Maternal Grandmother      Heart Disease Maternal Grandmother      Cancer Paternal Grandmother         cervical     Breast Cancer Paternal Grandmother      Hypertension Paternal Grandmother      Diabetes Paternal Grandmother      Heart Disease Paternal Grandmother       Cerebrovascular Disease Paternal Grandfather      Heart Disease Paternal Grandfather      Bipolar Disorder Paternal Grandfather      Suicide Paternal Grandfather      Cancer Paternal Aunt         cervical     Heart Disease Paternal Aunt      Coronary Artery Disease Maternal Grandfather         MI     Substance Abuse Father         recovered A&D     Heart Disease Father         MI-stents     Hypertension Father        OBJECTIVE:  Vitals: LMP 03/10/2021  BMI= There is no height or weight on file to calculate BMI.   Patient's last menstrual period was 03/10/2021.   Sounds sad over phone but engaged in conversation  PHG9=19  EDWARD 7=15    ASSESSMENT:      ICD-10-CM    1. Postpartum depression  O99.345 sertraline (ZOLOFT) 100 MG tablet    F53.0        PLAN:  I spoke with Merissa and her , offered immediate care from [facility] but she preferred to try an antidepressant medication first.  RX Sertraline 100mg po every day;  I will call her in 2 days to check on her symptoms but advised her , that if he sees worsening of her mood or SI, then to take her immediately to ER/  Carola Sanchez MD  [facility]      Carola Sanchez MD                     Phone call duration: 15 minutes

## 2021-12-28 ENCOUNTER — TELEPHONE (OUTPATIENT)
Dept: OBGYN | Facility: CLINIC | Age: 36
End: 2021-12-28
Payer: COMMERCIAL

## 2021-12-28 ASSESSMENT — ANXIETY QUESTIONNAIRES: GAD7 TOTAL SCORE: 15

## 2021-12-28 NOTE — TELEPHONE ENCOUNTER
"Return call to pt.  Spoke with patient on the phone.    Huddled with Dr. Villarreal .  Patient may cut 100 mg tablet in half for two weeks to adjust to medication and then resume full dose. Tablet should be scored.     Patient reports understanding and in agreement with the plan.  Patient reports \" I am feeling a lot better today than I was yesterday.\"    Julia Renee   [facility]  RN    "

## 2021-12-28 NOTE — TELEPHONE ENCOUNTER
Reason for Call:  Other call back    Detailed comments:  sertraline (ZOLOFT) 100 MG tablet started yesterday but feels to high of a dose. Was wondering if she could decrease the dose, cut the pill in half to 50 mg. She states she woke up really sleepy and feels it was to much.     Phone Number Patient can be reached at: Cell number on file:    Telephone Information:   Mobile [phone]       Best Time:     Can we leave a detailed message on this number? YES    Call taken on 12/28/2021 at 12:45 PM by Janet Llanos MA

## 2021-12-29 ENCOUNTER — TELEPHONE (OUTPATIENT)
Dept: OBGYN | Facility: CLINIC | Age: 36
End: 2021-12-29
Payer: COMMERCIAL

## 2021-12-29 NOTE — TELEPHONE ENCOUNTER
Spoke with patient as she reported that she just missed a call from Dr. Sanchez. Pt stated that she is doing much better and she plans on staying on the 1/2 tablet Zoloft. No need to call back unless there are questions.  Mela REECE RN BSN PHN  Specialty Clinics

## 2022-01-04 ENCOUNTER — VIRTUAL VISIT (OUTPATIENT)
Dept: RHEUMATOLOGY | Facility: CLINIC | Age: 37
End: 2022-01-04
Payer: COMMERCIAL

## 2022-01-04 DIAGNOSIS — L40.50 PSORIATIC ARTHRITIS (H): Primary | ICD-10-CM

## 2022-01-04 DIAGNOSIS — Z79.899 HIGH RISK MEDICATIONS (NOT ANTICOAGULANTS) LONG-TERM USE: ICD-10-CM

## 2022-01-04 PROCEDURE — 99214 OFFICE O/P EST MOD 30 MIN: CPT | Mod: GT | Performed by: INTERNAL MEDICINE

## 2022-01-04 RX ORDER — MEDROXYPROGESTERONE ACETATE 150 MG/ML
50 INJECTION, SUSPENSION INTRAMUSCULAR WEEKLY
Qty: 4 ML | Refills: 3 | Status: SHIPPED | OUTPATIENT
Start: 2022-01-04 | End: 2022-04-27

## 2022-01-04 NOTE — PROGRESS NOTES
Merissa Leung  is a 36 year old year old female who is being evaluated via a billable video visit.      How would you like to obtain your AVS? MyChart  If the video visit is dropped, the invitation should be resent by: Text to cell phone: [phone]  Will anyone else be joining your video visit? No     Rheumatology Video Visit      Merissa Leung MRN# [id number]   YOB: 1985 Age: 36 year old      Date of visit: 1/04/22   PCP: Mohini Mccord   Heme/Onc: Dr. Bernadette Pena  Dermatology: Merissa Hernandezgan    Chief Complaint     Patient presents with:  Psoriatic Arthritis    Assessment and Plan     1.  Psoriatic arthritis; psoriasis: initially presented 6/24/2020 with swelling of the MCPs consistent with an inflammatory arthritis; morning stiffness all day; pain and stiffness improve with activity and worsen with inactivity.  Lower back pain and stiffness is inflammatory in nature.  Was given dupixent by her dermatologist, per patient, that reportedly worsened the psoriasis.  Changed to Cosentyx with improvement of the psoriasis and arthritis but persistent symptoms so the dose was increased to 300 mg every 4 weeks in October 2020.  She did not improve by the end of October so she was changed to Humira but reportedly this caused worsening psoriasis.  She was then changed to Stelara but after half of an injection she realized she could not do self-injections with a prefilled syringe. Then started Enbrel in 2/2021 with improvement of the psoriasis and psoriatic arthritis, but then conceived and we discussed treatment during pregnancy: she first declined treatment during pregnancy, then later started Cimzia but only received twice (from the infusion center) on 10/22/2021 and 11/5/2021).  Today, 1/4/2022, post-partum and still symptomatic but not as bad as during pregnancy; she'd like to change back to Enbrel (able to tolerate auto-injectors per patient) that was effective previously and this is reasonable.  Discontinue cimzia. Start enbrel.  Chronic illness, progressive.    - Discontinue Cimzia   - Start Enbrel 50mg SQ every 7 days  - Labs in 3 months: CBC, Creatinine, Hepatic Panel, ESR, CRP    # Pregnancy plans: tubes tied per patient    2. Thrombocytopenia: followed by oncology and s/p splenectomy.  Continue following with heme/onc.    3. Fibromyalgia: noted here for historical significance only.  Follow with PCP for management.    4.  Vaccinations: Vaccinations reviewed with Ms. Leung.  Risks and benefits of vaccinations were discussed.    - Influenza: encouraged yearly vaccination    # At this time the COVID-19 vaccine is recommended.  As long as there is no history of allergic reaction to the vaccine, the COVID-19 vaccine may be received.  It is recommended to receive an mRNA vaccine (Pfizer or Moderna) over the Ayo&Ayo vaccine.      Following COVID-19 vaccination, please continue following all public health guidelines regarding physical distancing and other preventive measures.    Total minutes spent in evaluation with patient, documentation, , and review of pertinent studies and chart notes: 24     Ms. Leung verbalized agreement with and understanding of the rational for the diagnosis and treatment plan.  All questions were answered to best of my ability and the patient's satisfaction. Ms. Leung was advised to contact the clinic with any questions that may arise after the clinic visit.      Thank you for involving me in the care of the patient    Return to clinic: 3-4 months      HPI   Merissa Leung is a 36 year old female with a past medical history significant for hyperlipidemia, tension headaches, chronic constipation, anxiety, obsessive-compulsive disorder, chronic abdominal pain, history of lower GI bleed, ITP history, and fibromyalgia who is seen for follow-up of psoriatic arthritis and psoriasis.     1/8/2018 [facility] rheumatology note by Dr. Pavan Dimas documents ITP. Eval'd for  CTD. And given hx of bloody nose ANCA was checked.     11/5/2019 [facility] clinic note by Juliana Mcnulty documents psoriasis and bDMARD was recommended (specifically Taltz). Hx of ITP and is s/p splenecomty.     6/24/2020: Ms. Leung reported that she has psoriasis, joint pain, and myalgia; has been present her whole life and she's just dealt with it and now is being eval'd.  Scalp psoriasis started on the back of her head; now involving her neck, lip and sometimes on her cheek, dorsal left wrist, volar right wrist. Was given Dupixent by her dermatologist that caused her psoriasis to worsen and ooze; so this was stopped. Never used Taltz or any other injection med for the psoriasis. Never on MTX.   MCPS, PIPs, DIPS, wrists, elbows, shoulders, hips, knees, ankles, and MTPs all ache constantly; worse in the AM, better with time and activity; worse with inactivity; feels like all joints swell but is most noticeable in the hands.  Difficulty making a fist in the AM.  Dropping items frequently, broken several cups.  Symptoms have been worsening over the past 10 years she says.  Wakes up in the AM and feels like she's 90 years old until she gets moving.  Feels much better when moving constantly. Morning stiffness for most of the day, better with activity.    10/9/2020: Reports of the once weekly dosing of Cosentyx was much more effective than the every 28-day dosing.  Overall feels better on Cosentyx than she was prior to using Cosentyx, with regard to her arthritis and psoriasis.  Morning stiffness last for most of the day, improved with time activity, and reaching a baseline after several hours.  She is now able to make a fist in the morning easier than before, but still has some difficulty in doing so.  Still dropping items, but not as frequently.  cosentyx was increased at that time    2/17/2021: Since last seen, was seen by dermatology where Humira as tried that caused a flare of arthritis and  "psoriasis with each injection. Then changed to Stelara that was not an autoinjector and couldn't tolerate doing self-injections; injected half a shot in 12/2020.  Therefore, changed to Enbrel but hasn't started yet.  She says that her joints are very swollen; hard to get up and use the bathroom; hard to walk. Knees, hips, low back, and hands. Symptoms are worse in the AM and improve with heat, time, and activity. +gelling. Psoriasis still very active, on her chest, back, extremities, and ears.     4/15/2021: presenting sooner than previously scheduled due to pregnancy. When she informed this clinic of pregnancy she was advised to stop Enbrel until this could be discussed more.  Overall psoriasis and psoriatic arthritis were improving with Enbrel but now pregnant and she prefers to remain off medications if possible.  Still with psoriasis and joint pain but overall much improved.     5/26/2021: psoriasis on the neck, scalp, behind ears, and arms, but not on her legs.  Joints are \"absolutely terrible\" - affecting the hips, knees, lower back, wrists, and fingers.  Morning stiffness for most of the day, better after a few hours; +gelling.  Has discussed with her  and she says that they don't want to start a medication at this time because of current pregnancy and wanting to limit medications, but if she feels like it is needed in the future then she will notify me.  She notes that Enbrel was helping because she was doing much better when she was on it.    9/14/2021: more back pain that she says depends on where the baby is laying.  She does not want to start Cimzia at this time but is still considering it.  Hands ache too. All joint symptoms are worse in the AM and improve with time and activity.  Morning stiffness for all day, worse in the AM.  Currently planning to manage pain by trying to be physically active with low impact activity such as walking.  She will notify me if she would like to start Cimzia; " otherwise will plan to start Enbrel after her  and she verbalized understanding that it needs to be at least 2 weeks after the , and only if there is no delayed wound healing and no evidence of infection.    Today, 2022: post-partum. Tubes tied and no additional pregnancies in the future per patient. Not breastfeeding.  Pain and stiffness in the MCPs, PIPs, and DIPs that is worse in the AM and improved with time and activity.  Morning stiffness all day, reaching a baseline after 2-3 hours. She'd like to change back to enbrel that was effective for psoriatic arthritis and psoriasis previously.     Tobacco: Former smoker, quit 2021  EtOH: None  Drugs: None  Occupation: [employer]    ROS   12 point review of system was completed and negative except as noted in the HPI    Active Problem List     Patient Active Problem List   Diagnosis     Hyperlipidemia LDL goal <160     Headaches, tension     Chronic constipation     OCD (obsessive compulsive disorder)     Anxiety     Fibromyalgia     Chronic abdominal pain     Underweight     History of Lower GI bleed     Immune thrombocytopenic purpura (H)     Psoriatic arthritis (H)     Needle phobia     Past Medical History     Past Medical History:   Diagnosis Date     Anxiety      Binge eating disorder      Chickenpox     x3     Food intolerance      IBS (irritable bowel syndrome)      MEDICAL HISTORY OF -     pelvic floor disorder     Past Surgical History     Past Surgical History:   Procedure Laterality Date     C/SECTION, LOW TRANSVERSE  2007      SECTION, TUBAL LIGATION, COMBINED N/A 02/15/2016    NO tubal ligation done      SECTION, TUBAL LIGATION, COMBINED N/A 2021    RCS with BTL     COLONOSCOPY  2013    Procedure: COMBINED COLONOSCOPY, SINGLE BIOPSY/POLYPECTOMY BY BIOPSY;  Colonoscopy;  Surgeon: Maureen Valentin MD;  Location: [facility]     COLONOSCOPY N/A 2015    Procedure: COMBINED  COLONOSCOPY, SINGLE OR MULTIPLE BIOPSY/POLYPECTOMY BY BIOPSY;  Surgeon: Maureen Valentin MD;  Location: [facility]     ESOPHAGOSCOPY, GASTROSCOPY, DUODENOSCOPY (EGD), COMBINED N/A 01/02/2018    Procedure: COMBINED ESOPHAGOSCOPY, GASTROSCOPY, DUODENOSCOPY (EGD), BIOPSY SINGLE OR MULTIPLE;  Gastroscopy;  Surgeon: Ray Amato MD;  Location: [facility]     LAPAROSCOPIC SPLENECTOMY N/A 11/06/2018    Procedure: Laparoscopic splenectomy, ;  Surgeon: Anthony Jackson DO;  Location: [facility]     Allergy     Allergies   Allergen Reactions     Dairy Products [Milk Protein Extract] Swelling     Blow up like a balloon     Dilaudid [Hydromorphone] Nausea and Vomiting     Food Other (See Comments) and Swelling     Processed food, sugar, high sodium, red grains, rice, potatoes(can have mashed, but no french fries or puffs)  Pain in extremities and face.   All over body at times.     Current Medication List     Current Outpatient Medications   Medication Sig     clonazePAM (KLONOPIN) 1 MG tablet TAKE 1/2 (ONE-HALF) TABLET BY MOUTH TWICE DAILY FOR ANXIETY AND 1 & 1/2 (ONE & ONE-HALF) AT BEDTIME FOR SLEEP     etanercept (ENBREL SURECLICK) 50 MG/ML autoinjector Inject 50 mg Subcutaneous once a week . Hold for signs of infection, and seek medical attention.     LINZESS 145 MCG capsule TAKE 1 CAPSULE BY MOUTH IN THE MORNING BEFORE BREAKFAST     magnesium oxide (MAG-OX) 400 (240 Mg) MG tablet Take 400 mg by mouth daily     senna-docusate (SENOKOT-S/PERICOLACE) 8.6-50 MG tablet Take 1 tablet by mouth daily Start after delivery.     sertraline (ZOLOFT) 100 MG tablet Take 1 tablet (100 mg) by mouth daily     acetaminophen (TYLENOL) 325 MG tablet Take 2 tablets (650 mg) by mouth every 6 hours as needed for mild pain Start after Delivery. (Patient not taking: Reported on 12/27/2021)     cyclobenzaprine (FLEXERIL) 10 MG tablet Take 1 tablet (10 mg) by mouth 3 times daily as needed for muscle spasms (Patient not taking: Reported on  2021)     ferrous sulfate (FEROSUL) 325 (65 Fe) MG tablet Take 1 tablet (325 mg) by mouth daily (with breakfast) (Patient not taking: Reported on 2021)     fluticasone (FLONASE) 50 MCG/ACT nasal spray Spray 1 spray into both nostrils daily (Patient not taking: Reported on 2021)     gabapentin (NEURONTIN) 100 MG capsule Take 1 capsule (100 mg) by mouth 3 times daily (Patient not taking: Reported on 2021)     hydrOXYzine (VISTARIL) 50 MG capsule Take 1 capsule (50 mg) by mouth nightly as needed for itching (Patient not taking: Reported on 2021)     ibuprofen (ADVIL/MOTRIN) 600 MG tablet Take 1 tablet (600 mg) by mouth every 6 hours as needed for moderate pain Start after delivery (Patient not taking: Reported on 2021)     polyethylene glycol (MIRALAX/GLYCOLAX) powder Take 17 g by mouth daily as needed for constipation     potassium aminobenzoate 500 MG CAPS capsule  (Patient not taking: Reported on 2021)     Prenatal Vit-Fe Fumarate-FA (PRENATAL MULTIVITAMIN W/IRON) 27-0.8 MG tablet Take 1 tablet by mouth daily (Patient not taking: Reported on 2021)     sennosides (SENOKOT) 8.6 MG tablet Take 2 tablets by mouth 2 times daily (Patient not taking: Reported on 2021)     UNABLE TO FIND Take 2 teaspoonful by mouth At Bedtime Magnesium Calm (Patient not taking: Reported on 2021)     No current facility-administered medications for this visit.         Social History   See HPI    Family History     Family History   Problem Relation Age of Onset     Gastrointestinal Disease Mother 46        perforated intestines-      Hepatitis Mother         C     Liver Disease Mother      Blood Disease Mother         anemia     Substance Abuse Mother         A&D     Psychotic Disorder Brother         Schizophrenia, OCD     Anxiety Disorder Brother      Arthritis Maternal Grandmother      Heart Disease Maternal Grandmother      Cancer Paternal Grandmother         cervical      "Breast Cancer Paternal Grandmother      Hypertension Paternal Grandmother      Diabetes Paternal Grandmother      Heart Disease Paternal Grandmother      Cerebrovascular Disease Paternal Grandfather      Heart Disease Paternal Grandfather      Bipolar Disorder Paternal Grandfather      Suicide Paternal Grandfather      Cancer Paternal Aunt         cervical     Heart Disease Paternal Aunt      Coronary Artery Disease Maternal Grandfather         MI     Substance Abuse Father         recovered A&D     Heart Disease Father         MI-stents     Hypertension Father        Physical Exam     Temp Readings from Last 3 Encounters:   11/17/21 98.1  F (36.7  C) (Oral)   11/05/21 98.1  F (36.7  C) (Oral)   11/03/21 98.1  F (36.7  C) (Tympanic)     BP Readings from Last 5 Encounters:   11/17/21 95/70   11/05/21 102/64   11/03/21 107/67   10/29/21 127/71   10/22/21 124/74     Pulse Readings from Last 1 Encounters:   11/17/21 82     Resp Readings from Last 1 Encounters:   11/17/21 16     Estimated body mass index is 23.41 kg/m  as calculated from the following:    Height as of 11/12/21: 1.651 m (5' 5\").    Weight as of 11/12/21: 63.8 kg (140 lb 11.2 oz).    GEN: NAD. Healthy appearing adult.   HEENT: MMM.  Anicteric, noninjected sclera. No obvious external lesions of the ear and nose. Hearing intact.  PULM: No increased work of breathing  MSK:  Hands and wrists without swelling.   PSYCH: Alert. Appropriate.     Labs / Imaging (select studies)         RF/CCP  Recent Labs   Lab Test 06/29/20  1239   CCPIGG 1   RHF <7     CBC  Recent Labs   Lab Test 11/15/21  0652 11/14/21  1647 11/14/21  0245 10/18/21  1627 09/23/21  1639 04/22/21  1125 10/30/20  1411 10/12/20  1529 06/29/20  1330   WBC 12.7* 18.6* 10.3   < > 9.9   < > 7.6 9.9 7.8   RBC 3.23* 3.45* 3.44*   < > 4.02   < > 4.93 4.92 4.72   HGB 8.9* 9.5* 9.4*   < > 11.7   < > 15.2 15.1 14.7   HCT 27.7* 29.4* 28.9*   < > 34.0*   < > 44.1 44.3 43.5   MCV 86 85 84   < > 85   < > 90 90 92 "   RDW 15.9* 16.0* 15.6*   < > 12.4   < > 12.3 12.1 13.0    97* 87*   < > 54*   < > 93* 41* 92*   MCH 27.6 27.5 27.3   < > 29.1   < > 30.8 30.7 31.1   MCHC 32.1 32.3 32.5   < > 34.4   < > 34.5 34.1 33.8   NEUTROPHIL  --   --  78  --  73  --  43.6 65.8 58.6   LYMPH  --   --  17  --  18  --  42.9 25.6 32.5   MONOCYTE  --   --  4  --  8  --  10.7 6.7 7.2   EOSINOPHIL  --   --  0  --  0  --  0.9 0.5 0.4   BASOPHIL  --   --  0  --  0  --  1.6 1.2 1.0   ANEU  --   --   --   --   --   --  3.3 6.5 4.6   ALYM  --   --   --   --   --   --  3.2 2.5 2.5   CORRINA  --   --   --   --   --   --  0.8 0.7 0.6   AEOS  --   --   --   --   --   --  0.1 0.1 0.0   ABAS  --   --   --   --   --   --  0.1 0.1 0.1   ANEUTAUTO  --   --  7.9  --  7.2  --   --   --   --    ALYMPAUTO  --   --  1.8  --  1.8  --   --   --   --    AMONOAUTO  --   --  0.4  --  0.8  --   --   --   --    AEOSAUTO  --   --  0.0  --  0.0  --   --   --   --    ABSBASO  --   --  0.0  --  0.0  --   --   --   --     < > = values in this interval not displayed.     CMP  Recent Labs   Lab Test 10/29/21  1013 09/23/21  1639 10/30/20  1411 10/12/20  1529 06/29/20  1239    141 140  --  139   POTASSIUM 3.4 3.3* 3.8  --  3.1*   CHLORIDE 108 112* 105  --  106   CO2 22 23 30  --  27   ANIONGAP 8 6 5  --  6   GLC 83 83 105*  --  105*   BUN 13 8 11  --  13   CR 0.57 0.60 0.89 0.94 0.82   GFRESTIMATED >90 >90 85 79 >90   GFRESTBLACK  --   --  >90 >90 >90   DREW 8.6 7.6* 9.2  --  8.9   BILITOTAL 0.2 0.2 0.4 0.5 0.2   ALBUMIN 2.4* 2.3* 4.3 3.9 4.4   PROTTOTAL 6.1* 5.9* 7.3 7.2 8.1   ALKPHOS 79 56 61 59 64   AST 12 13 17 20 20   ALT 14 13 21 22 26     Calcium/VitaminD  Recent Labs   Lab Test 10/29/21  1013 09/23/21  1639 10/30/20  1411   DREW 8.6 7.6* 9.2     ESR/CRP  Recent Labs   Lab Test 11/12/21  1012 10/12/20  1529 06/29/20  1330 06/29/20  1239 11/08/19  1445 10/24/19  0824   SED  --  2 3  --   --  3   CRP <2.9 <2.9  --  <2.9   < > <2.9    < > = values in this interval not  displayed.       Hepatitis B  Recent Labs   Lab Test 04/22/21  1125 06/29/20  1239 01/24/18  1230   HBCAB  --  Nonreactive  --    HEPBANG Nonreactive Nonreactive Nonreactive     Hepatitis C  Recent Labs   Lab Test 06/29/20  1239 01/24/18  1230   HCVAB Nonreactive Nonreactive     Lyme ab screening  Recent Labs   Lab Test 06/29/20  1239   LYMEGM 0.10     Tuberculosis Screening  Recent Labs   Lab Test 10/18/21  1627 06/29/20  1239   TBRES Negative Negative     HIV Screening  Recent Labs   Lab Test 04/22/21  1125 06/29/20  1239 07/17/15  1544   HIAGAB Nonreactive Nonreactive Nonreactive   HIV-1 p24 Ag & HIV-1/HIV-2 Ab Not Detected         Immunization History     Immunization History   Administered Date(s) Administered     Flu, Unspecified 12/03/2018, 01/30/2020     Hib (PRP-T) 10/19/2018     Influenza Vaccine IM > 6 months Valent IIV4 (Alfuria,Fluzone) 12/03/2018, 01/30/2020     Meningococcal (Menactra ) 10/19/2018     Pneumo Conj 13-V (2010&after) 10/19/2018     Pneumococcal 23 valent 01/30/2020          Chart documentation done in part with Dragon Voice recognition Software. Although reviewed after completion, some word and grammatical error may remain.        Video-Visit Details    Type of service:  Video Visit    Video Start Time: 9:27 AM    Video End Time:9:38 AM    Originating Location (pt. Location): [place], [place]    Distant Location (provider location):  Home    Platform used for Video Visit: neelam Morrell MD

## 2022-01-13 ENCOUNTER — TELEPHONE (OUTPATIENT)
Dept: HEMATOLOGY | Facility: CLINIC | Age: 37
End: 2022-01-13
Payer: COMMERCIAL

## 2022-01-13 NOTE — TELEPHONE ENCOUNTER
Merissa MOONEY Xochitl  [id number]  1985    Reached out to Merissa as due for platelet count. Her last count was 12/9/21 at 77K. She reports having gum & nosebleeds that last a few minutes and easy bruising. She has had these symptoms for about a month and they have not worsened. I asked that she schedule a lab visit this week to check platelet count.  Gloria Burch, MSN, RN, PHN -Nurse Clinician, [facility] [phone]

## 2022-04-24 ENCOUNTER — HEALTH MAINTENANCE LETTER (OUTPATIENT)
Age: 37
End: 2022-04-24

## 2022-04-27 ENCOUNTER — VIRTUAL VISIT (OUTPATIENT)
Dept: RHEUMATOLOGY | Facility: CLINIC | Age: 37
End: 2022-04-27
Payer: COMMERCIAL

## 2022-04-27 DIAGNOSIS — L40.50 PSORIATIC ARTHRITIS (H): Primary | ICD-10-CM

## 2022-04-27 PROCEDURE — 99214 OFFICE O/P EST MOD 30 MIN: CPT | Mod: GT | Performed by: INTERNAL MEDICINE

## 2022-04-27 RX ORDER — MEDROXYPROGESTERONE ACETATE 150 MG/ML
50 INJECTION, SUSPENSION INTRAMUSCULAR WEEKLY
Qty: 4 ML | Refills: 4 | Status: SHIPPED | OUTPATIENT
Start: 2022-04-27

## 2022-04-27 NOTE — Clinical Note
Deirdre: please fax the lab orders entered today, 4/27/2022, to the [facility] in [place], [place]  Thanks! Buck Morrell MD 4/27/2022

## 2022-04-27 NOTE — PROGRESS NOTES
Merissa Leung  is a 37 year old year old who is being evaluated via a billable video visit.      How would you like to obtain your AVS? MyChart  If the video visit is dropped, the invitation should be resent by: Text to cell phone: [phone]   Will anyone else be joining your video visit? No     Rheumatology Video Visit      Merissa Leung MRN# [id number]   YOB: 1985 Age: 37 year old      Date of visit: 4/27/22   PCP: Mohini Mccord   Heme/Onc: Dr. Bernadette Pena  Dermatology: Merissa Muñoz    Chief Complaint     Patient presents with:  Rheumatoid Arthritis    Assessment and Plan     1.  Psoriatic arthritis; psoriasis: initially presented 6/24/2020 with swelling of the MCPs consistent with an inflammatory arthritis; morning stiffness all day; pain and stiffness improve with activity and worsen with inactivity.  Lower back pain and stiffness is inflammatory in nature.  Was given dupixent by her dermatologist, per patient, that reportedly worsened the psoriasis.  Changed to Cosentyx with improvement of the psoriasis and arthritis but persistent symptoms so the dose was increased to 300 mg every 4 weeks in October 2020.  She did not improve by the end of October so she was changed to Humira but reportedly this caused worsening psoriasis.  She was then changed to Stelara but after half of an injection she realized she could not do self-injections with a prefilled syringe. Then started Enbrel in 2/2021 with improvement of the psoriasis and psoriatic arthritis, but then conceived and we discussed treatment during pregnancy: she first declined treatment during pregnancy, then later started Cimzia but only received twice (from the infusion center) on 10/22/2021 and 11/5/2021).  Then on 1/4/2022 when postpartum and still symptomatic she was agreeable to start Enbrel (able to tolerate autoinjectors per patient) and she has improved.  Arthritis and psoriasis are not at goal, as she still has inflammatory joint  symptoms involving the hands.  Continue Enbrel for now in anticipation that a longer duration of therapy will be more effective.  Follow-up in about 3 months.   Chronic illness, progressive.    - Continue  Enbrel 50mg SQ every 7 days  - Labs in 3 months: CBC, Creatinine, Hepatic Panel, ESR, CRP (to send order to [facility] in [place])    # Pregnancy plans: tubes tied per patient    2. Thrombocytopenia: followed by oncology and s/p splenectomy.  Continue following with heme/onc.    3. Fibromyalgia: noted here for historical significance only.  Follow with PCP for management.    4.  Vaccinations: Vaccinations reviewed with Ms. Leung.  Risks and benefits of vaccinations were discussed.    - Influenza: encouraged yearly vaccination   - COVID19: Declined by patient    Total minutes spent in evaluation with patient, documentation, , and review of pertinent studies and chart notes: 14     Ms. Leung verbalized agreement with and understanding of the rational for the diagnosis and treatment plan.  All questions were answered to best of my ability and the patient's satisfaction. Ms. Leung was advised to contact the clinic with any questions that may arise after the clinic visit.      Thank you for involving me in the care of the patient    Return to clinic: 3-4 months, in-office      HPI   Merissa Leung is a 37 year old female with a past medical history significant for hyperlipidemia, tension headaches, chronic constipation, anxiety, obsessive-compulsive disorder, chronic abdominal pain, history of lower GI bleed, ITP history, and fibromyalgia who is seen for follow-up of psoriatic arthritis and psoriasis.     1/8/2018 [facility] rheumatology note by Dr. Pavan Dimas documents ITP. Eval'd for CTD. And given hx of bloody nose ANCA was checked.     11/5/2019 [facility] clinic note by Juliana Mcnulty documents psoriasis and bDMARD was recommended (specifically Taltz). Hx of ITP and is s/p splenecomty.      6/24/2020: Ms. Leung reported that she has psoriasis, joint pain, and myalgia; has been present her whole life and she's just dealt with it and now is being eval'd.  Scalp psoriasis started on the back of her head; now involving her neck, lip and sometimes on her cheek, dorsal left wrist, volar right wrist. Was given Dupixent by her dermatologist that caused her psoriasis to worsen and ooze; so this was stopped. Never used Taltz or any other injection med for the psoriasis. Never on MTX.   MCPS, PIPs, DIPS, wrists, elbows, shoulders, hips, knees, ankles, and MTPs all ache constantly; worse in the AM, better with time and activity; worse with inactivity; feels like all joints swell but is most noticeable in the hands.  Difficulty making a fist in the AM.  Dropping items frequently, broken several cups.  Symptoms have been worsening over the past 10 years she says.  Wakes up in the AM and feels like she's 90 years old until she gets moving.  Feels much better when moving constantly. Morning stiffness for most of the day, better with activity.    10/9/2020: Reports of the once weekly dosing of Cosentyx was much more effective than the every 28-day dosing.  Overall feels better on Cosentyx than she was prior to using Cosentyx, with regard to her arthritis and psoriasis.  Morning stiffness last for most of the day, improved with time activity, and reaching a baseline after several hours.  She is now able to make a fist in the morning easier than before, but still has some difficulty in doing so.  Still dropping items, but not as frequently.  cosentyx was increased at that time    2/17/2021: Since last seen, was seen by dermatology where Humira as tried that caused a flare of arthritis and psoriasis with each injection. Then changed to Stelara that was not an autoinjector and couldn't tolerate doing self-injections; injected half a shot in 12/2020.  Therefore, changed to Enbrel but hasn't started yet.  She says that  "her joints are very swollen; hard to get up and use the bathroom; hard to walk. Knees, hips, low back, and hands. Symptoms are worse in the AM and improve with heat, time, and activity. +gelling. Psoriasis still very active, on her chest, back, extremities, and ears.     4/15/2021: presenting sooner than previously scheduled due to pregnancy. When she informed this clinic of pregnancy she was advised to stop Enbrel until this could be discussed more.  Overall psoriasis and psoriatic arthritis were improving with Enbrel but now pregnant and she prefers to remain off medications if possible.  Still with psoriasis and joint pain but overall much improved.     2021: psoriasis on the neck, scalp, behind ears, and arms, but not on her legs.  Joints are \"absolutely terrible\" - affecting the hips, knees, lower back, wrists, and fingers.  Morning stiffness for most of the day, better after a few hours; +gelling.  Has discussed with her  and she says that they don't want to start a medication at this time because of current pregnancy and wanting to limit medications, but if she feels like it is needed in the future then she will notify me.  She notes that Enbrel was helping because she was doing much better when she was on it.    2021: more back pain that she says depends on where the baby is laying.  She does not want to start Cimzia at this time but is still considering it.  Hands ache too. All joint symptoms are worse in the AM and improve with time and activity.  Morning stiffness for all day, worse in the AM.  Currently planning to manage pain by trying to be physically active with low impact activity such as walking.  She will notify me if she would like to start Cimzia; otherwise will plan to start Enbrel after her  and she verbalized understanding that it needs to be at least 2 weeks after the , and only if there is no delayed wound healing and no evidence of infection.    2022: " post-partum. Tubes tied and no additional pregnancies in the future per patient. Not breastfeeding.  Pain and stiffness in the MCPs, PIPs, and DIPs that is worse in the AM and improved with time and activity.  Morning stiffness all day, reaching a baseline after 2-3 hours. She'd like to change back to enbrel that was effective for psoriatic arthritis and psoriasis previously.     22: no show to scheduled appointment    Today, 2022: States that Enbrel is working. Psoriasis is improving.  Joint pain and swelling some improved.  Spine, MCPs, PIPs, DIPs, back all hurt, worse in the AM and improved with time and activity.  Rings on her fingers that come off easily in the afternoon but not as easily in the morning.  Enbrel continues to work more over time so she would like to continue on Enbrel monotherapy.  Able to stay active and even during this call she is walking on a treadmill.  Arthritis is not limiting her ability to do her daily activities.      Tobacco: Former smoker, quit 2021  EtOH: None  Drugs: None  Occupation: [employer]    ROS   12 point review of system was completed and negative except as noted in the HPI    Active Problem List     Patient Active Problem List   Diagnosis     Hyperlipidemia LDL goal <160     Headaches, tension     Chronic constipation     OCD (obsessive compulsive disorder)     Anxiety     Fibromyalgia     Chronic abdominal pain     Underweight     History of Lower GI bleed     Immune thrombocytopenic purpura (H)     Psoriatic arthritis (H)     Needle phobia     Past Medical History     Past Medical History:   Diagnosis Date     Anxiety      Binge eating disorder      Chickenpox     x3     Food intolerance      IBS (irritable bowel syndrome)      MEDICAL HISTORY OF -     pelvic floor disorder     Past Surgical History     Past Surgical History:   Procedure Laterality Date     C/SECTION, LOW TRANSVERSE  2007      SECTION, TUBAL LIGATION, COMBINED N/A  02/15/2016    NO tubal ligation done      SECTION, TUBAL LIGATION, COMBINED N/A 2021    RCS with BTL     COLONOSCOPY  2013    Procedure: COMBINED COLONOSCOPY, SINGLE BIOPSY/POLYPECTOMY BY BIOPSY;  Colonoscopy;  Surgeon: Maureen Valentin MD;  Location: [facility]     COLONOSCOPY N/A 2015    Procedure: COMBINED COLONOSCOPY, SINGLE OR MULTIPLE BIOPSY/POLYPECTOMY BY BIOPSY;  Surgeon: Maureen Valentin MD;  Location: [facility]     ESOPHAGOSCOPY, GASTROSCOPY, DUODENOSCOPY (EGD), COMBINED N/A 2018    Procedure: COMBINED ESOPHAGOSCOPY, GASTROSCOPY, DUODENOSCOPY (EGD), BIOPSY SINGLE OR MULTIPLE;  Gastroscopy;  Surgeon: Ray Amato MD;  Location: [facility]     LAPAROSCOPIC SPLENECTOMY N/A 2018    Procedure: Laparoscopic splenectomy, ;  Surgeon: Anthony Jackson DO;  Location: [facility]     Allergy     Allergies   Allergen Reactions     Dairy Products [Milk Protein Extract] Swelling     Blow up like a balloon     Dilaudid [Hydromorphone] Nausea and Vomiting     Food Other (See Comments) and Swelling     Processed food, sugar, high sodium, red grains, rice, potatoes(can have mashed, but no french fries or puffs)  Pain in extremities and face.   All over body at times.     Current Medication List     Current Outpatient Medications   Medication Sig     clonazePAM (KLONOPIN) 1 MG tablet TAKE 1/2 (ONE-HALF) TABLET BY MOUTH TWICE DAILY FOR ANXIETY AND 1 & 1/2 (ONE & ONE-HALF) AT BEDTIME FOR SLEEP     etanercept (ENBREL SURECLICK) 50 MG/ML autoinjector Inject 50 mg Subcutaneous once a week . Hold for signs of infection, and seek medical attention.     LINZESS 145 MCG capsule TAKE 1 CAPSULE BY MOUTH IN THE MORNING BEFORE BREAKFAST     magnesium oxide (MAG-OX) 400 (240 Mg) MG tablet Take 400 mg by mouth daily     polyethylene glycol (MIRALAX/GLYCOLAX) powder Take 17 g by mouth daily as needed for constipation     potassium aminobenzoate 500 MG CAPS capsule      senna-docusate  (SENOKOT-S/PERICOLACE) 8.6-50 MG tablet Take 1 tablet by mouth daily Start after delivery.     sertraline (ZOLOFT) 100 MG tablet Take 1 tablet (100 mg) by mouth daily     acetaminophen (TYLENOL) 325 MG tablet Take 2 tablets (650 mg) by mouth every 6 hours as needed for mild pain Start after Delivery. (Patient not taking: Reported on 2021)     cyclobenzaprine (FLEXERIL) 10 MG tablet Take 1 tablet (10 mg) by mouth 3 times daily as needed for muscle spasms (Patient not taking: Reported on 2021)     ferrous sulfate (FEROSUL) 325 (65 Fe) MG tablet Take 1 tablet (325 mg) by mouth daily (with breakfast) (Patient not taking: Reported on 2021)     fluticasone (FLONASE) 50 MCG/ACT nasal spray Spray 1 spray into both nostrils daily (Patient not taking: Reported on 2021)     gabapentin (NEURONTIN) 100 MG capsule Take 1 capsule (100 mg) by mouth 3 times daily (Patient not taking: Reported on 2021)     hydrOXYzine (VISTARIL) 50 MG capsule Take 1 capsule (50 mg) by mouth nightly as needed for itching (Patient not taking: Reported on 2021)     ibuprofen (ADVIL/MOTRIN) 600 MG tablet Take 1 tablet (600 mg) by mouth every 6 hours as needed for moderate pain Start after delivery (Patient not taking: Reported on 2021)     Prenatal Vit-Fe Fumarate-FA (PRENATAL MULTIVITAMIN W/IRON) 27-0.8 MG tablet Take 1 tablet by mouth daily (Patient not taking: Reported on 2021)     sennosides (SENOKOT) 8.6 MG tablet Take 2 tablets by mouth 2 times daily (Patient not taking: Reported on 2021)     UNABLE TO FIND Take 2 teaspoonful by mouth At Bedtime Magnesium Calm (Patient not taking: Reported on 2021)     No current facility-administered medications for this visit.         Social History   See HPI    Family History     Family History   Problem Relation Age of Onset     Gastrointestinal Disease Mother 46        perforated intestines-      Hepatitis Mother         C     Liver Disease Mother  "     Blood Disease Mother         anemia     Substance Abuse Mother         A&D     Psychotic Disorder Brother         Schizophrenia, OCD     Anxiety Disorder Brother      Arthritis Maternal Grandmother      Heart Disease Maternal Grandmother      Cancer Paternal Grandmother         cervical     Breast Cancer Paternal Grandmother      Hypertension Paternal Grandmother      Diabetes Paternal Grandmother      Heart Disease Paternal Grandmother      Cerebrovascular Disease Paternal Grandfather      Heart Disease Paternal Grandfather      Bipolar Disorder Paternal Grandfather      Suicide Paternal Grandfather      Cancer Paternal Aunt         cervical     Heart Disease Paternal Aunt      Coronary Artery Disease Maternal Grandfather         MI     Substance Abuse Father         recovered A&D     Heart Disease Father         MI-stents     Hypertension Father        Physical Exam     Temp Readings from Last 3 Encounters:   11/17/21 98.1  F (36.7  C) (Oral)   11/05/21 98.1  F (36.7  C) (Oral)   11/03/21 98.1  F (36.7  C) (Tympanic)     BP Readings from Last 5 Encounters:   11/17/21 95/70   11/05/21 102/64   11/03/21 107/67   10/29/21 127/71   10/22/21 124/74     Pulse Readings from Last 1 Encounters:   11/17/21 82     Resp Readings from Last 1 Encounters:   11/17/21 16     Estimated body mass index is 23.41 kg/m  as calculated from the following:    Height as of 11/12/21: 1.651 m (5' 5\").    Weight as of 11/12/21: 63.8 kg (140 lb 11.2 oz).    GEN: NAD. Healthy appearing adult.   HEENT: MMM.  Anicteric, noninjected sclera. No obvious external lesions of the ear and nose. Hearing intact.  PULM: No increased work of breathing  MSK:  Hands and wrists without swelling.   PSYCH: Alert. Appropriate.     Labs / Imaging (select studies)         RF/CCP  Recent Labs   Lab Test 06/29/20  1239   CCPIGG 1   RHF <7     CBC  Recent Labs   Lab Test 11/15/21  0652 11/14/21  1647 11/14/21  0245 10/18/21  1627 09/23/21  1639 04/22/21  1125 " 10/30/20  1411 10/12/20  1529 06/29/20  1330   WBC 12.7* 18.6* 10.3   < > 9.9   < > 7.6 9.9 7.8   RBC 3.23* 3.45* 3.44*   < > 4.02   < > 4.93 4.92 4.72   HGB 8.9* 9.5* 9.4*   < > 11.7   < > 15.2 15.1 14.7   HCT 27.7* 29.4* 28.9*   < > 34.0*   < > 44.1 44.3 43.5   MCV 86 85 84   < > 85   < > 90 90 92   RDW 15.9* 16.0* 15.6*   < > 12.4   < > 12.3 12.1 13.0    97* 87*   < > 54*   < > 93* 41* 92*   MCH 27.6 27.5 27.3   < > 29.1   < > 30.8 30.7 31.1   MCHC 32.1 32.3 32.5   < > 34.4   < > 34.5 34.1 33.8   NEUTROPHIL  --   --  78  --  73  --  43.6 65.8 58.6   LYMPH  --   --  17  --  18  --  42.9 25.6 32.5   MONOCYTE  --   --  4  --  8  --  10.7 6.7 7.2   EOSINOPHIL  --   --  0  --  0  --  0.9 0.5 0.4   BASOPHIL  --   --  0  --  0  --  1.6 1.2 1.0   ANEU  --   --   --   --   --   --  3.3 6.5 4.6   ALYM  --   --   --   --   --   --  3.2 2.5 2.5   CORRINA  --   --   --   --   --   --  0.8 0.7 0.6   AEOS  --   --   --   --   --   --  0.1 0.1 0.0   ABAS  --   --   --   --   --   --  0.1 0.1 0.1   ANEUTAUTO  --   --  7.9  --  7.2  --   --   --   --    ALYMPAUTO  --   --  1.8  --  1.8  --   --   --   --    AMONOAUTO  --   --  0.4  --  0.8  --   --   --   --    AEOSAUTO  --   --  0.0  --  0.0  --   --   --   --    ABSBASO  --   --  0.0  --  0.0  --   --   --   --     < > = values in this interval not displayed.     CMP  Recent Labs   Lab Test 10/29/21  1013 09/23/21  1639 10/30/20  1411 10/12/20  1529 06/29/20  1239    141 140  --  139   POTASSIUM 3.4 3.3* 3.8  --  3.1*   CHLORIDE 108 112* 105  --  106   CO2 22 23 30  --  27   ANIONGAP 8 6 5  --  6   GLC 83 83 105*  --  105*   BUN 13 8 11  --  13   CR 0.57 0.60 0.89 0.94 0.82   GFRESTIMATED >90 >90 85 79 >90   GFRESTBLACK  --   --  >90 >90 >90   DREW 8.6 7.6* 9.2  --  8.9   BILITOTAL 0.2 0.2 0.4 0.5 0.2   ALBUMIN 2.4* 2.3* 4.3 3.9 4.4   PROTTOTAL 6.1* 5.9* 7.3 7.2 8.1   ALKPHOS 79 56 61 59 64   AST 12 13 17 20 20   ALT 14 13 21 22 26     Calcium/VitaminD  Recent Labs    Lab Test 10/29/21  1013 09/23/21  1639 10/30/20  1411   DREW 8.6 7.6* 9.2     ESR/CRP  Recent Labs   Lab Test 11/12/21  1012 10/12/20  1529 06/29/20  1330 06/29/20  1239 11/08/19  1445 10/24/19  0824   SED  --  2 3  --   --  3   CRP <2.9 <2.9  --  <2.9   < > <2.9    < > = values in this interval not displayed.     Hepatitis B  Recent Labs   Lab Test 04/22/21  1125 06/29/20  1239 01/24/18  1230   HBCAB  --  Nonreactive  --    HEPBANG Nonreactive Nonreactive Nonreactive     Hepatitis C  Recent Labs   Lab Test 06/29/20  1239 01/24/18  1230   HCVAB Nonreactive Nonreactive     Lyme ab screening  Recent Labs   Lab Test 06/29/20  1239   LYMEGM 0.10   .  Tuberculosis Screening  Recent Labs   Lab Test 10/18/21  1627 06/29/20  1239   TBRES Negative Negative     HIV Screening  Recent Labs   Lab Test 04/22/21  1125 06/29/20  1239 07/17/15  1544   HIAGAB Nonreactive Nonreactive Nonreactive   HIV-1 p24 Ag & HIV-1/HIV-2 Ab Not Detected         Immunization History     Immunization History   Administered Date(s) Administered     Flu, Unspecified 12/03/2018, 01/30/2020     Hib (PRP-T) 10/19/2018     Influenza Vaccine IM > 6 months Valent IIV4 (Alfuria,Fluzone) 12/03/2018, 01/30/2020     Meningococcal (Menactra ) 10/19/2018     Pneumo Conj 13-V (2010&after) 10/19/2018     Pneumococcal 23 valent 01/30/2020          Chart documentation done in part with Dragon Voice recognition Software. Although reviewed after completion, some word and grammatical error may remain.      Video-Visit Details    Type of service:  Video Visit    Video Start Time: 8:09 AM    Video End Time:8:18 AM    Originating Location (pt. Location): [place], [place]    Distant Location (provider location):  [place]    Platform used for Video Visit: Adeel Morrell MD

## 2022-04-27 NOTE — PATIENT INSTRUCTIONS
RHEUMATOLOGY    Dr. Buck Morrell    [address] [place] [address]  Phone number: [phone]  Fax number: [phone]      Thank you for choosing [facility]!    Deirdre Rollins CMA Rheumatology

## 2022-04-27 NOTE — PROGRESS NOTES
Deirdre: please fax the lab orders entered today, 4/27/2022, to the [facility] in [place], [place]    Thanks!  Buck Morrell MD  4/27/2022

## 2022-11-19 ENCOUNTER — HEALTH MAINTENANCE LETTER (OUTPATIENT)
Age: 37
End: 2022-11-19

## 2023-03-09 NOTE — PATIENT INSTRUCTIONS
We are referring you to gastroenterology for the persistent nausea. We would like to see you back in clinic with Dr. Le in 1 week with labs prior.  Please continue weekly standing labs. Our schedulers will also assist with trying to get you a sooner appt with rheumatology.    Your prescription (prednisone) has been sent to:   [facility] [address] [facility]  [address] [facility] [place] [address]  Phone: [phone] Fax: [phone]  When you are in need of a refill, please call your pharmacy and they will send us a request.      Copy of appointments, and after visit summary (AVS) given to patient.  If you have any questions during business hours (M-F 8 AM- 4PM), please call Laverne Rollins RN, BSN, OCN Oncology Hematology /Breast Cancer Navigator at [facility] [phone].   For questions after business hours, or on holidays/weekends, please call our after hours Nurse Triage line [phone]. Thank you.     [Normal] : moves all extremities, no focal deficits, normal speech [de-identified] : No carotid artery bruits auscultated bilaterally

## 2023-06-01 ENCOUNTER — HEALTH MAINTENANCE LETTER (OUTPATIENT)
Age: 38
End: 2023-06-01

## 2023-06-20 NOTE — TELEPHONE ENCOUNTER
Says she is having rectal bleeding with large clots. She is at work right now, advised she needs to be evaluated emergently as she says she is having a good deal of bleeding. She will go to ED for evaluation   Complex Repair Preamble Text (Leave Blank If You Do Not Want): Extensive wide and differential undermining were performed.  During reconstruction, hemostasis was performed using electrofulguration.  Initial buried interrupted vertical mattress suture(s) defined redundant cutaneous columns (Burow's triangles) which were removed to the level of the adipose tissue using a #15 blade scalpel and the triangulation technique.  Hemostasis was obtained and the resulting defect was repaired with the same suture material and techniques.  The epidermis was then carefully apposed using polypropylene suture (running).  The wound was stressed in various directions to insure the adequacy of closure and of hemostasis.  The wound edges were pink and well perfused.  Reconstruction was considered complete.

## 2024-06-16 ENCOUNTER — HEALTH MAINTENANCE LETTER (OUTPATIENT)
Age: 39
End: 2024-06-16

## (undated) DEVICE — ADHESIVE SWIFTSET 0.8ML OCTYL SS6

## (undated) DEVICE — Device

## (undated) DEVICE — ANTIFOG SOLUTION W/FOAM PAD 31142527

## (undated) DEVICE — GLOVE PROTEXIS W/NEU-THERA 7.5  2D73TE75

## (undated) DEVICE — STOCKING SLEEVE COMPRESSION CALF LG

## (undated) DEVICE — ENDO TROCAR FIRST ENTRY KII FIOS ADV FIX 12X100MM CFF73

## (undated) DEVICE — ENDO POUCH 8X10" LAPSAC 054800

## (undated) DEVICE — GLOVE PROTEXIS BLUE W/NEU-THERA 7.0  2D73EB70

## (undated) DEVICE — ESU ENDO SCISSORS 5MM CVD 5DCS

## (undated) DEVICE — CATH TRAY FOLEY SURESTEP 16FR WDRAIN BAG STLK LATEX A300316A

## (undated) DEVICE — GLOVE ESTEEM POWDER FREE SMT 6.5  2D72PT65

## (undated) DEVICE — PACK LAPAROTOMY CUSTOM LAKES

## (undated) DEVICE — TUBING C02 INSUFFLATION W/FILTER

## (undated) DEVICE — CLIP APPLIER ENDO ROTATING 10MM MED/LG ER320

## (undated) DEVICE — GOWN XLG DISP 9545

## (undated) DEVICE — ENDO POUCH UNIVERSAL RETRIEVAL SYSTEM INZII 12/15MM CD004

## (undated) DEVICE — ENDO TROCAR FIRST ENTRY KII FIOS ADV FIX 05X100MM CFF03

## (undated) DEVICE — TUBING SUCTION 12"X1/4" N612

## (undated) DEVICE — CLIP APPLIER ENDO 10MM M/L 176657

## (undated) DEVICE — GLOVE PROTEXIS BLUE W/NEU-THERA 8.5  2D73EB85

## (undated) DEVICE — SUCTION TIP POOLE K770

## (undated) DEVICE — SOL NACL 0.9% IRRIG 1000ML BOTTLE 07138-09

## (undated) DEVICE — SU VICRYL 4-0 PS-2 18" UND J496G

## (undated) DEVICE — ESU LIGASURE IMPACT OPEN SEALER/DVDR CVD LG JAW 36MM LF4318

## (undated) DEVICE — ESU HOLSTER PLASTIC DISP E2400

## (undated) DEVICE — PACK C-SECTION LF PL15OTA83B

## (undated) DEVICE — CATH TRAY FOLEY 16FR SILICONE 907416

## (undated) DEVICE — ADH LIQUID MASTISOL TOPICAL VIAL 2-3ML 0523-48

## (undated) DEVICE — ESU CORD MONOPOLAR 10'  E0510

## (undated) DEVICE — SUCTION TIP YANKAUER STR K87

## (undated) DEVICE — DRAPE POUCH INSTRUMENT 3 POCKET 1018L

## (undated) DEVICE — ESU LIGASURE LAPAROSCOPIC BLUNT TIP SEALER 5MMX37CM LF1837

## (undated) DEVICE — ENDO TROCAR SLEEVE KII ADV FIXATION 05X100MM CFS02

## (undated) DEVICE — ESU PENCIL W/COATED BLADE E2450H

## (undated) DEVICE — TUBING HYDRO-SURG PLUS LAP IRRIGATOR SUCTION 0026870

## (undated) DEVICE — STRAP KNEE/BODY 31143004

## (undated) DEVICE — SU MONOCRYL 0 CT-1 36" Y346H

## (undated) DEVICE — SU VICRYL 0 UR-6 27" J603H

## (undated) DEVICE — SU VICRYL 4-0 FS-2 27" J422-H

## (undated) DEVICE — DRAPE IOBAN LG .375X23.5" 6648EZ

## (undated) DEVICE — BLADE KNIFE SURG 11 371111

## (undated) DEVICE — ESU LIGASURE OPEN SEALER/DIVIDER SM JAW 16.5MM LF1212A

## (undated) DEVICE — SOL WATER IRRIG 1000ML BOTTLE 07139-09

## (undated) DEVICE — BNDG ABDOMINAL BINDER 10X26-50" 08140145

## (undated) DEVICE — PREP CHLORAPREP 26ML TINTED ORANGE  260815

## (undated) DEVICE — SU VICRYL 0 CT-1 36" J346H

## (undated) DEVICE — DRSG ABDOMINAL 07 1/2X8" 7197D

## (undated) DEVICE — STPL POWERED ECHELON 45MM PSEE45A

## (undated) DEVICE — DECANTER VIAL 2006S

## (undated) DEVICE — SOL NACL 0.9% IRRIG 3000ML BAG 07972-08

## (undated) DEVICE — GLOVE PROTEXIS BLUE W/NEU-THERA 8.0  2D73EB80

## (undated) DEVICE — SPONGE LAP 18X18" 1515

## (undated) RX ORDER — LIDOCAINE HYDROCHLORIDE 10 MG/ML
INJECTION, SOLUTION EPIDURAL; INFILTRATION; INTRACAUDAL; PERINEURAL
Status: DISPENSED
Start: 2018-11-06

## (undated) RX ORDER — GLYCOPYRROLATE 0.2 MG/ML
INJECTION, SOLUTION INTRAMUSCULAR; INTRAVENOUS
Status: DISPENSED
Start: 2018-11-06

## (undated) RX ORDER — DEXAMETHASONE SODIUM PHOSPHATE 4 MG/ML
INJECTION, SOLUTION INTRA-ARTICULAR; INTRALESIONAL; INTRAMUSCULAR; INTRAVENOUS; SOFT TISSUE
Status: DISPENSED
Start: 2018-11-06

## (undated) RX ORDER — FENTANYL CITRATE 50 UG/ML
INJECTION, SOLUTION INTRAMUSCULAR; INTRAVENOUS
Status: DISPENSED
Start: 2018-11-06

## (undated) RX ORDER — OXYCODONE HYDROCHLORIDE 5 MG/1
TABLET ORAL
Status: DISPENSED
Start: 2018-11-06

## (undated) RX ORDER — ONDANSETRON 2 MG/ML
INJECTION INTRAMUSCULAR; INTRAVENOUS
Status: DISPENSED
Start: 2021-11-14

## (undated) RX ORDER — FENTANYL CITRATE-0.9 % NACL/PF 10 MCG/ML
PLASTIC BAG, INJECTION (ML) INTRAVENOUS
Status: DISPENSED
Start: 2021-11-14

## (undated) RX ORDER — KETOROLAC TROMETHAMINE 30 MG/ML
INJECTION, SOLUTION INTRAMUSCULAR; INTRAVENOUS
Status: DISPENSED
Start: 2021-11-14

## (undated) RX ORDER — MORPHINE SULFATE 1 MG/ML
INJECTION, SOLUTION EPIDURAL; INTRATHECAL; INTRAVENOUS
Status: DISPENSED
Start: 2021-11-14

## (undated) RX ORDER — OXYTOCIN/0.9 % SODIUM CHLORIDE 30/500 ML
PLASTIC BAG, INJECTION (ML) INTRAVENOUS
Status: DISPENSED
Start: 2021-11-14

## (undated) RX ORDER — ACETAMINOPHEN 325 MG/1
TABLET ORAL
Status: DISPENSED
Start: 2021-11-14

## (undated) RX ORDER — ONDANSETRON 2 MG/ML
INJECTION INTRAMUSCULAR; INTRAVENOUS
Status: DISPENSED
Start: 2018-11-06

## (undated) RX ORDER — LIDOCAINE HYDROCHLORIDE 10 MG/ML
INJECTION, SOLUTION INFILTRATION; PERINEURAL
Status: DISPENSED
Start: 2018-11-06

## (undated) RX ORDER — SCOLOPAMINE TRANSDERMAL SYSTEM 1 MG/1
PATCH, EXTENDED RELEASE TRANSDERMAL
Status: DISPENSED
Start: 2018-11-06

## (undated) RX ORDER — BUPIVACAINE HYDROCHLORIDE 2.5 MG/ML
INJECTION, SOLUTION EPIDURAL; INFILTRATION; INTRACAUDAL
Status: DISPENSED
Start: 2021-11-14

## (undated) RX ORDER — PROPOFOL 10 MG/ML
INJECTION, EMULSION INTRAVENOUS
Status: DISPENSED
Start: 2018-11-06

## (undated) RX ORDER — FENTANYL CITRATE 50 UG/ML
INJECTION, SOLUTION INTRAMUSCULAR; INTRAVENOUS
Status: DISPENSED
Start: 2021-11-14

## (undated) RX ORDER — CEFAZOLIN SODIUM 2 G/100ML
INJECTION, SOLUTION INTRAVENOUS
Status: DISPENSED
Start: 2018-11-06